# Patient Record
Sex: MALE | Race: WHITE | NOT HISPANIC OR LATINO | Employment: OTHER | URBAN - METROPOLITAN AREA
[De-identification: names, ages, dates, MRNs, and addresses within clinical notes are randomized per-mention and may not be internally consistent; named-entity substitution may affect disease eponyms.]

---

## 2017-02-10 DIAGNOSIS — G44.40 OTHER DRUG INDUCED HEADACHE: ICD-10-CM

## 2017-02-10 RX ORDER — GABAPENTIN 300 MG/1
CAPSULE ORAL
Qty: 270 CAPSULE | Refills: 0 | Status: SHIPPED | OUTPATIENT
Start: 2017-02-10 | End: 2017-05-09 | Stop reason: SDUPTHER

## 2017-02-20 ENCOUNTER — TELEPHONE (OUTPATIENT)
Dept: INTERNAL MEDICINE | Facility: CLINIC | Age: 77
End: 2017-02-20

## 2017-02-20 DIAGNOSIS — E78.2 MIXED HYPERLIPIDEMIA: ICD-10-CM

## 2017-02-20 DIAGNOSIS — E03.9 HYPOTHYROIDISM, UNSPECIFIED TYPE: ICD-10-CM

## 2017-02-20 DIAGNOSIS — I25.10 CORONARY ARTERY DISEASE INVOLVING NATIVE CORONARY ARTERY OF NATIVE HEART WITHOUT ANGINA PECTORIS: Primary | ICD-10-CM

## 2017-02-20 NOTE — TELEPHONE ENCOUNTER
Please have Dr Brown put lab orders in for this pt. He is coming in on 2/22/17.     Pt was here 10/16 but no labs done    Thanks

## 2017-02-22 ENCOUNTER — LAB (OUTPATIENT)
Dept: INTERNAL MEDICINE | Facility: CLINIC | Age: 77
End: 2017-02-22

## 2017-02-22 DIAGNOSIS — I25.10 CORONARY ARTERY DISEASE INVOLVING NATIVE CORONARY ARTERY OF NATIVE HEART WITHOUT ANGINA PECTORIS: ICD-10-CM

## 2017-02-22 DIAGNOSIS — E03.9 HYPOTHYROIDISM, UNSPECIFIED TYPE: ICD-10-CM

## 2017-02-22 DIAGNOSIS — E78.2 MIXED HYPERLIPIDEMIA: ICD-10-CM

## 2017-02-22 LAB
ALBUMIN SERPL-MCNC: 3.77 G/DL (ref 3.4–4.6)
ALBUMIN/GLOB SERPL: 1.3 G/DL
ALP SERPL-CCNC: 141 U/L (ref 46–116)
ALT SERPL W P-5'-P-CCNC: 85 U/L (ref 16–63)
ANION GAP SERPL CALCULATED.3IONS-SCNC: 7 MMOL/L
AST SERPL-CCNC: 58 U/L (ref 7–37)
BASOPHILS # BLD AUTO: 0.03 10*3/MM3 (ref 0–0.2)
BASOPHILS NFR BLD AUTO: 0.6 % (ref 0–2)
BILIRUB SERPL-MCNC: 1 MG/DL (ref 0.2–1)
BUN BLD-MCNC: 11 MG/DL (ref 6–22)
BUN/CREAT SERPL: 10.5 (ref 7–25)
CALCIUM SPEC-SCNC: 8.7 MG/DL (ref 8.6–10.5)
CHLORIDE SERPL-SCNC: 105 MMOL/L (ref 95–107)
CHOLEST SERPL-MCNC: 119 MG/DL (ref 0–200)
CO2 SERPL-SCNC: 31 MMOL/L (ref 23–32)
CREAT BLD-MCNC: 1.05 MG/DL (ref 0.7–1.3)
DEPRECATED RDW RBC AUTO: 43.1 FL (ref 37–54)
EOSINOPHIL # BLD AUTO: 0.18 10*3/MM3 (ref 0–0.7)
EOSINOPHIL NFR BLD AUTO: 3.4 % (ref 0–5)
ERYTHROCYTE [DISTWIDTH] IN BLOOD BY AUTOMATED COUNT: 11.8 % (ref 11.5–15)
GFR SERPL CREATININE-BSD FRML MDRD: 69 ML/MIN/1.73
GLOBULIN UR ELPH-MCNC: 2.9 GM/DL
GLUCOSE BLD-MCNC: 83 MG/DL (ref 70–100)
HCT VFR BLD AUTO: 45.4 % (ref 40.1–51)
HDLC SERPL-MCNC: 66 MG/DL (ref 40–81)
HGB BLD-MCNC: 15.5 G/DL (ref 13.7–17.5)
LDLC SERPL CALC-MCNC: 39 MG/DL (ref 0–100)
LDLC/HDLC SERPL: 0.59 {RATIO}
LYMPHOCYTES # BLD AUTO: 1.12 10*3/MM3 (ref 0.8–7)
LYMPHOCYTES NFR BLD AUTO: 21.3 % (ref 10–60)
MCH RBC QN AUTO: 33.9 PG (ref 26–34)
MCHC RBC AUTO-ENTMCNC: 34.1 G/DL (ref 31–37)
MCV RBC AUTO: 99.3 FL (ref 80–100)
MONOCYTES # BLD AUTO: 0.5 10*3/MM3 (ref 0–1)
MONOCYTES NFR BLD AUTO: 9.5 % (ref 0–13)
NEUTROPHILS # BLD AUTO: 3.43 10*3/MM3 (ref 1–11)
NEUTROPHILS NFR BLD AUTO: 65.2 % (ref 30–85)
PLATELET # BLD AUTO: 185 10*3/MM3 (ref 150–450)
PMV BLD AUTO: 11 FL (ref 6–12)
POTASSIUM BLD-SCNC: 4.4 MMOL/L (ref 3.3–5.3)
PROT SERPL-MCNC: 6.7 G/DL (ref 6.3–8.4)
RBC # BLD AUTO: 4.57 10*6/MM3 (ref 4.63–6.08)
SODIUM BLD-SCNC: 143 MMOL/L (ref 136–145)
T4 FREE SERPL-MCNC: 0.95 NG/DL (ref 0.93–1.7)
TRIGL SERPL-MCNC: 71 MG/DL (ref 0–150)
TSH SERPL DL<=0.05 MIU/L-ACNC: 3.97 MIU/ML (ref 0.4–4.2)
VLDLC SERPL-MCNC: 14.2 MG/DL
WBC NRBC COR # BLD: 5.26 10*3/MM3 (ref 5–10)

## 2017-02-22 PROCEDURE — 80053 COMPREHEN METABOLIC PANEL: CPT | Performed by: INTERNAL MEDICINE

## 2017-02-22 PROCEDURE — 84443 ASSAY THYROID STIM HORMONE: CPT | Performed by: INTERNAL MEDICINE

## 2017-02-22 PROCEDURE — 80061 LIPID PANEL: CPT | Performed by: INTERNAL MEDICINE

## 2017-02-22 PROCEDURE — 85025 COMPLETE CBC W/AUTO DIFF WBC: CPT | Performed by: INTERNAL MEDICINE

## 2017-05-09 DIAGNOSIS — G44.40 OTHER DRUG INDUCED HEADACHE: ICD-10-CM

## 2017-05-09 RX ORDER — GABAPENTIN 300 MG/1
CAPSULE ORAL
Qty: 270 CAPSULE | Refills: 0 | Status: SHIPPED | OUTPATIENT
Start: 2017-05-09 | End: 2017-08-15 | Stop reason: SDUPTHER

## 2017-05-16 DIAGNOSIS — F39 MOOD DISORDER (HCC): ICD-10-CM

## 2017-05-16 RX ORDER — FLUOXETINE HYDROCHLORIDE 40 MG/1
CAPSULE ORAL
Qty: 90 CAPSULE | Refills: 0 | Status: SHIPPED | OUTPATIENT
Start: 2017-05-16 | End: 2017-08-15 | Stop reason: SDUPTHER

## 2017-08-07 DIAGNOSIS — G44.40 OTHER DRUG INDUCED HEADACHE: ICD-10-CM

## 2017-08-07 RX ORDER — GABAPENTIN 300 MG/1
CAPSULE ORAL
Qty: 270 CAPSULE | OUTPATIENT
Start: 2017-08-07

## 2017-08-15 ENCOUNTER — OFFICE VISIT (OUTPATIENT)
Dept: INTERNAL MEDICINE | Facility: CLINIC | Age: 77
End: 2017-08-15

## 2017-08-15 VITALS
SYSTOLIC BLOOD PRESSURE: 110 MMHG | HEIGHT: 72 IN | DIASTOLIC BLOOD PRESSURE: 88 MMHG | WEIGHT: 201 LBS | BODY MASS INDEX: 27.22 KG/M2

## 2017-08-15 DIAGNOSIS — I25.10 CORONARY ARTERY DISEASE INVOLVING NATIVE CORONARY ARTERY OF NATIVE HEART WITHOUT ANGINA PECTORIS: Primary | ICD-10-CM

## 2017-08-15 DIAGNOSIS — F39 MOOD DISORDER (HCC): ICD-10-CM

## 2017-08-15 DIAGNOSIS — G44.40 OTHER DRUG INDUCED HEADACHE: ICD-10-CM

## 2017-08-15 DIAGNOSIS — N52.9 ERECTILE DYSFUNCTION, UNSPECIFIED ERECTILE DYSFUNCTION TYPE: ICD-10-CM

## 2017-08-15 DIAGNOSIS — G25.0 TREMOR, ESSENTIAL: ICD-10-CM

## 2017-08-15 DIAGNOSIS — E78.2 MIXED HYPERLIPIDEMIA: ICD-10-CM

## 2017-08-15 LAB
ALBUMIN SERPL-MCNC: 3.95 G/DL (ref 3.4–4.6)
ALBUMIN/GLOB SERPL: 1.3 G/DL
ALP SERPL-CCNC: 149 U/L (ref 46–116)
ALT SERPL W P-5'-P-CCNC: 68 U/L (ref 16–63)
ANION GAP SERPL CALCULATED.3IONS-SCNC: 7 MMOL/L
AST SERPL-CCNC: 58 U/L (ref 7–37)
BASOPHILS # BLD AUTO: 0.01 10*3/MM3 (ref 0–0.2)
BASOPHILS NFR BLD AUTO: 0.1 % (ref 0–2)
BILIRUB SERPL-MCNC: 0.6 MG/DL (ref 0.2–1)
BUN BLD-MCNC: 20 MG/DL (ref 6–22)
BUN/CREAT SERPL: 17.4 (ref 7–25)
CALCIUM SPEC-SCNC: 9.6 MG/DL (ref 8.6–10.5)
CHLORIDE SERPL-SCNC: 107 MMOL/L (ref 95–107)
CO2 SERPL-SCNC: 29 MMOL/L (ref 23–32)
CREAT BLD-MCNC: 1.15 MG/DL (ref 0.7–1.3)
DEPRECATED RDW RBC AUTO: 43.2 FL (ref 37–54)
EOSINOPHIL # BLD AUTO: 0.11 10*3/MM3 (ref 0–0.7)
EOSINOPHIL NFR BLD AUTO: 1.6 % (ref 0–5)
ERYTHROCYTE [DISTWIDTH] IN BLOOD BY AUTOMATED COUNT: 11.9 % (ref 11.5–15)
GFR SERPL CREATININE-BSD FRML MDRD: 62 ML/MIN/1.73
GLOBULIN UR ELPH-MCNC: 3 GM/DL
GLUCOSE BLD-MCNC: 90 MG/DL (ref 70–100)
HCT VFR BLD AUTO: 41.1 % (ref 40.1–51)
HGB BLD-MCNC: 14.5 G/DL (ref 13.7–17.5)
LYMPHOCYTES # BLD AUTO: 1.3 10*3/MM3 (ref 0.8–7)
LYMPHOCYTES NFR BLD AUTO: 18.3 % (ref 10–60)
MCH RBC QN AUTO: 34.9 PG (ref 26–34)
MCHC RBC AUTO-ENTMCNC: 35.3 G/DL (ref 31–37)
MCV RBC AUTO: 98.8 FL (ref 80–100)
MONOCYTES # BLD AUTO: 0.72 10*3/MM3 (ref 0–1)
MONOCYTES NFR BLD AUTO: 10.2 % (ref 0–13)
NEUTROPHILS # BLD AUTO: 4.95 10*3/MM3 (ref 1–11)
NEUTROPHILS NFR BLD AUTO: 69.8 % (ref 30–85)
PLATELET # BLD AUTO: 220 10*3/MM3 (ref 150–450)
PMV BLD AUTO: 10.9 FL (ref 6–12)
POTASSIUM BLD-SCNC: 4.6 MMOL/L (ref 3.3–5.3)
PROT SERPL-MCNC: 6.9 G/DL (ref 6.3–8.4)
RBC # BLD AUTO: 4.16 10*6/MM3 (ref 4.63–6.08)
SODIUM BLD-SCNC: 143 MMOL/L (ref 136–145)
WBC NRBC COR # BLD: 7.09 10*3/MM3 (ref 5–10)

## 2017-08-15 PROCEDURE — 85025 COMPLETE CBC W/AUTO DIFF WBC: CPT | Performed by: INTERNAL MEDICINE

## 2017-08-15 PROCEDURE — 80053 COMPREHEN METABOLIC PANEL: CPT | Performed by: INTERNAL MEDICINE

## 2017-08-15 PROCEDURE — 99214 OFFICE O/P EST MOD 30 MIN: CPT | Performed by: INTERNAL MEDICINE

## 2017-08-15 RX ORDER — FLUOXETINE HYDROCHLORIDE 40 MG/1
CAPSULE ORAL
Qty: 90 CAPSULE | Refills: 3 | Status: SHIPPED | OUTPATIENT
Start: 2017-08-15 | End: 2018-02-09 | Stop reason: SDUPTHER

## 2017-08-15 RX ORDER — IBUPROFEN 200 MG
200 TABLET ORAL
COMMUNITY
End: 2018-01-24 | Stop reason: SDUPTHER

## 2017-08-15 RX ORDER — GABAPENTIN 300 MG/1
300 CAPSULE ORAL 3 TIMES DAILY
Qty: 270 CAPSULE | Refills: 0 | Status: SHIPPED | OUTPATIENT
Start: 2017-08-15 | End: 2017-12-19 | Stop reason: SDUPTHER

## 2017-08-15 NOTE — PROGRESS NOTES
Subjective   Sukhdev Zayas is a 76 y.o. male.     Coronary Artery Disease   Presents for follow-up visit. Pertinent negatives include no chest pain or palpitations. Dizziness: Has hadinner ear problems for tears.        The following portions of the patient's history were reviewed and updated as appropriate: allergies, current medications, past family history, past medical history, past social history, past surgical history and problem list.    Review of Systems   Constitutional:        Here for F/U  Fell in yard last week   HENT: Negative.    Eyes: Negative.    Respiratory: Negative.    Cardiovascular: Negative for chest pain and palpitations.        Doing well sees Dr Proctor   Gastrointestinal: Negative.    Genitourinary: Negative.         Has issues W/ erectile dysfunction   Musculoskeletal: Negative.    Neurological: Dizziness: Has hadinner ear problems for tears.       Objective   Physical Exam   Constitutional: He is oriented to person, place, and time. He appears well-developed.   HENT:   Head: Normocephalic.   Eyes: EOM are normal.   Neck: Neck supple.   Cardiovascular: Normal rate, regular rhythm and normal heart sounds.    Repeat 130/80   Pulmonary/Chest: Effort normal and breath sounds normal.   Musculoskeletal: Normal range of motion.   Neurological: He is alert and oriented to person, place, and time.   Vitals reviewed.      Assessment/Plan   Sukhdev was seen today for med refill and follow-up.    Diagnoses and all orders for this visit:    Coronary artery disease involving native coronary artery of native heart without angina pectoris  -     CBC Auto Differential; Future  -     Comprehensive Metabolic Panel; Future  -     CBC Auto Differential  -     Comprehensive Metabolic Panel    Mixed hyperlipidemia    Tremor, essential    Erectile dysfunction, unspecified erectile dysfunction type  -     Testosterone, Free, Total; Future  -     Testosterone, Free, Total    Other drug induced headache  -      gabapentin (NEURONTIN) 300 MG capsule; Take 1 capsule by mouth 3 (Three) Times a Day.

## 2017-08-16 LAB
TESTOST FREE SERPL-MCNC: 6.1 PG/ML (ref 6.6–18.1)
TESTOST SERPL-MCNC: 684 NG/DL (ref 264–916)

## 2017-11-15 ENCOUNTER — OFFICE VISIT (OUTPATIENT)
Dept: INTERNAL MEDICINE | Facility: CLINIC | Age: 77
End: 2017-11-15

## 2017-11-15 VITALS
HEART RATE: 58 BPM | DIASTOLIC BLOOD PRESSURE: 98 MMHG | SYSTOLIC BLOOD PRESSURE: 120 MMHG | BODY MASS INDEX: 27.63 KG/M2 | OXYGEN SATURATION: 97 % | WEIGHT: 204 LBS | HEIGHT: 72 IN

## 2017-11-15 DIAGNOSIS — E78.2 MIXED HYPERLIPIDEMIA: ICD-10-CM

## 2017-11-15 DIAGNOSIS — R31.9 HEMATURIA OF UNDIAGNOSED CAUSE: ICD-10-CM

## 2017-11-15 DIAGNOSIS — I25.10 CORONARY ARTERY DISEASE INVOLVING NATIVE CORONARY ARTERY OF NATIVE HEART WITHOUT ANGINA PECTORIS: ICD-10-CM

## 2017-11-15 DIAGNOSIS — R26.9 GAIT DISTURBANCE: Primary | ICD-10-CM

## 2017-11-15 DIAGNOSIS — Z12.5 PROSTATE CANCER SCREENING: ICD-10-CM

## 2017-11-15 LAB
ALBUMIN SERPL-MCNC: 4.2 G/DL (ref 3.5–5.2)
ALBUMIN/GLOB SERPL: 1.4 G/DL
ALP SERPL-CCNC: 153 U/L (ref 39–117)
ALT SERPL W P-5'-P-CCNC: 62 U/L (ref 1–41)
AMORPH URATE CRY URNS QL MICRO: ABNORMAL /HPF
ANION GAP SERPL CALCULATED.3IONS-SCNC: 12 MMOL/L
AST SERPL-CCNC: 57 U/L (ref 1–40)
BACTERIA UR QL AUTO: ABNORMAL /HPF
BASOPHILS # BLD AUTO: 0.01 10*3/MM3 (ref 0–0.2)
BASOPHILS NFR BLD AUTO: 0.2 % (ref 0–2)
BILIRUB SERPL-MCNC: 1.1 MG/DL (ref 0.1–1.2)
BILIRUB UR QL STRIP: NEGATIVE
BUN BLD-MCNC: 17 MG/DL (ref 8–23)
BUN/CREAT SERPL: 17 (ref 7–25)
CALCIUM SPEC-SCNC: 9.3 MG/DL (ref 8.6–10.5)
CHLORIDE SERPL-SCNC: 102 MMOL/L (ref 98–107)
CHOLEST SERPL-MCNC: 124 MG/DL (ref 0–200)
CLARITY UR: ABNORMAL
CO2 SERPL-SCNC: 27 MMOL/L (ref 22–29)
COLOR UR: YELLOW
CREAT BLD-MCNC: 1 MG/DL (ref 0.76–1.27)
DEPRECATED RDW RBC AUTO: 46.3 FL (ref 37–54)
EOSINOPHIL # BLD AUTO: 0.13 10*3/MM3 (ref 0–0.7)
EOSINOPHIL NFR BLD AUTO: 2 % (ref 0–5)
ERYTHROCYTE [DISTWIDTH] IN BLOOD BY AUTOMATED COUNT: 12.5 % (ref 11.5–15)
GFR SERPL CREATININE-BSD FRML MDRD: 73 ML/MIN/1.73
GLOBULIN UR ELPH-MCNC: 3 GM/DL
GLUCOSE BLD-MCNC: 90 MG/DL (ref 65–99)
GLUCOSE UR STRIP-MCNC: NEGATIVE MG/DL
HCT VFR BLD AUTO: 46.5 % (ref 40.1–51)
HDLC SERPL-MCNC: 74 MG/DL (ref 40–60)
HGB BLD-MCNC: 15.3 G/DL (ref 13.7–17.5)
HGB UR QL STRIP.AUTO: ABNORMAL
HYALINE CASTS UR QL AUTO: ABNORMAL /LPF
KETONES UR QL STRIP: NEGATIVE
LDLC SERPL CALC-MCNC: 35 MG/DL (ref 0–100)
LDLC/HDLC SERPL: 0.48 {RATIO}
LEUKOCYTE ESTERASE UR QL STRIP.AUTO: NEGATIVE
LYMPHOCYTES # BLD AUTO: 1.11 10*3/MM3 (ref 0.8–7)
LYMPHOCYTES NFR BLD AUTO: 17.1 % (ref 10–60)
MCH RBC QN AUTO: 33.7 PG (ref 26–34)
MCHC RBC AUTO-ENTMCNC: 32.9 G/DL (ref 31–37)
MCV RBC AUTO: 102.4 FL (ref 80–100)
MONOCYTES # BLD AUTO: 0.51 10*3/MM3 (ref 0–1)
MONOCYTES NFR BLD AUTO: 7.9 % (ref 0–13)
MUCOUS THREADS URNS QL MICRO: ABNORMAL /HPF
NEUTROPHILS # BLD AUTO: 4.73 10*3/MM3 (ref 1–11)
NEUTROPHILS NFR BLD AUTO: 72.8 % (ref 30–85)
NITRITE UR QL STRIP: NEGATIVE
PH UR STRIP.AUTO: 7 [PH] (ref 5–8)
PLATELET # BLD AUTO: 210 10*3/MM3 (ref 150–450)
PMV BLD AUTO: 11 FL (ref 6–12)
POTASSIUM BLD-SCNC: 4.1 MMOL/L (ref 3.5–5.2)
PROT SERPL-MCNC: 7.2 G/DL (ref 6–8.5)
PROT UR QL STRIP: NEGATIVE
PSA SERPL-MCNC: 1.54 NG/ML (ref 0–4)
RBC # BLD AUTO: 4.54 10*6/MM3 (ref 4.63–6.08)
RBC # UR: ABNORMAL /HPF
REF LAB TEST METHOD: ABNORMAL
SODIUM BLD-SCNC: 141 MMOL/L (ref 136–145)
SP GR UR STRIP: 1.02 (ref 1–1.03)
SQUAMOUS #/AREA URNS HPF: ABNORMAL /HPF
TRIGL SERPL-MCNC: 73 MG/DL (ref 0–150)
UROBILINOGEN UR QL STRIP: ABNORMAL
VLDLC SERPL-MCNC: 14.6 MG/DL (ref 5–40)
WBC NRBC COR # BLD: 6.49 10*3/MM3 (ref 5–10)
WBC UR QL AUTO: ABNORMAL /HPF

## 2017-11-15 PROCEDURE — 36415 COLL VENOUS BLD VENIPUNCTURE: CPT | Performed by: INTERNAL MEDICINE

## 2017-11-15 PROCEDURE — 81001 URINALYSIS AUTO W/SCOPE: CPT | Performed by: INTERNAL MEDICINE

## 2017-11-15 PROCEDURE — 99214 OFFICE O/P EST MOD 30 MIN: CPT | Performed by: INTERNAL MEDICINE

## 2017-11-15 PROCEDURE — 80053 COMPREHEN METABOLIC PANEL: CPT | Performed by: INTERNAL MEDICINE

## 2017-11-15 PROCEDURE — G0103 PSA SCREENING: HCPCS | Performed by: INTERNAL MEDICINE

## 2017-11-15 PROCEDURE — 80061 LIPID PANEL: CPT | Performed by: INTERNAL MEDICINE

## 2017-11-15 PROCEDURE — 85025 COMPLETE CBC W/AUTO DIFF WBC: CPT | Performed by: INTERNAL MEDICINE

## 2017-11-15 NOTE — PROGRESS NOTES
Subjective   Sukhdev Zayas is a 76 y.o. male.     Fall   The accident occurred more than 1 week ago. Associated symptoms include hematuria (Has had 2 episodes of hematuria No pain or dysuria).   Hyperlipidemia   This is a chronic problem. Associated symptoms include shortness of breath (Due to inactivity). Pertinent negatives include no chest pain.        The following portions of the patient's history were reviewed and updated as appropriate: allergies, current medications, past family history, past medical history, past social history, past surgical history and problem list.    Review of Systems   Constitutional:        Doing about the same   HENT: Negative.    Eyes: Negative.    Respiratory: Positive for shortness of breath (Due to inactivity).    Cardiovascular: Negative for chest pain and palpitations.   Gastrointestinal: Negative.    Genitourinary: Positive for hematuria (Has had 2 episodes of hematuria No pain or dysuria).   Musculoskeletal: Negative.    Neurological: Positive for dizziness (Having balance issues & has fallen twice W/ no injury Comes & goes Has episodic vertigo which is incapacitating).       Objective   Physical Exam   Constitutional: He is oriented to person, place, and time. He appears well-developed and well-nourished.   HENT:   Head: Normocephalic.   Eyes: EOM are normal.   Neck: Neck supple.   Cardiovascular: Normal rate, regular rhythm and normal heart sounds.    Repeat 130/80   Pulmonary/Chest: Effort normal and breath sounds normal.   Genitourinary: Rectum normal, prostate normal and penis normal.   Musculoskeletal: Normal range of motion.   Neurological: He is alert and oriented to person, place, and time.   Vitals reviewed.      Assessment/Plan   Sukhdev was seen today for fall and hyperlipidemia.    Diagnoses and all orders for this visit:    Gait disturbance  -     Ambulatory Referral to Neurology    Hematuria of undiagnosed cause  -     Urinalysis With / Microscopic If  Indicated - Urine, Clean Catch; Future    Coronary artery disease involving native coronary artery of native heart without angina pectoris  -     CBC Auto Differential; Future  -     Comprehensive Metabolic Panel; Future    Mixed hyperlipidemia  -     Lipid Panel; Future    Prostate cancer screening  -     PSA Screen; Future

## 2017-12-19 DIAGNOSIS — R52 PAIN: Primary | ICD-10-CM

## 2017-12-19 RX ORDER — GABAPENTIN 300 MG/1
300 CAPSULE ORAL 3 TIMES DAILY
Qty: 270 CAPSULE | Refills: 0 | Status: SHIPPED | OUTPATIENT
Start: 2017-12-19 | End: 2018-04-17 | Stop reason: SDUPTHER

## 2017-12-19 NOTE — TELEPHONE ENCOUNTER
Last OV  11/15  Last refill 08/05  blair done    Please print to fax to [facility]  please review med refill and advise

## 2018-01-10 ENCOUNTER — HOSPITAL ENCOUNTER (EMERGENCY)
Facility: HOSPITAL | Age: 78
Discharge: HOME | End: 2018-01-10
Attending: EMERGENCY MEDICINE | Admitting: EMERGENCY MEDICINE

## 2018-01-10 ENCOUNTER — APPOINTMENT (OUTPATIENT)
Dept: GENERAL RADIOLOGY | Facility: HOSPITAL | Age: 78
End: 2018-01-10

## 2018-01-10 ENCOUNTER — APPOINTMENT (OUTPATIENT)
Dept: CT IMAGING | Facility: HOSPITAL | Age: 78
End: 2018-01-10

## 2018-01-10 VITALS
WEIGHT: 205 LBS | BODY MASS INDEX: 27.77 KG/M2 | HEIGHT: 72 IN | DIASTOLIC BLOOD PRESSURE: 94 MMHG | HEART RATE: 80 BPM | TEMPERATURE: 96.3 F | SYSTOLIC BLOOD PRESSURE: 157 MMHG | OXYGEN SATURATION: 93 % | RESPIRATION RATE: 18 BRPM

## 2018-01-10 DIAGNOSIS — S22.050A TRAUMATIC COMPRESSION FRACTURE OF T5 THORACIC VERTEBRA, CLOSED, INITIAL ENCOUNTER (HCC): Primary | ICD-10-CM

## 2018-01-10 LAB
HOLD SPECIMEN: NORMAL
HOLD SPECIMEN: NORMAL
WHOLE BLOOD HOLD SPECIMEN: NORMAL
WHOLE BLOOD HOLD SPECIMEN: NORMAL

## 2018-01-10 PROCEDURE — 99284 EMERGENCY DEPT VISIT MOD MDM: CPT

## 2018-01-10 PROCEDURE — 72128 CT CHEST SPINE W/O DYE: CPT

## 2018-01-10 PROCEDURE — 72072 X-RAY EXAM THORAC SPINE 3VWS: CPT

## 2018-01-10 PROCEDURE — 73030 X-RAY EXAM OF SHOULDER: CPT

## 2018-01-10 PROCEDURE — 70450 CT HEAD/BRAIN W/O DYE: CPT

## 2018-01-10 PROCEDURE — 72110 X-RAY EXAM L-2 SPINE 4/>VWS: CPT

## 2018-01-10 RX ORDER — IBUPROFEN 800 MG/1
800 TABLET ORAL ONCE
Status: COMPLETED | OUTPATIENT
Start: 2018-01-10 | End: 2018-01-10

## 2018-01-10 RX ORDER — SODIUM CHLORIDE 0.9 % (FLUSH) 0.9 %
10 SYRINGE (ML) INJECTION AS NEEDED
Status: DISCONTINUED | OUTPATIENT
Start: 2018-01-10 | End: 2018-01-10 | Stop reason: HOSPADM

## 2018-01-10 RX ORDER — HYDROCODONE BITARTRATE AND ACETAMINOPHEN 7.5; 325 MG/1; MG/1
1 TABLET ORAL ONCE
Status: COMPLETED | OUTPATIENT
Start: 2018-01-10 | End: 2018-01-10

## 2018-01-10 RX ORDER — HYDROCODONE BITARTRATE AND ACETAMINOPHEN 5; 325 MG/1; MG/1
1 TABLET ORAL EVERY 6 HOURS PRN
Qty: 30 TABLET | Refills: 0 | Status: SHIPPED | OUTPATIENT
Start: 2018-01-10 | End: 2018-01-26 | Stop reason: SDUPTHER

## 2018-01-10 RX ADMIN — IBUPROFEN 800 MG: 800 TABLET ORAL at 12:19

## 2018-01-10 RX ADMIN — HYDROCODONE BITARTRATE AND ACETAMINOPHEN 1 TABLET: 7.5; 325 TABLET ORAL at 13:18

## 2018-01-10 NOTE — ED TRIAGE NOTES
"Pt had slip and fall on ice two days ago, landing on back. Reports hit head and on Plavix. Pt states \"I think it knocked me out, it took me couple minutes to get back up the stairs.\" pt now c/o headache, dizziness and pain all over  "

## 2018-01-10 NOTE — ED PROVIDER NOTES
"EMERGENCY DEPARTMENT ENCOUNTER    CHIEF COMPLAINT  Chief Complaint: back pain  History given by: pt/ family is present at bedside   History limited by: N/A  Room Number: 44/44  PMD: Lbian Brown MD      HPI:  Pt is a 77 y.o. male who presents complaining of diffuse back pain that began 2 days ago s/p mechanical fall with progressive worsening pain. Pt states he suffered from a head injury from the fall when he slipped on a patch of ice and fell backwards, hitting the back of his head and back on the ground. Pt states he has been able to walk since initial encounter; however due to the progressive worsening of pain ambulating has become more difficult. Pt also c/o generalized myalgias/ arthralgias [secondary to the fall] and HA, but denies any other pertinent symptoms. Pt is currently taking ASA and Plavix.     Duration:  2 days   Onset: sudden   Timing: constant   Location: diffuse back   Radiation: None reported   Quality: \"pain\"  Intensity/Severity: moderate   Progression: worsening   Associated Symptoms: HA, generalized myalgias/ arthralgias   Aggravating Factors: Ambulating   Alleviating Factors: None reported   Previous Episodes: Pt experienced a mechanical fall 2 days ago.   Treatment before arrival: Pt is currently taking Plavix and ASA.     PAST MEDICAL HISTORY  Active Ambulatory Problems     Diagnosis Date Noted   • Hyperlipidemia 05/09/2016   • Coronary artery disease involving native coronary artery of native heart without angina pectoris 05/09/2016   • Cognitive disorder 05/09/2016   • Mood disorder 10/20/2016     Resolved Ambulatory Problems     Diagnosis Date Noted   • No Resolved Ambulatory Problems     Past Medical History:   Diagnosis Date   • Anxiety    • Back pain    • Coronary artery disease    • Depression    • Fatigue    • Hyperlipidemia    • Hypertension        PAST SURGICAL HISTORY  History reviewed. No pertinent surgical history.    FAMILY HISTORY  Family History   Problem Relation Age " of Onset   • Arthritis Mother    • Glaucoma Mother    • Heart disease Father    • Emphysema Father    • Tremor Father        SOCIAL HISTORY  Social History     Social History   • Marital status:      Spouse name: N/A   • Number of children: N/A   • Years of education: N/A     Occupational History   • Not on file.     Social History Main Topics   • Smoking status: Never Smoker   • Smokeless tobacco: Not on file   • Alcohol use Yes      Comment: OCC   • Drug use: No   • Sexual activity: Not on file     Other Topics Concern   • Not on file     Social History Narrative       ALLERGIES  No known drug allergy    REVIEW OF SYSTEMS  Review of Systems   Constitutional: Negative.  Negative for chills and fever.   HENT: Negative for sore throat.    Eyes: Negative.    Respiratory: Negative.  Negative for cough.    Cardiovascular: Negative.  Negative for chest pain.   Gastrointestinal: Negative.    Genitourinary: Negative.  Negative for dysuria.   Musculoskeletal: Positive for arthralgias (generalized ), back pain and myalgias (generalized ).   Skin: Negative.  Negative for rash.   Neurological: Positive for headaches (from head injury ).       PHYSICAL EXAM  ED Triage Vitals   Temp Heart Rate Resp BP SpO2   01/10/18 0730 01/10/18 0730 01/10/18 0730 01/10/18 0734 01/10/18 0730   96.3 °F (35.7 °C) 92 20 150/90 95 %      Temp src Heart Rate Source Patient Position BP Location FiO2 (%)   01/10/18 0730 01/10/18 0730 01/10/18 0734 01/10/18 0910 --   Tympanic Monitor Lying Left arm        Physical Exam   Constitutional: He is oriented to person, place, and time. He appears distressed (moderate due to pain ).   HENT:   Head: Normocephalic and atraumatic.   Eyes: EOM are normal. Pupils are equal, round, and reactive to light.   Neck: Normal range of motion. Neck supple.   Cardiovascular: Normal rate, regular rhythm, normal heart sounds and intact distal pulses.    Pulmonary/Chest: Effort normal and breath sounds normal. No  respiratory distress. He exhibits tenderness (to R psoterior ribs ).   Abdominal: Soft. There is no tenderness. There is no rebound and no guarding.   Musculoskeletal: Normal range of motion. He exhibits no edema.   Pt's pain is reproduced with movement   There are multiple abrasions/ contusions to the back with the most significant tenderness at the mid-back and R posterior ribs    Neurological: He is alert and oriented to person, place, and time. He has normal sensation and normal strength.   Skin: Skin is warm and dry.   Multiple contusions/ abrasions to the back    Psychiatric: Mood and affect normal.   Nursing note and vitals reviewed.      LAB RESULTS  Lab Results (last 24 hours)     ** No results found for the last 24 hours. **          I ordered the above labs and reviewed the results    RADIOLOGY  CT Head Without Contrast   FINDINGS: The brain and ventricles are symmetrical. There is no evidence  of intracranial hemorrhage, hydrocephalus or of abnormal extra-axial  fluid. No focal area of decreased attenuation to suggest acute  infarction is identified. Bone windows showed no evidence of calvarial  fracture. A small scalp hematoma is present in the posterior parietal  region at the midline.      XR Shoulder 2+ View Right    (Results Pending)   XR Spine Lumbar 4+ View    (Results Pending)   XR Spine Thoracic 3 View    (Results Pending)     Impression         There is compression fracture at approximately T5 thoracic level with  mild 20% to 30% loss of anterior vertebral body height. The posterior  cortex of the compressed T5 vertebra is not visualized and may be  artifact though I cannot exclude a lytic process. Age of this  compression fracture is uncertain, could be anywhere from acute to  chronic and I recommend further evaluation of the T5 thoracic vertebrae  with either a CT or MRI of the thoracic spine. No additional compression  fracture is seen in the thoracic spine.      LUMBAR SPINE PLAIN FILMS: A  total of 5 views of the lumbar spine  including AP lateral and bilateral oblique views of the entire lumbar  spine and coned-down lateral view of the lumbosacral junction are  submitted for interpretation. On lateral view,  the lumbar vertebral body heights are well-maintained. There is moderate  disc space narrowing, degenerative endplate change at L4-L5, anterior  marginal bridging osteophytes from T10 to L1. There is some facet  arthropathy L3 to S1. On the oblique views, the pars interarticularis  are intact. On the AP view, there is slight rotary dextroscoliotic  curvature of the lumbar spine, its apex is at the L3 lumbar level.  Pedicles and transverse processes appear to be intact.     IMPRESSION: No convincing acute fracture is seen in the lumbar spine.  There is mild dextroscoliotic curvature of the lumbar spine, its apex at  the L3 lumbar level with some lumbar spondylosis,  bilateral facet  overgrowth L2 to S1. The results on this exam were communicated to  Angi Lyles by telephone on 01/10/2018 at 9 AM.         I ordered the above noted radiological studies. Interpreted by radiologist. Reviewed by me in PACS.       PROCEDURES  Procedures      PROGRESS AND CONSULTS  ED Course     0742  Ordered T-spine XR, L-spine XR, R shoulder XR, and CT Head for further evaluation.     0913  Ordered IVF and labs for further evaluation.     1146  Ordered CT Thoracic Spine for further evaluation and Norco for pain.     1242  Rechecked pt who is sitting upright in a chair and in no acute distress. Discussed that CT T-spine shows a T-spine compression fracture. Discussed this does not require surgical intervention at this time and the tx plan is to manage pt's pain. Recommend pt f/u with neurosurgery next week and PCP as needed. Discussed discharge plans with pt/ spouse. Pt understands and agrees to plan, all questions addressed at this time.     MEDICAL DECISION MAKING  Results were reviewed/discussed with the patient  and they were also made aware of online access. Pt also made aware that some labs, such as cultures, will not be resulted during ER visit and follow up with PMD is necessary.     MDM  Number of Diagnoses or Management Options     Amount and/or Complexity of Data Reviewed  Tests in the radiology section of CPT®: ordered and reviewed (CT Head  FINDINGS: The brain and ventricles are symmetrical. There is no evidence of intracranial hemorrhage, hydrocephalus or of abnormal extra-axial fluid. No focal area of decreased attenuation to suggest acute infarction is identified. Bone windows showed no evidence of calvarial fracture. A small scalp hematoma is present in the posterior parietal region at the midline.    Impression        There is compression fracture at approximately T5 thoracic level with mild 20% to 30% loss of anterior vertebral body height. The posterior cortex of the compressed T5 vertebra is not visualized and may be artifact. Age of this compression fracture is uncertain, could be anywhere from acute to chronic and could be further dated with a CT or MRI of the thoracic spine if clinically indicated. No additional compression fracture is seen in the thoracic spine.      LUMBAR SPINE PLAIN FILMS: A total of 5 views of the lumbar spine including AP lateral and bilateral oblique views of the entire lumbar spine and coned-down lateral view of the lumbosacral junction are submitted for interpretation. On lateral view, the lumbar vertebral body heights are well-maintained. There is moderate disc space narrowing, degenerative endplate change at L4-L5, anterior marginal bridging osteophytes from T10 to L1. There is some facet arthropathy L3 to S1. On the oblique views, the pars interarticularis are intact. On the AP view, there is slight rotary dextroscoliotic curvature of the lumbar spine, its apex is at the L3 lumbar level. Pedicles and transverse processes appear to be intact.     IMPRESSION: No convincing  acute fracture is seen in the lumbar spine. There is mild dextroscoliotic curvature of the lumbar spine, its apex at the L3 lumbar level with some lumbar spondylosis,  bilateral facet overgrowth L2 to S1. The results on this exam were communicated to Angitoro Lyles by telephone on 01/10/2018 at 9 AM.    )  Independent visualization of images, tracings, or specimens: yes           DIAGNOSIS  Final diagnoses:   Traumatic compression fracture of T5 thoracic vertebra, closed, initial encounter       DISPOSITION  DISCHARGE    Patient discharged in stable condition.    Reviewed implications of results, diagnosis, meds, responsibility to follow up, warning signs and symptoms of possible worsening, potential complications and reasons to return to ER.    Patient/Family voiced understanding of above instructions.    Discussed plan for discharge, as there is no emergent indication for admission.  Pt/family is agreeable and understands need for follow up and repeat testing.  Pt is aware that discharge does not mean that nothing is wrong but it indicates no emergency is present that requires admission and they must continue care with follow-up as given below or physician of their choice.     FOLLOW-UP  Yg Gorman MD  [id number] [id number]  [phone]               Medication List      New Prescriptions          HYDROcodone-acetaminophen 5-325 MG per tablet   Commonly known as:  NORCO   Take 1 tablet by mouth Every 6 (Six) Hours As Needed for Severe Pain .               Latest Documented Vital Signs:  As of 8:42 AM  BP- 157/94 HR- 80 Temp- 96.3 °F (35.7 °C) (Tympanic) O2 sat- 93%    --  Documentation assistance provided by martha Triplett for Dr. Sharma.  Information recorded by the scribe was done at my direction and has been verified and validated by me.          Krish Triplett  01/10/18 1249       Aries Sharma MD  01/11/18 [id number]

## 2018-01-15 ENCOUNTER — TELEPHONE (OUTPATIENT)
Dept: SOCIAL WORK | Facility: HOSPITAL | Age: 78
End: 2018-01-15

## 2018-01-15 NOTE — TELEPHONE ENCOUNTER
ER F/U phone call:   Pt states that he is still having pain. Advised pt to contact his PCP or neurosurgeon for review of medications. Pt verbalized understanding. No other questions or concerns voiced. Trini Loco RN

## 2018-01-17 ENCOUNTER — TELEPHONE (OUTPATIENT)
Dept: NEUROSURGERY | Facility: CLINIC | Age: 78
End: 2018-01-17

## 2018-01-17 NOTE — TELEPHONE ENCOUNTER
Left message informing patient that we have not received his new patient packet for his appointment on 01/24/2018 with Ashley Huynh.   Pt was advised on voicemail if we do not receive his packet by 4:30 on Jan 19th his new patient appt will be cancelled. Pt was advised that he would need to drop the packet off in person or fax it to our office 3 days prior to appt to ensure that we receive it on time.

## 2018-01-17 NOTE — TELEPHONE ENCOUNTER
Called and left message apologizing and stated to ignore previous message. Informed on voicemail that we have received his new patient packet.

## 2018-01-18 ENCOUNTER — TELEPHONE (OUTPATIENT)
Dept: INTERNAL MEDICINE | Facility: CLINIC | Age: 78
End: 2018-01-18

## 2018-01-18 DIAGNOSIS — S22.000B: Primary | ICD-10-CM

## 2018-01-18 RX ORDER — HYDROCODONE BITARTRATE AND ACETAMINOPHEN 5; 325 MG/1; MG/1
1 TABLET ORAL EVERY 6 HOURS PRN
Qty: 30 TABLET | Refills: 0 | Status: SHIPPED | OUTPATIENT
Start: 2018-01-18 | End: 2018-01-24 | Stop reason: SDUPTHER

## 2018-01-18 NOTE — TELEPHONE ENCOUNTER
----- Message from Ashley Mohan sent at 1/18/2018  9:04 AM EST -----  Contact: [place] patient wife    [place] called requesting refill. Patient fell and was in ER with fracture on 1-10-18 and is out of pain meds and would like to have it filled early.    HYDROcodone-acetaminophen (NORCO) 5-325 MG per tablet    blair done  12/21  Refill was 01/10  #30     Pt# [phone]

## 2018-01-18 NOTE — TELEPHONE ENCOUNTER
Wife came and p/u rx. Did not sign CSA , I called her again , spoke to pt and advised him or his wife to come again to sign it .

## 2018-01-24 ENCOUNTER — OFFICE VISIT (OUTPATIENT)
Dept: NEUROSURGERY | Facility: CLINIC | Age: 78
End: 2018-01-24

## 2018-01-24 VITALS
BODY MASS INDEX: 27.77 KG/M2 | HEIGHT: 72 IN | HEART RATE: 68 BPM | WEIGHT: 205 LBS | DIASTOLIC BLOOD PRESSURE: 76 MMHG | SYSTOLIC BLOOD PRESSURE: 118 MMHG

## 2018-01-24 DIAGNOSIS — Z91.81 HX OF FALL: ICD-10-CM

## 2018-01-24 DIAGNOSIS — G44.309 POST-TRAUMATIC HEADACHE, NOT INTRACTABLE, UNSPECIFIED CHRONICITY PATTERN: ICD-10-CM

## 2018-01-24 DIAGNOSIS — M54.12 CERVICAL RADICULOPATHY AT C7: ICD-10-CM

## 2018-01-24 DIAGNOSIS — M54.14 THORACIC RADICULITIS: ICD-10-CM

## 2018-01-24 DIAGNOSIS — S22.050A CLOSED WEDGE COMPRESSION FRACTURE OF FIFTH THORACIC VERTEBRA, INITIAL ENCOUNTER: Primary | ICD-10-CM

## 2018-01-24 PROCEDURE — 99205 OFFICE O/P NEW HI 60 MIN: CPT | Performed by: NURSE PRACTITIONER

## 2018-01-24 RX ORDER — IBUPROFEN 200 MG
600 TABLET ORAL EVERY 6 HOURS PRN
COMMUNITY
End: 2018-02-27

## 2018-01-24 RX ORDER — METHYLPREDNISOLONE 4 MG/1
TABLET ORAL
Qty: 1 TABLET | Refills: 0 | Status: SHIPPED | OUTPATIENT
Start: 2018-01-24 | End: 2018-02-06

## 2018-01-24 NOTE — PROGRESS NOTES
Subjective   Patient ID: Sukhdev Zayas is a 77 y.o. male is here today as a self referral. Pt was seen in the ER for back pain after falling on icy steps on 01/07/2018.   Pt is currently taking Gabapentin 300 mg TID, Ibuprofen 600 mg and Norco 5-325 mg PRN pain.   Pt has XRAY lumbar, thoracic and shoulder from 01/10/2018 at [facility].   Pt also has CT thoracic and head scans from 01/10/2018 at [facility].       HPI Comments: Mr. Zayas is a 77-year-old male with a history of lumbar stenosis and prior lumbar surgery with Dr. Gorman many years ago.  He was last seen in the office in 2013.  He has a history of coronary artery disease and is on Plavix at home.  The patient fell slipping on the ice.  He was walking to get the newspaper.  He fell down the steps striking his head and his back on the steps.  He did not lose consciousness.  He was seen in the emergency room.  A thoracic CT was performed which revealed a T5 compression fracture of uncertain age.  CT scan of the brain was negative.  Patient was sent home from the emergency room and he contacted our office for an appointment.  Since that time he has begun to experience worsening upper thoracic pain, right scapular pain, neck pain, right arm pain and headache.  He does note a history of balance issues but also states that he has a history of vertigo.    Back Pain   This is a new problem. The current episode started 1 to 4 weeks ago. The problem occurs constantly. The problem has been gradually improving since onset. The pain is present in the thoracic spine. The quality of the pain is described as aching, burning, cramping, shooting and stabbing. Radiates to: Right shoulder. The pain is at a severity of 8/10. The pain is severe. The pain is the same all the time. The symptoms are aggravated by standing, position, bending, coughing and twisting. Stiffness is present all day. Associated symptoms include headaches, numbness (bilateral shoulder), tingling (bilateral  shoulders) and weakness (right shoulder and arm to fingers). Pertinent negatives include no bladder incontinence, bowel incontinence or chest pain. He has tried muscle relaxant and NSAIDs (Norco, Gavapentin and Ibuprofen) for the symptoms. The treatment provided moderate relief.   Neck Pain    This is a new problem. The current episode started 1 to 4 weeks ago. The problem occurs constantly. The problem has been unchanged. The pain is associated with a fall. The pain is present in the right side. The quality of the pain is described as aching. The pain is moderate. Associated symptoms include headaches, numbness (bilateral shoulder), tingling (bilateral shoulders) and weakness (right shoulder and arm to fingers). Pertinent negatives include no chest pain or trouble swallowing. Associated symptoms comments: Right scapular and right arm pain.. He has tried bed rest and acetaminophen for the symptoms. The treatment provided no relief.   Headache    This is a new problem. The current episode started 1 to 4 weeks ago. The problem occurs constantly. The problem has been unchanged. The pain is located in the occipital and bilateral region. The quality of the pain is described as aching. The pain is moderate. Associated symptoms include back pain, dizziness, nausea, neck pain, numbness (bilateral shoulder), tingling (bilateral shoulders) and weakness (right shoulder and arm to fingers). The treatment provided no relief. (Recent fall down steps.  Headache.  On Plavix.)       The following portions of the patient's history were reviewed and updated as appropriate: allergies, current medications, past family history, past medical history, past social history, past surgical history and problem list.    Review of Systems   Constitutional: Positive for activity change.   HENT: Negative for facial swelling and trouble swallowing.    Respiratory: Negative for chest tightness and shortness of breath.    Cardiovascular: Negative for  chest pain.   Gastrointestinal: Positive for nausea. Negative for bowel incontinence.   Genitourinary: Negative for bladder incontinence.   Musculoskeletal: Positive for arthralgias, back pain, gait problem, neck pain and neck stiffness.   Neurological: Positive for dizziness, tingling (bilateral shoulders), weakness (right shoulder and arm to fingers), numbness (bilateral shoulder) and headaches. Negative for speech difficulty.   All other systems reviewed and are negative.      Objective   Physical Exam   Constitutional: He is oriented to person, place, and time. He appears well-developed and well-nourished. He is cooperative. No distress.   HENT:   Head: Normocephalic and atraumatic.   Right Ear: External ear normal.   Left Ear: External ear normal.   Eyes: Conjunctivae and EOM are normal. Pupils are equal, round, and reactive to light. No scleral icterus.   Neck: Normal range of motion. Neck supple. No tracheal deviation present.   Cardiovascular: Normal rate.    Pulmonary/Chest: Effort normal. No respiratory distress. He has no wheezes.   Abdominal: Soft. He exhibits no distension. There is no tenderness.   Musculoskeletal: Normal range of motion. He exhibits tenderness (in the posterior cervical region as well as the upper thoracic region and right scapular region with palpation). He exhibits no edema.   Neurological: He is alert and oriented to person, place, and time. He has normal strength and normal reflexes. He displays normal reflexes. No cranial nerve deficit or sensory deficit. He exhibits normal muscle tone. Coordination normal. GCS eye subscore is 4. GCS verbal subscore is 5. GCS motor subscore is 6.   Reflex Scores:       Tricep reflexes are 2+ on the right side and 2+ on the left side.       Bicep reflexes are 2+ on the right side and 2+ on the left side.       Brachioradialis reflexes are 2+ on the right side and 2+ on the left side.       Patellar reflexes are 2+ on the right side and 2+ on the  left side.       Achilles reflexes are 2+ on the right side and 2+ on the left side.  No motor or sensory deficits on exam with the exception of the right deltoid which is 4 negative/5.  Negative Rhomberg. No pronator drift, no dysmetria. Some difficulty with tandem walking. Negative Milton's; negative clonus   Skin: Skin is warm and dry. No rash noted. He is not diaphoretic.   Psychiatric: He has a normal mood and affect. Thought content normal.   Vitals reviewed.    Neurologic Exam     Mental Status   Oriented to person, place, and time.     Cranial Nerves     CN III, IV, VI   Pupils are equal, round, and reactive to light.  Extraocular motions are normal.     Motor Exam     Strength   Strength 5/5 throughout.     Gait, Coordination, and Reflexes     Reflexes   Right brachioradialis: 2+  Left brachioradialis: 2+  Right biceps: 2+  Left biceps: 2+  Right triceps: 2+  Left triceps: 2+  Right patellar: 2+  Left patellar: 2+  Right achilles: 2+  Left achilles: 2+      Assessment/Plan   Independent Review of Radiographic Studies:      I reviewed a CT scan of the thoracic spine as well as a CT scan of the brain both performed without contrast from a recent emergency room visit dated July 10, 2018.  The thoracic CT revealed a subacute T5 compression deformity with approximately 35% body height loss.  The age of this fracture cannot be determined on CT scan.    The CT scan of the brain showed no evidence of acute pathology.    Medical Decision Making:    Mr. Zayas was seen today for neurosurgical consultation per his request.  Notes from today's visit will be forwarded to his primary care physician Dr. Liban Brown.  Mr. Zayas was recently evaluated in the emergency room January 10 2 days after a fall on the ice.  He states he was attempting to get the newspaper and slipped down the steps of his porch.  His back landed on the steps.  He also struck his head.  He does take Plavix at home.  He denies any loss of  consciousness.  He does admit to a history of chronic vertigo and states that his vertigo has been relatively well managed although he is still having difficulty with balance.  He has noticed increasing headaches lately.  In addition he is reporting worsening cervical pain, right trapezius and scapular pain as well as radiating right arm pain.  He is tender to palpation in both the cervical and upper thoracic spine.    Given the exam findings, the radiographic findings and the history, I feel that an MRI of the cervical and thoracic spine is warranted as well as a repeat CT scan of the brain.  The patient does take Plavix at home and there is a risk of possible delayed subdural hematoma either in the brain or throughout the spine.  The MRI would be the best study to determine if any of this has occurred.      For the radicular symptoms Mr. Zayas will try a Medrol Dosepak.  He was advised to call our office if he explains his any worsening mental change, increased drowsiness, weakness on either side of the body, worsening gait or balance.  He will be seen in the office after the above imaging is complete.    Sukhdev was seen today for back pain.    Diagnoses and all orders for this visit:    Closed wedge compression fracture of fifth thoracic vertebra, initial encounter  -     MRI Cervical Spine Without Contrast; Future  -     MRI Thoracic Spine Without Contrast; Future  -     CT Head Without Contrast; Future    Cervical radiculopathy at C7  -     MRI Cervical Spine Without Contrast; Future  -     MRI Thoracic Spine Without Contrast; Future  -     CT Head Without Contrast; Future    Hx of fall  -     MRI Cervical Spine Without Contrast; Future  -     MRI Thoracic Spine Without Contrast; Future  -     CT Head Without Contrast; Future    Thoracic radiculitis  -     MRI Cervical Spine Without Contrast; Future  -     MRI Thoracic Spine Without Contrast; Future  -     CT Head Without Contrast; Future    Post-traumatic  headache, not intractable, unspecified chronicity pattern  -     CT Head Without Contrast; Future    Other orders  -     MethylPREDNISolone (MEDROL, ASHLEY,) 4 MG tablet; follow package directions      Return for after radiographic imaging.

## 2018-01-26 DIAGNOSIS — R52 PAIN: Primary | ICD-10-CM

## 2018-01-26 NOTE — TELEPHONE ENCOUNTER
----- Message from Fatou Betancur sent at 1/26/2018 10:09 AM EST    Contact: [place], spouse - Dr Brown's pt - RE: raven Posey, spouse calling and would like a refill on pt's Rx    HYDROcodone-acetaminophen (NORCO) 5-325 MG per tablet 30 tablet         Last OV   11/15  Last refill 01/18  blair 12/21  please review med refill and advise     [place], spouse  # [phone]

## 2018-01-29 RX ORDER — HYDROCODONE BITARTRATE AND ACETAMINOPHEN 5; 325 MG/1; MG/1
1 TABLET ORAL EVERY 6 HOURS PRN
Qty: 30 TABLET | Refills: 0 | Status: SHIPPED | OUTPATIENT
Start: 2018-01-29 | End: 2018-02-06

## 2018-02-01 ENCOUNTER — HOSPITAL ENCOUNTER (OUTPATIENT)
Dept: CT IMAGING | Facility: HOSPITAL | Age: 78
Discharge: HOME | End: 2018-02-01
Admitting: NURSE PRACTITIONER

## 2018-02-01 ENCOUNTER — HOSPITAL ENCOUNTER (OUTPATIENT)
Dept: MRI IMAGING | Facility: HOSPITAL | Age: 78
Discharge: HOME | End: 2018-02-01

## 2018-02-01 DIAGNOSIS — Z91.81 HX OF FALL: ICD-10-CM

## 2018-02-01 DIAGNOSIS — M54.14 THORACIC RADICULITIS: ICD-10-CM

## 2018-02-01 DIAGNOSIS — G44.309 POST-TRAUMATIC HEADACHE, NOT INTRACTABLE, UNSPECIFIED CHRONICITY PATTERN: ICD-10-CM

## 2018-02-01 DIAGNOSIS — M54.12 CERVICAL RADICULOPATHY AT C7: ICD-10-CM

## 2018-02-01 DIAGNOSIS — S22.050A CLOSED WEDGE COMPRESSION FRACTURE OF FIFTH THORACIC VERTEBRA, INITIAL ENCOUNTER: ICD-10-CM

## 2018-02-01 PROCEDURE — 72141 MRI NECK SPINE W/O DYE: CPT

## 2018-02-01 PROCEDURE — 70450 CT HEAD/BRAIN W/O DYE: CPT

## 2018-02-01 PROCEDURE — 72146 MRI CHEST SPINE W/O DYE: CPT

## 2018-02-06 ENCOUNTER — OFFICE VISIT (OUTPATIENT)
Dept: NEUROSURGERY | Facility: CLINIC | Age: 78
End: 2018-02-06

## 2018-02-06 VITALS — SYSTOLIC BLOOD PRESSURE: 154 MMHG | HEART RATE: 80 BPM | DIASTOLIC BLOOD PRESSURE: 97 MMHG

## 2018-02-06 DIAGNOSIS — S22.030D: Primary | ICD-10-CM

## 2018-02-06 PROCEDURE — 99213 OFFICE O/P EST LOW 20 MIN: CPT | Performed by: NEUROLOGICAL SURGERY

## 2018-02-06 NOTE — PROGRESS NOTES
Subjective   Patient ID: Sukhdev Zayas is a 77 y.o. male is here today for follow-up with a new Cervical and Lumbar MRI ordered for neck pain, back pain with numbness and tingling in bilateral shoulders. He also has weakness in his right shoulder and arm.    History of Present Illness    This patient has been having severe pain in his neck and his upper back with some numbness and tingling in his shoulders and may be some weakness in his arm as well.  This has been going on for about a month slipped on the ice and fell against the stairs.  The pain is better than when it first started but is still fairly severe.    The following portions of the patient's history were reviewed and updated as appropriate: allergies, current medications, past family history, past medical history, past social history, past surgical history and problem list.    Review of Systems   Constitutional: Positive for activity change.   Respiratory: Negative for chest tightness and shortness of breath.    Cardiovascular: Negative for chest pain.   Musculoskeletal: Positive for back pain, gait problem, myalgias and neck pain.   Neurological: Positive for weakness and numbness ( Bilateral shoulders).        Tingling bilateral shoulders    All other systems reviewed and are negative.      Objective   Physical Exam   Constitutional: He is oriented to person, place, and time. He appears well-developed and well-nourished.   Neurological: He is oriented to person, place, and time.     Neurologic Exam     Mental Status   Oriented to person, place, and time.       Assessment/Plan   Independent Review of Radiographic Studies:      I reviewed the CT scan of his head which shows no abnormality.  I also reviewed the MRI of his cervical spine which shows some multilevel spondylitic disease but no evidence of cord compression and nothing terribly dangerous.  There is no fracture.  I also reviewed the MRI of his thoracic spine which shows compression  deformities at T2, T3, T5, T6, T7 and T9.  There is also a question of something at L2 which is right on the edge of the film.  There are also rib fractures of both ninth ribs in the right eighth rib.    Medical Decision Making:      I told the patient about the imaging.  I would not recommend any type of surgery for AV of these fractures.  I think that they will gradually heal over time but trying to do surgery on that many levels would be counterproductive.  I also told him about the rib fractures and the question of something at L2.  We will check a formal MRI of the lumbar spine and I'll call him with the results.  In the meantime he will continue to treat his pain with ibuprofen and acetaminophen.    Sukhdev was seen today for back pain and neck pain.    Diagnoses and all orders for this visit:    Closed wedge compression fracture of third thoracic vertebra with routine healing  -     MRI Lumbar Spine Without Contrast; Future    Return for Call with results.

## 2018-02-09 ENCOUNTER — TELEPHONE (OUTPATIENT)
Dept: INTERNAL MEDICINE | Facility: CLINIC | Age: 78
End: 2018-02-09

## 2018-02-09 DIAGNOSIS — F39 MOOD DISORDER (HCC): ICD-10-CM

## 2018-02-09 RX ORDER — FLUOXETINE HYDROCHLORIDE 40 MG/1
40 CAPSULE ORAL DAILY
Qty: 90 CAPSULE | Refills: 3 | Status: SHIPPED | OUTPATIENT
Start: 2018-02-09 | End: 2018-11-15

## 2018-02-09 NOTE — TELEPHONE ENCOUNTER
----- Message from Elizabeth Hammond sent at 2/9/2018 10:31 AM EST -----  Pt is requesting that a refill of his FLUoxetine (PROzac) 40 MG capsule [facility] Call back is [phone]. Thank you!

## 2018-02-12 ENCOUNTER — APPOINTMENT (OUTPATIENT)
Dept: MRI IMAGING | Facility: HOSPITAL | Age: 78
End: 2018-02-12
Attending: NEUROLOGICAL SURGERY

## 2018-02-14 ENCOUNTER — HOSPITAL ENCOUNTER (OUTPATIENT)
Dept: MRI IMAGING | Facility: HOSPITAL | Age: 78
Discharge: HOME | End: 2018-02-14
Attending: NEUROLOGICAL SURGERY | Admitting: NEUROLOGICAL SURGERY

## 2018-02-14 DIAGNOSIS — S22.030D: ICD-10-CM

## 2018-02-14 PROCEDURE — 72148 MRI LUMBAR SPINE W/O DYE: CPT

## 2018-02-21 ENCOUNTER — OFFICE (OUTPATIENT)
Dept: URBAN - METROPOLITAN AREA CLINIC 75 | Facility: CLINIC | Age: 78
End: 2018-02-21

## 2018-02-21 VITALS
WEIGHT: 206 LBS | HEART RATE: 64 BPM | DIASTOLIC BLOOD PRESSURE: 78 MMHG | HEIGHT: 72 IN | SYSTOLIC BLOOD PRESSURE: 116 MMHG

## 2018-02-21 DIAGNOSIS — Z95.5 PRESENCE OF CORONARY ANGIOPLASTY IMPLANT AND GRAFT: ICD-10-CM

## 2018-02-21 DIAGNOSIS — K76.9 LIVER DISEASE, UNSPECIFIED: ICD-10-CM

## 2018-02-21 DIAGNOSIS — R94.5 ABNORMAL RESULTS OF LIVER FUNCTION STUDIES: ICD-10-CM

## 2018-02-21 DIAGNOSIS — I25.10 ATHEROSCLEROTIC HEART DISEASE OF NATIVE CORONARY ARTERY WITH: ICD-10-CM

## 2018-02-21 DIAGNOSIS — Z79.82 LONG TERM (CURRENT) USE OF ASPIRIN: ICD-10-CM

## 2018-02-21 DIAGNOSIS — Z79.02 LONG TERM (CURRENT) USE OF ANTITHROMBOTICS/ANTIPLATELETS: ICD-10-CM

## 2018-02-21 DIAGNOSIS — R19.4 CHANGE IN BOWEL HABIT: ICD-10-CM

## 2018-02-21 DIAGNOSIS — K59.1 FUNCTIONAL DIARRHEA: ICD-10-CM

## 2018-02-21 DIAGNOSIS — K57.30 DIVERTICULOSIS OF LARGE INTESTINE WITHOUT PERFORATION OR ABS: ICD-10-CM

## 2018-02-21 DIAGNOSIS — K31.89 OTHER DISEASES OF STOMACH AND DUODENUM: ICD-10-CM

## 2018-02-21 DIAGNOSIS — Z86.010 PERSONAL HISTORY OF COLONIC POLYPS: ICD-10-CM

## 2018-02-21 DIAGNOSIS — K21.9 GASTRO-ESOPHAGEAL REFLUX DISEASE WITHOUT ESOPHAGITIS: ICD-10-CM

## 2018-02-21 PROCEDURE — 99214 OFFICE O/P EST MOD 30 MIN: CPT | Performed by: INTERNAL MEDICINE

## 2018-02-23 ENCOUNTER — OFFICE (OUTPATIENT)
Dept: URBAN - METROPOLITAN AREA LAB 2 | Facility: LAB | Age: 78
End: 2018-02-23

## 2018-02-23 DIAGNOSIS — K59.1 FUNCTIONAL DIARRHEA: ICD-10-CM

## 2018-02-23 PROCEDURE — 87999 UNLISTED MICROBIOLOGY PX: CPT | Performed by: INTERNAL MEDICINE

## 2018-02-27 ENCOUNTER — OFFICE VISIT (OUTPATIENT)
Dept: INTERNAL MEDICINE | Facility: CLINIC | Age: 78
End: 2018-02-27

## 2018-02-27 VITALS
SYSTOLIC BLOOD PRESSURE: 120 MMHG | DIASTOLIC BLOOD PRESSURE: 100 MMHG | HEART RATE: 71 BPM | BODY MASS INDEX: 27.09 KG/M2 | OXYGEN SATURATION: 99 % | HEIGHT: 72 IN | WEIGHT: 200 LBS

## 2018-02-27 DIAGNOSIS — E78.2 MIXED HYPERLIPIDEMIA: ICD-10-CM

## 2018-02-27 DIAGNOSIS — R19.4 CHANGE IN BOWEL HABITS: ICD-10-CM

## 2018-02-27 DIAGNOSIS — I25.10 CORONARY ARTERY DISEASE INVOLVING NATIVE CORONARY ARTERY OF NATIVE HEART WITHOUT ANGINA PECTORIS: ICD-10-CM

## 2018-02-27 DIAGNOSIS — S22.030D: Primary | ICD-10-CM

## 2018-02-27 PROCEDURE — 99214 OFFICE O/P EST MOD 30 MIN: CPT | Performed by: INTERNAL MEDICINE

## 2018-02-27 NOTE — PROGRESS NOTES
Subjective   Sukhdev Zayas is a 77 y.o. male.     Fall   The accident occurred more than 1 week ago.        The following portions of the patient's history were reviewed and updated as appropriate: allergies, current medications, past family history, past medical history, past social history, past surgical history and problem list.    Review of Systems   Constitutional:        Here for F/U   HENT: Negative.    Eyes: Negative.    Respiratory: Negative for shortness of breath (Is much better).    Cardiovascular: Negative for chest pain and palpitations.        Sees Dr Proctor annually   Gastrointestinal: Positive for blood in stool (Also had rectal bleeding & is scheduled to have EGD next week Kavita ). Diarrhea:  Having loose stools.   Genitourinary: Negative.    Musculoskeletal: Positive for back pain ( Fell in january & had Compression fractures & brkoe ribs as well Seeing Dr Gorman ).   Neurological: Negative.        Objective   Physical Exam   Constitutional: He is oriented to person, place, and time. He appears well-developed.   HENT:   Head: Normocephalic.   Eyes: EOM are normal.   Neck: Neck supple.   Cardiovascular: Normal rate, regular rhythm and normal heart sounds.    Repeat 110/70   Pulmonary/Chest: Effort normal and breath sounds normal.   Musculoskeletal: Normal range of motion.   Neurological: He is alert and oriented to person, place, and time.   Skin: Skin is warm and dry.       Assessment/Plan   Sukhdev was seen today for fall.    Diagnoses and all orders for this visit:    Closed wedge compression fracture of third thoracic vertebra with routine healing    Coronary artery disease involving native coronary artery of native heart without angina pectoris    Mixed hyperlipidemia    Change in bowel habits

## 2018-03-01 VITALS
RESPIRATION RATE: 8 BRPM | DIASTOLIC BLOOD PRESSURE: 82 MMHG | HEART RATE: 58 BPM | SYSTOLIC BLOOD PRESSURE: 150 MMHG | HEART RATE: 60 BPM | OXYGEN SATURATION: 97 % | TEMPERATURE: 97.8 F | OXYGEN SATURATION: 99 % | OXYGEN SATURATION: 96 % | DIASTOLIC BLOOD PRESSURE: 98 MMHG | DIASTOLIC BLOOD PRESSURE: 103 MMHG | HEART RATE: 69 BPM | OXYGEN SATURATION: 100 % | TEMPERATURE: 97.1 F | SYSTOLIC BLOOD PRESSURE: 170 MMHG | SYSTOLIC BLOOD PRESSURE: 141 MMHG | DIASTOLIC BLOOD PRESSURE: 89 MMHG | HEART RATE: 63 BPM | DIASTOLIC BLOOD PRESSURE: 100 MMHG | SYSTOLIC BLOOD PRESSURE: 201 MMHG | WEIGHT: 205 LBS | SYSTOLIC BLOOD PRESSURE: 200 MMHG | HEART RATE: 57 BPM | RESPIRATION RATE: 20 BRPM | RESPIRATION RATE: 16 BRPM | DIASTOLIC BLOOD PRESSURE: 92 MMHG | OXYGEN SATURATION: 94 % | HEART RATE: 68 BPM | HEIGHT: 72 IN | DIASTOLIC BLOOD PRESSURE: 115 MMHG | SYSTOLIC BLOOD PRESSURE: 189 MMHG | SYSTOLIC BLOOD PRESSURE: 198 MMHG

## 2018-03-05 ENCOUNTER — OFFICE (OUTPATIENT)
Dept: URBAN - METROPOLITAN AREA PATHOLOGY 4 | Facility: PATHOLOGY | Age: 78
End: 2018-03-05

## 2018-03-05 ENCOUNTER — AMBULATORY SURGICAL CENTER (OUTPATIENT)
Dept: URBAN - METROPOLITAN AREA SURGERY 17 | Facility: SURGERY | Age: 78
End: 2018-03-05

## 2018-03-05 DIAGNOSIS — K59.1 FUNCTIONAL DIARRHEA: ICD-10-CM

## 2018-03-05 DIAGNOSIS — K29.50 UNSPECIFIED CHRONIC GASTRITIS WITHOUT BLEEDING: ICD-10-CM

## 2018-03-05 DIAGNOSIS — K31.89 OTHER DISEASES OF STOMACH AND DUODENUM: ICD-10-CM

## 2018-03-05 DIAGNOSIS — K21.9 GASTRO-ESOPHAGEAL REFLUX DISEASE WITHOUT ESOPHAGITIS: ICD-10-CM

## 2018-03-05 DIAGNOSIS — K29.70 GASTRITIS, UNSPECIFIED, WITHOUT BLEEDING: ICD-10-CM

## 2018-03-05 DIAGNOSIS — K44.9 DIAPHRAGMATIC HERNIA WITHOUT OBSTRUCTION OR GANGRENE: ICD-10-CM

## 2018-03-05 LAB
GI HISTOLOGY: A. UNSPECIFIED: (no result)
GI HISTOLOGY: B. UNSPECIFIED: (no result)
GI HISTOLOGY: C. UNSPECIFIED: (no result)
GI HISTOLOGY: PDF REPORT: (no result)

## 2018-03-05 PROCEDURE — 43239 EGD BIOPSY SINGLE/MULTIPLE: CPT | Performed by: INTERNAL MEDICINE

## 2018-03-05 PROCEDURE — 88305 TISSUE EXAM BY PATHOLOGIST: CPT | Performed by: INTERNAL MEDICINE

## 2018-03-05 PROCEDURE — 88342 IMHCHEM/IMCYTCHM 1ST ANTB: CPT | Performed by: INTERNAL MEDICINE

## 2018-03-05 RX ORDER — DICYCLOMINE HYDROCHLORIDE 10 MG/1
30 CAPSULE ORAL
Qty: 60 | Refills: 2 | Status: ACTIVE
Start: 2018-03-05

## 2018-03-05 RX ADMIN — PROPOFOL 100 MG: 10 INJECTION, EMULSION INTRAVENOUS at 14:58

## 2018-03-05 RX ADMIN — PROPOFOL 50 MG: 10 INJECTION, EMULSION INTRAVENOUS at 15:02

## 2018-04-17 DIAGNOSIS — R52 PAIN: ICD-10-CM

## 2018-04-17 RX ORDER — GABAPENTIN 300 MG/1
300 CAPSULE ORAL 3 TIMES DAILY
Qty: 270 CAPSULE | Refills: 0 | Status: SHIPPED | OUTPATIENT
Start: 2018-04-17 | End: 2018-04-23 | Stop reason: SDUPTHER

## 2018-04-19 ENCOUNTER — TELEPHONE (OUTPATIENT)
Dept: INTERNAL MEDICINE | Facility: CLINIC | Age: 78
End: 2018-04-19

## 2018-04-19 DIAGNOSIS — R52 PAIN: ICD-10-CM

## 2018-04-23 DIAGNOSIS — R52 PAIN: ICD-10-CM

## 2018-04-23 RX ORDER — GABAPENTIN 300 MG/1
300 CAPSULE ORAL 3 TIMES DAILY
Qty: 270 CAPSULE | Refills: 0 | Status: SHIPPED | OUTPATIENT
Start: 2018-04-23 | End: 2018-07-31

## 2018-04-23 NOTE — TELEPHONE ENCOUNTER
Patient needs gabapentin to be faxed to [facility], was called into local but he didn't get it

## 2018-05-29 ENCOUNTER — OFFICE VISIT (OUTPATIENT)
Dept: INTERNAL MEDICINE | Facility: CLINIC | Age: 78
End: 2018-05-29

## 2018-05-29 VITALS
HEIGHT: 72 IN | WEIGHT: 199 LBS | OXYGEN SATURATION: 98 % | DIASTOLIC BLOOD PRESSURE: 90 MMHG | SYSTOLIC BLOOD PRESSURE: 130 MMHG | BODY MASS INDEX: 26.95 KG/M2 | HEART RATE: 68 BPM

## 2018-05-29 DIAGNOSIS — S22.030D: ICD-10-CM

## 2018-05-29 DIAGNOSIS — I25.10 CORONARY ARTERY DISEASE INVOLVING NATIVE CORONARY ARTERY OF NATIVE HEART WITHOUT ANGINA PECTORIS: ICD-10-CM

## 2018-05-29 DIAGNOSIS — N52.9 ERECTILE DYSFUNCTION, UNSPECIFIED ERECTILE DYSFUNCTION TYPE: ICD-10-CM

## 2018-05-29 DIAGNOSIS — E78.2 MIXED HYPERLIPIDEMIA: Primary | ICD-10-CM

## 2018-05-29 DIAGNOSIS — R53.83 FATIGUE, UNSPECIFIED TYPE: ICD-10-CM

## 2018-05-29 LAB
ALBUMIN SERPL-MCNC: 4.2 G/DL (ref 3.5–5.2)
ALBUMIN/GLOB SERPL: 1.6 G/DL
ALP SERPL-CCNC: 153 U/L (ref 39–117)
ALT SERPL W P-5'-P-CCNC: 36 U/L (ref 1–41)
ANION GAP SERPL CALCULATED.3IONS-SCNC: 13.8 MMOL/L
AST SERPL-CCNC: 31 U/L (ref 1–40)
BASOPHILS # BLD AUTO: 0.01 10*3/MM3 (ref 0–0.2)
BASOPHILS NFR BLD AUTO: 0.2 % (ref 0–2)
BILIRUB SERPL-MCNC: 1.1 MG/DL (ref 0.1–1.2)
BUN BLD-MCNC: 18 MG/DL (ref 8–23)
BUN/CREAT SERPL: 19.1 (ref 7–25)
CALCIUM SPEC-SCNC: 9.4 MG/DL (ref 8.6–10.5)
CHLORIDE SERPL-SCNC: 100 MMOL/L (ref 98–107)
CO2 SERPL-SCNC: 24.2 MMOL/L (ref 22–29)
CREAT BLD-MCNC: 0.94 MG/DL (ref 0.76–1.27)
DEPRECATED RDW RBC AUTO: 42.9 FL (ref 37–54)
EOSINOPHIL # BLD AUTO: 0.15 10*3/MM3 (ref 0–0.7)
EOSINOPHIL NFR BLD AUTO: 2.5 % (ref 0–5)
ERYTHROCYTE [DISTWIDTH] IN BLOOD BY AUTOMATED COUNT: 12.1 % (ref 11.5–15)
GFR SERPL CREATININE-BSD FRML MDRD: 78 ML/MIN/1.73
GLOBULIN UR ELPH-MCNC: 2.6 GM/DL
GLUCOSE BLD-MCNC: 99 MG/DL (ref 65–99)
HCT VFR BLD AUTO: 44.1 % (ref 40.1–51)
HGB BLD-MCNC: 14.9 G/DL (ref 13.7–17.5)
LYMPHOCYTES # BLD AUTO: 0.94 10*3/MM3 (ref 0.8–7)
LYMPHOCYTES NFR BLD AUTO: 15.8 % (ref 10–60)
MCH RBC QN AUTO: 33.3 PG (ref 26–34)
MCHC RBC AUTO-ENTMCNC: 33.8 G/DL (ref 31–37)
MCV RBC AUTO: 98.7 FL (ref 80–100)
MONOCYTES # BLD AUTO: 0.43 10*3/MM3 (ref 0–1)
MONOCYTES NFR BLD AUTO: 7.2 % (ref 0–13)
NEUTROPHILS # BLD AUTO: 4.43 10*3/MM3 (ref 1–11)
NEUTROPHILS NFR BLD AUTO: 74.3 % (ref 30–85)
PLATELET # BLD AUTO: 210 10*3/MM3 (ref 150–450)
PMV BLD AUTO: 10.5 FL (ref 6–12)
POTASSIUM BLD-SCNC: 4 MMOL/L (ref 3.5–5.2)
PROT SERPL-MCNC: 6.8 G/DL (ref 6–8.5)
RBC # BLD AUTO: 4.47 10*6/MM3 (ref 4.63–6.08)
SODIUM BLD-SCNC: 138 MMOL/L (ref 136–145)
T4 FREE SERPL-MCNC: 1.04 NG/DL (ref 0.93–1.7)
TSH SERPL DL<=0.05 MIU/L-ACNC: 4.44 MIU/ML (ref 0.27–4.2)
WBC NRBC COR # BLD: 5.96 10*3/MM3 (ref 5–10)

## 2018-05-29 PROCEDURE — 36415 COLL VENOUS BLD VENIPUNCTURE: CPT | Performed by: INTERNAL MEDICINE

## 2018-05-29 PROCEDURE — 99214 OFFICE O/P EST MOD 30 MIN: CPT | Performed by: INTERNAL MEDICINE

## 2018-05-29 PROCEDURE — 85025 COMPLETE CBC W/AUTO DIFF WBC: CPT | Performed by: INTERNAL MEDICINE

## 2018-05-29 PROCEDURE — 80053 COMPREHEN METABOLIC PANEL: CPT | Performed by: INTERNAL MEDICINE

## 2018-05-29 PROCEDURE — 84443 ASSAY THYROID STIM HORMONE: CPT | Performed by: INTERNAL MEDICINE

## 2018-05-29 PROCEDURE — 84439 ASSAY OF FREE THYROXINE: CPT | Performed by: INTERNAL MEDICINE

## 2018-05-29 NOTE — PROGRESS NOTES
Subjective   Sukhdev Zayas is a 77 y.o. male.     Hyperlipidemia   This is a chronic problem. The current episode started more than 1 year ago. Associated symptoms include shortness of breath (Does have PRESLEY).        The following portions of the patient's history were reviewed and updated as appropriate: allergies, current medications, past family history, past medical history, past social history, past surgical history and problem list.    Review of Systems   Constitutional: Positive for fatigue (Doesn't have much stamina Tries to walk around [employer] [place] 1 lap 2 X /week Takes alot out of him ).        Here for routine F/U Has been doing pretty well but has some issues Has stopped consuming alcohol Just doesn't taste well any more   HENT: Negative.    Eyes: Negative.    Respiratory: Positive for shortness of breath (Does have PRESLEY).    Cardiovascular:        Sees Dr Proctor annually  No CP or palpitations  Advised to see him re Fatigue & lack of stamina     Gastrointestinal: Negative for anal bleeding and diarrhea.        Sees Dr De La Rosa & had EGD about 3 months ago   Genitourinary: Positive for hematuria (Has hadintermittent hematuria on 1 -2 occaisions  None for a long time).   Musculoskeletal: Positive for back pain (Hasa F/U appt W/ Dr Gorman in july  Still has mid to upper back pain after being on his feet for very long & has to sit down  Didn't have Kyphoplasty   ).   Neurological: Negative.        Objective   Physical Exam   Constitutional: He is oriented to person, place, and time. He appears well-developed.   HENT:   Head: Normocephalic.   Eyes: EOM are normal.   Neck: Neck supple.   Cardiovascular: Normal rate, regular rhythm and normal heart sounds.    Pulmonary/Chest: Effort normal and breath sounds normal.   Musculoskeletal: Normal range of motion.   Neurological: He is alert and oriented to person, place, and time.   Skin: Skin is warm and dry.   Vitals reviewed.        Assessment/Plan   Sukhdev  was seen today for hyperlipidemia and coronary artery disease.    Diagnoses and all orders for this visit:    Mixed hyperlipidemia  -     CBC Auto Differential; Future  -     Comprehensive Metabolic Panel; Future  -     CBC Auto Differential  -     Comprehensive Metabolic Panel    Coronary artery disease involving native coronary artery of native heart without angina pectoris    Closed wedge compression fracture of third thoracic vertebra with routine healing    Fatigue, unspecified type  -     TSH; Future  -     T4, free; Future  -     TSH  -     T4, free    Erectile dysfunction, unspecified erectile dysfunction type  -     Testosterone, Free, Total; Future  -     Testosterone, Free, Total

## 2018-05-30 LAB
TESTOST FREE SERPL-MCNC: 5.1 PG/ML (ref 6.6–18.1)
TESTOST SERPL-MCNC: 764 NG/DL (ref 264–916)

## 2018-06-06 ENCOUNTER — OFFICE (OUTPATIENT)
Dept: URBAN - METROPOLITAN AREA CLINIC 75 | Facility: CLINIC | Age: 78
End: 2018-06-06

## 2018-06-06 VITALS
WEIGHT: 195 LBS | DIASTOLIC BLOOD PRESSURE: 78 MMHG | HEART RATE: 64 BPM | HEIGHT: 72 IN | SYSTOLIC BLOOD PRESSURE: 130 MMHG

## 2018-06-06 DIAGNOSIS — K31.89 OTHER DISEASES OF STOMACH AND DUODENUM: ICD-10-CM

## 2018-06-06 DIAGNOSIS — Z86.010 PERSONAL HISTORY OF COLONIC POLYPS: ICD-10-CM

## 2018-06-06 DIAGNOSIS — K59.1 FUNCTIONAL DIARRHEA: ICD-10-CM

## 2018-06-06 DIAGNOSIS — Z79.82 LONG TERM (CURRENT) USE OF ASPIRIN: ICD-10-CM

## 2018-06-06 DIAGNOSIS — R94.5 ABNORMAL RESULTS OF LIVER FUNCTION STUDIES: ICD-10-CM

## 2018-06-06 DIAGNOSIS — K21.9 GASTRO-ESOPHAGEAL REFLUX DISEASE WITHOUT ESOPHAGITIS: ICD-10-CM

## 2018-06-06 DIAGNOSIS — K57.30 DIVERTICULOSIS OF LARGE INTESTINE WITHOUT PERFORATION OR ABS: ICD-10-CM

## 2018-06-06 DIAGNOSIS — Z79.02 LONG TERM (CURRENT) USE OF ANTITHROMBOTICS/ANTIPLATELETS: ICD-10-CM

## 2018-06-06 DIAGNOSIS — K44.9 DIAPHRAGMATIC HERNIA WITHOUT OBSTRUCTION OR GANGRENE: ICD-10-CM

## 2018-06-06 DIAGNOSIS — I25.10 ATHEROSCLEROTIC HEART DISEASE OF NATIVE CORONARY ARTERY WITH: ICD-10-CM

## 2018-06-06 DIAGNOSIS — K29.70 GASTRITIS, UNSPECIFIED, WITHOUT BLEEDING: ICD-10-CM

## 2018-06-06 DIAGNOSIS — K76.9 LIVER DISEASE, UNSPECIFIED: ICD-10-CM

## 2018-06-06 PROCEDURE — 99213 OFFICE O/P EST LOW 20 MIN: CPT | Performed by: INTERNAL MEDICINE

## 2018-06-06 RX ORDER — PANTOPRAZOLE SODIUM 40 MG/1
40 TABLET, DELAYED RELEASE ORAL
Qty: 90 | Refills: 3 | Status: ACTIVE
Start: 2017-07-07

## 2018-07-09 ENCOUNTER — OFFICE VISIT (OUTPATIENT)
Dept: NEUROLOGY | Facility: CLINIC | Age: 78
End: 2018-07-09

## 2018-07-09 VITALS — BODY MASS INDEX: 27.09 KG/M2 | WEIGHT: 200 LBS | HEIGHT: 72 IN

## 2018-07-09 DIAGNOSIS — I95.1 ORTHOSTATIC HYPOTENSION: ICD-10-CM

## 2018-07-09 DIAGNOSIS — R25.9 MIXED ACTION AND RESTING TREMOR: ICD-10-CM

## 2018-07-09 DIAGNOSIS — H81.09 LABYRINTHINE VERTIGO, UNSPECIFIED LATERALITY: Primary | ICD-10-CM

## 2018-07-09 PROCEDURE — 99204 OFFICE O/P NEW MOD 45 MIN: CPT | Performed by: PSYCHIATRY & NEUROLOGY

## 2018-07-09 RX ORDER — PANTOPRAZOLE SODIUM 40 MG/1
TABLET, DELAYED RELEASE ORAL
COMMUNITY
Start: 2018-06-06 | End: 2018-11-15
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
## 2018-07-23 ENCOUNTER — TELEPHONE (OUTPATIENT)
Dept: NEUROLOGY | Facility: CLINIC | Age: 78
End: 2018-07-23

## 2018-07-23 NOTE — TELEPHONE ENCOUNTER
----- Message from Herminio Multani sent at 7/20/2018  1:32 PM EDT -----  Contact: pt [phone]  Pt stopped in to update on his medicine history and he has no history of ever taking Primidone. Pt just want to let  know that. Please advise.

## 2018-07-25 ENCOUNTER — TELEPHONE (OUTPATIENT)
Dept: NEUROLOGY | Facility: CLINIC | Age: 78
End: 2018-07-25

## 2018-07-25 NOTE — TELEPHONE ENCOUNTER
----- Message from Tamela Olivarez sent at 7/24/2018 10:28 AM EDT -----  Contact: [phone]  Mr. Zayas has weaned off old medication and ready for the next set.    [facility] on [address] and [address]

## 2018-07-25 NOTE — TELEPHONE ENCOUNTER
Pt has weaned off the gabapentin and is needing to start something for his tremors. Please advise

## 2018-07-27 DIAGNOSIS — R25.1 TREMOR: Primary | ICD-10-CM

## 2018-07-27 RX ORDER — PRIMIDONE 50 MG/1
TABLET ORAL
Qty: 90 TABLET | Refills: 3 | Status: SHIPPED | OUTPATIENT
Start: 2018-07-27 | End: 2018-09-05 | Stop reason: SDUPTHER

## 2018-07-31 ENCOUNTER — OFFICE VISIT (OUTPATIENT)
Dept: INTERNAL MEDICINE | Facility: CLINIC | Age: 78
End: 2018-07-31

## 2018-07-31 VITALS
WEIGHT: 195 LBS | SYSTOLIC BLOOD PRESSURE: 128 MMHG | BODY MASS INDEX: 26.45 KG/M2 | TEMPERATURE: 98.2 F | DIASTOLIC BLOOD PRESSURE: 82 MMHG

## 2018-07-31 DIAGNOSIS — R31.9 HEMATURIA, UNSPECIFIED TYPE: Primary | ICD-10-CM

## 2018-07-31 PROBLEM — K21.9 GERD (GASTROESOPHAGEAL REFLUX DISEASE): Status: ACTIVE | Noted: 2018-07-31

## 2018-07-31 PROBLEM — Z95.5 S/P DRUG ELUTING CORONARY STENT PLACEMENT: Status: ACTIVE | Noted: 2018-07-31

## 2018-07-31 PROBLEM — G47.33 OBSTRUCTIVE SLEEP APNEA: Status: ACTIVE | Noted: 2018-07-31

## 2018-07-31 LAB
BACTERIA UR QL AUTO: ABNORMAL /HPF
BILIRUB UR QL CFM: NEGATIVE
BILIRUB UR QL STRIP: ABNORMAL
CLARITY UR: ABNORMAL
COLOR UR: ABNORMAL
GLUCOSE UR STRIP-MCNC: NEGATIVE MG/DL
HGB UR QL STRIP.AUTO: ABNORMAL
HYALINE CASTS UR QL AUTO: ABNORMAL /LPF
KETONES UR QL STRIP: ABNORMAL
LEUKOCYTE ESTERASE UR QL STRIP.AUTO: NEGATIVE
NITRITE UR QL STRIP: POSITIVE
PH UR STRIP.AUTO: 5.5 [PH] (ref 5–8)
PROT UR QL STRIP: ABNORMAL
RBC # UR: ABNORMAL /HPF
REF LAB TEST METHOD: ABNORMAL
SP GR UR STRIP: 1.02 (ref 1–1.03)
SQUAMOUS #/AREA URNS HPF: ABNORMAL /HPF
UROBILINOGEN UR QL STRIP: ABNORMAL
WBC UR QL AUTO: ABNORMAL /HPF

## 2018-07-31 PROCEDURE — 87086 URINE CULTURE/COLONY COUNT: CPT | Performed by: NURSE PRACTITIONER

## 2018-07-31 PROCEDURE — 81001 URINALYSIS AUTO W/SCOPE: CPT | Performed by: NURSE PRACTITIONER

## 2018-07-31 PROCEDURE — 99213 OFFICE O/P EST LOW 20 MIN: CPT | Performed by: NURSE PRACTITIONER

## 2018-07-31 RX ORDER — SULFAMETHOXAZOLE AND TRIMETHOPRIM 800; 160 MG/1; MG/1
1 TABLET ORAL 2 TIMES DAILY
Qty: 28 TABLET | Refills: 0 | Status: SHIPPED | OUTPATIENT
Start: 2018-07-31 | End: 2018-08-14

## 2018-07-31 RX ORDER — AMOXICILLIN 500 MG/1
1 CAPSULE ORAL 3 TIMES DAILY
Refills: 0 | COMMUNITY
Start: 2018-07-17 | End: 2018-07-31

## 2018-07-31 NOTE — PROGRESS NOTES
Subjective   Sukhdev Zayas is a 77 y.o. male.     He started taking primidone 3 days ago. He had recent dental work and treated with amoxicillin  (2 weeks ago). He reports he noticed blood in urine about one year ago. He has had a total of three episodes, which the other 2 episodes resolved in less than 24 hours.       Blood in Urine   This is a new problem. The current episode started in the past 7 days (2 days ago ). The problem has been gradually worsening since onset. He describes the hematuria as gross hematuria. He reports clotting at the beginning of his urine stream. His pain is at a severity of 7/10 (stinging ). The pain is moderate. He describes his urine color as dark red. Irritative symptoms include frequency (chronic ), nocturia (chronic 4-5 ) and urgency (chronic ). Obstructive symptoms include dribbling. Obstructive symptoms do not include incomplete emptying, an intermittent stream, a slower stream or straining. Associated symptoms include dysuria. Pertinent negatives include no abdominal pain, chills, fever, flank pain, nausea or vomiting. (Back pain, worst ) He is not sexually active. There is no history of kidney stones.        The following portions of the patient's history were reviewed and updated as appropriate: allergies, current medications, past family history, past medical history, past social history, past surgical history and problem list.    Review of Systems   Constitutional: Negative for chills, fatigue (chronic ) and fever.   Respiratory: Positive for shortness of breath (chronic, stable ).    Cardiovascular: Negative for chest pain.   Gastrointestinal: Negative for abdominal pain, nausea and vomiting.   Genitourinary: Positive for dysuria, frequency (chronic ), hematuria, nocturia (chronic 4-5 ) and urgency (chronic ). Negative for flank pain, incomplete emptying, penile swelling, scrotal swelling and testicular pain.   Musculoskeletal: Positive for back pain (lower back).    Neurological: Negative for dizziness (chronic , no changes ).       Objective   Physical Exam   Constitutional: He appears well-developed and well-nourished. He is cooperative. He does not have a sickly appearance. He does not appear ill.   HENT:   Head: Normocephalic.   Right Ear: Hearing, tympanic membrane and external ear normal. No drainage, swelling or tenderness. Tympanic membrane is not injected, not scarred, not erythematous and not bulging. No middle ear effusion. No decreased hearing is noted.   Left Ear: Hearing, tympanic membrane and external ear normal. No drainage, swelling or tenderness. Tympanic membrane is not injected, not scarred, not erythematous and not bulging.  No middle ear effusion. No decreased hearing is noted.   Nose: Nose normal. No mucosal edema, rhinorrhea, sinus tenderness or nasal deformity. Right sinus exhibits no maxillary sinus tenderness and no frontal sinus tenderness. Left sinus exhibits no maxillary sinus tenderness and no frontal sinus tenderness.   Mouth/Throat: Oropharynx is clear and moist and mucous membranes are normal. Normal dentition.   Eyes: Pupils are equal, round, and reactive to light. Conjunctivae and lids are normal. Right eye exhibits no discharge and no exudate. Left eye exhibits no discharge and no exudate.   Neck: Trachea normal and normal range of motion. No edema present. No thyroid mass and no thyromegaly present.   Cardiovascular: Regular rhythm, normal heart sounds and normal pulses.    No murmur heard.  Pulmonary/Chest: Breath sounds normal. No respiratory distress. He has no decreased breath sounds. He has no wheezes. He has no rhonchi. He has no rales.   Abdominal: Normal appearance and bowel sounds are normal. There is tenderness.   Lymphadenopathy:        Head (right side): No submental, no submandibular, no tonsillar, no preauricular, no posterior auricular and no occipital adenopathy present.        Head (left side): No submental, no  submandibular, no tonsillar, no preauricular, no posterior auricular and no occipital adenopathy present.   Neurological: He is alert. He displays tremor (left hand ).   Skin: Skin is warm, dry and intact. No cyanosis. Nails show no clubbing.       Assessment/Plan   Sukhdev was seen today for blood in urine.    Diagnoses and all orders for this visit:    Hematuria, unspecified type  -     Urinalysis With Microscopic If Indicated (No Culture) - Urine, Clean Catch; Future  -     Urinalysis With Microscopic If Indicated (No Culture) - Urine, Clean Catch  -     Urinalysis, Microscopic Only - Urine, Clean Catch; Future  -     Urinalysis, Microscopic Only - Urine, Clean Catch  -     Ictotest, Urine - Urine, Clean Catch; Future  -     Ictotest, Urine - Urine, Clean Catch  -     sulfamethoxazole-trimethoprim (BACTRIM DS,SEPTRA DS) 800-160 MG per tablet; Take 1 tablet by mouth 2 (Two) Times a Day for 14 days.  -     Ambulatory Referral to Urology  -     Urine Culture - Urine, Urine, Clean Catch; Future  -     Urine Culture - Urine, Urine, Clean Catch      Urine sent for culture (unable to see on scope secondary to TNTC blood). He has been set up for appointment in am with urologist (needs CT and cystoscopy).

## 2018-07-31 NOTE — PATIENT INSTRUCTIONS
Hematuria, Adult  Hematuria is blood in your urine. It can be caused by a bladder infection, kidney infection, prostate infection, kidney stone, or cancer of your urinary tract. Infections can usually be treated with medicine, and a kidney stone usually will pass through your urine. If neither of these is the cause of your hematuria, further workup to find out the reason may be needed.  It is very important that you tell your health care provider about any blood you see in your urine, even if the blood stops without treatment or happens without causing pain. Blood in your urine that happens and then stops and then happens again can be a symptom of a very serious condition. Also, pain is not a symptom in the initial stages of many urinary cancers.  Follow these instructions at home:  · Drink lots of fluid, 3-4 quarts a day. If you have been diagnosed with an infection, cranberry juice is especially recommended, in addition to large amounts of water.  · Avoid caffeine, tea, and carbonated beverages because they tend to irritate the bladder.  · Avoid alcohol because it may irritate the prostate.  · Take all medicines as directed by your health care provider.  · If you were prescribed an antibiotic medicine, finish it all even if you start to feel better.  · If you have been diagnosed with a kidney stone, follow your health care provider's instructions regarding straining your urine to catch the stone.  · Empty your bladder often. Avoid holding urine for long periods of time.  · After a bowel movement, women should cleanse front to back. Use each tissue only once.  · Empty your bladder before and after sexual intercourse if you are a female.  Contact a health care provider if:  · You develop back pain.  · You have a fever.  · You have a feeling of sickness in your stomach (nausea) or vomiting.  · Your symptoms are not better in 3 days. Return sooner if you are getting worse.  Get help right away if:  · You develop  severe vomiting and are unable to keep the medicine down.  · You develop severe back or abdominal pain despite taking your medicines.  · You begin passing a large amount of blood or clots in your urine.  · You feel extremely weak or faint, or you pass out.  This information is not intended to replace advice given to you by your health care provider. Make sure you discuss any questions you have with your health care provider.  Document Released: 12/18/2006 Document Revised: 05/25/2017 Document Reviewed: 08/18/2014  [employer] Interactive Patient Education © 2017 [employer]

## 2018-08-01 ENCOUNTER — TELEPHONE (OUTPATIENT)
Dept: INTERNAL MEDICINE | Facility: CLINIC | Age: 78
End: 2018-08-01

## 2018-08-01 NOTE — TELEPHONE ENCOUNTER
I called patient to follow up from office visit. He saw urologist this morning and they have scheduled him for ct and cystoscopy. He persist with blood in urine. He was instructed to continue with antibiotic. He will hold Plavix per urologist. I have instructed patient to return to clinic or urologist if any worsening of  symptoms.

## 2018-08-02 LAB — BACTERIA SPEC AEROBE CULT: NORMAL

## 2018-09-05 ENCOUNTER — OFFICE VISIT (OUTPATIENT)
Dept: NEUROLOGY | Facility: CLINIC | Age: 78
End: 2018-09-05

## 2018-09-05 VITALS
HEIGHT: 72 IN | WEIGHT: 183 LBS | SYSTOLIC BLOOD PRESSURE: 140 MMHG | BODY MASS INDEX: 24.79 KG/M2 | OXYGEN SATURATION: 92 % | DIASTOLIC BLOOD PRESSURE: 78 MMHG | HEART RATE: 68 BPM

## 2018-09-05 DIAGNOSIS — R25.1 TREMOR: Primary | ICD-10-CM

## 2018-09-05 PROCEDURE — 99213 OFFICE O/P EST LOW 20 MIN: CPT | Performed by: NURSE PRACTITIONER

## 2018-09-05 RX ORDER — AMLODIPINE BESYLATE 2.5 MG/1
2.5 TABLET ORAL DAILY
Refills: 3 | COMMUNITY
Start: 2018-08-16 | End: 2018-11-15

## 2018-09-05 RX ORDER — PRIMIDONE 50 MG/1
100 TABLET ORAL 3 TIMES DAILY
Qty: 540 TABLET | Refills: 3 | Status: SHIPPED | OUTPATIENT
Start: 2018-09-05 | End: 2018-11-15

## 2018-09-05 NOTE — PROGRESS NOTES
CC: Follow up for tremor.     HPI:  Sukhdev Zayas is a  77 y.o.  right-handed male with PMH HTN,  HLD, UBALDO, depression, CAD s/p stents, previous back surgery, and tremor. The patient is accompanied by his wife today. The patient was referred by Dr Brown and first evaluated by Dr Chaudhary 7/9/18 for tremor and gait disturbance. The patient's father had a tremor. The patients states he's had his tremor for several years. It is present both with action and rest. His occurs in both upper extremities, worse in LUE, and in the right leg as well. His wife has recently started to notice it around his mouth as well. He had been on a beta-blocker which made him very lethargic. He most recently was gabapentin 900 mg at night. When he took gabapentin during the day it made him lethargic also. Gabapentin was recently weaned off and tremor did seem worse. Dr Chaudhary started the patient on primidone 50 mg TID. He has only been on the full dose about 2 weeks and states he occasionally misses a dose. He has not noticed any significant improvement but also denies any obvious side effects.    His balance trouble has been described as lightheadedness when standing too quickly and he has also had episodes of vertigo associated with rapid movements, lying down, or rolling over in bed. He has described tripping over his own feet. He had a slip on ice in January causing compression and rib fractures. He was evaluated by Dr Gorman and MRIs of his entire spinal axis showed no substantial cord compression. CT head did not show hydrocephalus, however there was some atrophy and small vessel disease noted. He had documented orthostatic hypotension on his previous visit as documented by Dr Chaudhary: 150/80 supine; 120/80 immediately on standing and then after a delay 110/80.       Since the patient's last visit with Dr Chuadhary he was evaluated by urology, Dr Hood, for hematuria. Imaging showed a bladder mass. The patient disclosed episodic CP so he  underwent cardiac cath 8/23/18 by Dr Edge with [facility] and he had 2 lesions requiring stents. His asa and plavix have been continued. There is ongoing discussion about when cystoscopy/bladder mass resection can be safely performed. The patient has never smoked.    Past Medical History:   Diagnosis Date   • Anxiety    • Back pain    • Coronary artery disease    • Depression    • Fatigue    • Hyperlipidemia    • Hypertension          Past Surgical History:   Procedure Laterality Date   • CARDIAC SURGERY  2008    5 caths and 3 stents per patient approx 2008 or 2010           Current Outpatient Prescriptions:   •  amLODIPine (NORVASC) 2.5 MG tablet, Take 2.5 mg by mouth Daily., Disp: , Rfl: 3  •  aspirin 81 MG EC tablet, Take 81 mg by mouth daily., Disp: , Rfl:   •  atorvastatin (LIPITOR) 40 MG tablet, Take 40 mg by mouth., Disp: , Rfl:   •  clopidogrel (PLAVIX) 75 MG tablet, Take 75 mg by mouth daily., Disp: , Rfl:   •  FLUoxetine (PROzac) 40 MG capsule, Take 1 capsule by mouth Daily., Disp: 90 capsule, Rfl: 3  •  nitroglycerin (NITROSTAT) 0.4 MG SL tablet, Place 0.4 mg under the tongue as needed for chest pain. Take no more than 3 doses in 15 minutes., Disp: , Rfl:   •  pantoprazole (PROTONIX) 40 MG EC tablet, , Disp: , Rfl:   •  primidone (MYSOLINE) 50 MG tablet, Take 2 tablets by mouth 3 (Three) Times a Day. 2 tabs PO three times daily, Disp: 540 tablet, Rfl: 3      Family History   Problem Relation Age of Onset   • Arthritis Mother    • Glaucoma Mother    • Heart disease Father    • Emphysema Father    • Tremor Father          Social History     Social History   • Marital status:      Spouse name: N/A   • Number of children: 4   • Years of education: 5 college degrees     Occupational History   • Retired      Social History Main Topics   • Smoking status: Never Smoker   • Smokeless tobacco: Never Used   • Alcohol use Yes      Comment: OCC   • Drug use: No   • Sexual activity: Not Currently     Other Topics  "Concern   • Not on file     Social History Narrative   • No narrative on file         Allergies   Allergen Reactions   • No Known Drug Allergy            ROS:  Review of Systems   Constitutional: Negative.    HENT: Negative for congestion, dental problem, drooling, ear discharge, ear pain, facial swelling, hearing loss, mouth sores, nosebleeds, postnasal drip, rhinorrhea, sinus pain, sinus pressure, sneezing, sore throat, tinnitus, trouble swallowing and voice change.    Eyes: Negative.    Respiratory: Negative.    Cardiovascular: Negative.    Gastrointestinal: Negative.    Endocrine: Negative.    Genitourinary: Positive for hematuria. Negative for decreased urine volume, difficulty urinating, discharge, dysuria, enuresis, flank pain, frequency, genital sores, penile pain, penile swelling, scrotal swelling, testicular pain and urgency.   Musculoskeletal: Negative.    Skin: Negative.    Allergic/Immunologic: Negative.    Neurological: Positive for dizziness, tremors, light-headedness and headaches. Negative for seizures, syncope, facial asymmetry, speech difficulty, weakness and numbness.   Hematological: Negative.    Psychiatric/Behavioral: Negative.            Physical Exam:  Vitals:    09/05/18 0949   BP: 140/78   Pulse: 68   SpO2: 92%   Weight: 83 kg (183 lb)   Height: 182.9 cm (72\")     Body mass index is 24.82 kg/m².    Physical Exam  General appearance: Well developed, well nourished, well groomed, alert and cooperative.   HEENT: Normocephalic.   Neck and spine: Normal range of motion. Normal alignment. No mass or tenderness.    Cardiac: Regular rate and rhythm. No murmurs.   Peripheral Vasculature: Extremities warm and dry.  Chest Exam: Clear to auscultation bilaterally, no wheezes, no rhonchi.  Extremities: Normal, no edema.   Skin: No rashes or birthmarks.     Neurological Exam:   Higher integrative function: Oriented to time, place, person, intact recent and remote memory, attention span, concentration and " language. Spontaneous speech, fund of vocabulary are normal.   CN II: Normal visual fields.   CN III IV VI: Extraocular movements are full without nystagmus. Pupils are equal, round, and reactive to light. No relative afferent pupillary defect. No internuclear ophthalmoplegia.   CN V: Normal facial sensation.   CN VII: Facial movements are symmetric, no weakness.   CN VIII: Auditory acuity is normal.   CN IX & X: Symmetric palatal movement.   CN XI: Sternocleidomastoid and trapezius are normal. No weakness.   CN XII: The tongue is midline. No atrophy or fasciculations.   Motor: Normal muscle strength, bulk, and tone in upper and lower extremities. No fasciculations, rigidity, or spasticity.  Sensation: Normal light touch except for chronic numbness in fifth digit on left foot.   Station and gait: Mildly broadened gait. Difficulty with tandem walk.   Muscle stretch reflexes: Reflexes are normal and symmetric in the upper and lower extremities. Plantar reflexes are flexor bilaterally.   Coordination: Finger to nose test showed no dysmetria. Heel to shin normal. Primarily left hand and right leg resting tremor seen intermittently on exam. Pt also with left hand tremor with action.      Results:      Lab Results   Component Value Date    GLUCOSE 99 05/29/2018    BUN 18 05/29/2018    CREATININE 0.94 05/29/2018    EGFRIFNONA 78 05/29/2018    BCR 19.1 05/29/2018    CO2 24.2 05/29/2018    CALCIUM 9.4 05/29/2018    ALBUMIN 4.20 05/29/2018    AST 31 05/29/2018    ALT 36 05/29/2018       Lab Results   Component Value Date    WBC 5.96 05/29/2018    HGB 14.9 05/29/2018    HCT 44.1 05/29/2018    MCV 98.7 05/29/2018     05/29/2018         .No results found for: RPR      Lab Results   Component Value Date    TSH 4.440 (H) 05/29/2018         No results found for: [id number]      No results found for: FOLATE      No results found for: HGBA1C            Assessment:   1. Mixed tremor. Pt has failed betablocker and gabapentin.   Tremor does occur at rest some but he has no other features of Parkinson's disease.  2. Gait disturbance with previous documentation of orthostatic hypotension.        Plan:  1. Increase primidone from 50 mg TID to 100 mg TID. Pt given written instructions to increase by 50 mg weekly.  2. Next can consider Topamax although will need to f/u with pt's wife to see if she has spoken to ophthalmologist to see if there was any history of narrow angles.  3. Follow up in 3 months.

## 2018-09-05 NOTE — PATIENT INSTRUCTIONS
You are taking primidone 50 mg 1 pill 3 times a day. Increase by 1 pill weekly  - take 1 pill in morning, 1 pill in afternoon, 2 at night for a week  - take 1 pill in morning, 2 pills in afternoon, 2 at night for 1 week  - then take 2 pills three times daily  - follow up in 3 months or sooner if needed

## 2018-09-18 PROBLEM — R07.9 CHEST PAIN: Status: ACTIVE | Noted: 2018-08-16

## 2018-09-18 PROBLEM — I20.8 EXERTIONAL ANGINA (HCC): Status: ACTIVE | Noted: 2018-08-20

## 2018-09-18 PROBLEM — N32.89 BLADDER MASS: Status: ACTIVE | Noted: 2018-09-18

## 2018-09-18 PROBLEM — I51.89 DIASTOLIC DYSFUNCTION: Status: ACTIVE | Noted: 2018-08-16

## 2018-09-18 PROBLEM — I20.89 EXERTIONAL ANGINA: Status: ACTIVE | Noted: 2018-08-20

## 2018-09-18 PROBLEM — I20.0 UNSTABLE ANGINA (HCC): Status: ACTIVE | Noted: 2018-08-23

## 2018-11-13 ENCOUNTER — TELEPHONE (OUTPATIENT)
Dept: NEUROLOGY | Facility: CLINIC | Age: 78
End: 2018-11-13

## 2018-11-13 ENCOUNTER — DOCUMENTATION (OUTPATIENT)
Dept: NEUROLOGY | Facility: CLINIC | Age: 78
End: 2018-11-13

## 2018-11-13 RX ORDER — GABAPENTIN 100 MG/1
300 CAPSULE ORAL 3 TIMES DAILY
Qty: 270 CAPSULE | Refills: 1 | Status: SHIPPED | OUTPATIENT
Start: 2018-11-13 | End: 2018-11-15

## 2018-11-13 NOTE — TELEPHONE ENCOUNTER
Spoke with wife. Primidone not helping tremor. Pt wants to go back on gabapentin since it seemed to help. Prescription sent to [facility] for 300 mg TID. Instructions given to wife to increase dose gradually.

## 2018-11-13 NOTE — TELEPHONE ENCOUNTER
----- Message from ANIL Samaniego sent at 11/13/2018  3:44 PM EST -----  Contact: wife  I talked to wife. We are restarting gabapentin for tremor and keeping 11/19 appointment.  ----- Message -----  From: Elyse Zambrano MA  Sent: 11/9/2018   9:21 AM  To: ANIL Samaniego    I called pt and he said he has not followed up with Dr. Cedillo. I got the last ov note which is on your desk. Pt said he can f/u with him if you need him to.     ----- Message -----  From: Christie Pacheco APRN  Sent: 11/7/2018   2:13 PM  To: Elyse Zambrano MA, Bacilio Chaudhary MD    Has pt had f/u with ophthomology? Need this before next medication can be tried. He can be worked in with me but it would be more helpful if he could be seen by Dr Chaudhary or at least when Dr Chaudhary is here.   ----- Message -----  From: Elyse Zambrano MA  Sent: 11/5/2018   1:47 PM  To: ANIL Samaniego        ----- Message -----  From: Betzaida Garcia  Sent: 11/5/2018   1:38 PM  To: Elyse Zambrano MA    Wife called to report pt's tremors are much worse since the medication change ordered in September.  Wife sts tremors have worsened and pt's left hand constantly trembles. Wife is very concerned to see his sxs worsen as they have.  I have scheduled pt for the first available, november 19, but told wife if we needed to see him sooner, we would work him in.  Copied and paste plan from last ov below:    Plan:  1. Increase primidone from 50 mg TID to 100 mg TID. Pt given written instructions to increase by 50 mg weekly.  2. Next can consider Topamax although will need to f/u with pt's wife to see if she has spoken to ophthalmologist to see if there was any history of narrow angles.  3. Follow up in 3 months.

## 2018-11-13 NOTE — PROGRESS NOTES
Pt's wife called due to increase in tremor. He is currently taking primidone 100 mg TID and pt doesn't feel that it's helping much. He had better results when he was taking gabapentin 900 mg at bedtime, which he is no longer taking. I reviewed last ophthamology note with Dr Chaudhary and there was no evidence of narrow angle glaucoma so pt is a candidate for Topamax (also no h/o kidney stones), but wife states pt is very sensitive to new medications and he would prefer to go back on gabapentin. Instructions given to gradually increase dose to 300 mg TID, prescription sent to pharmacy of choice. Pt has appointment 11/19.

## 2018-11-15 ENCOUNTER — APPOINTMENT (OUTPATIENT)
Dept: PREADMISSION TESTING | Facility: HOSPITAL | Age: 78
End: 2018-11-15

## 2018-11-15 VITALS
WEIGHT: 199 LBS | OXYGEN SATURATION: 99 % | SYSTOLIC BLOOD PRESSURE: 107 MMHG | DIASTOLIC BLOOD PRESSURE: 72 MMHG | HEIGHT: 72 IN | BODY MASS INDEX: 26.95 KG/M2 | HEART RATE: 70 BPM | RESPIRATION RATE: 20 BRPM

## 2018-11-15 LAB
ANION GAP SERPL CALCULATED.3IONS-SCNC: 10.7 MMOL/L
BACTERIA UR QL AUTO: ABNORMAL /HPF
BILIRUB UR QL STRIP: NEGATIVE
BUN BLD-MCNC: 15 MG/DL (ref 8–23)
BUN/CREAT SERPL: 13.8 (ref 7–25)
CALCIUM SPEC-SCNC: 9.5 MG/DL (ref 8.6–10.5)
CHLORIDE SERPL-SCNC: 103 MMOL/L (ref 98–107)
CLARITY UR: CLEAR
CO2 SERPL-SCNC: 26.3 MMOL/L (ref 22–29)
COLOR UR: ABNORMAL
CREAT BLD-MCNC: 1.09 MG/DL (ref 0.76–1.27)
DEPRECATED RDW RBC AUTO: 44.8 FL (ref 37–54)
ERYTHROCYTE [DISTWIDTH] IN BLOOD BY AUTOMATED COUNT: 12.3 % (ref 11.5–14.5)
GFR SERPL CREATININE-BSD FRML MDRD: 66 ML/MIN/1.73
GLUCOSE BLD-MCNC: 108 MG/DL (ref 65–99)
GLUCOSE UR STRIP-MCNC: NEGATIVE MG/DL
HCT VFR BLD AUTO: 43.9 % (ref 40.4–52.2)
HGB BLD-MCNC: 14.9 G/DL (ref 13.7–17.6)
HGB UR QL STRIP.AUTO: ABNORMAL
HYALINE CASTS UR QL AUTO: ABNORMAL /LPF
KETONES UR QL STRIP: ABNORMAL
LEUKOCYTE ESTERASE UR QL STRIP.AUTO: ABNORMAL
MCH RBC QN AUTO: 33.5 PG (ref 27–32.7)
MCHC RBC AUTO-ENTMCNC: 33.9 G/DL (ref 32.6–36.4)
MCV RBC AUTO: 98.7 FL (ref 79.8–96.2)
NITRITE UR QL STRIP: NEGATIVE
PH UR STRIP.AUTO: 6.5 [PH] (ref 5–8)
PLATELET # BLD AUTO: 206 10*3/MM3 (ref 140–500)
PMV BLD AUTO: 10.7 FL (ref 6–12)
POTASSIUM BLD-SCNC: 4.3 MMOL/L (ref 3.5–5.2)
PROT UR QL STRIP: ABNORMAL
RBC # BLD AUTO: 4.45 10*6/MM3 (ref 4.6–6)
RBC # UR: ABNORMAL /HPF
REF LAB TEST METHOD: ABNORMAL
SODIUM BLD-SCNC: 140 MMOL/L (ref 136–145)
SP GR UR STRIP: 1.02 (ref 1–1.03)
SQUAMOUS #/AREA URNS HPF: ABNORMAL /HPF
UROBILINOGEN UR QL STRIP: ABNORMAL
WBC NRBC COR # BLD: 6.23 10*3/MM3 (ref 4.5–10.7)
WBC UR QL AUTO: ABNORMAL /HPF

## 2018-11-15 PROCEDURE — 93010 ELECTROCARDIOGRAM REPORT: CPT | Performed by: INTERNAL MEDICINE

## 2018-11-15 PROCEDURE — 80048 BASIC METABOLIC PNL TOTAL CA: CPT | Performed by: UROLOGY

## 2018-11-15 PROCEDURE — 85027 COMPLETE CBC AUTOMATED: CPT | Performed by: UROLOGY

## 2018-11-15 PROCEDURE — 93005 ELECTROCARDIOGRAM TRACING: CPT

## 2018-11-15 PROCEDURE — 81001 URINALYSIS AUTO W/SCOPE: CPT | Performed by: UROLOGY

## 2018-11-15 PROCEDURE — 87086 URINE CULTURE/COLONY COUNT: CPT | Performed by: UROLOGY

## 2018-11-15 PROCEDURE — 36415 COLL VENOUS BLD VENIPUNCTURE: CPT

## 2018-11-15 RX ORDER — CLOPIDOGREL BISULFATE 75 MG/1
75 TABLET ORAL DAILY
COMMUNITY
End: 2018-12-02 | Stop reason: HOSPADM

## 2018-11-15 RX ORDER — NITROGLYCERIN 0.4 MG/1
0.4 TABLET SUBLINGUAL
Status: ON HOLD | COMMUNITY
End: 2018-11-29 | Stop reason: SDUPTHER

## 2018-11-15 RX ORDER — IBUPROFEN 200 MG
200 TABLET ORAL EVERY 6 HOURS PRN
COMMUNITY
End: 2018-12-20

## 2018-11-15 RX ORDER — FLUOXETINE HYDROCHLORIDE 40 MG/1
40 CAPSULE ORAL DAILY
COMMUNITY
End: 2019-04-10 | Stop reason: SDUPTHER

## 2018-11-15 RX ORDER — PRIMIDONE 50 MG/1
100 TABLET ORAL 3 TIMES DAILY
Status: ON HOLD | COMMUNITY
End: 2018-11-29

## 2018-11-15 RX ORDER — ATORVASTATIN CALCIUM 40 MG/1
40 TABLET, FILM COATED ORAL EVERY MORNING
COMMUNITY
End: 2019-09-17 | Stop reason: HOSPADM

## 2018-11-15 RX ORDER — GABAPENTIN 100 MG/1
600 CAPSULE ORAL 3 TIMES DAILY
COMMUNITY
End: 2018-12-20 | Stop reason: SDUPTHER

## 2018-11-15 RX ORDER — AMLODIPINE BESYLATE 5 MG/1
5 TABLET ORAL EVERY MORNING
COMMUNITY
End: 2019-09-17 | Stop reason: HOSPADM

## 2018-11-15 RX ORDER — ESOMEPRAZOLE MAGNESIUM 40 MG/1
40 CAPSULE, DELAYED RELEASE ORAL
COMMUNITY
End: 2019-09-27

## 2018-11-15 NOTE — PROGRESS NOTES
CC:    HPI:  Sukhdev Zayas is a  77 y.o.  RH-handed male with past medical history of hypertension, hyperlipidemia, UBALDO, depression, CAD status post stents 8/23/18, previous back surgery, and tremor.  The patient is seen in follow-up today for his tremor.  He was last seen by me September 5, 2018.  The patient is accompanied by his wife today.  The patient was first referred by Dr. Brown and evaluated initially by Dr. Chaudhary in July 2018 for tremor and gait disturbance.  In review of previous neurology notes the patient's father also had a tremor.  The patient has had his tremor for several years it is present both with action and rest.  It is most bothersome in his left upper extremity however it may occur in the right arm or in the left face as well.  Previously been tried on a beta blocker which made him very lethargic.  He was on previously on gabapentin 900 mg at night, as it made him very drowsy if he took him during the day.      Our last visit we increased his primidone from 50 mg 3 times a day to 100 mg 3 times a day.  His wife called in stating that his tremor was worse gabapentin was restarted.  (Addition of topamax was discussed however patient is very sensitive to medications so his wife did not want to add a new medication. Previous ophthalmology notes were reviewed and there was no indication of narrow angle glaucoma.  The patient has no history of kidney stones).  The patient was started on gabapentin 100 mg 3 times a day with instructions to increase gradually to 300 mg 3 times a day however when he began adding the second pill to one of his doses he got very dizzy and lethargic so the gabapentin was stopped.    Cardiology notes in care everywhere were reviewed.  The patient had a repeat cardiac cath after his stents in August 2018 because he continued to have chest pain.  The stents were patent on the repeat cardiac cath.  The patient's bladder resection is planned for November 29.  He was  instructed to hold his Plavix for 5 days prior to surgery.        Past Medical History:   Diagnosis Date   • Anxiety    • Back pain    • Bladder tumor    • Coronary artery disease    • Depression    • Fatigue    • Hyperlipidemia    • Hypertension    • Tremors of nervous system          Past Surgical History:   Procedure Laterality Date   • CARDIAC CATHETERIZATION      7x   • CARDIAC SURGERY  2008    5 caths and 3 stents per patient approx 2008 or 2010   • CATARACT EXTRACTION, BILATERAL     • COLONOSCOPY     • EYE SURGERY     • SKIN CANCER EXCISION Left     chest wall           Current Outpatient Medications:   •  AMLODIPINE BESYLATE PO, Take 1 tablet by mouth Every Morning., Disp: , Rfl:   •  aspirin 81 MG tablet, Take 81 mg by mouth Daily. To stop 3-5 days before surgery, Disp: , Rfl:   •  atorvastatin (LIPITOR) 40 MG tablet, Take 40 mg by mouth Daily., Disp: , Rfl:   •  Cholecalciferol (VITAMIN D PO), Take 1 capsule by mouth Daily., Disp: , Rfl:   •  clopidogrel (PLAVIX) 75 MG tablet, Take 75 mg by mouth Daily. Pt to  Stop 3-5 days before surgery, Disp: , Rfl:   •  esomeprazole (nexIUM) 40 MG capsule, Take 40 mg by mouth Every Morning Before Breakfast., Disp: , Rfl:   •  FLUoxetine (PROZAC) 40 MG capsule, Take 40 mg by mouth Daily., Disp: , Rfl:   •  gabapentin (NEURONTIN) 100 MG capsule, Take 100 mg by mouth 3 (Three) Times a Day., Disp: , Rfl:   •  ibuprofen (ADVIL,MOTRIN) 200 MG tablet, Take 200 mg by mouth Every 6 (Six) Hours As Needed for Mild Pain ., Disp: , Rfl:   •  nitroglycerin (NITROSTAT) 0.4 MG SL tablet, Place 0.4 mg under the tongue as needed for chest pain. Take no more than 3 doses in 15 minutes., Disp: , Rfl:   •  primidone (MYSOLINE) 50 MG tablet, Take 100 mg by mouth 3 (Three) Times a Day., Disp: , Rfl:   •  Acetaminophen (TYLENOL PO), Take 3-4 tablets by mouth As Needed., Disp: , Rfl:   •  nitroglycerin (NITROSTAT) 0.4 MG SL tablet, Place 0.4 mg under the tongue Every 5 (Five) Minutes As Needed  for Chest Pain. Take no more than 3 doses in 15 minutes., Disp: , Rfl:       Family History   Problem Relation Age of Onset   • Arthritis Mother    • Glaucoma Mother    • Heart disease Father    • Emphysema Father    • Tremor Father    • Malig Hyperthermia Neg Hx          Social History     Socioeconomic History   • Marital status:      Spouse name: Not on file   • Number of children: 4   • Years of education: 5 college degrees   • Highest education level: Not on file   Social Needs   • Financial resource strain: Not on file   • Food insecurity - worry: Not on file   • Food insecurity - inability: Not on file   • Transportation needs - medical: Not on file   • Transportation needs - non-medical: Not on file   Occupational History   • Occupation: Retired   Tobacco Use   • Smoking status: Never Smoker   • Smokeless tobacco: Never Used   Substance and Sexual Activity   • Alcohol use: No     Frequency: Never     Comment: used to drink 2-3 nightly quit last 3 months ago 2018   • Drug use: No   • Sexual activity: Not on file   Other Topics Concern   • Not on file   Social History Narrative   • Not on file         Allergies   Allergen Reactions   • No Known Drug Allergy        ROS:  Review of Systems   Constitutional: Positive for fatigue. Negative for activity change, appetite change, chills, diaphoresis, fever and unexpected weight change.   HENT: Negative for drooling, hearing loss, tinnitus, trouble swallowing and voice change.    Eyes: Negative for photophobia, pain and visual disturbance.   Respiratory: Negative for chest tightness and shortness of breath.    Cardiovascular: Negative for chest pain, palpitations and leg swelling.   Gastrointestinal: Negative for blood in stool, nausea and vomiting.   Endocrine: Negative for cold intolerance and heat intolerance.   Genitourinary: Negative for difficulty urinating.   Musculoskeletal: Positive for gait problem. Negative for neck pain and neck stiffness.   Skin:  "Negative for rash.   Neurological: Positive for dizziness, tremors and light-headedness. Negative for seizures, syncope, facial asymmetry, speech difficulty, weakness, numbness and headaches.   Hematological: Bruises/bleeds easily.   Psychiatric/Behavioral: Positive for agitation and confusion. Negative for behavioral problems, hallucinations, sleep disturbance and suicidal ideas. The patient is nervous/anxious.            Physical Exam:  Vitals:    11/19/18 1107   BP: 132/80   BP Location: Left arm   Patient Position: Sitting   Cuff Size: Adult   Pulse: 64   SpO2: 94%   Weight: 89.4 kg (197 lb)   Height: 182.9 cm (72.01\")     Body mass index is 26.71 kg/m².    Physical Exam  General appearance: Well developed, well nourished, well groomed, alert and cooperative.   HEENT: Normocephalic.   Neck and spine: Normal range of motion. Normal alignment. No mass or tenderness.    Cardiac: Regular rate and rhythm. No murmurs.   Peripheral Vasculature: Extremities warm and dry.  Chest Exam: Clear to auscultation bilaterally, no wheezes, no rhonchi.  Extremities: Normal, no edema.   Skin: No rashes or birthmarks.     Neurological Exam:   Higher integrative function: Oriented to time, place, person, intact recent and remote memory, attention span, concentration and language. Spontaneous speech, fund of vocabulary are normal.   CN II: Normal visual fields.   CN III IV VI: Extraocular movements are full without nystagmus. Pupils are equal, round, and reactive to light. No relative afferent pupillary defect. No internuclear ophthalmoplegia.   CN V: Normal facial sensation.   CN VII: Facial movements are symmetric, no weakness.   CN VIII: Auditory acuity is normal.   CN IX & X: Symmetric palatal movement.   CN XI: Sternocleidomastoid and trapezius are normal. No weakness.   CN XII: The tongue is midline. No atrophy or fasciculations.   Motor: Normal muscle strength, bulk, and tone in upper and lower extremities.  Patient has a left " hand resting tremor and action tremor.  He also intermittently has a left facial tremor.  Sensation: Normal light touch.  Station and gait: Mildly broad-based gait.  Muscle stretch reflexes: Reflexes are normal and symmetric in the upper and lower extremities.   Coordination: Finger to nose test showed no dysmetria.  Heel to shin normal.                 Results:      Lab Results   Component Value Date    GLUCOSE 108 (H) 11/15/2018    BUN 15 11/15/2018    CREATININE 1.09 11/15/2018    EGFRIFNONA 66 11/15/2018    BCR 13.8 11/15/2018    CO2 26.3 11/15/2018    CALCIUM 9.5 11/15/2018    ALBUMIN 4.20 05/29/2018    AST 31 05/29/2018    ALT 36 05/29/2018       Lab Results   Component Value Date    WBC 6.23 11/15/2018    HGB 14.9 11/15/2018    HCT 43.9 11/15/2018    MCV 98.7 (H) 11/15/2018     11/15/2018         .No results found for: RPR      Lab Results   Component Value Date    TSH 4.440 (H) 05/29/2018         No results found for: [id number]      No results found for: FOLATE      No results found for: HGBA1C            Assessment:   Mr. soliman was seen today for mixed tremor.  He has failed a beta blocker.  Primidone was not helpful.  Gabapentin seems to be helpful however it causes him to have dizziness and drowsiness.          Plan:  · Patient to restart gabapentin 100 mg by mouth 3 times a day.  In the future only the evening dose will be uptitrated due to his sensitivity.  · Written instructions given to the patient and his wife on weaning primidone off.They were instructed that tremor may get worse before it gets better.  · The future Topamax can be considered as patient has no history of narrow angle glaucoma or kidney stones.  · Patient to follow-up in one month, sooner as needed.  Once the patient is taking gabapentin 100 mg 3 times a day and he is off the primidone his wife is going to call me with an update on how he is doing.                          Dictated utilizing Dragon dictation.

## 2018-11-16 LAB — BACTERIA SPEC AEROBE CULT: NORMAL

## 2018-11-19 ENCOUNTER — OFFICE VISIT (OUTPATIENT)
Dept: NEUROLOGY | Facility: CLINIC | Age: 78
End: 2018-11-19

## 2018-11-19 VITALS
DIASTOLIC BLOOD PRESSURE: 80 MMHG | HEART RATE: 64 BPM | HEIGHT: 72 IN | WEIGHT: 197 LBS | OXYGEN SATURATION: 94 % | SYSTOLIC BLOOD PRESSURE: 132 MMHG | BODY MASS INDEX: 26.68 KG/M2

## 2018-11-19 DIAGNOSIS — R25.9 MIXED ACTION AND RESTING TREMOR: Primary | ICD-10-CM

## 2018-11-19 PROCEDURE — 99213 OFFICE O/P EST LOW 20 MIN: CPT | Performed by: NURSE PRACTITIONER

## 2018-11-29 ENCOUNTER — HOSPITAL ENCOUNTER (OUTPATIENT)
Facility: HOSPITAL | Age: 78
Setting detail: OBSERVATION
Discharge: HOME | End: 2018-12-02
Attending: UROLOGY | Admitting: UROLOGY

## 2018-11-29 ENCOUNTER — ANESTHESIA (OUTPATIENT)
Dept: PERIOP | Facility: HOSPITAL | Age: 78
End: 2018-11-29

## 2018-11-29 ENCOUNTER — ANESTHESIA EVENT (OUTPATIENT)
Dept: PERIOP | Facility: HOSPITAL | Age: 78
End: 2018-11-29

## 2018-11-29 DIAGNOSIS — N32.89 BLADDER MASS: ICD-10-CM

## 2018-11-29 LAB
ANION GAP SERPL CALCULATED.3IONS-SCNC: 11.6 MMOL/L
BUN BLD-MCNC: 17 MG/DL (ref 8–23)
BUN/CREAT SERPL: 20.2 (ref 7–25)
CALCIUM SPEC-SCNC: 8.8 MG/DL (ref 8.6–10.5)
CHLORIDE SERPL-SCNC: 106 MMOL/L (ref 98–107)
CO2 SERPL-SCNC: 26.4 MMOL/L (ref 22–29)
CREAT BLD-MCNC: 0.84 MG/DL (ref 0.76–1.27)
DEPRECATED RDW RBC AUTO: 46.3 FL (ref 37–54)
ERYTHROCYTE [DISTWIDTH] IN BLOOD BY AUTOMATED COUNT: 12.9 % (ref 11.5–14.5)
GFR SERPL CREATININE-BSD FRML MDRD: 88 ML/MIN/1.73
GLUCOSE BLD-MCNC: 94 MG/DL (ref 65–99)
HCT VFR BLD AUTO: 41.3 % (ref 40.4–52.2)
HGB BLD-MCNC: 13.8 G/DL (ref 13.7–17.6)
MCH RBC QN AUTO: 33.1 PG (ref 27–32.7)
MCHC RBC AUTO-ENTMCNC: 33.4 G/DL (ref 32.6–36.4)
MCV RBC AUTO: 99 FL (ref 79.8–96.2)
PLATELET # BLD AUTO: 174 10*3/MM3 (ref 140–500)
PMV BLD AUTO: 10.7 FL (ref 6–12)
POTASSIUM BLD-SCNC: 4.3 MMOL/L (ref 3.5–5.2)
RBC # BLD AUTO: 4.17 10*6/MM3 (ref 4.6–6)
SODIUM BLD-SCNC: 144 MMOL/L (ref 136–145)
WBC NRBC COR # BLD: 6.92 10*3/MM3 (ref 4.5–10.7)

## 2018-11-29 PROCEDURE — G0378 HOSPITAL OBSERVATION PER HR: HCPCS

## 2018-11-29 PROCEDURE — 25010000002 ONDANSETRON PER 1 MG: Performed by: NURSE ANESTHETIST, CERTIFIED REGISTERED

## 2018-11-29 PROCEDURE — 80048 BASIC METABOLIC PNL TOTAL CA: CPT | Performed by: UROLOGY

## 2018-11-29 PROCEDURE — 85027 COMPLETE CBC AUTOMATED: CPT | Performed by: UROLOGY

## 2018-11-29 PROCEDURE — 25010000003 CEFAZOLIN 1-4 GM/50ML-% SOLUTION: Performed by: UROLOGY

## 2018-11-29 PROCEDURE — 88307 TISSUE EXAM BY PATHOLOGIST: CPT | Performed by: UROLOGY

## 2018-11-29 PROCEDURE — 25010000002 CEFTRIAXONE PER 250 MG: Performed by: UROLOGY

## 2018-11-29 PROCEDURE — 25010000002 FENTANYL CITRATE (PF) 100 MCG/2ML SOLUTION: Performed by: NURSE ANESTHETIST, CERTIFIED REGISTERED

## 2018-11-29 PROCEDURE — 25010000002 HYDROMORPHONE PER 4 MG: Performed by: NURSE ANESTHETIST, CERTIFIED REGISTERED

## 2018-11-29 PROCEDURE — 25010000002 PROPOFOL 10 MG/ML EMULSION: Performed by: NURSE ANESTHETIST, CERTIFIED REGISTERED

## 2018-11-29 RX ORDER — HYDROMORPHONE HYDROCHLORIDE 1 MG/ML
0.5 INJECTION, SOLUTION INTRAMUSCULAR; INTRAVENOUS; SUBCUTANEOUS
Status: DISCONTINUED | OUTPATIENT
Start: 2018-11-29 | End: 2018-11-29 | Stop reason: HOSPADM

## 2018-11-29 RX ORDER — LIDOCAINE HYDROCHLORIDE 10 MG/ML
0.5 INJECTION, SOLUTION EPIDURAL; INFILTRATION; INTRACAUDAL; PERINEURAL ONCE AS NEEDED
Status: DISCONTINUED | OUTPATIENT
Start: 2018-11-29 | End: 2018-11-29 | Stop reason: HOSPADM

## 2018-11-29 RX ORDER — ATORVASTATIN CALCIUM 20 MG/1
40 TABLET, FILM COATED ORAL DAILY
Status: DISCONTINUED | OUTPATIENT
Start: 2018-11-29 | End: 2018-12-02 | Stop reason: HOSPADM

## 2018-11-29 RX ORDER — ASPIRIN 81 MG/1
81 TABLET, CHEWABLE ORAL DAILY
Status: DISCONTINUED | OUTPATIENT
Start: 2018-11-29 | End: 2018-11-29 | Stop reason: HOSPADM

## 2018-11-29 RX ORDER — GLYCOPYRROLATE 0.2 MG/ML
INJECTION INTRAMUSCULAR; INTRAVENOUS AS NEEDED
Status: DISCONTINUED | OUTPATIENT
Start: 2018-11-29 | End: 2018-11-29 | Stop reason: SURG

## 2018-11-29 RX ORDER — LABETALOL HYDROCHLORIDE 5 MG/ML
INJECTION, SOLUTION INTRAVENOUS AS NEEDED
Status: DISCONTINUED | OUTPATIENT
Start: 2018-11-29 | End: 2018-11-29 | Stop reason: SURG

## 2018-11-29 RX ORDER — CEFTRIAXONE SODIUM 1 G/50ML
1 INJECTION, SOLUTION INTRAVENOUS EVERY 24 HOURS
Status: COMPLETED | OUTPATIENT
Start: 2018-11-29 | End: 2018-11-30

## 2018-11-29 RX ORDER — ASPIRIN 81 MG/1
TABLET, CHEWABLE ORAL AS NEEDED
Status: DISCONTINUED | OUTPATIENT
Start: 2018-11-29 | End: 2018-11-29 | Stop reason: HOSPADM

## 2018-11-29 RX ORDER — HYDROCODONE BITARTRATE AND ACETAMINOPHEN 5; 325 MG/1; MG/1
1 TABLET ORAL EVERY 4 HOURS PRN
Status: DISCONTINUED | OUTPATIENT
Start: 2018-11-29 | End: 2018-12-02 | Stop reason: HOSPADM

## 2018-11-29 RX ORDER — OXYCODONE AND ACETAMINOPHEN 7.5; 325 MG/1; MG/1
1 TABLET ORAL ONCE AS NEEDED
Status: DISCONTINUED | OUTPATIENT
Start: 2018-11-29 | End: 2018-11-29 | Stop reason: HOSPADM

## 2018-11-29 RX ORDER — FLUOXETINE HYDROCHLORIDE 20 MG/1
40 CAPSULE ORAL DAILY
Status: DISCONTINUED | OUTPATIENT
Start: 2018-11-29 | End: 2018-12-02 | Stop reason: HOSPADM

## 2018-11-29 RX ORDER — SODIUM CHLORIDE 0.9 % (FLUSH) 0.9 %
1-10 SYRINGE (ML) INJECTION AS NEEDED
Status: DISCONTINUED | OUTPATIENT
Start: 2018-11-29 | End: 2018-11-29 | Stop reason: HOSPADM

## 2018-11-29 RX ORDER — FENTANYL CITRATE 50 UG/ML
50 INJECTION, SOLUTION INTRAMUSCULAR; INTRAVENOUS
Status: DISCONTINUED | OUTPATIENT
Start: 2018-11-29 | End: 2018-11-29 | Stop reason: HOSPADM

## 2018-11-29 RX ORDER — GABAPENTIN 100 MG/1
100 CAPSULE ORAL 3 TIMES DAILY
Status: DISCONTINUED | OUTPATIENT
Start: 2018-11-29 | End: 2018-11-29

## 2018-11-29 RX ORDER — SODIUM CHLORIDE 9 MG/ML
100 INJECTION, SOLUTION INTRAVENOUS CONTINUOUS
Status: DISCONTINUED | OUTPATIENT
Start: 2018-11-29 | End: 2018-12-02 | Stop reason: HOSPADM

## 2018-11-29 RX ORDER — GABAPENTIN 100 MG/1
200 CAPSULE ORAL 3 TIMES DAILY
Status: DISCONTINUED | OUTPATIENT
Start: 2018-11-29 | End: 2018-12-02 | Stop reason: HOSPADM

## 2018-11-29 RX ORDER — PRIMIDONE 50 MG/1
100 TABLET ORAL 3 TIMES DAILY
Status: DISCONTINUED | OUTPATIENT
Start: 2018-11-29 | End: 2018-11-29

## 2018-11-29 RX ORDER — ONDANSETRON 2 MG/ML
4 INJECTION INTRAMUSCULAR; INTRAVENOUS ONCE AS NEEDED
Status: DISCONTINUED | OUTPATIENT
Start: 2018-11-29 | End: 2018-11-29 | Stop reason: HOSPADM

## 2018-11-29 RX ORDER — EPHEDRINE SULFATE 50 MG/ML
INJECTION, SOLUTION INTRAVENOUS AS NEEDED
Status: DISCONTINUED | OUTPATIENT
Start: 2018-11-29 | End: 2018-11-29 | Stop reason: SURG

## 2018-11-29 RX ORDER — NITROGLYCERIN 0.4 MG/1
0.4 TABLET SUBLINGUAL AS NEEDED
Status: DISCONTINUED | OUTPATIENT
Start: 2018-11-29 | End: 2018-12-02 | Stop reason: HOSPADM

## 2018-11-29 RX ORDER — FINASTERIDE 5 MG/1
5 TABLET, FILM COATED ORAL DAILY
Status: DISCONTINUED | OUTPATIENT
Start: 2018-11-29 | End: 2018-12-02 | Stop reason: HOSPADM

## 2018-11-29 RX ORDER — PROMETHAZINE HYDROCHLORIDE 25 MG/1
25 TABLET ORAL ONCE AS NEEDED
Status: DISCONTINUED | OUTPATIENT
Start: 2018-11-29 | End: 2018-11-29 | Stop reason: HOSPADM

## 2018-11-29 RX ORDER — DOCUSATE SODIUM 100 MG/1
100 CAPSULE, LIQUID FILLED ORAL 2 TIMES DAILY PRN
Status: DISCONTINUED | OUTPATIENT
Start: 2018-11-29 | End: 2018-12-02 | Stop reason: HOSPADM

## 2018-11-29 RX ORDER — NALOXONE HCL 0.4 MG/ML
0.2 VIAL (ML) INJECTION AS NEEDED
Status: DISCONTINUED | OUTPATIENT
Start: 2018-11-29 | End: 2018-11-29 | Stop reason: HOSPADM

## 2018-11-29 RX ORDER — PROMETHAZINE HYDROCHLORIDE 25 MG/ML
12.5 INJECTION, SOLUTION INTRAMUSCULAR; INTRAVENOUS ONCE AS NEEDED
Status: DISCONTINUED | OUTPATIENT
Start: 2018-11-29 | End: 2018-11-29 | Stop reason: HOSPADM

## 2018-11-29 RX ORDER — PANTOPRAZOLE SODIUM 40 MG/1
40 TABLET, DELAYED RELEASE ORAL EVERY MORNING
Status: DISCONTINUED | OUTPATIENT
Start: 2018-11-29 | End: 2018-12-02 | Stop reason: HOSPADM

## 2018-11-29 RX ORDER — ONDANSETRON 4 MG/1
4 TABLET, FILM COATED ORAL EVERY 6 HOURS PRN
Status: DISCONTINUED | OUTPATIENT
Start: 2018-11-29 | End: 2018-12-02 | Stop reason: HOSPADM

## 2018-11-29 RX ORDER — PROMETHAZINE HYDROCHLORIDE 25 MG/1
25 SUPPOSITORY RECTAL ONCE AS NEEDED
Status: DISCONTINUED | OUTPATIENT
Start: 2018-11-29 | End: 2018-11-29 | Stop reason: HOSPADM

## 2018-11-29 RX ORDER — MAGNESIUM HYDROXIDE 1200 MG/15ML
LIQUID ORAL AS NEEDED
Status: DISCONTINUED | OUTPATIENT
Start: 2018-11-29 | End: 2018-11-29 | Stop reason: HOSPADM

## 2018-11-29 RX ORDER — FAMOTIDINE 10 MG/ML
20 INJECTION, SOLUTION INTRAVENOUS ONCE
Status: COMPLETED | OUTPATIENT
Start: 2018-11-29 | End: 2018-11-29

## 2018-11-29 RX ORDER — FENTANYL CITRATE 50 UG/ML
INJECTION, SOLUTION INTRAMUSCULAR; INTRAVENOUS AS NEEDED
Status: DISCONTINUED | OUTPATIENT
Start: 2018-11-29 | End: 2018-11-29 | Stop reason: SURG

## 2018-11-29 RX ORDER — PROPOFOL 10 MG/ML
VIAL (ML) INTRAVENOUS AS NEEDED
Status: DISCONTINUED | OUTPATIENT
Start: 2018-11-29 | End: 2018-11-29 | Stop reason: SURG

## 2018-11-29 RX ORDER — HYDROMORPHONE HYDROCHLORIDE 1 MG/ML
0.5 INJECTION, SOLUTION INTRAMUSCULAR; INTRAVENOUS; SUBCUTANEOUS
Status: DISCONTINUED | OUTPATIENT
Start: 2018-11-29 | End: 2018-12-02 | Stop reason: HOSPADM

## 2018-11-29 RX ORDER — ONDANSETRON 2 MG/ML
INJECTION INTRAMUSCULAR; INTRAVENOUS AS NEEDED
Status: DISCONTINUED | OUTPATIENT
Start: 2018-11-29 | End: 2018-11-29 | Stop reason: SURG

## 2018-11-29 RX ORDER — MIDAZOLAM HYDROCHLORIDE 1 MG/ML
1 INJECTION INTRAMUSCULAR; INTRAVENOUS
Status: DISCONTINUED | OUTPATIENT
Start: 2018-11-29 | End: 2018-11-29 | Stop reason: HOSPADM

## 2018-11-29 RX ORDER — EPHEDRINE SULFATE 50 MG/ML
5 INJECTION, SOLUTION INTRAVENOUS ONCE AS NEEDED
Status: DISCONTINUED | OUTPATIENT
Start: 2018-11-29 | End: 2018-11-29 | Stop reason: HOSPADM

## 2018-11-29 RX ORDER — CEFAZOLIN SODIUM 1 G/50ML
1 INJECTION, SOLUTION INTRAVENOUS ONCE
Status: COMPLETED | OUTPATIENT
Start: 2018-11-29 | End: 2018-11-29

## 2018-11-29 RX ORDER — ONDANSETRON 2 MG/ML
4 INJECTION INTRAMUSCULAR; INTRAVENOUS EVERY 6 HOURS PRN
Status: DISCONTINUED | OUTPATIENT
Start: 2018-11-29 | End: 2018-12-02 | Stop reason: HOSPADM

## 2018-11-29 RX ORDER — ROCURONIUM BROMIDE 10 MG/ML
INJECTION, SOLUTION INTRAVENOUS AS NEEDED
Status: DISCONTINUED | OUTPATIENT
Start: 2018-11-29 | End: 2018-11-29 | Stop reason: SURG

## 2018-11-29 RX ORDER — MIDAZOLAM HYDROCHLORIDE 1 MG/ML
2 INJECTION INTRAMUSCULAR; INTRAVENOUS
Status: DISCONTINUED | OUTPATIENT
Start: 2018-11-29 | End: 2018-11-29 | Stop reason: HOSPADM

## 2018-11-29 RX ORDER — AMLODIPINE BESYLATE 5 MG/1
5 TABLET ORAL
Status: DISCONTINUED | OUTPATIENT
Start: 2018-11-29 | End: 2018-12-02 | Stop reason: HOSPADM

## 2018-11-29 RX ORDER — HYDROCODONE BITARTRATE AND ACETAMINOPHEN 7.5; 325 MG/1; MG/1
1 TABLET ORAL ONCE AS NEEDED
Status: DISCONTINUED | OUTPATIENT
Start: 2018-11-29 | End: 2018-11-29 | Stop reason: HOSPADM

## 2018-11-29 RX ORDER — LIDOCAINE HYDROCHLORIDE 20 MG/ML
INJECTION, SOLUTION INFILTRATION; PERINEURAL AS NEEDED
Status: DISCONTINUED | OUTPATIENT
Start: 2018-11-29 | End: 2018-11-29 | Stop reason: SURG

## 2018-11-29 RX ORDER — NALOXONE HCL 0.4 MG/ML
0.1 VIAL (ML) INJECTION
Status: DISCONTINUED | OUTPATIENT
Start: 2018-11-29 | End: 2018-12-02 | Stop reason: HOSPADM

## 2018-11-29 RX ORDER — ASPIRIN 81 MG/1
81 TABLET, CHEWABLE ORAL DAILY
Status: DISCONTINUED | OUTPATIENT
Start: 2018-11-30 | End: 2018-12-02 | Stop reason: HOSPADM

## 2018-11-29 RX ORDER — ONDANSETRON 4 MG/1
4 TABLET, ORALLY DISINTEGRATING ORAL EVERY 6 HOURS PRN
Status: DISCONTINUED | OUTPATIENT
Start: 2018-11-29 | End: 2018-12-02 | Stop reason: HOSPADM

## 2018-11-29 RX ORDER — SODIUM CHLORIDE, SODIUM LACTATE, POTASSIUM CHLORIDE, CALCIUM CHLORIDE 600; 310; 30; 20 MG/100ML; MG/100ML; MG/100ML; MG/100ML
9 INJECTION, SOLUTION INTRAVENOUS CONTINUOUS
Status: DISCONTINUED | OUTPATIENT
Start: 2018-11-29 | End: 2018-11-29 | Stop reason: HOSPADM

## 2018-11-29 RX ADMIN — FINASTERIDE 5 MG: 5 TABLET, FILM COATED ORAL at 15:25

## 2018-11-29 RX ADMIN — LIDOCAINE HYDROCHLORIDE 80 MG: 20 INJECTION, SOLUTION INFILTRATION; PERINEURAL at 07:30

## 2018-11-29 RX ADMIN — AMLODIPINE BESYLATE 5 MG: 5 TABLET ORAL at 17:33

## 2018-11-29 RX ADMIN — EPHEDRINE SULFATE 5 MG: 50 INJECTION INTRAMUSCULAR; INTRAVENOUS; SUBCUTANEOUS at 07:43

## 2018-11-29 RX ADMIN — FENTANYL CITRATE 100 MCG: 50 INJECTION INTRAMUSCULAR; INTRAVENOUS at 07:30

## 2018-11-29 RX ADMIN — SUGAMMADEX 200 MG: 100 INJECTION, SOLUTION INTRAVENOUS at 08:15

## 2018-11-29 RX ADMIN — GLYCOPYRROLATE 0.2 MG: 0.2 INJECTION INTRAMUSCULAR; INTRAVENOUS at 08:09

## 2018-11-29 RX ADMIN — GABAPENTIN 200 MG: 100 CAPSULE ORAL at 15:50

## 2018-11-29 RX ADMIN — LABETALOL HYDROCHLORIDE 5 MG: 5 INJECTION, SOLUTION INTRAVENOUS at 08:25

## 2018-11-29 RX ADMIN — EPHEDRINE SULFATE 5 MG: 50 INJECTION INTRAMUSCULAR; INTRAVENOUS; SUBCUTANEOUS at 07:39

## 2018-11-29 RX ADMIN — HYDROMORPHONE HYDROCHLORIDE 0.5 MG: 1 INJECTION, SOLUTION INTRAMUSCULAR; INTRAVENOUS; SUBCUTANEOUS at 10:05

## 2018-11-29 RX ADMIN — ROCURONIUM BROMIDE 50 MG: 10 INJECTION INTRAVENOUS at 07:30

## 2018-11-29 RX ADMIN — FAMOTIDINE 20 MG: 10 INJECTION INTRAVENOUS at 06:37

## 2018-11-29 RX ADMIN — HYDROCODONE BITARTRATE AND ACETAMINOPHEN 1 TABLET: 5; 325 TABLET ORAL at 21:46

## 2018-11-29 RX ADMIN — GABAPENTIN 200 MG: 100 CAPSULE ORAL at 21:44

## 2018-11-29 RX ADMIN — SODIUM CHLORIDE, POTASSIUM CHLORIDE, SODIUM LACTATE AND CALCIUM CHLORIDE 9 ML/HR: 600; 310; 30; 20 INJECTION, SOLUTION INTRAVENOUS at 06:37

## 2018-11-29 RX ADMIN — EPHEDRINE SULFATE 5 MG: 50 INJECTION INTRAMUSCULAR; INTRAVENOUS; SUBCUTANEOUS at 08:08

## 2018-11-29 RX ADMIN — SODIUM CHLORIDE 100 ML/HR: 9 INJECTION, SOLUTION INTRAVENOUS at 22:09

## 2018-11-29 RX ADMIN — PROPOFOL 160 MG: 10 INJECTION, EMULSION INTRAVENOUS at 07:30

## 2018-11-29 RX ADMIN — ONDANSETRON 4 MG: 2 INJECTION INTRAMUSCULAR; INTRAVENOUS at 07:44

## 2018-11-29 RX ADMIN — CEFAZOLIN SODIUM 1 G: 1 INJECTION, SOLUTION INTRAVENOUS at 07:35

## 2018-11-29 RX ADMIN — SODIUM CHLORIDE 100 ML/HR: 9 INJECTION, SOLUTION INTRAVENOUS at 11:30

## 2018-11-29 RX ADMIN — EPHEDRINE SULFATE 10 MG: 50 INJECTION INTRAMUSCULAR; INTRAVENOUS; SUBCUTANEOUS at 08:11

## 2018-11-29 RX ADMIN — CEFTRIAXONE SODIUM 1 G: 1 INJECTION, SOLUTION INTRAVENOUS at 15:25

## 2018-11-29 NOTE — ANESTHESIA PREPROCEDURE EVALUATION
Anesthesia Evaluation     Patient summary reviewed and Nursing notes reviewed                Airway   Mallampati: I  TM distance: >3 FB  Neck ROM: full  No difficulty expected  Dental - normal exam     Pulmonary - normal exam   (+) sleep apnea,   Cardiovascular - normal exam    (+) hypertension, CAD, angina, hyperlipidemia,       Neuro/Psych  (+) tremors,     GI/Hepatic/Renal/Endo    (+)  GERD,      Musculoskeletal     (+) back pain,   Abdominal  - normal exam    Bowel sounds: normal.   Substance History - negative use     OB/GYN negative ob/gyn ROS         Other                        Anesthesia Plan    ASA 3     general     Anesthetic plan, all risks, benefits, and alternatives have been provided, discussed and informed consent has been obtained with: patient.

## 2018-11-29 NOTE — ANESTHESIA POSTPROCEDURE EVALUATION
"Patient: Sukhdev Zayas    Procedure Summary     Date:  11/29/18 Room / Location:  [facility] OR 01 / [facility] MAIN OR    Anesthesia Start:  0723 Anesthesia Stop:  0835    Procedure:  TUR BLADDER TUMOR  LARGE (N/A ) Diagnosis:      Surgeon:  Wang Soares Jr., MD Provider:  Jose Ferguson MD    Anesthesia Type:  general ASA Status:  3          Anesthesia Type: general  Last vitals  BP   172/91 (11/29/18 1005)   Temp   36.8 °C (98.2 °F) (11/29/18 0832)   Pulse   65 (11/29/18 1005)   Resp   16 (11/29/18 1005)     SpO2   97 % (11/29/18 1005)     Post Anesthesia Care and Evaluation    Patient location during evaluation: PACU  Patient participation: complete - patient participated  Level of consciousness: awake and alert  Pain management: adequate  Airway patency: patent  Anesthetic complications: No anesthetic complications    Cardiovascular status: acceptable  Respiratory status: acceptable  Hydration status: acceptable    Comments: /91 (BP Location: Left arm, Patient Position: Lying)   Pulse 65   Temp 36.8 °C (98.2 °F) (Oral)   Resp 16   Ht 182.9 cm (72\")   Wt 89.5 kg (197 lb 6.4 oz)   SpO2 97%   BMI 26.77 kg/m²         "

## 2018-11-30 ENCOUNTER — APPOINTMENT (OUTPATIENT)
Dept: CT IMAGING | Facility: HOSPITAL | Age: 78
End: 2018-11-30

## 2018-11-30 LAB
CYTO UR: NORMAL
LAB AP CASE REPORT: NORMAL
PATH REPORT.FINAL DX SPEC: NORMAL
PATH REPORT.GROSS SPEC: NORMAL

## 2018-11-30 PROCEDURE — G0378 HOSPITAL OBSERVATION PER HR: HCPCS

## 2018-11-30 PROCEDURE — 25010000002 HYDROMORPHONE PER 4 MG: Performed by: UROLOGY

## 2018-11-30 PROCEDURE — 74176 CT ABD & PELVIS W/O CONTRAST: CPT

## 2018-11-30 PROCEDURE — 90661 CCIIV3 VAC ABX FR 0.5 ML IM: CPT | Performed by: UROLOGY

## 2018-11-30 PROCEDURE — 25010000002 KETOROLAC TROMETHAMINE PER 15 MG: Performed by: UROLOGY

## 2018-11-30 PROCEDURE — 25010000002 CEFTRIAXONE PER 250 MG: Performed by: UROLOGY

## 2018-11-30 PROCEDURE — 25010000002 ONDANSETRON PER 1 MG: Performed by: UROLOGY

## 2018-11-30 PROCEDURE — 25010000002 INFLUENZA VAC SUBUNIT QUAD 0.5 ML SUSPENSION PREFILLED SYRINGE: Performed by: UROLOGY

## 2018-11-30 PROCEDURE — G0008 ADMIN INFLUENZA VIRUS VAC: HCPCS | Performed by: UROLOGY

## 2018-11-30 RX ORDER — SULFAMETHOXAZOLE AND TRIMETHOPRIM 800; 160 MG/1; MG/1
1 TABLET ORAL 2 TIMES DAILY
Qty: 6 TABLET | Refills: 0 | Status: SHIPPED | OUTPATIENT
Start: 2018-11-30 | End: 2018-12-20

## 2018-11-30 RX ORDER — HYDROCODONE BITARTRATE AND ACETAMINOPHEN 5; 325 MG/1; MG/1
1-2 TABLET ORAL EVERY 4 HOURS PRN
Qty: 4 TABLET | Refills: 0 | Status: SHIPPED | OUTPATIENT
Start: 2018-11-30 | End: 2018-12-20

## 2018-11-30 RX ORDER — KETOROLAC TROMETHAMINE 30 MG/ML
15 INJECTION, SOLUTION INTRAMUSCULAR; INTRAVENOUS EVERY 6 HOURS
Status: DISCONTINUED | OUTPATIENT
Start: 2018-11-30 | End: 2018-12-02 | Stop reason: HOSPADM

## 2018-11-30 RX ADMIN — PANTOPRAZOLE SODIUM 40 MG: 40 TABLET, DELAYED RELEASE ORAL at 08:45

## 2018-11-30 RX ADMIN — HYDROMORPHONE HYDROCHLORIDE 0.5 MG: 1 INJECTION, SOLUTION INTRAMUSCULAR; INTRAVENOUS; SUBCUTANEOUS at 12:46

## 2018-11-30 RX ADMIN — GABAPENTIN 200 MG: 100 CAPSULE ORAL at 17:43

## 2018-11-30 RX ADMIN — AMLODIPINE BESYLATE 5 MG: 5 TABLET ORAL at 08:45

## 2018-11-30 RX ADMIN — FINASTERIDE 5 MG: 5 TABLET, FILM COATED ORAL at 08:45

## 2018-11-30 RX ADMIN — KETOROLAC TROMETHAMINE 15 MG: 30 INJECTION, SOLUTION INTRAMUSCULAR at 16:17

## 2018-11-30 RX ADMIN — GABAPENTIN 200 MG: 100 CAPSULE ORAL at 08:45

## 2018-11-30 RX ADMIN — CEFTRIAXONE SODIUM 1 G: 1 INJECTION, SOLUTION INTRAVENOUS at 16:17

## 2018-11-30 RX ADMIN — INFLUENZA A VIRUS A/SINGAPORE/GP1908/2015 IVR-180 (H1N1) ANTIGEN (MDCK CELL DERIVED, PROPIOLACTONE INACTIVATED), INFLUENZA A VIRUS A/NORTH CAROLINA/04/2016 (H3N2) HEMAGGLUTININ ANTIGEN (MDCK CELL DERIVED, PROPIOLACTONE INACTIVATED), INFLUENZA B VIRUS B/IOWA/06/2017 HEMAGGLUTININ ANTIGEN (MDCK CELL DERIVED, PROPIOLACTONE INACTIVATED), INFLUENZA B VIRUS B/SINGAPORE/INFTT-16-0610/2016 HEMAGGLUTININ ANTIGEN (MDCK CELL DERIVED, PROPIOLACTONE INACTIVATED) 0.5 ML: 15; 15; 15; 15 INJECTION, SUSPENSION INTRAMUSCULAR at 16:17

## 2018-11-30 RX ADMIN — KETOROLAC TROMETHAMINE 15 MG: 30 INJECTION, SOLUTION INTRAMUSCULAR at 22:42

## 2018-11-30 RX ADMIN — HYDROCODONE BITARTRATE AND ACETAMINOPHEN 1 TABLET: 5; 325 TABLET ORAL at 11:20

## 2018-11-30 RX ADMIN — ATORVASTATIN CALCIUM 40 MG: 20 TABLET, FILM COATED ORAL at 08:45

## 2018-11-30 RX ADMIN — GABAPENTIN 200 MG: 100 CAPSULE ORAL at 21:09

## 2018-11-30 RX ADMIN — Medication 81 MG: at 12:46

## 2018-11-30 RX ADMIN — FLUOXETINE HYDROCHLORIDE 40 MG: 20 CAPSULE ORAL at 08:45

## 2018-11-30 RX ADMIN — ONDANSETRON 4 MG: 2 INJECTION INTRAMUSCULAR; INTRAVENOUS at 12:46

## 2018-12-01 PROCEDURE — 25010000002 KETOROLAC TROMETHAMINE PER 15 MG: Performed by: UROLOGY

## 2018-12-01 PROCEDURE — G0378 HOSPITAL OBSERVATION PER HR: HCPCS

## 2018-12-01 RX ORDER — BISACODYL 5 MG/1
10 TABLET, DELAYED RELEASE ORAL DAILY PRN
Status: DISCONTINUED | OUTPATIENT
Start: 2018-12-01 | End: 2018-12-02 | Stop reason: HOSPADM

## 2018-12-01 RX ADMIN — KETOROLAC TROMETHAMINE 15 MG: 30 INJECTION, SOLUTION INTRAMUSCULAR at 10:59

## 2018-12-01 RX ADMIN — FLUOXETINE HYDROCHLORIDE 40 MG: 20 CAPSULE ORAL at 10:59

## 2018-12-01 RX ADMIN — KETOROLAC TROMETHAMINE 15 MG: 30 INJECTION, SOLUTION INTRAMUSCULAR at 04:49

## 2018-12-01 RX ADMIN — Medication 81 MG: at 10:58

## 2018-12-01 RX ADMIN — AMLODIPINE BESYLATE 5 MG: 5 TABLET ORAL at 10:59

## 2018-12-01 RX ADMIN — KETOROLAC TROMETHAMINE 15 MG: 30 INJECTION, SOLUTION INTRAMUSCULAR at 23:07

## 2018-12-01 RX ADMIN — GABAPENTIN 200 MG: 100 CAPSULE ORAL at 21:11

## 2018-12-01 RX ADMIN — FINASTERIDE 5 MG: 5 TABLET, FILM COATED ORAL at 11:00

## 2018-12-01 RX ADMIN — DOCUSATE SODIUM 100 MG: 100 CAPSULE, LIQUID FILLED ORAL at 21:06

## 2018-12-01 RX ADMIN — GABAPENTIN 200 MG: 100 CAPSULE ORAL at 11:00

## 2018-12-01 RX ADMIN — KETOROLAC TROMETHAMINE 15 MG: 30 INJECTION, SOLUTION INTRAMUSCULAR at 17:04

## 2018-12-01 RX ADMIN — ATORVASTATIN CALCIUM 40 MG: 20 TABLET, FILM COATED ORAL at 10:59

## 2018-12-01 RX ADMIN — GABAPENTIN 200 MG: 100 CAPSULE ORAL at 17:04

## 2018-12-01 RX ADMIN — BISACODYL 10 MG: 5 TABLET, COATED ORAL at 21:06

## 2018-12-01 RX ADMIN — PANTOPRAZOLE SODIUM 40 MG: 40 TABLET, DELAYED RELEASE ORAL at 06:42

## 2018-12-01 NOTE — PLAN OF CARE
Problem: Patient Care Overview  Goal: Plan of Care Review  Outcome: Ongoing (interventions implemented as appropriate)   11/30/18 2119 12/01/18 0353   Coping/Psychosocial   Plan of Care Reviewed With patient;spouse --    Plan of Care Review   Progress --  improving   OTHER   Outcome Summary --  Pt rested well with spouse at bedside. Medicated for pain per scheduled Toradol. CBI running slow all night, slight pink after activity. Continue to monitor.      Goal: Individualization and Mutuality  Outcome: Ongoing (interventions implemented as appropriate)    Goal: Discharge Needs Assessment  Outcome: Ongoing (interventions implemented as appropriate)

## 2018-12-01 NOTE — PROGRESS NOTES
"Cc bt  Doing well still some g hem   Still on cbi no pain     LOS: 0 days   Patient Care Team:  Liban Brown MD as PCP - General (Internal Medicine)  Liban Brown MD as PCP - Claims Attributed  Wang Soares Jr., MD as Consulting Physician (Urology)  Jitendra Proctor MD as Consulting Physician (Cardiology)        Subjective     History of Present Illness    Subjective      Objective     Vital Signs  Temp:  [97 °F (36.1 °C)-98.3 °F (36.8 °C)] 97.7 °F (36.5 °C)  Heart Rate:  [61-69] 63  Resp:  [16] 16  BP: ()/(56-76) 114/68    Objective:  General Appearance:  Comfortable.    Vital signs: (most recent): Blood pressure 114/68, pulse 63, temperature 97.7 °F (36.5 °C), resp. rate 16, height 182.9 cm (72\"), weight 89.5 kg (197 lb 6.4 oz), SpO2 93 %.  Vital signs are normal.    Output: Producing urine.    HEENT: Normal HEENT exam.    Lungs:  Normal effort and normal respiratory rate.    Abdomen: Abdomen is soft.              Results Review:  New clinical results reviewed.        Assessment/Plan       Bladder mass      Assessment:  (Bt and g heme  On asa     Will try tto wean cbi  ).       Junior Adams MD  12/01/18  1:57 PM            "

## 2018-12-02 VITALS
RESPIRATION RATE: 16 BRPM | TEMPERATURE: 98.5 F | SYSTOLIC BLOOD PRESSURE: 132 MMHG | HEIGHT: 72 IN | WEIGHT: 197.4 LBS | OXYGEN SATURATION: 92 % | DIASTOLIC BLOOD PRESSURE: 68 MMHG | BODY MASS INDEX: 26.74 KG/M2 | HEART RATE: 72 BPM

## 2018-12-02 PROCEDURE — G0378 HOSPITAL OBSERVATION PER HR: HCPCS

## 2018-12-02 PROCEDURE — 25010000002 HYDROMORPHONE PER 4 MG: Performed by: UROLOGY

## 2018-12-02 PROCEDURE — 25010000002 KETOROLAC TROMETHAMINE PER 15 MG: Performed by: UROLOGY

## 2018-12-02 RX ADMIN — Medication 81 MG: at 09:38

## 2018-12-02 RX ADMIN — GABAPENTIN 200 MG: 100 CAPSULE ORAL at 09:38

## 2018-12-02 RX ADMIN — FLUOXETINE HYDROCHLORIDE 40 MG: 20 CAPSULE ORAL at 09:38

## 2018-12-02 RX ADMIN — ATORVASTATIN CALCIUM 40 MG: 20 TABLET, FILM COATED ORAL at 09:38

## 2018-12-02 RX ADMIN — PANTOPRAZOLE SODIUM 40 MG: 40 TABLET, DELAYED RELEASE ORAL at 07:44

## 2018-12-02 RX ADMIN — KETOROLAC TROMETHAMINE 15 MG: 30 INJECTION, SOLUTION INTRAMUSCULAR at 05:42

## 2018-12-02 RX ADMIN — KETOROLAC TROMETHAMINE 15 MG: 30 INJECTION, SOLUTION INTRAMUSCULAR at 12:11

## 2018-12-02 RX ADMIN — AMLODIPINE BESYLATE 5 MG: 5 TABLET ORAL at 09:39

## 2018-12-02 RX ADMIN — BISACODYL 10 MG: 5 TABLET, COATED ORAL at 09:38

## 2018-12-02 RX ADMIN — FINASTERIDE 5 MG: 5 TABLET, FILM COATED ORAL at 09:38

## 2018-12-02 RX ADMIN — HYDROMORPHONE HYDROCHLORIDE 0.5 MG: 1 INJECTION, SOLUTION INTRAMUSCULAR; INTRAVENOUS; SUBCUTANEOUS at 05:42

## 2018-12-02 NOTE — PROGRESS NOTES
"Cc bt  Doing well single episode of left flank pain last night  No pain now urine still with g heme     LOS: 0 days   Patient Care Team:  Liban Brown MD as PCP - General (Internal Medicine)  Liban Brown MD as PCP - Claims Attributed  Wang Soares Jr., MD as Consulting Physician (Urology)  Jitendra Proctor MD as Consulting Physician (Cardiology)        Subjective     History of Present Illness    Subjective      Objective     Vital Signs  Temp:  [96.8 °F (36 °C)-98.5 °F (36.9 °C)] 98.5 °F (36.9 °C)  Heart Rate:  [64-73] 72  Resp:  [16] 16  BP: (124-151)/(68-83) 132/68    Objective:  General Appearance:  Comfortable.    Vital signs: (most recent): Blood pressure 132/68, pulse 72, temperature 98.5 °F (36.9 °C), temperature source Oral, resp. rate 16, height 182.9 cm (72\"), weight 89.5 kg (197 lb 6.4 oz), SpO2 92 %.  Vital signs are normal.    Output: Producing urine.    HEENT: Normal HEENT exam.    Lungs:  Normal effort and normal respiratory rate.    Abdomen: Abdomen is soft.              Results Review:  New clinical results reviewed.        Assessment/Plan       Bladder mass      Assessment:  (MO home with shay).       Junior Adams MD  12/02/18  1:10 PM            "

## 2018-12-02 NOTE — PLAN OF CARE
Problem: Patient Care Overview  Goal: Plan of Care Review  Outcome: Ongoing (interventions implemented as appropriate)   12/02/18 0550   Coping/Psychosocial   Plan of Care Reviewed With patient   Plan of Care Review   Progress improving   OTHER   Outcome Summary Pt rested well until this AM when he c/o left side pain- something he has had before. He requested dilaudid and stated this is the only thing that relieves it- 0.5 mgs given IVP as ordered. Urine is pink tinged but there are no clots and and shay is patent to BSD. Pt was given bisacodyl 10 mgs PO at 2100 last night but has had no results. Up ad real in room-wife at bedside. Probable discharge today. Will continue to monitor and follow current POC.       Problem: Urine Elimination Impaired (Adult)  Goal: Identify Related Risk Factors and Signs and Symptoms  Outcome: Ongoing (interventions implemented as appropriate)    Goal: Effective Containment of Urine  Outcome: Ongoing (interventions implemented as appropriate)    Goal: Reduced Incontinence Episodes  Outcome: Ongoing (interventions implemented as appropriate)

## 2018-12-02 NOTE — PLAN OF CARE
Problem: Urine Elimination Impaired (Adult)  Goal: Effective or Improved Urinary Elimination  Outcome: Ongoing (interventions implemented as appropriate)  CBI clamped at 10am with success. Holding again overnight tonight per urology order. No distresses noted. Suppository tonight. Monitor output and hope for home in a.m.

## 2018-12-03 NOTE — PROGRESS NOTES
Case Management Discharge Note    Final Note: Home--no needs    Destination      No service has been selected for the patient.      Durable Medical Equipment      No service has been selected for the patient.      Dialysis/Infusion      No service has been selected for the patient.      Home Medical Care      No service has been selected for the patient.      Community Resources      No service has been selected for the patient.             Final Discharge Disposition Code: 01 - home or self-care

## 2018-12-14 NOTE — PROGRESS NOTES
CC: Follow-up for mixed tremor    HPI:  Sukhdev Zayas is a  78 y.o.  right-handed male with past medical history of hypertension, hyperlipidemia, UBALDO, depression, CAD status post stents 8/23/18, previous back surgery, and tremor.  Patient is being seen today for follow-up of his tremor.  He is accompanied by his wife.  He was last seen by me November 19, 2018.  Patient is Accompanied by his wife today.  The patient was initially evaluated by Dr. Chaudhary in July 2018 for tremor and gait disturbance.  Patient's father also had a tremor.  The patient's tremor has been present for several years and it is present both with action and rest.  It is worse in his left upper extremity but may also occur in his right arm or in the left face as well.  Beta blocker made him lethargic.  He is previously been on gabapentin 900 mg at night as it made him very drowsy during the day.  Patient didn't seem to think the gabapentin was helping much but once we weaned him off it became evident that it was helping.  Prior to the last appointment we had started the patient on primidone however it was not helping his tremor so that has been weaned off.  I started the patient on gabapentin at the last appointment with instructions for slow titration however the patient has increased dosing on his own to 600 mg 3 times a day.  The only side effect he has now is mild drowsiness he feels like his tremor is 40-50% improved, but it is still quite bothersome.    Patient had a bladder resection November 29. Surgery seemed to go well.  He has a repeat scope planned.        Past Medical History:   Diagnosis Date   • Anxiety    • Back pain    • Bladder tumor    • Coronary artery disease    • Depression    • Fatigue    • Hyperlipidemia    • Hypertension    • Tremors of nervous system          Past Surgical History:   Procedure Laterality Date   • CARDIAC CATHETERIZATION      7x   • CARDIAC SURGERY  2008    5 caths and 3 stents per patient approx 2008  or 2010   • CATARACT EXTRACTION, BILATERAL     • COLONOSCOPY     • EYE SURGERY     • SKIN CANCER EXCISION Left     chest wall   • TRANSURETHRAL RESECTION OF BLADDER TUMOR N/A 11/29/2018    Procedure: TUR BLADDER TUMOR  LARGE;  Surgeon: Wang Soares Jr., MD;  Location: [facility] OR;  Service: Urology           Current Outpatient Medications:   •  Acetaminophen (TYLENOL PO), Take  by mouth As Needed., Disp: , Rfl:   •  amLODIPine (NORVASC) 5 MG tablet, Take 1 tablet by mouth Every Morning., Disp: , Rfl:   •  aspirin 81 MG tablet, Take 81 mg by mouth Daily. To stop 3-5 days before surgery, Disp: , Rfl:   •  atorvastatin (LIPITOR) 40 MG tablet, Take 40 mg by mouth Daily., Disp: , Rfl:   •  esomeprazole (nexIUM) 40 MG capsule, Take 40 mg by mouth Every Morning Before Breakfast., Disp: , Rfl:   •  FLUoxetine (PROZAC) 40 MG capsule, Take 40 mg by mouth Daily., Disp: , Rfl:   •  gabapentin (NEURONTIN) 100 MG capsule, Take 600 mg by mouth 3 (Three) Times a Day. Plan to titrate up to 300mg TID per Neurologist, Disp: , Rfl:   •  nitroglycerin (NITROSTAT) 0.4 MG SL tablet, Place 0.4 mg under the tongue as needed for chest pain. Take no more than 3 doses in 15 minutes., Disp: , Rfl:   •  topiramate (TOPAMAX) 25 MG tablet, Take 1 tablet by mouth 2 (Two) Times a Day. 1 tablet at bedtime for 1 week then 1 tablet twice a day, Disp: 30 tablet, Rfl: 2      Family History   Problem Relation Age of Onset   • Arthritis Mother    • Glaucoma Mother    • Heart disease Father    • Emphysema Father    • Tremor Father    • Malig Hyperthermia Neg Hx          Social History     Socioeconomic History   • Marital status:      Spouse name: Not on file   • Number of children: 4   • Years of education: 5 college degrees   • Highest education level: Not on file   Social Needs   • Financial resource strain: Not on file   • Food insecurity - worry: Not on file   • Food insecurity - inability: Not on file   • Transportation needs -  "medical: Not on file   • Transportation needs - non-medical: Not on file   Occupational History   • Occupation: Retired   Tobacco Use   • Smoking status: Never Smoker   • Smokeless tobacco: Never Used   Substance and Sexual Activity   • Alcohol use: No     Frequency: Never     Comment: used to drink 2-3 nightly quit last 3 months ago 2018   • Drug use: No   • Sexual activity: Defer   Other Topics Concern   • Not on file   Social History Narrative   • Not on file         Allergies   Allergen Reactions   • No Known Drug Allergy                ROS:  Review of Systems   Constitutional: Negative.    HENT: Negative.    Eyes: Negative.    Respiratory: Negative.    Cardiovascular: Negative.    Gastrointestinal: Negative.    Endocrine: Negative.    Genitourinary: Negative.    Musculoskeletal: Negative.    Skin: Negative.    Allergic/Immunologic: Negative.    Neurological: Positive for tremors. Negative for dizziness, seizures, syncope, facial asymmetry, speech difficulty, weakness, light-headedness, numbness and headaches.   Hematological: Negative.    Psychiatric/Behavioral: Negative.            Physical Exam:  Vitals:    12/20/18 1559   BP: 125/70   Pulse: 64   SpO2: 92%   Weight: 86.2 kg (190 lb)   Height: 182.9 cm (72\")     Body mass index is 25.77 kg/m².    Physical Exam  General appearance: Well developed, well nourished, well groomed, alert and cooperative.   HEENT: Normocephalic.   Neck and spine: Normal range of motion. Normal alignment. No mass or tenderness.    Cardiac: Regular rate and rhythm. No murmurs.   Peripheral Vasculature: Extremities warm and dry.  Chest Exam: Clear to auscultation bilaterally, no wheezes, no rhonchi.  Extremities: Normal, no edema.   Skin: No rashes or birthmarks.     Neurological Exam:   Higher integrative function: Oriented to time, place, person, intact recent and remote memory, attention span, concentration and language. Spontaneous speech, fund of vocabulary are normal.   CN II: " Normal visual fields.   CN III IV VI: Extraocular movements are full without nystagmus. Pupils are equal, round, and reactive to light.   CN V: Normal facial sensation.  CN VII: Facial movements are symmetric, no weakness.   CN VIII: Auditory acuity is normal.   CN IX & X: Symmetric palatal movement.   CN XI: Sternocleidomastoid and trapezius are normal.   CN XII: The tongue is midline.   Motor: Normal muscle strength, bulk, and tone in upper and lower extremities. L hand resting and action tremor. Intermittent left facial tremor.  Sensation: Normal light touch.  Station and gait: Normal casual gait.  Coordination: No dysmetria with finger-to-nose.            Results:      Lab Results   Component Value Date    GLUCOSE 94 11/29/2018    BUN 17 11/29/2018    CREATININE 0.84 11/29/2018    EGFRIFNONA 88 11/29/2018    BCR 20.2 11/29/2018    CO2 26.4 11/29/2018    CALCIUM 8.8 11/29/2018    ALBUMIN 4.20 05/29/2018    AST 31 05/29/2018    ALT 36 05/29/2018       Lab Results   Component Value Date    WBC 6.92 11/29/2018    HGB 13.8 11/29/2018    HCT 41.3 11/29/2018    MCV 99.0 (H) 11/29/2018     11/29/2018         .No results found for: RPR      Lab Results   Component Value Date    TSH 4.440 (H) 05/29/2018         No results found for: [id number]      No results found for: FOLATE      No results found for: HGBA1C            Assessment:   Mr. Garcia was seen today for a mixed tremor.  He has felt beta blocker and primidone was not helpful gabapentin is helping at 600 mg 3 times a day with only mild drowsiness.          Plan:  Will add Topamax.  Start with 25 mg daily at bedtime and after one week increase to 25 mg twice a day if tolerated.  Patient is wife were educated on the side effects.  The patient has never had kidney stones and previous ophthalmology notes have been reviewed and there is no indication of narrow angle glaucoma.    Patient instructed to call me in approximately 2 weeks to let me know how he is  doing and further recommendations will be made at that time.                          Dictated utilizing Dragon dictation.

## 2018-12-20 ENCOUNTER — OFFICE VISIT (OUTPATIENT)
Dept: NEUROLOGY | Facility: CLINIC | Age: 78
End: 2018-12-20

## 2018-12-20 VITALS
WEIGHT: 190 LBS | BODY MASS INDEX: 25.73 KG/M2 | HEART RATE: 64 BPM | HEIGHT: 72 IN | SYSTOLIC BLOOD PRESSURE: 125 MMHG | DIASTOLIC BLOOD PRESSURE: 70 MMHG | OXYGEN SATURATION: 92 %

## 2018-12-20 DIAGNOSIS — R25.9 MIXED ACTION AND RESTING TREMOR: Primary | ICD-10-CM

## 2018-12-20 PROCEDURE — 99213 OFFICE O/P EST LOW 20 MIN: CPT | Performed by: NURSE PRACTITIONER

## 2018-12-20 RX ORDER — GABAPENTIN 300 MG/1
CAPSULE ORAL
Qty: 180 CAPSULE | Refills: 2 | Status: SHIPPED | OUTPATIENT
Start: 2018-12-20 | End: 2018-12-20 | Stop reason: SDUPTHER

## 2018-12-20 RX ORDER — TOPIRAMATE 25 MG/1
25 TABLET ORAL 2 TIMES DAILY
Qty: 30 TABLET | Refills: 2 | Status: SHIPPED | OUTPATIENT
Start: 2018-12-20 | End: 2019-01-03

## 2018-12-20 RX ORDER — GABAPENTIN 300 MG/1
600 CAPSULE ORAL 3 TIMES DAILY
Qty: 180 CAPSULE | Refills: 2 | Status: SHIPPED | OUTPATIENT
Start: 2018-12-20 | End: 2019-01-03

## 2018-12-20 RX ORDER — GABAPENTIN 300 MG/1
CAPSULE ORAL
Qty: 180 CAPSULE | Refills: 2 | OUTPATIENT
Start: 2018-12-20 | End: 2018-12-20 | Stop reason: DRUGHIGH

## 2018-12-20 NOTE — PATIENT INSTRUCTIONS
· Start topamax 1 pill daily at bedtime for 1 week, if tolerated increase to twice a day.  · Call me in 2 weeks to check in.

## 2019-01-03 ENCOUNTER — APPOINTMENT (OUTPATIENT)
Dept: PREADMISSION TESTING | Facility: HOSPITAL | Age: 79
End: 2019-01-03

## 2019-01-03 VITALS
DIASTOLIC BLOOD PRESSURE: 92 MMHG | HEART RATE: 64 BPM | BODY MASS INDEX: 26.82 KG/M2 | WEIGHT: 198 LBS | SYSTOLIC BLOOD PRESSURE: 152 MMHG | OXYGEN SATURATION: 100 % | HEIGHT: 72 IN | RESPIRATION RATE: 20 BRPM

## 2019-01-03 LAB
ANION GAP SERPL CALCULATED.3IONS-SCNC: 11.2 MMOL/L
BUN BLD-MCNC: 17 MG/DL (ref 8–23)
BUN/CREAT SERPL: 16.8 (ref 7–25)
CALCIUM SPEC-SCNC: 9.1 MG/DL (ref 8.6–10.5)
CHLORIDE SERPL-SCNC: 107 MMOL/L (ref 98–107)
CO2 SERPL-SCNC: 22.8 MMOL/L (ref 22–29)
CREAT BLD-MCNC: 1.01 MG/DL (ref 0.76–1.27)
DEPRECATED RDW RBC AUTO: 49.6 FL (ref 37–54)
ERYTHROCYTE [DISTWIDTH] IN BLOOD BY AUTOMATED COUNT: 13.2 % (ref 11.5–14.5)
GFR SERPL CREATININE-BSD FRML MDRD: 71 ML/MIN/1.73
GLUCOSE BLD-MCNC: 56 MG/DL (ref 65–99)
HCT VFR BLD AUTO: 45.7 % (ref 40.4–52.2)
HGB BLD-MCNC: 14.7 G/DL (ref 13.7–17.6)
MCH RBC QN AUTO: 33 PG (ref 27–32.7)
MCHC RBC AUTO-ENTMCNC: 32.2 G/DL (ref 32.6–36.4)
MCV RBC AUTO: 102.5 FL (ref 79.8–96.2)
PLATELET # BLD AUTO: 211 10*3/MM3 (ref 140–500)
PMV BLD AUTO: 10.5 FL (ref 6–12)
POTASSIUM BLD-SCNC: 4.1 MMOL/L (ref 3.5–5.2)
RBC # BLD AUTO: 4.46 10*6/MM3 (ref 4.6–6)
SODIUM BLD-SCNC: 141 MMOL/L (ref 136–145)
WBC NRBC COR # BLD: 7.55 10*3/MM3 (ref 4.5–10.7)

## 2019-01-03 PROCEDURE — 80048 BASIC METABOLIC PNL TOTAL CA: CPT | Performed by: UROLOGY

## 2019-01-03 PROCEDURE — 36415 COLL VENOUS BLD VENIPUNCTURE: CPT

## 2019-01-03 PROCEDURE — 85027 COMPLETE CBC AUTOMATED: CPT | Performed by: UROLOGY

## 2019-01-03 RX ORDER — GABAPENTIN 300 MG/1
600 CAPSULE ORAL 3 TIMES DAILY
COMMUNITY
End: 2019-07-17

## 2019-01-03 RX ORDER — TOPIRAMATE 25 MG/1
25 TABLET ORAL NIGHTLY
COMMUNITY
End: 2019-03-14 | Stop reason: SDUPTHER

## 2019-01-03 RX ORDER — CLOPIDOGREL BISULFATE 75 MG/1
75 TABLET ORAL DAILY
Status: ON HOLD | COMMUNITY
End: 2019-01-08 | Stop reason: SDUPTHER

## 2019-01-03 NOTE — DISCHARGE INSTRUCTIONS
Take the following medications the morning of surgery with a small sip of water:  AMLODIPINE, NEXIUM, AND GABAPENTIN    ARRIVAL TIME 0700 TO MAIN OR         General Instructions:  • Do not eat or drink anything after midnight the night before surgery.  • Infants may have breast milk up to four hours before surgery.  • Infants drinking formula may drink formula up to six hours before surgery.   • Patients who avoid smoking, chewing tobacco and alcohol for 4 weeks prior to surgery have a reduced risk of post-operative complications.  Quit smoking as many days before surgery as you can.  • Do not smoke, use chewing tobacco or drink alcohol the day of surgery.   • If applicable bring your C-PAP/ BI-PAP machine.  • Bring any papers given to you in the doctor’s office.  • Wear clean comfortable clothes and socks.  • Do not wear contact lenses or make-up.  Bring a case for your glasses.   • Bring crutches or walker if applicable.  • Remove all piercings.  Leave jewelry and any other valuables at home.  • Hair extensions with metal clips must be removed prior to surgery.  • The Pre-Admission Testing nurse will instruct you to bring medications if unable to obtain an accurate list in Pre-Admission Testing.            Preventing a Surgical Site Infection:  • For 2 to 3 days before surgery, avoid shaving with a razor because the razor can irritate skin and make it easier to develop an infection.    • Any areas of open skin can increase the risk of a post-operative wound infection by allowing bacteria to enter and travel throughout the body.  Notify your surgeon if you have any skin wounds / rashes even if it is not near the expected surgical site.  The area will need assessed to determine if surgery should be delayed until it is healed.  • The night prior to surgery sleep in a clean bed with clean clothing.  Do not allow pets to sleep with you.  • Shower on the morning of surgery using a fresh bar of anti-bacterial soap (such  as Dial) and clean washcloth.  Dry with a clean towel and dress in clean clothing.  • Ask your surgeon if you will be receiving antibiotics prior to surgery.  • Make sure you, your family, and all healthcare providers clean their hands with soap and water or an alcohol based hand  before caring for you or your wound.    Day of surgery:  Upon arrival, a Pre-op nurse and Anesthesiologist will review your health history, obtain vital signs, and answer questions you may have.  The only belongings needed at this time will be your home medications and if applicable your C-PAP/BI-PAP machine.  If you are staying overnight your family can leave the rest of your belongings in the car and bring them to your room later.  A Pre-op nurse will start an IV and you may receive medication in preparation for surgery, including something to help you relax.  Your family will be able to see you in the Pre-op area.  While you are in surgery your family should notify the waiting room  if they leave the waiting room area and provide a contact phone number.    Please be aware that surgery does come with discomfort.  We want to make every effort to control your discomfort so please discuss any uncontrolled symptoms with your nurse.   Your doctor will most likely have prescribed pain medications.      If you are going home after surgery you will receive individualized written care instructions before being discharged.  A responsible adult must drive you to and from the hospital on the day of your surgery and stay with you for 24 hours.    If you are staying overnight following surgery, you will be transported to your hospital room following the recovery period.  [facility] has all private rooms.    You have received a list of surgical assistants for your reference.  If you have any questions please call Pre-Admission Testing at [phone].  Deductibles and co-payments are collected on the day of service.  Please be prepared to pay the required co-pay, deductible or deposit on the day of service as defined by your plan.

## 2019-01-08 ENCOUNTER — ANESTHESIA EVENT (OUTPATIENT)
Dept: PERIOP | Facility: HOSPITAL | Age: 79
End: 2019-01-08

## 2019-01-08 ENCOUNTER — ANESTHESIA (OUTPATIENT)
Dept: PERIOP | Facility: HOSPITAL | Age: 79
End: 2019-01-08

## 2019-01-08 ENCOUNTER — HOSPITAL ENCOUNTER (OUTPATIENT)
Facility: HOSPITAL | Age: 79
Setting detail: HOSPITAL OUTPATIENT SURGERY
Discharge: HOME | End: 2019-01-08
Attending: UROLOGY | Admitting: UROLOGY

## 2019-01-08 VITALS
TEMPERATURE: 97.6 F | HEART RATE: 59 BPM | OXYGEN SATURATION: 96 % | WEIGHT: 194 LBS | SYSTOLIC BLOOD PRESSURE: 148 MMHG | DIASTOLIC BLOOD PRESSURE: 85 MMHG | BODY MASS INDEX: 26.28 KG/M2 | HEIGHT: 72 IN | RESPIRATION RATE: 16 BRPM

## 2019-01-08 DIAGNOSIS — D49.4 BLADDER TUMOR: ICD-10-CM

## 2019-01-08 PROBLEM — C67.9 BLADDER CANCER (HCC): Status: ACTIVE | Noted: 2019-01-08

## 2019-01-08 PROCEDURE — 25010000002 FENTANYL CITRATE (PF) 100 MCG/2ML SOLUTION: Performed by: ANESTHESIOLOGY

## 2019-01-08 PROCEDURE — 25010000002 PHENYLEPHRINE PER 1 ML: Performed by: NURSE ANESTHETIST, CERTIFIED REGISTERED

## 2019-01-08 PROCEDURE — 25010000002 PROPOFOL 10 MG/ML EMULSION: Performed by: NURSE ANESTHETIST, CERTIFIED REGISTERED

## 2019-01-08 PROCEDURE — 25010000003 CEFAZOLIN 1-4 GM/50ML-% SOLUTION: Performed by: UROLOGY

## 2019-01-08 PROCEDURE — 88305 TISSUE EXAM BY PATHOLOGIST: CPT | Performed by: UROLOGY

## 2019-01-08 PROCEDURE — 25010000002 ONDANSETRON PER 1 MG: Performed by: NURSE ANESTHETIST, CERTIFIED REGISTERED

## 2019-01-08 PROCEDURE — 25010000002 FENTANYL CITRATE (PF) 100 MCG/2ML SOLUTION: Performed by: NURSE ANESTHETIST, CERTIFIED REGISTERED

## 2019-01-08 RX ORDER — FAMOTIDINE 10 MG/ML
20 INJECTION, SOLUTION INTRAVENOUS ONCE
Status: COMPLETED | OUTPATIENT
Start: 2019-01-08 | End: 2019-01-08

## 2019-01-08 RX ORDER — MAGNESIUM HYDROXIDE 1200 MG/15ML
LIQUID ORAL AS NEEDED
Status: DISCONTINUED | OUTPATIENT
Start: 2019-01-08 | End: 2019-01-08 | Stop reason: HOSPADM

## 2019-01-08 RX ORDER — LIDOCAINE HYDROCHLORIDE 10 MG/ML
0.5 INJECTION, SOLUTION EPIDURAL; INFILTRATION; INTRACAUDAL; PERINEURAL ONCE AS NEEDED
Status: DISCONTINUED | OUTPATIENT
Start: 2019-01-08 | End: 2019-01-08 | Stop reason: HOSPADM

## 2019-01-08 RX ORDER — NALOXONE HCL 0.4 MG/ML
0.2 VIAL (ML) INJECTION AS NEEDED
Status: DISCONTINUED | OUTPATIENT
Start: 2019-01-08 | End: 2019-01-08 | Stop reason: HOSPADM

## 2019-01-08 RX ORDER — SODIUM CHLORIDE, SODIUM LACTATE, POTASSIUM CHLORIDE, CALCIUM CHLORIDE 600; 310; 30; 20 MG/100ML; MG/100ML; MG/100ML; MG/100ML
9 INJECTION, SOLUTION INTRAVENOUS CONTINUOUS
Status: DISCONTINUED | OUTPATIENT
Start: 2019-01-08 | End: 2019-01-08 | Stop reason: HOSPADM

## 2019-01-08 RX ORDER — HYDROMORPHONE HYDROCHLORIDE 1 MG/ML
0.5 INJECTION, SOLUTION INTRAMUSCULAR; INTRAVENOUS; SUBCUTANEOUS
Status: DISCONTINUED | OUTPATIENT
Start: 2019-01-08 | End: 2019-01-08 | Stop reason: HOSPADM

## 2019-01-08 RX ORDER — ONDANSETRON 2 MG/ML
4 INJECTION INTRAMUSCULAR; INTRAVENOUS ONCE AS NEEDED
Status: DISCONTINUED | OUTPATIENT
Start: 2019-01-08 | End: 2019-01-08 | Stop reason: HOSPADM

## 2019-01-08 RX ORDER — LABETALOL HYDROCHLORIDE 5 MG/ML
5 INJECTION, SOLUTION INTRAVENOUS
Status: DISCONTINUED | OUTPATIENT
Start: 2019-01-08 | End: 2019-01-08 | Stop reason: HOSPADM

## 2019-01-08 RX ORDER — SODIUM CHLORIDE 0.9 % (FLUSH) 0.9 %
1-10 SYRINGE (ML) INJECTION AS NEEDED
Status: DISCONTINUED | OUTPATIENT
Start: 2019-01-08 | End: 2019-01-08 | Stop reason: HOSPADM

## 2019-01-08 RX ORDER — CEFAZOLIN SODIUM 1 G/50ML
1 INJECTION, SOLUTION INTRAVENOUS ONCE
Status: COMPLETED | OUTPATIENT
Start: 2019-01-08 | End: 2019-01-08

## 2019-01-08 RX ORDER — FENTANYL CITRATE 50 UG/ML
50 INJECTION, SOLUTION INTRAMUSCULAR; INTRAVENOUS
Status: DISCONTINUED | OUTPATIENT
Start: 2019-01-08 | End: 2019-01-08 | Stop reason: HOSPADM

## 2019-01-08 RX ORDER — HYDROCODONE BITARTRATE AND ACETAMINOPHEN 5; 325 MG/1; MG/1
1 TABLET ORAL ONCE AS NEEDED
Status: DISCONTINUED | OUTPATIENT
Start: 2019-01-08 | End: 2019-01-08 | Stop reason: HOSPADM

## 2019-01-08 RX ORDER — MIDAZOLAM HYDROCHLORIDE 1 MG/ML
2 INJECTION INTRAMUSCULAR; INTRAVENOUS
Status: DISCONTINUED | OUTPATIENT
Start: 2019-01-08 | End: 2019-01-08 | Stop reason: HOSPADM

## 2019-01-08 RX ORDER — PROPOFOL 10 MG/ML
VIAL (ML) INTRAVENOUS AS NEEDED
Status: DISCONTINUED | OUTPATIENT
Start: 2019-01-08 | End: 2019-01-08 | Stop reason: SURG

## 2019-01-08 RX ORDER — LIDOCAINE HYDROCHLORIDE 20 MG/ML
INJECTION, SOLUTION INFILTRATION; PERINEURAL AS NEEDED
Status: DISCONTINUED | OUTPATIENT
Start: 2019-01-08 | End: 2019-01-08 | Stop reason: SURG

## 2019-01-08 RX ORDER — ONDANSETRON 2 MG/ML
INJECTION INTRAMUSCULAR; INTRAVENOUS AS NEEDED
Status: DISCONTINUED | OUTPATIENT
Start: 2019-01-08 | End: 2019-01-08 | Stop reason: SURG

## 2019-01-08 RX ORDER — HYDRALAZINE HYDROCHLORIDE 20 MG/ML
5 INJECTION INTRAMUSCULAR; INTRAVENOUS
Status: DISCONTINUED | OUTPATIENT
Start: 2019-01-08 | End: 2019-01-08 | Stop reason: HOSPADM

## 2019-01-08 RX ORDER — FLUMAZENIL 0.1 MG/ML
0.2 INJECTION INTRAVENOUS AS NEEDED
Status: DISCONTINUED | OUTPATIENT
Start: 2019-01-08 | End: 2019-01-08 | Stop reason: HOSPADM

## 2019-01-08 RX ORDER — FENTANYL CITRATE 50 UG/ML
INJECTION, SOLUTION INTRAMUSCULAR; INTRAVENOUS AS NEEDED
Status: DISCONTINUED | OUTPATIENT
Start: 2019-01-08 | End: 2019-01-08 | Stop reason: SURG

## 2019-01-08 RX ORDER — HYDROCODONE BITARTRATE AND ACETAMINOPHEN 5; 325 MG/1; MG/1
1-2 TABLET ORAL EVERY 4 HOURS PRN
Qty: 10 TABLET | Refills: 0 | Status: SHIPPED | OUTPATIENT
Start: 2019-01-08 | End: 2019-04-10

## 2019-01-08 RX ORDER — MIDAZOLAM HYDROCHLORIDE 1 MG/ML
1 INJECTION INTRAMUSCULAR; INTRAVENOUS
Status: DISCONTINUED | OUTPATIENT
Start: 2019-01-08 | End: 2019-01-08 | Stop reason: HOSPADM

## 2019-01-08 RX ORDER — SULFAMETHOXAZOLE AND TRIMETHOPRIM 800; 160 MG/1; MG/1
1 TABLET ORAL 2 TIMES DAILY
Qty: 6 TABLET | Refills: 0 | Status: SHIPPED | OUTPATIENT
Start: 2019-01-08 | End: 2019-04-10

## 2019-01-08 RX ORDER — CLOPIDOGREL BISULFATE 75 MG/1
75 TABLET ORAL DAILY
Qty: 30 TABLET
Start: 2019-01-11 | End: 2019-05-24

## 2019-01-08 RX ORDER — ACETAMINOPHEN 325 MG/1
650 TABLET ORAL ONCE AS NEEDED
Status: DISCONTINUED | OUTPATIENT
Start: 2019-01-08 | End: 2019-01-08 | Stop reason: HOSPADM

## 2019-01-08 RX ADMIN — PHENYLEPHRINE HYDROCHLORIDE 100 MCG: 10 INJECTION INTRAVENOUS at 10:53

## 2019-01-08 RX ADMIN — LIDOCAINE HYDROCHLORIDE 100 MG: 20 INJECTION, SOLUTION INFILTRATION; PERINEURAL at 10:23

## 2019-01-08 RX ADMIN — PHENYLEPHRINE HYDROCHLORIDE 50 MCG: 10 INJECTION INTRAVENOUS at 10:25

## 2019-01-08 RX ADMIN — SODIUM CHLORIDE, POTASSIUM CHLORIDE, SODIUM LACTATE AND CALCIUM CHLORIDE 9 ML/HR: 600; 310; 30; 20 INJECTION, SOLUTION INTRAVENOUS at 10:05

## 2019-01-08 RX ADMIN — PROPOFOL 140 MG: 10 INJECTION, EMULSION INTRAVENOUS at 10:23

## 2019-01-08 RX ADMIN — PHENYLEPHRINE HYDROCHLORIDE 50 MCG: 10 INJECTION INTRAVENOUS at 10:31

## 2019-01-08 RX ADMIN — PHENYLEPHRINE HYDROCHLORIDE 100 MCG: 10 INJECTION INTRAVENOUS at 10:46

## 2019-01-08 RX ADMIN — ONDANSETRON 4 MG: 2 INJECTION INTRAMUSCULAR; INTRAVENOUS at 10:45

## 2019-01-08 RX ADMIN — PHENYLEPHRINE HYDROCHLORIDE 50 MCG: 10 INJECTION INTRAVENOUS at 10:36

## 2019-01-08 RX ADMIN — FENTANYL CITRATE 50 MCG: 50 INJECTION, SOLUTION INTRAMUSCULAR; INTRAVENOUS at 10:06

## 2019-01-08 RX ADMIN — FENTANYL CITRATE 25 MCG: 50 INJECTION INTRAMUSCULAR; INTRAVENOUS at 10:20

## 2019-01-08 RX ADMIN — HYDROCODONE BITARTRATE AND ACETAMINOPHEN 1 TABLET: 5; 325 TABLET ORAL at 11:44

## 2019-01-08 RX ADMIN — CEFAZOLIN SODIUM 1 G: 1 INJECTION, SOLUTION INTRAVENOUS at 10:30

## 2019-01-08 RX ADMIN — FAMOTIDINE 20 MG: 10 INJECTION, SOLUTION INTRAVENOUS at 10:05

## 2019-01-08 NOTE — ANESTHESIA PREPROCEDURE EVALUATION
Anesthesia Evaluation     Patient summary reviewed and Nursing notes reviewed                Airway   Mallampati: III  Neck ROM: limited  Dental      Pulmonary    (+) sleep apnea,   Cardiovascular     ECG reviewed  Rhythm: regular  Rate: normal    (+) hypertension, CAD, cardiac stents within the past 12 months hyperlipidemia,       Neuro/Psych  (+) dizziness/light headedness, tremors, psychiatric history Anxiety and Depression,     GI/Hepatic/Renal/Endo    (+)  GERD,      Musculoskeletal     (+) back pain,   Abdominal    Substance History - negative use     OB/GYN negative ob/gyn ROS         Other      history of cancer remission                    Anesthesia Plan    ASA 3     general   (5 coronary stents total the last of which were placed in Aug of 2018 and now without angina and the patient has stopped his plavix 7 days prior to today's procedure)  intravenous induction   Anesthetic plan, all risks, benefits, and alternatives have been provided, discussed and informed consent has been obtained with: patient.

## 2019-01-08 NOTE — ANESTHESIA PROCEDURE NOTES
ANESTHESIA INTUBATION  Urgency: elective    Airway not difficult    General Information and Staff    Patient location during procedure: OR  Anesthesiologist: Armen Valladares MD  CRNA: Felicity Iverson CRNA    Indications and Patient Condition  Indications for airway management: airway protection    Preoxygenated: yes (Pt pre-O2 with 100% O2)  Mask difficulty assessment: 0 - not attempted    Final Airway Details  Final airway type: supraglottic airway      Successful airway: classic  Size 5    Number of attempts at approach: 1    Additional Comments  Appears ATOLMA x1. No change in dentition. + ETCO2. Airway seal pressure <20cm H2O.

## 2019-01-08 NOTE — OP NOTE
Operative Report    [facility] OR    Patient: Sukhdev Zayas  Age:      78 y.o.  :     1940  Sex:      male    Medical Record:  [id number]    Date of Operation/Procedure:  2019    Pre-op Diagnosis:   Bladder cancer    Post-Op Diagnosis Codes:   Same    Pre-operative Diagnosis Free Text:  * No pre-op diagnosis entered *     Name of Operation/Procedure:  Procedure(s) and Anesthesia Type:     * CYSTOSCOPY BLADDER BIOPSY - General    Findings/Complications:  Necrotic tissue overlying tumor base. No residual tumor observed    Description of procedure:  After informed consent was obtained, the patient was taken to the OR and general anesthesia was induced. He was placed in lithotomy position, prepped and draped in the standard fashion. All pressure points were padded. He received IV antibiotics prior to the procedure. The rigid cystoscope was passed into the bladder. The bladder was then inspected in its entirety. There were no tumors, stones, or foreign bodies that were visible. The tumor base from the previously resected tumor demonstrated necrotic tissue and calcifications. Biopsies were taken from viable tissue at the base of the previous tumor. All biopsy sites were fulgurated. The patient was then awakened from anesthesia in the OR and taken to the recovery room    Estimated Blood Loss: 2 mL    Specimens:   Order Name Source Comment Collection Info Order Time   TISSUE PATHOLOGY EXAM Urinary Bladder  Collected By: Wang Soares Jr., MD 2019 10:44 AM       Fluids/Drains: none    Wang Saores Jr., MD  2019  10:51 AM

## 2019-01-08 NOTE — H&P
[facility] H&P      Patient Identification:  NAME:  Sukhdev Zayas  Age:  78 y.o.   Sex:  male   :  1940   MRN:  [id number]       Chief complaint: Bladder tumor    History of present illness:    78-year-old man with a history of HG T1 NMIBC here for re-TUR. Holding Plavix x 7 days.     Past medical history:  Past Medical History:   Diagnosis Date   • Anxiety    • Back pain    • Bladder cancer (CMS/HCC)    • Coronary artery disease    • Depression    • Dizziness    • Fatigue    • GERD (gastroesophageal reflux disease)    • History of fractured rib     RIGHT SIDE   • Hyperlipidemia    • Hypertension    • Risk factors for obstructive sleep apnea     STOP BANG 6   • Tremors of nervous system    • Urinary incontinence        Past surgical history:  Past Surgical History:   Procedure Laterality Date   • CARDIAC CATHETERIZATION      7x   • CATARACT EXTRACTION, BILATERAL     • COLONOSCOPY     • CORONARY ANGIOPLASTY WITH STENT PLACEMENT      X5    • EYE SURGERY     • SKIN CANCER EXCISION Left     chest wall   • TRANSURETHRAL RESECTION OF BLADDER TUMOR N/A 2018    Procedure: TUR BLADDER TUMOR  LARGE;  Surgeon: Wang Soares Jr., MD;  Location: [facility];  Service: Urology       Allergies:  No known drug allergy    Home medications:  Medications Prior to Admission   Medication Sig Dispense Refill Last Dose   • amLODIPine (NORVASC) 5 MG tablet Take 5 mg by mouth Every Morning.   2019 at 0900   • aspirin 81 MG tablet Take 81 mg by mouth Daily. TO CONTINUE PER CARDIO   2019 at 0900   • atorvastatin (LIPITOR) 40 MG tablet Take 40 mg by mouth Daily.   2019 at 0900   • esomeprazole (nexIUM) 40 MG capsule Take 40 mg by mouth Every Morning Before Breakfast.   2019 at 0900   • FLUoxetine (PROZAC) 40 MG capsule Take 40 mg by mouth Daily.   2019 at 0900   • gabapentin (NEURONTIN) 300 MG capsule Take 300 mg by mouth 3 (Three) Times a Day.   2019 at 2100   • topiramate  (TOPAMAX) 25 MG tablet Take 25 mg by mouth Every Night.   2019 at 2100   • Acetaminophen (TYLENOL PO) Take 325 mg by mouth As Needed.   Taking   • clopidogrel (PLAVIX) 75 MG tablet Take 75 mg by mouth Daily. TO HOLD 5 DAYS PRIOR  PER CARDIO   2018   • nitroglycerin (NITROSTAT) 0.4 MG SL tablet Place 0.4 mg under the tongue as needed for chest pain. Take no more than 3 doses in 15 minutes.   Taking        Hospital medications:    ceFAZolin 1 g Intravenous Once       lactated ringers 9 mL/hr Last Rate: 9 mL/hr (19 1005)     fentanyl  •  lidocaine PF 1%  •  midazolam **OR** midazolam  •  sodium chloride    Family history:  Family History   Problem Relation Age of Onset   • Arthritis Mother    • Glaucoma Mother    • Heart disease Father    • Emphysema Father    • Tremor Father    • Malig Hyperthermia Neg Hx        Social history:  Social History     Tobacco Use   • Smoking status: Never Smoker   • Smokeless tobacco: Never Used   Substance Use Topics   • Alcohol use: No     Frequency: Never     Comment: used to drink 2-3 nightly quit last 4 months ago    • Drug use: No       Review of systems:    Negative 12-system ROS except for the following:  none      Objective:  TMax 24 hours:   Temp (24hrs), Av.5 °F (36.4 °C), Min:97.5 °F (36.4 °C), Max:97.5 °F (36.4 °C)      Vitals Ranges:   Temp:  [97.5 °F (36.4 °C)] 97.5 °F (36.4 °C)  Heart Rate:  [58-60] 58  Resp:  [20] 20  BP: (144)/(88) 144/88    Intake/Output Last 3 shifts:  No intake/output data recorded.     Physical Exam:       General Appearance:    Alert, cooperative, in no acute distress   Head:    Normocephalic, without obvious abnormality, atraumatic                                               Neuro/Psych:   Orientation intact, mood/affect pleasant, no focal findings       Results review:   I reviewed the patient's new clinical results.    Data review:  Lab Results (last 24 hours)     ** No results found for the last 24 hours. **            Imaging:  Imaging Results (last 24 hours)     ** No results found for the last 24 hours. **             Assessment:       * No active hospital problems. *    Bladder cancer    Plan:     - To OR for TURBT    Wang Soares Jr., MD  01/08/19  10:13 AM

## 2019-01-08 NOTE — ANESTHESIA POSTPROCEDURE EVALUATION
Patient: Sukhdev Zayas    Procedure Summary     Date:  01/08/19 Room / Location:  [facility] OR 01 / [facility] MAIN OR    Anesthesia Start:  1016 Anesthesia Stop:  1102    Procedure:  CYSTOSCOPY BLADDER BIOPSY (N/A ) Diagnosis:      Surgeon:  Wang Soares Jr., MD Provider:  Armen Valladares MD    Anesthesia Type:  general ASA Status:  3          Anesthesia Type: general  Last vitals  BP   129/76 (01/08/19 1130)   Temp   36.4 °C (97.6 °F) (01/08/19 1057)   Pulse   55 (01/08/19 1130)   Resp   20 (01/08/19 1130)     SpO2   97 % (01/08/19 1130)     Post Anesthesia Care and Evaluation    Patient location during evaluation: PACU  Patient participation: complete - patient participated  Level of consciousness: awake and alert  Pain management: adequate  Airway patency: patent  Anesthetic complications: No anesthetic complications    Cardiovascular status: acceptable  Respiratory status: acceptable  Hydration status: acceptable    Comments: --------------------            01/08/19               1130     --------------------   BP:       129/76     Pulse:      55       Resp:       20       Temp:                SpO2:      97%      --------------------

## 2019-01-09 LAB
CYTO UR: NORMAL
LAB AP CASE REPORT: NORMAL
LAB AP DIAGNOSIS COMMENT: NORMAL
PATH REPORT.FINAL DX SPEC: NORMAL
PATH REPORT.GROSS SPEC: NORMAL

## 2019-01-18 DIAGNOSIS — F39 MOOD DISORDER (HCC): ICD-10-CM

## 2019-01-18 RX ORDER — FLUOXETINE HYDROCHLORIDE 40 MG/1
CAPSULE ORAL
Qty: 90 CAPSULE | Refills: 3 | Status: SHIPPED | OUTPATIENT
Start: 2019-01-18 | End: 2019-05-24

## 2019-03-14 RX ORDER — TOPIRAMATE 25 MG/1
25 TABLET ORAL 2 TIMES DAILY
Qty: 180 TABLET | Refills: 3 | Status: SHIPPED | OUTPATIENT
Start: 2019-03-14 | End: 2019-04-10

## 2019-03-26 ENCOUNTER — TELEPHONE (OUTPATIENT)
Dept: NEUROLOGY | Facility: CLINIC | Age: 79
End: 2019-03-26

## 2019-03-26 NOTE — TELEPHONE ENCOUNTER
S/w pt's wife she stated that patient has been unsteady on his feet and having some confusion. She thinks it may be due to the topamax. She wanted to see if there's anyway he can be taken off the med.

## 2019-03-27 NOTE — TELEPHONE ENCOUNTER
If he is taking it twice a day have him go to once daily for a week then stop it. Let me know if any new or worsening symptoms.

## 2019-03-28 NOTE — TELEPHONE ENCOUNTER
Called and spoke to patient's wife.  I confirmed with wife that patient is currently taking 2 topomax a day and instructed her to have pt cut back to one a day for one week and then stop it, per Christie.  I instructed wife to call if they notice any new or worsening sxs.

## 2019-04-02 NOTE — PROGRESS NOTES
CC: Follow up for mixed tremor    HPI:  Sukhdev Zayas is a  78 y.o.  right-handed male with PMH of HTN, HLP, UBALDO, depression, bladder cancer s/p resection (undergoing intravesical chemo), CAD status post stents August 2018, previous back surgery, and tremor who is being seen in follow-up today for his tremor.  He is accompanied by his wife.  He was last seen by me December 20, 2018.  The patient was evaluated initially by Dr. Chaudhary in July 2018 for tremor and gait disturbance.  The patient's father also had a tremor.  The patient's tremor has been present for several years and is present both with action and rest.  It is worse in his left upper extremity but may also occur in his right arm or in the left face as well.  He has had lethargy related to beta-blocker.  He was previously on gabapentin 900 mg at night and it made him very drowsy during the day.  The patient did not think that the gabapentin was helping however when we weaned him off of it it became evident that it was helping.  We try the patient on primidone which did not seem to help his tremors that was stopped.  When I last saw him he was on gabapentin 600 mg 3 times a day and he noted some mild drowsiness but felt like his primary tremor was 40-50% improved however it was still quite bothersome.      I started Topamax 25 mg daily at bedtime with instructions to increase to 25 mg twice daily after 1 week if tolerated.  Patient developed confusion and unsteadiness on his feet on 25 mg BID so it was stopped. It did not help his tremor at that dose. The tremor seems to be getting progressively worse. It started in his left upper extremity but is also in the right arm and bilateral face. His wife states some days it increases in amplitude.    The patient has recently had his dose of prozac increased for depression.    The patient's wife is concerned that patient's gait is changing. He doesn't seem to be picking up his feet as well. He had a fall 2 days  ago; he was trying to step over a low landscaping fence and did not pick his foot up high enough to clear it. His wife also notes 3 episodes of the patient staring off briefly like he forgot what it was doing. He is responsive during these spells he just seems a little confused about what he is try to do or say. Spells have lasted no longer than a couple of minutes. She also feels like his memory is not as sharp and notes he had a baseline neuropsych test previously (per records done at [facility] in Spring 2016).     [facility] report between March 2018 in March 2019 was reviewed by me.  He has had hydrocodone filled twice, prescribed by Dr. Wang Soares.  He has also had gabapentin filled 3 different times, prescribed by myself and patient's PCP Dr. Brown.  Past Medical History:   Diagnosis Date   • Anxiety    • Back pain    • Bladder cancer (CMS/HCC)    • Coronary artery disease    • Depression    • Dizziness    • Fatigue    • GERD (gastroesophageal reflux disease)    • History of fractured rib     RIGHT SIDE   • Hyperlipidemia    • Hypertension    • Risk factors for obstructive sleep apnea     STOP BANG 6   • Tremors of nervous system    • Urinary incontinence          Past Surgical History:   Procedure Laterality Date   • CARDIAC CATHETERIZATION      7x   • CATARACT EXTRACTION, BILATERAL     • COLONOSCOPY     • CORONARY ANGIOPLASTY WITH STENT PLACEMENT      X5    • CYSTOSCOPY BLADDER BIOPSY N/A 1/8/2019    Procedure: CYSTOSCOPY BLADDER BIOPSY;  Surgeon: Wang Soares Jr., MD;  Location: [facility];  Service: Urology   • EYE SURGERY     • SKIN CANCER EXCISION Left     chest wall   • TRANSURETHRAL RESECTION OF BLADDER TUMOR N/A 11/29/2018    Procedure: TUR BLADDER TUMOR  LARGE;  Surgeon: Wang Soares Jr., MD;  Location: [facility];  Service: Urology           Current Outpatient Medications:   •  Acetaminophen (TYLENOL PO), Take 325 mg by mouth As Needed., Disp: , Rfl:   •  amLODIPine (NORVASC) 5  MG tablet, Take 5 mg by mouth Every Morning., Disp: , Rfl:   •  atorvastatin (LIPITOR) 40 MG tablet, Take 40 mg by mouth Daily., Disp: , Rfl:   •  clopidogrel (PLAVIX) 75 MG tablet, Take 1 tablet by mouth Daily. TO HOLD 5 DAYS PRIOR  PER CARDIO, Disp: 30 tablet, Rfl:   •  esomeprazole (nexIUM) 40 MG capsule, Take 40 mg by mouth Every Morning Before Breakfast., Disp: , Rfl:   •  FLUoxetine (PROzac) 40 MG capsule, TAKE 1 CAPSULE DAILY, Disp: 90 capsule, Rfl: 3  •  gabapentin (NEURONTIN) 300 MG capsule, Take 300 mg by mouth 3 (Three) Times a Day., Disp: , Rfl:   •  nitroglycerin (NITROSTAT) 0.4 MG SL tablet, Place 0.4 mg under the tongue as needed for chest pain. Take no more than 3 doses in 15 minutes., Disp: , Rfl:   •  amantadine (SYMMETREL) 100 MG capsule, Take 1 capsule by mouth 2 (Two) Times a Day., Disp: 60 capsule, Rfl: 2  •  FLUoxetine (PROzac) 20 MG tablet, TK 3 TS PO QD, Disp: , Rfl: 5  •  primidone (MYSOLINE) 50 MG tablet, , Disp: , Rfl:       Family History   Problem Relation Age of Onset   • Arthritis Mother    • Glaucoma Mother    • Heart disease Father    • Emphysema Father    • Tremor Father    • Malig Hyperthermia Neg Hx          Social History     Socioeconomic History   • Marital status:      Spouse name: Not on file   • Number of children: 4   • Years of education: 5 college degrees   • Highest education level: Not on file   Occupational History   • Occupation: Retired   Tobacco Use   • Smoking status: Never Smoker   • Smokeless tobacco: Never Used   Substance and Sexual Activity   • Alcohol use: No     Frequency: Never     Comment: used to drink 2-3 nightly quit last 4 months ago 2018   • Drug use: No   • Sexual activity: Defer         Allergies   Allergen Reactions   • No Known Drug Allergy          ROS:  Review of Systems   Constitutional: Negative for activity change, appetite change and fatigue.   HENT: Negative for ear pain, facial swelling and trouble swallowing.    Eyes: Negative for  "photophobia, pain and visual disturbance.   Respiratory: Negative for choking, chest tightness and shortness of breath.    Cardiovascular: Positive for chest pain. Negative for palpitations and leg swelling.   Gastrointestinal: Negative for abdominal pain, constipation and nausea.   Endocrine: Negative for polydipsia, polyphagia and polyuria.   Genitourinary: Negative for difficulty urinating, frequency and urgency.   Musculoskeletal: Positive for gait problem. Negative for back pain and neck pain.   Skin: Negative for color change, rash and wound.   Allergic/Immunologic: Negative for environmental allergies, food allergies and immunocompromised state.   Neurological: Positive for dizziness, tremors, weakness and light-headedness. Negative for seizures, syncope, facial asymmetry, speech difficulty, numbness and headaches.   Hematological: Negative for adenopathy. Bruises/bleeds easily.   Psychiatric/Behavioral: Positive for confusion. Negative for agitation, behavioral problems, decreased concentration, dysphoric mood, hallucinations, self-injury, sleep disturbance and suicidal ideas. The patient is not nervous/anxious and is not hyperactive.          I reviewed the above ROS completed by the medical assistant.    Physical Exam:  Vitals:    04/10/19 1604   BP: 118/72   Pulse: 74   SpO2: 98%   Weight: 90.3 kg (199 lb)   Height: 182.9 cm (72.01\")     Body mass index is 26.98 kg/m².    Physical Exam  General appearance: Well developed, well nourished, well groomed, alert and cooperative.   HEENT: Normocephalic.   Neck and spine: Normal range of motion. Normal alignment. No mass or tenderness.    Cardiac: Regular rate and rhythm. No murmurs.   Peripheral Vasculature: Radial pulses are equal and symmetric.  Chest Exam: Clear to auscultation bilaterally, no wheezes, no rhonchi.  Extremities: Normal, no edema.   Skin: No rashes or birthmarks.     Neurological Exam:   Higher integrative function: Oriented to time, place, " person, intact recent and remote memory, attention span, concentration and language. Spontaneous speech, fund of vocabulary are normal. Clock draw 4/4. 5 min recall 1 out of 3. Able to do serial 7's accurately.  CN II: Normal visual fields.   CN III IV VI: Extraocular movements are full without nystagmus. Pupils are equal, round, and reactive to light.  CN V: Normal facial sensation.  CN VII: Facial movements are symmetric, no weakness.   CN VIII: Auditory acuity is normal.   CN IX & X: Symmetric palatal movement.   CN XI: Sternocleidomastoid and trapezius are normal. No weakness.   CN XII: The tongue is midline.    Motor: Normal muscle strength, bulk, and tone in upper and lower extremities. LUE > RUE resting and action tremor. Intermittent facial tremor.   Sensation: Normal light touch.  Station and gait: No festination/shufflinf. Decreased left arm swing.   Muscle stretch reflexes: Reflexes are normal and symmetric in the upper and lower extremities.   Coordination: Finger to nose test showed no dysmetria. Heel to shin normal.                   Results:      Lab Results   Component Value Date    GLUCOSE 56 (L) 01/03/2019    BUN 17 01/03/2019    CREATININE 1.01 01/03/2019    EGFRIFNONA 71 01/03/2019    BCR 16.8 01/03/2019    CO2 22.8 01/03/2019    CALCIUM 9.1 01/03/2019    ALBUMIN 4.20 05/29/2018    AST 31 05/29/2018    ALT 36 05/29/2018       Lab Results   Component Value Date    WBC 7.55 01/03/2019    HGB 14.7 01/03/2019    HCT 45.7 01/03/2019    .5 (H) 01/03/2019     01/03/2019         .No results found for: RPR      Lab Results   Component Value Date    TSH 4.440 (H) 05/29/2018         No results found for: [id number]      No results found for: FOLATE      No results found for: HGBA1C            Assessment:   Mr Zayas was seen today for mixed tremor. He has not tolerated multiple medications, as described above. He has had increase in shuffling as described by his wife and spells of  confusion/decreased responsiveness.          Plan:  Continue gabapentin   Start amantadine, 100 mg daily x 1 week, then twice daily. Side effects reviewed with the patient and his wife.  Referral to [facility] for repeat neuropsych testing.  Check MRI brain w/wo  Check RPR, B12, and TSH  Follow up in 3 months                            Dictated utilizing Dragon dictation.

## 2019-04-10 ENCOUNTER — OFFICE VISIT (OUTPATIENT)
Dept: NEUROLOGY | Facility: CLINIC | Age: 79
End: 2019-04-10

## 2019-04-10 VITALS
SYSTOLIC BLOOD PRESSURE: 118 MMHG | HEART RATE: 74 BPM | DIASTOLIC BLOOD PRESSURE: 72 MMHG | HEIGHT: 72 IN | OXYGEN SATURATION: 98 % | WEIGHT: 199 LBS | BODY MASS INDEX: 26.95 KG/M2

## 2019-04-10 DIAGNOSIS — R25.1 TREMOR: ICD-10-CM

## 2019-04-10 DIAGNOSIS — R41.3 MEMORY LOSS: Primary | ICD-10-CM

## 2019-04-10 PROCEDURE — 99215 OFFICE O/P EST HI 40 MIN: CPT | Performed by: NURSE PRACTITIONER

## 2019-04-10 RX ORDER — PANTOPRAZOLE SODIUM 40 MG/1
TABLET, DELAYED RELEASE ORAL
COMMUNITY
Start: 2019-02-09 | End: 2019-04-10

## 2019-04-10 RX ORDER — PRIMIDONE 50 MG/1
TABLET ORAL
COMMUNITY
Start: 2019-02-14 | End: 2019-04-11

## 2019-04-10 RX ORDER — DOXYCYCLINE 100 MG/1
CAPSULE ORAL
Refills: 0 | COMMUNITY
Start: 2019-02-11 | End: 2019-04-10

## 2019-04-10 RX ORDER — FLUOXETINE 20 MG/1
TABLET, FILM COATED ORAL
Refills: 5 | COMMUNITY
Start: 2019-03-03 | End: 2019-05-24

## 2019-04-10 RX ORDER — PHENAZOPYRIDINE HYDROCHLORIDE 100 MG/1
TABLET, FILM COATED ORAL
Refills: 6 | COMMUNITY
Start: 2019-02-13 | End: 2019-04-10

## 2019-04-10 RX ORDER — AMANTADINE HYDROCHLORIDE 100 MG/1
100 CAPSULE, GELATIN COATED ORAL 2 TIMES DAILY
Qty: 60 CAPSULE | Refills: 2 | Status: SHIPPED | OUTPATIENT
Start: 2019-04-10 | End: 2019-07-17

## 2019-04-10 NOTE — PATIENT INSTRUCTIONS
· MRI brain with and without- we will call to schedule  · Referral to neuropsych testing, [facility], we will call to schedule  · Get labs checked at [facility] lab  · Start amantadine 100 mg once daily, increase to twice daily if tolerated after 1 week. Side effects- dizziness, drowsiness, etc    Amantadine capsules or tablets  What is this medicine?  AMANTADINE (a MAN jocelyn matthews) is an antiviral medicine. It is used to prevent and to treat a specific type of flu called influenza A. It will not work for colds, other types of flu, or other viral infections. This medicine is also used to treat Parkinson's disease and other movement disorders.  This medicine may be used for other purposes; ask your health care provider or pharmacist if you have questions.  COMMON BRAND NAME(S): Symmzainabel  What should I tell my health care provider before I take this medicine?  They need to know if you have any of these conditions:  -depression or other mental illness  -eczema  -glaucoma  -heart failure  -if you drink alcohol  -kidney disease  -low blood pressure  -narcolepsy  -seizures  -sleep apnea  -suicidal thoughts, plans, or attempt; a previous suicide attempt by you or a family member  -an unusual or allergic reaction to amantadine, other medicines, foods, dyes, or preservatives  -pregnant or trying to get pregnant  -breast-feeding  How should I use this medicine?  Take this medicine by mouth with a full glass of water. Follow the directions on the prescription label. Take your medicine at regular intervals. Do not take your medicine more often than directed. Take all of your medicine as directed even if you think your are better. Do not skip doses or stop your medicine early. Contact your pediatrician or health care professional regarding the useof this medicine in children. While this drug may be prescribed for children as young as 1 year old for selected conditions, precautions do apply. Patients over 65 years old may  have a stronger reaction and need a smaller dose.  Overdosage: If you think you have taken too much of this medicine contact a poison control center or emergency room at once.  NOTE: This medicine is only for you. Do not share this medicine with others.  What if I miss a dose?  If you miss a dose, take it as soon as you can. If it is almost time for your next dose, take only that dose. Do not take double or extra doses.  What may interact with this medicine?  -acetazolamide  -alcohol  -atropine  -antihistamines for allergy, cough and cold  -benztropine  -bupropion  -certain medicines for bladder problems like oxybutynin, tolterodine  -certain medicines for stomach problems like dicyclomine, hyoscyamine  -certain medicines for travel sickness like scopolamine  -ipratropium  -methazolamide  -quinidine  -quinine  -sodium bicarbonate  -some flu vaccines  -thioridazine  -trihexyphenidyl  This list may not describe all possible interactions. Give your health care provider a list of all the medicines, herbs, non-prescription drugs, or dietary supplements you use. Also tell them if you smoke, drink alcohol, or use illegal drugs. Some items may interact with your medicine.  What should I watch for while using this medicine?  Tell your doctor or health care professional if your symptoms do not improve.  You may get drowsy or dizzy. Do not drive, use machinery, or do anything that needs mental alertness until you know how this medicine affects you. Do not stand or sit up quickly, especially if you are an older patient. This reduces the risk of dizzy or fainting spells. Alcohol may interfere with the effect of this medicine. Avoid alcoholic drinks.  If you are taking this medicine for Parkinson's disease or a movement disorder, be careful. Slowly increase your daily activities as your condition improves. Do not suddenly stop taking your medicine because you may develop a severe reaction.  You may get dry mouth or eyes, or  blurry vision while taking this medicine. Try sugarless gum or hard candy, and drink 6 to 8 glasses of water daily. Brush and floss your teeth regularly and carefully to avoid teeth and gum problems. You may want to wet your eyes with lubricating eye drops. Talk to your doctor if these symptoms become a problem.  There have been reports of increased sexual urges or other strong urges such as gambling while taking some medicines for Parkinson's disease. If you experience any of these urges while taking this medicine, you should report it to your health care provider as soon as possible.  You should check your skin often for changes to moles and new growths while taking this medicine. Call your doctor if you notice any of these changes.  What side effects may I notice from receiving this medicine?  Side effects that you should report to your doctor or health care professional as soon as possible:  -allergic reactions like skin rash, itching or hives, swelling of the face, lips, or tongue  -anxiety  -breathing problems  -changes in vision  -color changes on the skin  -confusion  -depressed mood  -eye pain  -falling asleep during normal activities like driving  -hallucination, loss of contact with reality  -new or increased gambling urges, sexual urges, uncontrolled spending, binge or compulsive eating, or other urges  -seizures  -signs and symptoms of low blood pressure like dizziness; feeling faint or lightheaded, falls; unusually weak or tired  -swelling in your legs and feet  -suicidal thoughts or other mood changes  -trouble passing urine or change in the amount of urine  -trouble sleeping  -uncontrolled movements of the mouth, head, hands, feet, shoulders, eyelids or other unusual muscle movements  Side effects that usually do not require medical attention (report these to your doctor or health care professional if they continue or are bothersome):  -constipation  -dizziness  -drowsiness  -dry  mouth  -headache  -nausea  This list may not describe all possible side effects. Call your doctor for medical advice about side effects. You may report side effects to FDA at 9-054-RRL-5707.  Where should I keep my medicine?  Keep out of the reach of children.  Store at room temperature between 20 and 25 degrees C (68 and 77 degrees F). Keep container tightly closed. Throw away any unused medicine after the expiration date.  NOTE: This sheet is a summary. It may not cover all possible information. If you have questions about this medicine, talk to your doctor, pharmacist, or health care provider.  © 2019 [employer] (2017-09-01 12:06:00)

## 2019-04-11 ENCOUNTER — LAB (OUTPATIENT)
Dept: LAB | Facility: HOSPITAL | Age: 79
End: 2019-04-11

## 2019-04-11 DIAGNOSIS — R41.3 MEMORY LOSS: ICD-10-CM

## 2019-04-11 LAB
RPR SER QL: NORMAL
TSH SERPL DL<=0.05 MIU/L-ACNC: 5.31 MIU/ML (ref 0.27–4.2)
VIT B12 BLD-MCNC: 519 PG/ML (ref 211–946)

## 2019-04-11 PROCEDURE — 36415 COLL VENOUS BLD VENIPUNCTURE: CPT

## 2019-04-11 PROCEDURE — 86592 SYPHILIS TEST NON-TREP QUAL: CPT

## 2019-04-11 PROCEDURE — 84443 ASSAY THYROID STIM HORMONE: CPT

## 2019-04-11 PROCEDURE — 82607 VITAMIN B-12: CPT

## 2019-04-20 ENCOUNTER — HOSPITAL ENCOUNTER (OUTPATIENT)
Dept: MRI IMAGING | Facility: HOSPITAL | Age: 79
Discharge: HOME | End: 2019-04-20
Admitting: NURSE PRACTITIONER

## 2019-04-20 DIAGNOSIS — R41.3 MEMORY LOSS: ICD-10-CM

## 2019-04-20 PROCEDURE — 0 GADOBENATE DIMEGLUMINE 529 MG/ML SOLUTION: Performed by: NURSE PRACTITIONER

## 2019-04-20 PROCEDURE — 70553 MRI BRAIN STEM W/O & W/DYE: CPT

## 2019-04-20 PROCEDURE — 82565 ASSAY OF CREATININE: CPT

## 2019-04-20 PROCEDURE — A9577 INJ MULTIHANCE: HCPCS | Performed by: NURSE PRACTITIONER

## 2019-04-20 RX ADMIN — GADOBENATE DIMEGLUMINE 19 ML: 529 INJECTION, SOLUTION INTRAVENOUS at 10:02

## 2019-04-22 LAB — CREAT BLDA-MCNC: 1.1 MG/DL (ref 0.6–1.3)

## 2019-04-24 ENCOUNTER — DOCUMENTATION (OUTPATIENT)
Dept: NEUROLOGY | Facility: CLINIC | Age: 79
End: 2019-04-24

## 2019-04-24 NOTE — PROGRESS NOTES
I called and spoke with the patient and his wife. I notified them of his normal MRI brain with and without contrast, normal B12/RPR, and mildly elevated TSH. I will fax results to Dr Brown's (now retired) office and I ask patient's wife to follow up with that office to see if any further thyroid w/u is needed. Patient has neuropych evaluation scheduled with Dr Henley in June.

## 2019-05-24 ENCOUNTER — APPOINTMENT (OUTPATIENT)
Dept: CT IMAGING | Facility: HOSPITAL | Age: 79
End: 2019-05-24

## 2019-05-24 ENCOUNTER — APPOINTMENT (OUTPATIENT)
Dept: GENERAL RADIOLOGY | Facility: HOSPITAL | Age: 79
End: 2019-05-24

## 2019-05-24 ENCOUNTER — HOSPITAL ENCOUNTER (INPATIENT)
Facility: HOSPITAL | Age: 79
LOS: 1 days | Discharge: HOME | End: 2019-05-27
Attending: EMERGENCY MEDICINE | Admitting: INTERNAL MEDICINE

## 2019-05-24 DIAGNOSIS — R06.09 DYSPNEA ON EXERTION: ICD-10-CM

## 2019-05-24 DIAGNOSIS — N30.01 ACUTE CYSTITIS WITH HEMATURIA: Primary | ICD-10-CM

## 2019-05-24 DIAGNOSIS — R53.1 GENERALIZED WEAKNESS: ICD-10-CM

## 2019-05-24 LAB
ALBUMIN SERPL-MCNC: 3.4 G/DL (ref 3.5–5.2)
ALBUMIN/GLOB SERPL: 1.1 G/DL
ALP SERPL-CCNC: 181 U/L (ref 39–117)
ALT SERPL W P-5'-P-CCNC: 63 U/L (ref 1–41)
ANION GAP SERPL CALCULATED.3IONS-SCNC: 17.4 MMOL/L
AST SERPL-CCNC: 81 U/L (ref 1–40)
BACTERIA UR QL AUTO: ABNORMAL /HPF
BASOPHILS # BLD AUTO: 0.01 10*3/MM3 (ref 0–0.2)
BASOPHILS NFR BLD AUTO: 0.1 % (ref 0–1.5)
BILIRUB SERPL-MCNC: 1.1 MG/DL (ref 0.2–1.2)
BILIRUB UR QL STRIP: NEGATIVE
BUN BLD-MCNC: 20 MG/DL (ref 8–23)
BUN/CREAT SERPL: 16.5 (ref 7–25)
CALCIUM SPEC-SCNC: 9 MG/DL (ref 8.6–10.5)
CHLORIDE SERPL-SCNC: 102 MMOL/L (ref 98–107)
CLARITY UR: ABNORMAL
CO2 SERPL-SCNC: 19.6 MMOL/L (ref 22–29)
COLOR UR: ABNORMAL
CREAT BLD-MCNC: 1.21 MG/DL (ref 0.76–1.27)
D-LACTATE SERPL-SCNC: 2.2 MMOL/L (ref 0.5–2)
DEPRECATED RDW RBC AUTO: 44.1 FL (ref 37–54)
EOSINOPHIL # BLD AUTO: 0.02 10*3/MM3 (ref 0–0.4)
EOSINOPHIL NFR BLD AUTO: 0.3 % (ref 0.3–6.2)
ERYTHROCYTE [DISTWIDTH] IN BLOOD BY AUTOMATED COUNT: 12.5 % (ref 12.3–15.4)
GFR SERPL CREATININE-BSD FRML MDRD: 58 ML/MIN/1.73
GLOBULIN UR ELPH-MCNC: 3 GM/DL
GLUCOSE BLD-MCNC: 101 MG/DL (ref 65–99)
GLUCOSE UR STRIP-MCNC: NEGATIVE MG/DL
HCT VFR BLD AUTO: 42.2 % (ref 37.5–51)
HGB BLD-MCNC: 14 G/DL (ref 13–17.7)
HGB UR QL STRIP.AUTO: ABNORMAL
HYALINE CASTS UR QL AUTO: ABNORMAL /LPF
IMM GRANULOCYTES # BLD AUTO: 0.02 10*3/MM3 (ref 0–0.05)
IMM GRANULOCYTES NFR BLD AUTO: 0.3 % (ref 0–0.5)
KETONES UR QL STRIP: ABNORMAL
LEUKOCYTE ESTERASE UR QL STRIP.AUTO: ABNORMAL
LYMPHOCYTES # BLD AUTO: 0.16 10*3/MM3 (ref 0.7–3.1)
LYMPHOCYTES NFR BLD AUTO: 2.3 % (ref 19.6–45.3)
MCH RBC QN AUTO: 32.1 PG (ref 26.6–33)
MCHC RBC AUTO-ENTMCNC: 33.2 G/DL (ref 31.5–35.7)
MCV RBC AUTO: 96.8 FL (ref 79–97)
MONOCYTES # BLD AUTO: 0.11 10*3/MM3 (ref 0.1–0.9)
MONOCYTES NFR BLD AUTO: 1.6 % (ref 5–12)
NEUTROPHILS # BLD AUTO: 6.76 10*3/MM3 (ref 1.7–7)
NEUTROPHILS NFR BLD AUTO: 95.4 % (ref 42.7–76)
NITRITE UR QL STRIP: POSITIVE
NRBC BLD AUTO-RTO: 0.1 /100 WBC (ref 0–0.2)
PH UR STRIP.AUTO: 5.5 [PH] (ref 5–8)
PLATELET # BLD AUTO: 159 10*3/MM3 (ref 140–450)
PMV BLD AUTO: 10.7 FL (ref 6–12)
POTASSIUM BLD-SCNC: 3.1 MMOL/L (ref 3.5–5.2)
PROT SERPL-MCNC: 6.4 G/DL (ref 6–8.5)
PROT UR QL STRIP: ABNORMAL
RBC # BLD AUTO: 4.36 10*6/MM3 (ref 4.14–5.8)
RBC # UR: ABNORMAL /HPF
REF LAB TEST METHOD: ABNORMAL
SODIUM BLD-SCNC: 139 MMOL/L (ref 136–145)
SP GR UR STRIP: 1.03 (ref 1–1.03)
SQUAMOUS #/AREA URNS HPF: ABNORMAL /HPF
TROPONIN T SERPL-MCNC: <0.01 NG/ML (ref 0–0.03)
UROBILINOGEN UR QL STRIP: ABNORMAL
WBC NRBC COR # BLD: 7.08 10*3/MM3 (ref 3.4–10.8)
WBC UR QL AUTO: ABNORMAL /HPF

## 2019-05-24 PROCEDURE — 93010 ELECTROCARDIOGRAM REPORT: CPT | Performed by: INTERNAL MEDICINE

## 2019-05-24 PROCEDURE — 87086 URINE CULTURE/COLONY COUNT: CPT | Performed by: HOSPITALIST

## 2019-05-24 PROCEDURE — 87040 BLOOD CULTURE FOR BACTERIA: CPT | Performed by: EMERGENCY MEDICINE

## 2019-05-24 PROCEDURE — 83605 ASSAY OF LACTIC ACID: CPT | Performed by: EMERGENCY MEDICINE

## 2019-05-24 PROCEDURE — 85025 COMPLETE CBC W/AUTO DIFF WBC: CPT | Performed by: EMERGENCY MEDICINE

## 2019-05-24 PROCEDURE — 93005 ELECTROCARDIOGRAM TRACING: CPT | Performed by: EMERGENCY MEDICINE

## 2019-05-24 PROCEDURE — G0378 HOSPITAL OBSERVATION PER HR: HCPCS

## 2019-05-24 PROCEDURE — 87077 CULTURE AEROBIC IDENTIFY: CPT | Performed by: EMERGENCY MEDICINE

## 2019-05-24 PROCEDURE — 84484 ASSAY OF TROPONIN QUANT: CPT | Performed by: EMERGENCY MEDICINE

## 2019-05-24 PROCEDURE — 25010000002 CEFTRIAXONE PER 250 MG: Performed by: EMERGENCY MEDICINE

## 2019-05-24 PROCEDURE — 81001 URINALYSIS AUTO W/SCOPE: CPT | Performed by: EMERGENCY MEDICINE

## 2019-05-24 PROCEDURE — 74176 CT ABD & PELVIS W/O CONTRAST: CPT

## 2019-05-24 PROCEDURE — 80053 COMPREHEN METABOLIC PANEL: CPT | Performed by: EMERGENCY MEDICINE

## 2019-05-24 PROCEDURE — 87186 SC STD MICRODIL/AGAR DIL: CPT | Performed by: EMERGENCY MEDICINE

## 2019-05-24 PROCEDURE — P9612 CATHETERIZE FOR URINE SPEC: HCPCS

## 2019-05-24 PROCEDURE — 71046 X-RAY EXAM CHEST 2 VIEWS: CPT

## 2019-05-24 PROCEDURE — 99284 EMERGENCY DEPT VISIT MOD MDM: CPT

## 2019-05-24 RX ORDER — ACETAMINOPHEN 325 MG/1
650 TABLET ORAL EVERY 6 HOURS PRN
COMMUNITY
End: 2019-09-17 | Stop reason: HOSPADM

## 2019-05-24 RX ORDER — ASPIRIN 81 MG/1
81 TABLET ORAL DAILY
COMMUNITY
End: 2019-06-13 | Stop reason: HOSPADM

## 2019-05-24 RX ORDER — CLOPIDOGREL BISULFATE 75 MG/1
75 TABLET ORAL DAILY
COMMUNITY
End: 2019-06-13 | Stop reason: HOSPADM

## 2019-05-24 RX ORDER — FLUOXETINE HYDROCHLORIDE 40 MG/1
40 CAPSULE ORAL EVERY MORNING
Status: ON HOLD | COMMUNITY
End: 2019-10-04

## 2019-05-24 RX ORDER — CEFTRIAXONE SODIUM 1 G/50ML
1 INJECTION, SOLUTION INTRAVENOUS ONCE
Status: COMPLETED | OUTPATIENT
Start: 2019-05-24 | End: 2019-05-24

## 2019-05-24 RX ORDER — ACETAMINOPHEN 500 MG
1000 TABLET ORAL ONCE
Status: COMPLETED | OUTPATIENT
Start: 2019-05-24 | End: 2019-05-24

## 2019-05-24 RX ORDER — TOPIRAMATE 25 MG/1
25 CAPSULE, COATED PELLETS ORAL NIGHTLY
COMMUNITY
End: 2019-07-17

## 2019-05-24 RX ORDER — POTASSIUM CHLORIDE 750 MG/1
40 CAPSULE, EXTENDED RELEASE ORAL ONCE
Status: COMPLETED | OUTPATIENT
Start: 2019-05-24 | End: 2019-05-24

## 2019-05-24 RX ORDER — FLUOXETINE HYDROCHLORIDE 20 MG/1
20 CAPSULE ORAL DAILY
COMMUNITY
End: 2019-06-26

## 2019-05-24 RX ORDER — SODIUM CHLORIDE 0.9 % (FLUSH) 0.9 %
10 SYRINGE (ML) INJECTION AS NEEDED
Status: DISCONTINUED | OUTPATIENT
Start: 2019-05-24 | End: 2019-05-27 | Stop reason: HOSPADM

## 2019-05-24 RX ADMIN — POTASSIUM CHLORIDE 40 MEQ: 750 CAPSULE, EXTENDED RELEASE ORAL at 22:09

## 2019-05-24 RX ADMIN — SODIUM CHLORIDE 1000 ML: 9 INJECTION, SOLUTION INTRAVENOUS at 22:10

## 2019-05-24 RX ADMIN — CEFTRIAXONE SODIUM 1 G: 1 INJECTION, SOLUTION INTRAVENOUS at 22:10

## 2019-05-24 RX ADMIN — ACETAMINOPHEN 1000 MG: 500 TABLET, FILM COATED ORAL at 22:09

## 2019-05-25 PROBLEM — R53.1 GENERALIZED WEAKNESS: Status: ACTIVE | Noted: 2019-05-25

## 2019-05-25 PROBLEM — R06.09 DYSPNEA ON EXERTION: Status: ACTIVE | Noted: 2019-05-25

## 2019-05-25 LAB
ANION GAP SERPL CALCULATED.3IONS-SCNC: 13.7 MMOL/L
BASOPHILS # BLD AUTO: 0.02 10*3/MM3 (ref 0–0.2)
BASOPHILS NFR BLD AUTO: 0.2 % (ref 0–1.5)
BUN BLD-MCNC: 21 MG/DL (ref 8–23)
BUN/CREAT SERPL: 17.5 (ref 7–25)
CALCIUM SPEC-SCNC: 8.1 MG/DL (ref 8.6–10.5)
CHLORIDE SERPL-SCNC: 109 MMOL/L (ref 98–107)
CO2 SERPL-SCNC: 20.3 MMOL/L (ref 22–29)
CREAT BLD-MCNC: 1.2 MG/DL (ref 0.76–1.27)
D-LACTATE SERPL-SCNC: 1.6 MMOL/L (ref 0.5–2)
DEPRECATED RDW RBC AUTO: 46 FL (ref 37–54)
EOSINOPHIL # BLD AUTO: 0.04 10*3/MM3 (ref 0–0.4)
EOSINOPHIL NFR BLD AUTO: 0.4 % (ref 0.3–6.2)
ERYTHROCYTE [DISTWIDTH] IN BLOOD BY AUTOMATED COUNT: 12.8 % (ref 12.3–15.4)
GFR SERPL CREATININE-BSD FRML MDRD: 59 ML/MIN/1.73
GLUCOSE BLD-MCNC: 105 MG/DL (ref 65–99)
HCT VFR BLD AUTO: 38.8 % (ref 37.5–51)
HGB BLD-MCNC: 12.8 G/DL (ref 13–17.7)
HOLD SPECIMEN: NORMAL
IMM GRANULOCYTES # BLD AUTO: 0.06 10*3/MM3 (ref 0–0.05)
IMM GRANULOCYTES NFR BLD AUTO: 0.6 % (ref 0–0.5)
LYMPHOCYTES # BLD AUTO: 0.69 10*3/MM3 (ref 0.7–3.1)
LYMPHOCYTES NFR BLD AUTO: 6.4 % (ref 19.6–45.3)
MCH RBC QN AUTO: 32.7 PG (ref 26.6–33)
MCHC RBC AUTO-ENTMCNC: 33 G/DL (ref 31.5–35.7)
MCV RBC AUTO: 99 FL (ref 79–97)
MONOCYTES # BLD AUTO: 0.75 10*3/MM3 (ref 0.1–0.9)
MONOCYTES NFR BLD AUTO: 6.9 % (ref 5–12)
NEUTROPHILS # BLD AUTO: 9.27 10*3/MM3 (ref 1.7–7)
NEUTROPHILS NFR BLD AUTO: 85.5 % (ref 42.7–76)
NRBC BLD AUTO-RTO: 0 /100 WBC (ref 0–0.2)
PLATELET # BLD AUTO: 146 10*3/MM3 (ref 140–450)
PMV BLD AUTO: 10.8 FL (ref 6–12)
POTASSIUM BLD-SCNC: 3.7 MMOL/L (ref 3.5–5.2)
RBC # BLD AUTO: 3.92 10*6/MM3 (ref 4.14–5.8)
SODIUM BLD-SCNC: 143 MMOL/L (ref 136–145)
TROPONIN T SERPL-MCNC: <0.01 NG/ML (ref 0–0.03)
WBC NRBC COR # BLD: 10.83 10*3/MM3 (ref 3.4–10.8)

## 2019-05-25 PROCEDURE — G0378 HOSPITAL OBSERVATION PER HR: HCPCS

## 2019-05-25 PROCEDURE — 85025 COMPLETE CBC W/AUTO DIFF WBC: CPT | Performed by: HOSPITALIST

## 2019-05-25 PROCEDURE — 97110 THERAPEUTIC EXERCISES: CPT

## 2019-05-25 PROCEDURE — 83605 ASSAY OF LACTIC ACID: CPT | Performed by: EMERGENCY MEDICINE

## 2019-05-25 PROCEDURE — 84484 ASSAY OF TROPONIN QUANT: CPT | Performed by: HOSPITALIST

## 2019-05-25 PROCEDURE — 97162 PT EVAL MOD COMPLEX 30 MIN: CPT

## 2019-05-25 PROCEDURE — 80048 BASIC METABOLIC PNL TOTAL CA: CPT | Performed by: HOSPITALIST

## 2019-05-25 RX ORDER — SODIUM CHLORIDE 0.9 % (FLUSH) 0.9 %
3 SYRINGE (ML) INJECTION EVERY 12 HOURS SCHEDULED
Status: DISCONTINUED | OUTPATIENT
Start: 2019-05-25 | End: 2019-05-27 | Stop reason: HOSPADM

## 2019-05-25 RX ORDER — ATORVASTATIN CALCIUM 20 MG/1
40 TABLET, FILM COATED ORAL DAILY
Status: DISCONTINUED | OUTPATIENT
Start: 2019-05-25 | End: 2019-05-27 | Stop reason: HOSPADM

## 2019-05-25 RX ORDER — NITROGLYCERIN 0.4 MG/1
0.4 TABLET SUBLINGUAL AS NEEDED
Status: DISCONTINUED | OUTPATIENT
Start: 2019-05-25 | End: 2019-05-27 | Stop reason: HOSPADM

## 2019-05-25 RX ORDER — ASPIRIN 81 MG/1
81 TABLET ORAL DAILY
Status: DISCONTINUED | OUTPATIENT
Start: 2019-05-25 | End: 2019-05-27 | Stop reason: HOSPADM

## 2019-05-25 RX ORDER — CEFTRIAXONE SODIUM 1 G/50ML
1 INJECTION, SOLUTION INTRAVENOUS EVERY 24 HOURS
Status: DISCONTINUED | OUTPATIENT
Start: 2019-05-25 | End: 2019-05-26

## 2019-05-25 RX ORDER — POTASSIUM CHLORIDE 1.5 G/1.77G
40 POWDER, FOR SOLUTION ORAL AS NEEDED
Status: DISCONTINUED | OUTPATIENT
Start: 2019-05-25 | End: 2019-05-27 | Stop reason: HOSPADM

## 2019-05-25 RX ORDER — ONDANSETRON 4 MG/1
4 TABLET, FILM COATED ORAL EVERY 6 HOURS PRN
Status: DISCONTINUED | OUTPATIENT
Start: 2019-05-25 | End: 2019-05-27 | Stop reason: HOSPADM

## 2019-05-25 RX ORDER — ONDANSETRON 2 MG/ML
4 INJECTION INTRAMUSCULAR; INTRAVENOUS EVERY 6 HOURS PRN
Status: DISCONTINUED | OUTPATIENT
Start: 2019-05-25 | End: 2019-05-27 | Stop reason: HOSPADM

## 2019-05-25 RX ORDER — TOPIRAMATE 25 MG/1
25 TABLET ORAL NIGHTLY
Status: DISCONTINUED | OUTPATIENT
Start: 2019-05-25 | End: 2019-05-27 | Stop reason: HOSPADM

## 2019-05-25 RX ORDER — CLOPIDOGREL BISULFATE 75 MG/1
75 TABLET ORAL DAILY
Status: DISCONTINUED | OUTPATIENT
Start: 2019-05-25 | End: 2019-05-27 | Stop reason: HOSPADM

## 2019-05-25 RX ORDER — ACETAMINOPHEN 325 MG/1
650 TABLET ORAL EVERY 6 HOURS PRN
Status: DISCONTINUED | OUTPATIENT
Start: 2019-05-25 | End: 2019-05-27 | Stop reason: HOSPADM

## 2019-05-25 RX ORDER — AMANTADINE HYDROCHLORIDE 100 MG/1
100 CAPSULE, GELATIN COATED ORAL 2 TIMES DAILY
Status: DISCONTINUED | OUTPATIENT
Start: 2019-05-25 | End: 2019-05-27 | Stop reason: HOSPADM

## 2019-05-25 RX ORDER — FLUOXETINE HYDROCHLORIDE 20 MG/1
40 CAPSULE ORAL DAILY
Status: DISCONTINUED | OUTPATIENT
Start: 2019-05-25 | End: 2019-05-25 | Stop reason: SDUPTHER

## 2019-05-25 RX ORDER — AMLODIPINE BESYLATE 5 MG/1
5 TABLET ORAL EVERY MORNING
Status: DISCONTINUED | OUTPATIENT
Start: 2019-05-25 | End: 2019-05-27 | Stop reason: HOSPADM

## 2019-05-25 RX ORDER — POTASSIUM CHLORIDE 7.45 MG/ML
10 INJECTION INTRAVENOUS
Status: DISCONTINUED | OUTPATIENT
Start: 2019-05-25 | End: 2019-05-27 | Stop reason: HOSPADM

## 2019-05-25 RX ORDER — PANTOPRAZOLE SODIUM 40 MG/1
40 TABLET, DELAYED RELEASE ORAL EVERY MORNING
Status: DISCONTINUED | OUTPATIENT
Start: 2019-05-25 | End: 2019-05-27 | Stop reason: HOSPADM

## 2019-05-25 RX ORDER — FLUOXETINE HYDROCHLORIDE 20 MG/1
60 CAPSULE ORAL DAILY
Status: DISCONTINUED | OUTPATIENT
Start: 2019-05-25 | End: 2019-05-27 | Stop reason: HOSPADM

## 2019-05-25 RX ORDER — SODIUM CHLORIDE 0.9 % (FLUSH) 0.9 %
3-10 SYRINGE (ML) INJECTION AS NEEDED
Status: DISCONTINUED | OUTPATIENT
Start: 2019-05-25 | End: 2019-05-27 | Stop reason: HOSPADM

## 2019-05-25 RX ORDER — GABAPENTIN 300 MG/1
300 CAPSULE ORAL 3 TIMES DAILY
Status: DISCONTINUED | OUTPATIENT
Start: 2019-05-25 | End: 2019-05-27 | Stop reason: HOSPADM

## 2019-05-25 RX ORDER — POTASSIUM CHLORIDE 750 MG/1
40 CAPSULE, EXTENDED RELEASE ORAL AS NEEDED
Status: DISCONTINUED | OUTPATIENT
Start: 2019-05-25 | End: 2019-05-27 | Stop reason: HOSPADM

## 2019-05-25 RX ADMIN — CLOPIDOGREL 75 MG: 75 TABLET, FILM COATED ORAL at 08:48

## 2019-05-25 RX ADMIN — ASPIRIN 81 MG: 81 TABLET, DELAYED RELEASE ORAL at 08:48

## 2019-05-25 RX ADMIN — ATORVASTATIN CALCIUM 40 MG: 20 TABLET, FILM COATED ORAL at 08:48

## 2019-05-25 RX ADMIN — GABAPENTIN 300 MG: 300 CAPSULE ORAL at 21:29

## 2019-05-25 RX ADMIN — GABAPENTIN 300 MG: 300 CAPSULE ORAL at 15:32

## 2019-05-25 RX ADMIN — GABAPENTIN 300 MG: 300 CAPSULE ORAL at 08:48

## 2019-05-25 RX ADMIN — SODIUM CHLORIDE, PRESERVATIVE FREE 3 ML: 5 INJECTION INTRAVENOUS at 21:30

## 2019-05-25 RX ADMIN — TOPIRAMATE 25 MG: 25 TABLET, FILM COATED ORAL at 21:29

## 2019-05-25 RX ADMIN — AMLODIPINE BESYLATE 5 MG: 5 TABLET ORAL at 08:48

## 2019-05-25 RX ADMIN — PANTOPRAZOLE SODIUM 40 MG: 40 TABLET, DELAYED RELEASE ORAL at 08:47

## 2019-05-25 RX ADMIN — AMANTADINE HYDROCHLORIDE 100 MG: 100 CAPSULE ORAL at 08:48

## 2019-05-25 RX ADMIN — AMANTADINE HYDROCHLORIDE 100 MG: 100 CAPSULE ORAL at 21:29

## 2019-05-25 RX ADMIN — SODIUM CHLORIDE, PRESERVATIVE FREE 3 ML: 5 INJECTION INTRAVENOUS at 08:49

## 2019-05-25 RX ADMIN — FLUOXETINE HYDROCHLORIDE 60 MG: 20 CAPSULE ORAL at 08:48

## 2019-05-26 PROBLEM — N30.00 ACUTE CYSTITIS WITHOUT HEMATURIA: Status: ACTIVE | Noted: 2019-05-24

## 2019-05-26 LAB
BASOPHILS # BLD AUTO: 0.01 10*3/MM3 (ref 0–0.2)
BASOPHILS NFR BLD AUTO: 0.2 % (ref 0–1.5)
DEPRECATED RDW RBC AUTO: 45.1 FL (ref 37–54)
EOSINOPHIL # BLD AUTO: 0.09 10*3/MM3 (ref 0–0.4)
EOSINOPHIL NFR BLD AUTO: 1.7 % (ref 0.3–6.2)
ERYTHROCYTE [DISTWIDTH] IN BLOOD BY AUTOMATED COUNT: 12.3 % (ref 12.3–15.4)
HCT VFR BLD AUTO: 39 % (ref 37.5–51)
HGB BLD-MCNC: 12.9 G/DL (ref 13–17.7)
LYMPHOCYTES # BLD AUTO: 0.94 10*3/MM3 (ref 0.7–3.1)
LYMPHOCYTES NFR BLD AUTO: 18 % (ref 19.6–45.3)
MCH RBC QN AUTO: 32.6 PG (ref 26.6–33)
MCHC RBC AUTO-ENTMCNC: 33.1 G/DL (ref 31.5–35.7)
MCV RBC AUTO: 98.5 FL (ref 79–97)
MONOCYTES # BLD AUTO: 0.79 10*3/MM3 (ref 0.1–0.9)
MONOCYTES NFR BLD AUTO: 15.2 % (ref 5–12)
NEUTROPHILS # BLD AUTO: 3.36 10*3/MM3 (ref 1.7–7)
NEUTROPHILS NFR BLD AUTO: 64.5 % (ref 42.7–76)
PLATELET # BLD AUTO: 140 10*3/MM3 (ref 140–450)
PMV BLD AUTO: 11 FL (ref 6–12)
RBC # BLD AUTO: 3.96 10*6/MM3 (ref 4.14–5.8)
WBC NRBC COR # BLD: 5.21 10*3/MM3 (ref 3.4–10.8)

## 2019-05-26 PROCEDURE — 85025 COMPLETE CBC W/AUTO DIFF WBC: CPT | Performed by: INTERNAL MEDICINE

## 2019-05-26 PROCEDURE — 25010000002 MEROPENEM PER 100 MG: Performed by: UROLOGY

## 2019-05-26 RX ORDER — FLUTICASONE PROPIONATE 50 MCG
2 SPRAY, SUSPENSION (ML) NASAL DAILY
Status: DISCONTINUED | OUTPATIENT
Start: 2019-05-26 | End: 2019-05-27 | Stop reason: HOSPADM

## 2019-05-26 RX ADMIN — AMANTADINE HYDROCHLORIDE 100 MG: 100 CAPSULE ORAL at 09:17

## 2019-05-26 RX ADMIN — FLUOXETINE HYDROCHLORIDE 60 MG: 20 CAPSULE ORAL at 09:17

## 2019-05-26 RX ADMIN — SODIUM CHLORIDE, PRESERVATIVE FREE 3 ML: 5 INJECTION INTRAVENOUS at 09:17

## 2019-05-26 RX ADMIN — ACETAMINOPHEN 650 MG: 325 TABLET, FILM COATED ORAL at 12:33

## 2019-05-26 RX ADMIN — FLUTICASONE PROPIONATE 2 SPRAY: 50 SPRAY, METERED NASAL at 15:55

## 2019-05-26 RX ADMIN — GABAPENTIN 300 MG: 300 CAPSULE ORAL at 15:56

## 2019-05-26 RX ADMIN — PANTOPRAZOLE SODIUM 40 MG: 40 TABLET, DELAYED RELEASE ORAL at 07:16

## 2019-05-26 RX ADMIN — ASPIRIN 81 MG: 81 TABLET, DELAYED RELEASE ORAL at 09:17

## 2019-05-26 RX ADMIN — CLOPIDOGREL 75 MG: 75 TABLET, FILM COATED ORAL at 09:17

## 2019-05-26 RX ADMIN — MEROPENEM 1 G: 1 INJECTION, POWDER, FOR SOLUTION INTRAVENOUS at 15:56

## 2019-05-26 RX ADMIN — AMANTADINE HYDROCHLORIDE 100 MG: 100 CAPSULE ORAL at 20:01

## 2019-05-26 RX ADMIN — GABAPENTIN 300 MG: 300 CAPSULE ORAL at 09:17

## 2019-05-26 RX ADMIN — MEROPENEM 500 MG: 500 INJECTION INTRAVENOUS at 19:58

## 2019-05-26 RX ADMIN — AMLODIPINE BESYLATE 5 MG: 5 TABLET ORAL at 07:17

## 2019-05-26 RX ADMIN — ATORVASTATIN CALCIUM 40 MG: 20 TABLET, FILM COATED ORAL at 09:17

## 2019-05-26 RX ADMIN — GABAPENTIN 300 MG: 300 CAPSULE ORAL at 20:01

## 2019-05-26 RX ADMIN — TOPIRAMATE 25 MG: 25 TABLET, FILM COATED ORAL at 20:01

## 2019-05-26 RX ADMIN — SODIUM CHLORIDE, PRESERVATIVE FREE 3 ML: 5 INJECTION INTRAVENOUS at 22:14

## 2019-05-27 VITALS
TEMPERATURE: 97.5 F | SYSTOLIC BLOOD PRESSURE: 119 MMHG | OXYGEN SATURATION: 94 % | WEIGHT: 189.9 LBS | RESPIRATION RATE: 18 BRPM | DIASTOLIC BLOOD PRESSURE: 75 MMHG | HEIGHT: 72 IN | BODY MASS INDEX: 25.72 KG/M2 | HEART RATE: 70 BPM

## 2019-05-27 LAB
ANION GAP SERPL CALCULATED.3IONS-SCNC: 8.6 MMOL/L
BACTERIA SPEC AEROBE CULT: NORMAL
BASOPHILS # BLD AUTO: 0.02 10*3/MM3 (ref 0–0.2)
BASOPHILS NFR BLD AUTO: 0.4 % (ref 0–1.5)
BUN BLD-MCNC: 12 MG/DL (ref 8–23)
BUN/CREAT SERPL: 14.3 (ref 7–25)
CALCIUM SPEC-SCNC: 8.6 MG/DL (ref 8.6–10.5)
CHLORIDE SERPL-SCNC: 106 MMOL/L (ref 98–107)
CO2 SERPL-SCNC: 27.4 MMOL/L (ref 22–29)
CREAT BLD-MCNC: 0.84 MG/DL (ref 0.76–1.27)
DEPRECATED RDW RBC AUTO: 45.1 FL (ref 37–54)
EOSINOPHIL # BLD AUTO: 0.19 10*3/MM3 (ref 0–0.4)
EOSINOPHIL NFR BLD AUTO: 3.4 % (ref 0.3–6.2)
ERYTHROCYTE [DISTWIDTH] IN BLOOD BY AUTOMATED COUNT: 12.5 % (ref 12.3–15.4)
GFR SERPL CREATININE-BSD FRML MDRD: 88 ML/MIN/1.73
GLUCOSE BLD-MCNC: 85 MG/DL (ref 65–99)
HCT VFR BLD AUTO: 40.6 % (ref 37.5–51)
HGB BLD-MCNC: 13.3 G/DL (ref 13–17.7)
IMM GRANULOCYTES # BLD AUTO: 0.03 10*3/MM3 (ref 0–0.05)
IMM GRANULOCYTES NFR BLD AUTO: 0.5 % (ref 0–0.5)
LYMPHOCYTES # BLD AUTO: 1.02 10*3/MM3 (ref 0.7–3.1)
LYMPHOCYTES NFR BLD AUTO: 18.4 % (ref 19.6–45.3)
MCH RBC QN AUTO: 32.4 PG (ref 26.6–33)
MCHC RBC AUTO-ENTMCNC: 32.8 G/DL (ref 31.5–35.7)
MCV RBC AUTO: 98.8 FL (ref 79–97)
MONOCYTES # BLD AUTO: 0.63 10*3/MM3 (ref 0.1–0.9)
MONOCYTES NFR BLD AUTO: 11.4 % (ref 5–12)
NEUTROPHILS # BLD AUTO: 3.65 10*3/MM3 (ref 1.7–7)
NEUTROPHILS NFR BLD AUTO: 65.9 % (ref 42.7–76)
NRBC BLD AUTO-RTO: 0 /100 WBC (ref 0–0.2)
PLATELET # BLD AUTO: 163 10*3/MM3 (ref 140–450)
PMV BLD AUTO: 10.5 FL (ref 6–12)
POTASSIUM BLD-SCNC: 4.2 MMOL/L (ref 3.5–5.2)
RBC # BLD AUTO: 4.11 10*6/MM3 (ref 4.14–5.8)
SODIUM BLD-SCNC: 142 MMOL/L (ref 136–145)
WBC NRBC COR # BLD: 5.54 10*3/MM3 (ref 3.4–10.8)

## 2019-05-27 PROCEDURE — 25010000002 MEROPENEM PER 100 MG: Performed by: UROLOGY

## 2019-05-27 PROCEDURE — 85025 COMPLETE CBC W/AUTO DIFF WBC: CPT | Performed by: INTERNAL MEDICINE

## 2019-05-27 PROCEDURE — 80048 BASIC METABOLIC PNL TOTAL CA: CPT | Performed by: INTERNAL MEDICINE

## 2019-05-27 RX ORDER — SULFAMETHOXAZOLE AND TRIMETHOPRIM 800; 160 MG/1; MG/1
1 TABLET ORAL 2 TIMES DAILY
Start: 2019-05-27 | End: 2019-06-26

## 2019-05-27 RX ADMIN — FLUOXETINE HYDROCHLORIDE 60 MG: 20 CAPSULE ORAL at 08:43

## 2019-05-27 RX ADMIN — MEROPENEM 500 MG: 500 INJECTION INTRAVENOUS at 08:42

## 2019-05-27 RX ADMIN — MEROPENEM 500 MG: 500 INJECTION INTRAVENOUS at 02:35

## 2019-05-27 RX ADMIN — AMANTADINE HYDROCHLORIDE 100 MG: 100 CAPSULE ORAL at 08:43

## 2019-05-27 RX ADMIN — ASPIRIN 81 MG: 81 TABLET, DELAYED RELEASE ORAL at 08:43

## 2019-05-27 RX ADMIN — SODIUM CHLORIDE, PRESERVATIVE FREE 3 ML: 5 INJECTION INTRAVENOUS at 08:45

## 2019-05-27 RX ADMIN — FLUTICASONE PROPIONATE 2 SPRAY: 50 SPRAY, METERED NASAL at 08:47

## 2019-05-27 RX ADMIN — ATORVASTATIN CALCIUM 40 MG: 20 TABLET, FILM COATED ORAL at 08:43

## 2019-05-27 RX ADMIN — GABAPENTIN 300 MG: 300 CAPSULE ORAL at 08:45

## 2019-05-27 RX ADMIN — PANTOPRAZOLE SODIUM 40 MG: 40 TABLET, DELAYED RELEASE ORAL at 06:17

## 2019-05-27 RX ADMIN — AMLODIPINE BESYLATE 5 MG: 5 TABLET ORAL at 06:16

## 2019-05-27 RX ADMIN — CLOPIDOGREL 75 MG: 75 TABLET, FILM COATED ORAL at 08:43

## 2019-05-28 ENCOUNTER — READMISSION MANAGEMENT (OUTPATIENT)
Dept: CALL CENTER | Facility: HOSPITAL | Age: 79
End: 2019-05-28

## 2019-05-29 LAB
BACTERIA SPEC AEROBE CULT: ABNORMAL
BACTERIA SPEC AEROBE CULT: NORMAL
GRAM STN SPEC: ABNORMAL
ISOLATED FROM: ABNORMAL

## 2019-05-29 NOTE — OUTREACH NOTE
Prep Survey      Responses   Facility patient discharged from?  [facility]   Is patient eligible?  Yes   Discharge diagnosis  Acute cystitis without hematuria    Does the patient have one of the following disease processes/diagnoses(primary or secondary)?  Other   Does the patient have Home health ordered?  No   Is there a DME ordered?  No   Prep survey completed?  Yes          Rachel Cortes RN

## 2019-05-30 ENCOUNTER — READMISSION MANAGEMENT (OUTPATIENT)
Dept: CALL CENTER | Facility: HOSPITAL | Age: 79
End: 2019-05-30

## 2019-05-30 ENCOUNTER — APPOINTMENT (OUTPATIENT)
Dept: PREADMISSION TESTING | Facility: HOSPITAL | Age: 79
End: 2019-05-30

## 2019-05-30 VITALS
RESPIRATION RATE: 18 BRPM | TEMPERATURE: 98.8 F | BODY MASS INDEX: 25.19 KG/M2 | HEART RATE: 60 BPM | OXYGEN SATURATION: 99 % | SYSTOLIC BLOOD PRESSURE: 155 MMHG | WEIGHT: 186 LBS | HEIGHT: 72 IN | DIASTOLIC BLOOD PRESSURE: 86 MMHG

## 2019-05-30 LAB
ANION GAP SERPL CALCULATED.3IONS-SCNC: 12.3 MMOL/L
BUN BLD-MCNC: 17 MG/DL (ref 8–23)
BUN/CREAT SERPL: 15.6 (ref 7–25)
CALCIUM SPEC-SCNC: 9.2 MG/DL (ref 8.6–10.5)
CHLORIDE SERPL-SCNC: 102 MMOL/L (ref 98–107)
CO2 SERPL-SCNC: 24.7 MMOL/L (ref 22–29)
CREAT BLD-MCNC: 1.09 MG/DL (ref 0.76–1.27)
DEPRECATED RDW RBC AUTO: 45.1 FL (ref 37–54)
ERYTHROCYTE [DISTWIDTH] IN BLOOD BY AUTOMATED COUNT: 12.5 % (ref 12.3–15.4)
GFR SERPL CREATININE-BSD FRML MDRD: 65 ML/MIN/1.73
GLUCOSE BLD-MCNC: 87 MG/DL (ref 65–99)
HCT VFR BLD AUTO: 44.1 % (ref 37.5–51)
HGB BLD-MCNC: 14.5 G/DL (ref 13–17.7)
MCH RBC QN AUTO: 32.2 PG (ref 26.6–33)
MCHC RBC AUTO-ENTMCNC: 32.9 G/DL (ref 31.5–35.7)
MCV RBC AUTO: 98 FL (ref 79–97)
PLATELET # BLD AUTO: 264 10*3/MM3 (ref 140–450)
PMV BLD AUTO: 10.2 FL (ref 6–12)
POTASSIUM BLD-SCNC: 4.6 MMOL/L (ref 3.5–5.2)
RBC # BLD AUTO: 4.5 10*6/MM3 (ref 4.14–5.8)
SODIUM BLD-SCNC: 139 MMOL/L (ref 136–145)
WBC NRBC COR # BLD: 6.92 10*3/MM3 (ref 3.4–10.8)

## 2019-05-30 PROCEDURE — 80048 BASIC METABOLIC PNL TOTAL CA: CPT | Performed by: UROLOGY

## 2019-05-30 PROCEDURE — 85027 COMPLETE CBC AUTOMATED: CPT | Performed by: UROLOGY

## 2019-05-30 PROCEDURE — 36415 COLL VENOUS BLD VENIPUNCTURE: CPT

## 2019-05-30 RX ORDER — CEPHALEXIN 500 MG/1
1 CAPSULE ORAL 2 TIMES DAILY
Refills: 0 | COMMUNITY
Start: 2019-04-30 | End: 2019-05-30

## 2019-05-30 RX ORDER — NITROFURANTOIN 25; 75 MG/1; MG/1
1 CAPSULE ORAL DAILY
Refills: 0 | COMMUNITY
Start: 2019-05-06 | End: 2019-05-30

## 2019-05-30 NOTE — OUTREACH NOTE
Medical Week 1 Survey      Responses   Facility patient discharged from?  [facility]   Does the patient have one of the following disease processes/diagnoses(primary or secondary)?  Other   Is there a successful TCM telephone encounter documented?  No   Week 1 attempt successful?  Yes   Call start time  1159   Call end time  1203   Is patient permission given to speak with other caregiver?  Yes   List who call center can speak with  anyone   Person spoke with today (if not patient) and relationship  Pt   Meds reviewed with patient/caregiver?  Yes   Is the patient having any side effects they believe may be caused by any medication additions or changes?  No   Does the patient have all medications ordered at discharge?  Yes   Is the patient taking all medications as directed (includes completed medication regime)?  Yes   Comments regarding appointments  Pt has already seen pcp.   Does the patient have a primary care provider?   Yes   Does the patient have an appointment with their PCP within 7 days of discharge?  Yes   Has the patient kept scheduled appointments due by today?  Yes   Has home health visited the patient within 72 hours of discharge?  N/A   Psychosocial issues?  No   Did the patient receive a copy of their discharge instructions?  Yes   Nursing interventions  Reviewed instructions with patient   What is the patient's perception of their health status since discharge?  Improving   Is the patient/caregiver able to teach back signs and symptoms related to disease process for when to call PCP?  Yes   Is the patient/caregiver able to teach back signs and symptoms related to disease process for when to call 911?  Yes   Is the patient/caregiver able to teach back the hierarchy of who to call/visit for symptoms/problems? PCP, Specialist, Home health nurse, Urgent Care, ED, 911  Yes   Additional teach back comments  Pt did participate in the survey today but feels another call would not be necessary.   Week 1  call completed?  Yes   Revoked  No further contact(revokes)-requires comment          Aimee Aj RN

## 2019-06-13 ENCOUNTER — ANESTHESIA (OUTPATIENT)
Dept: PERIOP | Facility: HOSPITAL | Age: 79
End: 2019-06-13

## 2019-06-13 ENCOUNTER — ANESTHESIA EVENT (OUTPATIENT)
Dept: PERIOP | Facility: HOSPITAL | Age: 79
End: 2019-06-13

## 2019-06-13 ENCOUNTER — HOSPITAL ENCOUNTER (OUTPATIENT)
Facility: HOSPITAL | Age: 79
Setting detail: HOSPITAL OUTPATIENT SURGERY
Discharge: HOME | End: 2019-06-13
Attending: UROLOGY | Admitting: UROLOGY

## 2019-06-13 VITALS
OXYGEN SATURATION: 95 % | HEIGHT: 72 IN | RESPIRATION RATE: 16 BRPM | SYSTOLIC BLOOD PRESSURE: 135 MMHG | WEIGHT: 185.85 LBS | TEMPERATURE: 98.4 F | HEART RATE: 91 BPM | DIASTOLIC BLOOD PRESSURE: 92 MMHG | BODY MASS INDEX: 25.17 KG/M2

## 2019-06-13 DIAGNOSIS — C67.9 BLADDER CANCER (HCC): Primary | ICD-10-CM

## 2019-06-13 PROCEDURE — 88305 TISSUE EXAM BY PATHOLOGIST: CPT | Performed by: UROLOGY

## 2019-06-13 PROCEDURE — 25010000002 FENTANYL CITRATE (PF) 100 MCG/2ML SOLUTION: Performed by: ANESTHESIOLOGY

## 2019-06-13 PROCEDURE — 25010000002 DEXAMETHASONE PER 1 MG: Performed by: ANESTHESIOLOGY

## 2019-06-13 PROCEDURE — 25010000002 PROPOFOL 10 MG/ML EMULSION: Performed by: ANESTHESIOLOGY

## 2019-06-13 PROCEDURE — 25010000002 HYDROMORPHONE PER 4 MG: Performed by: ANESTHESIOLOGY

## 2019-06-13 PROCEDURE — 25010000002 ONDANSETRON PER 1 MG: Performed by: ANESTHESIOLOGY

## 2019-06-13 PROCEDURE — 25010000002 HYDRALAZINE PER 20 MG: Performed by: ANESTHESIOLOGY

## 2019-06-13 PROCEDURE — 25010000003 CEFAZOLIN 1-4 GM/50ML-% SOLUTION: Performed by: UROLOGY

## 2019-06-13 RX ORDER — HYDROMORPHONE HYDROCHLORIDE 1 MG/ML
0.5 INJECTION, SOLUTION INTRAMUSCULAR; INTRAVENOUS; SUBCUTANEOUS
Status: DISCONTINUED | OUTPATIENT
Start: 2019-06-13 | End: 2019-06-13 | Stop reason: HOSPADM

## 2019-06-13 RX ORDER — FAMOTIDINE 10 MG/ML
20 INJECTION, SOLUTION INTRAVENOUS
Status: COMPLETED | OUTPATIENT
Start: 2019-06-13 | End: 2019-06-13

## 2019-06-13 RX ORDER — HYDROCODONE BITARTRATE AND ACETAMINOPHEN 7.5; 325 MG/1; MG/1
1 TABLET ORAL ONCE AS NEEDED
Status: DISCONTINUED | OUTPATIENT
Start: 2019-06-13 | End: 2019-06-13 | Stop reason: HOSPADM

## 2019-06-13 RX ORDER — PROMETHAZINE HYDROCHLORIDE 25 MG/ML
12.5 INJECTION, SOLUTION INTRAMUSCULAR; INTRAVENOUS ONCE AS NEEDED
Status: DISCONTINUED | OUTPATIENT
Start: 2019-06-13 | End: 2019-06-13 | Stop reason: HOSPADM

## 2019-06-13 RX ORDER — LIDOCAINE HYDROCHLORIDE 20 MG/ML
INJECTION, SOLUTION INFILTRATION; PERINEURAL AS NEEDED
Status: DISCONTINUED | OUTPATIENT
Start: 2019-06-13 | End: 2019-06-13 | Stop reason: SURG

## 2019-06-13 RX ORDER — MIDAZOLAM HYDROCHLORIDE 1 MG/ML
2 INJECTION INTRAMUSCULAR; INTRAVENOUS
Status: DISCONTINUED | OUTPATIENT
Start: 2019-06-13 | End: 2019-06-13 | Stop reason: HOSPADM

## 2019-06-13 RX ORDER — PROPOFOL 10 MG/ML
VIAL (ML) INTRAVENOUS AS NEEDED
Status: DISCONTINUED | OUTPATIENT
Start: 2019-06-13 | End: 2019-06-13 | Stop reason: SURG

## 2019-06-13 RX ORDER — PROMETHAZINE HYDROCHLORIDE 25 MG/ML
6.25 INJECTION, SOLUTION INTRAMUSCULAR; INTRAVENOUS
Status: DISCONTINUED | OUTPATIENT
Start: 2019-06-13 | End: 2019-06-13 | Stop reason: HOSPADM

## 2019-06-13 RX ORDER — FENTANYL CITRATE 50 UG/ML
INJECTION, SOLUTION INTRAMUSCULAR; INTRAVENOUS AS NEEDED
Status: DISCONTINUED | OUTPATIENT
Start: 2019-06-13 | End: 2019-06-13 | Stop reason: SURG

## 2019-06-13 RX ORDER — PROMETHAZINE HYDROCHLORIDE 25 MG/1
25 TABLET ORAL ONCE AS NEEDED
Status: DISCONTINUED | OUTPATIENT
Start: 2019-06-13 | End: 2019-06-13 | Stop reason: HOSPADM

## 2019-06-13 RX ORDER — EPHEDRINE SULFATE 50 MG/ML
INJECTION, SOLUTION INTRAVENOUS AS NEEDED
Status: DISCONTINUED | OUTPATIENT
Start: 2019-06-13 | End: 2019-06-13 | Stop reason: SURG

## 2019-06-13 RX ORDER — IBUPROFEN 200 MG
600 TABLET ORAL EVERY 6 HOURS PRN
COMMUNITY
End: 2019-09-05 | Stop reason: HOSPADM

## 2019-06-13 RX ORDER — SODIUM CHLORIDE 0.9 % (FLUSH) 0.9 %
3 SYRINGE (ML) INJECTION EVERY 12 HOURS SCHEDULED
Status: DISCONTINUED | OUTPATIENT
Start: 2019-06-13 | End: 2019-06-13 | Stop reason: HOSPADM

## 2019-06-13 RX ORDER — DEXAMETHASONE SODIUM PHOSPHATE 10 MG/ML
INJECTION INTRAMUSCULAR; INTRAVENOUS AS NEEDED
Status: DISCONTINUED | OUTPATIENT
Start: 2019-06-13 | End: 2019-06-13 | Stop reason: SURG

## 2019-06-13 RX ORDER — OXYCODONE AND ACETAMINOPHEN 7.5; 325 MG/1; MG/1
1 TABLET ORAL ONCE AS NEEDED
Status: DISCONTINUED | OUTPATIENT
Start: 2019-06-13 | End: 2019-06-13 | Stop reason: HOSPADM

## 2019-06-13 RX ORDER — HYDROCODONE BITARTRATE AND ACETAMINOPHEN 7.5; 325 MG/1; MG/1
1 TABLET ORAL EVERY 6 HOURS PRN
Status: DISCONTINUED | OUTPATIENT
Start: 2019-06-13 | End: 2019-06-13

## 2019-06-13 RX ORDER — ONDANSETRON 2 MG/ML
4 INJECTION INTRAMUSCULAR; INTRAVENOUS ONCE AS NEEDED
Status: DISCONTINUED | OUTPATIENT
Start: 2019-06-13 | End: 2019-06-13 | Stop reason: HOSPADM

## 2019-06-13 RX ORDER — SODIUM CHLORIDE 0.9 % (FLUSH) 0.9 %
3-10 SYRINGE (ML) INJECTION AS NEEDED
Status: DISCONTINUED | OUTPATIENT
Start: 2019-06-13 | End: 2019-06-13 | Stop reason: HOSPADM

## 2019-06-13 RX ORDER — FENTANYL CITRATE 50 UG/ML
50 INJECTION, SOLUTION INTRAMUSCULAR; INTRAVENOUS
Status: DISCONTINUED | OUTPATIENT
Start: 2019-06-13 | End: 2019-06-13 | Stop reason: HOSPADM

## 2019-06-13 RX ORDER — DIPHENHYDRAMINE HCL 25 MG
25 CAPSULE ORAL
Status: DISCONTINUED | OUTPATIENT
Start: 2019-06-13 | End: 2019-06-13 | Stop reason: HOSPADM

## 2019-06-13 RX ORDER — HYDRALAZINE HYDROCHLORIDE 20 MG/ML
5 INJECTION INTRAMUSCULAR; INTRAVENOUS
Status: DISCONTINUED | OUTPATIENT
Start: 2019-06-13 | End: 2019-06-13 | Stop reason: HOSPADM

## 2019-06-13 RX ORDER — HYDROCODONE BITARTRATE AND ACETAMINOPHEN 5; 325 MG/1; MG/1
1-2 TABLET ORAL EVERY 4 HOURS PRN
Qty: 5 TABLET | Refills: 0 | Status: SHIPPED | OUTPATIENT
Start: 2019-06-13 | End: 2019-06-26

## 2019-06-13 RX ORDER — PROMETHAZINE HYDROCHLORIDE 25 MG/1
25 SUPPOSITORY RECTAL ONCE AS NEEDED
Status: DISCONTINUED | OUTPATIENT
Start: 2019-06-13 | End: 2019-06-13 | Stop reason: HOSPADM

## 2019-06-13 RX ORDER — HYDROCODONE BITARTRATE AND ACETAMINOPHEN 7.5; 325 MG/1; MG/1
1 TABLET ORAL ONCE AS NEEDED
Status: COMPLETED | OUTPATIENT
Start: 2019-06-13 | End: 2019-06-13

## 2019-06-13 RX ORDER — MIDAZOLAM HYDROCHLORIDE 1 MG/ML
1 INJECTION INTRAMUSCULAR; INTRAVENOUS
Status: DISCONTINUED | OUTPATIENT
Start: 2019-06-13 | End: 2019-06-13 | Stop reason: HOSPADM

## 2019-06-13 RX ORDER — SODIUM CHLORIDE, SODIUM LACTATE, POTASSIUM CHLORIDE, CALCIUM CHLORIDE 600; 310; 30; 20 MG/100ML; MG/100ML; MG/100ML; MG/100ML
9 INJECTION, SOLUTION INTRAVENOUS CONTINUOUS PRN
Status: DISCONTINUED | OUTPATIENT
Start: 2019-06-13 | End: 2019-06-13 | Stop reason: HOSPADM

## 2019-06-13 RX ORDER — ACETAMINOPHEN 325 MG/1
650 TABLET ORAL ONCE AS NEEDED
Status: DISCONTINUED | OUTPATIENT
Start: 2019-06-13 | End: 2019-06-13 | Stop reason: HOSPADM

## 2019-06-13 RX ORDER — ONDANSETRON 2 MG/ML
INJECTION INTRAMUSCULAR; INTRAVENOUS AS NEEDED
Status: DISCONTINUED | OUTPATIENT
Start: 2019-06-13 | End: 2019-06-13 | Stop reason: SURG

## 2019-06-13 RX ORDER — FLUMAZENIL 0.1 MG/ML
0.2 INJECTION INTRAVENOUS AS NEEDED
Status: DISCONTINUED | OUTPATIENT
Start: 2019-06-13 | End: 2019-06-13 | Stop reason: HOSPADM

## 2019-06-13 RX ORDER — CEPHALEXIN 500 MG/1
500 CAPSULE ORAL 2 TIMES DAILY
Qty: 10 CAPSULE | Refills: 0 | Status: SHIPPED | OUTPATIENT
Start: 2019-06-13 | End: 2019-06-18

## 2019-06-13 RX ORDER — CEFAZOLIN SODIUM 1 G/50ML
1 INJECTION, SOLUTION INTRAVENOUS
Status: COMPLETED | OUTPATIENT
Start: 2019-06-13 | End: 2019-06-13

## 2019-06-13 RX ORDER — MAGNESIUM HYDROXIDE 1200 MG/15ML
LIQUID ORAL AS NEEDED
Status: DISCONTINUED | OUTPATIENT
Start: 2019-06-13 | End: 2019-06-13 | Stop reason: HOSPADM

## 2019-06-13 RX ORDER — NALOXONE HCL 0.4 MG/ML
0.2 VIAL (ML) INJECTION AS NEEDED
Status: DISCONTINUED | OUTPATIENT
Start: 2019-06-13 | End: 2019-06-13 | Stop reason: HOSPADM

## 2019-06-13 RX ORDER — EPHEDRINE SULFATE 50 MG/ML
5 INJECTION, SOLUTION INTRAVENOUS ONCE AS NEEDED
Status: DISCONTINUED | OUTPATIENT
Start: 2019-06-13 | End: 2019-06-13 | Stop reason: HOSPADM

## 2019-06-13 RX ADMIN — ONDANSETRON 4 MG: 2 INJECTION INTRAMUSCULAR; INTRAVENOUS at 11:02

## 2019-06-13 RX ADMIN — FENTANYL CITRATE 25 MCG: 50 INJECTION INTRAMUSCULAR; INTRAVENOUS at 11:25

## 2019-06-13 RX ADMIN — HYDROMORPHONE HYDROCHLORIDE 0.5 MG: 1 INJECTION, SOLUTION INTRAMUSCULAR; INTRAVENOUS; SUBCUTANEOUS at 11:56

## 2019-06-13 RX ADMIN — EPHEDRINE SULFATE 10 MG: 50 INJECTION INTRAMUSCULAR; INTRAVENOUS; SUBCUTANEOUS at 10:42

## 2019-06-13 RX ADMIN — HYDROCODONE BITARTRATE AND ACETAMINOPHEN 1 TABLET: 7.5; 325 TABLET ORAL at 12:01

## 2019-06-13 RX ADMIN — FENTANYL CITRATE 25 MCG: 50 INJECTION INTRAMUSCULAR; INTRAVENOUS at 10:52

## 2019-06-13 RX ADMIN — HYDRALAZINE HYDROCHLORIDE 5 MG: 20 INJECTION INTRAMUSCULAR; INTRAVENOUS at 12:20

## 2019-06-13 RX ADMIN — SODIUM CHLORIDE, POTASSIUM CHLORIDE, SODIUM LACTATE AND CALCIUM CHLORIDE: 600; 310; 30; 20 INJECTION, SOLUTION INTRAVENOUS at 10:20

## 2019-06-13 RX ADMIN — PROPOFOL 150 MG: 10 INJECTION, EMULSION INTRAVENOUS at 10:27

## 2019-06-13 RX ADMIN — FENTANYL CITRATE 25 MCG: 50 INJECTION INTRAMUSCULAR; INTRAVENOUS at 10:51

## 2019-06-13 RX ADMIN — DEXAMETHASONE SODIUM PHOSPHATE 8 MG: 10 INJECTION INTRAMUSCULAR; INTRAVENOUS at 10:43

## 2019-06-13 RX ADMIN — FENTANYL CITRATE 50 MCG: 50 INJECTION INTRAMUSCULAR; INTRAVENOUS at 12:40

## 2019-06-13 RX ADMIN — FENTANYL CITRATE 50 MCG: 50 INJECTION INTRAMUSCULAR; INTRAVENOUS at 12:49

## 2019-06-13 RX ADMIN — FENTANYL CITRATE 25 MCG: 50 INJECTION INTRAMUSCULAR; INTRAVENOUS at 10:33

## 2019-06-13 RX ADMIN — LIDOCAINE HYDROCHLORIDE 75 MG: 20 INJECTION, SOLUTION INFILTRATION; PERINEURAL at 10:27

## 2019-06-13 RX ADMIN — HYDRALAZINE HYDROCHLORIDE 5 MG: 20 INJECTION INTRAMUSCULAR; INTRAVENOUS at 11:30

## 2019-06-13 RX ADMIN — FENTANYL CITRATE 50 MCG: 50 INJECTION INTRAMUSCULAR; INTRAVENOUS at 12:10

## 2019-06-13 RX ADMIN — FAMOTIDINE 20 MG: 10 INJECTION INTRAVENOUS at 09:57

## 2019-06-13 RX ADMIN — HYDRALAZINE HYDROCHLORIDE 5 MG: 20 INJECTION INTRAMUSCULAR; INTRAVENOUS at 11:40

## 2019-06-13 RX ADMIN — CEFAZOLIN SODIUM 1 G: 1 INJECTION, SOLUTION INTRAVENOUS at 10:27

## 2019-06-13 RX ADMIN — EPHEDRINE SULFATE 20 MG: 50 INJECTION INTRAMUSCULAR; INTRAVENOUS; SUBCUTANEOUS at 11:16

## 2019-06-13 RX ADMIN — FENTANYL CITRATE 50 MCG: 50 INJECTION INTRAMUSCULAR; INTRAVENOUS at 11:56

## 2019-06-13 RX ADMIN — HYDROMORPHONE HYDROCHLORIDE 0.5 MG: 1 INJECTION, SOLUTION INTRAMUSCULAR; INTRAVENOUS; SUBCUTANEOUS at 12:17

## 2019-06-13 RX ADMIN — SODIUM CHLORIDE, POTASSIUM CHLORIDE, SODIUM LACTATE AND CALCIUM CHLORIDE 9 ML/HR: 600; 310; 30; 20 INJECTION, SOLUTION INTRAVENOUS at 09:57

## 2019-06-13 NOTE — H&P
FIRST UROLOGY CONSULT      Patient Identification:  NAME:  Sukhdev Zayas  Age:  78 y.o.   Sex:  male   :  1940   MRN:  [id number]       Chief complaint: Bladder cancer    History of present illness:  This is a 78 year old man with a history of HG T1 bladder cancer s/p BCG induction course. He returns today for cystoscopy and bladder biopsy. We discussed the risks of the procedure, and he wishes to proceed.      Past medical history:  Past Medical History:   Diagnosis Date   • Anxiety    • Back pain    • Bladder cancer (CMS/HCC)    • Coronary artery disease    • Depression    • Dizziness    • Fatigue    • GERD (gastroesophageal reflux disease)    • History of fractured rib     RIGHT SIDE   • Hyperlipidemia    • Hypertension    • Risk factors for obstructive sleep apnea     STOP BANG 6   • Tremors of nervous system    • Urinary incontinence        Past surgical history:  Past Surgical History:   Procedure Laterality Date   • CARDIAC CATHETERIZATION      7x   • CATARACT EXTRACTION, BILATERAL     • COLONOSCOPY     • CORONARY ANGIOPLASTY WITH STENT PLACEMENT      X5    • CYSTOSCOPY BLADDER BIOPSY N/A 2019    Procedure: CYSTOSCOPY BLADDER BIOPSY;  Surgeon: Wang Soares Jr., MD;  Location: [facility];  Service: Urology   • EYE SURGERY     • SKIN CANCER EXCISION Left     chest wall   • TRANSURETHRAL RESECTION OF BLADDER TUMOR N/A 2018    Procedure: TUR BLADDER TUMOR  LARGE;  Surgeon: Wang Soares Jr., MD;  Location: [facility];  Service: Urology       Allergies:  Patient has no known allergies.    Home medications:  Medications Prior to Admission   Medication Sig Dispense Refill Last Dose   • acetaminophen (TYLENOL) 325 MG tablet Take 650 mg by mouth Every 6 (Six) Hours As Needed for Mild Pain .   Past Week at Unknown time   • amLODIPine (NORVASC) 5 MG tablet Take 5 mg by mouth Every Morning.   2019 at 0800   • atorvastatin (LIPITOR) 40 MG tablet Take 40 mg by mouth  Daily.   6/12/2019 at Unknown time   • esomeprazole (nexIUM) 40 MG capsule Take 40 mg by mouth Every Morning Before Breakfast.   6/12/2019 at 0800   • FLUoxetine (PROzac) 20 MG capsule Take 20 mg by mouth Daily. Take with 40 mg = 60 mg   6/12/2019 at 0800   • FLUoxetine (PROzac) 40 MG capsule Take 40 mg by mouth Daily. Take with 20 mg = 60 mg   6/12/2019 at 0800   • ibuprofen (ADVIL,MOTRIN) 200 MG tablet Take 600 mg by mouth Every 6 (Six) Hours As Needed for Mild Pain .   6/10/2019   • topiramate (TOPAMAX) 25 MG capsule Take 25 mg by mouth Every Night.   6/12/2019 at 2100   • amantadine (SYMMETREL) 100 MG capsule Take 1 capsule by mouth 2 (Two) Times a Day. 60 capsule 2 Unknown at Unknown time   • aspirin 81 MG EC tablet Take 81 mg by mouth Daily. PT TO CHECK WITH DR ONTIVEROS ON STOP DATE FOR SX   6/6/2019   • clopidogrel (PLAVIX) 75 MG tablet Take 75 mg by mouth Daily. PT TO HOLD 1 WEEK PRIOR TO SX PER CARDIOLOGIST   6/6/2019   • gabapentin (NEURONTIN) 300 MG capsule Take 300 mg by mouth 3 (Three) Times a Day.   6/11/2019   • nitroglycerin (NITROSTAT) 0.4 MG SL tablet Place 0.4 mg under the tongue as needed for chest pain. Take no more than 3 doses in 15 minutes.   Unknown at Unknown time   • sulfamethoxazole-trimethoprim (BACTRIM DS) 800-160 MG per tablet Take 1 tablet by mouth 2 (Two) Times a Day. (Patient taking differently: Take 160 mg by mouth 2 (Two) Times a Day. X 7 DAYS-PT STARTED 5/27/19)   6/11/2019        Hospital medications:    ceFAZolin 1 g Intravenous 30 Min Pre-Op   sodium chloride 3 mL Intravenous Q12H       lactated ringers 9 mL/hr Last Rate: 9 mL/hr (06/13/19 0957)     lactated ringers  •  midazolam **OR** midazolam  •  sodium chloride    Family history:  Family History   Problem Relation Age of Onset   • Arthritis Mother    • Glaucoma Mother    • Heart disease Father    • Emphysema Father    • Tremor Father    • Malig Hyperthermia Neg Hx        Social history:  Social History     Tobacco Use   •  Smoking status: Never Smoker   • Smokeless tobacco: Never Used   Substance Use Topics   • Alcohol use: No     Frequency: Never     Comment: used to drink 2-3 nightly quit last 4 months ago 2018   • Drug use: No       Review of systems:    Negative 12-system ROS except for the following:  As per HPI      Objective:  TMax 24 hours:   No data recorded.      Vitals Ranges:   Heart Rate:  [70] 70  Resp:  [16] 16  BP: (166)/(96) 166/96    Intake/Output Last 3 shifts:  No intake/output data recorded.     Physical Exam:       General Appearance:    Alert, cooperative, in no acute distress                                                   Neuro/Psych:   Orientation intact, mood/affect pleasant, no focal findings       Results review:   I reviewed the patient's new clinical results.    Data review:  Lab Results (last 24 hours)     ** No results found for the last 24 hours. **           Imaging:  Imaging Results (last 24 hours)     ** No results found for the last 24 hours. **             Assessment:       * No active hospital problems. *    Bladder cancer    Plan:     Cystoscopy and bladder biopsy    Wang Soares Jr., MD  06/13/19  10:06 AM

## 2019-06-13 NOTE — OP NOTE
Operative Report    [facility]    Patient: Sukhdev Zayas  Age:      78 y.o.  :     1940  Sex:      male    Medical Record:  [id number]    Date of Operation/Procedure:  2019    Pre-op Diagnosis:   Bladder cancer    Post-Op Diagnosis Codes:   Same    Pre-operative Diagnosis Free Text:  * No pre-op diagnosis entered *     Name of Operation/Procedure:  Procedure(s) and Anesthesia Type:     * CYSTOSCOPY BLADDER BIOPSY - General     * CYSTO LITHOPAXY - General    Findings/Complications:  Bladder stones were identified, lasered into small fragments, basketed, and removed. No discrete tumors were seen. Biopsies were taken from the prior tumor site, dome, right and left lateral walls.    Description of procedure:  After informed consent was obtained, the patient was taken to the OR and general anesthesia was induced. He was placed in lithotomy position, prepped and draped in the standard fashion. All pressure points were padded. He received IV antibiotics prior to the procedure. The rigid cystoscope was passed into the bladder. The bladder was then inspected in its entirety. There were no tumors or foreign bodies that were visible. There were, however, several large bladder stones. First, bladder biopsy was performed. Biopsies were taken from the area around the posterior wall tumor, the dome, as well as the left and right lateral walls. All biopsy sites were fulgurated. A laser fiber was then inserted, and the bladder stones were fragmented. All fragments were grasped with a zer-tip nitinol basket and removed. The patient was then awakened from anesthesia in the OR and taken to the recovery room    Estimated Blood Loss: minimal    Specimens: * No orders in the log *    Fluids/Drains: none    Wang Soares Jr., MD  2019  10:09 AM

## 2019-06-13 NOTE — DISCHARGE INSTRUCTIONS
**YOU HAD A PAIN PILL AT 12:01 PM**  FOLLOW UP IN THE OFFICE TOMORROW FOR CATHETER REMOVAL      Outpatient Surgery Guidelines, Adult  Outpatient procedures are those for which the person having the procedure is allowed to go home the same day as the procedure. Various procedures are done on an outpatient basis. You should follow some general guidelines if you will be having an outpatient procedure.  AFTER THE  PROCEDURE  After surgery, you will be taken to a recovery area, where your progress will be monitored. If there are no complications, you will be allowed to go home when you are awake, stable, and taking fluids well. You may have numbness around the surgical site. Healing will take some time. You will have tenderness at the surgical site and may have some swelling and bruising. You may also have some nausea.  HOME CARE INSTRUCTIONS  · Do not drive for 24 hours, or as directed by your health care provider. Do not drive while taking prescription pain medicines.  · Do not drink alcohol for 24 hours.  · Do not make important decisions or sign legal documents for 24 hours.  · Plan on having a responsible adult stay with your for 24 hours following your procedure.  · You may resume a normal diet and activities as directed.  · Do not lift anything heavier than 10 pounds (4.5 kg) or play contact sports until your health care provider says it is okay.  · Only take over-the-counter or prescription medicines as directed by your health care provider.  · Follow up with your health care provider as directed.  · If you have sleep apnea, surgery and certain medicines can increase your risk for breathing problems. Follow instructions from your health care provider about wearing your sleep device:  ? Anytime you are sleeping, including during daytime naps.  ? While taking prescription pain medicines, sleeping medicines, or medicines that make you drowsy.  ·   SEEK MEDICAL CARE IF:  · You have increased bleeding (more than a  small spot) from the surgical site.  · You have redness, swelling, or increasing pain in the wound.  · You see pus coming from the wound.  · You have a fever > 101.  · You notice a bad smell coming from the wound or dressing.  · You feel lightheaded or faint.  · You develop a rash.  · You have trouble breathing.  · You develop allergies.  MAKE SURE YOU:  · Understand these instructions.  · Will watch your condition.  · Will get help right away if you are not doing well or get worse.

## 2019-06-13 NOTE — ANESTHESIA POSTPROCEDURE EVALUATION
Patient: Sukhdev Zayas    Procedure Summary     Date:  06/13/19 Room / Location:  [facility] OR 01 / [facility] MAIN OR    Anesthesia Start:  1020 Anesthesia Stop:  1126    Procedures:       CYSTOSCOPY BLADDER BIOPSY (N/A )      CYSTO LITHOPAXY (N/A ) Diagnosis:      Surgeon:  Wang Soares Jr., MD Provider:  Arthur Vee MD    Anesthesia Type:  general ASA Status:  3          Anesthesia Type: general  Last vitals  BP   166/88 (06/13/19 1200)   Temp       Pulse   80 (06/13/19 1200)   Resp   14 (06/13/19 1200)     SpO2   96 % (06/13/19 1200)     Post Anesthesia Care and Evaluation    Patient location during evaluation: PACU  Patient participation: complete - patient participated  Level of consciousness: awake and alert  Pain management: adequate  Airway patency: patent  Anesthetic complications: No anesthetic complications    Cardiovascular status: acceptable  Respiratory status: acceptable  Hydration status: acceptable    Comments: --------------------            06/13/19               1200     --------------------   BP:       166/88     Pulse:      80       Resp:       14       SpO2:      96%      --------------------

## 2019-06-13 NOTE — PERIOPERATIVE NURSING NOTE
Pt discharged from phase 2 after retail pharmacy was closed, paper scripts given to pt spouse, stated she would have them filled at [facility]

## 2019-06-13 NOTE — PERIOPERATIVE NURSING NOTE
Attempted to call OR where Dr. Soares is working to report c/o increased pain, HTN, unable to void and bladder scan results; no answer.  Will call and return to PACU.

## 2019-06-13 NOTE — PERIOPERATIVE NURSING NOTE
Dr Hernadez at bedside, V.O. For pt to continue shay catheter upon discharge today, and follow up with Dr Hernadez tomorrow for catheter removal.

## 2019-06-13 NOTE — ANESTHESIA PROCEDURE NOTES
Airway  Urgency: elective    Airway not difficult    General Information and Staff    Patient location during procedure: OR  Anesthesiologist: Arthur Vee MD    Indications and Patient Condition  Indications for airway management: airway protection    Preoxygenated: yes  MILS maintained throughout  Mask difficulty assessment: 1 - vent by mask    Final Airway Details  Final airway type: supraglottic airway      Successful airway: classic  Size 5    Number of attempts at approach: 1

## 2019-06-25 ENCOUNTER — APPOINTMENT (OUTPATIENT)
Dept: PREADMISSION TESTING | Facility: HOSPITAL | Age: 79
End: 2019-06-25

## 2019-06-26 ENCOUNTER — APPOINTMENT (OUTPATIENT)
Dept: PREADMISSION TESTING | Facility: HOSPITAL | Age: 79
End: 2019-06-26

## 2019-06-26 VITALS
OXYGEN SATURATION: 96 % | WEIGHT: 185 LBS | HEART RATE: 79 BPM | TEMPERATURE: 97.7 F | SYSTOLIC BLOOD PRESSURE: 100 MMHG | HEIGHT: 72 IN | BODY MASS INDEX: 25.06 KG/M2 | DIASTOLIC BLOOD PRESSURE: 64 MMHG | RESPIRATION RATE: 16 BRPM

## 2019-06-26 LAB
ANION GAP SERPL CALCULATED.3IONS-SCNC: 10.6 MMOL/L (ref 5–15)
BUN BLD-MCNC: 16 MG/DL (ref 8–23)
BUN/CREAT SERPL: 14.8 (ref 7–25)
CALCIUM SPEC-SCNC: 9.6 MG/DL (ref 8.6–10.5)
CHLORIDE SERPL-SCNC: 106 MMOL/L (ref 98–107)
CO2 SERPL-SCNC: 22.4 MMOL/L (ref 22–29)
CREAT BLD-MCNC: 1.08 MG/DL (ref 0.76–1.27)
DEPRECATED RDW RBC AUTO: 47.4 FL (ref 37–54)
ERYTHROCYTE [DISTWIDTH] IN BLOOD BY AUTOMATED COUNT: 13.1 % (ref 12.3–15.4)
GFR SERPL CREATININE-BSD FRML MDRD: 66 ML/MIN/1.73
GLUCOSE BLD-MCNC: 87 MG/DL (ref 65–99)
HCT VFR BLD AUTO: 41.6 % (ref 37.5–51)
HGB BLD-MCNC: 13.5 G/DL (ref 13–17.7)
MCH RBC QN AUTO: 32.5 PG (ref 26.6–33)
MCHC RBC AUTO-ENTMCNC: 32.5 G/DL (ref 31.5–35.7)
MCV RBC AUTO: 100.2 FL (ref 79–97)
PLATELET # BLD AUTO: 249 10*3/MM3 (ref 140–450)
PMV BLD AUTO: 10.5 FL (ref 6–12)
POTASSIUM BLD-SCNC: 3.9 MMOL/L (ref 3.5–5.2)
RBC # BLD AUTO: 4.15 10*6/MM3 (ref 4.14–5.8)
SODIUM BLD-SCNC: 139 MMOL/L (ref 136–145)
WBC NRBC COR # BLD: 8.65 10*3/MM3 (ref 3.4–10.8)

## 2019-06-26 PROCEDURE — 80048 BASIC METABOLIC PNL TOTAL CA: CPT | Performed by: UROLOGY

## 2019-06-26 PROCEDURE — 85027 COMPLETE CBC AUTOMATED: CPT | Performed by: UROLOGY

## 2019-06-26 PROCEDURE — 36415 COLL VENOUS BLD VENIPUNCTURE: CPT

## 2019-06-26 RX ORDER — CEPHALEXIN 500 MG/1
500 CAPSULE ORAL 2 TIMES DAILY
COMMUNITY
End: 2019-07-29

## 2019-07-11 ENCOUNTER — HOSPITAL ENCOUNTER (OUTPATIENT)
Facility: HOSPITAL | Age: 79
Setting detail: OBSERVATION
Discharge: HOME | End: 2019-07-12
Attending: UROLOGY | Admitting: UROLOGY

## 2019-07-11 ENCOUNTER — ANESTHESIA (OUTPATIENT)
Dept: PERIOP | Facility: HOSPITAL | Age: 79
End: 2019-07-11

## 2019-07-11 ENCOUNTER — ANESTHESIA EVENT (OUTPATIENT)
Dept: PERIOP | Facility: HOSPITAL | Age: 79
End: 2019-07-11

## 2019-07-11 DIAGNOSIS — N40.0 BPH (BENIGN PROSTATIC HYPERPLASIA): Primary | ICD-10-CM

## 2019-07-11 LAB
ANION GAP SERPL CALCULATED.3IONS-SCNC: 9 MMOL/L (ref 5–15)
BUN BLD-MCNC: 14 MG/DL (ref 8–23)
BUN/CREAT SERPL: 15.2 (ref 7–25)
CALCIUM SPEC-SCNC: 8.7 MG/DL (ref 8.6–10.5)
CHLORIDE SERPL-SCNC: 104 MMOL/L (ref 98–107)
CO2 SERPL-SCNC: 25 MMOL/L (ref 22–29)
CREAT BLD-MCNC: 0.92 MG/DL (ref 0.76–1.27)
DEPRECATED RDW RBC AUTO: 46.7 FL (ref 37–54)
ERYTHROCYTE [DISTWIDTH] IN BLOOD BY AUTOMATED COUNT: 12.8 % (ref 12.3–15.4)
GFR SERPL CREATININE-BSD FRML MDRD: 80 ML/MIN/1.73
GLUCOSE BLD-MCNC: 91 MG/DL (ref 65–99)
HCT VFR BLD AUTO: 38.4 % (ref 37.5–51)
HGB BLD-MCNC: 12.6 G/DL (ref 13–17.7)
MCH RBC QN AUTO: 32.9 PG (ref 26.6–33)
MCHC RBC AUTO-ENTMCNC: 32.8 G/DL (ref 31.5–35.7)
MCV RBC AUTO: 100.3 FL (ref 79–97)
PLATELET # BLD AUTO: 168 10*3/MM3 (ref 140–450)
PMV BLD AUTO: 10 FL (ref 6–12)
POTASSIUM BLD-SCNC: 4.4 MMOL/L (ref 3.5–5.2)
RBC # BLD AUTO: 3.83 10*6/MM3 (ref 4.14–5.8)
SODIUM BLD-SCNC: 138 MMOL/L (ref 136–145)
WBC NRBC COR # BLD: 7.77 10*3/MM3 (ref 3.4–10.8)

## 2019-07-11 PROCEDURE — G0378 HOSPITAL OBSERVATION PER HR: HCPCS

## 2019-07-11 PROCEDURE — 25010000002 CEFTRIAXONE PER 250 MG: Performed by: UROLOGY

## 2019-07-11 PROCEDURE — 25010000002 HYDRALAZINE PER 20 MG: Performed by: ANESTHESIOLOGY

## 2019-07-11 PROCEDURE — 25010000002 FENTANYL CITRATE (PF) 100 MCG/2ML SOLUTION: Performed by: ANESTHESIOLOGY

## 2019-07-11 PROCEDURE — 25010000002 HYDROMORPHONE PER 4 MG: Performed by: ANESTHESIOLOGY

## 2019-07-11 PROCEDURE — 88305 TISSUE EXAM BY PATHOLOGIST: CPT | Performed by: UROLOGY

## 2019-07-11 PROCEDURE — 85027 COMPLETE CBC AUTOMATED: CPT | Performed by: UROLOGY

## 2019-07-11 PROCEDURE — 80048 BASIC METABOLIC PNL TOTAL CA: CPT | Performed by: UROLOGY

## 2019-07-11 PROCEDURE — 25010000002 PROPOFOL 10 MG/ML EMULSION: Performed by: ANESTHESIOLOGY

## 2019-07-11 PROCEDURE — 25010000002 KETOROLAC TROMETHAMINE PER 15 MG: Performed by: UROLOGY

## 2019-07-11 PROCEDURE — 25010000003 CEFAZOLIN 1-4 GM/50ML-% SOLUTION: Performed by: UROLOGY

## 2019-07-11 PROCEDURE — 25010000002 MIDAZOLAM PER 1 MG: Performed by: ANESTHESIOLOGY

## 2019-07-11 RX ORDER — AMLODIPINE BESYLATE 5 MG/1
5 TABLET ORAL EVERY MORNING
Status: DISCONTINUED | OUTPATIENT
Start: 2019-07-11 | End: 2019-07-12 | Stop reason: HOSPADM

## 2019-07-11 RX ORDER — OXYCODONE AND ACETAMINOPHEN 7.5; 325 MG/1; MG/1
1 TABLET ORAL ONCE AS NEEDED
Status: DISCONTINUED | OUTPATIENT
Start: 2019-07-11 | End: 2019-07-11 | Stop reason: HOSPADM

## 2019-07-11 RX ORDER — AMANTADINE HYDROCHLORIDE 100 MG/1
100 CAPSULE, GELATIN COATED ORAL 2 TIMES DAILY
Status: DISCONTINUED | OUTPATIENT
Start: 2019-07-11 | End: 2019-07-12 | Stop reason: HOSPADM

## 2019-07-11 RX ORDER — HYDRALAZINE HYDROCHLORIDE 20 MG/ML
INJECTION INTRAMUSCULAR; INTRAVENOUS AS NEEDED
Status: DISCONTINUED | OUTPATIENT
Start: 2019-07-11 | End: 2019-07-11 | Stop reason: SURG

## 2019-07-11 RX ORDER — SODIUM CHLORIDE, SODIUM LACTATE, POTASSIUM CHLORIDE, CALCIUM CHLORIDE 600; 310; 30; 20 MG/100ML; MG/100ML; MG/100ML; MG/100ML
9 INJECTION, SOLUTION INTRAVENOUS CONTINUOUS
Status: DISCONTINUED | OUTPATIENT
Start: 2019-07-11 | End: 2019-07-11 | Stop reason: HOSPADM

## 2019-07-11 RX ORDER — MIDAZOLAM HYDROCHLORIDE 1 MG/ML
2 INJECTION INTRAMUSCULAR; INTRAVENOUS
Status: DISCONTINUED | OUTPATIENT
Start: 2019-07-11 | End: 2019-07-11 | Stop reason: HOSPADM

## 2019-07-11 RX ORDER — FLUOXETINE HYDROCHLORIDE 20 MG/1
60 CAPSULE ORAL EVERY MORNING
Status: DISCONTINUED | OUTPATIENT
Start: 2019-07-11 | End: 2019-07-12 | Stop reason: HOSPADM

## 2019-07-11 RX ORDER — CEFTRIAXONE SODIUM 1 G/50ML
1 INJECTION, SOLUTION INTRAVENOUS EVERY 24 HOURS
Status: DISCONTINUED | OUTPATIENT
Start: 2019-07-11 | End: 2019-07-12 | Stop reason: HOSPADM

## 2019-07-11 RX ORDER — HYDROCODONE BITARTRATE AND ACETAMINOPHEN 7.5; 325 MG/1; MG/1
1 TABLET ORAL ONCE AS NEEDED
Status: DISCONTINUED | OUTPATIENT
Start: 2019-07-11 | End: 2019-07-11 | Stop reason: HOSPADM

## 2019-07-11 RX ORDER — MAGNESIUM HYDROXIDE 1200 MG/15ML
LIQUID ORAL AS NEEDED
Status: DISCONTINUED | OUTPATIENT
Start: 2019-07-11 | End: 2019-07-11 | Stop reason: HOSPADM

## 2019-07-11 RX ORDER — PROPOFOL 10 MG/ML
VIAL (ML) INTRAVENOUS AS NEEDED
Status: DISCONTINUED | OUTPATIENT
Start: 2019-07-11 | End: 2019-07-11 | Stop reason: SURG

## 2019-07-11 RX ORDER — HYDROMORPHONE HCL 110MG/55ML
PATIENT CONTROLLED ANALGESIA SYRINGE INTRAVENOUS AS NEEDED
Status: DISCONTINUED | OUTPATIENT
Start: 2019-07-11 | End: 2019-07-11 | Stop reason: SURG

## 2019-07-11 RX ORDER — PROMETHAZINE HYDROCHLORIDE 25 MG/1
25 TABLET ORAL ONCE AS NEEDED
Status: DISCONTINUED | OUTPATIENT
Start: 2019-07-11 | End: 2019-07-11 | Stop reason: HOSPADM

## 2019-07-11 RX ORDER — ATROPA BELLADONNA AND OPIUM 16.2; 6 MG/1; MG/1
SUPPOSITORY RECTAL AS NEEDED
Status: DISCONTINUED | OUTPATIENT
Start: 2019-07-11 | End: 2019-07-11 | Stop reason: HOSPADM

## 2019-07-11 RX ORDER — ASPIRIN 81 MG/1
81 TABLET, CHEWABLE ORAL ONCE
Status: DISCONTINUED | OUTPATIENT
Start: 2019-07-11 | End: 2019-07-11 | Stop reason: HOSPADM

## 2019-07-11 RX ORDER — ONDANSETRON 2 MG/ML
4 INJECTION INTRAMUSCULAR; INTRAVENOUS ONCE AS NEEDED
Status: DISCONTINUED | OUTPATIENT
Start: 2019-07-11 | End: 2019-07-11 | Stop reason: HOSPADM

## 2019-07-11 RX ORDER — LIDOCAINE HYDROCHLORIDE 20 MG/ML
INJECTION, SOLUTION INFILTRATION; PERINEURAL AS NEEDED
Status: DISCONTINUED | OUTPATIENT
Start: 2019-07-11 | End: 2019-07-11 | Stop reason: SURG

## 2019-07-11 RX ORDER — HYDROMORPHONE HYDROCHLORIDE 1 MG/ML
0.5 INJECTION, SOLUTION INTRAMUSCULAR; INTRAVENOUS; SUBCUTANEOUS
Status: DISCONTINUED | OUTPATIENT
Start: 2019-07-11 | End: 2019-07-11 | Stop reason: HOSPADM

## 2019-07-11 RX ORDER — PROMETHAZINE HYDROCHLORIDE 25 MG/1
25 SUPPOSITORY RECTAL ONCE AS NEEDED
Status: DISCONTINUED | OUTPATIENT
Start: 2019-07-11 | End: 2019-07-11 | Stop reason: HOSPADM

## 2019-07-11 RX ORDER — ONDANSETRON 2 MG/ML
4 INJECTION INTRAMUSCULAR; INTRAVENOUS EVERY 6 HOURS PRN
Status: DISCONTINUED | OUTPATIENT
Start: 2019-07-11 | End: 2019-07-12 | Stop reason: HOSPADM

## 2019-07-11 RX ORDER — HYDROMORPHONE HYDROCHLORIDE 1 MG/ML
0.5 INJECTION, SOLUTION INTRAMUSCULAR; INTRAVENOUS; SUBCUTANEOUS
Status: DISCONTINUED | OUTPATIENT
Start: 2019-07-11 | End: 2019-07-12 | Stop reason: HOSPADM

## 2019-07-11 RX ORDER — HYDROCODONE BITARTRATE AND ACETAMINOPHEN 5; 325 MG/1; MG/1
1 TABLET ORAL EVERY 4 HOURS PRN
Status: DISCONTINUED | OUTPATIENT
Start: 2019-07-11 | End: 2019-07-12 | Stop reason: HOSPADM

## 2019-07-11 RX ORDER — PROMETHAZINE HYDROCHLORIDE 25 MG/ML
6.25 INJECTION, SOLUTION INTRAMUSCULAR; INTRAVENOUS
Status: DISCONTINUED | OUTPATIENT
Start: 2019-07-11 | End: 2019-07-11 | Stop reason: HOSPADM

## 2019-07-11 RX ORDER — SENNA AND DOCUSATE SODIUM 50; 8.6 MG/1; MG/1
2 TABLET, FILM COATED ORAL 2 TIMES DAILY
Status: DISCONTINUED | OUTPATIENT
Start: 2019-07-11 | End: 2019-07-12 | Stop reason: HOSPADM

## 2019-07-11 RX ORDER — EPHEDRINE SULFATE 50 MG/ML
5 INJECTION, SOLUTION INTRAVENOUS ONCE AS NEEDED
Status: DISCONTINUED | OUTPATIENT
Start: 2019-07-11 | End: 2019-07-11 | Stop reason: HOSPADM

## 2019-07-11 RX ORDER — FENTANYL CITRATE 50 UG/ML
50 INJECTION, SOLUTION INTRAMUSCULAR; INTRAVENOUS
Status: DISCONTINUED | OUTPATIENT
Start: 2019-07-11 | End: 2019-07-11 | Stop reason: HOSPADM

## 2019-07-11 RX ORDER — HYDRALAZINE HYDROCHLORIDE 20 MG/ML
5 INJECTION INTRAMUSCULAR; INTRAVENOUS
Status: DISCONTINUED | OUTPATIENT
Start: 2019-07-11 | End: 2019-07-11 | Stop reason: HOSPADM

## 2019-07-11 RX ORDER — PROMETHAZINE HYDROCHLORIDE 25 MG/ML
12.5 INJECTION, SOLUTION INTRAMUSCULAR; INTRAVENOUS ONCE AS NEEDED
Status: DISCONTINUED | OUTPATIENT
Start: 2019-07-11 | End: 2019-07-11 | Stop reason: HOSPADM

## 2019-07-11 RX ORDER — SODIUM CHLORIDE 9 MG/ML
100 INJECTION, SOLUTION INTRAVENOUS CONTINUOUS
Status: DISCONTINUED | OUTPATIENT
Start: 2019-07-11 | End: 2019-07-12 | Stop reason: HOSPADM

## 2019-07-11 RX ORDER — NITROGLYCERIN 0.4 MG/1
0.4 TABLET SUBLINGUAL AS NEEDED
Status: DISCONTINUED | OUTPATIENT
Start: 2019-07-11 | End: 2019-07-12 | Stop reason: HOSPADM

## 2019-07-11 RX ORDER — NALOXONE HCL 0.4 MG/ML
0.2 VIAL (ML) INJECTION AS NEEDED
Status: DISCONTINUED | OUTPATIENT
Start: 2019-07-11 | End: 2019-07-11 | Stop reason: HOSPADM

## 2019-07-11 RX ORDER — EPHEDRINE SULFATE 50 MG/ML
INJECTION, SOLUTION INTRAVENOUS AS NEEDED
Status: DISCONTINUED | OUTPATIENT
Start: 2019-07-11 | End: 2019-07-11 | Stop reason: SURG

## 2019-07-11 RX ORDER — DIPHENHYDRAMINE HYDROCHLORIDE 50 MG/ML
12.5 INJECTION INTRAMUSCULAR; INTRAVENOUS
Status: DISCONTINUED | OUTPATIENT
Start: 2019-07-11 | End: 2019-07-11 | Stop reason: HOSPADM

## 2019-07-11 RX ORDER — ATORVASTATIN CALCIUM 20 MG/1
40 TABLET, FILM COATED ORAL EVERY MORNING
Status: DISCONTINUED | OUTPATIENT
Start: 2019-07-11 | End: 2019-07-12 | Stop reason: HOSPADM

## 2019-07-11 RX ORDER — GLYCOPYRROLATE 0.2 MG/ML
INJECTION INTRAMUSCULAR; INTRAVENOUS AS NEEDED
Status: DISCONTINUED | OUTPATIENT
Start: 2019-07-11 | End: 2019-07-11 | Stop reason: SURG

## 2019-07-11 RX ORDER — MIDAZOLAM HYDROCHLORIDE 1 MG/ML
1 INJECTION INTRAMUSCULAR; INTRAVENOUS
Status: DISCONTINUED | OUTPATIENT
Start: 2019-07-11 | End: 2019-07-11 | Stop reason: HOSPADM

## 2019-07-11 RX ORDER — ONDANSETRON 4 MG/1
4 TABLET, FILM COATED ORAL EVERY 6 HOURS PRN
Status: DISCONTINUED | OUTPATIENT
Start: 2019-07-11 | End: 2019-07-12 | Stop reason: HOSPADM

## 2019-07-11 RX ORDER — FAMOTIDINE 10 MG/ML
20 INJECTION, SOLUTION INTRAVENOUS ONCE
Status: COMPLETED | OUTPATIENT
Start: 2019-07-11 | End: 2019-07-11

## 2019-07-11 RX ORDER — LIDOCAINE HYDROCHLORIDE 10 MG/ML
0.5 INJECTION, SOLUTION EPIDURAL; INFILTRATION; INTRACAUDAL; PERINEURAL ONCE AS NEEDED
Status: DISCONTINUED | OUTPATIENT
Start: 2019-07-11 | End: 2019-07-11 | Stop reason: HOSPADM

## 2019-07-11 RX ORDER — ASPIRIN 81 MG/1
81 TABLET ORAL ONCE
Status: COMPLETED | OUTPATIENT
Start: 2019-07-11 | End: 2019-07-11

## 2019-07-11 RX ORDER — DIPHENHYDRAMINE HCL 25 MG
25 CAPSULE ORAL
Status: DISCONTINUED | OUTPATIENT
Start: 2019-07-11 | End: 2019-07-11 | Stop reason: HOSPADM

## 2019-07-11 RX ORDER — FLUMAZENIL 0.1 MG/ML
0.2 INJECTION INTRAVENOUS AS NEEDED
Status: DISCONTINUED | OUTPATIENT
Start: 2019-07-11 | End: 2019-07-11 | Stop reason: HOSPADM

## 2019-07-11 RX ORDER — GABAPENTIN 300 MG/1
600 CAPSULE ORAL 3 TIMES DAILY
Status: DISCONTINUED | OUTPATIENT
Start: 2019-07-11 | End: 2019-07-12 | Stop reason: HOSPADM

## 2019-07-11 RX ORDER — KETOROLAC TROMETHAMINE 30 MG/ML
15 INJECTION, SOLUTION INTRAMUSCULAR; INTRAVENOUS EVERY 6 HOURS
Status: DISCONTINUED | OUTPATIENT
Start: 2019-07-11 | End: 2019-07-12 | Stop reason: HOSPADM

## 2019-07-11 RX ORDER — NALOXONE HCL 0.4 MG/ML
0.1 VIAL (ML) INJECTION
Status: DISCONTINUED | OUTPATIENT
Start: 2019-07-11 | End: 2019-07-12 | Stop reason: HOSPADM

## 2019-07-11 RX ORDER — LABETALOL HYDROCHLORIDE 5 MG/ML
5 INJECTION, SOLUTION INTRAVENOUS
Status: DISCONTINUED | OUTPATIENT
Start: 2019-07-11 | End: 2019-07-11 | Stop reason: HOSPADM

## 2019-07-11 RX ORDER — FENTANYL CITRATE 50 UG/ML
INJECTION, SOLUTION INTRAMUSCULAR; INTRAVENOUS AS NEEDED
Status: DISCONTINUED | OUTPATIENT
Start: 2019-07-11 | End: 2019-07-11 | Stop reason: SURG

## 2019-07-11 RX ORDER — ACETAMINOPHEN 325 MG/1
650 TABLET ORAL ONCE AS NEEDED
Status: DISCONTINUED | OUTPATIENT
Start: 2019-07-11 | End: 2019-07-11 | Stop reason: HOSPADM

## 2019-07-11 RX ORDER — SODIUM CHLORIDE 0.9 % (FLUSH) 0.9 %
1-10 SYRINGE (ML) INJECTION AS NEEDED
Status: DISCONTINUED | OUTPATIENT
Start: 2019-07-11 | End: 2019-07-11 | Stop reason: HOSPADM

## 2019-07-11 RX ORDER — PANTOPRAZOLE SODIUM 40 MG/1
40 TABLET, DELAYED RELEASE ORAL EVERY MORNING
Status: DISCONTINUED | OUTPATIENT
Start: 2019-07-11 | End: 2019-07-12 | Stop reason: HOSPADM

## 2019-07-11 RX ORDER — TOPIRAMATE 25 MG/1
25 TABLET ORAL DAILY
Status: DISCONTINUED | OUTPATIENT
Start: 2019-07-11 | End: 2019-07-12 | Stop reason: HOSPADM

## 2019-07-11 RX ORDER — CEFAZOLIN SODIUM 1 G/50ML
1 INJECTION, SOLUTION INTRAVENOUS ONCE
Status: COMPLETED | OUTPATIENT
Start: 2019-07-11 | End: 2019-07-11

## 2019-07-11 RX ADMIN — CEFAZOLIN SODIUM 2 G: 1 INJECTION, SOLUTION INTRAVENOUS at 08:16

## 2019-07-11 RX ADMIN — CEFTRIAXONE SODIUM 1 G: 1 INJECTION, SOLUTION INTRAVENOUS at 12:21

## 2019-07-11 RX ADMIN — GABAPENTIN 600 MG: 300 CAPSULE ORAL at 20:21

## 2019-07-11 RX ADMIN — LIDOCAINE HYDROCHLORIDE 75 MG: 20 INJECTION, SOLUTION INFILTRATION; PERINEURAL at 08:14

## 2019-07-11 RX ADMIN — SENNOSIDES,DOCUSATE SODIUM 2 TABLET: 50; 8.6 TABLET, FILM COATED ORAL at 20:22

## 2019-07-11 RX ADMIN — FAMOTIDINE 20 MG: 10 INJECTION INTRAVENOUS at 08:03

## 2019-07-11 RX ADMIN — HYDROMORPHONE HYDROCHLORIDE 0.5 MG: 2 INJECTION INTRAMUSCULAR; INTRAVENOUS; SUBCUTANEOUS at 09:03

## 2019-07-11 RX ADMIN — KETOROLAC TROMETHAMINE 15 MG: 30 INJECTION, SOLUTION INTRAMUSCULAR at 15:31

## 2019-07-11 RX ADMIN — GABAPENTIN 600 MG: 300 CAPSULE ORAL at 15:32

## 2019-07-11 RX ADMIN — PROPOFOL 100 MG: 10 INJECTION, EMULSION INTRAVENOUS at 08:13

## 2019-07-11 RX ADMIN — FENTANYL CITRATE 25 MCG: 50 INJECTION INTRAMUSCULAR; INTRAVENOUS at 08:50

## 2019-07-11 RX ADMIN — SODIUM CHLORIDE, POTASSIUM CHLORIDE, SODIUM LACTATE AND CALCIUM CHLORIDE 9 ML/HR: 600; 310; 30; 20 INJECTION, SOLUTION INTRAVENOUS at 08:03

## 2019-07-11 RX ADMIN — AMANTADINE HYDROCHLORIDE 100 MG: 100 CAPSULE ORAL at 20:22

## 2019-07-11 RX ADMIN — AMLODIPINE BESYLATE 5 MG: 5 TABLET ORAL at 12:21

## 2019-07-11 RX ADMIN — ATORVASTATIN CALCIUM 40 MG: 20 TABLET, FILM COATED ORAL at 12:21

## 2019-07-11 RX ADMIN — FENTANYL CITRATE 25 MCG: 50 INJECTION INTRAMUSCULAR; INTRAVENOUS at 08:59

## 2019-07-11 RX ADMIN — ASPIRIN 81 MG: 81 TABLET, CHEWABLE ORAL at 08:09

## 2019-07-11 RX ADMIN — LABETALOL 20 MG/4 ML (5 MG/ML) INTRAVENOUS SYRINGE 5 MG: at 09:32

## 2019-07-11 RX ADMIN — HYDROMORPHONE HYDROCHLORIDE 0.5 MG: 2 INJECTION INTRAMUSCULAR; INTRAVENOUS; SUBCUTANEOUS at 09:10

## 2019-07-11 RX ADMIN — HYDRALAZINE HYDROCHLORIDE 5 MG: 20 INJECTION INTRAMUSCULAR; INTRAVENOUS at 09:32

## 2019-07-11 RX ADMIN — KETOROLAC TROMETHAMINE 15 MG: 30 INJECTION, SOLUTION INTRAMUSCULAR at 20:21

## 2019-07-11 RX ADMIN — FENTANYL CITRATE 25 MCG: 50 INJECTION INTRAMUSCULAR; INTRAVENOUS at 08:45

## 2019-07-11 RX ADMIN — FLUOXETINE HYDROCHLORIDE 60 MG: 20 CAPSULE ORAL at 12:13

## 2019-07-11 RX ADMIN — HYDRALAZINE HYDROCHLORIDE 5 MG: 20 INJECTION INTRAMUSCULAR; INTRAVENOUS at 08:47

## 2019-07-11 RX ADMIN — TOPIRAMATE 25 MG: 25 TABLET, FILM COATED ORAL at 12:21

## 2019-07-11 RX ADMIN — KETOROLAC TROMETHAMINE 15 MG: 30 INJECTION, SOLUTION INTRAMUSCULAR at 10:31

## 2019-07-11 RX ADMIN — FENTANYL CITRATE 25 MCG: 50 INJECTION INTRAMUSCULAR; INTRAVENOUS at 08:10

## 2019-07-11 RX ADMIN — PROPOFOL 50 MG: 10 INJECTION, EMULSION INTRAVENOUS at 08:14

## 2019-07-11 RX ADMIN — ASPIRIN 81 MG: 81 TABLET, DELAYED RELEASE ORAL at 08:02

## 2019-07-11 RX ADMIN — MIDAZOLAM 1 MG: 1 INJECTION INTRAMUSCULAR; INTRAVENOUS at 08:03

## 2019-07-11 RX ADMIN — GLYCOPYRROLATE 0.2 MG: 0.2 INJECTION INTRAMUSCULAR; INTRAVENOUS at 08:10

## 2019-07-11 RX ADMIN — SODIUM CHLORIDE 100 ML/HR: 9 INJECTION, SOLUTION INTRAVENOUS at 12:20

## 2019-07-11 RX ADMIN — SODIUM CHLORIDE 100 ML/HR: 9 INJECTION, SOLUTION INTRAVENOUS at 20:22

## 2019-07-11 RX ADMIN — EPHEDRINE SULFATE 10 MG: 50 INJECTION INTRAMUSCULAR; INTRAVENOUS; SUBCUTANEOUS at 08:25

## 2019-07-11 RX ADMIN — FENTANYL CITRATE 50 MCG: 50 INJECTION INTRAMUSCULAR; INTRAVENOUS at 09:42

## 2019-07-11 RX ADMIN — SENNOSIDES,DOCUSATE SODIUM 2 TABLET: 50; 8.6 TABLET, FILM COATED ORAL at 12:23

## 2019-07-11 RX ADMIN — AMANTADINE HYDROCHLORIDE 100 MG: 100 CAPSULE ORAL at 12:12

## 2019-07-11 NOTE — H&P
FIRST UROLOGY CONSULT      Patient Identification:  NAME:  Sukhdev Zayas  Age:  78 y.o.   Sex:  male   :  1940   MRN:  [id number]       Chief complaint: BPH    History of present illness:  This is a 78 year old man with a history of bladder cancer and BPH. He has been found on multiple occasions to have an elevated PVR and has relied on an indwelling urethral catheter. He now presents for TURP. He understands that there are risks to the procedure, including pain, infection, bleeding, damage to structures around the prostate, urinary incontinence, failure to improve urination, need for additional procedures, as well as the risk of myocardial infarction, sepsis, stroke, recurrence of cancer, even death. He would like to proceed.      Past medical history:  Past Medical History:   Diagnosis Date   • Anxiety    • Back pain    • Bladder cancer (CMS/HCC)    • Coronary artery disease    • Depression    • Dizziness    • Fatigue    • GERD (gastroesophageal reflux disease)    • History of fractured rib     RIGHT SIDE   • Hyperlipidemia    • Hypertension    • Risk factors for obstructive sleep apnea    • Tremors of nervous system    • Urinary incontinence        Past surgical history:  Past Surgical History:   Procedure Laterality Date   • CARDIAC CATHETERIZATION      7x   • CATARACT EXTRACTION, BILATERAL     • COLONOSCOPY     • CORONARY ANGIOPLASTY WITH STENT PLACEMENT      X5    • CYSTOSCOPY BLADDER BIOPSY N/A 2019    Procedure: CYSTOSCOPY BLADDER BIOPSY;  Surgeon: Wang Soares Jr., MD;  Location: [facility];  Service: Urology   • CYSTOSCOPY BLADDER BIOPSY N/A 2019    Procedure: CYSTOSCOPY BLADDER BIOPSY;  Surgeon: Wang Soares Jr., MD;  Location: [facility] OR;  Service: Urology   • CYSTOSCOPY URETEROSCOPY LASER LITHOTRIPSY N/A 2019    Procedure: CYSTO LITHOPAXY;  Surgeon: Wang Soares Jr., MD;  Location: [facility] OR;  Service: Urology   • EYE SURGERY     •  SKIN CANCER EXCISION Left     chest wall   • TRANSURETHRAL RESECTION OF BLADDER TUMOR N/A 11/29/2018    Procedure: TUR BLADDER TUMOR  LARGE;  Surgeon: Wang Soares Jr., MD;  Location: [facility];  Service: Urology       Allergies:  Patient has no known allergies.    Home medications:  Medications Prior to Admission   Medication Sig Dispense Refill Last Dose   • acetaminophen (TYLENOL) 325 MG tablet Take 650 mg by mouth Every 6 (Six) Hours As Needed for Mild Pain .   Past Week at Unknown time   • amantadine (SYMMETREL) 100 MG capsule Take 1 capsule by mouth 2 (Two) Times a Day. 60 capsule 2 Unknown at Unknown time   • amLODIPine (NORVASC) 5 MG tablet Take 5 mg by mouth Every Morning.   6/12/2019 at 0800   • aspirin 81 MG tablet Take 81 mg by mouth Daily. TO HOLD 1 WEEK BEFORE SURGERY      • atorvastatin (LIPITOR) 40 MG tablet Take 40 mg by mouth Every Morning.   6/12/2019 at Unknown time   • cephalexin (KEFLEX) 500 MG capsule Take 500 mg by mouth 2 (Two) Times a Day.      • esomeprazole (nexIUM) 40 MG capsule Take 40 mg by mouth Every Morning Before Breakfast.   6/12/2019 at 0800   • FLUoxetine (PROzac) 40 MG capsule Take 60 mg by mouth Every Morning.   6/12/2019 at 0800   • gabapentin (NEURONTIN) 300 MG capsule Take 300 mg by mouth 3 (Three) Times a Day.   6/11/2019   • ibuprofen (ADVIL,MOTRIN) 200 MG tablet Take 600 mg by mouth Every 6 (Six) Hours As Needed for Mild Pain . TO HOLD 1 WEEK BEFORE SURGERY   6/10/2019   • nitroglycerin (NITROSTAT) 0.4 MG SL tablet Place 0.4 mg under the tongue as needed for chest pain. Take no more than 3 doses in 15 minutes.   Unknown at Unknown time   • topiramate (TOPAMAX) 25 MG capsule Take 25 mg by mouth Every Night.   6/12/2019 at 2100        Hospital medications:    ceFAZolin 1 g Intravenous Once            Family history:  Family History   Problem Relation Age of Onset   • Arthritis Mother    • Glaucoma Mother    • Heart disease Father    • Emphysema Father    •  Tremor Father    • Malig Hyperthermia Neg Hx        Social history:  Social History     Tobacco Use   • Smoking status: Never Smoker   • Smokeless tobacco: Never Used   Substance Use Topics   • Alcohol use: No     Frequency: Never   • Drug use: No       Review of systems:      Positive for:  As per HPI  Negative for:  none    Objective:  TMax 24 hours:   No data recorded.      Vitals Ranges:        Intake/Output Last 3 shifts:  No intake/output data recorded.     Physical Exam:    General Appearance:    Alert, cooperative, NAD                                       Neuro/Psych:   Orientation intact, mood/affect pleasant, no focal findings       Results review:   I reviewed the patient's new clinical results.    Data review:  Lab Results (last 24 hours)     ** No results found for the last 24 hours. **           Imaging:  Imaging Results (last 24 hours)     ** No results found for the last 24 hours. **             Assessment:       BPH (benign prostatic hyperplasia)    Urinary retention  Bladder cancer    Plan:     TURP    Wang Soares Jr., MD  07/11/19  7:02 AM

## 2019-07-11 NOTE — ANESTHESIA PROCEDURE NOTES
Airway  Urgency: elective    Airway not difficult    General Information and Staff    Patient location during procedure: OR  Anesthesiologist: Nasir Rajan MD    Indications and Patient Condition  Indications for airway management: airway protection    Preoxygenated: yes  Mask difficulty assessment: 0 - not attempted    Final Airway Details  Final airway type: supraglottic airway      Successful airway: classic  Size 5    Number of attempts at approach: 1

## 2019-07-11 NOTE — OP NOTE
Operative Report     [facility] OR    Patient: Sukhdev Zayas  Age:      78 y.o.  :     1940  Sex:      male    Medical Record:  [id number]    Date of Operation/Procedure:  2019    Pre-op Diagnosis:   BPH, urinary retention    Post-Op Diagnosis Codes:   Same    Pre-operative Diagnosis Free Text:  * No pre-op diagnosis entered *     Name of Operation/Procedure:  Procedure(s) and Anesthesia Type:     * CYSTOSCOPY TRANSURETHRAL RESECTION OF PROSTATE - General    Findings/Complications:  Severe BPH, no median lobe.    Description of procedure: The patient was taken to the OR and placed under GA in lithotomy position.  He was prepped and draped in sterile fashion.  The 21 Fr rigid cystoscope was placed and pan-cystoscopy was performed.  Severe hyperplasia of the prostate was noted.  The UOs were visualized and were safely away from the median lobe.  No bladder tumors seen.  The bladder was left full.  The obturator sheath was then placed into the bladder and the bladder drained.  The CF resectoscope was then placed. Resection was initiated on the 5 o'clock sulcus, and a channel was carried down to the capsule and out to but not past the verumontanum.  Hemostasis was obtained.  I then resected a single trough at the 7 o'clock position and carried it deep to the capsule and out to the verumontanum.  The remained of the right and left lobes were resected fully then by connecting the two previous resection sites.  All chips were removed and hemostasis was obtained.  Anterior lobe tissue was not prominent and was not resected.  Careful hemostasis was obtained and all chips removed.  A 24 Fr 3 way Moses was placed on traction and the bladder irrigated repeatedly until the urine was clear.  CBI was initiated in the OR.  The patient was awakened and taken to the RR in good condition.  No complications.    Estimated Blood Loss: 100ml    Specimens:   Order Name Source Comment Collection Info Order Time   TISSUE  PATHOLOGY EXAM Prostate  Collected By: Wang Soares Jr., MD 7/11/2019  9:11 AM       Fluids/Drains: shay catheter    Wang Soares Jr., MD  7/11/2019  9:44 AM

## 2019-07-11 NOTE — ANESTHESIA POSTPROCEDURE EVALUATION
Patient: Sukhdev Zayas    Procedure Summary     Date:  07/11/19 Room / Location:  [facility] OR 01 / [facility] MAIN OR    Anesthesia Start:  0810 Anesthesia Stop:  0925    Procedure:  CYSTOSCOPY TRANSURETHRAL RESECTION OF PROSTATE (N/A ) Diagnosis:      Surgeon:  Wang Soares Jr., MD Provider:  Nasir Rajan MD    Anesthesia Type:  general ASA Status:  3          Anesthesia Type: general  Last vitals  BP   155/95 (07/11/19 0955)   Temp   36.4 °C (97.6 °F) (07/11/19 0923)   Pulse   64 (07/11/19 0955)   Resp   14 (07/11/19 0955)     SpO2   94 % (07/11/19 0955)     Post Anesthesia Care and Evaluation    Patient location during evaluation: bedside  Pain management: adequate  Airway patency: patent  Anesthetic complications: No anesthetic complications    Cardiovascular status: acceptable  Respiratory status: acceptable  Hydration status: acceptable

## 2019-07-11 NOTE — ANESTHESIA PREPROCEDURE EVALUATION
Anesthesia Evaluation     Patient summary reviewed and Nursing notes reviewed                Airway   Mallampati: III  Neck ROM: limited  Dental      Pulmonary    (+) shortness of breath, sleep apnea,   Cardiovascular     (+) hypertension, CAD, cardiac stents within the past 12 months angina, hyperlipidemia,       Neuro/Psych  (+) dizziness/light headedness, tremors,     GI/Hepatic/Renal/Endo    (+)  GERD,      Musculoskeletal     (+) back pain,   Abdominal    Substance History - negative use     OB/GYN negative ob/gyn ROS         Other      history of cancer                    Anesthesia Plan    ASA 3     general     intravenous induction   Anesthetic plan, all risks, benefits, and alternatives have been provided, discussed and informed consent has been obtained with: patient.

## 2019-07-12 VITALS
SYSTOLIC BLOOD PRESSURE: 122 MMHG | OXYGEN SATURATION: 97 % | DIASTOLIC BLOOD PRESSURE: 74 MMHG | TEMPERATURE: 97.2 F | HEIGHT: 72 IN | WEIGHT: 188.56 LBS | RESPIRATION RATE: 16 BRPM | HEART RATE: 62 BPM | BODY MASS INDEX: 25.54 KG/M2

## 2019-07-12 PROCEDURE — 25010000002 KETOROLAC TROMETHAMINE PER 15 MG: Performed by: UROLOGY

## 2019-07-12 PROCEDURE — G0378 HOSPITAL OBSERVATION PER HR: HCPCS

## 2019-07-12 RX ORDER — HYDROCODONE BITARTRATE AND ACETAMINOPHEN 5; 325 MG/1; MG/1
1-2 TABLET ORAL EVERY 4 HOURS PRN
Qty: 5 TABLET | Refills: 0 | Status: SHIPPED | OUTPATIENT
Start: 2019-07-12 | End: 2019-07-17

## 2019-07-12 RX ORDER — DOCUSATE SODIUM 250 MG
250 CAPSULE ORAL 2 TIMES DAILY PRN
Qty: 30 CAPSULE | Refills: 0 | Status: ON HOLD | OUTPATIENT
Start: 2019-07-12 | End: 2019-08-31

## 2019-07-12 RX ADMIN — AMLODIPINE BESYLATE 5 MG: 5 TABLET ORAL at 05:49

## 2019-07-12 RX ADMIN — SENNOSIDES,DOCUSATE SODIUM 2 TABLET: 50; 8.6 TABLET, FILM COATED ORAL at 08:35

## 2019-07-12 RX ADMIN — GABAPENTIN 600 MG: 300 CAPSULE ORAL at 08:35

## 2019-07-12 RX ADMIN — TOPIRAMATE 25 MG: 25 TABLET, FILM COATED ORAL at 08:35

## 2019-07-12 RX ADMIN — FLUOXETINE HYDROCHLORIDE 60 MG: 20 CAPSULE ORAL at 05:48

## 2019-07-12 RX ADMIN — KETOROLAC TROMETHAMINE 15 MG: 30 INJECTION, SOLUTION INTRAMUSCULAR at 05:49

## 2019-07-12 RX ADMIN — PANTOPRAZOLE SODIUM 40 MG: 40 TABLET, DELAYED RELEASE ORAL at 05:47

## 2019-07-12 RX ADMIN — AMANTADINE HYDROCHLORIDE 100 MG: 100 CAPSULE ORAL at 08:35

## 2019-07-12 RX ADMIN — ATORVASTATIN CALCIUM 40 MG: 20 TABLET, FILM COATED ORAL at 05:48

## 2019-07-12 RX ADMIN — SODIUM CHLORIDE 100 ML/HR: 9 INJECTION, SOLUTION INTRAVENOUS at 05:49

## 2019-07-12 NOTE — PROGRESS NOTES
Case Management Discharge Note    Final Note: Home---no needs    Destination      No service has been selected for the patient.      Durable Medical Equipment      No service has been selected for the patient.      Dialysis/Infusion      No service has been selected for the patient.      Home Medical Care      No service has been selected for the patient.      Therapy      No service has been selected for the patient.      Community Resources      No service has been selected for the patient.             Final Discharge Disposition Code: 01 - home or self-care

## 2019-07-12 NOTE — DISCHARGE SUMMARY
Date of admission:  7/11/2019   Date of discharge:   7/12/2019  Admitting diagnosis:  BPH, urinary retention  Discharge diagnosis:  Same    Procedures:  TURP    Hospital course:  The patient was taken to the OR on the date of admission for the above-mentioned surgery. Postoperatively, his diet and activity were advanced without difficulty. He remained afebrile and hemodynamically stable throughout his admission. CBI was clamped on POD1 and his urine remained clear. He ambulated, and his pain was well controlled on oral medications. He was cleared for discharge on POD1.    Discharge medications:       Discharge Medications      ASK your doctor about these medications      Instructions Start Date   acetaminophen 325 MG tablet  Commonly known as:  TYLENOL   650 mg, Oral, Every 6 Hours PRN      amantadine 100 MG capsule  Commonly known as:  SYMMETREL   100 mg, Oral, 2 Times Daily      amLODIPine 5 MG tablet  Commonly known as:  NORVASC   5 mg, Oral, Every Morning      aspirin 81 MG tablet   81 mg, Oral, Daily, TO HOLD 1 WEEK BEFORE SURGERY       atorvastatin 40 MG tablet  Commonly known as:  LIPITOR   40 mg, Oral, Every Morning      cephalexin 500 MG capsule  Commonly known as:  KEFLEX   500 mg, Oral, 2 Times Daily      esomeprazole 40 MG capsule  Commonly known as:  nexIUM   40 mg, Oral, Every Morning Before Breakfast      FLUoxetine 40 MG capsule  Commonly known as:  PROzac   60 mg, Oral, Every Morning      gabapentin 300 MG capsule  Commonly known as:  NEURONTIN   600 mg, Oral, 3 Times Daily      ibuprofen 200 MG tablet  Commonly known as:  ADVIL,MOTRIN   600 mg, Oral, Every 6 Hours PRN, TO HOLD 1 WEEK BEFORE SURGERY      nitroglycerin 0.4 MG SL tablet  Commonly known as:  NITROSTAT   0.4 mg, Sublingual, As Needed, Take no more than 3 doses in 15 minutes.      topiramate 25 MG capsule  Commonly known as:  TOPAMAX   25 mg, Oral, Nightly              ROV:  1 week

## 2019-07-13 ENCOUNTER — READMISSION MANAGEMENT (OUTPATIENT)
Dept: CALL CENTER | Facility: HOSPITAL | Age: 79
End: 2019-07-13

## 2019-07-13 NOTE — OUTREACH NOTE
Prep Survey      Responses   Facility patient discharged from?  [facility]   Is patient eligible?  Yes   Discharge diagnosis  BPH,    urinary retention,      TURP   Does the patient have one of the following disease processes/diagnoses(primary or secondary)?  Other   Does the patient have Home health ordered?  No   Is there a DME ordered?  No   Prep survey completed?  Yes          Rachel Cortes RN

## 2019-07-14 ENCOUNTER — READMISSION MANAGEMENT (OUTPATIENT)
Dept: CALL CENTER | Facility: HOSPITAL | Age: 79
End: 2019-07-14

## 2019-07-14 NOTE — OUTREACH NOTE
Medical Week 1 Survey      Responses   Facility patient discharged from?  [facility]   Does the patient have one of the following disease processes/diagnoses(primary or secondary)?  Other   Is there a successful TCM telephone encounter documented?  No   Week 1 attempt successful?  Yes   Call start time  1810   Call end time  1813   Is patient permission given to speak with other caregiver?  Yes   List who call center can speak with  anyone   Person spoke with today (if not patient) and relationship  [place]   Meds reviewed with patient/caregiver?  Yes   Is the patient having any side effects they believe may be caused by any medication additions or changes?  No   Does the patient have all medications ordered at discharge?  Yes   Is the patient taking all medications as directed (includes completed medication regime)?  Yes   Does the patient have a primary care provider?   Yes   Does the patient have an appointment with their PCP within 7 days of discharge?  Yes   Comments regarding PCP  Dr. Brown   Has the patient kept scheduled appointments due by today?  N/A   Comments  Shay will be removed tomorrow with urology appt.    Has home health visited the patient within 72 hours of discharge?  N/A   Psychosocial issues?  No   Comments  has shay to be removed tomorrow 7/15   Did the patient receive a copy of their discharge instructions?  Yes   Nursing interventions  Reviewed instructions with patient   What is the patient's perception of their health status since discharge?  Improving   Is the patient/caregiver able to teach back signs and symptoms related to disease process for when to call PCP?  Yes   Is the patient/caregiver able to teach back signs and symptoms related to disease process for when to call 911?  Yes   Is the patient/caregiver able to teach back the hierarchy of who to call/visit for symptoms/problems? PCP, Specialist, Home health nurse, Urgent Care, ED, 911  Yes   Week 1 call completed?  Yes   Wrap  up additional comments  Will see Urology tomorrow, shay will be removed, pt. states is doing well          Ngoc Jose, RN

## 2019-07-17 ENCOUNTER — OFFICE VISIT (OUTPATIENT)
Dept: NEUROLOGY | Facility: CLINIC | Age: 79
End: 2019-07-17

## 2019-07-17 VITALS
DIASTOLIC BLOOD PRESSURE: 78 MMHG | HEIGHT: 72 IN | WEIGHT: 187 LBS | HEART RATE: 66 BPM | OXYGEN SATURATION: 96 % | SYSTOLIC BLOOD PRESSURE: 126 MMHG | BODY MASS INDEX: 25.33 KG/M2

## 2019-07-17 DIAGNOSIS — R29.898 LEFT LEG WEAKNESS: ICD-10-CM

## 2019-07-17 DIAGNOSIS — R25.9 MIXED ACTION AND RESTING TREMOR: Primary | ICD-10-CM

## 2019-07-17 DIAGNOSIS — F09 COGNITIVE DISORDER: ICD-10-CM

## 2019-07-17 PROCEDURE — 99215 OFFICE O/P EST HI 40 MIN: CPT | Performed by: NURSE PRACTITIONER

## 2019-07-17 RX ORDER — GABAPENTIN 600 MG/1
TABLET ORAL
Qty: 270 TABLET | Refills: 2 | Status: ON HOLD | OUTPATIENT
Start: 2019-07-17 | End: 2019-08-31

## 2019-07-17 RX ORDER — CLOPIDOGREL BISULFATE 75 MG/1
75 TABLET ORAL DAILY
COMMUNITY
Start: 2019-06-28 | End: 2019-09-17 | Stop reason: HOSPADM

## 2019-07-17 NOTE — PROGRESS NOTES
CC: Follow up for mixed tremor, memory loss, and new complaint of falls.    HPI:  Sukhdev Zayas is a  78 y.o.  right-handed male with PMH of HTN, HLD, depression, bladder cancer status post resection and intravesical vesicle chemo, CAD status post stents August 2018, previous back surgery, and tremor who is being seen in follow-up today for his mixed tremor, memory loss, and new onset of falls.  Chart is indicated that the patient has UBALDO however the patient tells me he has previously been tested for UBALDO and does not have it.  The patient is accompanied by his wife.  The patient was initially evaluated by Dr. Chaudhary in July 2018 for tremor and gait disturbance.  The patient was most recently seen by me in April 2019.  The patient's tremor has been present for several years and present both with action and rest.  It is worse in his left upper extremity may also occur in his right arm or left face.  He had lethargy related to beta-blocker.  He has been off and on gabapentin which does not seem terribly helpful but also the tremor gets worse when he is off of it.  The patient is also been tried on primidone, Topamax, and most recently amantadine.  None of these medications were helpful for his tremor and had side effects.  He had been on gabapentin 600 mg 3 times a day up until 1 to 2 weeks ago when he ran out and did not request refill.  Again he feels his tremor has been worse off of it.    When I last saw the patient in April he and his wife are complaining that his gait has changed.  He was not picking his feet up as well and he had a fall due to difficulty stepping over a landscaping fence.  His wife also felt that the patient had been getting a little confused and that the patient has been having difficulty with short-term memory.  He has remotely had a baseline neuropsych test done at [facility] in spring 2016.  After last visit I referred him for MRI of the brain with and without contrast which was completed  April 20, 2019.  It showed mild small vessel disease but no acute findings.  In April 2019 check labs and RPR was nonreactive, TSH was slightly elevated at 5.3, and vitamin B12 level was 519.  I recommended he follow-up with primary care for the slightly elevated TSH.  I referred him for neuropsych testing but this is not scheduled until August.    Since I last saw the patient he has been hospitalized twice, initially for cystitis and again last week for TURP.  He has had 3 falls recently.  The falls occurred while he was getting into the car.  His right foot was already in the car and all of his weight was on his left leg and his left get leg gave out and he fell.  The other 2 episodes occurred when he was walking; both legs gave out with one episode in his left leg gave out with another episode.  The injuries.  He denies back pain or dizziness or lightheadedness with these spells.  He has to wait a minute for the strength to come back in his left or both legs before he can get up again.  He does have some left hip pain but never with the spells.    Patient notes a chronic history of poor sense of smell but no issues with constipation.    Past Medical History:   Diagnosis Date   • Anxiety    • Back pain    • Bladder cancer (CMS/HCC)    • Coronary artery disease    • Depression    • Dizziness    • Fatigue    • GERD (gastroesophageal reflux disease)    • History of fractured rib     RIGHT SIDE   • Hyperlipidemia    • Hypertension    • Risk factors for obstructive sleep apnea    • Tremors of nervous system    • Urinary incontinence          Past Surgical History:   Procedure Laterality Date   • CARDIAC CATHETERIZATION      7x   • CATARACT EXTRACTION, BILATERAL     • COLONOSCOPY     • CORONARY ANGIOPLASTY WITH STENT PLACEMENT      X5    • CYSTOSCOPY BLADDER BIOPSY N/A 1/8/2019    Procedure: CYSTOSCOPY BLADDER BIOPSY;  Surgeon: Wang Soares Jr., MD;  Location: [facility];  Service: Urology   • CYSTOSCOPY  BLADDER BIOPSY N/A 6/13/2019    Procedure: CYSTOSCOPY BLADDER BIOPSY;  Surgeon: Wang Soares Jr., MD;  Location:  [facility];  Service: Urology   • CYSTOSCOPY TRANSURETHRAL RESECTION OF PROSTATE N/A 7/11/2019    Procedure: CYSTOSCOPY TRANSURETHRAL RESECTION OF PROSTATE;  Surgeon: Wang Soares Jr., MD;  Location:  [facility];  Service: Urology   • CYSTOSCOPY URETEROSCOPY LASER LITHOTRIPSY N/A 6/13/2019    Procedure: CYSTO LITHOPAXY;  Surgeon: Wang Soares Jr., MD;  Location:  [facility];  Service: Urology   • EYE SURGERY     • SKIN CANCER EXCISION Left     chest wall   • TRANSURETHRAL RESECTION OF BLADDER TUMOR N/A 11/29/2018    Procedure: TUR BLADDER TUMOR  LARGE;  Surgeon: Wang Soares Jr., MD;  [facility];  Service: Urology           Current Outpatient Medications:   •  acetaminophen (TYLENOL) 325 MG tablet, Take 650 mg by mouth Every 6 (Six) Hours As Needed for Mild Pain ., Disp: , Rfl:   •  amantadine (SYMMETREL) 100 MG capsule, Take 1 capsule by mouth 2 (Two) Times a Day., Disp: 60 capsule, Rfl: 2  •  amLODIPine (NORVASC) 5 MG tablet, Take 5 mg by mouth Every Morning., Disp: , Rfl:   •  atorvastatin (LIPITOR) 40 MG tablet, Take 40 mg by mouth Every Morning., Disp: , Rfl:   •  cephalexin (KEFLEX) 500 MG capsule, Take 500 mg by mouth 2 (Two) Times a Day., Disp: , Rfl:   •  clopidogrel (PLAVIX) 75 MG tablet, Take 75 mg by mouth Daily. Not taking for the next week, Disp: , Rfl:   •  docusate sodium (COLACE) 250 MG capsule, Take 1 capsule by mouth 2 (Two) Times a Day As Needed for Constipation., Disp: 30 capsule, Rfl: 0  •  esomeprazole (nexIUM) 40 MG capsule, Take 40 mg by mouth Every Morning Before Breakfast., Disp: , Rfl:   •  FLUoxetine (PROzac) 40 MG capsule, Take 60 mg by mouth Every Morning., Disp: , Rfl:   •  ibuprofen (ADVIL,MOTRIN) 200 MG tablet, Take 600 mg by mouth Every 6 (Six) Hours As Needed for Mild Pain . TO HOLD 1 WEEK BEFORE SURGERY, Disp: ,  Rfl:   •  topiramate (TOPAMAX) 25 MG capsule, Take 25 mg by mouth Every Night., Disp: , Rfl:   •  nitroglycerin (NITROSTAT) 0.4 MG SL tablet, Place 0.4 mg under the tongue as needed for chest pain. Take no more than 3 doses in 15 minutes., Disp: , Rfl:       Family History   Problem Relation Age of Onset   • Arthritis Mother    • Glaucoma Mother    • Heart disease Father    • Emphysema Father    • Tremor Father    • Malig Hyperthermia Neg Hx          Social History     Socioeconomic History   • Marital status:      Spouse name: Not on file   • Number of children: 4   • Years of education: 5 college degrees   • Highest education level: Not on file   Occupational History   • Occupation: Retired   Tobacco Use   • Smoking status: Never Smoker   • Smokeless tobacco: Never Used   Substance and Sexual Activity   • Alcohol use: No     Frequency: Never   • Drug use: No   • Sexual activity: Defer     Partners: Male         No Known Allergies            ROS:  Review of Systems   Constitutional: Positive for fatigue. Negative for chills and fever.   HENT: Negative for hearing loss, tinnitus and trouble swallowing.    Eyes: Negative for pain, redness and itching.   Respiratory: Positive for chest tightness and shortness of breath. Negative for wheezing.    Cardiovascular: Positive for chest pain and palpitations. Negative for leg swelling.   Gastrointestinal: Negative for constipation, diarrhea, nausea and vomiting.   Endocrine: Negative for cold intolerance, heat intolerance and polydipsia.   Genitourinary: Negative for decreased urine volume, difficulty urinating and urgency.   Musculoskeletal: Positive for back pain, gait problem (fallen 3 times in last couple of months), neck pain and neck stiffness.   Skin: Negative for color change, rash and wound.   Allergic/Immunologic: Negative for environmental allergies, food allergies and immunocompromised state.   Neurological: Positive for dizziness, tremors, weakness (legs)  "and headaches. Negative for seizures, syncope, facial asymmetry, speech difficulty, light-headedness and numbness.   Hematological: Negative for adenopathy. Does not bruise/bleed easily.   Psychiatric/Behavioral: Negative for agitation, behavioral problems, confusion, decreased concentration, dysphoric mood, hallucinations, self-injury, sleep disturbance and suicidal ideas. The patient is not nervous/anxious and is not hyperactive.        Physical Exam:  Vitals:    07/17/19 1040   BP: 126/78   Pulse: 66   SpO2: 96%   Weight: 84.8 kg (187 lb)   Height: 182.9 cm (72\")     Orthostatic BP: SBP went from 118 sitting to 120 standing.    Body mass index is 25.36 kg/m².    General appearance: Well developed, well nourished, well groomed, alert and cooperative.   HEENT: Normocephalic.   Neck and spine: Normal range of motion. Normal alignment. No mass or tenderness.    Cardiac: Regular rate and rhythm. No murmurs.   Peripheral Vasculature: Radial pulses are equal and symmetric.  Chest Exam: Clear to auscultation bilaterally, no wheezes, no rhonchi.  Extremities: Normal, no edema.   Skin: No rashes or birthmarks.      Neurological Exam:   Higher integrative function: Oriented to time, place, person, remote memory, attention span, concentration and language. Spontaneous speech, fund of vocabulary are normal.   Oriented to current president.  Serial sevens 2 out of 5.  Able to spell world backwards correctly.  CN II: Normal visual fields.   CN III IV VI: Extraocular movements are full without nystagmus. Pupils are equal, round, and reactive to light.  CN V: Normal facial sensation.  CN VII: Facial movements are symmetric, no weakness.   CN VIII: Auditory acuity is normal.   CN IX & X: Symmetric palatal movement.   CN XI: Sternocleidomastoid and trapezius are normal. No weakness.   CN XII: The tongue is midline.    Motor: Normal muscle strength except left hip flexion, knee extension and dorsiflexion were mildly weak at 4+/5. " LUE > RUE resting and action tremor. Intermittent L facial tremor.   Sensation: Decreased light touch and pinprick in left leg, more so in the lateral left thigh.  Station and gait: No festination/shufflin. Decreased left arm swing.   Muscle stretch reflexes: Reflexes are 2+ bilateral upper extremities and right patella.  Hyporeflexive in the left patella and bilateral ankles.  Coordination: Finger to nose test showed no dysmetria. Heel to shin normal.      Patient reexamined, changes noted.      Results:      Lab Results   Component Value Date    GLUCOSE 91 07/11/2019    BUN 14 07/11/2019    CREATININE 0.92 07/11/2019    EGFRIFNONA 80 07/11/2019    BCR 15.2 07/11/2019    CO2 25.0 07/11/2019    CALCIUM 8.7 07/11/2019    ALBUMIN 3.40 (L) 05/24/2019    AST 81 (H) 05/24/2019    ALT 63 (H) 05/24/2019       Lab Results   Component Value Date    WBC 7.77 07/11/2019    HGB 12.6 (L) 07/11/2019    HCT 38.4 07/11/2019    .3 (H) 07/11/2019     07/11/2019         .  Lab Results   Component Value Date    RPR Non-Reactive 04/11/2019         Lab Results   Component Value Date    TSH 5.310 (H) 04/11/2019         Lab Results   Component Value Date    [id number] 519 04/11/2019         No results found for: FOLATE      No results found for: HGBA1C    MRI EXAMINATION BRAIN WITH AND WITHOUT CONTRAST MRI brain images viewed by me, no acute findings.     HISTORY: Encephalopathy.     COMPARISON: Comparison is made to multiple prior CT examinations of the  head with the most recent examination being that of 02/01/2018.     TECHNIQUE: A MRI examination of the brain was performed before and after  the intravenous administration of contrast utilizing sagittal T1, axial  diffusion, T1, T2, T2 FLAIR, gradient echo T2, as well as sagittal,  axial and coronal T1 postcontrast weighted sequences.     FINDINGS: There is no evidence of restricted diffusion to suggest acute  infarction. There is age-appropriate atrophy. There is a mild  degree of  increased signal intensity involving the periventricular and deep white  matter of the cerebral hemispheres bilaterally on T2 FLAIR sequence,  nonspecific, but suggesting mild small vessel ischemic disease. There is  no evidence of mass, mass effect or of abnormal enhancement after  contrast administration.     IMPRESSION:  Mild small vessel ischemic disease. There is no evidence of  acute infarction, mass or of abnormal enhancement.     This report was finalized on 4/23/2019 8:30 AM by Dr. Trey Card M.D.             Assessment:   Mr. soliman was seen today for mixed tremor, memory loss, and falls, Parkinson's plus?? On exam he has decreased light touch, pinprick, and strength in his left leg.          Plan:  Neuropsychometric testing planned for August.  Patient discussed with Dr Dave today, check MRI L-spine (with and without contrast given bladder cancer)  Gabapentin 600 mg 3 times daily refilled.  At his time I do not have any further recommendations for medications to try for his tremor and recommend following up with Dr. Chaudhary.                          Dictated utilizing Dragon dictation.

## 2019-07-17 NOTE — PATIENT INSTRUCTIONS
Restart gabapentin slowing- increasing to 600 mg three times a day  I will discuss further w/u for falls and left leg weakness and let you know about further tests  Follow up with Dr Chaudhary in 3 months after Neuro psychtesting

## 2019-07-18 ENCOUNTER — TELEPHONE (OUTPATIENT)
Dept: NEUROLOGY | Facility: CLINIC | Age: 79
End: 2019-07-18

## 2019-07-18 NOTE — TELEPHONE ENCOUNTER
----- Message from ANIL Samaniego sent at 7/17/2019  1:18 PM EDT -----  Please call patient/wife and let them know that I talked to Dr Dave and our recommendation is to check a lumbar MRI and to f/u with Dr Chaudhary.

## 2019-07-18 NOTE — TELEPHONE ENCOUNTER
Called and informed wife that Christie and Dr. Dave recommend MRI Lumbar and f/u with Dr. Chaudhary.  Informed patient insurance has authorized MRI and hosp will be calling to schedule. Instructed wife to call us if she hasn't heard from hospital scheduling by the end of the week.

## 2019-07-29 ENCOUNTER — OFFICE VISIT (OUTPATIENT)
Dept: INTERNAL MEDICINE | Facility: CLINIC | Age: 79
End: 2019-07-29

## 2019-07-29 VITALS
BODY MASS INDEX: 25.73 KG/M2 | HEIGHT: 72 IN | SYSTOLIC BLOOD PRESSURE: 106 MMHG | TEMPERATURE: 98.3 F | HEART RATE: 75 BPM | WEIGHT: 190 LBS | RESPIRATION RATE: 15 BRPM | DIASTOLIC BLOOD PRESSURE: 67 MMHG

## 2019-07-29 DIAGNOSIS — C67.9 MALIGNANT NEOPLASM OF URINARY BLADDER, UNSPECIFIED SITE (HCC): Primary | ICD-10-CM

## 2019-07-29 DIAGNOSIS — I25.10 CORONARY ARTERY DISEASE INVOLVING NATIVE CORONARY ARTERY OF NATIVE HEART WITHOUT ANGINA PECTORIS: ICD-10-CM

## 2019-07-29 DIAGNOSIS — R25.9 MIXED ACTION AND RESTING TREMOR: ICD-10-CM

## 2019-07-29 DIAGNOSIS — F09 COGNITIVE DISORDER: ICD-10-CM

## 2019-07-29 DIAGNOSIS — E03.9 ACQUIRED HYPOTHYROIDISM: ICD-10-CM

## 2019-07-29 DIAGNOSIS — E78.2 MIXED HYPERLIPIDEMIA: ICD-10-CM

## 2019-07-29 DIAGNOSIS — R79.89 ABNORMAL TSH: ICD-10-CM

## 2019-07-29 PROCEDURE — 99214 OFFICE O/P EST MOD 30 MIN: CPT | Performed by: PHYSICIAN ASSISTANT

## 2019-07-29 RX ORDER — FLUOXETINE HYDROCHLORIDE 20 MG/1
20 CAPSULE ORAL DAILY
Status: ON HOLD | COMMUNITY
End: 2019-10-04

## 2019-07-29 RX ORDER — FLUOXETINE HYDROCHLORIDE 40 MG/1
20 CAPSULE ORAL DAILY
COMMUNITY
End: 2019-07-29 | Stop reason: SDUPTHER

## 2019-07-29 NOTE — PROGRESS NOTES
Subjective   Chief Complaint   Patient presents with   • Hypertension   • Hypothyroidism   • Hyperlipidemia       Of coronary artery disease, hyperlipidemia, chest pain, bladder cancer, benign essential tremors and depression who presents today to establish care as a new patient.  He is a former patient of Dr. alexander.  He is currently being treated by Dr. Soares at [facility] urology for his bladder cancer and had a TURP to weeks ago.  He has had continual gross hematuria since the procedure which she has been reassured is normal.  He does however state that his blood pressure has been lower than normal for him over the last week or so when he was at cardiology.  He has felt fatigued for the last 5 to 6 years and has had extensive work-up including sleep study which was negative for obstructive sleep apnea.  He states the last week or 2 since surgery he has been more fatigued.  He has also had some orthostasis symptoms with sitting to standing.  He has not had a repeat CBC since preop.    He has followed by Dr. Chaudhary neurology for tremors which she has failed multiple modalities of treatment.  The only thing that seems to keep his tremors from being worse his gabapentin.  This was recently restarted by Dr. Jet MA.  Patient also notes some cognitive decline and he is having thorough neuropsych evaluation in August with [facility].    Dr. Proctor  is his cardiologist and he manages his lipids.  Dr. Soares is continue to hold his Plavix for right now due to hematuria postop and continues on baby aspirin.  He did see cardiology nurse practitioner last Friday.  He was started on Ranexa for intermittent chest pain as nitro is caused headaches for him in the past.  He is to call cardiology in 2 weeks for follow-up regarding this medication.    He had a TSH with neurology in April that was abnormal has not had it repeated.          Patient Active Problem List   Diagnosis   • Hyperlipidemia   • Coronary artery  disease involving native coronary artery of native heart without angina pectoris   • Cognitive disorder   • Mood disorder (CMS/HCC)   • Closed wedge compression fracture of third thoracic vertebra with routine healing   • GERD (gastroesophageal reflux disease)   • S/P drug eluting coronary stent placement   • Chest pain   • Diastolic dysfunction   • Exertional angina (CMS/HCC)   • Unstable angina (CMS/HCC)   • Bladder mass   • Mixed action and resting tremor   • Bladder cancer (CMS/HCC)   • Acute cystitis without hematuria   • Generalized weakness   • Dyspnea on exertion   • BPH (benign prostatic hyperplasia)       No Known Allergies    Current Outpatient Medications on File Prior to Visit   Medication Sig Dispense Refill   • acetaminophen (TYLENOL) 325 MG tablet Take 650 mg by mouth Every 6 (Six) Hours As Needed for Mild Pain .     • amLODIPine (NORVASC) 5 MG tablet Take 5 mg by mouth Every Morning.     • atorvastatin (LIPITOR) 40 MG tablet Take 40 mg by mouth Every Morning.     • esomeprazole (nexIUM) 40 MG capsule Take 40 mg by mouth Every Morning Before Breakfast.     • FLUoxetine (PROzac) 20 MG capsule Take 20 mg by mouth Daily.     • FLUoxetine (PROzac) 40 MG capsule Take 40 mg by mouth Every Morning. Patient takes total of 60 mg a day     • nitroglycerin (NITROSTAT) 0.4 MG SL tablet Place 0.4 mg under the tongue as needed for chest pain. Take no more than 3 doses in 15 minutes.     • clopidogrel (PLAVIX) 75 MG tablet Take 75 mg by mouth Daily. Not taking for the next week     • docusate sodium (COLACE) 250 MG capsule Take 1 capsule by mouth 2 (Two) Times a Day As Needed for Constipation. 30 capsule 0   • gabapentin (NEURONTIN) 600 MG tablet Gradually increase to three times daily. 270 tablet 2   • ibuprofen (ADVIL,MOTRIN) 200 MG tablet Take 600 mg by mouth Every 6 (Six) Hours As Needed for Mild Pain . TO HOLD 1 WEEK BEFORE SURGERY       No current facility-administered medications on file prior to visit.         Past Medical History:   Diagnosis Date   • Anxiety    • Back pain    • Bladder cancer (CMS/HCC)    • Coronary artery disease    • Depression    • Dizziness    • Fatigue    • GERD (gastroesophageal reflux disease)    • History of fractured rib     RIGHT SIDE   • Hyperlipidemia    • Hypertension    • Tremors of nervous system    • Urinary incontinence        Family History   Problem Relation Age of Onset   • Arthritis Mother    • Glaucoma Mother    • Heart disease Father    • Emphysema Father    • Tremor Father    • Malig Hyperthermia Neg Hx        Social History     Socioeconomic History   • Marital status:      Spouse name: Not on file   • Number of children: 4   • Years of education: 5 college degrees   • Highest education level: Not on file   Occupational History   • Occupation: Retired   Tobacco Use   • Smoking status: Never Smoker   • Smokeless tobacco: Never Used   Substance and Sexual Activity   • Alcohol use: No     Frequency: Never   • Drug use: No   • Sexual activity: Defer     Partners: Male       Past Surgical History:   Procedure Laterality Date   • CARDIAC CATHETERIZATION      7x   • CATARACT EXTRACTION, BILATERAL     • COLONOSCOPY     • CORONARY ANGIOPLASTY WITH STENT PLACEMENT      X5    • CYSTOSCOPY BLADDER BIOPSY N/A 1/8/2019    Procedure: CYSTOSCOPY BLADDER BIOPSY;  Surgeon: Wang Soares Jr., MD;  Location: [facility] OR;  Service: Urology   • CYSTOSCOPY BLADDER BIOPSY N/A 6/13/2019    Procedure: CYSTOSCOPY BLADDER BIOPSY;  Surgeon: Wang Soares Jr., MD;  Location: [facility] OR;  Service: Urology   • CYSTOSCOPY TRANSURETHRAL RESECTION OF PROSTATE N/A 7/11/2019    Procedure: CYSTOSCOPY TRANSURETHRAL RESECTION OF PROSTATE;  Surgeon: Wang Soares Jr., MD;  Location: [facility] OR;  Service: Urology   • CYSTOSCOPY URETEROSCOPY LASER LITHOTRIPSY N/A 6/13/2019    Procedure: CYSTO LITHOPAXY;  Surgeon: Wang Soares Jr., MD;  Location: [facility] OR;   "Service: Urology   • EYE SURGERY     • SKIN CANCER EXCISION Left     chest wall   • TRANSURETHRAL RESECTION OF BLADDER TUMOR N/A 11/29/2018    Procedure: TUR BLADDER TUMOR  LARGE;  Surgeon: Wang Soares Jr., MD;  Location: [facility];  Service: Urology       PHQ-9 Depression Screening  Little interest or pleasure in doing things?     Feeling down, depressed, or hopeless?     Trouble falling or staying asleep, or sleeping too much?     Feeling tired or having little energy?     Poor appetite or overeating?     Feeling bad about yourself - or that you are a failure or have let yourself or your family down?     Trouble concentrating on things, such as reading the newspaper or watching television?     Moving or speaking so slowly that other people could have noticed? Or the opposite - being so fidgety or restless that you have been moving around a lot more than usual?     Thoughts that you would be better off dead, or of hurting yourself in some way?     PHQ-9 Total Score     If you checked off any problems, how difficult have these problems made it for you to do your work, take care of things at home, or get along with other people?       The following portions of the patient's history were reviewed and updated as appropriate: problem list, allergies, current medications, past medical history, past family history, past social history and past surgical history.    Review of Systems   Constitution: Positive for malaise/fatigue.   Cardiovascular: Negative for chest pain.   Genitourinary: Positive for hematuria.       Immunization History   Administered Date(s) Administered   • Flu Vaccine High Dose PF 65YR+ 10/12/2016   • flucelvax quad pfs =>4 YRS 11/30/2018       Objective   Vitals:    07/29/19 1451 07/29/19 1524   BP:  106/67   Pulse: 75    Resp: 15    Temp: 98.3 °F (36.8 °C)    TempSrc: Oral    Weight: 86.2 kg (190 lb)    Height: 182 cm (71.65\")      Physical Exam   Constitutional: He appears " well-developed and well-nourished.   HENT:   Head: Normocephalic and atraumatic.   Eyes:   Conjunctiva are pale   Neck: Carotid bruit is not present.   Cardiovascular: Normal rate, regular rhythm and normal heart sounds.   Pulmonary/Chest: Effort normal and breath sounds normal.   Vitals reviewed.        Assessment/Plan   Sukhdev was seen today for hypertension, hypothyroidism and hyperlipidemia.    Diagnoses and all orders for this visit:    Malignant neoplasm of urinary bladder, unspecified site (CMS/HCC)  -     CBC & Differential    Abnormal TSH  -     TSH    Acquired hypothyroidism   -     TSH    Coronary artery disease involving native coronary artery of native heart without angina pectoris    Cognitive disorder    Mixed hyperlipidemia    Mixed action and resting tremor    I am going to repeat his hgb today has not had rechecked since preop. His BP is lower than nml for him today, concerned for volume depletion. I will also repeat his TSH today as well. Will treat if needed based on his numbers. He is going to need pneumonia vaccines at his fup he does not want right now due to everything he has been going through as of late. He will also need a flu shot in the fall. I will plan to see him in fup in at least 6 months, sooner if needed based upon his lab results.     Return in about 6 months (around 1/29/2020).

## 2019-07-30 ENCOUNTER — OFFICE VISIT (OUTPATIENT)
Dept: INTERNAL MEDICINE | Facility: CLINIC | Age: 79
End: 2019-07-30

## 2019-07-30 VITALS
SYSTOLIC BLOOD PRESSURE: 106 MMHG | DIASTOLIC BLOOD PRESSURE: 60 MMHG | WEIGHT: 189.9 LBS | HEART RATE: 62 BPM | BODY MASS INDEX: 25.72 KG/M2 | RESPIRATION RATE: 15 BRPM | HEIGHT: 72 IN | TEMPERATURE: 97.6 F

## 2019-07-30 DIAGNOSIS — R55 SYNCOPE, UNSPECIFIED SYNCOPE TYPE: Primary | ICD-10-CM

## 2019-07-30 LAB
BASOPHILS # BLD AUTO: 0.04 10*3/MM3 (ref 0–0.2)
BASOPHILS NFR BLD AUTO: 0.5 % (ref 0–1.5)
EOSINOPHIL # BLD AUTO: 0.1 10*3/MM3 (ref 0–0.4)
EOSINOPHIL NFR BLD AUTO: 1.2 % (ref 0.3–6.2)
ERYTHROCYTE [DISTWIDTH] IN BLOOD BY AUTOMATED COUNT: 13.1 % (ref 12.3–15.4)
HCT VFR BLD AUTO: 42.6 % (ref 37.5–51)
HGB BLD-MCNC: 13.5 G/DL (ref 13–17.7)
IMM GRANULOCYTES # BLD AUTO: 0.04 10*3/MM3 (ref 0–0.05)
IMM GRANULOCYTES NFR BLD AUTO: 0.5 % (ref 0–0.5)
LYMPHOCYTES # BLD AUTO: 1 10*3/MM3 (ref 0.7–3.1)
LYMPHOCYTES NFR BLD AUTO: 11.8 % (ref 19.6–45.3)
MCH RBC QN AUTO: 31.7 PG (ref 26.6–33)
MCHC RBC AUTO-ENTMCNC: 31.7 G/DL (ref 31.5–35.7)
MCV RBC AUTO: 100 FL (ref 79–97)
MONOCYTES # BLD AUTO: 0.6 10*3/MM3 (ref 0.1–0.9)
MONOCYTES NFR BLD AUTO: 7.1 % (ref 5–12)
NEUTROPHILS # BLD AUTO: 6.71 10*3/MM3 (ref 1.7–7)
NEUTROPHILS NFR BLD AUTO: 78.9 % (ref 42.7–76)
NRBC BLD AUTO-RTO: 0 /100 WBC (ref 0–0.2)
PLATELET # BLD AUTO: 357 10*3/MM3 (ref 140–450)
RBC # BLD AUTO: 4.26 10*6/MM3 (ref 4.14–5.8)
TSH SERPL DL<=0.005 MIU/L-ACNC: 4.3 MIU/ML (ref 0.27–4.2)
WBC # BLD AUTO: 8.49 10*3/MM3 (ref 3.4–10.8)

## 2019-07-30 PROCEDURE — 99214 OFFICE O/P EST MOD 30 MIN: CPT | Performed by: PHYSICIAN ASSISTANT

## 2019-07-30 NOTE — PROGRESS NOTES
Subjective   Chief Complaint   Patient presents with   • Follow-up     fainted       Patient is here today with his wife after syncopal episode this morning.  He was seen for the first time by myself yesterday has had continuous gross hematuria and had a CBC on him and his hemoglobin was normal.  His blood pressure has been a little low normal running around 100/60.  This morning he states that he woke up and he felt fine he was sitting on the couch had does counter back off and woke up again and got up to go to the kitchen to fix coffee for him and his wife.  He states he was in there and holding on to the countertop and felt a little fuzzy mentally which has been ongoing for several years now.  He just does not seem to have as much mental clarity and sharpness he states.  The next thing his wife know she hears a loud commotion she went into the kitchen and he had passed out he try to get back up and fell again.  He thinks he lost consciousness for just a few seconds.  He had some confusion right afterwards for a few minutes no observed twitching or jerking.  No loss of bowel or bladder function.  He denies feeling dizzy or lightheaded or any chest pain prior to syncopal episode.  He did not take his Ranexa this morning nor did he take gabapentin.  Again his hemoglobin from yesterday was normal.  He saw cardiology last week had a normal EKG and recently saw neurology as well.  He had an MRI of the brain in April that showed small vessel ischemic changes but otherwise normal.  He had a carotid ultrasound it looks like in 2013 that showed mild enosis bilaterally.  He had an echo in 2018 as well as I have reviewed.  He has not had follow-up imaging on his carotids.  If he feels okay now still has some mental fogginess he feels like.          Patient Active Problem List   Diagnosis   • Hyperlipidemia   • Coronary artery disease involving native coronary artery of native heart without angina pectoris   • Cognitive  disorder   • Mood disorder (CMS/HCC)   • Closed wedge compression fracture of third thoracic vertebra with routine healing   • GERD (gastroesophageal reflux disease)   • S/P drug eluting coronary stent placement   • Chest pain   • Diastolic dysfunction   • Exertional angina (CMS/HCC)   • Unstable angina (CMS/HCC)   • Bladder mass   • Mixed action and resting tremor   • Bladder cancer (CMS/HCC)   • Acute cystitis without hematuria   • Generalized weakness   • Dyspnea on exertion   • BPH (benign prostatic hyperplasia)       No Known Allergies    Current Outpatient Medications on File Prior to Visit   Medication Sig Dispense Refill   • acetaminophen (TYLENOL) 325 MG tablet Take 650 mg by mouth Every 6 (Six) Hours As Needed for Mild Pain .     • amLODIPine (NORVASC) 5 MG tablet Take 5 mg by mouth Every Morning.     • atorvastatin (LIPITOR) 40 MG tablet Take 40 mg by mouth Every Morning.     • docusate sodium (COLACE) 250 MG capsule Take 1 capsule by mouth 2 (Two) Times a Day As Needed for Constipation. 30 capsule 0   • esomeprazole (nexIUM) 40 MG capsule Take 40 mg by mouth Every Morning Before Breakfast.     • FLUoxetine (PROzac) 20 MG capsule Take 20 mg by mouth Daily.     • FLUoxetine (PROzac) 40 MG capsule Take 40 mg by mouth Every Morning. Patient takes total of 60 mg a day     • gabapentin (NEURONTIN) 600 MG tablet Gradually increase to three times daily. 270 tablet 2   • nitroglycerin (NITROSTAT) 0.4 MG SL tablet Place 0.4 mg under the tongue as needed for chest pain. Take no more than 3 doses in 15 minutes.     • clopidogrel (PLAVIX) 75 MG tablet Take 75 mg by mouth Daily. Not taking for the next week     • ibuprofen (ADVIL,MOTRIN) 200 MG tablet Take 600 mg by mouth Every 6 (Six) Hours As Needed for Mild Pain . TO HOLD 1 WEEK BEFORE SURGERY       No current facility-administered medications on file prior to visit.        Past Medical History:   Diagnosis Date   • Anxiety    • Back pain    • Bladder cancer  (CMS/[facility])    • Coronary artery disease    • Depression    • Dizziness    • Fatigue    • GERD (gastroesophageal reflux disease)    • History of fractured rib     RIGHT SIDE   • Hyperlipidemia    • Hypertension    • Tremors of nervous system    • Urinary incontinence        Family History   Problem Relation Age of Onset   • Arthritis Mother    • Glaucoma Mother    • Heart disease Father    • Emphysema Father    • Tremor Father    • Malig Hyperthermia Neg Hx        Social History     Socioeconomic History   • Marital status:      Spouse name: Not on file   • Number of children: 4   • Years of education: 5 college degrees   • Highest education level: Not on file   Occupational History   • Occupation: Retired   Tobacco Use   • Smoking status: Never Smoker   • Smokeless tobacco: Never Used   Substance and Sexual Activity   • Alcohol use: No     Frequency: Never   • Drug use: No   • Sexual activity: Defer     Partners: Male       Past Surgical History:   Procedure Laterality Date   • CARDIAC CATHETERIZATION      7x   • CATARACT EXTRACTION, BILATERAL     • COLONOSCOPY     • CORONARY ANGIOPLASTY WITH STENT PLACEMENT      X5    • CYSTOSCOPY BLADDER BIOPSY N/A 1/8/2019    Procedure: CYSTOSCOPY BLADDER BIOPSY;  Surgeon: Wang Soares Jr., MD;  Location: [facility] OR;  Service: Urology   • CYSTOSCOPY BLADDER BIOPSY N/A 6/13/2019    Procedure: CYSTOSCOPY BLADDER BIOPSY;  Surgeon: Wang Soares Jr., MD;  Location: [facility] OR;  Service: Urology   • CYSTOSCOPY TRANSURETHRAL RESECTION OF PROSTATE N/A 7/11/2019    Procedure: CYSTOSCOPY TRANSURETHRAL RESECTION OF PROSTATE;  Surgeon: Wang Soares Jr., MD;  Location: [facility] OR;  Service: Urology   • CYSTOSCOPY URETEROSCOPY LASER LITHOTRIPSY N/A 6/13/2019    Procedure: CYSTO LITHOPAXY;  Surgeon: Wang Soares Jr., MD;  Location: [facility] OR;  Service: Urology   • EYE SURGERY     • SKIN CANCER EXCISION Left     chest wall   • TRANSURETHRAL  RESECTION OF BLADDER TUMOR N/A 11/29/2018    Procedure: TUR BLADDER TUMOR  LARGE;  Surgeon: Wang Soares Jr., MD;  Location: [facility];  Service: Urology       PHQ-9 Depression Screening  Little interest or pleasure in doing things?     Feeling down, depressed, or hopeless?     Trouble falling or staying asleep, or sleeping too much?     Feeling tired or having little energy?     Poor appetite or overeating?     Feeling bad about yourself - or that you are a failure or have let yourself or your family down?     Trouble concentrating on things, such as reading the newspaper or watching television?     Moving or speaking so slowly that other people could have noticed? Or the opposite - being so fidgety or restless that you have been moving around a lot more than usual?     Thoughts that you would be better off dead, or of hurting yourself in some way?     PHQ-9 Total Score     If you checked off any problems, how difficult have these problems made it for you to do your work, take care of things at home, or get along with other people?       The following portions of the patient's history were reviewed and updated as appropriate: problem list, allergies, current medications, past medical history, past family history, past social history and past surgical history.    Review of Systems   HENT: Negative for congestion.    Eyes: Negative for blurred vision and double vision.   Cardiovascular: Positive for syncope. Negative for chest pain, dyspnea on exertion, irregular heartbeat and palpitations.   Genitourinary: Positive for hematuria. Negative for frequency.   Neurological: Positive for difficulty with concentration, disturbances in coordination and tremors. Negative for dizziness, focal weakness, headaches, light-headedness, seizures and vertigo.       Immunization History   Administered Date(s) Administered   • Flu Vaccine High Dose PF 65YR+ 10/12/2016   • flucelvax quad pfs =>4 YRS 11/30/2018  "      Objective   Vitals:    07/30/19 1335 07/30/19 1637   BP:  106/60   Pulse: 62    Resp: 15    Temp: 97.6 °F (36.4 °C)    TempSrc: Oral    Weight: 86.1 kg (189 lb 14.4 oz)    Height: 182 cm (71.65\")      Physical Exam   Constitutional: He is oriented to person, place, and time. He appears well-developed and well-nourished.   HENT:   Head: Normocephalic and atraumatic.   Right Ear: External ear normal.   Left Ear: External ear normal.   Mouth/Throat: Oropharynx is clear and moist.   Eyes: Conjunctivae and EOM are normal. Pupils are equal, round, and reactive to light.   Neck: Neck supple. Carotid bruit is not present. No thyromegaly present.   Cardiovascular: Normal rate, regular rhythm and normal heart sounds.   No murmur heard.  No peripheral edema.   Pulmonary/Chest: Effort normal and breath sounds normal.   Neurological: He is alert and oriented to person, place, and time. No cranial nerve deficit.   Shuffled gait.  Abnormal tandem walk.  DTRs intact bilaterally.  Patient has poor balance.   Vitals reviewed.        Assessment/Plan   Sukhdev was seen today for follow-up.    Diagnoses and all orders for this visit:    Syncope, unspecified syncope type  -     MRI Angiogram Head With & Without Contrast; Future  -     MRI Angiogram Neck With & Without Contrast; Future      To decrease his amlodipine from 5 mg to 2-1/2 mg daily.  Also had a get an MRA of his head and neck for any VBI, stenosis or abnormalities of arterial flow.  I reviewed his recent neurologic work-up and office visit as well as his cardiologist note and visit.  His hemoglobin was normal yesterday.  Will await imaging for further MDM.  They are to call if something changes or he has another episode.         "

## 2019-08-14 ENCOUNTER — TELEPHONE (OUTPATIENT)
Dept: INTERNAL MEDICINE | Facility: CLINIC | Age: 79
End: 2019-08-14

## 2019-08-14 NOTE — TELEPHONE ENCOUNTER
Spoke to Maddy from [facility], she called asking if the MRA could be changed? Spoke to Rachelle and she wanted it changed to MRA head without

## 2019-08-20 ENCOUNTER — HOSPITAL ENCOUNTER (OUTPATIENT)
Dept: MRI IMAGING | Facility: HOSPITAL | Age: 79
Discharge: HOME | End: 2019-08-20
Admitting: PHYSICIAN ASSISTANT

## 2019-08-20 ENCOUNTER — HOSPITAL ENCOUNTER (OUTPATIENT)
Dept: MRI IMAGING | Facility: HOSPITAL | Age: 79
Discharge: HOME | End: 2019-08-20

## 2019-08-20 ENCOUNTER — APPOINTMENT (OUTPATIENT)
Dept: MRI IMAGING | Facility: HOSPITAL | Age: 79
End: 2019-08-20

## 2019-08-20 DIAGNOSIS — R55 SYNCOPE, UNSPECIFIED SYNCOPE TYPE: ICD-10-CM

## 2019-08-20 DIAGNOSIS — R29.898 LEFT LEG WEAKNESS: ICD-10-CM

## 2019-08-20 PROCEDURE — 0 GADOBENATE DIMEGLUMINE 529 MG/ML SOLUTION: Performed by: PHYSICIAN ASSISTANT

## 2019-08-20 PROCEDURE — 72158 MRI LUMBAR SPINE W/O & W/DYE: CPT

## 2019-08-20 PROCEDURE — A9577 INJ MULTIHANCE: HCPCS | Performed by: PHYSICIAN ASSISTANT

## 2019-08-20 PROCEDURE — 70549 MR ANGIOGRAPH NECK W/O&W/DYE: CPT

## 2019-08-20 PROCEDURE — 70544 MR ANGIOGRAPHY HEAD W/O DYE: CPT

## 2019-08-20 PROCEDURE — 82565 ASSAY OF CREATININE: CPT

## 2019-08-20 RX ADMIN — GADOBENATE DIMEGLUMINE 17 ML: 529 INJECTION, SOLUTION INTRAVENOUS at 15:47

## 2019-08-21 LAB — CREAT BLDA-MCNC: 1.3 MG/DL (ref 0.6–1.3)

## 2019-08-22 ENCOUNTER — APPOINTMENT (OUTPATIENT)
Dept: MRI IMAGING | Facility: HOSPITAL | Age: 79
End: 2019-08-22

## 2019-08-29 ENCOUNTER — DOCUMENTATION (OUTPATIENT)
Dept: NEUROLOGY | Facility: CLINIC | Age: 79
End: 2019-08-29

## 2019-08-29 DIAGNOSIS — R29.898 LEFT LEG WEAKNESS: Primary | ICD-10-CM

## 2019-08-29 NOTE — PROGRESS NOTES
MRI L spine results reviewed with patient's wife. Notified her Dr Chaudhary's recommends EMG/NCS and they are agreeable to having this scheduled.

## 2019-08-30 ENCOUNTER — HOSPITAL ENCOUNTER (OUTPATIENT)
Dept: CT IMAGING | Facility: HOSPITAL | Age: 79
Discharge: HOME | End: 2019-08-30
Admitting: NURSE PRACTITIONER

## 2019-08-30 ENCOUNTER — HOSPITAL ENCOUNTER (INPATIENT)
Facility: HOSPITAL | Age: 79
LOS: 4 days | Discharge: HOME HEALTH | End: 2019-09-05
Attending: EMERGENCY MEDICINE | Admitting: INTERNAL MEDICINE

## 2019-08-30 ENCOUNTER — TELEPHONE (OUTPATIENT)
Dept: INTERNAL MEDICINE | Facility: CLINIC | Age: 79
End: 2019-08-30

## 2019-08-30 ENCOUNTER — LAB (OUTPATIENT)
Dept: LAB | Facility: HOSPITAL | Age: 79
End: 2019-08-30

## 2019-08-30 ENCOUNTER — OFFICE VISIT (OUTPATIENT)
Dept: INTERNAL MEDICINE | Facility: CLINIC | Age: 79
End: 2019-08-30

## 2019-08-30 ENCOUNTER — APPOINTMENT (OUTPATIENT)
Dept: ULTRASOUND IMAGING | Facility: HOSPITAL | Age: 79
End: 2019-08-30

## 2019-08-30 VITALS
HEIGHT: 72 IN | RESPIRATION RATE: 14 BRPM | DIASTOLIC BLOOD PRESSURE: 74 MMHG | WEIGHT: 184 LBS | SYSTOLIC BLOOD PRESSURE: 112 MMHG | HEART RATE: 72 BPM | BODY MASS INDEX: 24.92 KG/M2

## 2019-08-30 DIAGNOSIS — R11.0 NAUSEA: ICD-10-CM

## 2019-08-30 DIAGNOSIS — R10.11 RIGHT UPPER QUADRANT ABDOMINAL PAIN: ICD-10-CM

## 2019-08-30 DIAGNOSIS — K81.9 CHOLECYSTITIS: ICD-10-CM

## 2019-08-30 DIAGNOSIS — K80.20 CHOLELITHIASIS: ICD-10-CM

## 2019-08-30 DIAGNOSIS — R10.11 RIGHT UPPER QUADRANT PAIN: Primary | ICD-10-CM

## 2019-08-30 DIAGNOSIS — K80.20 CALCULUS OF GALLBLADDER WITHOUT CHOLECYSTITIS WITHOUT OBSTRUCTION: ICD-10-CM

## 2019-08-30 DIAGNOSIS — K80.42 CALCULUS OF BILE DUCT WITH ACUTE CHOLECYSTITIS WITHOUT OBSTRUCTION: ICD-10-CM

## 2019-08-30 DIAGNOSIS — R10.10 PAIN OF UPPER ABDOMEN: Primary | ICD-10-CM

## 2019-08-30 DIAGNOSIS — R74.8 ELEVATED LIVER ENZYMES: ICD-10-CM

## 2019-08-30 DIAGNOSIS — R10.10 PAIN OF UPPER ABDOMEN: ICD-10-CM

## 2019-08-30 DIAGNOSIS — R53.1 GENERALIZED WEAKNESS: ICD-10-CM

## 2019-08-30 LAB
ALBUMIN SERPL-MCNC: 4 G/DL (ref 3.5–5.2)
ALBUMIN SERPL-MCNC: 4.2 G/DL (ref 3.5–5.2)
ALBUMIN/GLOB SERPL: 1.3 G/DL
ALBUMIN/GLOB SERPL: 1.5 G/DL
ALP SERPL-CCNC: 501 U/L (ref 39–117)
ALP SERPL-CCNC: 528 U/L (ref 39–117)
ALT SERPL W P-5'-P-CCNC: 451 U/L (ref 1–41)
ALT SERPL W P-5'-P-CCNC: 456 U/L (ref 1–41)
ANION GAP SERPL CALCULATED.3IONS-SCNC: 11 MMOL/L (ref 5–15)
ANION GAP SERPL CALCULATED.3IONS-SCNC: 11.3 MMOL/L (ref 5–15)
AST SERPL-CCNC: 531 U/L (ref 1–40)
AST SERPL-CCNC: 576 U/L (ref 1–40)
BACTERIA UR QL AUTO: ABNORMAL /HPF
BASOPHILS # BLD AUTO: 0.01 10*3/MM3 (ref 0–0.2)
BASOPHILS # BLD AUTO: 0.03 10*3/MM3 (ref 0–0.2)
BASOPHILS NFR BLD AUTO: 0.1 % (ref 0–1.5)
BASOPHILS NFR BLD AUTO: 0.5 % (ref 0–1.5)
BILIRUB SERPL-MCNC: 3.2 MG/DL (ref 0.2–1.2)
BILIRUB SERPL-MCNC: 3.6 MG/DL (ref 0.2–1.2)
BILIRUB UR QL STRIP: NEGATIVE
BUN BLD-MCNC: 14 MG/DL (ref 8–23)
BUN BLD-MCNC: 15 MG/DL (ref 8–23)
BUN/CREAT SERPL: 14 (ref 7–25)
BUN/CREAT SERPL: 15.8 (ref 7–25)
CALCIUM SPEC-SCNC: 9.2 MG/DL (ref 8.6–10.5)
CALCIUM SPEC-SCNC: 9.2 MG/DL (ref 8.6–10.5)
CHLORIDE SERPL-SCNC: 102 MMOL/L (ref 98–107)
CHLORIDE SERPL-SCNC: 99 MMOL/L (ref 98–107)
CLARITY UR: ABNORMAL
CO2 SERPL-SCNC: 23 MMOL/L (ref 22–29)
CO2 SERPL-SCNC: 23.7 MMOL/L (ref 22–29)
COLOR UR: ABNORMAL
CREAT BLD-MCNC: 0.95 MG/DL (ref 0.76–1.27)
CREAT BLD-MCNC: 1 MG/DL (ref 0.76–1.27)
CREAT BLDA-MCNC: 1 MG/DL (ref 0.6–1.3)
DEPRECATED RDW RBC AUTO: 44.3 FL (ref 37–54)
DEPRECATED RDW RBC AUTO: 44.5 FL (ref 37–54)
EOSINOPHIL # BLD AUTO: 0.3 10*3/MM3 (ref 0–0.4)
EOSINOPHIL # BLD AUTO: 0.35 10*3/MM3 (ref 0–0.4)
EOSINOPHIL NFR BLD AUTO: 4.3 % (ref 0.3–6.2)
EOSINOPHIL NFR BLD AUTO: 6 % (ref 0.3–6.2)
ERYTHROCYTE [DISTWIDTH] IN BLOOD BY AUTOMATED COUNT: 12.2 % (ref 12.3–15.4)
ERYTHROCYTE [DISTWIDTH] IN BLOOD BY AUTOMATED COUNT: 12.4 % (ref 12.3–15.4)
GFR SERPL CREATININE-BSD FRML MDRD: 72 ML/MIN/1.73
GFR SERPL CREATININE-BSD FRML MDRD: 77 ML/MIN/1.73
GLOBULIN UR ELPH-MCNC: 2.8 GM/DL
GLOBULIN UR ELPH-MCNC: 3 GM/DL
GLUCOSE BLD-MCNC: 107 MG/DL (ref 65–99)
GLUCOSE BLD-MCNC: 119 MG/DL (ref 65–99)
GLUCOSE UR STRIP-MCNC: NEGATIVE MG/DL
HCT VFR BLD AUTO: 43.4 % (ref 37.5–51)
HCT VFR BLD AUTO: 44.2 % (ref 37.5–51)
HGB BLD-MCNC: 13.8 G/DL (ref 13–17.7)
HGB BLD-MCNC: 14 G/DL (ref 13–17.7)
HGB UR QL STRIP.AUTO: ABNORMAL
HOLD SPECIMEN: NORMAL
HYALINE CASTS UR QL AUTO: ABNORMAL /LPF
IMM GRANULOCYTES # BLD AUTO: 0.02 10*3/MM3 (ref 0–0.05)
IMM GRANULOCYTES # BLD AUTO: 0.04 10*3/MM3 (ref 0–0.05)
IMM GRANULOCYTES NFR BLD AUTO: 0.3 % (ref 0–0.5)
IMM GRANULOCYTES NFR BLD AUTO: 0.7 % (ref 0–0.5)
KETONES UR QL STRIP: NEGATIVE
LEUKOCYTE ESTERASE UR QL STRIP.AUTO: ABNORMAL
LIPASE SERPL-CCNC: 27 U/L (ref 13–60)
LIPASE SERPL-CCNC: 33 U/L (ref 13–60)
LYMPHOCYTES # BLD AUTO: 0.65 10*3/MM3 (ref 0.7–3.1)
LYMPHOCYTES # BLD AUTO: 1.01 10*3/MM3 (ref 0.7–3.1)
LYMPHOCYTES NFR BLD AUTO: 17.4 % (ref 19.6–45.3)
LYMPHOCYTES NFR BLD AUTO: 9.3 % (ref 19.6–45.3)
MCH RBC QN AUTO: 30.8 PG (ref 26.6–33)
MCH RBC QN AUTO: 31.2 PG (ref 26.6–33)
MCHC RBC AUTO-ENTMCNC: 31.7 G/DL (ref 31.5–35.7)
MCHC RBC AUTO-ENTMCNC: 31.8 G/DL (ref 31.5–35.7)
MCV RBC AUTO: 97.1 FL (ref 79–97)
MCV RBC AUTO: 98.2 FL (ref 79–97)
MONOCYTES # BLD AUTO: 0.52 10*3/MM3 (ref 0.1–0.9)
MONOCYTES # BLD AUTO: 0.62 10*3/MM3 (ref 0.1–0.9)
MONOCYTES NFR BLD AUTO: 8.9 % (ref 5–12)
MONOCYTES NFR BLD AUTO: 8.9 % (ref 5–12)
NEUTROPHILS # BLD AUTO: 3.87 10*3/MM3 (ref 1.7–7)
NEUTROPHILS # BLD AUTO: 5.36 10*3/MM3 (ref 1.7–7)
NEUTROPHILS NFR BLD AUTO: 66.5 % (ref 42.7–76)
NEUTROPHILS NFR BLD AUTO: 77.1 % (ref 42.7–76)
NITRITE UR QL STRIP: NEGATIVE
NRBC BLD AUTO-RTO: 0 /100 WBC (ref 0–0.2)
NRBC BLD AUTO-RTO: 0 /100 WBC (ref 0–0.2)
PH UR STRIP.AUTO: 6.5 [PH] (ref 5–8)
PLATELET # BLD AUTO: 263 10*3/MM3 (ref 140–450)
PLATELET # BLD AUTO: 314 10*3/MM3 (ref 140–450)
PMV BLD AUTO: 10.3 FL (ref 6–12)
PMV BLD AUTO: 9.8 FL (ref 6–12)
POTASSIUM BLD-SCNC: 4 MMOL/L (ref 3.5–5.2)
POTASSIUM BLD-SCNC: 4.1 MMOL/L (ref 3.5–5.2)
PROT SERPL-MCNC: 7 G/DL (ref 6–8.5)
PROT SERPL-MCNC: 7 G/DL (ref 6–8.5)
PROT UR QL STRIP: NEGATIVE
RBC # BLD AUTO: 4.42 10*6/MM3 (ref 4.14–5.8)
RBC # BLD AUTO: 4.55 10*6/MM3 (ref 4.14–5.8)
RBC # UR: ABNORMAL /HPF
REF LAB TEST METHOD: ABNORMAL
SODIUM BLD-SCNC: 133 MMOL/L (ref 136–145)
SODIUM BLD-SCNC: 137 MMOL/L (ref 136–145)
SP GR UR STRIP: >=1.03 (ref 1–1.03)
SQUAMOUS #/AREA URNS HPF: ABNORMAL /HPF
UROBILINOGEN UR QL STRIP: ABNORMAL
WBC NRBC COR # BLD: 5.82 10*3/MM3 (ref 3.4–10.8)
WBC NRBC COR # BLD: 6.96 10*3/MM3 (ref 3.4–10.8)
WBC UR QL AUTO: ABNORMAL /HPF
WHOLE BLOOD HOLD SPECIMEN: NORMAL

## 2019-08-30 PROCEDURE — 87186 SC STD MICRODIL/AGAR DIL: CPT | Performed by: NURSE PRACTITIONER

## 2019-08-30 PROCEDURE — 82565 ASSAY OF CREATININE: CPT

## 2019-08-30 PROCEDURE — 87088 URINE BACTERIA CULTURE: CPT | Performed by: NURSE PRACTITIONER

## 2019-08-30 PROCEDURE — 83690 ASSAY OF LIPASE: CPT | Performed by: PHYSICIAN ASSISTANT

## 2019-08-30 PROCEDURE — 81001 URINALYSIS AUTO W/SCOPE: CPT | Performed by: NURSE PRACTITIONER

## 2019-08-30 PROCEDURE — 81001 URINALYSIS AUTO W/SCOPE: CPT | Performed by: PHYSICIAN ASSISTANT

## 2019-08-30 PROCEDURE — 25010000002 ONDANSETRON PER 1 MG: Performed by: EMERGENCY MEDICINE

## 2019-08-30 PROCEDURE — 87086 URINE CULTURE/COLONY COUNT: CPT | Performed by: NURSE PRACTITIONER

## 2019-08-30 PROCEDURE — 85730 THROMBOPLASTIN TIME PARTIAL: CPT | Performed by: PHYSICIAN ASSISTANT

## 2019-08-30 PROCEDURE — 85610 PROTHROMBIN TIME: CPT | Performed by: PHYSICIAN ASSISTANT

## 2019-08-30 PROCEDURE — 83690 ASSAY OF LIPASE: CPT

## 2019-08-30 PROCEDURE — 36415 COLL VENOUS BLD VENIPUNCTURE: CPT

## 2019-08-30 PROCEDURE — 76705 ECHO EXAM OF ABDOMEN: CPT

## 2019-08-30 PROCEDURE — 85025 COMPLETE CBC W/AUTO DIFF WBC: CPT | Performed by: NURSE PRACTITIONER

## 2019-08-30 PROCEDURE — 80053 COMPREHEN METABOLIC PANEL: CPT | Performed by: PHYSICIAN ASSISTANT

## 2019-08-30 PROCEDURE — 74177 CT ABD & PELVIS W/CONTRAST: CPT

## 2019-08-30 PROCEDURE — 99214 OFFICE O/P EST MOD 30 MIN: CPT | Performed by: NURSE PRACTITIONER

## 2019-08-30 PROCEDURE — 85025 COMPLETE CBC W/AUTO DIFF WBC: CPT | Performed by: PHYSICIAN ASSISTANT

## 2019-08-30 PROCEDURE — 80053 COMPREHEN METABOLIC PANEL: CPT

## 2019-08-30 PROCEDURE — 25010000002 IOPAMIDOL 61 % SOLUTION: Performed by: NURSE PRACTITIONER

## 2019-08-30 PROCEDURE — 25010000002 HYDROMORPHONE PER 4 MG: Performed by: EMERGENCY MEDICINE

## 2019-08-30 PROCEDURE — 99284 EMERGENCY DEPT VISIT MOD MDM: CPT

## 2019-08-30 RX ORDER — SODIUM CHLORIDE 0.9 % (FLUSH) 0.9 %
10 SYRINGE (ML) INJECTION AS NEEDED
Status: DISCONTINUED | OUTPATIENT
Start: 2019-08-30 | End: 2019-09-05 | Stop reason: HOSPADM

## 2019-08-30 RX ORDER — HYDROMORPHONE HYDROCHLORIDE 1 MG/ML
0.5 INJECTION, SOLUTION INTRAMUSCULAR; INTRAVENOUS; SUBCUTANEOUS ONCE
Status: COMPLETED | OUTPATIENT
Start: 2019-08-30 | End: 2019-08-30

## 2019-08-30 RX ORDER — ONDANSETRON 4 MG/1
4 TABLET, FILM COATED ORAL EVERY 8 HOURS PRN
Qty: 30 TABLET | Refills: 0 | Status: SHIPPED | OUTPATIENT
Start: 2019-08-30 | End: 2019-09-26

## 2019-08-30 RX ORDER — ONDANSETRON 2 MG/ML
4 INJECTION INTRAMUSCULAR; INTRAVENOUS ONCE
Status: COMPLETED | OUTPATIENT
Start: 2019-08-30 | End: 2019-08-30

## 2019-08-30 RX ADMIN — ONDANSETRON 4 MG: 2 INJECTION INTRAMUSCULAR; INTRAVENOUS at 21:48

## 2019-08-30 RX ADMIN — IOPAMIDOL 85 ML: 612 INJECTION, SOLUTION INTRAVENOUS at 16:25

## 2019-08-30 RX ADMIN — HYDROMORPHONE HYDROCHLORIDE 0.5 MG: 1 INJECTION, SOLUTION INTRAMUSCULAR; INTRAVENOUS; SUBCUTANEOUS at 21:47

## 2019-08-30 RX ADMIN — SODIUM CHLORIDE 500 ML: 9 INJECTION, SOLUTION INTRAVENOUS at 21:49

## 2019-08-30 NOTE — TELEPHONE ENCOUNTER
Patient's wife called and left message stating that Mr. Zayas has been feeling nausea, having stomach cramps and headaches. She wants a refill of Zofran for him, she states that it usually works for him and they usually have it on hand. Last visit with Rachelle was 7/30/19 and next visit is 9/3/19.     Please advice

## 2019-08-30 NOTE — PROGRESS NOTES
"Subjective   Chief Complaint   Patient presents with   • Nausea   • Abdominal Pain       History of Present Illness     79 yo wm presents with cc as above ongoing times 3 days. He describes an \"explosion of pain that comes out no where\" from bed until 12-1 am. He notes nausea but denies vomiting with it. He denies diarrhea or constipation but describes hard stool. He denies hematochezia or melena He notes LYNN but no fever or chills. He notes this started after having his wife's meatloaf with hot sauce. He still has his gallbladder. He notes the pain was so intense that he considered going to the ER. He notes Pepto-Bismol hasn't helped. He has been taking Ibuprofen which hasn't helped. He takes his Nexium faithfully. He denies dysuria, frequency or hematuria.      Patient Active Problem List   Diagnosis   • Hyperlipidemia   • Coronary artery disease involving native coronary artery of native heart without angina pectoris   • Cognitive disorder   • Mood disorder (CMS/HCC)   • Closed wedge compression fracture of third thoracic vertebra with routine healing   • GERD (gastroesophageal reflux disease)   • S/P drug eluting coronary stent placement   • Chest pain   • Diastolic dysfunction   • Exertional angina (CMS/HCC)   • Unstable angina (CMS/HCC)   • Bladder mass   • Mixed action and resting tremor   • Bladder cancer (CMS/HCC)   • Acute cystitis without hematuria   • Generalized weakness   • Dyspnea on exertion   • BPH (benign prostatic hyperplasia)       No Known Allergies    Current Outpatient Medications on File Prior to Visit   Medication Sig Dispense Refill   • acetaminophen (TYLENOL) 325 MG tablet Take 650 mg by mouth Every 6 (Six) Hours As Needed for Mild Pain .     • amLODIPine (NORVASC) 5 MG tablet Take 5 mg by mouth Every Morning.     • atorvastatin (LIPITOR) 40 MG tablet Take 40 mg by mouth Every Morning.     • clopidogrel (PLAVIX) 75 MG tablet Take 75 mg by mouth Daily. Not taking for the next week     • " docusate sodium (COLACE) 250 MG capsule Take 1 capsule by mouth 2 (Two) Times a Day As Needed for Constipation. 30 capsule 0   • esomeprazole (nexIUM) 40 MG capsule Take 40 mg by mouth Every Morning Before Breakfast.     • FLUoxetine (PROzac) 20 MG capsule Take 20 mg by mouth Daily.     • FLUoxetine (PROzac) 40 MG capsule Take 40 mg by mouth Every Morning. Patient takes total of 60 mg a day     • ibuprofen (ADVIL,MOTRIN) 200 MG tablet Take 600 mg by mouth Every 6 (Six) Hours As Needed for Mild Pain . TO HOLD 1 WEEK BEFORE SURGERY     • nitroglycerin (NITROSTAT) 0.4 MG SL tablet Place 0.4 mg under the tongue as needed for chest pain. Take no more than 3 doses in 15 minutes.     • gabapentin (NEURONTIN) 600 MG tablet Gradually increase to three times daily. 270 tablet 2     No current facility-administered medications on file prior to visit.        Past Medical History:   Diagnosis Date   • Anxiety    • Back pain    • Bladder cancer (CMS/HCC)    • Coronary artery disease    • Depression    • Dizziness    • Fatigue    • GERD (gastroesophageal reflux disease)    • History of fractured rib     RIGHT SIDE   • Hyperlipidemia    • Hypertension    • Tremors of nervous system    • Urinary incontinence        Family History   Problem Relation Age of Onset   • Arthritis Mother    • Glaucoma Mother    • Heart disease Father    • Emphysema Father    • Tremor Father    • Malig Hyperthermia Neg Hx        Social History     Socioeconomic History   • Marital status:      Spouse name: Not on file   • Number of children: 4   • Years of education: 5 college degrees   • Highest education level: Not on file   Occupational History   • Occupation: Retired   Tobacco Use   • Smoking status: Never Smoker   • Smokeless tobacco: Never Used   Substance and Sexual Activity   • Alcohol use: No     Frequency: Never   • Drug use: No   • Sexual activity: Defer     Partners: Male       Past Surgical History:   Procedure Laterality Date   •  CARDIAC CATHETERIZATION      7x   • CATARACT EXTRACTION, BILATERAL     • COLONOSCOPY     • CORONARY ANGIOPLASTY WITH STENT PLACEMENT      X5    • CYSTOSCOPY BLADDER BIOPSY N/A 1/8/2019    Procedure: CYSTOSCOPY BLADDER BIOPSY;  Surgeon: Wang Soares Jr., MD;  Location: [facility] OR;  Service: Urology   • CYSTOSCOPY BLADDER BIOPSY N/A 6/13/2019    Procedure: CYSTOSCOPY BLADDER BIOPSY;  Surgeon: Wang Soares Jr., MD;  Location: [facility] OR;  Service: Urology   • CYSTOSCOPY TRANSURETHRAL RESECTION OF PROSTATE N/A 7/11/2019    Procedure: CYSTOSCOPY TRANSURETHRAL RESECTION OF PROSTATE;  Surgeon: Wang Soares Jr., MD;  Location: [facility] MAIN OR;  Service: Urology   • CYSTOSCOPY URETEROSCOPY LASER LITHOTRIPSY N/A 6/13/2019    Procedure: CYSTO LITHOPAXY;  Surgeon: Wang Soares Jr., MD;  Location: [facility] OR;  Service: Urology   • EYE SURGERY     • SKIN CANCER EXCISION Left     chest wall   • TRANSURETHRAL RESECTION OF BLADDER TUMOR N/A 11/29/2018    Procedure: TUR BLADDER TUMOR  LARGE;  Surgeon: Wang Soares Jr., MD;  Location: [facility] OR;  Service: Urology       The following portions of the patient's history were reviewed and updated as appropriate: problem list, allergies, current medications, past medical history, past social history and past surgical history.    Review of Systems   Constitution: Positive for malaise/fatigue and weight loss. Negative for chills and fever.   Cardiovascular: Negative for chest pain.   Respiratory: Negative for shortness of breath.    Gastrointestinal: Positive for abdominal pain, heartburn and nausea. Negative for constipation, diarrhea, hematochezia, melena and vomiting.   Genitourinary: Negative for dysuria, frequency and hematuria.       Immunization History   Administered Date(s) Administered   • Flu Vaccine High Dose PF 65YR+ 10/12/2016   • flucelvax quad pfs =>4 YRS 11/30/2018       Objective   Vitals:    08/30/19 1504 08/30/19 1526  "08/30/19 1546   BP:  112/74    Pulse:  (!) 12 72   Resp:  14    Weight: 83.5 kg (184 lb)     Height: 182.9 cm (72\")       PULSE of 12 listed above was entered in error. Pulse is 72 bpm.     Physical Exam   Constitutional: He is oriented to person, place, and time. He appears well-developed and well-nourished.   HENT:   Head: Normocephalic and atraumatic.   Cardiovascular: Normal rate, regular rhythm, S1 normal, S2 normal, normal heart sounds and intact distal pulses.   Pulmonary/Chest: Effort normal and breath sounds normal.   Abdominal: Soft. Bowel sounds are normal. He exhibits no distension. There is tenderness.   RUQ/LUQ TTP.    Neurological: He is alert and oriented to person, place, and time.   Skin: Skin is warm and dry.   Psychiatric: He has a normal mood and affect.   Vitals reviewed.      Procedures    Assessment/Plan   Sukhdev was seen today for nausea and abdominal pain.    Diagnoses and all orders for this visit:    Pain of upper abdomen  Comments:  New problem; ongoing for several days with tenderness on exam. Further MDM pending imaging and labs as below.   Orders:  -     CT Abdomen Pelvis With Contrast; Future  -     CBC & Differential  -     Comprehensive metabolic panel; Future  -     Lipase; Future  -     Urinalysis With Culture If Indicated -    Nausea  Comments:  New problem; further MDM pending labs and imaging. Encouraged to avoid spicy or fatty foods. BRAT diet. Avoid dairy.   Orders:  -     CT Abdomen Pelvis With Contrast; Future  -     CBC & Differential  -     Comprehensive metabolic panel; Future  -     Lipase; Future  -     Urinalysis With Culture If Indicated -  -     ondansetron (ZOFRAN) 4 MG tablet; Take 1 tablet by mouth Every 8 (Eight) Hours As Needed for Nausea or Vomiting.    He will follow up with Rachelle Patton on 09/03/19.     Return for Next scheduled follow up.           "

## 2019-08-31 ENCOUNTER — APPOINTMENT (OUTPATIENT)
Dept: GENERAL RADIOLOGY | Facility: HOSPITAL | Age: 79
End: 2019-08-31

## 2019-08-31 PROBLEM — R10.11 RIGHT UPPER QUADRANT PAIN: Status: ACTIVE | Noted: 2019-08-31

## 2019-08-31 PROBLEM — R79.89 ELEVATED LFTS: Status: ACTIVE | Noted: 2019-08-31

## 2019-08-31 PROBLEM — K80.20 CALCULUS OF GALLBLADDER WITHOUT CHOLECYSTITIS: Status: ACTIVE | Noted: 2019-08-31

## 2019-08-31 LAB
ABO GROUP BLD: NORMAL
ANION GAP SERPL CALCULATED.3IONS-SCNC: 13.3 MMOL/L (ref 5–15)
APTT PPP: 29.5 SECONDS (ref 22.7–35.4)
BACTERIA UR QL AUTO: ABNORMAL /HPF
BILIRUB UR QL STRIP: ABNORMAL
BLD GP AB SCN SERPL QL: NEGATIVE
BUN BLD-MCNC: 12 MG/DL (ref 8–23)
BUN/CREAT SERPL: 14.5 (ref 7–25)
CALCIUM SPEC-SCNC: 8.9 MG/DL (ref 8.6–10.5)
CHLORIDE SERPL-SCNC: 104 MMOL/L (ref 98–107)
CLARITY UR: CLEAR
CO2 SERPL-SCNC: 22.7 MMOL/L (ref 22–29)
COLOR UR: ABNORMAL
CREAT BLD-MCNC: 0.83 MG/DL (ref 0.76–1.27)
DEPRECATED RDW RBC AUTO: 44.5 FL (ref 37–54)
ERYTHROCYTE [DISTWIDTH] IN BLOOD BY AUTOMATED COUNT: 12.3 % (ref 12.3–15.4)
GFR SERPL CREATININE-BSD FRML MDRD: 90 ML/MIN/1.73
GLUCOSE BLD-MCNC: 82 MG/DL (ref 65–99)
GLUCOSE UR STRIP-MCNC: NEGATIVE MG/DL
HCT VFR BLD AUTO: 38.7 % (ref 37.5–51)
HGB BLD-MCNC: 12.3 G/DL (ref 13–17.7)
HGB UR QL STRIP.AUTO: ABNORMAL
HYALINE CASTS UR QL AUTO: ABNORMAL /LPF
INR PPP: 1.07 (ref 0.9–1.1)
KETONES UR QL STRIP: NEGATIVE
LEUKOCYTE ESTERASE UR QL STRIP.AUTO: ABNORMAL
MCH RBC QN AUTO: 31.1 PG (ref 26.6–33)
MCHC RBC AUTO-ENTMCNC: 31.8 G/DL (ref 31.5–35.7)
MCV RBC AUTO: 97.7 FL (ref 79–97)
NITRITE UR QL STRIP: POSITIVE
PH UR STRIP.AUTO: 6.5 [PH] (ref 5–8)
PLATELET # BLD AUTO: 238 10*3/MM3 (ref 140–450)
PMV BLD AUTO: 10.6 FL (ref 6–12)
POTASSIUM BLD-SCNC: 3.9 MMOL/L (ref 3.5–5.2)
PROT UR QL STRIP: NEGATIVE
PROTHROMBIN TIME: 13.6 SECONDS (ref 11.7–14.2)
RBC # BLD AUTO: 3.96 10*6/MM3 (ref 4.14–5.8)
RBC # UR: ABNORMAL /HPF
REF LAB TEST METHOD: ABNORMAL
RH BLD: NEGATIVE
SODIUM BLD-SCNC: 140 MMOL/L (ref 136–145)
SP GR UR STRIP: >=1.03 (ref 1–1.03)
SQUAMOUS #/AREA URNS HPF: ABNORMAL /HPF
T&S EXPIRATION DATE: NORMAL
UROBILINOGEN UR QL STRIP: ABNORMAL
WBC NRBC COR # BLD: 5.41 10*3/MM3 (ref 3.4–10.8)
WBC UR QL AUTO: ABNORMAL /HPF

## 2019-08-31 PROCEDURE — 86900 BLOOD TYPING SEROLOGIC ABO: CPT | Performed by: PHYSICIAN ASSISTANT

## 2019-08-31 PROCEDURE — 86901 BLOOD TYPING SEROLOGIC RH(D): CPT | Performed by: PHYSICIAN ASSISTANT

## 2019-08-31 PROCEDURE — 99204 OFFICE O/P NEW MOD 45 MIN: CPT | Performed by: SURGERY

## 2019-08-31 PROCEDURE — 93005 ELECTROCARDIOGRAM TRACING: CPT | Performed by: PHYSICIAN ASSISTANT

## 2019-08-31 PROCEDURE — G0378 HOSPITAL OBSERVATION PER HR: HCPCS

## 2019-08-31 PROCEDURE — 25010000002 PIPERACILLIN SOD-TAZOBACTAM PER 1 G: Performed by: PHYSICIAN ASSISTANT

## 2019-08-31 PROCEDURE — 85027 COMPLETE CBC AUTOMATED: CPT | Performed by: NURSE PRACTITIONER

## 2019-08-31 PROCEDURE — 93010 ELECTROCARDIOGRAM REPORT: CPT | Performed by: INTERNAL MEDICINE

## 2019-08-31 PROCEDURE — 80048 BASIC METABOLIC PNL TOTAL CA: CPT | Performed by: NURSE PRACTITIONER

## 2019-08-31 PROCEDURE — 86850 RBC ANTIBODY SCREEN: CPT | Performed by: PHYSICIAN ASSISTANT

## 2019-08-31 PROCEDURE — 71045 X-RAY EXAM CHEST 1 VIEW: CPT

## 2019-08-31 RX ORDER — ATORVASTATIN CALCIUM 20 MG/1
40 TABLET, FILM COATED ORAL EVERY MORNING
Status: DISCONTINUED | OUTPATIENT
Start: 2019-08-31 | End: 2019-09-05 | Stop reason: HOSPADM

## 2019-08-31 RX ORDER — SODIUM CHLORIDE 9 MG/ML
100 INJECTION, SOLUTION INTRAVENOUS CONTINUOUS
Status: DISCONTINUED | OUTPATIENT
Start: 2019-08-31 | End: 2019-09-05

## 2019-08-31 RX ORDER — SODIUM CHLORIDE 0.9 % (FLUSH) 0.9 %
10 SYRINGE (ML) INJECTION AS NEEDED
Status: DISCONTINUED | OUTPATIENT
Start: 2019-08-31 | End: 2019-09-05 | Stop reason: HOSPADM

## 2019-08-31 RX ORDER — FLUOXETINE HYDROCHLORIDE 20 MG/1
20 CAPSULE ORAL DAILY
Status: DISCONTINUED | OUTPATIENT
Start: 2019-08-31 | End: 2019-08-31 | Stop reason: ALTCHOICE

## 2019-08-31 RX ORDER — BISACODYL 10 MG
10 SUPPOSITORY, RECTAL RECTAL ONCE
Status: COMPLETED | OUTPATIENT
Start: 2019-08-31 | End: 2019-08-31

## 2019-08-31 RX ORDER — NALOXONE HCL 0.4 MG/ML
0.4 VIAL (ML) INJECTION
Status: DISCONTINUED | OUTPATIENT
Start: 2019-08-31 | End: 2019-09-05 | Stop reason: HOSPADM

## 2019-08-31 RX ORDER — HYDROMORPHONE HYDROCHLORIDE 1 MG/ML
0.5 INJECTION, SOLUTION INTRAMUSCULAR; INTRAVENOUS; SUBCUTANEOUS
Status: DISCONTINUED | OUTPATIENT
Start: 2019-08-31 | End: 2019-09-05 | Stop reason: HOSPADM

## 2019-08-31 RX ORDER — ONDANSETRON 2 MG/ML
4 INJECTION INTRAMUSCULAR; INTRAVENOUS EVERY 6 HOURS PRN
Status: DISCONTINUED | OUTPATIENT
Start: 2019-08-31 | End: 2019-09-05 | Stop reason: HOSPADM

## 2019-08-31 RX ORDER — FLUOXETINE HYDROCHLORIDE 20 MG/1
60 CAPSULE ORAL EVERY MORNING
Status: DISCONTINUED | OUTPATIENT
Start: 2019-08-31 | End: 2019-09-05 | Stop reason: HOSPADM

## 2019-08-31 RX ORDER — AMLODIPINE BESYLATE 5 MG/1
5 TABLET ORAL EVERY MORNING
Status: DISCONTINUED | OUTPATIENT
Start: 2019-08-31 | End: 2019-09-05 | Stop reason: HOSPADM

## 2019-08-31 RX ORDER — SODIUM CHLORIDE 0.9 % (FLUSH) 0.9 %
10 SYRINGE (ML) INJECTION EVERY 12 HOURS SCHEDULED
Status: DISCONTINUED | OUTPATIENT
Start: 2019-08-31 | End: 2019-09-05 | Stop reason: HOSPADM

## 2019-08-31 RX ORDER — HYDROCODONE BITARTRATE AND ACETAMINOPHEN 5; 325 MG/1; MG/1
1 TABLET ORAL EVERY 6 HOURS PRN
Status: DISCONTINUED | OUTPATIENT
Start: 2019-08-31 | End: 2019-09-05 | Stop reason: HOSPADM

## 2019-08-31 RX ADMIN — SODIUM CHLORIDE 100 ML/HR: 9 INJECTION, SOLUTION INTRAVENOUS at 04:19

## 2019-08-31 RX ADMIN — SODIUM CHLORIDE, PRESERVATIVE FREE 10 ML: 5 INJECTION INTRAVENOUS at 10:01

## 2019-08-31 RX ADMIN — TAZOBACTAM SODIUM AND PIPERACILLIN SODIUM 3.38 G: 375; 3 INJECTION, SOLUTION INTRAVENOUS at 01:17

## 2019-08-31 RX ADMIN — BISACODYL 10 MG RECTAL SUPPOSITORY 10 MG: at 20:00

## 2019-08-31 RX ADMIN — FLUOXETINE HYDROCHLORIDE 60 MG: 20 CAPSULE ORAL at 10:01

## 2019-08-31 RX ADMIN — SODIUM CHLORIDE, PRESERVATIVE FREE 10 ML: 5 INJECTION INTRAVENOUS at 20:00

## 2019-08-31 RX ADMIN — ATORVASTATIN CALCIUM 40 MG: 20 TABLET, FILM COATED ORAL at 10:01

## 2019-08-31 RX ADMIN — AMLODIPINE BESYLATE 5 MG: 5 TABLET ORAL at 10:01

## 2019-08-31 RX ADMIN — SODIUM CHLORIDE, PRESERVATIVE FREE 10 ML: 5 INJECTION INTRAVENOUS at 04:19

## 2019-09-01 LAB
ALBUMIN SERPL-MCNC: 3.6 G/DL (ref 3.5–5.2)
ALBUMIN/GLOB SERPL: 1.6 G/DL
ALP SERPL-CCNC: 503 U/L (ref 39–117)
ALT SERPL W P-5'-P-CCNC: 346 U/L (ref 1–41)
ANION GAP SERPL CALCULATED.3IONS-SCNC: 7.6 MMOL/L (ref 5–15)
AST SERPL-CCNC: 370 U/L (ref 1–40)
BACTERIA SPEC AEROBE CULT: ABNORMAL
BILIRUB SERPL-MCNC: 3.2 MG/DL (ref 0.2–1.2)
BUN BLD-MCNC: 7 MG/DL (ref 8–23)
BUN/CREAT SERPL: 8.8 (ref 7–25)
CALCIUM SPEC-SCNC: 8.7 MG/DL (ref 8.6–10.5)
CHLORIDE SERPL-SCNC: 105 MMOL/L (ref 98–107)
CO2 SERPL-SCNC: 22.4 MMOL/L (ref 22–29)
CREAT BLD-MCNC: 0.8 MG/DL (ref 0.76–1.27)
DEPRECATED RDW RBC AUTO: 43.7 FL (ref 37–54)
ERYTHROCYTE [DISTWIDTH] IN BLOOD BY AUTOMATED COUNT: 12.2 % (ref 12.3–15.4)
GFR SERPL CREATININE-BSD FRML MDRD: 93 ML/MIN/1.73
GLOBULIN UR ELPH-MCNC: 2.2 GM/DL
GLUCOSE BLD-MCNC: 83 MG/DL (ref 65–99)
HCT VFR BLD AUTO: 38.1 % (ref 37.5–51)
HGB BLD-MCNC: 12.1 G/DL (ref 13–17.7)
MCH RBC QN AUTO: 30.9 PG (ref 26.6–33)
MCHC RBC AUTO-ENTMCNC: 31.8 G/DL (ref 31.5–35.7)
MCV RBC AUTO: 97.4 FL (ref 79–97)
PLATELET # BLD AUTO: 215 10*3/MM3 (ref 140–450)
PMV BLD AUTO: 10.2 FL (ref 6–12)
POTASSIUM BLD-SCNC: 3.7 MMOL/L (ref 3.5–5.2)
PROT SERPL-MCNC: 5.8 G/DL (ref 6–8.5)
RBC # BLD AUTO: 3.91 10*6/MM3 (ref 4.14–5.8)
SODIUM BLD-SCNC: 135 MMOL/L (ref 136–145)
WBC NRBC COR # BLD: 5.27 10*3/MM3 (ref 3.4–10.8)

## 2019-09-01 PROCEDURE — 99231 SBSQ HOSP IP/OBS SF/LOW 25: CPT | Performed by: SURGERY

## 2019-09-01 PROCEDURE — 99222 1ST HOSP IP/OBS MODERATE 55: CPT | Performed by: INTERNAL MEDICINE

## 2019-09-01 PROCEDURE — 85027 COMPLETE CBC AUTOMATED: CPT | Performed by: INTERNAL MEDICINE

## 2019-09-01 PROCEDURE — 87040 BLOOD CULTURE FOR BACTERIA: CPT | Performed by: HOSPITALIST

## 2019-09-01 PROCEDURE — 80053 COMPREHEN METABOLIC PANEL: CPT | Performed by: INTERNAL MEDICINE

## 2019-09-01 RX ORDER — POLYETHYLENE GLYCOL 3350 17 G/17G
17 POWDER, FOR SOLUTION ORAL DAILY
Status: DISCONTINUED | OUTPATIENT
Start: 2019-09-01 | End: 2019-09-05 | Stop reason: HOSPADM

## 2019-09-01 RX ORDER — BISACODYL 10 MG
10 SUPPOSITORY, RECTAL RECTAL DAILY PRN
Status: DISCONTINUED | OUTPATIENT
Start: 2019-09-01 | End: 2019-09-05 | Stop reason: HOSPADM

## 2019-09-01 RX ORDER — ASPIRIN 81 MG/1
81 TABLET, CHEWABLE ORAL DAILY
Status: DISCONTINUED | OUTPATIENT
Start: 2019-09-01 | End: 2019-09-03

## 2019-09-01 RX ADMIN — SODIUM CHLORIDE 100 ML/HR: 9 INJECTION, SOLUTION INTRAVENOUS at 03:02

## 2019-09-01 RX ADMIN — ATORVASTATIN CALCIUM 40 MG: 20 TABLET, FILM COATED ORAL at 06:53

## 2019-09-01 RX ADMIN — FLUOXETINE HYDROCHLORIDE 60 MG: 20 CAPSULE ORAL at 06:53

## 2019-09-01 RX ADMIN — POLYETHYLENE GLYCOL 3350 17 G: 17 POWDER, FOR SOLUTION ORAL at 13:25

## 2019-09-01 RX ADMIN — ASPIRIN 81 MG: 81 TABLET, CHEWABLE ORAL at 15:32

## 2019-09-01 RX ADMIN — AMLODIPINE BESYLATE 5 MG: 5 TABLET ORAL at 06:53

## 2019-09-01 NOTE — CONSULTS
Referring Provider: Jose Salinas MD  [id number]  [address] [address]  Reason for Consultation: Positive urine culture    Subjective   History of present illness: This is a 78-year-old male with a history of bladder cancer, coronary artery disease, stroke, hypertension who was admitted on August 30 with  The patient was hospitalized in May of this year with acute cystitis urine cultures were negative but per urology he was treated with Bactrim.  After discharge 1 out of 2 blood cultures came back positive for ESBL E. coli.  He underwent cystoscopy with bladder biopsy on 6/13.  Pathology came back with no evidence of malignancy.  He developed a urinary retention in the setting of BPH and underwent transurethral resection of the prostate on July 11.  About 3 days prior to admission he developed acute onset of right upper quadrant and epigastric abdominal pain 7 out of 10 in intensity.  He was seen by his primary care provider and a CAT scan of the abdomen and pelvis was obtained which showed cholelithiasis.  His LFTs were elevated and he was sent to the emergency room.  She reports associated nausea but denies any vomiting or diarrhea.  He denies any fevers chills or night sweats.  [place] labs revealed a normal white blood cell count.  He received 1 dose of Zosyn in the ER.  He was seen by general surgery plan is to proceed with laparoscopic cholecystectomy tomorrow.  Urinalysis was obtained on admission and showed too numerous to count white blood cells.  Patient denies any dysuria increasing urinary frequency, suprapubic tenderness or CVA tenderness.  Urine cultures growing rater than 100,000 ESBL E. coli.    Past Medical History:   Diagnosis Date   • Anxiety    • Bladder cancer (CMS/HCC) status post BCG    • Coronary artery disease    • Depression    • GERD (gastroesophageal reflux disease)    • Hyperlipidemia    • Hypertension    CVA    Past Surgical History:   Procedure Laterality Date   •  CYSTOSCOPY BLADDER BIOPSY N/A 1/8/2019    Procedure: CYSTOSCOPY BLADDER BIOPSY;  Surgeon: Wang oSares Jr., MD;  Location: [facility] MAIN OR;  Service: Urology   • CYSTOSCOPY BLADDER BIOPSY N/A 6/13/2019    Procedure: CYSTOSCOPY BLADDER BIOPSY;  Surgeon: Wang Soares Jr., MD;  Location: [facility] MAIN OR;  Service: Urology   • CYSTOSCOPY TRANSURETHRAL RESECTION OF PROSTATE N/A 7/11/2019    Procedure: CYSTOSCOPY TRANSURETHRAL RESECTION OF PROSTATE;  Surgeon: Wang Soares Jr., MD;  Location: [facility] MAIN OR;  Service: Urology   • CYSTOSCOPY URETEROSCOPY LASER LITHOTRIPSY N/A 6/13/2019    Procedure: CYSTO LITHOPAXY;  Surgeon: Wang Soares Jr., MD;  Location: [facility] MAIN OR;  Service: Urology   • TRANSURETHRAL RESECTION OF BLADDER TUMOR N/A 11/29/2018    Procedure: TUR BLADDER TUMOR  LARGE;  Surgeon: Wang Soares Jr., MD;  Location: [facility] OR;  Service: Urology        reports that he has never smoked. He has never used smokeless tobacco. He reports that he does not drink alcohol or use drugs.    family history includes Arthritis in his mother; Emphysema in his father; Glaucoma in his mother; Heart disease in his father; Tremor in his father.    No Known Allergies    Medication:  Antibiotics:  None    Please refer to the medical record for a full medication list    Review of Systems  Pertinent items are noted in HPI, all other systems reviewed and negative    Objective   Vital Signs   Temp:  [97.2 °F (36.2 °C)-98.5 °F (36.9 °C)] 97.2 °F (36.2 °C)  Heart Rate:  [63-71] 71  Resp:  [16-18] 16  BP: (142-158)/(78-84) 143/78    Physical Exam:   General: In no acute distress  HEENT: Normocephalic, atraumatic, PERRL, EOMI, no scleral icterus. Oropharynx is clear and moist  Neck: Supple, trachea is midline  Cardiovascular: Normal rate, regular rhythm, sofia S1 and S2, no murmurs, rubs, or gallops    Respiratory: Lungs are cleat to ascultation bilaterally, no wheezing   GI: Abdomen is soft,  mildly tender to deep palpation right greater than left, non-distended, positive bowel sounds bilaterally, no masses  Musculoskeletal: Normal range of motion, no edema, tenderness or deformity, no CVA tenderness  Skin: No rashes or lesions  Extremities: no E/C/C  Neurological: Alert and oriented, moving all 4 extremities  Psychiatric: Normal mood and affect     Results Review:   I reviewed the patient's new clinical results.  I reviewed the patient's new imaging results and agree with the interpretation.    Lab Results   Component Value Date    WBC 5.27 09/01/2019    HGB 12.1 (L) 09/01/2019    HCT 38.1 09/01/2019    MCV 97.4 (H) 09/01/2019     09/01/2019       Lab Results   Component Value Date    GLUCOSE 83 09/01/2019    BUN 7 (L) 09/01/2019    CREATININE 0.80 09/01/2019    EGFRIFNONA 93 09/01/2019    BCR 8.8 09/01/2019    CO2 22.4 09/01/2019    CALCIUM 8.7 09/01/2019    ALBUMIN 3.60 09/01/2019     (H) 09/01/2019     (H) 09/01/2019     Urinalysis trace blood positive nitrite moderate leukocytes too numerous to count white blood cells    Microbiology:  9/1 BCx P x 2  8/30 UCx >100K ESBL E. Coli    5/24 BCx ESBL E coli 1/2 5/24 UCx >100K Mixed       Radiology:  Admission CAT scan of the abdomen pelvis shows cholelithiasis.  Normal liver spleen adrenals pancreas kidneys.  Calcification at the posterior aspect of the urinary bladder.  Possible bladder wall lesion cannot be excluded    Ultrasound of the gallbladder shows cholelithiasis    Chest x-ray personally reviewed by me shows no evidence of infiltrate pleural effusion or pneumothorax    Assessment/Plan   1.  Symptomatic cholelithiasis  2.  Elevated LFTs in the setting of 1 above  3.  Abdominal pain in the setting of 1 above  4.  Asymptomatic bacteriuria  5.  History of bladder cancer status post BCG treatment  6.  BPH with urinary retention status post TURP on 7/11    Patient does not have any signs or symptoms consistent with infection in  general furthermore he has no signs or symptoms consistent with a  infection.  This is asymptomatic bacteriuria and there is no need for antimicrobial treatment.    From an ID standpoint patient is okay to proceed to surgery    Thank you for this consult.  Please call us with further questions or concerns    I discussed the patients findings and my recommendations with patient, family and nursing staff

## 2019-09-01 NOTE — PROGRESS NOTES
Chief Complaint:    Cholecystitis    Subjective:    The patient is feeling well with minimal abdominal pain.    Objective:    Temp:  [97.2 °F (36.2 °C)-98.5 °F (36.9 °C)] 97.2 °F (36.2 °C)  Heart Rate:  [63-71] 71  Resp:  [16-18] 16  BP: (142-158)/(78-84) 143/78    Physical Exam   Constitutional: He is cooperative. He does not appear ill. No distress.   Pulmonary/Chest: Effort normal. No respiratory distress.   Abdominal: Soft. There is no tenderness.   Neurological: He is alert.       Results:    WBC is 5.27.  Liver function tests are slightly improved with , , alkaline phosphatase 503, and total bilirubin 3.2.    Assessment/Plan:    The patient has cholelithiasis with cholecystitis and probable choledocholithiasis.  The plan is to proceed with a laparoscopic cholecystectomy with intraoperative cholangiogram tomorrow, after being off Plavix for 5 days.    Bg Rodriguez Jr., M.D.

## 2019-09-01 NOTE — PLAN OF CARE
Problem: Pain, Acute (Adult)  Goal: Identify Related Risk Factors and Signs and Symptoms  Outcome: Ongoing (interventions implemented as appropriate)    Goal: Acceptable Pain Control/Comfort Level  Outcome: Ongoing (interventions implemented as appropriate)      Problem: Patient Care Overview  Goal: Plan of Care Review  Outcome: Ongoing (interventions implemented as appropriate)   09/01/19 0602   Coping/Psychosocial   Plan of Care Reviewed With patient   Plan of Care Review   Progress improving   OTHER   Outcome Summary Pt had a good night, rested well. Denied any pain. Had small BM after suppository. Refused bed alarm. VSS. Continue to monitor.       Problem: Fall Risk (Adult)  Goal: Identify Related Risk Factors and Signs and Symptoms  Outcome: Ongoing (interventions implemented as appropriate)    Goal: Absence of Fall  Outcome: Ongoing (interventions implemented as appropriate)

## 2019-09-01 NOTE — PLAN OF CARE
Problem: Patient Care Overview  Goal: Plan of Care Review  Outcome: Ongoing (interventions implemented as appropriate)   09/01/19 7440   Coping/Psychosocial   Plan of Care Reviewed With patient;spouse   Plan of Care Review   Progress no change   OTHER   Outcome Summary Pt awaiting surgery tomorrow. Still on clear liquid diet. NPO at midnight for lap chauncey. Contact isolation for ESBL E coli in urine. No c/o pain. Will continue to monitor.       Problem: Fall Risk (Adult)  Goal: Absence of Fall  Outcome: Ongoing (interventions implemented as appropriate)

## 2019-09-01 NOTE — PROGRESS NOTES
Pharmacy Consult - Zosyn Dosing   Day #1  Sukhdev Zayas is a 78 y.o. male 83.7 kg (184 lb 8 oz) Body mass index is 25.02 kg/m²..  Pharmacy  to dose Zosyn for esbl uti per Dr Soto' request.       Duration: 5 days       Past Medical History:   Diagnosis Date   • Anxiety    • Back pain    • Bladder cancer (CMS/HCC)    • Coronary artery disease    • Depression    • Dizziness    • Fatigue    • GERD (gastroesophageal reflux disease)    • History of fractured rib     RIGHT SIDE   • Hyperlipidemia    • Hypertension    • Tremors of nervous system    • Urinary incontinence        Estimated Creatinine Clearance: 90.1 mL/min (by C-G formula based on SCr of 0.8 mg/dL).  Creatinine   Date Value Ref Range Status   09/01/2019 0.80 0.76 - 1.27 mg/dL Final   08/31/2019 0.83 0.76 - 1.27 mg/dL Final   08/30/2019 1.00 0.76 - 1.27 mg/dL Final   08/30/2019 0.95 0.76 - 1.27 mg/dL Final   08/30/2019 1.00 0.60 - 1.30 mg/dL Final     Comment:     Serial Number: [id number]:  [id number]     WBC   Date Value Ref Range Status   09/01/2019 5.27 3.40 - 10.80 10*3/mm3 Final   08/31/2019 5.41 3.40 - 10.80 10*3/mm3 Final   08/30/2019 6.96 3.40 - 10.80 10*3/mm3 Final   08/30/2019 5.82 3.40 - 10.80 10*3/mm3 Final     Temp Readings from Last 2 Encounters:   09/01/19 97.2 °F (36.2 °C) (Oral)   07/30/19 97.6 °F (36.4 °C) (Oral)     Anti-Infectives (From admission, onward)    Ordered     Dose/Rate Route Frequency Start Stop    09/01/19 0838  piperacillin-tazobactam (ZOSYN) 3.375 g in iso-osmotic dextrose 50 ml (premix)     Ordering Provider:  Gregor Soto MD    3.375 g  over 4 Hours Intravenous Every 8 Hours 09/01/19 0930 09/06/19 0929    09/01/19 0809  Pharmacy to Dose Zosyn     Ordering Provider:  Gregor Soto MD     Does not apply Continuous PRN 09/01/19 0809 09/06/19 0808    08/31/19 0045  piperacillin-tazobactam (ZOSYN) 3.375 g in iso-osmotic dextrose 50 ml (premix)     Ordering Provider:  Raz Newell III, PA     3.375 g  over 30 Minutes Intravenous Once 08/31/19 0047 08/31/19 0147          Baseline cultures:     Microbiology Results (last 10 days)     Procedure Component Value - Date/Time    Urine Culture - Urine, Urine, Random Void [[id number]]  (Abnormal)  (Susceptibility) Collected:  08/30/19 1708    Lab Status:  Final result Specimen:  Urine, Random Void Updated:  09/01/19 0718     Urine Culture >100,000 CFU/mL Escherichia coli ESBL     Comment:   Consider infectious disease consult.  Susceptibility results may not correlate to clinical outcomes.       Susceptibility      Escherichia coli ESBL     LUISA     Gentamicin Susceptible     Levofloxacin Resistant     Meropenem Susceptible     Nitrofurantoin Susceptible     Piperacillin + Tazobactam Susceptible     Tetracycline Resistant     Trimethoprim + Sulfamethoxazole Resistant                          ZOSYN DOSING SCHEDULE  Begin Zosyn 3.375gm iv q 8 hours via extended infusion protocol     PLAN/ ASSESSMENT      1. Pharmacy will continue to follow and adjust accordingly   2.  “Pharmacy will discontinue the Pharmacy to Dose consult at this time. Renal function will continue to be monitored and dosing adjustments will be made by pharmacy based on renal function if necessary.”     Jennifer Adan, [facility]      09/01/19 8:41 AM

## 2019-09-02 ENCOUNTER — APPOINTMENT (OUTPATIENT)
Dept: GENERAL RADIOLOGY | Facility: HOSPITAL | Age: 79
End: 2019-09-02

## 2019-09-02 ENCOUNTER — ANESTHESIA EVENT (OUTPATIENT)
Dept: PERIOP | Facility: HOSPITAL | Age: 79
End: 2019-09-02

## 2019-09-02 ENCOUNTER — ANESTHESIA (OUTPATIENT)
Dept: PERIOP | Facility: HOSPITAL | Age: 79
End: 2019-09-02

## 2019-09-02 LAB
ALBUMIN SERPL-MCNC: 3.5 G/DL (ref 3.5–5.2)
ALBUMIN/GLOB SERPL: 1.5 G/DL
ALP SERPL-CCNC: 500 U/L (ref 39–117)
ALT SERPL W P-5'-P-CCNC: 308 U/L (ref 1–41)
ANION GAP SERPL CALCULATED.3IONS-SCNC: 8 MMOL/L (ref 5–15)
AST SERPL-CCNC: 278 U/L (ref 1–40)
BASOPHILS # BLD AUTO: 0.02 10*3/MM3 (ref 0–0.2)
BASOPHILS NFR BLD AUTO: 0.4 % (ref 0–1.5)
BILIRUB SERPL-MCNC: 1.8 MG/DL (ref 0.2–1.2)
BUN BLD-MCNC: 7 MG/DL (ref 8–23)
BUN/CREAT SERPL: 8.3 (ref 7–25)
CALCIUM SPEC-SCNC: 8.7 MG/DL (ref 8.6–10.5)
CHLORIDE SERPL-SCNC: 107 MMOL/L (ref 98–107)
CO2 SERPL-SCNC: 26 MMOL/L (ref 22–29)
CREAT BLD-MCNC: 0.84 MG/DL (ref 0.76–1.27)
DEPRECATED RDW RBC AUTO: 43.9 FL (ref 37–54)
EOSINOPHIL # BLD AUTO: 0.25 10*3/MM3 (ref 0–0.4)
EOSINOPHIL NFR BLD AUTO: 5.1 % (ref 0.3–6.2)
ERYTHROCYTE [DISTWIDTH] IN BLOOD BY AUTOMATED COUNT: 12.3 % (ref 12.3–15.4)
GFR SERPL CREATININE-BSD FRML MDRD: 88 ML/MIN/1.73
GLOBULIN UR ELPH-MCNC: 2.3 GM/DL
GLUCOSE BLD-MCNC: 80 MG/DL (ref 65–99)
HCT VFR BLD AUTO: 37.8 % (ref 37.5–51)
HGB BLD-MCNC: 12.2 G/DL (ref 13–17.7)
IMM GRANULOCYTES # BLD AUTO: 0.02 10*3/MM3 (ref 0–0.05)
IMM GRANULOCYTES NFR BLD AUTO: 0.4 % (ref 0–0.5)
LYMPHOCYTES # BLD AUTO: 0.97 10*3/MM3 (ref 0.7–3.1)
LYMPHOCYTES NFR BLD AUTO: 19.6 % (ref 19.6–45.3)
MCH RBC QN AUTO: 31.4 PG (ref 26.6–33)
MCHC RBC AUTO-ENTMCNC: 32.3 G/DL (ref 31.5–35.7)
MCV RBC AUTO: 97.2 FL (ref 79–97)
MONOCYTES # BLD AUTO: 0.49 10*3/MM3 (ref 0.1–0.9)
MONOCYTES NFR BLD AUTO: 9.9 % (ref 5–12)
NEUTROPHILS # BLD AUTO: 3.19 10*3/MM3 (ref 1.7–7)
NEUTROPHILS NFR BLD AUTO: 64.6 % (ref 42.7–76)
NRBC BLD AUTO-RTO: 0 /100 WBC (ref 0–0.2)
PLATELET # BLD AUTO: 183 10*3/MM3 (ref 140–450)
PMV BLD AUTO: 10.4 FL (ref 6–12)
POTASSIUM BLD-SCNC: 3.9 MMOL/L (ref 3.5–5.2)
PROT SERPL-MCNC: 5.8 G/DL (ref 6–8.5)
RBC # BLD AUTO: 3.89 10*6/MM3 (ref 4.14–5.8)
SODIUM BLD-SCNC: 141 MMOL/L (ref 136–145)
WBC NRBC COR # BLD: 4.94 10*3/MM3 (ref 3.4–10.8)

## 2019-09-02 PROCEDURE — 0 IOTHALAMATE 60 % SOLUTION: Performed by: SURGERY

## 2019-09-02 PROCEDURE — 88304 TISSUE EXAM BY PATHOLOGIST: CPT | Performed by: SURGERY

## 2019-09-02 PROCEDURE — 25010000002 DEXAMETHASONE PER 1 MG: Performed by: NURSE ANESTHETIST, CERTIFIED REGISTERED

## 2019-09-02 PROCEDURE — 25010000002 FENTANYL CITRATE (PF) 100 MCG/2ML SOLUTION: Performed by: NURSE ANESTHETIST, CERTIFIED REGISTERED

## 2019-09-02 PROCEDURE — 25010000002 ONDANSETRON PER 1 MG: Performed by: NURSE ANESTHETIST, CERTIFIED REGISTERED

## 2019-09-02 PROCEDURE — 47563 LAPARO CHOLECYSTECTOMY/GRAPH: CPT | Performed by: SURGERY

## 2019-09-02 PROCEDURE — 25010000002 HYDRALAZINE PER 20 MG: Performed by: NURSE ANESTHETIST, CERTIFIED REGISTERED

## 2019-09-02 PROCEDURE — 0FT44ZZ RESECTION OF GALLBLADDER, PERCUTANEOUS ENDOSCOPIC APPROACH: ICD-10-PCS | Performed by: INTERNAL MEDICINE

## 2019-09-02 PROCEDURE — 25010000002 NEOSTIGMINE PER 0.5 MG: Performed by: NURSE ANESTHETIST, CERTIFIED REGISTERED

## 2019-09-02 PROCEDURE — 25010000002 HYDROMORPHONE PER 4 MG: Performed by: NURSE ANESTHETIST, CERTIFIED REGISTERED

## 2019-09-02 PROCEDURE — BF131ZZ FLUOROSCOPY OF GALLBLADDER AND BILE DUCTS USING LOW OSMOLAR CONTRAST: ICD-10-PCS | Performed by: INTERNAL MEDICINE

## 2019-09-02 PROCEDURE — 85025 COMPLETE CBC W/AUTO DIFF WBC: CPT | Performed by: HOSPITALIST

## 2019-09-02 PROCEDURE — 80053 COMPREHEN METABOLIC PANEL: CPT | Performed by: INTERNAL MEDICINE

## 2019-09-02 PROCEDURE — 74300 X-RAY BILE DUCTS/PANCREAS: CPT

## 2019-09-02 PROCEDURE — 25010000002 PROPOFOL 10 MG/ML EMULSION: Performed by: NURSE ANESTHETIST, CERTIFIED REGISTERED

## 2019-09-02 DEVICE — CLIP LIGAT VASC HORIZON TI MD/LG GRN 6CT: Type: IMPLANTABLE DEVICE | Site: ABDOMEN | Status: FUNCTIONAL

## 2019-09-02 RX ORDER — DEXAMETHASONE SODIUM PHOSPHATE 10 MG/ML
INJECTION INTRAMUSCULAR; INTRAVENOUS AS NEEDED
Status: DISCONTINUED | OUTPATIENT
Start: 2019-09-02 | End: 2019-09-02 | Stop reason: SURG

## 2019-09-02 RX ORDER — PROMETHAZINE HYDROCHLORIDE 25 MG/1
25 SUPPOSITORY RECTAL ONCE AS NEEDED
Status: DISCONTINUED | OUTPATIENT
Start: 2019-09-02 | End: 2019-09-02

## 2019-09-02 RX ORDER — FLUMAZENIL 0.1 MG/ML
0.2 INJECTION INTRAVENOUS AS NEEDED
Status: DISCONTINUED | OUTPATIENT
Start: 2019-09-02 | End: 2019-09-02

## 2019-09-02 RX ORDER — PROPOFOL 10 MG/ML
VIAL (ML) INTRAVENOUS AS NEEDED
Status: DISCONTINUED | OUTPATIENT
Start: 2019-09-02 | End: 2019-09-02 | Stop reason: SURG

## 2019-09-02 RX ORDER — HYDRALAZINE HYDROCHLORIDE 20 MG/ML
5 INJECTION INTRAMUSCULAR; INTRAVENOUS
Status: DISCONTINUED | OUTPATIENT
Start: 2019-09-02 | End: 2019-09-02

## 2019-09-02 RX ORDER — SODIUM CHLORIDE, SODIUM LACTATE, POTASSIUM CHLORIDE, CALCIUM CHLORIDE 600; 310; 30; 20 MG/100ML; MG/100ML; MG/100ML; MG/100ML
9 INJECTION, SOLUTION INTRAVENOUS CONTINUOUS
Status: DISCONTINUED | OUTPATIENT
Start: 2019-09-02 | End: 2019-09-02

## 2019-09-02 RX ORDER — SODIUM CHLORIDE 0.9 % (FLUSH) 0.9 %
3-10 SYRINGE (ML) INJECTION AS NEEDED
Status: DISCONTINUED | OUTPATIENT
Start: 2019-09-02 | End: 2019-09-02 | Stop reason: HOSPADM

## 2019-09-02 RX ORDER — HYDROCODONE BITARTRATE AND ACETAMINOPHEN 7.5; 325 MG/1; MG/1
1 TABLET ORAL ONCE AS NEEDED
Status: DISCONTINUED | OUTPATIENT
Start: 2019-09-02 | End: 2019-09-02

## 2019-09-02 RX ORDER — BUPIVACAINE HYDROCHLORIDE AND EPINEPHRINE 5; 5 MG/ML; UG/ML
INJECTION, SOLUTION PERINEURAL AS NEEDED
Status: DISCONTINUED | OUTPATIENT
Start: 2019-09-02 | End: 2019-09-02 | Stop reason: HOSPADM

## 2019-09-02 RX ORDER — ACETAMINOPHEN 325 MG/1
650 TABLET ORAL ONCE AS NEEDED
Status: DISCONTINUED | OUTPATIENT
Start: 2019-09-02 | End: 2019-09-02

## 2019-09-02 RX ORDER — FENTANYL CITRATE 50 UG/ML
50 INJECTION, SOLUTION INTRAMUSCULAR; INTRAVENOUS
Status: DISCONTINUED | OUTPATIENT
Start: 2019-09-02 | End: 2019-09-02 | Stop reason: HOSPADM

## 2019-09-02 RX ORDER — MEPERIDINE HYDROCHLORIDE 25 MG/ML
12.5 INJECTION INTRAMUSCULAR; INTRAVENOUS; SUBCUTANEOUS
Status: DISCONTINUED | OUTPATIENT
Start: 2019-09-02 | End: 2019-09-02

## 2019-09-02 RX ORDER — ONDANSETRON 2 MG/ML
4 INJECTION INTRAMUSCULAR; INTRAVENOUS ONCE AS NEEDED
Status: DISCONTINUED | OUTPATIENT
Start: 2019-09-02 | End: 2019-09-02

## 2019-09-02 RX ORDER — PROMETHAZINE HYDROCHLORIDE 25 MG/ML
6.25 INJECTION, SOLUTION INTRAMUSCULAR; INTRAVENOUS
Status: DISCONTINUED | OUTPATIENT
Start: 2019-09-02 | End: 2019-09-02

## 2019-09-02 RX ORDER — GLYCOPYRROLATE 0.2 MG/ML
INJECTION INTRAMUSCULAR; INTRAVENOUS AS NEEDED
Status: DISCONTINUED | OUTPATIENT
Start: 2019-09-02 | End: 2019-09-02 | Stop reason: SURG

## 2019-09-02 RX ORDER — NALOXONE HCL 0.4 MG/ML
0.2 VIAL (ML) INJECTION AS NEEDED
Status: DISCONTINUED | OUTPATIENT
Start: 2019-09-02 | End: 2019-09-02

## 2019-09-02 RX ORDER — EPHEDRINE SULFATE 50 MG/ML
INJECTION, SOLUTION INTRAVENOUS AS NEEDED
Status: DISCONTINUED | OUTPATIENT
Start: 2019-09-02 | End: 2019-09-02 | Stop reason: SURG

## 2019-09-02 RX ORDER — OXYCODONE AND ACETAMINOPHEN 7.5; 325 MG/1; MG/1
1 TABLET ORAL ONCE AS NEEDED
Status: DISCONTINUED | OUTPATIENT
Start: 2019-09-02 | End: 2019-09-02

## 2019-09-02 RX ORDER — LIDOCAINE HYDROCHLORIDE 20 MG/ML
INJECTION, SOLUTION INFILTRATION; PERINEURAL AS NEEDED
Status: DISCONTINUED | OUTPATIENT
Start: 2019-09-02 | End: 2019-09-02 | Stop reason: SURG

## 2019-09-02 RX ORDER — HYDROMORPHONE HYDROCHLORIDE 1 MG/ML
0.25 INJECTION, SOLUTION INTRAMUSCULAR; INTRAVENOUS; SUBCUTANEOUS
Status: COMPLETED | OUTPATIENT
Start: 2019-09-02 | End: 2019-09-02

## 2019-09-02 RX ORDER — DIPHENHYDRAMINE HYDROCHLORIDE 50 MG/ML
12.5 INJECTION INTRAMUSCULAR; INTRAVENOUS
Status: DISCONTINUED | OUTPATIENT
Start: 2019-09-02 | End: 2019-09-02

## 2019-09-02 RX ORDER — SODIUM CHLORIDE 0.9 % (FLUSH) 0.9 %
3 SYRINGE (ML) INJECTION EVERY 12 HOURS SCHEDULED
Status: DISCONTINUED | OUTPATIENT
Start: 2019-09-02 | End: 2019-09-02 | Stop reason: HOSPADM

## 2019-09-02 RX ORDER — MAGNESIUM HYDROXIDE 1200 MG/15ML
LIQUID ORAL AS NEEDED
Status: DISCONTINUED | OUTPATIENT
Start: 2019-09-02 | End: 2019-09-02 | Stop reason: HOSPADM

## 2019-09-02 RX ORDER — LABETALOL HYDROCHLORIDE 5 MG/ML
5 INJECTION, SOLUTION INTRAVENOUS
Status: DISCONTINUED | OUTPATIENT
Start: 2019-09-02 | End: 2019-09-02

## 2019-09-02 RX ORDER — ONDANSETRON 2 MG/ML
INJECTION INTRAMUSCULAR; INTRAVENOUS AS NEEDED
Status: DISCONTINUED | OUTPATIENT
Start: 2019-09-02 | End: 2019-09-02 | Stop reason: SURG

## 2019-09-02 RX ORDER — ROCURONIUM BROMIDE 10 MG/ML
INJECTION, SOLUTION INTRAVENOUS AS NEEDED
Status: DISCONTINUED | OUTPATIENT
Start: 2019-09-02 | End: 2019-09-02 | Stop reason: SURG

## 2019-09-02 RX ORDER — HYDRALAZINE HYDROCHLORIDE 20 MG/ML
INJECTION INTRAMUSCULAR; INTRAVENOUS AS NEEDED
Status: DISCONTINUED | OUTPATIENT
Start: 2019-09-02 | End: 2019-09-02 | Stop reason: SURG

## 2019-09-02 RX ORDER — FENTANYL CITRATE 50 UG/ML
INJECTION, SOLUTION INTRAMUSCULAR; INTRAVENOUS AS NEEDED
Status: DISCONTINUED | OUTPATIENT
Start: 2019-09-02 | End: 2019-09-02 | Stop reason: SURG

## 2019-09-02 RX ORDER — EPHEDRINE SULFATE 50 MG/ML
5 INJECTION, SOLUTION INTRAVENOUS ONCE AS NEEDED
Status: DISCONTINUED | OUTPATIENT
Start: 2019-09-02 | End: 2019-09-02

## 2019-09-02 RX ORDER — PROMETHAZINE HYDROCHLORIDE 25 MG/1
25 TABLET ORAL ONCE AS NEEDED
Status: DISCONTINUED | OUTPATIENT
Start: 2019-09-02 | End: 2019-09-02

## 2019-09-02 RX ORDER — LIDOCAINE HYDROCHLORIDE 10 MG/ML
0.5 INJECTION, SOLUTION EPIDURAL; INFILTRATION; INTRACAUDAL; PERINEURAL ONCE AS NEEDED
Status: DISCONTINUED | OUTPATIENT
Start: 2019-09-02 | End: 2019-09-02 | Stop reason: HOSPADM

## 2019-09-02 RX ORDER — PROMETHAZINE HYDROCHLORIDE 25 MG/ML
12.5 INJECTION, SOLUTION INTRAMUSCULAR; INTRAVENOUS ONCE AS NEEDED
Status: DISCONTINUED | OUTPATIENT
Start: 2019-09-02 | End: 2019-09-02

## 2019-09-02 RX ORDER — FENTANYL CITRATE 50 UG/ML
50 INJECTION, SOLUTION INTRAMUSCULAR; INTRAVENOUS
Status: DISCONTINUED | OUTPATIENT
Start: 2019-09-02 | End: 2019-09-02

## 2019-09-02 RX ORDER — DIPHENHYDRAMINE HCL 25 MG
25 CAPSULE ORAL
Status: DISCONTINUED | OUTPATIENT
Start: 2019-09-02 | End: 2019-09-02

## 2019-09-02 RX ORDER — FAMOTIDINE 10 MG/ML
20 INJECTION, SOLUTION INTRAVENOUS ONCE
Status: COMPLETED | OUTPATIENT
Start: 2019-09-02 | End: 2019-09-02

## 2019-09-02 RX ORDER — FENTANYL CITRATE 50 UG/ML
INJECTION, SOLUTION INTRAMUSCULAR; INTRAVENOUS
Status: COMPLETED
Start: 2019-09-02 | End: 2019-09-02

## 2019-09-02 RX ADMIN — HYDROMORPHONE HYDROCHLORIDE 0.25 MG: 1 INJECTION, SOLUTION INTRAMUSCULAR; INTRAVENOUS; SUBCUTANEOUS at 11:10

## 2019-09-02 RX ADMIN — HYDRALAZINE HYDROCHLORIDE 4 MG: 20 INJECTION INTRAMUSCULAR; INTRAVENOUS at 09:38

## 2019-09-02 RX ADMIN — SODIUM CHLORIDE 100 ML/HR: 9 INJECTION, SOLUTION INTRAVENOUS at 18:35

## 2019-09-02 RX ADMIN — HYDROMORPHONE HYDROCHLORIDE 0.25 MG: 1 INJECTION, SOLUTION INTRAMUSCULAR; INTRAVENOUS; SUBCUTANEOUS at 10:55

## 2019-09-02 RX ADMIN — HYDROMORPHONE HYDROCHLORIDE 0.25 MG: 1 INJECTION, SOLUTION INTRAMUSCULAR; INTRAVENOUS; SUBCUTANEOUS at 11:15

## 2019-09-02 RX ADMIN — EPHEDRINE SULFATE 5 MG: 50 INJECTION INTRAMUSCULAR; INTRAVENOUS; SUBCUTANEOUS at 09:26

## 2019-09-02 RX ADMIN — GLYCOPYRROLATE 0.4 MG: 0.2 INJECTION INTRAMUSCULAR; INTRAVENOUS at 10:12

## 2019-09-02 RX ADMIN — NEOSTIGMINE METHYLSULFATE 4 MG: 1 INJECTION INTRAMUSCULAR; INTRAVENOUS; SUBCUTANEOUS at 10:12

## 2019-09-02 RX ADMIN — ONDANSETRON 4 MG: 2 INJECTION INTRAMUSCULAR; INTRAVENOUS at 10:12

## 2019-09-02 RX ADMIN — EPHEDRINE SULFATE 10 MG: 50 INJECTION INTRAMUSCULAR; INTRAVENOUS; SUBCUTANEOUS at 09:16

## 2019-09-02 RX ADMIN — ROCURONIUM BROMIDE 40 MG: 10 INJECTION INTRAVENOUS at 09:08

## 2019-09-02 RX ADMIN — SODIUM CHLORIDE, POTASSIUM CHLORIDE, SODIUM LACTATE AND CALCIUM CHLORIDE 9 ML/HR: 600; 310; 30; 20 INJECTION, SOLUTION INTRAVENOUS at 08:48

## 2019-09-02 RX ADMIN — AMLODIPINE BESYLATE 5 MG: 5 TABLET ORAL at 13:46

## 2019-09-02 RX ADMIN — HYDROMORPHONE HYDROCHLORIDE 0.25 MG: 1 INJECTION, SOLUTION INTRAMUSCULAR; INTRAVENOUS; SUBCUTANEOUS at 10:50

## 2019-09-02 RX ADMIN — DEXAMETHASONE SODIUM PHOSPHATE 8 MG: 10 INJECTION INTRAMUSCULAR; INTRAVENOUS at 09:34

## 2019-09-02 RX ADMIN — LIDOCAINE HYDROCHLORIDE 100 MG: 20 INJECTION, SOLUTION INFILTRATION; PERINEURAL at 09:08

## 2019-09-02 RX ADMIN — PROPOFOL 150 MG: 10 INJECTION, EMULSION INTRAVENOUS at 09:08

## 2019-09-02 RX ADMIN — FENTANYL CITRATE 50 MCG: 50 INJECTION INTRAMUSCULAR; INTRAVENOUS at 10:45

## 2019-09-02 RX ADMIN — FAMOTIDINE 20 MG: 10 INJECTION INTRAVENOUS at 08:48

## 2019-09-02 RX ADMIN — FENTANYL CITRATE 50 MCG: 50 INJECTION INTRAMUSCULAR; INTRAVENOUS at 09:36

## 2019-09-02 RX ADMIN — FENTANYL CITRATE 50 MCG: 50 INJECTION INTRAMUSCULAR; INTRAVENOUS at 11:25

## 2019-09-02 RX ADMIN — HYDRALAZINE HYDROCHLORIDE 4 MG: 20 INJECTION INTRAMUSCULAR; INTRAVENOUS at 10:04

## 2019-09-02 RX ADMIN — HYDROCODONE BITARTRATE AND ACETAMINOPHEN 1 TABLET: 5; 325 TABLET ORAL at 21:02

## 2019-09-02 RX ADMIN — FENTANYL CITRATE 50 MCG: 50 INJECTION INTRAMUSCULAR; INTRAVENOUS at 11:00

## 2019-09-02 NOTE — ANESTHESIA PREPROCEDURE EVALUATION
Anesthesia Evaluation     Patient summary reviewed and Nursing notes reviewed   NPO Solid Status: > 8 hours  NPO Liquid Status: > 2 hours           Airway   Mallampati: II  Dental - normal exam     Pulmonary - normal exam   Cardiovascular - normal exam    ECG reviewed  PT is on anticoagulation therapy    (+) hypertension, CAD, cardiac stents Drug eluting stent angina, hyperlipidemia,       Neuro/Psych  (+) tremors, psychiatric history Anxiety and Depression,     GI/Hepatic/Renal/Endo    (+)  GERD,      Musculoskeletal     Abdominal    Substance History      OB/GYN          Other      history of cancer                  Anesthesia Plan    ASA 3     general

## 2019-09-02 NOTE — OP NOTE
Surgeon: Bg Rodriguez Jr., M.D.    Assistant: Bg Rodriguez MD    Pre-Operative Diagnosis:     Cholelithiasis with cholecystitis    Post-Operative Diagnosis:    Cholelithiasis with cholecystitis and choledocholithiasis    Procedure Performed:     Laparoscopic cholecystectomy with intraoperative cholangiogram    Indications:     The patient is a pleasant 78-year-old male who presented to the emergency room with epigastric and right upper quadrant abdominal pain.  Right upper quadrant ultrasound showed gallstones.  A CT scan of the abdomen and pelvis showed gallstones and a dilated common bile duct.  He had elevation of his liver function test.  He had a benign abdomen but was on Plavix and was admitted while he was off Plavix long enough for a safe surgery.  He now presents for laparoscopic cholecystectomy with intraoperative cholangiogram.  The patient understands the indications, alternatives, risks, and benefits of the procedure and wishes to proceed.    Procedure:     The patient was identified and taken to the operating room where he was placed in the supine position on the operating table.  Monitors were placed and he underwent general endotracheal anesthesia and was appropriately monitored throughout the case by the anesthesia personnel.  The abdomen was prepped and draped in the standard surgical fashion.  Each incision was infiltrated with 0.5% Marcaine with epinephrine prior to making the incision.  A supraumbilical incision was made and carried through the skin into the subcutaneous tissue.  The abdominal wall was elevated with skin hooks and a Veress needle was inserted through the incision into the abdomen without difficulty.  The abdomen was then insufflated with low pressures encountered initially.  Once fully insufflated, the Veress needle was removed and a 5 mm port was placed in the same incision, again with traction applied to the abdominal wall using skin hooks.  The laparoscope was inserted  and the area of Veress needle and port insertion was inspected, no injury had occurred.  Attention was then turned to the upper abdomen.  A 10 mm subxiphoid port was placed by making skin incision carried through the skin into the subcutaneous tissue and inserting the port through the incision into the abdomen with direct visualization using the laparoscope.  Two 5 mm ports were placed in the right upper quadrant in the same fashion.  The liver was cirrhotic and the edge was carefully elevated and the gallbladder was visualized.  The gallbladder had a thickened wall consistent with cholecystitis.  The gallbladder was grasped and elevated over the liver.  Adhesions were taken down with blunt dissection.  The gallbladder was then grasped at the infundibulum and traction was applied to open the triangle of Calot.  The triangle of Calot was carefully and meticulously dissected.  The cystic duct was identified and surrounded.  A clip was placed at the infundibulum cystic duct junction.  A cholangiocatheter was introduced into the abdomen through an Angiocath.  A small incision was created in the cystic duct with scissors and the cholangiocatheter was inserted into the cystic duct.  It was secured with a clip.  A cholangiogram was then performed that confirmed our impression of the anatomy, showed a mildly dilated common bile duct with multiple filling defects distally, and rapid drainage of contrast material into the duodenum.  The cholangiocatheter was removed and the cystic duct was divided after placing 2 clips proximally and dividing with the scissors.  The cystic artery was also identified, surrounded, and divided after placing 2 clips proximally and dividing distally using Bovie electrocautery.  The gallbladder was removed from the gallbladder fossa using Bovie electrocautery.  It was then passed out of the abdomen through the epigastric port site in an Endo Catch bag.  The gallbladder fossa was carefully inspected  and and noted to have mild oozing, likely related to the cirrhosis.  It was cauterized to control the bleeding and then treated with Mirian.  Hemostasis was noted be adequate.  The abdomen was deflated and all ports were removed.  The fascia of the subxiphoid port was closed with an 0 vicryl suture in a figure-of-eight fashion.  All skin incisions were closed with a 4-0 Monocryl in a subcuticular fashion followed by benzoin and Steri-Strips.  The sponge, needle, and instrument counts were correct at the end of the case.  The patient tolerated the procedure well and was transferred to the recovery room in stable condition.    Estimated Blood Loss:      minimal    Specimens:       Order Name Source Comment Collection Info Order Time   TISSUE PATHOLOGY EXAM Gallbladder  Collected By: Bg Rodriguez Jr., MD 9/2/2019  9:43 AM       Bg Rodriguez Jr., M.D.

## 2019-09-02 NOTE — PROGRESS NOTES
Name: Sukhdev Zayas ADMIT: 2019   : 1940  PCP: Rachelle Patton PA-C    MRN: [id number] LOS: 1 days   AGE/SEX: 78 y.o. male  ROOM: \[facility] [place] [facility]\""     Subjective   Subjective   CC: abdominal discomfort  No acute events. Patient had laparoscopic cholecystectomy today and tolerated well.  He had some abdominal discomfort following the procedure. He is still quite drowsy. No f/c/CP/dyspnea.    Objective   Objective   Vital Signs  Temp:  [96.1 °F (35.6 °C)-98.4 °F (36.9 °C)] 96.9 °F (36.1 °C)  Heart Rate:  [65-98] 97  Resp:  [9-18] 16  BP: (138-178)/() 152/84  SpO2:  [93 %-98 %] 95 %  on  Flow (L/min):  [2-6] 2;   Device (Oxygen Therapy): nasal cannula  Body mass index is 25.02 kg/m².  Physical Exam   Constitutional: He is oriented to person, place, and time. He appears lethargic. No distress.   HENT:   Head: Normocephalic and atraumatic.   Mouth/Throat: Oropharynx is clear and moist.   Eyes: Conjunctivae and EOM are normal. Pupils are equal, round, and reactive to light.   Neck: Normal range of motion. Neck supple.   Cardiovascular: Normal rate, regular rhythm and intact distal pulses.   Pulmonary/Chest: Effort normal and breath sounds normal. He has no rales.   Abdominal: Soft. Bowel sounds are normal. There is tenderness (appropriate postop). There is no rebound and no guarding.   Musculoskeletal: He exhibits no edema or tenderness.   Neurological: He is oriented to person, place, and time. He appears lethargic. No cranial nerve deficit.   Awakens to voice   Skin: Skin is warm and dry. He is not diaphoretic.   Psychiatric: He has a normal mood and affect. His behavior is normal.   Nursing note and vitals reviewed.      Results Review:       I reviewed the patient's new clinical results.  Results from last 7 days   Lab Units 19  0548 19  0454 19  0639 19  2147   WBC 10*3/mm3 4.94 5.27 5.41 6.96   HEMOGLOBIN g/dL 12.2* 12.1* 12.3* 14.0   PLATELETS 10*3/mm3 183 215 238 263      Results from last 7 days   Lab Units 09/02/19  0548 09/01/19  0454 08/31/19 0639 08/30/19  2147   SODIUM mmol/L 141 135* 140 137   POTASSIUM mmol/L 3.9 3.7 3.9 4.1   CHLORIDE mmol/L 107 105 104 102   CO2 mmol/L 26.0 22.4 22.7 23.7   BUN mg/dL 7* 7* 12 14   CREATININE mg/dL 0.84 0.80 0.83 1.00   GLUCOSE mg/dL 80 83 82 119*   Estimated Creatinine Clearance: 85.8 mL/min (by C-G formula based on SCr of 0.84 mg/dL).  Results from last 7 days   Lab Units 09/02/19  0548 09/01/19 0454 08/30/19 2147 08/30/19  1707   ALBUMIN g/dL 3.50 3.60 4.20 4.00   BILIRUBIN mg/dL 1.8* 3.2* 3.6* 3.2*   ALK PHOS U/L 500* 503* 528* 501*   AST (SGOT) U/L 278* 370* 531* 576*   ALT (SGPT) U/L 308* 346* 451* 456*     Results from last 7 days   Lab Units 09/02/19  0548 09/01/19 0454 08/31/19 0639 08/30/19 2147 08/30/19  1707   CALCIUM mg/dL 8.7 8.7 8.9 9.2 9.2   ALBUMIN g/dL 3.50 3.60  --  4.20 4.00       No results found for: HGBA1C, POCGLU      amLODIPine 5 mg Oral QAM   aspirin 81 mg Oral Daily   atorvastatin 40 mg Oral QAM   FLUoxetine 60 mg Oral QAM   polyethylene glycol 17 g Oral Daily   sodium chloride 10 mL Intravenous Q12H       sodium chloride 100 mL/hr Last Rate: 100 mL/hr (09/01/19 0741)   NPO Diet       Assessment/Plan     Active Hospital Problems    Diagnosis  POA   • Right upper quadrant pain [R10.11]  Yes   • Calculus of gallbladder without cholecystitis [K80.20]  Yes   • Elevated LFTs [R94.5]  Yes   • BPH (benign prostatic hyperplasia) [N40.0]  Yes   • Bladder cancer (CMS/HCC) [C67.9]  Yes   • Diastolic dysfunction [I51.9]  Yes   • GERD (gastroesophageal reflux disease) [K21.9]  Yes   • S/P drug eluting coronary stent placement [Z95.5]  Not Applicable   • Coronary artery disease involving native coronary artery of native heart without angina pectoris [I25.10]  Yes      Resolved Hospital Problems   No resolved problems to display.   Symptomatic Cholelithiasis  - no e/o cholecystitis  - s/p laparoscopic cholecystectomy  9/2/19-intraoperative cholangiogram is positive and GI is consulted to evaluate for ERCP  - appreciate general surgery recs     Elevated LFTs  - secondary to above  - Improving.      ESBL Colonization  - no subjective UTI symptoms or evidence of infection  - no abx warranted at this time  - appreciate ID recs     CAD  - s/p ANJALI x2 8/2018  - plavix held, on ASA  - follows with Dr. Proctor  - appreciate cardiology recs     BPH/Bladder Cancer  - finished BCG 3/6/19  - s/p TURP 7/11/19  - no urinary symptoms currently      VTE Prophylaxis - SCDs  Code Status - Full code  Disposition - Anticipate discharge home with family later this week.      Jose Salinas MD  [facility]  09/02/19  4:36 PM

## 2019-09-02 NOTE — ANESTHESIA PROCEDURE NOTES
Airway  Urgency: elective    Date/Time: 9/2/2019 9:11 AM  Airway not difficult    General Information and Staff    Patient location during procedure: OR  Anesthesiologist: Jayleen Donis MD  CRNA: Betzaida Harrington CRNA    Indications and Patient Condition  Indications for airway management: airway protection    Preoxygenated: yes  MILS not maintained throughout  Mask difficulty assessment: 1 - vent by mask    Final Airway Details  Final airway type: endotracheal airway      Successful airway: ETT  Cuffed: yes   Successful intubation technique: direct laryngoscopy  Endotracheal tube insertion site: oral  Blade: Thalia  Blade size: 3  ETT size (mm): 8.0  Cormack-Lehane Classification: grade I - full view of glottis  Placement verified by: chest auscultation and capnometry   Cuff volume (mL): 8  Measured from: lips  ETT to lips (cm): 23  Number of attempts at approach: 1    Additional Comments  Pt preoxygenated prior to induction, easy mask airway, atraumatic intubation,+ ETCO2, + bs bilat,  ETT secured and connected to ventilator.

## 2019-09-02 NOTE — ANESTHESIA POSTPROCEDURE EVALUATION
"Patient: Sukhdev Zayas    Procedure Summary     Date:  09/02/19 Room / Location:  [facility] OR [address] [place] [address] MAIN OR    Anesthesia Start:  0902 Anesthesia Stop:  1031    Procedure:  CHOLECYSTECTOMY LAPAROSCOPIC WITH INTRAOPERATIVE CHOLANGIOGRAM (N/A Abdomen) Diagnosis:      Surgeon:  Bg Rodriguez Jr., MD Provider:  Jayleen Donis MD    Anesthesia Type:  general ASA Status:  3          Anesthesia Type: general  Last vitals  BP   153/85 (09/02/19 1130)   Temp   36.6 °C (97.8 °F) (09/02/19 1026)   Pulse   98 (09/02/19 1130)   Resp   16 (09/02/19 1130)     SpO2   94 % (09/02/19 1130)     Post Anesthesia Care and Evaluation    Patient location during evaluation: PACU  Patient participation: complete - patient participated  Level of consciousness: awake  Pain management: adequate  Airway patency: patent  Anesthetic complications: No anesthetic complications    Cardiovascular status: acceptable  Respiratory status: acceptable  Hydration status: acceptable    Comments: /85   Pulse 98   Temp 36.6 °C (97.8 °F) (Oral)   Resp 16   Ht 182.9 cm (72\")   Wt 83.7 kg (184 lb 8 oz)   SpO2 94%   BMI 25.02 kg/m²       "

## 2019-09-03 ENCOUNTER — APPOINTMENT (OUTPATIENT)
Dept: GENERAL RADIOLOGY | Facility: HOSPITAL | Age: 79
End: 2019-09-03

## 2019-09-03 ENCOUNTER — TELEPHONE (OUTPATIENT)
Dept: INTERNAL MEDICINE | Facility: CLINIC | Age: 79
End: 2019-09-03

## 2019-09-03 ENCOUNTER — ANESTHESIA (OUTPATIENT)
Dept: GASTROENTEROLOGY | Facility: HOSPITAL | Age: 79
End: 2019-09-03

## 2019-09-03 ENCOUNTER — ANESTHESIA EVENT (OUTPATIENT)
Dept: GASTROENTEROLOGY | Facility: HOSPITAL | Age: 79
End: 2019-09-03

## 2019-09-03 PROBLEM — I48.91 ATRIAL FIBRILLATION: Status: ACTIVE | Noted: 2019-09-03

## 2019-09-03 PROBLEM — K80.42 CALCULUS OF BILE DUCT WITH ACUTE CHOLECYSTITIS WITHOUT OBSTRUCTION: Status: ACTIVE | Noted: 2019-08-30

## 2019-09-03 LAB
ALBUMIN SERPL-MCNC: 3.5 G/DL (ref 3.5–5.2)
ALBUMIN/GLOB SERPL: 1.6 G/DL
ALP SERPL-CCNC: 637 U/L (ref 39–117)
ALT SERPL W P-5'-P-CCNC: 399 U/L (ref 1–41)
ANION GAP SERPL CALCULATED.3IONS-SCNC: 12.6 MMOL/L (ref 5–15)
AST SERPL-CCNC: 558 U/L (ref 1–40)
BILIRUB SERPL-MCNC: 4.7 MG/DL (ref 0.2–1.2)
BUN BLD-MCNC: 11 MG/DL (ref 8–23)
BUN/CREAT SERPL: 13.8 (ref 7–25)
CALCIUM SPEC-SCNC: 8.8 MG/DL (ref 8.6–10.5)
CHLORIDE SERPL-SCNC: 102 MMOL/L (ref 98–107)
CO2 SERPL-SCNC: 21.4 MMOL/L (ref 22–29)
CREAT BLD-MCNC: 0.8 MG/DL (ref 0.76–1.27)
GFR SERPL CREATININE-BSD FRML MDRD: 93 ML/MIN/1.73
GLOBULIN UR ELPH-MCNC: 2.2 GM/DL
GLUCOSE BLD-MCNC: 130 MG/DL (ref 65–99)
POTASSIUM BLD-SCNC: 3.6 MMOL/L (ref 3.5–5.2)
PROT SERPL-MCNC: 5.7 G/DL (ref 6–8.5)
SODIUM BLD-SCNC: 136 MMOL/L (ref 136–145)
TROPONIN T SERPL-MCNC: <0.01 NG/ML (ref 0–0.03)

## 2019-09-03 PROCEDURE — 43262 ENDO CHOLANGIOPANCREATOGRAPH: CPT | Performed by: INTERNAL MEDICINE

## 2019-09-03 PROCEDURE — 25010000002 PROPOFOL 10 MG/ML EMULSION: Performed by: ANESTHESIOLOGY

## 2019-09-03 PROCEDURE — 99222 1ST HOSP IP/OBS MODERATE 55: CPT | Performed by: INTERNAL MEDICINE

## 2019-09-03 PROCEDURE — 25010000002 ONDANSETRON PER 1 MG: Performed by: NURSE PRACTITIONER

## 2019-09-03 PROCEDURE — 0FC98ZZ EXTIRPATION OF MATTER FROM COMMON BILE DUCT, VIA NATURAL OR ARTIFICIAL OPENING ENDOSCOPIC: ICD-10-PCS | Performed by: INTERNAL MEDICINE

## 2019-09-03 PROCEDURE — 25010000002 HYDROMORPHONE PER 4 MG: Performed by: INTERNAL MEDICINE

## 2019-09-03 PROCEDURE — 84484 ASSAY OF TROPONIN QUANT: CPT | Performed by: INTERNAL MEDICINE

## 2019-09-03 PROCEDURE — 25010000002 IOPAMIDOL 61 % SOLUTION: Performed by: INTERNAL MEDICINE

## 2019-09-03 PROCEDURE — 93005 ELECTROCARDIOGRAM TRACING: CPT | Performed by: ANESTHESIOLOGY

## 2019-09-03 PROCEDURE — C1769 GUIDE WIRE: HCPCS | Performed by: INTERNAL MEDICINE

## 2019-09-03 PROCEDURE — 74328 X-RAY BILE DUCT ENDOSCOPY: CPT

## 2019-09-03 PROCEDURE — 99024 POSTOP FOLLOW-UP VISIT: CPT | Performed by: SURGERY

## 2019-09-03 PROCEDURE — 43264 ERCP REMOVE DUCT CALCULI: CPT | Performed by: INTERNAL MEDICINE

## 2019-09-03 PROCEDURE — 80053 COMPREHEN METABOLIC PANEL: CPT | Performed by: INTERNAL MEDICINE

## 2019-09-03 PROCEDURE — 93010 ELECTROCARDIOGRAM REPORT: CPT | Performed by: INTERNAL MEDICINE

## 2019-09-03 RX ORDER — SODIUM CHLORIDE, SODIUM LACTATE, POTASSIUM CHLORIDE, CALCIUM CHLORIDE 600; 310; 30; 20 MG/100ML; MG/100ML; MG/100ML; MG/100ML
INJECTION, SOLUTION INTRAVENOUS CONTINUOUS PRN
Status: DISCONTINUED | OUTPATIENT
Start: 2019-09-03 | End: 2019-09-03 | Stop reason: SURG

## 2019-09-03 RX ORDER — NITROGLYCERIN 0.4 MG/1
0.4 TABLET SUBLINGUAL
Status: DISCONTINUED | OUTPATIENT
Start: 2019-09-03 | End: 2019-09-05 | Stop reason: HOSPADM

## 2019-09-03 RX ORDER — PROPOFOL 10 MG/ML
VIAL (ML) INTRAVENOUS AS NEEDED
Status: DISCONTINUED | OUTPATIENT
Start: 2019-09-03 | End: 2019-09-03 | Stop reason: SURG

## 2019-09-03 RX ORDER — CHOLECALCIFEROL (VITAMIN D3) 125 MCG
5 CAPSULE ORAL NIGHTLY PRN
Status: DISCONTINUED | OUTPATIENT
Start: 2019-09-03 | End: 2019-09-05 | Stop reason: HOSPADM

## 2019-09-03 RX ORDER — SODIUM CHLORIDE 9 MG/ML
30 INJECTION, SOLUTION INTRAVENOUS CONTINUOUS PRN
Status: DISCONTINUED | OUTPATIENT
Start: 2019-09-03 | End: 2019-09-05 | Stop reason: HOSPADM

## 2019-09-03 RX ORDER — LIDOCAINE HYDROCHLORIDE 20 MG/ML
INJECTION, SOLUTION INFILTRATION; PERINEURAL AS NEEDED
Status: DISCONTINUED | OUTPATIENT
Start: 2019-09-03 | End: 2019-09-03 | Stop reason: SURG

## 2019-09-03 RX ORDER — ASPIRIN 81 MG/1
81 TABLET, CHEWABLE ORAL DAILY
Status: DISCONTINUED | OUTPATIENT
Start: 2019-09-03 | End: 2019-09-05 | Stop reason: HOSPADM

## 2019-09-03 RX ADMIN — HYDROMORPHONE HYDROCHLORIDE 0.5 MG: 1 INJECTION, SOLUTION INTRAMUSCULAR; INTRAVENOUS; SUBCUTANEOUS at 13:00

## 2019-09-03 RX ADMIN — LIDOCAINE HYDROCHLORIDE 100 MG: 20 INJECTION, SOLUTION INFILTRATION; PERINEURAL at 18:20

## 2019-09-03 RX ADMIN — SODIUM CHLORIDE 100 ML/HR: 9 INJECTION, SOLUTION INTRAVENOUS at 14:29

## 2019-09-03 RX ADMIN — HYDROMORPHONE HYDROCHLORIDE 0.5 MG: 1 INJECTION, SOLUTION INTRAMUSCULAR; INTRAVENOUS; SUBCUTANEOUS at 07:42

## 2019-09-03 RX ADMIN — ONDANSETRON 4 MG: 2 INJECTION INTRAMUSCULAR; INTRAVENOUS at 21:05

## 2019-09-03 RX ADMIN — HYDROMORPHONE HYDROCHLORIDE 0.5 MG: 1 INJECTION, SOLUTION INTRAMUSCULAR; INTRAVENOUS; SUBCUTANEOUS at 10:30

## 2019-09-03 RX ADMIN — SODIUM CHLORIDE, PRESERVATIVE FREE 10 ML: 5 INJECTION INTRAVENOUS at 08:56

## 2019-09-03 RX ADMIN — SODIUM CHLORIDE, PRESERVATIVE FREE 10 ML: 5 INJECTION INTRAVENOUS at 23:14

## 2019-09-03 RX ADMIN — HYDROMORPHONE HYDROCHLORIDE 0.5 MG: 1 INJECTION, SOLUTION INTRAMUSCULAR; INTRAVENOUS; SUBCUTANEOUS at 21:05

## 2019-09-03 RX ADMIN — HYDROMORPHONE HYDROCHLORIDE 0.5 MG: 1 INJECTION, SOLUTION INTRAMUSCULAR; INTRAVENOUS; SUBCUTANEOUS at 23:05

## 2019-09-03 RX ADMIN — SODIUM CHLORIDE, PRESERVATIVE FREE 10 ML: 5 INJECTION INTRAVENOUS at 16:58

## 2019-09-03 RX ADMIN — PROPOFOL 300 MG: 10 INJECTION, EMULSION INTRAVENOUS at 18:23

## 2019-09-03 RX ADMIN — SODIUM CHLORIDE, POTASSIUM CHLORIDE, SODIUM LACTATE AND CALCIUM CHLORIDE: 600; 310; 30; 20 INJECTION, SOLUTION INTRAVENOUS at 18:15

## 2019-09-03 RX ADMIN — SODIUM CHLORIDE 100 ML/HR: 9 INJECTION, SOLUTION INTRAVENOUS at 16:58

## 2019-09-03 NOTE — BRIEF OP NOTE
ENDOSCOPIC RETROGRADE CHOLANGIOPANCREATOGRAPHY  Progress Note    Sukhdev Zayas  9/3/2019    Pre-op Diagnosis:   Calculus of bile duct with acute cholecystitis without obstruction [K80.42]       Post-Op Diagnosis Codes:     * Calculus of bile duct with acute cholecystitis without obstruction [K80.42]    Procedure/CPT® Codes:      Procedure(s):  ENDOSCOPIC RETROGRADE CHOLANGIOPANCREATOGRAPHY with sphincterotomy and balloon sweep    Surgeon(s):  Ashok Amaya MD    Anesthesia: Monitored Anesthesia Care    Staff:   Radiology Technologist: Karina Byrnes  Endo Technician: Cindy Mckeon RN  Endo Nurse: Rhoda Stanton RN    Estimated Blood Loss: minimal    Urine Voided: * No values recorded between 9/3/2019  6:17 PM and 9/3/2019  6:49 PM *    Specimens:                None          Drains:      Findings: ERCP with sphincterotomy balloon stone extraction with several stones and sludge removed, noted in Afib during procedure.    Complications: None      Ashok Amaya MD     Date: 9/3/2019  Time: 6:58 PM

## 2019-09-03 NOTE — PROGRESS NOTES
Discharge Planning Assessment  [facility]     Patient Name: Sukhdev Zayas  MRN: [id number]  Today's Date: 9/3/2019    Admit Date: 8/30/2019    Discharge Needs Assessment     Row Name 09/03/19 0759       Living Environment    Lives With  spouse    Name(s) of Who Lives With Patient  wife Taty Zayas [phone]    Current Living Arrangements  home/apartment/condo    Primary Care Provided by  self;spouse/significant other    Provides Primary Care For  no one, unable/limited ability to care for self    Family Caregiver if Needed  spouse    Family Caregiver Names  wife Taty Zayas [phone]    Quality of Family Relationships  helpful;involved;supportive    Able to Return to Prior Arrangements  yes       Resource/Environmental Concerns    Resource/Environmental Concerns  home accessibility    Home Accessibility Concerns  stairs to enter home 9 steps with 2 handrails to enter the single story home with 13 steps and 1 handrail to get to the basement of the home.      Transportation Concerns  car, none       Transition Planning    Patient/Family Anticipates Transition to  home with family    Transportation Anticipated  family or friend will provide       Discharge Needs Assessment    Concerns to be Addressed  discharge planning    Equipment Currently Used at Home  none    Anticipated Changes Related to Illness  none    Equipment Needed After Discharge  none    Offered/Gave Vendor List  yes    Patient's Choice of Community Agency(s)  HH/SNF list provided to patient's wife.     Current Discharge Risk  physical impairment        Discharge Plan     Row Name 09/03/19 8704       Plan    Plan  Plans home;  follow for any P.T. eval and recommendations and will assist with any d/c needs that may arise as the patient and his wife currently deny any d/c needs.     Patient/Family in Agreement with Plan  yes    Plan Comments  Met with the patient and his wife Taty Zayas [phone]; explained role of CCP,  checked IMM, verified facesheet, discussed IMM (copy provided to the patient's spouse, copy placed in HIM basket and copy placed in patient's chart) and discussed discharge planning needs.  The patient resides at home with his spouse who can assist him as needed.  The patient has 9 steps with 2 handrails to enter the single story home with 13 steps and 1 handrail to get to the basement of the home.  The patient's PCP is Rachelle Patton, pharmacy is [facility] on [address] and [address].  The patient's wife assists him with remembering to take his medication and arranging his medication in his pill box.  The patient denies any HH/SNF history, was provided with a HH/SNF list and  currently denies any HH/SNF needs.  The patient was encouraged to bring his living will to [facility] to get scanned into [facility].  The patient still drives himself to appointments but states that his wife will transport him home upon d/c.  Spoke to [facility] liaison Iesha per patient's wife request to see if the MD will request a P.T. eval or if the MD himself will discuss with the patient and his wife perimeters for ambulation (too much vs. not enough) considering all of the patient's recent surgeries and hospitalizations.  CCP will follow for any P.T. eval and recommendations and will assist with any d/c needs that may arise as the patient and his wife currently deny any d/c needs. TOMMY Menjivar        Destination      No service coordination in this encounter.      Durable Medical Equipment      No service coordination in this encounter.      Dialysis/Infusion      No service coordination in this encounter.      Home Medical Care      No service coordination in this encounter.      Therapy      No service coordination in this encounter.      Community Resources      No service coordination in this encounter.          Demographic Summary     Row Name 09/03/19 1230       General Information    Admission Type  inpatient     Arrived From  home    Required Notices Provided  Important Message from Medicare    Referral Source  admission list    Reason for Consult  discharge planning    Preferred Language  English     Used During This Interaction  no        Functional Status     Row Name 09/03/19 1350       Functional Status    Usual Activity Tolerance  moderate    Current Activity Tolerance  fair       Functional Status, IADL    Medications  independent    Meal Preparation  independent    Housekeeping  independent    Laundry  independent    Shopping  independent       Mental Status    General Appearance WDL  WDL       Mental Status Summary    Recent Changes in Mental Status/Cognitive Functioning  no changes        Psychosocial    No documentation.       Abuse/Neglect    No documentation.       Legal    No documentation.       Substance Abuse    No documentation.       Patient Forms     Row Name 09/03/19 1327       Patient Forms    Provider Choice List  Delivered    Delivered to  Support person wife Taty Zayas [phone]    Method of delivery  In person    Important Message from Medicare (IMM)  Delivered    Delivered to  Support person wife Virginia Chaan [phone]    Method of delivery  In person            TOMMY Rehman

## 2019-09-03 NOTE — CONSULTS
[facility]  Initial Inpatient Consult Note    Referring Provider: Dr. Bg Rodriguez    Reason for Consultation: Bile duct calculus    Subjective     History of present illness:    78 y.o. male with history of bladder cancer, coronary artery disease, hyperlipidemia hypertension who presented with 3-day history of epigastric and right upper quadrant pain.  Patient noted worsening pain with nausea but no vomiting and presented to the emergency room.  Ultrasound showed gallstones and CT with mildly dilated biliary tree.  Patient noted with elevated LFTs underwent laparoscopic cholecystectomy yesterday.  Intra-Op cholangiogram revealed multiple small common duct stones referred now for evaluation.  Patient complaining of only incisional pain otherwise doing well with no fevers or chills.    Past Medical History:  Past Medical History:   Diagnosis Date   • Anxiety    • Back pain    • Bladder cancer (CMS/HCC)    • Coronary artery disease    • Depression    • Dizziness    • Fatigue    • GERD (gastroesophageal reflux disease)    • History of fractured rib     RIGHT SIDE   • Hyperlipidemia    • Hypertension    • Tremors of nervous system    • Urinary incontinence      Past Surgical History:  Past Surgical History:   Procedure Laterality Date   • CARDIAC CATHETERIZATION      7x   • CATARACT EXTRACTION, BILATERAL     • COLONOSCOPY     • CORONARY ANGIOPLASTY WITH STENT PLACEMENT      X5    • CYSTOSCOPY BLADDER BIOPSY N/A 1/8/2019    Procedure: CYSTOSCOPY BLADDER BIOPSY;  Surgeon: Wang Soares Jr., MD;  Location: [facility] OR;  Service: Urology   • CYSTOSCOPY BLADDER BIOPSY N/A 6/13/2019    Procedure: CYSTOSCOPY BLADDER BIOPSY;  Surgeon: Wang Soares Jr., MD;  Location: [facility] OR;  Service: Urology   • CYSTOSCOPY TRANSURETHRAL RESECTION OF PROSTATE N/A 7/11/2019    Procedure: CYSTOSCOPY TRANSURETHRAL RESECTION OF PROSTATE;  Surgeon: Wang Soares Jr., MD;  Location: [facility]  MAIN OR;  Service: Urology   • CYSTOSCOPY URETEROSCOPY LASER LITHOTRIPSY N/A 6/13/2019    Procedure: CYSTO LITHOPAXY;  Surgeon: Wang Soares Jr., MD;  Location: [facility] MAIN OR;  Service: Urology   • EYE SURGERY     • SKIN CANCER EXCISION Left     chest wall   • TRANSURETHRAL RESECTION OF BLADDER TUMOR N/A 11/29/2018    Procedure: TUR BLADDER TUMOR  LARGE;  Surgeon: Wang Soares Jr., MD;  Location: [facility] MAIN OR;  Service: Urology      Social History:   Social History     Tobacco Use   • Smoking status: Never Smoker   • Smokeless tobacco: Never Used   Substance Use Topics   • Alcohol use: No     Frequency: Never      Family History:  Family History   Problem Relation Age of Onset   • Arthritis Mother    • Glaucoma Mother    • Heart disease Father    • Emphysema Father    • Tremor Father    • Malig Hyperthermia Neg Hx        Home Meds:  Medications Prior to Admission   Medication Sig Dispense Refill Last Dose   • acetaminophen (TYLENOL) 325 MG tablet Take 650 mg by mouth Every 6 (Six) Hours As Needed for Mild Pain .   Past Month at Unknown time   • atorvastatin (LIPITOR) 40 MG tablet Take 40 mg by mouth Every Morning.   Past Week at Unknown time   • clopidogrel (PLAVIX) 75 MG tablet Take 75 mg by mouth Daily. Not taking for the next week   Past Week at Unknown time   • esomeprazole (nexIUM) 40 MG capsule Take 40 mg by mouth Every Morning Before Breakfast.   Past Week at Unknown time   • FLUoxetine (PROzac) 20 MG capsule Take 20 mg by mouth Daily.   Past Week at Unknown time   • FLUoxetine (PROzac) 40 MG capsule Take 40 mg by mouth Every Morning. Patient takes total of 60 mg a day   Past Week at Unknown time   • ibuprofen (ADVIL,MOTRIN) 200 MG tablet Take 600 mg by mouth Every 6 (Six) Hours As Needed for Mild Pain . TO HOLD 1 WEEK BEFORE SURGERY   8/30/2019 at Unknown time   • ondansetron (ZOFRAN) 4 MG tablet Take 1 tablet by mouth Every 8 (Eight) Hours As Needed for Nausea or Vomiting. 30 tablet 0  8/30/2019 at Unknown time   • amLODIPine (NORVASC) 5 MG tablet Take 5 mg by mouth Every Morning.   Taking   • nitroglycerin (NITROSTAT) 0.4 MG SL tablet Place 0.4 mg under the tongue as needed for chest pain. Take no more than 3 doses in 15 minutes.   Taking     Current Meds:     amLODIPine 5 mg Oral QAM   aspirin 81 mg Oral Daily   atorvastatin 40 mg Oral QAM   FLUoxetine 60 mg Oral QAM   polyethylene glycol 17 g Oral Daily   sodium chloride 10 mL Intravenous Q12H     Allergies:  No Known Allergies  Review of Systems  All systems were reviewed and negative except for:  Gastrointestinal: positive for  nausea and pain     Objective     Vital Signs  Temp:  [96.1 °F (35.6 °C)-97.9 °F (36.6 °C)] 97.5 °F (36.4 °C)  Heart Rate:  [65-98] 71  Resp:  [12-18] 18  BP: (129-173)/() 142/79  Physical Exam:  General Appearance:    Alert, cooperative, in no acute distress   Head:    Normocephalic, without obvious abnormality, atraumatic   Eyes:            Lids and lashes normal, conjunctivae and sclerae normal, no   icterus   Throat:   No oral lesions, no thrush, oral mucosa moist   Neck:   No adenopathy, supple, trachea midline, no thyromegaly, no   carotid bruit, no JVD   Lungs:     Clear to auscultation,respirations regular, even and                   unlabored    Heart:    Regular rhythm and normal rate, normal S1 and S2, no            murmur, no gallop, no rub, no click   Chest Wall:    No abnormalities observed   Abdomen:     Normal bowel sounds, no masses, no organomegaly, soft        incisional tenderness, non-distended, no guarding, no rebound tenderness   Rectal:     Deferred   Extremities:   no edema, no cyanosis, no redness   Skin:   No bleeding, bruising or rash   Lymph nodes:   No palpable adenopathy   Psychiatric:  Judgement and insight: normal   Orientation to person place and time: normal   Mood and affect: normal   Results Review:   I reviewed the patient's new clinical results.  I reviewed the patient's new  imaging results and agree with the interpretation.    Results from last 7 days   Lab Units 09/02/19  0548 09/01/19  0454 08/31/19  0639   WBC 10*3/mm3 4.94 5.27 5.41   HEMOGLOBIN g/dL 12.2* 12.1* 12.3*   HEMATOCRIT % 37.8 38.1 38.7   PLATELETS 10*3/mm3 183 215 238     Results from last 7 days   Lab Units 09/03/19  0607 09/02/19  0548 09/01/19  0454   SODIUM mmol/L 136 141 135*   POTASSIUM mmol/L 3.6 3.9 3.7   CHLORIDE mmol/L 102 107 105   CO2 mmol/L 21.4* 26.0 22.4   BUN mg/dL 11 7* 7*   CREATININE mg/dL 0.80 0.84 0.80   CALCIUM mg/dL 8.8 8.7 8.7   BILIRUBIN mg/dL 4.7* 1.8* 3.2*   ALK PHOS U/L 637* 500* 503*   ALT (SGPT) U/L 399* 308* 346*   AST (SGOT) U/L 558* 278* 370*   GLUCOSE mg/dL 130* 80 83     Results from last 7 days   Lab Units 08/30/19  2147   INR  1.07     Lab Results   Lab Value Date/Time    LIPASE 27 08/30/2019 2147    LIPASE 33 08/30/2019 1707       Radiology:  FL Cholangiogram Operative   Final Result      XR Chest 1 View   Final Result       No active disease by portable imaging.       This report was finalized on 8/31/2019 12:41 AM by Jose Varela M.D.          US Gallbladder   Final Result   1. Cholelithiasis.       This report was finalized on 8/30/2019 10:31 PM by Jose Varela M.D.              Assessment/Plan   Patient Active Problem List   Diagnosis   • Hyperlipidemia   • Coronary artery disease involving native coronary artery of native heart without angina pectoris   • Cognitive disorder   • Mood disorder (CMS/HCC)   • Closed wedge compression fracture of third thoracic vertebra with routine healing   • GERD (gastroesophageal reflux disease)   • S/P drug eluting coronary stent placement   • Chest pain   • Diastolic dysfunction   • Exertional angina (CMS/HCC)   • Unstable angina (CMS/HCC)   • Bladder mass   • Mixed action and resting tremor   • Bladder cancer (CMS/HCC)   • Acute cystitis without hematuria   • Generalized weakness   • Dyspnea on exertion   • BPH (benign prostatic  hyperplasia)   • Right upper quadrant pain   • Calculus of gallbladder without cholecystitis   • Elevated LFTs     78-year-old male with history of bladder cancer, coronary artery disease, hypertension, hyperlipidemia presenting with cholecystitis found with common bile duct stones on intraoperative cholangiogram.  Agree with proceeding with ERCP to clear biliary tree.  Risk benefits discussed with patient patient family agreed to proceed.    I discussed the patients findings and my recommendations with patient and family.    Ashok Amaya MD

## 2019-09-03 NOTE — PLAN OF CARE
Problem: Pain, Acute (Adult)  Goal: Acceptable Pain Control/Comfort Level  Outcome: Ongoing (interventions implemented as appropriate)      Problem: Patient Care Overview  Goal: Plan of Care Review  Outcome: Ongoing (interventions implemented as appropriate)   09/03/19 0423   Coping/Psychosocial   Plan of Care Reviewed With patient   Plan of Care Review   Progress no change   OTHER   Outcome Summary Pt had lap chauncey yesterday, 4 lap sites w steri strips, NPO cont. w gastro consult ordered...poss scope today to remove additional stones, oral pain med given x1 thus far, IVF cont       Problem: Fall Risk (Adult)  Goal: Absence of Fall  Outcome: Ongoing (interventions implemented as appropriate)

## 2019-09-03 NOTE — TELEPHONE ENCOUNTER
Called and wife answered stating that patient will not be able to make appt, due to being in hospital. She will call and reschedule when able to. She is on his forms, she also states she will be at her appt.

## 2019-09-03 NOTE — ANESTHESIA PREPROCEDURE EVALUATION
Anesthesia Evaluation     Patient summary reviewed and Nursing notes reviewed   NPO Solid Status: > 8 hours  NPO Liquid Status: > 2 hours           Airway   Mallampati: II  TM distance: >3 FB  Neck ROM: full  no difficulty expected  Dental - normal exam     Pulmonary - normal exam   (+) shortness of breath,   Cardiovascular - normal exam    (+) hypertension, CAD, angina, CHF, hyperlipidemia,       Neuro/Psych  (+) dizziness/light headedness, tremors, psychiatric history Anxiety and Depression,     GI/Hepatic/Renal/Endo    (+)  GERD,      Musculoskeletal     (+) back pain,   Abdominal  - normal exam   Substance History      OB/GYN          Other      history of cancer                    Anesthesia Plan    ASA 4     MAC     Anesthetic plan, all risks, benefits, and alternatives have been provided, discussed and informed consent has been obtained with: patient.

## 2019-09-03 NOTE — NURSING NOTE
Spoke with both Dr Segal about BP and consult and Dr Amaya about oxygen.  Dr Segal stated for hospitalist or cardiologist to see tonight.  Dr Segal aware of elevated BP.  Said to have Dr's on floor to address.  Dr Amaya said Ok to start lovenox if needed

## 2019-09-03 NOTE — PROGRESS NOTES
Chief Complaint:    S/P Laparoscopic cholecystectomy with intraoperative cholangiogram, POD 1    Subjective:    The patient is complaining of ongoing abdominal pain requiring pain medications.    Objective:    Temp:  [96.1 °F (35.6 °C)-98.4 °F (36.9 °C)] 97.5 °F (36.4 °C)  Heart Rate:  [65-98] 71  Resp:  [9-18] 18  BP: (129-178)/() 142/79    Physical Exam   Constitutional: He is cooperative. He does not appear ill. No distress.   Pulmonary/Chest: Effort normal. No respiratory distress.   Abdominal: Soft. There is generalized tenderness (Mildly tender).   Neurological: He is alert.       Results:    Labs are pending this morning.    Impression/Plan:    The patient is POD 1 from a laparoscopic cholecystectomy with intraoperative cholangiogram.  He has ongoing pain that is likely related to the recent surgery and the ongoing choledocholithiasis.  The plan is for an ERCP with CBD stone removal later today.    Bg Rodriguez Jr., M.D.

## 2019-09-03 NOTE — PROGRESS NOTES
Sukhdev Zayas   78 y.o.  male    LOS: 2 days   Patient Care Team:  Rachelle Patton PA-C as PCP - General (Physician Assistant)  Christie Pacheco APRN as PCP - Claims Attributed      Subjective   No abdominal pain no chest pain today  Interval History:     Patient Complaints:     Review of Systems:       Medication Review:   Current Facility-Administered Medications:   •  amLODIPine (NORVASC) tablet 5 mg, 5 mg, Oral, Brad AMANDA Matthew D, MD, 5 mg at 09/02/19 1346  •  aspirin chewable tablet 81 mg, 81 mg, Oral, Daily, Bg Rodriguez Jr., MD, 81 mg at 09/01/19 1532  •  atorvastatin (LIPITOR) tablet 40 mg, 40 mg, Oral, Brad AMANDA Matthew D, MD, 40 mg at 09/01/19 0653  •  bisacodyl (DULCOLAX) suppository 10 mg, 10 mg, Rectal, Daily PRN, Gregor Soto MD  •  FLUoxetine (PROzac) capsule 60 mg, 60 mg, Oral, Brad AMANDA Matthew D, MD, 60 mg at 09/01/19 0653  •  HYDROcodone-acetaminophen (NORCO) 5-325 MG per tablet 1 tablet, 1 tablet, Oral, Q6H PRN, Jose Salinas MD, 1 tablet at 09/02/19 2102  •  HYDROmorphone (DILAUDID) injection 0.5 mg, 0.5 mg, Intravenous, Q2H PRN, 0.5 mg at 09/03/19 0742 **AND** naloxone (NARCAN) injection 0.4 mg, 0.4 mg, Intravenous, Q5 Min PRN, Jose Salinas MD  •  ondansetron (ZOFRAN) injection 4 mg, 4 mg, Intravenous, Q6H PRN, Marlena Velazquez APRN  •  polyethylene glycol (MIRALAX) powder 17 g, 17 g, Oral, Daily, Gregor Soto MD, 17 g at 09/01/19 1325  •  [COMPLETED] Insert peripheral IV, , , Once **AND** sodium chloride 0.9 % flush 10 mL, 10 mL, Intravenous, PRN, Cotton, Raz Jasen III, PA  •  sodium chloride 0.9 % flush 10 mL, 10 mL, Intravenous, Q12H, Marlena Velazquez, APRN, 10 mL at 09/03/19 0856  •  sodium chloride 0.9 % flush 10 mL, 10 mL, Intravenous, PRN, Marlena Velazquez, APRN  •  sodium chloride 0.9 % infusion, 100 mL/hr, Intravenous, Continuous, Marlena Velazquez APRN, Last Rate: 100 mL/hr at 09/02/19 1835, 100 mL/hr at 09/02/19  1835      Objective   Vital Sign Min/Max for last 24 hours  Temp  Min: 96.1 °F (35.6 °C)  Max: 97.9 °F (36.6 °C)   BP  Min: 129/77  Max: 173/97    Pulse  Min: 71  Max: 98     Wt Readings from Last 3 Encounters:   08/31/19 83.7 kg (184 lb 8 oz)   08/30/19 83.5 kg (184 lb)   07/30/19 86.1 kg (189 lb 14.4 oz)        Intake/Output Summary (Last 24 hours) at 9/3/2019 0902  Last data filed at 9/3/2019 0735  Gross per 24 hour   Intake 2730.67 ml   Output 300 ml   Net 2430.67 ml     Physical Exam:      General Appearance:    Well developed and well nourished in no acute distress   Head:    Normocephalic, atraumatic   Eyes:            Conjunctivae normal, non icteric, no xanthelasma   Neck:   supple, trachea midline, no thyromegaly, no carotid bruit, no JVD, no elevated CVP   Lungs:     Clear to auscultation,respirations regular, even and                  unlabored    Heart:    Regular rhythm and normal rate, normal S1 and S2,            No murmur, no gallop, no rub, no click   Chest Wall:    No abnormalities observed   Abdomen:     No  bowel sounds, no masses, no organomegaly, soft        -tender, non-distended, no guarding, no rebound                tenderness   Rectal:     Deferred   Extremities:   No edema. Moves all extremities well, no cyanosis, no erythema   Pulses:   Pulses palpable and equal bilaterally   Skin:   No bleeding, bruising or rash   Neurologic:   awake alert and oriented x3, speech clear and approp, no facial drooping     :    Monitor:      Results Review:         Sodium Sodium   Date Value Ref Range Status   09/03/2019 136 136 - 145 mmol/L Final   09/02/2019 141 136 - 145 mmol/L Final   09/01/2019 135 (L) 136 - 145 mmol/L Final      Potassium Potassium   Date Value Ref Range Status   09/03/2019 3.6 3.5 - 5.2 mmol/L Final   09/02/2019 3.9 3.5 - 5.2 mmol/L Final   09/01/2019 3.7 3.5 - 5.2 mmol/L Final      Chloride Chloride   Date Value Ref Range Status   09/03/2019 102 98 - 107 mmol/L Final    09/02/2019 107 98 - 107 mmol/L Final   09/01/2019 105 98 - 107 mmol/L Final      Bicarbonate No results found for: PLASMABICARB   BUN BUN   Date Value Ref Range Status   09/03/2019 11 8 - 23 mg/dL Final   09/02/2019 7 (L) 8 - 23 mg/dL Final   09/01/2019 7 (L) 8 - 23 mg/dL Final      Creatinine Creatinine   Date Value Ref Range Status   09/03/2019 0.80 0.76 - 1.27 mg/dL Final   09/02/2019 0.84 0.76 - 1.27 mg/dL Final   09/01/2019 0.80 0.76 - 1.27 mg/dL Final      Calcium Calcium   Date Value Ref Range Status   09/03/2019 8.8 8.6 - 10.5 mg/dL Final   09/02/2019 8.7 8.6 - 10.5 mg/dL Final   09/01/2019 8.7 8.6 - 10.5 mg/dL Final      Magnesium No results found for: MG     Results from last 7 days   Lab Units 09/02/19  0548   WBC 10*3/mm3 4.94   HEMOGLOBIN g/dL 12.2*   HEMATOCRIT % 37.8   PLATELETS 10*3/mm3 183     Lab Results   Lab Value Date/Time    TROPONINT <0.010 05/25/2019 0603    TROPONINT <0.010 05/24/2019 2029     Lab Results   Component Value Date    CHOL 124 11/15/2017    CHOL 119 02/22/2017    CHOL 121 06/09/2016     Lab Results   Component Value Date    HDL 74 (H) 11/15/2017    HDL 66 02/22/2017    HDL 62 06/09/2016     Lab Results   Component Value Date    LDL 35 11/15/2017    LDL 39 02/22/2017    LDL 45 06/09/2016     Lab Results   Component Value Date    TRIG 73 11/15/2017    TRIG 71 02/22/2017    TRIG 68 06/09/2016     No components found for: CHOLHDL  No results found for: PTT  No components found for: PT/INR  No results found for: HGBA1C   Lab Results   Component Value Date    TSH 4.300 (H) 07/29/2019        Echo EF Estimated  No results found for: ECHOEFEST      Assessment/ Plan    Active Hospital Problems    Diagnosis POA   • Right upper quadrant pain [R10.11] Yes   • Calculus of gallbladder without cholecystitis [K80.20] Yes   • Elevated LFTs [R94.5] Yes   • BPH (benign prostatic hyperplasia) [N40.0] Yes   • Bladder cancer (CMS/HCC) [C67.9] Yes   • Diastolic dysfunction [I51.9] Yes   • GERD  (gastroesophageal reflux disease) [K21.9] Yes   • S/P drug eluting coronary stent placement [Z95.5] Not Applicable   • Coronary artery disease involving native coronary artery of native heart without angina pectoris [I25.10] Yes       Show:Clear all  [x]Manual[x]Template[]Copied    Added by:  [x]Ana Knutson MD    []Jeanie for details  Addendum: obtained cath report from [facility] showing his last cath was  showing patent stents, no obstructive cad, and his last stents wer 8-2018, as such he should be ok to hold plavix for surgery, will see him back post op now on 9-3-2019   CAD,s/p stents, with Dr Proctor, get cath and echo reports from this year  -currently no angina, euvolemic, no known chf, sinus rhythm  -had held plavix twice since stents placed this year in march, for surgeries and done well, more recent data suggesting even if ANJALI, after 6 months dapt could be interrupted, with low risk for stent thrombosis. so should be ok to hold plavix in the event of need for surgery on GB, would prefer to continue aspirin 81 mg periop if ok with surgery team, continue statin periop  -Platelet reactivity can be checked if earlier need for surgery like today  -requested [facility] records  -moderate overall CV risk for this surgery given co morbidities and functional status     3. HTN     4. S/p surgery for bladder cancer this year, in remission  Status post laparoscopic cholecystectomy  ERCP today for residual biliary stones.               Pema Cao MD  09/03/19  9:02 AM

## 2019-09-04 LAB
ALBUMIN SERPL-MCNC: 3.3 G/DL (ref 3.5–5.2)
ALBUMIN/GLOB SERPL: 1.7 G/DL
ALP SERPL-CCNC: 724 U/L (ref 39–117)
ALT SERPL W P-5'-P-CCNC: 495 U/L (ref 1–41)
ANION GAP SERPL CALCULATED.3IONS-SCNC: 9.4 MMOL/L (ref 5–15)
AST SERPL-CCNC: 749 U/L (ref 1–40)
BASOPHILS # BLD AUTO: 0.02 10*3/MM3 (ref 0–0.2)
BASOPHILS NFR BLD AUTO: 0.2 % (ref 0–1.5)
BILIRUB SERPL-MCNC: 5.2 MG/DL (ref 0.2–1.2)
BUN BLD-MCNC: 16 MG/DL (ref 8–23)
BUN/CREAT SERPL: 20.8 (ref 7–25)
CALCIUM SPEC-SCNC: 8.6 MG/DL (ref 8.6–10.5)
CHLORIDE SERPL-SCNC: 109 MMOL/L (ref 98–107)
CO2 SERPL-SCNC: 22.6 MMOL/L (ref 22–29)
CREAT BLD-MCNC: 0.77 MG/DL (ref 0.76–1.27)
DEPRECATED RDW RBC AUTO: 44.1 FL (ref 37–54)
EOSINOPHIL # BLD AUTO: 0.24 10*3/MM3 (ref 0–0.4)
EOSINOPHIL NFR BLD AUTO: 2.3 % (ref 0.3–6.2)
ERYTHROCYTE [DISTWIDTH] IN BLOOD BY AUTOMATED COUNT: 12.7 % (ref 12.3–15.4)
GFR SERPL CREATININE-BSD FRML MDRD: 98 ML/MIN/1.73
GLOBULIN UR ELPH-MCNC: 2 GM/DL
GLUCOSE BLD-MCNC: 94 MG/DL (ref 65–99)
HCT VFR BLD AUTO: 37.5 % (ref 37.5–51)
HGB BLD-MCNC: 13.6 G/DL (ref 13–17.7)
LIPASE SERPL-CCNC: 14 U/L (ref 13–60)
LYMPHOCYTES # BLD AUTO: 1.08 10*3/MM3 (ref 0.7–3.1)
LYMPHOCYTES NFR BLD AUTO: 10.4 % (ref 19.6–45.3)
MCH RBC QN AUTO: 34.3 PG (ref 26.6–33)
MCHC RBC AUTO-ENTMCNC: 36.3 G/DL (ref 31.5–35.7)
MCV RBC AUTO: 94.7 FL (ref 79–97)
MONOCYTES # BLD AUTO: 0.85 10*3/MM3 (ref 0.1–0.9)
MONOCYTES NFR BLD AUTO: 8.2 % (ref 5–12)
NEUTROPHILS # BLD AUTO: 8.14 10*3/MM3 (ref 1.7–7)
NEUTROPHILS NFR BLD AUTO: 78.4 % (ref 42.7–76)
PLATELET # BLD AUTO: 224 10*3/MM3 (ref 140–450)
PMV BLD AUTO: 11 FL (ref 6–12)
POTASSIUM BLD-SCNC: 3.5 MMOL/L (ref 3.5–5.2)
PROT SERPL-MCNC: 5.3 G/DL (ref 6–8.5)
RBC # BLD AUTO: 3.96 10*6/MM3 (ref 4.14–5.8)
SODIUM BLD-SCNC: 141 MMOL/L (ref 136–145)
WBC NRBC COR # BLD: 10.38 10*3/MM3 (ref 3.4–10.8)

## 2019-09-04 PROCEDURE — 85027 COMPLETE CBC AUTOMATED: CPT | Performed by: SURGERY

## 2019-09-04 PROCEDURE — 25010000002 HYDROMORPHONE PER 4 MG: Performed by: INTERNAL MEDICINE

## 2019-09-04 PROCEDURE — 99024 POSTOP FOLLOW-UP VISIT: CPT | Performed by: SURGERY

## 2019-09-04 PROCEDURE — 25010000002 ENOXAPARIN PER 10 MG: Performed by: INTERNAL MEDICINE

## 2019-09-04 PROCEDURE — 93005 ELECTROCARDIOGRAM TRACING: CPT | Performed by: INTERNAL MEDICINE

## 2019-09-04 PROCEDURE — 80053 COMPREHEN METABOLIC PANEL: CPT | Performed by: INTERNAL MEDICINE

## 2019-09-04 PROCEDURE — 83690 ASSAY OF LIPASE: CPT | Performed by: SURGERY

## 2019-09-04 PROCEDURE — 94799 UNLISTED PULMONARY SVC/PX: CPT

## 2019-09-04 RX ORDER — SENNA AND DOCUSATE SODIUM 50; 8.6 MG/1; MG/1
2 TABLET, FILM COATED ORAL 2 TIMES DAILY
Status: DISCONTINUED | OUTPATIENT
Start: 2019-09-04 | End: 2019-09-05 | Stop reason: HOSPADM

## 2019-09-04 RX ADMIN — Medication 5 MG: at 00:08

## 2019-09-04 RX ADMIN — ATORVASTATIN CALCIUM 40 MG: 20 TABLET, FILM COATED ORAL at 09:10

## 2019-09-04 RX ADMIN — FLUOXETINE HYDROCHLORIDE 60 MG: 20 CAPSULE ORAL at 09:09

## 2019-09-04 RX ADMIN — ENOXAPARIN SODIUM 80 MG: 80 INJECTION SUBCUTANEOUS at 20:49

## 2019-09-04 RX ADMIN — SODIUM CHLORIDE, PRESERVATIVE FREE 10 ML: 5 INJECTION INTRAVENOUS at 09:11

## 2019-09-04 RX ADMIN — ASPIRIN 81 MG: 81 TABLET, CHEWABLE ORAL at 00:08

## 2019-09-04 RX ADMIN — HYDROCODONE BITARTRATE AND ACETAMINOPHEN 1 TABLET: 5; 325 TABLET ORAL at 00:08

## 2019-09-04 RX ADMIN — SENNOSIDES AND DOCUSATE SODIUM 2 TABLET: 8.6; 5 TABLET ORAL at 14:30

## 2019-09-04 RX ADMIN — POLYETHYLENE GLYCOL 3350 17 G: 17 POWDER, FOR SOLUTION ORAL at 09:09

## 2019-09-04 RX ADMIN — BISACODYL 10 MG: 10 SUPPOSITORY RECTAL at 14:30

## 2019-09-04 RX ADMIN — ENOXAPARIN SODIUM 80 MG: 80 INJECTION SUBCUTANEOUS at 00:08

## 2019-09-04 RX ADMIN — HYDROMORPHONE HYDROCHLORIDE 0.5 MG: 1 INJECTION, SOLUTION INTRAMUSCULAR; INTRAVENOUS; SUBCUTANEOUS at 16:20

## 2019-09-04 RX ADMIN — HYDROCODONE BITARTRATE AND ACETAMINOPHEN 1 TABLET: 5; 325 TABLET ORAL at 19:05

## 2019-09-04 RX ADMIN — ENOXAPARIN SODIUM 80 MG: 80 INJECTION SUBCUTANEOUS at 09:10

## 2019-09-04 RX ADMIN — AMLODIPINE BESYLATE 5 MG: 5 TABLET ORAL at 09:10

## 2019-09-04 RX ADMIN — SENNOSIDES AND DOCUSATE SODIUM 2 TABLET: 8.6; 5 TABLET ORAL at 20:49

## 2019-09-04 NOTE — PROGRESS NOTES
Name: Sukhdev Zayas ADMIT: 2019   : 1940  PCP: Rachelle Patton PA-C    MRN: [id number] LOS: 2 days   AGE/SEX: 78 y.o. male  ROOM: [facility]     Subjective   Subjective   CC: abdominal discomfort  Patient went to endoscopy earlier for ERCP. He has some abdominal pain following this.  He was found to be in atrial fibrillation following the procedure.  No f/c/CP.  He is unable to take a deep breath due to pain.    Objective   Objective   Vital Signs  Temp:  [97 °F (36.1 °C)-97.8 °F (36.6 °C)] 97 °F (36.1 °C)  Heart Rate:  [] 104  Resp:  [18-20] 20  BP: (132-166)/() 159/87  SpO2:  [92 %-97 %] 95 %  on  Flow (L/min):  [2] 2;   Device (Oxygen Therapy): nasal cannula  Body mass index is 25.02 kg/m².  Physical Exam   Constitutional: He is oriented to person, place, and time. No distress.   HENT:   Head: Normocephalic and atraumatic.   Mouth/Throat: Oropharynx is clear and moist.   Eyes: Conjunctivae and EOM are normal. Pupils are equal, round, and reactive to light.   Neck: Normal range of motion. Neck supple.   Cardiovascular: Normal rate and intact distal pulses. An irregularly irregular rhythm present.   Pulmonary/Chest: Effort normal and breath sounds normal. He has no rales.   Abdominal: Soft. Bowel sounds are normal. There is tenderness (appropriate postop). There is no rebound and no guarding.   Musculoskeletal: He exhibits no edema or tenderness.   Neurological: He is alert and oriented to person, place, and time. No cranial nerve deficit.   Skin: Skin is warm and dry. He is not diaphoretic.   Psychiatric: He has a normal mood and affect. His behavior is normal.   Nursing note and vitals reviewed.      Results Review:       I reviewed the patient's new clinical results.  Results from last 7 days   Lab Units 19  0548 19  0454 19  0639 19  2147   WBC 10*3/mm3 4.94 5.27 5.41 6.96   HEMOGLOBIN g/dL 12.2* 12.1* 12.3* 14.0   PLATELETS 10*3/mm3 183 215 238 430      Results from last 7 days   Lab Units 09/03/19  0607 09/02/19  0548 09/01/19  0454 08/31/19  0639   SODIUM mmol/L 136 141 135* 140   POTASSIUM mmol/L 3.6 3.9 3.7 3.9   CHLORIDE mmol/L 102 107 105 104   CO2 mmol/L 21.4* 26.0 22.4 22.7   BUN mg/dL 11 7* 7* 12   CREATININE mg/dL 0.80 0.84 0.80 0.83   GLUCOSE mg/dL 130* 80 83 82   Estimated Creatinine Clearance: 90.1 mL/min (by C-G formula based on SCr of 0.8 mg/dL).  Results from last 7 days   Lab Units 09/03/19  0607 09/02/19  0548 09/01/19  0454 08/30/19  2147   ALBUMIN g/dL 3.50 3.50 3.60 4.20   BILIRUBIN mg/dL 4.7* 1.8* 3.2* 3.6*   ALK PHOS U/L 637* 500* 503* 528*   AST (SGOT) U/L 558* 278* 370* 531*   ALT (SGPT) U/L 399* 308* 346* 451*     Results from last 7 days   Lab Units 09/03/19  0607 09/02/19  0548 09/01/19  0454 08/31/19  0639 08/30/19  2147   CALCIUM mg/dL 8.8 8.7 8.7 8.9 9.2   ALBUMIN g/dL 3.50 3.50 3.60  --  4.20       No results found for: HGBA1C, POCGLU      amLODIPine 5 mg Oral QAM   aspirin 81 mg Oral Daily   atorvastatin 40 mg Oral QAM   enoxaparin 1 mg/kg Subcutaneous Q12H   FLUoxetine 60 mg Oral QAM   polyethylene glycol 17 g Oral Daily   sodium chloride 10 mL Intravenous Q12H       sodium chloride 100 mL/hr Last Rate: 100 mL/hr (09/03/19 1658)   sodium chloride 30 mL/hr    Diet Regular; Cardiac       Assessment/Plan     Active Hospital Problems    Diagnosis  POA   • **Calculus of bile duct with acute cholecystitis without obstruction [K80.42]  Unknown   • Atrial fibrillation (CMS/HCC) [I48.91]  No   • Right upper quadrant pain [R10.11]  Yes   • Calculus of gallbladder without cholecystitis [K80.20]  Yes   • Elevated LFTs [R94.5]  Yes   • BPH (benign prostatic hyperplasia) [N40.0]  Yes   • Bladder cancer (CMS/HCC) [C67.9]  Yes   • Diastolic dysfunction [I51.9]  Yes   • GERD (gastroesophageal reflux disease) [K21.9]  Yes   • S/P drug eluting coronary stent placement [Z95.5]  Not Applicable   • Coronary artery disease involving native coronary  artery of native heart without angina pectoris [I25.10]  Yes      Resolved Hospital Problems   No resolved problems to display.   Symptomatic Cholelithiasis  - no e/o cholecystitis  - s/p laparoscopic cholecystectomy 9/2/19-intraoperative cholangiogram is positive and GI is consulted to evaluate for ERCP  - appreciate general surgery recs     Elevated LFTs  - secondary to above  - Improving.      Atrial Fibrillation  - not previously known  - ecg reviewed-atrial fibrillation, no specific acute ischemic changes  - [id number] is 3, will start on lovenox-okay'd with Dr. Amaya  - his epigastric pain is likely from his procedure and is not entirely unexpected but with his history and potential ambiguity will also check troponin  - transfer to telemetry floor  - I have asked RN to notify cardiology-they are already following    ESBL Colonization  - no subjective UTI symptoms or evidence of infection  - no abx warranted at this time  - appreciate ID recs     CAD  - s/p ANJALI x2 8/2018  - plavix held, on ASA  - follows with Dr. Proctor  - appreciate cardiology recs     BPH/Bladder Cancer  - finished BCG 3/6/19  - s/p TURP 7/11/19  - no urinary symptoms currently      VTE Prophylaxis - SCDs  Code Status - Full code  Disposition - Anticipate discharge home with family later this week.      Jose Salinas MD  [facility]  09/03/19  8:29 PM

## 2019-09-04 NOTE — PLAN OF CARE
Problem: Pain, Acute (Adult)  Goal: Acceptable Pain Control/Comfort Level  Outcome: Ongoing (interventions implemented as appropriate)      Problem: Patient Care Overview  Goal: Plan of Care Review  Outcome: Ongoing (interventions implemented as appropriate)   09/04/19 0543   Coping/Psychosocial   Plan of Care Reviewed With patient;spouse   Plan of Care Review   Progress improving   OTHER   Outcome Summary Pt transfer from [facility] due to new onset afib, reg HH diet- pt no appitite, pain reported as 9 of 10 on arrival, Iv meds and po meds given with good relief, some vomitin also- meds given, Vitals stable, 2L NC, midline incision-dressing with old drainage,- 4 lap sites,        Problem: Fall Risk (Adult)  Goal: Identify Related Risk Factors and Signs and Symptoms  Outcome: Outcome(s) achieved Date Met: 09/04/19    Goal: Absence of Fall  Outcome: Ongoing (interventions implemented as appropriate)   09/04/19 0543   Fall Risk (Adult)   Absence of Fall making progress toward outcome

## 2019-09-04 NOTE — PROGRESS NOTES
Sukhdev Zayas   78 y.o.  male    LOS: 3 days   Patient Care Team:  Rachelle Patton PA-C as PCP - General (Physician Assistant)  Christie Pacheco APRN as PCP - Claims Attributed      Subjective     Interval History:     Patient Complaints: Abdominal discomfort improved.  No significant chest pain.  No shortness of air.    Review of Systems:   No subjective fever or chills  No bleeding    Medication Review:   Current Facility-Administered Medications:   •  amLODIPine (NORVASC) tablet 5 mg, 5 mg, Oral, Brad AMANDA Matthew D, MD, 5 mg at 09/04/19 0910  •  aspirin chewable tablet 81 mg, 81 mg, Oral, Daily, Jose Salinas MD, 81 mg at 09/04/19 0008  •  atorvastatin (LIPITOR) tablet 40 mg, 40 mg, Oral, Brad AMANDA Matthew D, MD, 40 mg at 09/04/19 0910  •  bisacodyl (DULCOLAX) suppository 10 mg, 10 mg, Rectal, Daily PRN, Gregor Soto MD  •  enoxaparin (LOVENOX) syringe 80 mg, 1 mg/kg, Subcutaneous, Q12H, Jose Salinas MD, 80 mg at 09/04/19 0910  •  FLUoxetine (PROzac) capsule 60 mg, 60 mg, Oral, Brad AMANDA Matthew D, MD, 60 mg at 09/04/19 0909  •  HYDROcodone-acetaminophen (NORCO) 5-325 MG per tablet 1 tablet, 1 tablet, Oral, Q6H PRN, Jose Salinas MD, 1 tablet at 09/04/19 0008  •  HYDROmorphone (DILAUDID) injection 0.5 mg, 0.5 mg, Intravenous, Q2H PRN, 0.5 mg at 09/03/19 2305 **AND** naloxone (NARCAN) injection 0.4 mg, 0.4 mg, Intravenous, Q5 Min PRN, Jose Salinas MD  •  melatonin tablet 5 mg, 5 mg, Oral, Nightly PRN, Jose Salinas MD, 5 mg at 09/04/19 0008  •  nitroglycerin (NITROSTAT) SL tablet 0.4 mg, 0.4 mg, Sublingual, Q5 Min PRN, Jose Salinas MD  •  ondansetron (ZOFRAN) injection 4 mg, 4 mg, Intravenous, Q6H PRN, Marlena Velazquez APRN, 4 mg at 09/03/19 2105  •  polyethylene glycol (MIRALAX) powder 17 g, 17 g, Oral, Daily, Gregor Soto MD, 17 g at 09/04/19 0909  •  sennosides-docusate sodium (SENOKOT-S) 8.6-50 MG tablet 2 tablet, 2 tablet, Oral, BID, Brad,  Jose MELGOZA MD  •  [COMPLETED] Insert peripheral IV, , , Once **AND** sodium chloride 0.9 % flush 10 mL, 10 mL, Intravenous, PRN, Raz Newell III, PA  •  sodium chloride 0.9 % flush 10 mL, 10 mL, Intravenous, Q12H, English, Marlena C, APRN, 10 mL at 09/04/19 0911  •  sodium chloride 0.9 % flush 10 mL, 10 mL, Intravenous, PRN, English, Marlena C, APRN, 10 mL at 09/03/19 1658  •  sodium chloride 0.9 % infusion, 100 mL/hr, Intravenous, Continuous, English, Marlena C, APRN, Last Rate: 100 mL/hr at 09/03/19 1658, 100 mL/hr at 09/03/19 1658  •  sodium chloride 0.9 % infusion, 30 mL/hr, Intravenous, Continuous PRN, Ashok Amaya MD      Objective     Vital Sign Min/Max for last 24 hours  Temp  Min: 96.2 °F (35.7 °C)  Max: 98.7 °F (37.1 °C)   BP  Min: 97/67  Max: 166/79    Pulse  Min: 72  Max: 107     Wt Readings from Last 3 Encounters:   09/03/19 83 kg (182 lb 14.4 oz)   08/30/19 83.5 kg (184 lb)   07/30/19 86.1 kg (189 lb 14.4 oz)        Intake/Output Summary (Last 24 hours) at 9/4/2019 1144  Last data filed at 9/4/2019 0934  Gross per 24 hour   Intake 810 ml   Output 725 ml   Net 85 ml     Physical Exam:      General Appearance:    Well developed and well nourished in no acute distress   Head:    Normocephalic, atraumatic   Eyes:            Conjunctivae normal, non icteric, no xanthelasma   Neck:   no carotid bruit, no JVD   Lungs:    Some decreased breath sounds on the left but otherwise clear on the right    Heart:    Regular rate and rhythm.  Normal S1 and S2. No murmur, no gallop, rub or lift.    Chest Wall:    No abnormalities observed   Abdomen:     Normal bowel sounds, no masses, no organomegaly, soft        non-tender, non-distended, no guarding, no rebound                tenderness   Rectal:     Deferred   Extremities:   No clubbing, cyanosis or edema.     Pulses:   Pulses palpable and equal bilaterally   Skin:   No bleeding, bruising or rash   Neurologic:   awake alert and oriented x3, speech clear  and approp, no facial drooping      Monitor:  nsr  Results Review:     I reviewed the patient's new clinical results.    Sodium Sodium   Date Value Ref Range Status   09/04/2019 141 136 - 145 mmol/L Final   09/03/2019 136 136 - 145 mmol/L Final   09/02/2019 141 136 - 145 mmol/L Final      Potassium Potassium   Date Value Ref Range Status   09/04/2019 3.5 3.5 - 5.2 mmol/L Final   09/03/2019 3.6 3.5 - 5.2 mmol/L Final   09/02/2019 3.9 3.5 - 5.2 mmol/L Final      Chloride Chloride   Date Value Ref Range Status   09/04/2019 109 (H) 98 - 107 mmol/L Final   09/03/2019 102 98 - 107 mmol/L Final   09/02/2019 107 98 - 107 mmol/L Final      Bicarbonate No results found for: PLASMABICARB   BUN BUN   Date Value Ref Range Status   09/04/2019 16 8 - 23 mg/dL Final   09/03/2019 11 8 - 23 mg/dL Final   09/02/2019 7 (L) 8 - 23 mg/dL Final      Creatinine Creatinine   Date Value Ref Range Status   09/04/2019 0.77 0.76 - 1.27 mg/dL Final   09/03/2019 0.80 0.76 - 1.27 mg/dL Final   09/02/2019 0.84 0.76 - 1.27 mg/dL Final      Calcium Calcium   Date Value Ref Range Status   09/04/2019 8.6 8.6 - 10.5 mg/dL Final   09/03/2019 8.8 8.6 - 10.5 mg/dL Final   09/02/2019 8.7 8.6 - 10.5 mg/dL Final      Magnesium No results found for: MG     Results from last 7 days   Lab Units 09/04/19  0700   WBC 10*3/mm3 10.38   HEMOGLOBIN g/dL 13.6   HEMATOCRIT % 37.5   PLATELETS 10*3/mm3 224     Lab Results   Lab Value Date/Time    TROPONINT <0.010 09/03/2019 2039    TROPONINT <0.010 05/25/2019 0603    TROPONINT <0.010 05/24/2019 2029     Lab Results   Component Value Date    CHOL 124 11/15/2017    CHOL 119 02/22/2017    CHOL 121 06/09/2016     Lab Results   Component Value Date    HDL 74 (H) 11/15/2017    HDL 66 02/22/2017    HDL 62 06/09/2016     Lab Results   Component Value Date    LDL 35 11/15/2017    LDL 39 02/22/2017    LDL 45 06/09/2016     Lab Results   Component Value Date    TRIG 73 11/15/2017    TRIG 71 02/22/2017    TRIG 68 06/09/2016     No  components found for: CHOLHDL    Results from last 7 days   Lab Units 08/30/19  2147   INR  1.07         Echo EF Estimated  No results found for: ECHOEFEST       Assessment/ Plan  Coronary artery disease, last stent was in August 2018, cardiac cath October 18 showed patent stents and no obstructive coronary disease  New onset atrial fibrillation yesterday  Postop day 2 from laparoscopic cholecystectomy and ERCP with common bile duct stone removal yesterday  Plan 1 now on Lovenox for anticoagulation in view of the atrial fibrillation yesterday.  On the monitor appears to be back in sinus rhythm but will recheck EKG to be sure.  #2 Plavix has not been restarted yet.  He continues on his aspirin.  Stable cardiac wise at present  #3 I would like to watch him rhythm wise here in the hospital today and may be go home tomorrow.  I will discuss tomorrow as to the need for oral anticoagulation at the time of discharge and what to do with the aspirin and Plavix at that time.  4.  Discussed with the patient and his wife.  Gordo Luna MD  09/04/19  11:44 AM      Time: 19 min

## 2019-09-04 NOTE — PROGRESS NOTES
Chief Complaint:    S/P Laparoscopic cholecystectomy with intraoperative cholangiogram, POD 2    Subjective:    The patient had an ERCP yesterday and is feeling fine this morning.  He did have atrial fibrillation that required transfer to a monitored bed.  He is having minimal abdominal pain.    Objective:    Temp:  [96.2 °F (35.7 °C)-98.7 °F (37.1 °C)] 97.6 °F (36.4 °C)  Heart Rate:  [] 73  Resp:  [18-20] 18  BP: ()/() 114/66    Physical Exam   Constitutional: He is cooperative. He does not appear ill. No distress.   Abdominal: Soft. There is generalized tenderness (Appropriate postop tenderness).   Neurological: He is alert.       Results:    WBC is 10.3.  Liver function tests are abnormal with , , alkaline phosphatase 724, and total bilirubin 5.2.  Lipase is 14.    Impression/Plan:    The patient is POD 2 from a laparoscopic cholecystectomy with intraoperative cholangiogram and POD 1 from an ERCP with CBD stone removal.  The patient is doing well from a surgical standpoint and is ready for discharge at any time.    Bg Rodriguez Jr., M.D.

## 2019-09-04 NOTE — PLAN OF CARE
Problem: Pain, Acute (Adult)  Goal: Acceptable Pain Control/Comfort Level  Outcome: Ongoing (interventions implemented as appropriate)   09/03/19 2013   Pain, Acute (Adult)   Acceptable Pain Control/Comfort Level making progress toward outcome       Problem: Patient Care Overview  Goal: Plan of Care Review  Outcome: Ongoing (interventions implemented as appropriate)   09/03/19 2013   Coping/Psychosocial   Plan of Care Reviewed With patient   Plan of Care Review   Progress improving   OTHER   Outcome Summary Pt was NPO before scope. Mostly pain control today. Contact precautions maintained. Gave 0.5mg dilaudid q2h to treat. VSS before scope. Continue to monitor progress when he returns.     Goal: Individualization and Mutuality  Outcome: Ongoing (interventions implemented as appropriate)   09/03/19 1646 09/03/19 2013   Individualization   Patient Specific Preferences goes by ray  --    Patient Specific Goals (Include Timeframe) --  resolve stones/pain control before discharge   Patient Specific Interventions --  treat pain, cluster care to promote rest, monitor incision sites.    Mutuality/Individual Preferences   What Anxieties, Fears, Concerns, or Questions Do You Have About Your Care? --  none   What Information Would Help Us Give You More Personalized Care? --  none   How Would You and/or Your Support Person Like to Participate in Your Care? --  wife at bedside, assisting with care   Mutuality/Individual Preferences   How to Address Anxieties/Fears --  N/A       Problem: Fall Risk (Adult)  Goal: Identify Related Risk Factors and Signs and Symptoms  Outcome: Ongoing (interventions implemented as appropriate)   09/03/19 2013   Fall Risk (Adult)   Related Risk Factors (Fall Risk) age-related changes;environment unfamiliar;polypharmacy   Signs and Symptoms (Fall Risk) presence of risk factors

## 2019-09-04 NOTE — ANESTHESIA POSTPROCEDURE EVALUATION
"Patient: Sukhdev Zayas    Procedure Summary     Date:  09/03/19 Room / Location:   [facility] /  [facility]    Anesthesia Start:  1817 Anesthesia Stop:  1858    Procedure:  ENDOSCOPIC RETROGRADE CHOLANGIOPANCREATOGRAPHY with sphincterotomy and balloon sweep (N/A ) Diagnosis:       Calculus of bile duct with acute cholecystitis without obstruction      (Calculus of bile duct with acute cholecystitis without obstruction [K80.42])    Surgeon:  Ashok Amaya MD Provider:  Wang Segal MD    Anesthesia Type:  MAC ASA Status:  4          Anesthesia Type: MAC  Last vitals  BP   157/93 (09/03/19 2036)   Temp   36.1 °C (97 °F) (09/03/19 2036)   Pulse   88 (09/03/19 2036)   Resp   18 (09/03/19 2036)     SpO2   96 % (09/03/19 2036)     Post Anesthesia Care and Evaluation    Patient location during evaluation: bedside  Patient participation: complete - patient participated  Level of consciousness: awake and alert  Pain management: adequate  Airway patency: patent  Anesthetic complications: No anesthetic complications    Cardiovascular status: acceptable  Respiratory status: acceptable  Hydration status: acceptable    Comments: /93 (BP Location: Right arm, Patient Position: Lying)   Pulse 88   Temp 36.1 °C (97 °F) (Oral)   Resp 18   Ht 182.9 cm (72\")   Wt 83.7 kg (184 lb 8 oz)   SpO2 96%   BMI 25.02 kg/m²       "

## 2019-09-04 NOTE — PROGRESS NOTES
Name: Sukhdev Zayas ADMIT: 2019   : 1940  PCP: Rachelle Patton PA-C    MRN: [id number] LOS: 3 days   AGE/SEX: 78 y.o. male  ROOM: [facility]     Subjective   Subjective   CC: abdominal discomfort  Patient went into Afib yesterday evening following ERCP.  He has since converted to NSR.  He feels much better.  His pain is well-controlled.  He is taking good PO.  No CP/dyspnea/f/c/n/v/d.  +constipation.    Objective   Objective   Vital Signs  Temp:  [96.2 °F (35.7 °C)-98.7 °F (37.1 °C)] 97.6 °F (36.4 °C)  Heart Rate:  [] 73  Resp:  [18-20] 18  BP: ()/() 114/66  SpO2:  [93 %-97 %] 95 %  on  Flow (L/min):  [2] 2;   Device (Oxygen Therapy): nasal cannula  Body mass index is 24.81 kg/m².  Physical Exam   Constitutional: He is oriented to person, place, and time. No distress.   HENT:   Head: Normocephalic and atraumatic.   Mouth/Throat: Oropharynx is clear and moist.   Eyes: Conjunctivae and EOM are normal. Pupils are equal, round, and reactive to light.   Neck: Normal range of motion. Neck supple.   Cardiovascular: Normal rate, regular rhythm and intact distal pulses.   Pulmonary/Chest: Effort normal and breath sounds normal. He has no rales.   Abdominal: Soft. Bowel sounds are normal. There is tenderness (appropriate postop). There is no rebound and no guarding.   Musculoskeletal: He exhibits no edema or tenderness.   Neurological: He is alert and oriented to person, place, and time. No cranial nerve deficit.   Skin: Skin is warm and dry. He is not diaphoretic.   Psychiatric: He has a normal mood and affect. His behavior is normal.   Nursing note and vitals reviewed.      Results Review:       I reviewed the patient's new clinical results.  Results from last 7 days   Lab Units 19  0700 19  0548 19  0454 19  0639   WBC 10*3/mm3 10.38 4.94 5.27 5.41   HEMOGLOBIN g/dL 13.6 12.2* 12.1* 12.3*   PLATELETS 10*3/mm3 224 183 215 238     Results from last 7 days   Lab Units  09/04/19  0700 09/03/19  0607 09/02/19  0548 09/01/19  0454   SODIUM mmol/L 141 136 141 135*   POTASSIUM mmol/L 3.5 3.6 3.9 3.7   CHLORIDE mmol/L 109* 102 107 105   CO2 mmol/L 22.6 21.4* 26.0 22.4   BUN mg/dL 16 11 7* 7*   CREATININE mg/dL 0.77 0.80 0.84 0.80   GLUCOSE mg/dL 94 130* 80 83   Estimated Creatinine Clearance: 89.3 mL/min (by C-G formula based on SCr of 0.77 mg/dL).  Results from last 7 days   Lab Units 09/04/19  0700 09/03/19  0607 09/02/19  0548 09/01/19  0454   ALBUMIN g/dL 3.30* 3.50 3.50 3.60   BILIRUBIN mg/dL 5.2* 4.7* 1.8* 3.2*   ALK PHOS U/L 724* 637* 500* 503*   AST (SGOT) U/L 749* 558* 278* 370*   ALT (SGPT) U/L 495* 399* 308* 346*     Results from last 7 days   Lab Units 09/04/19  0700 09/03/19  0607 09/02/19  0548 09/01/19  0454   CALCIUM mg/dL 8.6 8.8 8.7 8.7   ALBUMIN g/dL 3.30* 3.50 3.50 3.60       No results found for: HGBA1C, POCGLU      amLODIPine 5 mg Oral QAM   aspirin 81 mg Oral Daily   atorvastatin 40 mg Oral QAM   enoxaparin 1 mg/kg Subcutaneous Q12H   FLUoxetine 60 mg Oral QAM   polyethylene glycol 17 g Oral Daily   sodium chloride 10 mL Intravenous Q12H       sodium chloride 100 mL/hr Last Rate: 100 mL/hr (09/03/19 1658)   sodium chloride 30 mL/hr    Diet Regular; Cardiac       Assessment/Plan     Active Hospital Problems    Diagnosis  POA   • **Calculus of bile duct with acute cholecystitis without obstruction [K80.42]  Unknown   • Atrial fibrillation (CMS/HCC) [I48.91]  No   • Right upper quadrant pain [R10.11]  Yes   • Calculus of gallbladder without cholecystitis [K80.20]  Yes   • Elevated LFTs [R94.5]  Yes   • BPH (benign prostatic hyperplasia) [N40.0]  Yes   • Bladder cancer (CMS/HCC) [C67.9]  Yes   • Diastolic dysfunction [I51.9]  Yes   • GERD (gastroesophageal reflux disease) [K21.9]  Yes   • S/P drug eluting coronary stent placement [Z95.5]  Not Applicable   • Coronary artery disease involving native coronary artery of native heart without angina pectoris [I25.10]  Yes       Resolved Hospital Problems   No resolved problems to display.   Symptomatic Cholelithiasis  - no e/o cholecystitis  - s/p laparoscopic cholecystectomy 9/2/19 with positive intraoperative cholangiogram  - s/p ERCP with sphincterotomy and stone extraction 9/3/19   - appreciate general surgery and GI recs     Elevated LFTs  - secondary to above  - Improving.      Atrial Fibrillation  - not previously known  - converted to NSR  - [id number] is 3, started on lovenox-will defer to cardiology as to whether to continue this as well as when to restart plavix     ESBL Colonization  - no subjective UTI symptoms or evidence of infection  - no abx warranted at this time  - appreciate ID recs     CAD  - s/p ANJALI x2 8/2018  - plavix held, on ASA-defer to cardiology when to restart on plavix  - follows with Dr. Proctor  - appreciate cardiology recs     BPH/Bladder Cancer  - finished BCG 3/6/19  - s/p TURP 7/11/19  - no urinary symptoms currently    Constipation  - bowel regimen  - encourage mobility    VTE Prophylaxis - SCDs  Code Status - Full code  Disposition - Anticipate discharge home with family tomorrow if okay with cardiology.      Jose Salinas MD  [facility]  09/04/19  11:28 AM

## 2019-09-04 NOTE — PLAN OF CARE
Problem: Pain, Acute (Adult)  Goal: Acceptable Pain Control/Comfort Level  Outcome: Ongoing (interventions implemented as appropriate)      Problem: Patient Care Overview  Goal: Plan of Care Review  Outcome: Ongoing (interventions implemented as appropriate)   09/04/19 2107   Coping/Psychosocial   Plan of Care Reviewed With patient   OTHER   Outcome Summary pt complains of mild abd pain, but does not want pain meds, dsg changed to belly button lap site, tolerating diet, given suppository, awaiting bowel function, sr most of my shift, vss, will ctm.     Goal: Individualization and Mutuality  Outcome: Ongoing (interventions implemented as appropriate)    Goal: Discharge Needs Assessment  Outcome: Ongoing (interventions implemented as appropriate)    Goal: Interprofessional Rounds/Family Conf  Outcome: Ongoing (interventions implemented as appropriate)      Problem: Fall Risk (Adult)  Goal: Absence of Fall  Outcome: Ongoing (interventions implemented as appropriate)

## 2019-09-05 VITALS
SYSTOLIC BLOOD PRESSURE: 108 MMHG | HEIGHT: 72 IN | TEMPERATURE: 98.2 F | DIASTOLIC BLOOD PRESSURE: 68 MMHG | RESPIRATION RATE: 18 BRPM | OXYGEN SATURATION: 92 % | WEIGHT: 182.9 LBS | BODY MASS INDEX: 24.77 KG/M2 | HEART RATE: 76 BPM

## 2019-09-05 LAB
ALBUMIN SERPL-MCNC: 3.3 G/DL (ref 3.5–5.2)
ALBUMIN/GLOB SERPL: 1.5 G/DL
ALP SERPL-CCNC: 855 U/L (ref 39–117)
ALT SERPL W P-5'-P-CCNC: 461 U/L (ref 1–41)
ANION GAP SERPL CALCULATED.3IONS-SCNC: 10.9 MMOL/L (ref 5–15)
AST SERPL-CCNC: 611 U/L (ref 1–40)
BILIRUB SERPL-MCNC: 5.8 MG/DL (ref 0.2–1.2)
BUN BLD-MCNC: 16 MG/DL (ref 8–23)
BUN/CREAT SERPL: 22.5 (ref 7–25)
CALCIUM SPEC-SCNC: 8.3 MG/DL (ref 8.6–10.5)
CHLORIDE SERPL-SCNC: 103 MMOL/L (ref 98–107)
CO2 SERPL-SCNC: 23.1 MMOL/L (ref 22–29)
CREAT BLD-MCNC: 0.71 MG/DL (ref 0.76–1.27)
CYTO UR: NORMAL
GFR SERPL CREATININE-BSD FRML MDRD: 107 ML/MIN/1.73
GLOBULIN UR ELPH-MCNC: 2.2 GM/DL
GLUCOSE BLD-MCNC: 90 MG/DL (ref 65–99)
LAB AP CASE REPORT: NORMAL
PATH REPORT.FINAL DX SPEC: NORMAL
PATH REPORT.GROSS SPEC: NORMAL
POTASSIUM BLD-SCNC: 3.1 MMOL/L (ref 3.5–5.2)
PROT SERPL-MCNC: 5.5 G/DL (ref 6–8.5)
SODIUM BLD-SCNC: 137 MMOL/L (ref 136–145)

## 2019-09-05 PROCEDURE — 97110 THERAPEUTIC EXERCISES: CPT

## 2019-09-05 PROCEDURE — 80053 COMPREHEN METABOLIC PANEL: CPT | Performed by: INTERNAL MEDICINE

## 2019-09-05 PROCEDURE — 25010000002 HYDROMORPHONE PER 4 MG: Performed by: INTERNAL MEDICINE

## 2019-09-05 PROCEDURE — 97161 PT EVAL LOW COMPLEX 20 MIN: CPT

## 2019-09-05 PROCEDURE — 25010000002 ENOXAPARIN PER 10 MG: Performed by: INTERNAL MEDICINE

## 2019-09-05 RX ORDER — CLOPIDOGREL BISULFATE 75 MG/1
75 TABLET ORAL DAILY
Status: DISCONTINUED | OUTPATIENT
Start: 2019-09-05 | End: 2019-09-05 | Stop reason: HOSPADM

## 2019-09-05 RX ORDER — ASPIRIN 81 MG/1
81 TABLET, CHEWABLE ORAL DAILY
Qty: 30 TABLET | Refills: 0 | Status: SHIPPED | OUTPATIENT
Start: 2019-09-06

## 2019-09-05 RX ORDER — POTASSIUM CHLORIDE 750 MG/1
40 CAPSULE, EXTENDED RELEASE ORAL EVERY 4 HOURS
Status: COMPLETED | OUTPATIENT
Start: 2019-09-05 | End: 2019-09-05

## 2019-09-05 RX ORDER — SENNA AND DOCUSATE SODIUM 50; 8.6 MG/1; MG/1
2 TABLET, FILM COATED ORAL 2 TIMES DAILY
Qty: 60 TABLET | Refills: 0 | Status: SHIPPED | OUTPATIENT
Start: 2019-09-05 | End: 2019-09-17 | Stop reason: HOSPADM

## 2019-09-05 RX ORDER — HYDROCODONE BITARTRATE AND ACETAMINOPHEN 5; 325 MG/1; MG/1
1 TABLET ORAL EVERY 4 HOURS PRN
Qty: 18 TABLET | Refills: 0 | Status: SHIPPED | OUTPATIENT
Start: 2019-09-05 | End: 2019-09-26

## 2019-09-05 RX ADMIN — HYDROMORPHONE HYDROCHLORIDE 0.5 MG: 1 INJECTION, SOLUTION INTRAMUSCULAR; INTRAVENOUS; SUBCUTANEOUS at 03:32

## 2019-09-05 RX ADMIN — POLYETHYLENE GLYCOL 3350 17 G: 17 POWDER, FOR SOLUTION ORAL at 09:11

## 2019-09-05 RX ADMIN — FLUOXETINE HYDROCHLORIDE 60 MG: 20 CAPSULE ORAL at 06:01

## 2019-09-05 RX ADMIN — CLOPIDOGREL 75 MG: 75 TABLET, FILM COATED ORAL at 14:14

## 2019-09-05 RX ADMIN — HYDROMORPHONE HYDROCHLORIDE 0.5 MG: 1 INJECTION, SOLUTION INTRAMUSCULAR; INTRAVENOUS; SUBCUTANEOUS at 01:10

## 2019-09-05 RX ADMIN — SODIUM CHLORIDE, PRESERVATIVE FREE 10 ML: 5 INJECTION INTRAVENOUS at 09:11

## 2019-09-05 RX ADMIN — POTASSIUM CHLORIDE 40 MEQ: 750 CAPSULE, EXTENDED RELEASE ORAL at 14:14

## 2019-09-05 RX ADMIN — ATORVASTATIN CALCIUM 40 MG: 20 TABLET, FILM COATED ORAL at 06:02

## 2019-09-05 RX ADMIN — HYDROMORPHONE HYDROCHLORIDE 0.5 MG: 1 INJECTION, SOLUTION INTRAMUSCULAR; INTRAVENOUS; SUBCUTANEOUS at 09:23

## 2019-09-05 RX ADMIN — AMLODIPINE BESYLATE 5 MG: 5 TABLET ORAL at 06:01

## 2019-09-05 RX ADMIN — ASPIRIN 81 MG: 81 TABLET, CHEWABLE ORAL at 09:10

## 2019-09-05 RX ADMIN — HYDROMORPHONE HYDROCHLORIDE 0.5 MG: 1 INJECTION, SOLUTION INTRAMUSCULAR; INTRAVENOUS; SUBCUTANEOUS at 06:02

## 2019-09-05 RX ADMIN — ENOXAPARIN SODIUM 80 MG: 80 INJECTION SUBCUTANEOUS at 09:10

## 2019-09-05 RX ADMIN — SENNOSIDES AND DOCUSATE SODIUM 2 TABLET: 8.6; 5 TABLET ORAL at 09:10

## 2019-09-05 RX ADMIN — POTASSIUM CHLORIDE 40 MEQ: 750 CAPSULE, EXTENDED RELEASE ORAL at 09:10

## 2019-09-05 NOTE — THERAPY EVALUATION
Patient Name: Sukhdev Zayas  : 1940    MRN: [id number]                              Today's Date: 2019       Admit Date: 2019    Visit Dx:     ICD-10-CM ICD-9-CM   1. Right upper quadrant pain R10.11 789.01   2. Elevated liver enzymes R74.8 790.5   3. Calculus of gallbladder without cholecystitis without obstruction K80.20 574.20   4. Cholelithiasis K80.20 574.20   5. Cholecystitis K81.9 575.10   6. Calculus of bile duct with acute cholecystitis without obstruction K80.42 574.30     Patient Active Problem List   Diagnosis   • Hyperlipidemia   • Coronary artery disease involving native coronary artery of native heart without angina pectoris   • Cognitive disorder   • Mood disorder (CMS/HCC)   • Closed wedge compression fracture of third thoracic vertebra with routine healing   • GERD (gastroesophageal reflux disease)   • S/P drug eluting coronary stent placement   • Chest pain   • Diastolic dysfunction   • Exertional angina (CMS/HCC)   • Unstable angina (CMS/HCC)   • Bladder mass   • Mixed action and resting tremor   • Bladder cancer (CMS/HCC)   • Acute cystitis without hematuria   • Generalized weakness   • Dyspnea on exertion   • BPH (benign prostatic hyperplasia)   • Right upper quadrant pain   • Calculus of gallbladder without cholecystitis   • Elevated LFTs   • Calculus of bile duct with acute cholecystitis without obstruction   • Atrial fibrillation (CMS/HCC)     Past Medical History:   Diagnosis Date   • Anxiety    • Back pain    • Bladder cancer (CMS/HCC)    • Coronary artery disease    • Depression    • Dizziness    • Fatigue    • GERD (gastroesophageal reflux disease)    • History of fractured rib     RIGHT SIDE   • Hyperlipidemia    • Hypertension    • Tremors of nervous system    • Urinary incontinence      Past Surgical History:   Procedure Laterality Date   • CARDIAC CATHETERIZATION      7x   • CATARACT EXTRACTION, BILATERAL     • CHOLECYSTECTOMY N/A 2019    Procedure:  CHOLECYSTECTOMY LAPAROSCOPIC WITH INTRAOPERATIVE CHOLANGIOGRAM;  Surgeon: Bg Rodriguez Jr., MD;  Location: [facility] MAIN OR;  Service: General   • COLONOSCOPY     • CORONARY ANGIOPLASTY WITH STENT PLACEMENT      X5    • CYSTOSCOPY BLADDER BIOPSY N/A 1/8/2019    Procedure: CYSTOSCOPY BLADDER BIOPSY;  Surgeon: Wang Soares Jr., MD;  Location: [facility] MAIN OR;  Service: Urology   • CYSTOSCOPY BLADDER BIOPSY N/A 6/13/2019    Procedure: CYSTOSCOPY BLADDER BIOPSY;  Surgeon: Wang Soares Jr., MD;  Location: [facility] MAIN OR;  Service: Urology   • CYSTOSCOPY TRANSURETHRAL RESECTION OF PROSTATE N/A 7/11/2019    Procedure: CYSTOSCOPY TRANSURETHRAL RESECTION OF PROSTATE;  Surgeon: Wang Soares Jr., MD;  Location: [facility] MAIN OR;  Service: Urology   • CYSTOSCOPY URETEROSCOPY LASER LITHOTRIPSY N/A 6/13/2019    Procedure: CYSTO LITHOPAXY;  Surgeon: Wang Soares Jr., MD;  Location: [facility] MAIN OR;  Service: Urology   • ERCP N/A 9/3/2019    Procedure: ENDOSCOPIC RETROGRADE CHOLANGIOPANCREATOGRAPHY with sphincterotomy and balloon sweep;  Surgeon: Ashok Amaya MD;  Location: [facility] ENDOSCOPY;  Service: Gastroenterology   • EYE SURGERY     • SKIN CANCER EXCISION Left     chest wall   • TRANSURETHRAL RESECTION OF BLADDER TUMOR N/A 11/29/2018    Procedure: TUR BLADDER TUMOR  LARGE;  Surgeon: Wang Soares Jr., MD;  Location: [facility] MAIN OR;  Service: Urology     General Information     Row Name 09/05/19 1452          PT Evaluation Time/Intention    Document Type  evaluation  -MS     Mode of Treatment  physical therapy;individual therapy  -MS     Row Name 09/05/19 1450          General Information    Patient Profile Reviewed?  yes  -MS     Prior Level of Function  independent:  -MS     Barriers to Rehab  none identified  -MS     Row Name 09/05/19 1452          Relationship/Environment    Lives With  spouse  -MS     Row Name 09/05/19 1452          Cognitive Assessment/Intervention- PT/OT     Orientation Status (Cognition)  oriented x 3  -MS     Row Name 09/05/19 1452          Safety Issues, Functional Mobility    Comment, Safety Issues/Impairments (Mobility)  Gait belt used for safety.  -MS       User Key  (r) = Recorded By, (t) = Taken By, (c) = Cosigned By    Initials Name Provider Type    MS Mahan Jose ALEXANDER, PT Physical Therapist        Mobility     Row Name 09/05/19 1453          Bed Mobility Assessment/Treatment    Bed Mobility Assessment/Treatment  supine-sit;sit-supine  -MS     Supine-Sit [place] (Bed Mobility)  independent  -MS     Sit-Supine [place] (Bed Mobility)  independent  -MS     Row Name 09/05/19 1453          Sit-Stand Transfer    Sit-Stand [place] (Transfers)  contact guard  -MS     Row Name 09/05/19 1453          Gait/Stairs Assessment/Training    [place] Level (Gait)  contact guard  -MS     Distance in Feet (Gait)  300 feet  -MS     Pattern (Gait)  step-through  -MS     Comment (Gait/Stairs)  Unsteady at times but no overt losses of balance.   -MS       User Key  (r) = Recorded By, (t) = Taken By, (c) = Cosigned By    Initials Name Provider Type    MS Mahan Jose ALEXANDER, PT Physical Therapist        Obj/Interventions     Row Name 09/05/19 1454          General ROM    GENERAL ROM COMMENTS  BUE/LE (WFL's)  -MS     Row Name 09/05/19 1454          MMT (Manual Muscle Testing)    General MMT Comments  BUE/LE (4-/5)  -MS       User Key  (r) = Recorded By, (t) = Taken By, (c) = Cosigned By    Initials Name Provider Type    MS MahanJose, PT Physical Therapist        Goals/Plan     Row Name 09/05/19 1456          Transfer Goal 1 (PT)    Activity/Assistive Device (Transfer Goal 1, PT)  transfers, all  -MS     [place] Level/Cues Needed (Transfer Goal 1, PT)  independent  -MS     Time Frame (Transfer Goal 1, PT)  long term goal (LTG);3 days  -MS     Row Name 09/05/19 1456          Gait Training Goal 1 (PT)    Activity/Assistive Device (Gait Training Goal 1, PT)   gait (walking locomotion)  -MS     [place] Level (Gait Training Goal 1, PT)  independent  -MS     Distance (Gait Goal 1, PT)  300 feet  -MS     Time Frame (Gait Training Goal 1, PT)  long term goal (LTG);3 days  -MS       User Key  (r) = Recorded By, (t) = Taken By, (c) = Cosigned By    Initials Name Provider Type    MS KalliJose, PT Physical Therapist        Clinical Impression     Row Name 09/05/19 1456          Pain Assessment    Additional Documentation  Pain Scale: Numbers Pre/Post-Treatment (Group)  -MS     Row Name 09/05/19 1452          Pain Scale: Numbers Pre/Post-Treatment    Pain Scale: Numbers, Pretreatment  3/10  -MS     Pain Scale: Numbers, Post-Treatment  3/10  -MS     Pain Location  abdomen  -MS     Row Name 09/05/19 1109          Plan of Care Review    Plan of Care Reviewed With  patient  -MS     Row Name 09/05/19 1456          Physical Therapy Clinical Impression    Criteria for Skilled Interventions Met (PT Clinical Impression)  yes  -MS     Rehab Potential (PT Clinical Summary)  good, to achieve stated therapy goals  -MS     Row Name 09/05/19 1453          Positioning and Restraints    Pre-Treatment Position  in bed  -MS     Post Treatment Position  bathroom  -MS     Bathroom  notified nsg;sitting;call light within reach;encouraged to call for assist;with family/caregiver  -MS       User Key  (r) = Recorded By, (t) = Taken By, (c) = Cosigned By    Initials Name Provider Type    MS MahanJose, PT Physical Therapist        Outcome Measures     Row Name 09/05/19 5521          How much help from another person do you currently need...    Turning from your back to your side while in flat bed without using bedrails?  4  -MS     Moving from lying on back to sitting on the side of a flat bed without bedrails?  4  -MS     Moving to and from a bed to a chair (including a wheelchair)?  3  -MS     Standing up from a chair using your arms (e.g., wheelchair, bedside chair)?  3  -MS      Climbing 3-5 steps with a railing?  3  -MS     To walk in hospital room?  3  -MS     AM-PAC 6 Clicks Score (PT)  20  -MS     Row Name 09/05/19 1457          Functional Assessment    Outcome Measure Options  AM-PAC 6 Clicks Basic Mobility (PT)  -MS       User Key  (r) = Recorded By, (t) = Taken By, (c) = Cosigned By    Initials Name Provider Type    MS Jose Mahan PT Physical Therapist        Physical Therapy Education     Title: PT OT SLP Therapies (Done)     Topic: Physical Therapy (Done)     Point: Mobility training (Done)     Learning Progress Summary           Patient Acceptance, E,D, VU,NR by MS at 9/5/2019  2:57 PM                   Point: Home exercise program (Done)     Learning Progress Summary           Patient Acceptance, E,D, VU,NR by MS at 9/5/2019  2:57 PM                   Point: Body mechanics (Done)     Learning Progress Summary           Patient Acceptance, E,D, VU,NR by MS at 9/5/2019  2:57 PM                   Point: Precautions (Done)     Learning Progress Summary           Patient Acceptance, E,D, VU,NR by MS at 9/5/2019  2:57 PM                               User Key     Initials Effective Dates Name Provider Type Discipline    MS 04/03/18 -  Jose Mahan PT Physical Therapist PT              PT Recommendation and Plan  Planned Therapy Interventions (PT Eval): balance training, bed mobility training, gait training, home exercise program, patient/family education, postural re-education, strengthening, transfer training  Outcome Summary/Treatment Plan (PT)  Anticipated Discharge Disposition (PT): home with assist, home with home health  Plan of Care Reviewed With: patient, family  Outcome Summary: Pt. will benefit from skilled inpt. P.T. to address his functional deficits and to assist pt. in regaining his maximum level of independence with functional mobility.      Time Calculation:   PT Charges     Row Name 09/05/19 6148             Time Calculation    Start Time  1415  -MS       Stop Time  1430  -MS      Time Calculation (min)  15 min  -MS      PT Received On  09/05/19  -MS      PT - Next Appointment  09/06/19  -MS      PT Goal Re-Cert Due Date  09/08/19  -MS         Time Calculation- PT    Total Timed Code Minutes- PT  13 minute(s)  -MS        User Key  (r) = Recorded By, (t) = Taken By, (c) = Cosigned By    Initials Name Provider Type    MS Jose Mahan, PT Physical Therapist        Therapy Charges for Today     Code Description Service Date Service Provider Modifiers Qty    99132290452 HC PT EVAL LOW COMPLEXITY 1 9/5/2019 Jose Mahan, PT GP 1    19160941067 HC PT THER PROC EA 15 MIN 9/5/2019 Jose Mahan, PT GP 1          PT G-Codes  Outcome Measure Options: AM-PAC 6 Clicks Basic Mobility (PT)  AM-PAC 6 Clicks Score (PT): 20    Jose Mahan, PT  9/5/2019

## 2019-09-05 NOTE — PROGRESS NOTES
Continued Stay Note  [facility]     Patient Name: Sukhdev Zayas  MRN: [id number]  Today's Date: 9/5/2019    Admit Date: 8/30/2019    Discharge Plan     Row Name 09/05/19 1322       Plan    Plan  Plan home with [facility].  FELICIA Egan RN    Patient/Family in Agreement with Plan  yes    Plan Comments  Spoke with pt's wife (Taty Zayas [phone]).  She denies the need for SNF.  Awaiting PT eval.  Provided a HH list and she is requesting [facility]  follow pt.  Fabiana  ( 0914) called to follow.  Plan home with [facility].  FELICIA Egna RN        Discharge Codes    No documentation.             Domitila Egan, RN

## 2019-09-05 NOTE — PLAN OF CARE
Problem: Pain, Acute (Adult)  Goal: Acceptable Pain Control/Comfort Level  Outcome: Ongoing (interventions implemented as appropriate)      Problem: Patient Care Overview  Goal: Plan of Care Review  Outcome: Ongoing (interventions implemented as appropriate)   09/05/19 0430   Coping/Psychosocial   Plan of Care Reviewed With patient   Plan of Care Review   Progress no change   OTHER   Outcome Summary Patient alert and oriented. Complaints of abdominal pain at laproscopic sites. PRN pain medication given. Dressing to umbilical site changed. Up with assitance to restroom. Vital signs stable. No acute distress noted. Will continue to monitor.        Problem: Fall Risk (Adult)  Goal: Absence of Fall  Outcome: Ongoing (interventions implemented as appropriate)

## 2019-09-05 NOTE — PROGRESS NOTES
Sukhdev Zayas   78 y.o.  male    LOS: 4 days   Patient Care Team:  Rachelle Patton PA-C as PCP - General (Physician Assistant)  Christie Pacheco APRN as PCP - Claims Attributed      Subjective     Interval History:     Patient Complaints: No chest pain.  Says his breathing is better.  No feeling of palpitations or lightheadedness.    Review of Systems:   Still having some abdominal pain.  Last evening he said it was very severe and he received some IV pain meds.  He apparently also received another dose of IV pain meds this morning says the pain is better in the abdomen.    Medication Review:   Current Facility-Administered Medications:   •  amLODIPine (NORVASC) tablet 5 mg, 5 mg, Oral, Brad AMANDA Matthew D, MD, 5 mg at 09/05/19 0601  •  aspirin chewable tablet 81 mg, 81 mg, Oral, Daily, Jose Salinas MD, 81 mg at 09/05/19 0910  •  atorvastatin (LIPITOR) tablet 40 mg, 40 mg, Oral, Brad AMANDA Matthew D, MD, 40 mg at 09/05/19 0602  •  bisacodyl (DULCOLAX) suppository 10 mg, 10 mg, Rectal, Daily PRN, Gregor Soto MD, 10 mg at 09/04/19 1430  •  clopidogrel (PLAVIX) tablet 75 mg, 75 mg, Oral, Daily, Gordo Luna MD  •  FLUoxetine (PROzac) capsule 60 mg, 60 mg, Oral, Brad AMANDA Matthew D, MD, 60 mg at 09/05/19 0601  •  HYDROcodone-acetaminophen (NORCO) 5-325 MG per tablet 1 tablet, 1 tablet, Oral, Q6H PRN, Jose Salinas MD, 1 tablet at 09/04/19 1905  •  HYDROmorphone (DILAUDID) injection 0.5 mg, 0.5 mg, Intravenous, Q2H PRN, 0.5 mg at 09/05/19 0923 **AND** naloxone (NARCAN) injection 0.4 mg, 0.4 mg, Intravenous, Q5 Min PRN, Jose Salinas MD  •  melatonin tablet 5 mg, 5 mg, Oral, Nightly PRN, Jose Salinas MD, 5 mg at 09/04/19 0008  •  nitroglycerin (NITROSTAT) SL tablet 0.4 mg, 0.4 mg, Sublingual, Q5 Min PRN, Jose Salinas MD  •  ondansetron (ZOFRAN) injection 4 mg, 4 mg, Intravenous, Q6H PRN, Marlena Velazquez, ANIL, 4 mg at 09/03/19 2105  •  polyethylene glycol  (MIRALAX) powder 17 g, 17 g, Oral, Daily, Gregor Soto MD, 17 g at 09/05/19 0911  •  potassium chloride (MICRO-K) CR capsule 40 mEq, 40 mEq, Oral, Q4H, Jose Salinas MD, 40 mEq at 09/05/19 0910  •  sennosides-docusate sodium (SENOKOT-S) 8.6-50 MG tablet 2 tablet, 2 tablet, Oral, BID, Jose Salinas MD, 2 tablet at 09/05/19 0910  •  [COMPLETED] Insert peripheral IV, , , Once **AND** sodium chloride 0.9 % flush 10 mL, 10 mL, Intravenous, PRN, Raz Newell III, PA  •  sodium chloride 0.9 % flush 10 mL, 10 mL, Intravenous, Q12H, Marlena Velazquez, APRN, 10 mL at 09/05/19 0911  •  sodium chloride 0.9 % flush 10 mL, 10 mL, Intravenous, PRN, Marlena Velazquez, APRN, 10 mL at 09/03/19 1658  •  sodium chloride 0.9 % infusion, 30 mL/hr, Intravenous, Continuous PRN, Ashok Amaya MD      Objective     Vital Sign Min/Max for last 24 hours  Temp  Min: 97.6 °F (36.4 °C)  Max: 98.4 °F (36.9 °C)   BP  Min: 122/72  Max: 153/89    Pulse  Min: 62  Max: 73     Wt Readings from Last 3 Encounters:   09/03/19 83 kg (182 lb 14.4 oz)   08/30/19 83.5 kg (184 lb)   07/30/19 86.1 kg (189 lb 14.4 oz)        Intake/Output Summary (Last 24 hours) at 9/5/2019 1243  Last data filed at 9/4/2019 1335  Gross per 24 hour   Intake 360 ml   Output 100 ml   Net 260 ml     Physical Exam:      General Appearance:    Well developed and well nourished in no acute distress   Head:    Normocephalic, atraumatic   Eyes:            Conjunctivae normal, non icteric, no xanthelasma   Neck:   no carotid bruit, no JVD   Lungs:    Some decreased breath sounds at bases    Heart:    Regular rate and rhythm.  Normal S1 and S2. No murmur, no gallop, rub or lift.    Chest Wall:    No abnormalities observed   Abdomen:     Normal bowel sounds, no masses, no organomegaly, soft        non-tender, non-distended, no guarding, no rebound                tenderness   Rectal:     Deferred   Extremities:   No clubbing, cyanosis or edema.     Pulses:    Pulses palpable and equal bilaterally   Skin:   No bleeding, bruising or rash   Neurologic:   awake alert and oriented x3, speech clear and approp, no facial drooping      Monitor:  nsr  Results Review:     I reviewed the patient's new clinical results.    Sodium Sodium   Date Value Ref Range Status   09/05/2019 137 136 - 145 mmol/L Final   09/04/2019 141 136 - 145 mmol/L Final   09/03/2019 136 136 - 145 mmol/L Final      Potassium Potassium   Date Value Ref Range Status   09/05/2019 3.1 (L) 3.5 - 5.2 mmol/L Final   09/04/2019 3.5 3.5 - 5.2 mmol/L Final   09/03/2019 3.6 3.5 - 5.2 mmol/L Final      Chloride Chloride   Date Value Ref Range Status   09/05/2019 103 98 - 107 mmol/L Final   09/04/2019 109 (H) 98 - 107 mmol/L Final   09/03/2019 102 98 - 107 mmol/L Final      Bicarbonate No results found for: PLASMABICARB   BUN BUN   Date Value Ref Range Status   09/05/2019 16 8 - 23 mg/dL Final   09/04/2019 16 8 - 23 mg/dL Final   09/03/2019 11 8 - 23 mg/dL Final      Creatinine Creatinine   Date Value Ref Range Status   09/05/2019 0.71 (L) 0.76 - 1.27 mg/dL Final   09/04/2019 0.77 0.76 - 1.27 mg/dL Final   09/03/2019 0.80 0.76 - 1.27 mg/dL Final      Calcium Calcium   Date Value Ref Range Status   09/05/2019 8.3 (L) 8.6 - 10.5 mg/dL Final   09/04/2019 8.6 8.6 - 10.5 mg/dL Final   09/03/2019 8.8 8.6 - 10.5 mg/dL Final      Magnesium No results found for: MG     Results from last 7 days   Lab Units 09/04/19  0700   WBC 10*3/mm3 10.38   HEMOGLOBIN g/dL 13.6   HEMATOCRIT % 37.5   PLATELETS 10*3/mm3 224     Lab Results   Lab Value Date/Time    TROPONINT <0.010 09/03/2019 2039    TROPONINT <0.010 05/25/2019 0603    TROPONINT <0.010 05/24/2019 2029     Lab Results   Component Value Date    CHOL 124 11/15/2017    CHOL 119 02/22/2017    CHOL 121 06/09/2016     Lab Results   Component Value Date    HDL 74 (H) 11/15/2017    HDL 66 02/22/2017    HDL 62 06/09/2016     Lab Results   Component Value Date    LDL 35 11/15/2017    LDL 39  02/22/2017    LDL 45 06/09/2016     Lab Results   Component Value Date    TRIG 73 11/15/2017    TRIG 71 02/22/2017    TRIG 68 06/09/2016     No components found for: CHOLHDL    Results from last 7 days   Lab Units 08/30/19  2147   INR  1.07         Echo EF Estimated  No results found for: ECHOEFEST       Assessment/ Plan  Coronary artery disease, last stents was in August 2018 to the proximal and mid LAD, cardiac cath October 18 showed patent stents and no obstructive coronary disease  New onset atrial fibrillation yesterday  Postop day 2 from laparoscopic cholecystectomy and ERCP with common bile duct stone removal yesterday  Plan #1 he has not had any further atrial fibrillation since the brief episode.  No prior mention of any atrial fibrillation in the outside records from Dr. Proctor's office.  I had thought about placing him on a DOAC at discharge and then recommending outpatient monitoring for 2 to 4 weeks and then if no atrial fibrillation deciding whether to continue it.  However I am concerned about the abnormal liver functions with an oral anticoagulant at this time.  At his level of liver abnormality it is recommended caution or not use.  Therefore I am not going to start anything at this point and will discontinue the Lovenox.  I will restart the Plavix and will have him on aspirin and Plavix view of his multiple stents and the fact that the stents a year ago were LAD stents.  I think the risk of triple therapy at this point outweighs benefit.  2.  He will need some outpatient monitoring as discussed above and determine if he has any further atrial fibrillation which he does not seem to feel.  #3 I had a long discussion with the patient and mostly with his wife.  Also discussed with the patient's nurse    Gordo Luna MD  09/05/19  12:43 PM      Time: 21 min

## 2019-09-05 NOTE — DISCHARGE PLACEMENT REQUEST
"Angie Zayas (78 y.o. Male)     Date of Birth Social Security Number Address Home Phone MRN    1940  [address] [place] [id number] [phone] [id number]    Orthodox Marital Status          None        Admission Date Admission Type Admitting Provider Attending Provider Department, Room/Bed    8/30/19 Emergency Jose Salinas MD Lykins, Matthew D, MD [address], [address]/1    Discharge Date Discharge Disposition Discharge Destination                       Attending Provider:  Jose Salinas MD    Allergies:  No Known Allergies    Isolation:  Contact   Infection:  ESBL E coli (09/01/19)   Code Status:  CPR    Ht:  182.9 cm (72\")   Wt:  83 kg (182 lb 14.4 oz)    Admission Cmt:  None   Principal Problem:  Calculus of bile duct with acute cholecystitis without obstruction [K80.42] More...                 Active Insurance as of 8/30/2019     Primary Coverage     Payor Plan Insurance Group Employer/Plan Group    MEDICARE MEDICARE A & B      Payor Plan Address Payor Plan Phone Number Payor Plan Fax Number Effective Dates    [address] [phone]  11/1/2005 - None Entered    [facility] [id number]       Subscriber Name Subscriber Birth Date Member ID       ANGIE ZAYAS 1940 [id number]           Secondary Coverage     Payor Plan Insurance Group Employer/Plan Group     FOR LIFE  FOR LIFE MC SUPP [id number]     Payor Plan Address Payor Plan Phone Number Payor Plan Fax Number Effective Dates    [address] [phone]  5/9/2016 - None Entered    [facility] [address]       Subscriber Name Subscriber Birth Date Member ID       ANGIE ZAYAS 1940 [id number]                 Emergency Contacts      (Rel.) Home Phone Work Phone Mobile Phone    Taty Zayas (Spouse) -- -- [phone]    Del ToroRosa Elena owusu (Daughter) -- -- [phone]              "

## 2019-09-05 NOTE — PLAN OF CARE
Problem: Patient Care Overview  Goal: Plan of Care Review   09/05/19 1210   Coping/Psychosocial   Plan of Care Reviewed With patient;family   OTHER   Outcome Summary Pt. will benefit from skilled inpt. P.T. to address his functional deficits and to assist pt. in regaining his maximum level of independence with functional mobility.

## 2019-09-05 NOTE — DISCHARGE SUMMARY
Date of Admission: 8/30/2019  Date of Discharge:  9/5/2019  Primary Care Physician: Rachelle Patton PA-C     Discharge Diagnosis:  Active Hospital Problems    Diagnosis  POA   • **Calculus of bile duct with acute cholecystitis without obstruction [K80.42]  Yes   • Atrial fibrillation (CMS/HCC) [I48.91]  No   • Right upper quadrant pain [R10.11]  Yes   • Calculus of gallbladder without cholecystitis [K80.20]  Yes   • Elevated LFTs [R94.5]  Yes   • BPH (benign prostatic hyperplasia) [N40.0]  Yes   • Bladder cancer (CMS/HCC) [C67.9]  Yes   • Diastolic dysfunction [I51.9]  Yes   • GERD (gastroesophageal reflux disease) [K21.9]  Yes   • S/P drug eluting coronary stent placement [Z95.5]  Not Applicable   • Coronary artery disease involving native coronary artery of native heart without angina pectoris [I25.10]  Yes      Resolved Hospital Problems   No resolved problems to display.       Presenting Problem/History of Present Illness:  Right upper quadrant pain [R10.11]  Elevated liver enzymes [R74.8]  Right upper quadrant pain [R10.11]     Hospital Course:  The patient is a 78 y.o. male with a history of CAD s/p stents to proximal and mid LAD August 2018 on ASA and plavix, CAD, and BPH with pladder cancer s/p TURP 7/11/19 who presented with RUQ pain and was found to have symptomatic cholelithiasis without evidence of acute obstruction or infection. Please see admission H&P from 8/31/19 for further details.  Dr. Bg Rodriguez with general surgery was consulted and he was taken for cholecystectomy on 9/2/19 which he tolerated well. He had a positive intraoperative cholangiogram and had an ERCP with sphincterotomy and stone extraction the following day with Dr. Amaya.  His abdominal symptoms resolved and his diet was advanced which he tolerated well.  He should remain on a low fat diet.  He did have some small amounts of blood oozing from the umbilical laparoscopic site and general surgery was notified-they cleared him for  "discharge and instructed him to continue with gauze changes. His INR was normal. He did have some elevated LFTs which were likely secondary to his biliary issues and these did improve following the above procedures.  He should follow up with his PCP in a week or two to check on these.  He will be continued on his atorvastatin for now as this does not appear to have been the cause and he did improve while on this.  Of note, the patient had some bacteruria which grew ESBL.  He had no urinary symptoms or signs of infection.  Dr. Niyah Neal with ID was consulted and recommended no antibiotics at this time as this likely represents a colonization.    He did have an episode of atrial fibrillation following his ERCP and he was started on therapeutic lovenox.  His plavix that he takes for CAD had been held for his surgery.  He spontaneously converted to sinus rhythm by the following morning.  Cardiology recommended restarting the plavix and continuing him in the ASA/plavix without anticoagulation for the time being.  He will follow up with his primary cardiologist, Dr. Proctor.   He had a low potassium at 3.1 on the day of discharge and was given 80mEQ total of potassium chloride orally prior to discharge.    Exam Today:  Blood pressure 108/68, pulse 76, temperature 98.2 °F (36.8 °C), temperature source Oral, resp. rate 18, height 182.9 cm (72\"), weight 83 kg (182 lb 14.4 oz), SpO2 92 %.  Constitutional: He is oriented to person, place, and time. No distress.   Head: Normocephalic and atraumatic.   Mouth/Throat: Oropharynx is clear and moist.   Eyes: Conjunctivae and EOM are normal. Pupils are equal, round, and reactive to light.   Neck: Normal range of motion. Neck supple.   Cardiovascular: Normal rate, regular rhythm and intact distal pulses.   Pulmonary/Chest: Effort normal and breath sounds normal. He has no rales.   Abdominal: Soft. Bowel sounds are normal. There is There is no rebound and no guarding. "   Musculoskeletal: He exhibits no edema or tenderness.   Neurological: He is alert and oriented to person, place, and time. No cranial nerve deficit.   Skin: Skin is warm and dry. He is not diaphoretic.   Psychiatric: He has a normal mood and affect. His behavior is normal.   Nursing note and vitals reviewed.    Procedures Performed:  Procedure(s):  ENDOSCOPIC RETROGRADE CHOLANGIOPANCREATOGRAPHY with sphincterotomy and balloon sweep  09/03 1648 ERCP        Consults:   Consults     Date and Time Order Name Status Description    9/3/2019 1900 Inpatient Cardiology Consult      9/2/2019 1218 Inpatient Gastroenterology Consult Completed     9/1/2019 0808 Inpatient Infectious Diseases Consult Completed     8/31/2019 0753 Inpatient Cardiology Consult Completed     8/31/2019 0119 Inpatient General Surgery Consult Completed     8/31/2019 0004 [facility] (on-call MD unless specified) Details Completed     8/30/2019 2348 Surgery (on-call MD unless specified) Completed            Discharge Disposition:  Home or Self Care    Discharge Medications:     Discharge Medications      New Medications      Instructions Start Date   aspirin 81 MG chewable tablet   81 mg, Oral, Daily   Start Date:  9/6/2019     HYDROcodone-acetaminophen 5-325 MG per tablet  Commonly known as:  NORCO   1 tablet, Oral, Every 4 Hours PRN      sennosides-docusate sodium 8.6-50 MG tablet  Commonly known as:  SENOKOT-S   2 tablets, Oral, 2 Times Daily         Continue These Medications      Instructions Start Date   acetaminophen 325 MG tablet  Commonly known as:  TYLENOL   650 mg, Oral, Every 6 Hours PRN      amLODIPine 5 MG tablet  Commonly known as:  NORVASC   5 mg, Oral, Every Morning      atorvastatin 40 MG tablet  Commonly known as:  LIPITOR   40 mg, Oral, Every Morning      clopidogrel 75 MG tablet  Commonly known as:  PLAVIX   75 mg, Oral, Daily, Not taking for the next week      esomeprazole 40 MG capsule  Commonly known as:  nexIUM   40 mg, Oral, Every  Morning Before Breakfast      FLUoxetine 40 MG capsule  Commonly known as:  PROzac   40 mg, Oral, Every Morning, Patient takes total of 60 mg a day      FLUoxetine 20 MG capsule  Commonly known as:  PROzac   20 mg, Oral, Daily      nitroglycerin 0.4 MG SL tablet  Commonly known as:  NITROSTAT   0.4 mg, Sublingual, As Needed, Take no more than 3 doses in 15 minutes.      ondansetron 4 MG tablet  Commonly known as:  ZOFRAN   4 mg, Oral, Every 8 Hours PRN         Stop These Medications    ibuprofen 200 MG tablet  Commonly known as:  ADVIL,MOTRIN            Discharge Diet:   Diet Instructions     Diet: Cardiac, Specialty Diet; Thin Liquids, No Restrictions; Low Fat      Discharge Diet:   Cardiac  Specialty Diet       Fluid Consistency:  Thin Liquids, No Restrictions    Specialty Diets:  Low Fat          Activity at Discharge:   Activity Instructions     Activity as Tolerated      Do not lift, push, or pull more than 20lbs for the next 2 weeks          Follow-up Appointments:  Future Appointments   Date Time Provider Department Center   10/18/2019 11:00 AM Bacilio Chaudhary MD [facility] None   1/30/2020 11:00 AM Ben Patton PA-C [facility]     Additional Instructions for the Follow-ups that You Need to Schedule     Ambulatory Referral to Home Health   As directed      Face to Face Visit Date:  9/5/2019    Follow-up provider for Plan of Care?:  I treated the patient in an acute care facility and will not continue treatment after discharge.    Follow-up provider:  BEN PATTON [[id number]]    Reason/Clinical Findings:  generalized weakness    Describe mobility limitations that make leaving home difficult:  generalized weakness    Nursing/Therapeutic Services Requested:  Physical Therapy    PT orders:  Strengthening    Frequency:  1 Week 1         Discharge Follow-up with PCP   As directed       Currently Documented PCP:    Ben Patton PA-C    PCP Phone Number:    [phone]     Follow Up Details:  1-2 weeks          Discharge Follow-up with Specified Provider: Dr. Bg Rodriguez (General Surgery)   As directed      To:  Dr. Bg Rodriguez (General Surgery)               Test Results Pending at Discharge:   Order Current Status    Blood Culture - Blood, Arm, Right Preliminary result    Blood Culture - Blood, Hand, Right Preliminary result           Jose Salinas MD  09/05/19  8:39 PM    Time Spent on Discharge Activities: Greater than 30 minutes.

## 2019-09-06 ENCOUNTER — TELEPHONE (OUTPATIENT)
Dept: SURGERY | Facility: CLINIC | Age: 79
End: 2019-09-06

## 2019-09-06 ENCOUNTER — READMISSION MANAGEMENT (OUTPATIENT)
Dept: CALL CENTER | Facility: HOSPITAL | Age: 79
End: 2019-09-06

## 2019-09-06 ENCOUNTER — TELEPHONE (OUTPATIENT)
Dept: INTERNAL MEDICINE | Facility: CLINIC | Age: 79
End: 2019-09-06

## 2019-09-06 LAB
BACTERIA SPEC AEROBE CULT: NORMAL
BACTERIA SPEC AEROBE CULT: NORMAL

## 2019-09-06 NOTE — TELEPHONE ENCOUNTER
----- Message from Rachelle Patton PA-C sent at 9/6/2019  9:26 AM EDT -----  I need to see him in next 1-2 weeks for hospital follow up please and thank you!    Rachelle

## 2019-09-06 NOTE — TELEPHONE ENCOUNTER
Patients wife had called stating that the patients umbilical site is actively bleeding/oozing w/ BRB causing her to change his bandages multiple times. While in the hospital he was on Lovenox and resumed his Plavix yesterday before discharge. Patients wife also stated that a HH nurse will be out sometime this weekend to assess the area. I spoke with Dr. Rodriguez while in the office and he was already aware of this and stated the oozing is due to the Plavix and ASA and the patient should continue these and needs to keep the site covered with a clean dry dressing. I informed Mr. Zayas's wife of Dr. Rodriguez's response and that if there are any issues over the weekend, the HH nurse can call the office to speak with the on call MD. Patients wife verbalized understanding.

## 2019-09-06 NOTE — OUTREACH NOTE
Prep Survey      Responses   Facility patient discharged from?  [facility]   Is patient eligible?  Yes   Discharge diagnosis  Calculus of bile duct with acute cholecystitis w/o obstruction, AFib., RUQ pain, Calculus of Gallbladder w/o cholecystitis, elevated LFTs, BPH, bladder cancer, diastolic dysfunction, GERd, s/p ANJALI placement 08/2019, s/p cholecystectomy 09/02/19 and ERCP with sphincterotomy and stone extraction   Does the patient have one of the following disease processes/diagnoses(primary or secondary)?  General Surgery   Does the patient have Home health ordered?  Yes   What is the Home health agency?   [facility]   Is there a DME ordered?  No   Comments regarding appointments  See AVS   Prep survey completed?  Yes          Jennifer Jc RN

## 2019-09-06 NOTE — PROGRESS NOTES
Case Management Discharge Note    Final Note: Pt discharged home with [facility] on 9/5.   FELICIA Egan RN    Destination      No service has been selected for the patient.      Durable Medical Equipment      No service has been selected for the patient.      Dialysis/Infusion      No service has been selected for the patient.      Home Medical Care      Service Provider Request Status Selected Services Address Phone Number Fax Number    [facility] [address] [address] [phone]      Therapy      No service has been selected for the patient.      Community Resources      No service has been selected for the patient.        Transportation Services  Private: Car    Final Discharge Disposition Code: 06 - home with home health care

## 2019-09-07 ENCOUNTER — READMISSION MANAGEMENT (OUTPATIENT)
Dept: CALL CENTER | Facility: HOSPITAL | Age: 79
End: 2019-09-07

## 2019-09-07 NOTE — OUTREACH NOTE
General Surgery Week 1 Survey      Responses   Facility patient discharged from?  [facility]   Does the patient have one of the following disease processes/diagnoses(primary or secondary)?  General Surgery   Is there a successful TCM telephone encounter documented?  No   Week 1 attempt successful?  Yes   Call start time  0910   Call end time  0920   Discharge diagnosis  Calculus of bile duct with acute cholecystitis w/o obstruction, AFib., RUQ pain, Calculus of Gallbladder w/o cholecystitis, elevated LFTs, BPH, bladder cancer, diastolic dysfunction, GERd, s/p ANJALI placement 08/2019, s/p cholecystectomy 09/02/19 and ERCP with sphincterotomy and stone extraction   Is patient permission given to speak with other caregiver?  Yes   List who call center can speak with  Taty Zayas, spouse   Person spoke with today (if not patient) and relationship  [place], spouse   Meds reviewed with patient/caregiver?  Yes   Is the patient having any side effects they believe may be caused by any medication additions or changes?  No   Does the patient have all medications related to this admission filled (includes all antibiotics, pain medications, etc.)  Yes   Is the patient taking all medications as directed (includes completed medication regime)?  Yes   Does the patient have a follow up appointment scheduled with their surgeon?  Yes   Has the patient kept scheduled appointments due by today?  Yes   What is the Home health agency?   [facility]   Has home health visited the patient within 72 hours of discharge?  Yes   Home health comments  Wife states that  nurse scheduled to visit today.    Psychosocial issues?  No   Did the patient receive a copy of their discharge instructions?  Yes   Nursing interventions  Reviewed instructions with patient   What is the patient's perception of their health status since discharge?  Improving [Wife states that there is still some oozing from the umbilical incision, but it is improving. States that   nurse will be checking today. ]   Nursing interventions  Nurse provided patient education   Is the patient /caregiver able to teach back basic post-op care?  Continue use of incentive spirometry at least 1 week post discharge, Practice 'cough and deep breath', No tub bath, swimming, or hot tub until instructed by MD, Keep incision areas clean,dry and protected, Lifting as instructed by MD in discharge instructions, Drive as instructed by MD in discharge instructions   Is the patient/caregiver able to teach back signs and symptoms of incisional infection?  Increased redness, swelling or pain at the incisonal site, Increased drainage or bleeding, Incisional warmth, Pus or odor from incision, Fever   Is the patient/caregiver able to teach back steps to recovery at home?  Set small, achievable goals for return to baseline health, Rest and rebuild strength, gradually increase activity   Is the patient/caregiver able to teach back the hierarchy of who to call/visit for symptoms/problems? PCP, Specialist, Home health nurse, Urgent Care, ED, 911  Yes   Week 1 call completed?  Yes          Jennifer Muniz RN

## 2019-09-09 ENCOUNTER — OFFICE VISIT (OUTPATIENT)
Dept: INTERNAL MEDICINE | Facility: CLINIC | Age: 79
End: 2019-09-09

## 2019-09-09 ENCOUNTER — TELEPHONE (OUTPATIENT)
Dept: INTERNAL MEDICINE | Facility: CLINIC | Age: 79
End: 2019-09-09

## 2019-09-09 VITALS
WEIGHT: 181 LBS | BODY MASS INDEX: 24.52 KG/M2 | SYSTOLIC BLOOD PRESSURE: 90 MMHG | DIASTOLIC BLOOD PRESSURE: 60 MMHG | HEIGHT: 72 IN

## 2019-09-09 DIAGNOSIS — R79.89 ELEVATED LFTS: Primary | ICD-10-CM

## 2019-09-09 DIAGNOSIS — E87.6 HYPOKALEMIA: ICD-10-CM

## 2019-09-09 PROCEDURE — 99496 TRANSJ CARE MGMT HIGH F2F 7D: CPT | Performed by: PHYSICIAN ASSISTANT

## 2019-09-09 NOTE — PROGRESS NOTES
Subjective   Chief Complaint   Patient presents with   • Follow-up     hospital fup       History of Present Illness     Mr. Zayas is a 78 yr old white male who presents today with his wife for a hospital follow up. He saw Lucy Smith in the office on 8/30 with nausea and abdominal pain. He had CT abdomen and pelvis showing gallstones and labs. His liver enzymes were in the 400's he was recommended to go to the ER for further evaluation and admission over the weekend. Pt presented to ER and gen surgery was consulted after admission to [facility].     Pt had US of gallbladder showing obstruction or pericholecystic fluid. His urine grew ESBL and ID was consulted. Dr. Neal with ID felt this was from colonization. He did have blood cx that were negative, but also after receiving Zosyn in ER.  Cardiology cleared for surgery. He had a lap chauncey and intraop cholangiogram. LFTs were still elevated consulted by GI, Dr. Amaya did ERCP. He was in afib post op and spontaneously converted. He sees Dr. Proctor tomorrow for fup.     His LFTs were still elevated on discharge and he notes today that his urine has been very dark. His bili was 5.3 at discharge. He states he feels very tired and week. Still feels dizzy. Not drinking much fluids. His appetite is good and he is eating well.      Patient Active Problem List   Diagnosis   • Hyperlipidemia   • Coronary artery disease involving native coronary artery of native heart without angina pectoris   • Cognitive disorder   • Mood disorder (CMS/HCC)   • Closed wedge compression fracture of third thoracic vertebra with routine healing   • GERD (gastroesophageal reflux disease)   • S/P drug eluting coronary stent placement   • Chest pain   • Diastolic dysfunction   • Exertional angina (CMS/HCC)   • Unstable angina (CMS/HCC)   • Bladder mass   • Mixed action and resting tremor   • Bladder cancer (CMS/HCC)   • Acute cystitis without hematuria   • Generalized weakness   • Dyspnea on exertion    • BPH (benign prostatic hyperplasia)   • Right upper quadrant pain   • Calculus of gallbladder without cholecystitis   • Elevated LFTs   • Calculus of bile duct with acute cholecystitis without obstruction   • Atrial fibrillation (CMS/HCC)       No Known Allergies    Current Outpatient Medications on File Prior to Visit   Medication Sig Dispense Refill   • acetaminophen (TYLENOL) 325 MG tablet Take 650 mg by mouth Every 6 (Six) Hours As Needed for Mild Pain .     • aspirin 81 MG chewable tablet Chew 1 tablet Daily. 30 tablet 0   • atorvastatin (LIPITOR) 40 MG tablet Take 40 mg by mouth Every Morning.     • clopidogrel (PLAVIX) 75 MG tablet Take 75 mg by mouth Daily. Not taking for the next week     • esomeprazole (nexIUM) 40 MG capsule Take 40 mg by mouth Every Morning Before Breakfast.     • FLUoxetine (PROzac) 20 MG capsule Take 20 mg by mouth Daily.     • FLUoxetine (PROzac) 40 MG capsule Take 40 mg by mouth Every Morning. Patient takes total of 60 mg a day     • HYDROcodone-acetaminophen (NORCO) 5-325 MG per tablet Take 1 tablet by mouth Every 4 (Four) Hours As Needed for Moderate Pain  or Severe Pain . 18 tablet 0   • nitroglycerin (NITROSTAT) 0.4 MG SL tablet Place 0.4 mg under the tongue as needed for chest pain. Take no more than 3 doses in 15 minutes.     • ondansetron (ZOFRAN) 4 MG tablet Take 1 tablet by mouth Every 8 (Eight) Hours As Needed for Nausea or Vomiting. 30 tablet 0   • amLODIPine (NORVASC) 5 MG tablet Take 5 mg by mouth Every Morning.     • sennosides-docusate sodium (SENOKOT-S) 8.6-50 MG tablet Take 2 tablets by mouth 2 Times a Day. 60 tablet 0     No current facility-administered medications on file prior to visit.        Past Medical History:   Diagnosis Date   • Anxiety    • Back pain    • Bladder cancer (CMS/HCC)    • Coronary artery disease    • Depression    • Dizziness    • Fatigue    • GERD (gastroesophageal reflux disease)    • History of fractured rib     RIGHT SIDE   •  Hyperlipidemia    • Hypertension    • Tremors of nervous system    • Urinary incontinence        Family History   Problem Relation Age of Onset   • Arthritis Mother    • Glaucoma Mother    • Heart disease Father    • Emphysema Father    • Tremor Father    • Malig Hyperthermia Neg Hx        Social History     Socioeconomic History   • Marital status:      Spouse name: Not on file   • Number of children: 4   • Years of education: 5 college degrees   • Highest education level: Not on file   Occupational History   • Occupation: Retired   Tobacco Use   • Smoking status: Never Smoker   • Smokeless tobacco: Never Used   Substance and Sexual Activity   • Alcohol use: No     Frequency: Never   • Drug use: No   • Sexual activity: Defer     Partners: Male       Past Surgical History:   Procedure Laterality Date   • CARDIAC CATHETERIZATION      7x   • CATARACT EXTRACTION, BILATERAL     • CHOLECYSTECTOMY N/A 9/2/2019    Procedure: CHOLECYSTECTOMY LAPAROSCOPIC WITH INTRAOPERATIVE CHOLANGIOGRAM;  Surgeon: Bg Rodriguez Jr., MD;  Location: [facility] MAIN OR;  Service: General   • COLONOSCOPY     • CORONARY ANGIOPLASTY WITH STENT PLACEMENT      X5    • CYSTOSCOPY BLADDER BIOPSY N/A 1/8/2019    Procedure: CYSTOSCOPY BLADDER BIOPSY;  Surgeon: Wang Soares Jr., MD;  Location: [facility] MAIN OR;  Service: Urology   • CYSTOSCOPY BLADDER BIOPSY N/A 6/13/2019    Procedure: CYSTOSCOPY BLADDER BIOPSY;  Surgeon: Wang Soares Jr., MD;  Location: [facility] MAIN OR;  Service: Urology   • CYSTOSCOPY TRANSURETHRAL RESECTION OF PROSTATE N/A 7/11/2019    Procedure: CYSTOSCOPY TRANSURETHRAL RESECTION OF PROSTATE;  Surgeon: Wang Soares Jr., MD;  Location: [facility] MAIN OR;  Service: Urology   • CYSTOSCOPY URETEROSCOPY LASER LITHOTRIPSY N/A 6/13/2019    Procedure: CYSTO LITHOPAXY;  Surgeon: Wang Soares Jr., MD;  Location: [facility] MAIN OR;  Service: Urology   • ERCP N/A 9/3/2019    Procedure: ENDOSCOPIC RETROGRADE  "CHOLANGIOPANCREATOGRAPHY with sphincterotomy and balloon sweep;  Surgeon: Ashok Amaya MD;  Location: [facility] ENDOSCOPY;  Service: Gastroenterology   • EYE SURGERY     • SKIN CANCER EXCISION Left     chest wall   • TRANSURETHRAL RESECTION OF BLADDER TUMOR N/A 11/29/2018    Procedure: TUR BLADDER TUMOR  LARGE;  Surgeon: Wang Soares Jr., MD;  Location: [facility] MAIN OR;  Service: Urology           The following portions of the patient's history were reviewed and updated as appropriate: problem list, allergies, current medications, past medical history, past family history, past social history and past surgical history.    Review of Systems   Constitution: Positive for malaise/fatigue and weight loss.   Cardiovascular: Negative for palpitations.   Skin:        Jaundice.   Gastrointestinal: Positive for abdominal pain.   Genitourinary:        Dark urine. No frequency or dysuria.    Neurological: Positive for dizziness.       Immunization History   Administered Date(s) Administered   • Flu Vaccine High Dose PF 65YR+ 10/12/2016   • flucelvax quad pfs =>4 YRS 11/30/2018       Objective   Vitals:    09/09/19 1312 09/09/19 1339   BP:  90/60   Weight: 82.1 kg (181 lb)    Height: 182.9 cm (72.01\")      Physical Exam   Constitutional: He appears well-developed and well-nourished.   HENT:   Mucous membranes are dry.    Eyes:   Conjunctiva are icteric.    Cardiovascular: Normal rate, regular rhythm and normal heart sounds.   No murmur heard.  Pulmonary/Chest: Effort normal and breath sounds normal. He has no wheezes. He has no rales.   Abdominal:   Incisions are healing well.    Skin: Skin is warm and dry.   jaundiced   Vitals reviewed.        Assessment/Plan   Sukhdev was seen today for follow-up.    Diagnoses and all orders for this visit:    Elevated LFTs  -     Hepatic Function Panel  -     Urinalysis With Culture If Indicated -    Hypokalemia  -     Basic Metabolic Panel    Reviewed all hospital records " including admission, op, labs, imaging and discharge. His BP is low he is to stop his amlodipine right now. He needs to significantly increase his fluid intake to at least 64 oz of water daily. I will repeat his liver enzymes as well as his potassium as was low on discharge and supplementation was given. He is to see cardiology tomorrow, needs to keep fup. Will continue to follow his lfts and bilirubin until trend back to nml.     Return in about 4 weeks (around 10/7/2019).

## 2019-09-11 ENCOUNTER — HOSPITAL ENCOUNTER (INPATIENT)
Facility: HOSPITAL | Age: 79
LOS: 4 days | Discharge: HOME | End: 2019-09-17
Attending: EMERGENCY MEDICINE | Admitting: HOSPITALIST

## 2019-09-11 ENCOUNTER — TELEPHONE (OUTPATIENT)
Dept: INTERNAL MEDICINE | Facility: CLINIC | Age: 79
End: 2019-09-11

## 2019-09-11 DIAGNOSIS — R53.1 GENERALIZED WEAKNESS: ICD-10-CM

## 2019-09-11 DIAGNOSIS — E86.0 DEHYDRATION: Primary | ICD-10-CM

## 2019-09-11 DIAGNOSIS — R79.89 ELEVATED LFTS: ICD-10-CM

## 2019-09-11 LAB
ALBUMIN SERPL-MCNC: 3.6 G/DL (ref 3.5–5.2)
ALBUMIN/GLOB SERPL: 1.2 G/DL
ALP SERPL-CCNC: 1705 U/L (ref 39–117)
ALT SERPL W P-5'-P-CCNC: 299 U/L (ref 1–41)
ANION GAP SERPL CALCULATED.3IONS-SCNC: 10.1 MMOL/L (ref 5–15)
APTT PPP: 31.2 SECONDS (ref 22.7–35.4)
AST SERPL-CCNC: 460 U/L (ref 1–40)
BACTERIA UR QL AUTO: ABNORMAL /HPF
BASOPHILS # BLD AUTO: 0.04 10*3/MM3 (ref 0–0.2)
BASOPHILS NFR BLD AUTO: 0.5 % (ref 0–1.5)
BILIRUB SERPL-MCNC: 5.8 MG/DL (ref 0.2–1.2)
BILIRUB UR QL STRIP: ABNORMAL
BUN BLD-MCNC: 8 MG/DL (ref 8–23)
BUN/CREAT SERPL: 8.7 (ref 7–25)
CALCIUM SPEC-SCNC: 9 MG/DL (ref 8.6–10.5)
CHLORIDE SERPL-SCNC: 99 MMOL/L (ref 98–107)
CK SERPL-CCNC: 24 U/L (ref 20–200)
CLARITY UR: ABNORMAL
CO2 SERPL-SCNC: 26.9 MMOL/L (ref 22–29)
COLOR UR: ABNORMAL
CREAT BLD-MCNC: 0.92 MG/DL (ref 0.76–1.27)
D-LACTATE SERPL-SCNC: 1.6 MMOL/L (ref 0.5–2)
DEPRECATED RDW RBC AUTO: 48.7 FL (ref 37–54)
EOSINOPHIL # BLD AUTO: 0.75 10*3/MM3 (ref 0–0.4)
EOSINOPHIL NFR BLD AUTO: 10.2 % (ref 0.3–6.2)
ERYTHROCYTE [DISTWIDTH] IN BLOOD BY AUTOMATED COUNT: 14.6 % (ref 12.3–15.4)
GFR SERPL CREATININE-BSD FRML MDRD: 80 ML/MIN/1.73
GLOBULIN UR ELPH-MCNC: 2.9 GM/DL
GLUCOSE BLD-MCNC: 95 MG/DL (ref 65–99)
GLUCOSE UR STRIP-MCNC: NEGATIVE MG/DL
HCT VFR BLD AUTO: 35.4 % (ref 37.5–51)
HGB BLD-MCNC: 11.8 G/DL (ref 13–17.7)
HGB UR QL STRIP.AUTO: NEGATIVE
HOLD SPECIMEN: NORMAL
HOLD SPECIMEN: NORMAL
HYALINE CASTS UR QL AUTO: ABNORMAL /LPF
IMM GRANULOCYTES # BLD AUTO: 0.04 10*3/MM3 (ref 0–0.05)
IMM GRANULOCYTES NFR BLD AUTO: 0.5 % (ref 0–0.5)
INR PPP: 1.12 (ref 0.9–1.1)
KETONES UR QL STRIP: NEGATIVE
LEUKOCYTE ESTERASE UR QL STRIP.AUTO: ABNORMAL
LIPASE SERPL-CCNC: 61 U/L (ref 13–60)
LYMPHOCYTES # BLD AUTO: 0.96 10*3/MM3 (ref 0.7–3.1)
LYMPHOCYTES NFR BLD AUTO: 13 % (ref 19.6–45.3)
MAGNESIUM SERPL-MCNC: 2.5 MG/DL (ref 1.6–2.4)
MCH RBC QN AUTO: 31 PG (ref 26.6–33)
MCHC RBC AUTO-ENTMCNC: 33.3 G/DL (ref 31.5–35.7)
MCV RBC AUTO: 92.9 FL (ref 79–97)
MONOCYTES # BLD AUTO: 0.83 10*3/MM3 (ref 0.1–0.9)
MONOCYTES NFR BLD AUTO: 11.3 % (ref 5–12)
NEUTROPHILS # BLD AUTO: 4.74 10*3/MM3 (ref 1.7–7)
NEUTROPHILS NFR BLD AUTO: 64.5 % (ref 42.7–76)
NITRITE UR QL STRIP: NEGATIVE
NRBC BLD AUTO-RTO: 0 /100 WBC (ref 0–0.2)
NT-PROBNP SERPL-MCNC: 121.1 PG/ML (ref 5–1800)
PH UR STRIP.AUTO: 7 [PH] (ref 5–8)
PLATELET # BLD AUTO: 207 10*3/MM3 (ref 140–450)
PMV BLD AUTO: 11.5 FL (ref 6–12)
POTASSIUM BLD-SCNC: 3.2 MMOL/L (ref 3.5–5.2)
PROT SERPL-MCNC: 6.5 G/DL (ref 6–8.5)
PROT UR QL STRIP: NEGATIVE
PROTHROMBIN TIME: 14.1 SECONDS (ref 11.7–14.2)
RBC # BLD AUTO: 3.81 10*6/MM3 (ref 4.14–5.8)
RBC # UR: ABNORMAL /HPF
REF LAB TEST METHOD: ABNORMAL
SODIUM BLD-SCNC: 136 MMOL/L (ref 136–145)
SP GR UR STRIP: 1.01 (ref 1–1.03)
SQUAMOUS #/AREA URNS HPF: ABNORMAL /HPF
TROPONIN T SERPL-MCNC: <0.01 NG/ML (ref 0–0.03)
UROBILINOGEN UR QL STRIP: ABNORMAL
WBC NRBC COR # BLD: 7.36 10*3/MM3 (ref 3.4–10.8)
WBC UR QL AUTO: ABNORMAL /HPF
WHOLE BLOOD HOLD SPECIMEN: NORMAL
WHOLE BLOOD HOLD SPECIMEN: NORMAL

## 2019-09-11 PROCEDURE — 83605 ASSAY OF LACTIC ACID: CPT | Performed by: EMERGENCY MEDICINE

## 2019-09-11 PROCEDURE — 85610 PROTHROMBIN TIME: CPT | Performed by: EMERGENCY MEDICINE

## 2019-09-11 PROCEDURE — 85025 COMPLETE CBC W/AUTO DIFF WBC: CPT | Performed by: EMERGENCY MEDICINE

## 2019-09-11 PROCEDURE — 83735 ASSAY OF MAGNESIUM: CPT | Performed by: EMERGENCY MEDICINE

## 2019-09-11 PROCEDURE — 84484 ASSAY OF TROPONIN QUANT: CPT | Performed by: EMERGENCY MEDICINE

## 2019-09-11 PROCEDURE — 87088 URINE BACTERIA CULTURE: CPT | Performed by: NURSE PRACTITIONER

## 2019-09-11 PROCEDURE — G0378 HOSPITAL OBSERVATION PER HR: HCPCS

## 2019-09-11 PROCEDURE — 99284 EMERGENCY DEPT VISIT MOD MDM: CPT

## 2019-09-11 PROCEDURE — 83690 ASSAY OF LIPASE: CPT | Performed by: EMERGENCY MEDICINE

## 2019-09-11 PROCEDURE — 80053 COMPREHEN METABOLIC PANEL: CPT | Performed by: EMERGENCY MEDICINE

## 2019-09-11 PROCEDURE — 81001 URINALYSIS AUTO W/SCOPE: CPT | Performed by: EMERGENCY MEDICINE

## 2019-09-11 PROCEDURE — 87186 SC STD MICRODIL/AGAR DIL: CPT | Performed by: NURSE PRACTITIONER

## 2019-09-11 PROCEDURE — 87086 URINE CULTURE/COLONY COUNT: CPT | Performed by: NURSE PRACTITIONER

## 2019-09-11 PROCEDURE — 83880 ASSAY OF NATRIURETIC PEPTIDE: CPT | Performed by: EMERGENCY MEDICINE

## 2019-09-11 PROCEDURE — 85730 THROMBOPLASTIN TIME PARTIAL: CPT | Performed by: EMERGENCY MEDICINE

## 2019-09-11 PROCEDURE — 25010000002 PIPERACILLIN SOD-TAZOBACTAM PER 1 G: Performed by: EMERGENCY MEDICINE

## 2019-09-11 PROCEDURE — 82550 ASSAY OF CK (CPK): CPT | Performed by: EMERGENCY MEDICINE

## 2019-09-11 RX ORDER — SODIUM CHLORIDE 0.9 % (FLUSH) 0.9 %
10 SYRINGE (ML) INJECTION AS NEEDED
Status: DISCONTINUED | OUTPATIENT
Start: 2019-09-11 | End: 2019-09-17 | Stop reason: HOSPADM

## 2019-09-11 RX ADMIN — TAZOBACTAM SODIUM AND PIPERACILLIN SODIUM 3.38 G: 375; 3 INJECTION, SOLUTION INTRAVENOUS at 21:48

## 2019-09-11 RX ADMIN — SODIUM CHLORIDE 1000 ML: 9 INJECTION, SOLUTION INTRAVENOUS at 19:00

## 2019-09-11 NOTE — TELEPHONE ENCOUNTER
Please let her know that his labs are not improving like I would prefer my recommendation would go back to ER for IV fluids and hydration. He may need to be readmitted based on his lft's as they are actually increasing along with his bilirubin.

## 2019-09-11 NOTE — ED TRIAGE NOTES
"Pt reports he was told to come to ED per LUIS FERNANDO Edward for generalized weakness and increased \"enzyme levels\" after recent gallbladder removal surgery and gallstone removal surgery. Pt wife also reports low bp, near syncope dizziness, headaches and ongoing abd pain.   "

## 2019-09-11 NOTE — ED PROVIDER NOTES
EMERGENCY DEPARTMENT ENCOUNTER    CHIEF COMPLAINT  Chief Complaint: Generalized Weakness  History given by: patient and pt's wife  History limited by: nothing  Room Number: 16/16  PMD: Rachelle Patton PA-C  GI: Dr. Amaya  Cardio: Dr. Proctor    HPI:  Pt is a 78 y.o. male with hx of Hypertension, Hyperlipidemia, CAD presents complaining of generalized weakness that started a few days ago. Pt admits to nausea, unsteady gait, CP, lightheadedness, and abd pain. He denies fever and chills. Pt associates the sx with being dehydrated. Pt states that he has recently undergone 4 surgeries. Pt's wife states the pt was DC from the hospital 6 days ago and the liver enzymes have not improved which has caused the pt to be jaundie. Pt's wife states that the PCP was called and they referred the pt to the ED.     Duration:  A few days  Onset: gradual  Timing: constant  Location: generalized  Radiation: generalized  Quality: weakness  Intensity/Severity: mild  Progression: unchanged  Associated Symptoms: nausea, unsteady gait, CP, lightheadedness, abd pain, and jaundice  Aggravating Factors: none  Alleviating Factors: none      PAST MEDICAL HISTORY  Active Ambulatory Problems     Diagnosis Date Noted   • Hyperlipidemia 05/09/2016   • Coronary artery disease involving native coronary artery of native heart without angina pectoris 05/09/2016   • Cognitive disorder 05/09/2016   • Mood disorder (CMS/HCC) 10/20/2016   • Closed wedge compression fracture of third thoracic vertebra with routine healing 02/06/2018   • GERD (gastroesophageal reflux disease) 07/31/2018   • S/P drug eluting coronary stent placement 07/31/2018   • Chest pain 08/16/2018   • Diastolic dysfunction 08/16/2018   • Exertional angina (CMS/HCC) 08/20/2018   • Unstable angina (CMS/HCC) 08/23/2018   • Bladder mass 09/18/2018   • Mixed action and resting tremor 11/19/2018   • Bladder cancer (CMS/HCC) 01/08/2019   • Acute cystitis without hematuria 05/24/2019   •  Generalized weakness 05/25/2019   • Dyspnea on exertion 05/25/2019   • BPH (benign prostatic hyperplasia) 07/11/2019   • Right upper quadrant pain 08/31/2019   • Calculus of gallbladder without cholecystitis 08/31/2019   • Elevated LFTs 08/31/2019   • Calculus of bile duct with acute cholecystitis without obstruction 08/30/2019   • Atrial fibrillation (CMS/HCC) 09/03/2019     Resolved Ambulatory Problems     Diagnosis Date Noted   • No Resolved Ambulatory Problems     Past Medical History:   Diagnosis Date   • Anxiety    • Back pain    • Bladder cancer (CMS/HCC)    • Coronary artery disease    • Depression    • Dizziness    • Fatigue    • GERD (gastroesophageal reflux disease)    • History of fractured rib    • Hyperlipidemia    • Hypertension    • Tremors of nervous system    • Urinary incontinence        PAST SURGICAL HISTORY  Past Surgical History:   Procedure Laterality Date   • CARDIAC CATHETERIZATION      7x   • CATARACT EXTRACTION, BILATERAL     • CHOLECYSTECTOMY N/A 9/2/2019    Procedure: CHOLECYSTECTOMY LAPAROSCOPIC WITH INTRAOPERATIVE CHOLANGIOGRAM;  Surgeon: Bg Rodriguez Jr., MD;  Location: [facility] OR;  Service: General   • COLONOSCOPY     • CORONARY ANGIOPLASTY WITH STENT PLACEMENT      X5    • CYSTOSCOPY BLADDER BIOPSY N/A 1/8/2019    Procedure: CYSTOSCOPY BLADDER BIOPSY;  Surgeon: Wang Soares Jr., MD;  Location: [facility] OR;  Service: Urology   • CYSTOSCOPY BLADDER BIOPSY N/A 6/13/2019    Procedure: CYSTOSCOPY BLADDER BIOPSY;  Surgeon: Wang Soares Jr., MD;  Location: [facility] OR;  Service: Urology   • CYSTOSCOPY TRANSURETHRAL RESECTION OF PROSTATE N/A 7/11/2019    Procedure: CYSTOSCOPY TRANSURETHRAL RESECTION OF PROSTATE;  Surgeon: Wang Soares Jr., MD;  Location: [facility] OR;  Service: Urology   • CYSTOSCOPY URETEROSCOPY LASER LITHOTRIPSY N/A 6/13/2019    Procedure: CYSTO LITHOPAXY;  Surgeon: Wang Soares Jr., MD;  Location: [facility] OR;  Service:  Urology   • ERCP N/A 9/3/2019    Procedure: ENDOSCOPIC RETROGRADE CHOLANGIOPANCREATOGRAPHY with sphincterotomy and balloon sweep;  Surgeon: Ashok Amaya MD;  Location: [facility] ENDOSCOPY;  Service: Gastroenterology   • EYE SURGERY     • SKIN CANCER EXCISION Left     chest wall   • TRANSURETHRAL RESECTION OF BLADDER TUMOR N/A 11/29/2018    Procedure: TUR BLADDER TUMOR  LARGE;  Surgeon: Wang Soares Jr., MD;  Location: [facility] MAIN OR;  Service: Urology       FAMILY HISTORY  Family History   Problem Relation Age of Onset   • Arthritis Mother    • Glaucoma Mother    • Heart disease Father    • Emphysema Father    • Tremor Father    • Malig Hyperthermia Neg Hx        SOCIAL HISTORY  Social History     Socioeconomic History   • Marital status:      Spouse name: Not on file   • Number of children: 4   • Years of education: 5 college degrees   • Highest education level: Not on file   Occupational History   • Occupation: Retired   Tobacco Use   • Smoking status: Never Smoker   • Smokeless tobacco: Never Used   Substance and Sexual Activity   • Alcohol use: No     Frequency: Never   • Drug use: No   • Sexual activity: Defer     Partners: Male       ALLERGIES  Patient has no known allergies.    REVIEW OF SYSTEMS  Review of Systems   Constitutional: Negative for activity change, appetite change and fever.   HENT: Negative for congestion and sore throat.    Eyes: Negative.    Respiratory: Negative for cough and shortness of breath.    Cardiovascular: Positive for chest pain. Negative for leg swelling.   Gastrointestinal: Positive for abdominal pain and nausea. Negative for diarrhea and vomiting.   Endocrine: Negative.    Genitourinary: Negative for decreased urine volume and dysuria.   Musculoskeletal: Positive for gait problem (unsteady). Negative for neck pain.   Skin: Positive for color change (jaundice). Negative for rash and wound.   Allergic/Immunologic: Negative.    Neurological: Positive for weakness  (generalized) and light-headedness. Negative for numbness and headaches.   Hematological: Negative.    Psychiatric/Behavioral: Negative.    All other systems reviewed and are negative.      PHYSICAL EXAM  ED Triage Vitals   Temp Heart Rate Resp BP SpO2   09/11/19 1633 09/11/19 1633 09/11/19 1633 09/11/19 1829 09/11/19 1633   98.2 °F (36.8 °C) 72 18 126/78 97 %      Temp src Heart Rate Source Patient Position BP Location FiO2 (%)   09/11/19 1633 09/11/19 1633 -- -- --   Tympanic Monitor          Physical Exam   Constitutional: He is oriented to person, place, and time. He appears jaundiced. No distress.   HENT:   Head: Normocephalic and atraumatic.   Mouth/Throat: Mucous membranes are dry.   Eyes: EOM are normal. Pupils are equal, round, and reactive to light.   Neck: Normal range of motion. Neck supple.   Cardiovascular: Normal rate, regular rhythm and normal heart sounds.   Pulmonary/Chest: Effort normal and breath sounds normal. No respiratory distress.   Abdominal: Soft. There is no tenderness. There is no rebound and no guarding.   Steri-strips on the 4 ports noted.   Musculoskeletal: Normal range of motion. He exhibits no edema.   No calf tenderness noted bilaterally   Neurological: He is alert and oriented to person, place, and time. He has normal sensation and normal strength.   Skin: Skin is warm and dry.   Psychiatric: Mood and affect normal.   Nursing note and vitals reviewed.      LAB RESULTS  Lab Results (last 24 hours)     Procedure Component Value Units Date/Time    CBC & Differential [[id number]] Collected:  09/11/19 1835    Specimen:  Blood Updated:  09/11/19 1859    Narrative:       The following orders were created for panel order CBC & Differential.  Procedure                               Abnormality         Status                     ---------                               -----------         ------                     CBC Auto Differential[[id number]]        Abnormal            Final result                  Please view results for these tests on the individual orders.    Comprehensive Metabolic Panel [[id number]]  (Abnormal) Collected:  09/11/19 1835    Specimen:  Blood Updated:  09/11/19 1953     Glucose 95 mg/dL      BUN 8 mg/dL      Creatinine 0.92 mg/dL      Sodium 136 mmol/L      Potassium 3.2 mmol/L      Chloride 99 mmol/L      CO2 26.9 mmol/L      Calcium 9.0 mg/dL      Total Protein 6.5 g/dL      Albumin 3.60 g/dL      ALT (SGPT) 299 U/L      AST (SGOT) 460 U/L      Alkaline Phosphatase 1,705 U/L      Total Bilirubin 5.8 mg/dL      eGFR Non African Amer 80 mL/min/1.73      Globulin 2.9 gm/dL      A/G Ratio 1.2 g/dL      BUN/Creatinine Ratio 8.7     Anion Gap 10.1 mmol/L     Narrative:       GFR Normal >60  Chronic Kidney Disease <60  Kidney Failure <15    Protime-INR [[id number]]  (Abnormal) Collected:  09/11/19 1835    Specimen:  Blood Updated:  09/11/19 1909     Protime 14.1 Seconds      INR 1.12    aPTT [[id number]]  (Normal) Collected:  09/11/19 1835    Specimen:  Blood Updated:  09/11/19 1909     PTT 31.2 seconds     Lipase [[id number]]  (Abnormal) Collected:  09/11/19 1835    Specimen:  Blood Updated:  09/11/19 1928     Lipase 61 U/L     BNP [[id number]]  (Normal) Collected:  09/11/19 1835    Specimen:  Blood Updated:  09/11/19 1923     proBNP 121.1 pg/mL     Narrative:       Among patients with dyspnea, NT-proBNP is highly sensitive for the detection of acute congestive heart failure. In addition NT-proBNP of <300 pg/ml effectively rules out acute congestive heart failure with 99% negative predictive value.    Troponin [[id number]]  (Normal) Collected:  09/11/19 1835    Specimen:  Blood Updated:  09/11/19 1928     Troponin T <0.010 ng/mL     Narrative:       Troponin T Reference Range:  <= 0.03 ng/mL-   Negative for AMI  >0.03 ng/mL-     Abnormal for myocardial necrosis.  Clinicians would have to utilize clinical acumen, EKG, Troponin and serial changes to determine if it is an Acute Myocardial  Infarction or myocardial injury due to an underlying chronic condition.     CK [[id number]]  (Normal) Collected:  09/11/19 1835    Specimen:  Blood Updated:  09/11/19 1928     Creatine Kinase 24 U/L     Magnesium [[id number]]  (Abnormal) Collected:  09/11/19 1835    Specimen:  Blood Updated:  09/11/19 1928     Magnesium 2.5 mg/dL     CBC Auto Differential [[id number]]  (Abnormal) Collected:  09/11/19 1835    Specimen:  Blood Updated:  09/11/19 1859     WBC 7.36 10*3/mm3      RBC 3.81 10*6/mm3      Hemoglobin 11.8 g/dL      Hematocrit 35.4 %      MCV 92.9 fL      MCH 31.0 pg      MCHC 33.3 g/dL      RDW 14.6 %      RDW-SD 48.7 fl      MPV 11.5 fL      Platelets 207 10*3/mm3      Neutrophil % 64.5 %      Lymphocyte % 13.0 %      Monocyte % 11.3 %      Eosinophil % 10.2 %      Basophil % 0.5 %      Immature Grans % 0.5 %      Neutrophils, Absolute 4.74 10*3/mm3      Lymphocytes, Absolute 0.96 10*3/mm3      Monocytes, Absolute 0.83 10*3/mm3      Eosinophils, Absolute 0.75 10*3/mm3      Basophils, Absolute 0.04 10*3/mm3      Immature Grans, Absolute 0.04 10*3/mm3      nRBC 0.0 /100 WBC     Lactic Acid, Plasma [[id number]]  (Normal) Collected:  09/11/19 1901    Specimen:  Blood Updated:  09/11/19 1929     Lactate 1.6 mmol/L     Urinalysis With Microscopic If Indicated (No Culture) - Urine, Clean Catch [[id number]]  (Abnormal) Collected:  09/11/19 2000    Specimen:  Urine, Clean Catch Updated:  09/11/19 2025     Color, UA Dark Yellow     Appearance, UA Cloudy     pH, UA 7.0     Specific Gravity, UA 1.012     Glucose, UA Negative     Ketones, UA Negative     Bilirubin, UA Moderate (2+)     Blood, UA Negative     Protein, UA Negative     Leuk Esterase, UA Large (3+)     Nitrite, UA Negative     Urobilinogen, UA 1.0 E.U./dL    Urinalysis, Microscopic Only - Urine, Clean Catch [[id number]]  (Abnormal) Collected:  09/11/19 2000    Specimen:  Urine, Clean Catch Updated:  09/11/19 2022     RBC, UA 3-5 /HPF      WBC, UA Too Numerous to  Count /HPF      Bacteria, UA 2+ /HPF      Squamous Epithelial Cells, UA None Seen /HPF      Hyaline Casts, UA 0-2 /LPF      Methodology Automated Microscopy          I ordered the above labs and reviewed the results      PROCEDURES  Procedures      PROGRESS AND CONSULTS     2138- Rechecked pt who is resting comfortably in NAD. Informed pt of the lab results. D/w pt the plan to admit for further care. Pt understands and agrees with plan. All questions answered.      2154- Call placed to [facility].    2158- Rechecked pt who is resting comfortably in NAD. Informed the pt that abx will not be started as medical records review has been obtained. Pt understands and agrees with plan. All questions answered.      2208- Discussed pt's case with ANIL Vázquez ([facility]) who agrees with plan to admit the pt for further care to Dr. Castro ([facility])          MEDICATIONS GIVEN IN ER  Medications   sodium chloride 0.9 % flush 10 mL (not administered)   sodium chloride 0.9 % bolus 1,000 mL (0 mL Intravenous Stopped 9/11/19 2026)         MEDICAL DECISION MAKING  Results were reviewed/discussed with the patient and they were also made aware of online access. Pt also made aware that some labs, such as cultures, will not be resulted during ER visit and follow up with PMD is necessary.     MDM  Number of Diagnoses or Management Options  Dehydration:      Amount and/or Complexity of Data Reviewed  Clinical lab tests: reviewed and ordered (See lab note for official results)  Discuss the patient with other providers: yes (ANIL Vázquez ([facility]))           DIAGNOSIS  Final diagnoses:   Dehydration       DISPOSITION  ADMISSION    Discussed treatment plan and reason for admission with pt/family and admitting physician.  Pt/family voiced understanding of the plan for admission for further testing/treatment as needed.         Latest Documented Vital Signs:  As of 10:12 PM  BP- 154/88 HR- 67 Temp- 98.2 °F (36.8 °C) (Tympanic) O2 sat-  98%    --  Documentation assistance provided by martha Remy for Dr. Bernstein.  Information recorded by the scribe was done at my direction and has been verified and validated by me.     Yovana Remy  09/11/19 2212       Hilton Bernstein MD  09/12/19 0003

## 2019-09-11 NOTE — TELEPHONE ENCOUNTER
Patient wife called and stated that Mr. Zayas BP has sustained at 90/60, the wife said she has been pushing him to take more liquids. She also wanted to know about his lab results and urinalysis. Please read

## 2019-09-12 ENCOUNTER — READMISSION MANAGEMENT (OUTPATIENT)
Dept: CALL CENTER | Facility: HOSPITAL | Age: 79
End: 2019-09-12

## 2019-09-12 ENCOUNTER — APPOINTMENT (OUTPATIENT)
Dept: CT IMAGING | Facility: HOSPITAL | Age: 79
End: 2019-09-12

## 2019-09-12 PROBLEM — I10 ESSENTIAL HYPERTENSION: Status: ACTIVE | Noted: 2019-09-12

## 2019-09-12 PROBLEM — R10.9 ABDOMINAL PAIN: Status: ACTIVE | Noted: 2019-09-12

## 2019-09-12 PROBLEM — R11.0 NAUSEA WITHOUT VOMITING: Status: ACTIVE | Noted: 2019-09-12

## 2019-09-12 LAB
ALBUMIN SERPL-MCNC: 2.9 G/DL (ref 3.5–5.2)
ALBUMIN SERPL-MCNC: 3.4 G/DL (ref 3.5–5.2)
ALBUMIN/GLOB SERPL: 0.9 G/DL
ALP SERPL-CCNC: 1385 U/L (ref 39–117)
ALP SERPL-CCNC: 1641 U/L (ref 39–117)
ALT SERPL W P-5'-P-CCNC: 234 U/L (ref 1–41)
ALT SERPL-CCNC: 353 U/L (ref 1–41)
ANION GAP SERPL CALCULATED.3IONS-SCNC: 8.9 MMOL/L (ref 5–15)
ANION GAP SERPL CALCULATED.3IONS-SCNC: 9.2 MMOL/L (ref 5–15)
APPEARANCE UR: ABNORMAL
AST SERPL-CCNC: 332 U/L (ref 1–40)
AST SERPL-CCNC: 554 U/L (ref 1–40)
BACTERIA #/AREA URNS HPF: ABNORMAL /HPF
BACTERIA UR CULT: ABNORMAL
BACTERIA UR CULT: ABNORMAL
BILIRUB DIRECT SERPL-MCNC: 6.4 MG/DL (ref 0.2–0.3)
BILIRUB SERPL-MCNC: 4.7 MG/DL (ref 0.2–1.2)
BILIRUB SERPL-MCNC: 7.3 MG/DL (ref 0.2–1.2)
BILIRUB UR QL STRIP: POSITIVE
BUN BLD-MCNC: 6 MG/DL (ref 8–23)
BUN BLD-MCNC: 7 MG/DL (ref 8–23)
BUN SERPL-MCNC: 14 MG/DL (ref 8–23)
BUN/CREAT SERPL: 10.3 (ref 7–25)
BUN/CREAT SERPL: 14.9 (ref 7–25)
BUN/CREAT SERPL: 7.9 (ref 7–25)
CALCIUM SERPL-MCNC: 8.9 MG/DL (ref 8.6–10.5)
CALCIUM SPEC-SCNC: 8.5 MG/DL (ref 8.6–10.5)
CALCIUM SPEC-SCNC: 8.7 MG/DL (ref 8.6–10.5)
CHLORIDE SERPL-SCNC: 100 MMOL/L (ref 98–107)
CHLORIDE SERPL-SCNC: 105 MMOL/L (ref 98–107)
CHLORIDE SERPL-SCNC: 105 MMOL/L (ref 98–107)
CO2 SERPL-SCNC: 22.8 MMOL/L (ref 22–29)
CO2 SERPL-SCNC: 23.3 MMOL/L (ref 22–29)
CO2 SERPL-SCNC: 24.1 MMOL/L (ref 22–29)
COLOR UR: ABNORMAL
CREAT BLD-MCNC: 0.68 MG/DL (ref 0.76–1.27)
CREAT BLD-MCNC: 0.76 MG/DL (ref 0.76–1.27)
CREAT SERPL-MCNC: 0.94 MG/DL (ref 0.76–1.27)
DEPRECATED RDW RBC AUTO: 48.9 FL (ref 37–54)
EPI CELLS #/AREA URNS HPF: ABNORMAL /HPF
ERYTHROCYTE [DISTWIDTH] IN BLOOD BY AUTOMATED COUNT: 14.7 % (ref 12.3–15.4)
GFR SERPL CREATININE-BSD FRML MDRD: 113 ML/MIN/1.73
GFR SERPL CREATININE-BSD FRML MDRD: 99 ML/MIN/1.73
GLOBULIN UR ELPH-MCNC: 3.1 GM/DL
GLUCOSE BLD-MCNC: 105 MG/DL (ref 65–99)
GLUCOSE BLD-MCNC: 95 MG/DL (ref 65–99)
GLUCOSE SERPL-MCNC: 137 MG/DL (ref 65–99)
GLUCOSE UR QL: NEGATIVE
HCT VFR BLD AUTO: 31.8 % (ref 37.5–51)
HGB BLD-MCNC: 10.7 G/DL (ref 13–17.7)
HGB UR QL STRIP: ABNORMAL
KETONES UR QL STRIP: NEGATIVE
LEUKOCYTE ESTERASE UR QL STRIP: ABNORMAL
MCH RBC QN AUTO: 30.5 PG (ref 26.6–33)
MCHC RBC AUTO-ENTMCNC: 33.6 G/DL (ref 31.5–35.7)
MCV RBC AUTO: 90.6 FL (ref 79–97)
MICRO URNS: ABNORMAL
MUCOUS THREADS URNS QL MICRO: PRESENT /HPF
NITRITE UR QL STRIP: NEGATIVE
OTHER ANTIBIOTIC SUSC ISLT: ABNORMAL
PH UR STRIP: 6 [PH] (ref 5–7.5)
PLATELET # BLD AUTO: 194 10*3/MM3 (ref 140–450)
PMV BLD AUTO: 11.3 FL (ref 6–12)
POTASSIUM BLD-SCNC: 3.4 MMOL/L (ref 3.5–5.2)
POTASSIUM BLD-SCNC: 4 MMOL/L (ref 3.5–5.2)
POTASSIUM BLD-SCNC: 4.2 MMOL/L (ref 3.5–5.2)
POTASSIUM SERPL-SCNC: 3.7 MMOL/L (ref 3.5–5.2)
PROT SERPL-MCNC: 5.9 G/DL (ref 6–8.5)
PROT SERPL-MCNC: 6 G/DL (ref 6–8.5)
PROT UR QL STRIP: ABNORMAL
RBC # BLD AUTO: 3.51 10*6/MM3 (ref 4.14–5.8)
RBC #/AREA URNS HPF: ABNORMAL /HPF
SODIUM BLD-SCNC: 137 MMOL/L (ref 136–145)
SODIUM BLD-SCNC: 138 MMOL/L (ref 136–145)
SODIUM SERPL-SCNC: 137 MMOL/L (ref 136–145)
SP GR UR: >=1.03 (ref 1–1.03)
URINALYSIS REFLEX: ABNORMAL
UROBILINOGEN UR STRIP-MCNC: 1 MG/DL (ref 0.2–1)
WBC #/AREA URNS HPF: >30 /HPF
WBC NRBC COR # BLD: 8.75 10*3/MM3 (ref 3.4–10.8)

## 2019-09-12 PROCEDURE — 97110 THERAPEUTIC EXERCISES: CPT

## 2019-09-12 PROCEDURE — 84132 ASSAY OF SERUM POTASSIUM: CPT | Performed by: NURSE PRACTITIONER

## 2019-09-12 PROCEDURE — 93005 ELECTROCARDIOGRAM TRACING: CPT | Performed by: NURSE PRACTITIONER

## 2019-09-12 PROCEDURE — G0378 HOSPITAL OBSERVATION PER HR: HCPCS

## 2019-09-12 PROCEDURE — 74176 CT ABD & PELVIS W/O CONTRAST: CPT

## 2019-09-12 PROCEDURE — 93010 ELECTROCARDIOGRAM REPORT: CPT | Performed by: INTERNAL MEDICINE

## 2019-09-12 PROCEDURE — 80053 COMPREHEN METABOLIC PANEL: CPT | Performed by: NURSE PRACTITIONER

## 2019-09-12 PROCEDURE — 99214 OFFICE O/P EST MOD 30 MIN: CPT | Performed by: INTERNAL MEDICINE

## 2019-09-12 PROCEDURE — 97161 PT EVAL LOW COMPLEX 20 MIN: CPT

## 2019-09-12 PROCEDURE — 85027 COMPLETE CBC AUTOMATED: CPT | Performed by: NURSE PRACTITIONER

## 2019-09-12 RX ORDER — POTASSIUM CHLORIDE 7.45 MG/ML
10 INJECTION INTRAVENOUS
Status: DISCONTINUED | OUTPATIENT
Start: 2019-09-12 | End: 2019-09-17 | Stop reason: HOSPADM

## 2019-09-12 RX ORDER — POTASSIUM CHLORIDE 750 MG/1
40 CAPSULE, EXTENDED RELEASE ORAL AS NEEDED
Status: DISCONTINUED | OUTPATIENT
Start: 2019-09-12 | End: 2019-09-17 | Stop reason: HOSPADM

## 2019-09-12 RX ORDER — FLUOXETINE HYDROCHLORIDE 20 MG/1
20 CAPSULE ORAL DAILY
Status: DISCONTINUED | OUTPATIENT
Start: 2019-09-12 | End: 2019-09-17 | Stop reason: HOSPADM

## 2019-09-12 RX ORDER — ACETAMINOPHEN 160 MG/5ML
650 SOLUTION ORAL EVERY 4 HOURS PRN
Status: DISCONTINUED | OUTPATIENT
Start: 2019-09-12 | End: 2019-09-17 | Stop reason: HOSPADM

## 2019-09-12 RX ORDER — ACETAMINOPHEN 650 MG/1
650 SUPPOSITORY RECTAL EVERY 4 HOURS PRN
Status: DISCONTINUED | OUTPATIENT
Start: 2019-09-12 | End: 2019-09-17 | Stop reason: HOSPADM

## 2019-09-12 RX ORDER — ACETAMINOPHEN 325 MG/1
650 TABLET ORAL EVERY 4 HOURS PRN
Status: DISCONTINUED | OUTPATIENT
Start: 2019-09-12 | End: 2019-09-17 | Stop reason: HOSPADM

## 2019-09-12 RX ORDER — FLUOXETINE HYDROCHLORIDE 20 MG/1
40 CAPSULE ORAL EVERY MORNING
Status: DISCONTINUED | OUTPATIENT
Start: 2019-09-13 | End: 2019-09-17 | Stop reason: HOSPADM

## 2019-09-12 RX ORDER — CLOPIDOGREL BISULFATE 75 MG/1
75 TABLET ORAL DAILY
Status: DISCONTINUED | OUTPATIENT
Start: 2019-09-12 | End: 2019-09-17 | Stop reason: HOSPADM

## 2019-09-12 RX ORDER — ONDANSETRON 4 MG/1
4 TABLET, FILM COATED ORAL EVERY 8 HOURS PRN
Status: DISCONTINUED | OUTPATIENT
Start: 2019-09-12 | End: 2019-09-17 | Stop reason: HOSPADM

## 2019-09-12 RX ORDER — HYDROCODONE BITARTRATE AND ACETAMINOPHEN 5; 325 MG/1; MG/1
1 TABLET ORAL EVERY 4 HOURS PRN
Status: DISCONTINUED | OUTPATIENT
Start: 2019-09-12 | End: 2019-09-17 | Stop reason: HOSPADM

## 2019-09-12 RX ORDER — ONDANSETRON 2 MG/ML
4 INJECTION INTRAMUSCULAR; INTRAVENOUS EVERY 6 HOURS PRN
Status: DISCONTINUED | OUTPATIENT
Start: 2019-09-12 | End: 2019-09-17 | Stop reason: HOSPADM

## 2019-09-12 RX ORDER — NITROGLYCERIN 0.4 MG/1
0.4 TABLET SUBLINGUAL
Status: DISCONTINUED | OUTPATIENT
Start: 2019-09-12 | End: 2019-09-17 | Stop reason: HOSPADM

## 2019-09-12 RX ORDER — ASPIRIN 81 MG/1
81 TABLET, CHEWABLE ORAL DAILY
Status: DISCONTINUED | OUTPATIENT
Start: 2019-09-12 | End: 2019-09-17 | Stop reason: HOSPADM

## 2019-09-12 RX ORDER — SODIUM CHLORIDE 0.9 % (FLUSH) 0.9 %
10 SYRINGE (ML) INJECTION EVERY 12 HOURS SCHEDULED
Status: DISCONTINUED | OUTPATIENT
Start: 2019-09-12 | End: 2019-09-17 | Stop reason: HOSPADM

## 2019-09-12 RX ORDER — NITROGLYCERIN 0.4 MG/1
0.4 TABLET SUBLINGUAL AS NEEDED
Status: DISCONTINUED | OUTPATIENT
Start: 2019-09-12 | End: 2019-09-17 | Stop reason: HOSPADM

## 2019-09-12 RX ORDER — POTASSIUM CHLORIDE 1.5 G/1.77G
40 POWDER, FOR SOLUTION ORAL AS NEEDED
Status: DISCONTINUED | OUTPATIENT
Start: 2019-09-12 | End: 2019-09-17 | Stop reason: HOSPADM

## 2019-09-12 RX ORDER — SODIUM CHLORIDE 9 MG/ML
75 INJECTION, SOLUTION INTRAVENOUS CONTINUOUS
Status: DISCONTINUED | OUTPATIENT
Start: 2019-09-12 | End: 2019-09-13

## 2019-09-12 RX ORDER — PANTOPRAZOLE SODIUM 40 MG/1
40 TABLET, DELAYED RELEASE ORAL EVERY MORNING
Status: DISCONTINUED | OUTPATIENT
Start: 2019-09-13 | End: 2019-09-17 | Stop reason: HOSPADM

## 2019-09-12 RX ORDER — SODIUM CHLORIDE 0.9 % (FLUSH) 0.9 %
10 SYRINGE (ML) INJECTION AS NEEDED
Status: DISCONTINUED | OUTPATIENT
Start: 2019-09-12 | End: 2019-09-17 | Stop reason: HOSPADM

## 2019-09-12 RX ADMIN — POTASSIUM CHLORIDE 40 MEQ: 750 CAPSULE, EXTENDED RELEASE ORAL at 03:06

## 2019-09-12 RX ADMIN — SODIUM CHLORIDE 75 ML/HR: 9 INJECTION, SOLUTION INTRAVENOUS at 00:33

## 2019-09-12 RX ADMIN — SODIUM CHLORIDE, PRESERVATIVE FREE 10 ML: 5 INJECTION INTRAVENOUS at 20:15

## 2019-09-12 RX ADMIN — ASPIRIN 81 MG: 81 TABLET, CHEWABLE ORAL at 20:15

## 2019-09-12 RX ADMIN — SODIUM CHLORIDE 75 ML/HR: 9 INJECTION, SOLUTION INTRAVENOUS at 16:01

## 2019-09-12 RX ADMIN — CLOPIDOGREL 75 MG: 75 TABLET, FILM COATED ORAL at 20:14

## 2019-09-12 RX ADMIN — POTASSIUM CHLORIDE 40 MEQ: 750 CAPSULE, EXTENDED RELEASE ORAL at 06:48

## 2019-09-12 NOTE — H&P
History and Physical    Patient Name: Sukhdev Zayas  Age/Sex: 78 y.o. male  : 1940  MRN: [id number]    Date of Admission: 2019  Date of Encounter Visit: 19  Encounter Provider: ANIL Seo  Place of Service: [facility]  Patient Care Team:  Rachelle Patton PA-C as PCP - General (Physician Assistant)  Christie Pacheco APRN as PCP - Claims Attributed    Subjective:     Chief Complaint:   Chief Complaint   Patient presents with   • Weakness - Generalized   • Abnormal Lab       History of Present Illness  Sukhdev Zayas is a 78 y.o. male with a history of calculus of bile duct with acute cholecystitis with recent cholecystectomy on 19 and ERCP on 09/3/2019, HLD,CAD, HTN an dprior ESBL UTI . Patient presented with complaints of nausea, chest pain, lightheadedness, abdominal pain, decreased appetite, dark urine and stool.  His wife took his blood pressure and said it was low and called his PCP who advise him to go to the ER. The patient was discharged a week ago after recent cholecystectomy and still had some elevated liver enzymes and jaundice that has improved, but not resoloved.  Abdominal pain is currently uncomfortable described as achy, cramping, twisting, and stabbing. He has some nausea, but has been tolerating diet and fluids at home without any vomiting.     He was previously a heavy drinker, drinking 3-6 whiskey our bourbon drinks nightly but quit about a year ago when he started having problems with his bladder and prostate. He reports chronic chest pains as a ache in mid sternum with occasional indigestion. He also reports feeling groggy, but denies any recent fever, chills, dysuria, diarrhea or shortness of breath.     Labs in ED showed persistent elevated liver enzymes slightly better than at discharge, except increased ALk phos and now lipase is elevated at 61.       Review of Systems   Constitutional: Positive for activity change, appetite  change and fatigue. Negative for chills, fever and unexpected weight change.   HENT: Negative for congestion.    Respiratory: Positive for chest tightness and shortness of breath.    Cardiovascular: Positive for chest pain. Negative for palpitations and leg swelling.   Gastrointestinal: Positive for abdominal distention, abdominal pain and nausea. Negative for anal bleeding, blood in stool, constipation, diarrhea and vomiting.   Genitourinary: Negative for decreased urine volume, hematuria and urgency.   Musculoskeletal: Positive for back pain. Negative for gait problem.   Skin: Positive for color change and wound.   Neurological: Positive for dizziness, tremors, weakness and light-headedness. Negative for syncope, facial asymmetry, numbness and headaches.   Psychiatric/Behavioral: Negative for confusion.       Past Medical and Surgical History:  Past Medical History:   Diagnosis Date   • Anxiety    • Back pain    • Bladder cancer (CMS/HCC)    • Coronary artery disease    • Depression    • Dizziness    • Fatigue    • GERD (gastroesophageal reflux disease)    • History of fractured rib     RIGHT SIDE   • Hyperlipidemia    • Hypertension    • Tremors of nervous system    • Urinary incontinence    • Vertigo        Past Surgical History:   Procedure Laterality Date   • CARDIAC CATHETERIZATION      7x, 5 stents   • CATARACT EXTRACTION, BILATERAL     • CHOLECYSTECTOMY N/A 9/2/2019    Procedure: CHOLECYSTECTOMY LAPAROSCOPIC WITH INTRAOPERATIVE CHOLANGIOGRAM;  Surgeon: Bg Rodriguez Jr., MD;  Location: [facility];  Service: General   • COLONOSCOPY     • CORONARY ANGIOPLASTY WITH STENT PLACEMENT      X5    • CYSTOSCOPY BLADDER BIOPSY N/A 1/8/2019    Procedure: CYSTOSCOPY BLADDER BIOPSY;  Surgeon: Wang Soares Jr., MD;  Location: [facility];  Service: Urology   • CYSTOSCOPY BLADDER BIOPSY N/A 6/13/2019    Procedure: CYSTOSCOPY BLADDER BIOPSY;  Surgeon: Wang Soares Jr., MD;  Location: [facility];   Service: Urology   • CYSTOSCOPY TRANSURETHRAL RESECTION OF PROSTATE N/A 7/11/2019    Procedure: CYSTOSCOPY TRANSURETHRAL RESECTION OF PROSTATE;  Surgeon: Wang Soares Jr., MD;  Location: [facility] OR;  Service: Urology   • CYSTOSCOPY URETEROSCOPY LASER LITHOTRIPSY N/A 6/13/2019    Procedure: CYSTO LITHOPAXY;  Surgeon: Wang Soares Jr., MD;  Location: [facility] MAIN OR;  Service: Urology   • ERCP N/A 9/3/2019    Procedure: ENDOSCOPIC RETROGRADE CHOLANGIOPANCREATOGRAPHY with sphincterotomy and balloon sweep;  Surgeon: Ashok Amaya MD;  Location: [facility]OSCOPY;  Service: Gastroenterology   • EYE SURGERY      Lens implants   • LUMBAR DISCECTOMY FUSION INSTRUMENTATION     • SKIN CANCER EXCISION Left     chest wall   • TRANSURETHRAL RESECTION OF BLADDER TUMOR N/A 11/29/2018    Procedure: TUR BLADDER TUMOR  LARGE;  Surgeon: Wang Soares Jr., MD;  Location: [facility] OR;  Service: Urology       Home Medications:   Medications Prior to Admission   Medication Sig Dispense Refill Last Dose   • acetaminophen (TYLENOL) 325 MG tablet Take 650 mg by mouth Every 6 (Six) Hours As Needed for Mild Pain .   Past Week at Unknown time   • amLODIPine (NORVASC) 5 MG tablet Take 5 mg by mouth Every Morning.   Past Week at Unknown time   • aspirin 81 MG chewable tablet Chew 1 tablet Daily. 30 tablet 0 9/11/2019 at Unknown time   • atorvastatin (LIPITOR) 40 MG tablet Take 40 mg by mouth Every Morning.   Past Week at Unknown time   • clopidogrel (PLAVIX) 75 MG tablet Take 75 mg by mouth Daily. Not taking for the next week   9/11/2019 at Unknown time   • esomeprazole (nexIUM) 40 MG capsule Take 40 mg by mouth Every Morning Before Breakfast.   9/11/2019 at Unknown time   • FLUoxetine (PROzac) 20 MG capsule Take 20 mg by mouth Daily.   9/11/2019 at Unknown time   • FLUoxetine (PROzac) 40 MG capsule Take 40 mg by mouth Every Morning. Patient takes total of 60 mg a day   9/11/2019 at Unknown time   •  HYDROcodone-acetaminophen (NORCO) 5-325 MG per tablet Take 1 tablet by mouth Every 4 (Four) Hours As Needed for Moderate Pain  or Severe Pain . 18 tablet 0 Past Week at Unknown time   • ondansetron (ZOFRAN) 4 MG tablet Take 1 tablet by mouth Every 8 (Eight) Hours As Needed for Nausea or Vomiting. 30 tablet 0 Past Month at Unknown time   • nitroglycerin (NITROSTAT) 0.4 MG SL tablet Place 0.4 mg under the tongue as needed for chest pain. Take no more than 3 doses in 15 minutes.   Taking   • sennosides-docusate sodium (SENOKOT-S) 8.6-50 MG tablet Take 2 tablets by mouth 2 Times a Day. 60 tablet 0 Not Taking       Inpatient Medications:  Scheduled Meds:    sodium chloride 10 mL Intravenous Q12H     Continuous Infusions:    sodium chloride 75 mL/hr Last Rate: 75 mL/hr (09/12/19 1014)     PRN Meds:.•  acetaminophen **OR** acetaminophen **OR** acetaminophen  •  nitroglycerin  •  ondansetron  •  potassium chloride **OR** potassium chloride **OR** potassium chloride  •  [COMPLETED] Insert peripheral IV **AND** sodium chloride  •  sodium chloride    Allergies:  No Known Allergies    Past Social History:  Social History     Socioeconomic History   • Marital status:      Spouse name: Not on file   • Number of children: 4   • Years of education: 5 college degrees   • Highest education level: Not on file   Occupational History   • Occupation: Retired   Tobacco Use   • Smoking status: Never Smoker   • Smokeless tobacco: Never Used   Substance and Sexual Activity   • Alcohol use: Yes     Frequency: Never     Drinks per session: 5 or 6     Binge frequency: Less than monthly     Comment: last drink about 1 year ago    • Drug use: No   • Sexual activity: Defer       Past Family History:  Family History   Problem Relation Age of Onset   • Arthritis Mother    • Glaucoma Mother    • Heart disease Father    • Emphysema Father    • Tremor Father    • Malig Hyperthermia Neg Hx        Objective:   Temp:  [98.2 °F (36.8 °C)-99 °F (37.2  °C)] 99 °F (37.2 °C)  Heart Rate:  [67-72] 69  Resp:  [16-18] 16  BP: (126-154)/(77-88) 130/77   SpO2:  [97 %-98 %] 97 %  on    Device (Oxygen Therapy): room air    Intake/Output Summary (Last 24 hours) at 9/12/2019 1111  Last data filed at 9/12/2019 0923  Gross per 24 hour   Intake 1290 ml   Output 751 ml   Net 539 ml     Body mass index is 24.83 kg/m².      09/11/19  1829   Weight: 83.1 kg (183 lb 1.6 oz)     Weight change:     Physical Exam   Constitutional: He is oriented to person, place, and time. He appears well-developed. No distress.   HENT:   Head: Normocephalic.   Eyes: Conjunctivae are normal. Pupils are equal, round, and reactive to light. Scleral icterus is present.   Neck: No tracheal deviation present. No thyromegaly present.   Cardiovascular: Normal rate, regular rhythm, normal heart sounds and intact distal pulses.   No murmur heard.  Pulmonary/Chest: Effort normal and breath sounds normal. He has no wheezes. He has no rales.   Abdominal: Soft. Bowel sounds are normal. He exhibits no distension. There is no hepatosplenomegaly, splenomegaly or hepatomegaly. There is generalized tenderness. There is no rigidity, no rebound and no CVA tenderness. No hernia.   Musculoskeletal: Normal range of motion. He exhibits no edema.   Neurological: He is alert and oriented to person, place, and time. He displays tremor.   Skin: Skin is warm and dry. Bruising (Abdominal bruising ) noted. No cyanosis. Nails show no clubbing.         jaundice    Vitals reviewed.       Lab Review:     Results from last 7 days   Lab Units 09/12/19  0408 09/11/19  1835   SODIUM mmol/L 137 136   POTASSIUM mmol/L 3.4* 3.2*   CHLORIDE mmol/L 105 99   CO2 mmol/L 22.8 26.9   BUN mg/dL 7* 8   CREATININE mg/dL 0.68* 0.92   GLUCOSE mg/dL 95 95   CALCIUM mg/dL 8.5* 9.0   AST (SGOT) U/L  --  460*   ALT (SGPT) U/L  --  299*     Estimated Creatinine Clearance: 89.4 mL/min (A) (by C-G formula based on SCr of 0.68 mg/dL (L)).          Results from  last 7 days   Lab Units 09/11/19  1835   CK TOTAL U/L 24   TROPONIN T ng/mL <0.010     Results from last 7 days   Lab Units 09/11/19  1835   PROBNP pg/mL 121.1         Results from last 7 days   Lab Units 09/11/19  1835   MAGNESIUM mg/dL 2.5*         Results from last 7 days   Lab Units 09/12/19  0408 09/11/19  1835   WBC 10*3/mm3 8.75 7.36   HEMOGLOBIN g/dL 10.7* 11.8*   HEMATOCRIT % 31.8* 35.4*   PLATELETS 10*3/mm3 194 207   MCV fL 90.6 92.9   MCH pg 30.5 31.0   MCHC g/dL 33.6 33.3   RDW % 14.7 14.6   RDW-SD fl 48.9 48.7   MPV fL 11.3 11.5   NEUTROPHIL % %  --  64.5   LYMPHOCYTE % %  --  13.0*   MONOCYTES % %  --  11.3   EOSINOPHIL % %  --  10.2*   BASOPHIL % %  --  0.5   IMM GRAN % %  --  0.5   NEUTROS ABS 10*3/mm3  --  4.74   LYMPHS ABS 10*3/mm3  --  0.96   MONOS ABS 10*3/mm3  --  0.83   EOS ABS 10*3/mm3  --  0.75*   BASOS ABS 10*3/mm3  --  0.04   IMMATURE GRANS (ABS) 10*3/mm3  --  0.04   NRBC /100 WBC  --  0.0     Results from last 7 days   Lab Units 09/11/19  1835   INR  1.12*   APTT seconds 31.2       Results from last 7 days   Lab Units 09/11/19  1901   LACTATE mmol/L 1.6         Results from last 7 days   Lab Units 09/11/19  1835   LIPASE U/L 61*         Results from last 7 days   Lab Units 09/11/19  2000   NITRITE UA  Negative   WBC UA /HPF Too Numerous to Count*   BACTERIA UA /HPF 2+*   SQUAM EPITHEL UA /HPF None Seen               Imaging:  Imaging Results (last 24 hours)     ** No results found for the last 24 hours. **          EKG:  ECG 12 Lead   Preliminary Result   HEART RATE= 72  bpm   RR Interval= 836  ms   HI Interval= 181  ms   P Horizontal Axis= 51  deg   P Front Axis= 1  deg   QRSD Interval= 97  ms   QT Interval= 421  ms   QRS Axis= -2  deg   T Wave Axis= 1  deg   - ABNORMAL ECG -   Sinus rhythm   Ventricular premature complex   Inferior infarct, old   Electronically Signed By:    Date and Time of Study: 2019-09-12 09:57:43          I reviewed the patient's new clinical results and  medications.  I reviewed the patient's new imaging results and agree with the interpretation.  I personally viewed and interpreted the patient's EKG/Telemetry data.      Problem List:     Active Hospital Problems    Diagnosis  POA   • **Dehydration [E86.0]  Yes   • Essential hypertension [I10]  No   • Abdominal pain [R10.9]  Yes   • Nausea without vomiting [R11.0]  Unknown   • Elevated LFTs [R94.5]  Yes   • Dyspnea on exertion [R06.09]  Yes   • Generalized weakness [R53.1]  Yes   • Chest pain [R07.9]  Yes   • Diastolic dysfunction [I51.9]  Yes   • GERD (gastroesophageal reflux disease) [K21.9]  Yes   • Hyperlipidemia [E78.5]  Yes   • Coronary artery disease involving native coronary artery of native heart without angina pectoris [I25.10]  Yes      Resolved Hospital Problems   No resolved problems to display.       Assessment and Plan:     Dehydration/hypokalemia  -Normal Saline Bolus given in ER   -Normal Saline infusion 75ml/hr  -not orthostatic.  -Replace electrolytes per protocol.  -check magnesium    Abdominal Pain/Nausea  - generalized abdominal pain through out the entire abdomen that is slightly worse in RUQ. - Zofran PRN for nausea   -Clear liquid diet  -Consult GI   - CT of abdomen and pelvis to assess the pancreas  -consider MRCP or ERCP, but will defer to GI     -Elevated LFT's  -Jaundice that has worsened since discharge   - Check CMP and Lipase in the AM ,  - CT of abdomen and pelvis as above    - hold lipitor    Generalized weakness  -Consult PT      Coronary artery disease/Dyspnea on exertion/chest pain  -EKG reviewed- no changes. Troponin negative. Reports pain as chronic. He did have an episode of afib last admission, but converted and no OAC recommended at that time.   -Continue home Plavix and Aspirin   -Continue home Nitro PRN   -Consider cardiology consult if not improved     GERD  Change home Nexium to Protonix 40mg     Essential hypertension   - stopped norvasc recently due to hypotension and  will hold     Abnormal UA  - Culture ordered will hold off treating at this time due to history of colonized ESBL and asymptomatic.     Admit to Telemetry   DVT prophylaxis: SCD's  Code status addressed: Full   Diet: Clear liquid     I discussed the patients findings and my recommendations with patient and family.    ANIL Seo  [facility]  09/12/19  10:18 AM    Dictated utilizing Dragon dictation

## 2019-09-12 NOTE — CONSULTS
[facility]  Initial Inpatient Consult Note    Referring Provider: Dr. Castro    Reason for Consultation: Abnormal liver test    Subjective     History of present illness:    78 y.o. male with GI past medical history significant for calculus of the bile duct with acute cholecystitis with cholecystectomy 10 days ago and ERCP.  ERCP revealed common duct stones that were removed.  Admitted yesterday for nausea, lightheadedness, near syncope, dark urine and stool.  Blood pressure at home very low brought to the emergency room.  Continuing with jaundice and elevated liver tests.  Abdominal pain is resolving today but yesterday was epigastric did not radiate, worse with movement better at rest no relation to eating.  He has had no vomiting at home since discharge.  Previously heavy alcohol use drinking 6 bourbon drinks nightly but quit a year ago.    Past Medical History:  Past Medical History:   Diagnosis Date   • Anxiety    • Back pain    • Bladder cancer (CMS/HCC)    • Coronary artery disease    • Depression    • Dizziness    • Fatigue    • GERD (gastroesophageal reflux disease)    • History of fractured rib     RIGHT SIDE   • Hyperlipidemia    • Hypertension    • Tremors of nervous system    • Urinary incontinence    • Vertigo      Past Surgical History:  Past Surgical History:   Procedure Laterality Date   • CARDIAC CATHETERIZATION      7x, 5 stents   • CATARACT EXTRACTION, BILATERAL     • CHOLECYSTECTOMY N/A 9/2/2019    Procedure: CHOLECYSTECTOMY LAPAROSCOPIC WITH INTRAOPERATIVE CHOLANGIOGRAM;  Surgeon: Bg Rodriguez Jr., MD;  Location: [facility];  Service: General   • COLONOSCOPY     • CORONARY ANGIOPLASTY WITH STENT PLACEMENT      X5    • CYSTOSCOPY BLADDER BIOPSY N/A 1/8/2019    Procedure: CYSTOSCOPY BLADDER BIOPSY;  Surgeon: Wang Soares Jr., MD;  Location: [facility];  Service: Urology   • CYSTOSCOPY BLADDER BIOPSY N/A 6/13/2019    Procedure: CYSTOSCOPY BLADDER BIOPSY;   Surgeon: Wang Soares Jr., MD;  Location: [facility] MAIN OR;  Service: Urology   • CYSTOSCOPY TRANSURETHRAL RESECTION OF PROSTATE N/A 7/11/2019    Procedure: CYSTOSCOPY TRANSURETHRAL RESECTION OF PROSTATE;  Surgeon: Wang Soares Jr., MD;  Location: [facility] MAIN OR;  Service: Urology   • CYSTOSCOPY URETEROSCOPY LASER LITHOTRIPSY N/A 6/13/2019    Procedure: CYSTO LITHOPAXY;  Surgeon: Wang Soares Jr., MD;  Location: [facility]IN OR;  Service: Urology   • ERCP N/A 9/3/2019    Procedure: ENDOSCOPIC RETROGRADE CHOLANGIOPANCREATOGRAPHY with sphincterotomy and balloon sweep;  Surgeon: Ashok Amaya MD;  Location: [facility] ENDOSCOPY;  Service: Gastroenterology   • EYE SURGERY      Lens implants   • LUMBAR DISCECTOMY FUSION INSTRUMENTATION     • SKIN CANCER EXCISION Left     chest wall   • TRANSURETHRAL RESECTION OF BLADDER TUMOR N/A 11/29/2018    Procedure: TUR BLADDER TUMOR  LARGE;  Surgeon: aWng Soares Jr., MD;  Location: [facility] MAIN OR;  Service: Urology      Social History:   Social History     Tobacco Use   • Smoking status: Never Smoker   • Smokeless tobacco: Never Used   Substance Use Topics   • Alcohol use: Yes     Frequency: Never     Drinks per session: 5 or 6     Binge frequency: Less than monthly     Comment: last drink about 1 year ago       Family History:  Family History   Problem Relation Age of Onset   • Arthritis Mother    • Glaucoma Mother    • Heart disease Father    • Emphysema Father    • Tremor Father    • Malig Hyperthermia Neg Hx        Home Meds:  Medications Prior to Admission   Medication Sig Dispense Refill Last Dose   • acetaminophen (TYLENOL) 325 MG tablet Take 650 mg by mouth Every 6 (Six) Hours As Needed for Mild Pain .   Past Week at Unknown time   • amLODIPine (NORVASC) 5 MG tablet Take 5 mg by mouth Every Morning.   Past Week at Unknown time   • aspirin 81 MG chewable tablet Chew 1 tablet Daily. 30 tablet 0 9/11/2019 at Unknown time   • atorvastatin  (LIPITOR) 40 MG tablet Take 40 mg by mouth Every Morning.   Past Week at Unknown time   • clopidogrel (PLAVIX) 75 MG tablet Take 75 mg by mouth Daily. Not taking for the next week   9/11/2019 at Unknown time   • esomeprazole (nexIUM) 40 MG capsule Take 40 mg by mouth Every Morning Before Breakfast.   9/11/2019 at Unknown time   • FLUoxetine (PROzac) 20 MG capsule Take 20 mg by mouth Daily.   9/11/2019 at Unknown time   • FLUoxetine (PROzac) 40 MG capsule Take 40 mg by mouth Every Morning. Patient takes total of 60 mg a day   9/11/2019 at Unknown time   • HYDROcodone-acetaminophen (NORCO) 5-325 MG per tablet Take 1 tablet by mouth Every 4 (Four) Hours As Needed for Moderate Pain  or Severe Pain . 18 tablet 0 Past Week at Unknown time   • ondansetron (ZOFRAN) 4 MG tablet Take 1 tablet by mouth Every 8 (Eight) Hours As Needed for Nausea or Vomiting. 30 tablet 0 Past Month at Unknown time   • nitroglycerin (NITROSTAT) 0.4 MG SL tablet Place 0.4 mg under the tongue as needed for chest pain. Take no more than 3 doses in 15 minutes.   Taking   • sennosides-docusate sodium (SENOKOT-S) 8.6-50 MG tablet Take 2 tablets by mouth 2 Times a Day. 60 tablet 0 Not Taking     Current Meds:     aspirin 81 mg Oral Daily   clopidogrel 75 mg Oral Daily   FLUoxetine 20 mg Oral Daily   [START ON 9/13/2019] FLUoxetine 40 mg Oral QAM   [START ON 9/13/2019] pantoprazole 40 mg Oral QAM   sodium chloride 10 mL Intravenous Q12H     Allergies:  No Known Allergies  Review of Systems  He has weakness and fatigue all other systems reviewed and negative     Objective     Vital Signs  Temp:  [97.5 °F (36.4 °C)-99 °F (37.2 °C)] 97.5 °F (36.4 °C)  Heart Rate:  [67-72] 67  Resp:  [16-18] 18  BP: (110-154)/(74-88) 111/75  Physical Exam:  General Appearance:    Alert, cooperative, in no acute distress   Head:    Normocephalic, without obvious abnormality, atraumatic   Eyes:          conjunctivae and sclerae normal, no   icterus   Throat:   no thrush, oral  mucosa moist   Neck:   Supple, no adenopathy   Lungs:     Clear to auscultation bilaterally    Heart:    Regular rhythm and normal rate    Chest Wall:    No abnormalities observed   Abdomen:     Soft, nondistended, nontender; normal bowel sounds   Extremities:   no edema, no redness   Skin:   No bruising or rash   Psychiatric:  normal mood and insight     Results Review:   I reviewed the patient's new clinical results.    Results from last 7 days   Lab Units 09/12/19  0408 09/11/19  1835   WBC 10*3/mm3 8.75 7.36   HEMOGLOBIN g/dL 10.7* 11.8*   HEMATOCRIT % 31.8* 35.4*   PLATELETS 10*3/mm3 194 207     Results from last 7 days   Lab Units 09/12/19  1103 09/12/19  0408 09/11/19  1835 09/09/19  1402   SODIUM mmol/L  --  137 136 137   POTASSIUM mmol/L 4.0 3.4* 3.2* 3.7   CHLORIDE mmol/L  --  105 99 100   TOTAL CO2 mmol/L  --   --   --  23.3   CO2 mmol/L  --  22.8 26.9  --    BUN mg/dL  --  7* 8 14   CREATININE mg/dL  --  0.68* 0.92 0.94   CALCIUM mg/dL  --  8.5* 9.0 8.9   BILIRUBIN mg/dL  --   --  5.8* 7.3*   ALK PHOS U/L  --   --  1,705* 1,641*   ALT (SGPT) U/L  --   --  299* 353*   AST (SGOT) U/L  --   --  460* 554*   GLUCOSE mg/dL  --  95 95  --      Results from last 7 days   Lab Units 09/11/19  1835   INR  1.12*     Lab Results   Lab Value Date/Time    LIPASE 61 (H) 09/11/2019 1835    LIPASE 14 09/04/2019 0700    LIPASE 27 08/30/2019 2147    LIPASE 33 08/30/2019 1707       Radiology:  CT Abdomen Pelvis Without Contrast   Preliminary Result   1.  Pancreas has an unremarkable noncontrast CT appearance. Please note   that early pancreatitis can be occult on imaging and correlation with   serum lipase levels recommended.   2.  Postsurgical changes from recent cholecystectomy with a mixed hypo   and hyperdense collection within the gallbladder fossa likely   representing mixture of postoperative seroma and hemorrhage. The   sterility of this collection cannot be determined based on imaging and   correlation with patient  history is recommended to exclude developing   abscess.   3.  Small amount of ascites.   4.  Other findings as above.                      Assessment/Plan   Patient Active Problem List   Diagnosis   • Hyperlipidemia   • Coronary artery disease involving native coronary artery of native heart without angina pectoris   • Cognitive disorder   • Mood disorder (CMS/HCC)   • Closed wedge compression fracture of third thoracic vertebra with routine healing   • GERD (gastroesophageal reflux disease)   • S/P drug eluting coronary stent placement   • Chest pain   • Diastolic dysfunction   • Exertional angina (CMS/HCC)   • Unstable angina (CMS/HCC)   • Bladder mass   • Mixed action and resting tremor   • Bladder cancer (CMS/HCC)   • Acute cystitis without hematuria   • Generalized weakness   • Dyspnea on exertion   • BPH (benign prostatic hyperplasia)   • Right upper quadrant pain   • Calculus of gallbladder without cholecystitis   • Elevated LFTs   • Calculus of bile duct with acute cholecystitis without obstruction   • Atrial fibrillation (CMS/HCC)   • Dehydration   • Essential hypertension   • Abdominal pain   • Nausea without vomiting       Assessment:  1. Abdominal pain  2. Abnormal liver tests  3. Recent cholecystectomy with choledocholithiasis requiring ERCP and stone removal  4. Dehydration    Plan:  · Currently receiving CAT scan  · Depending on those results may need MRCP  · No indication for repeat ERCP at this time  · Full serological work-up for abnormal liver tests ordered for tomorrow morning  · Advance diet per primary team  · Follow liver tests daily      I discussed the patients findings and my recommendations with patient, family and nursing staff.    Aubrey Villasenor MD

## 2019-09-12 NOTE — PLAN OF CARE
Problem: Patient Care Overview  Goal: Plan of Care Review  Outcome: Ongoing (interventions implemented as appropriate)   09/12/19 9000   Coping/Psychosocial   Plan of Care Reviewed With patient   OTHER   Outcome Summary pt presents with general weakness and deconditioning due to illness, he will benefit from PT to address, pt with recent hospitalization, went home with spouse, has rwx if needed. Pt readmitted with dehydration, weakness. anticipate pt will return home with spouse at discharge, PT will maximize independence for safe return home

## 2019-09-12 NOTE — DISCHARGE PLACEMENT REQUEST
"Angie Zayas (78 y.o. Male)     Date of Birth Social Security Number Address Home Phone MRN    1940  [address] [place] [address] [phone] [id number]    [employer] Marital Status          None        Admission Date Admission Type Admitting Provider Attending Provider Department, Room/Bed    8/30/19 Emergency Jose Salinas MD  [address], E668/1    Discharge Date Discharge Disposition Discharge Destination        9/5/2019 Home or Self Care              Attending Provider:  (none)   Allergies:  No Known Allergies    Isolation:  Contact   Infection:  ESBL E coli (09/01/19)   Code Status:  CPR    Ht:  182.9 cm (72\")   Wt:  83 kg (182 lb 14.4 oz)    Admission Cmt:  None   Principal Problem:  Calculus of bile duct with acute cholecystitis without obstruction [K80.42] More...                 Active Insurance as of 8/30/2019     Primary Coverage     Payor Plan Insurance Group Employer/Plan Group    MEDICARE MEDICARE A & B      Payor Plan Address Payor Plan Phone Number Payor Plan Fax Number Effective Dates    [address] [phone]  11/1/2005 - None Entered    [facility] [address]       Subscriber Name Subscriber Birth Date Member ID       ANGIE ZAYAS 1940 [id number]           Secondary Coverage     Payor Plan Insurance Group Employer/Plan Group     FOR LIFE  FOR LIFE MC SUPP NGN     Payor Plan Address Payor Plan Phone Number Payor Plan Fax Number Effective Dates    [address] [phone]  5/9/2016 - None Entered    [facility] [address]       Subscriber Name Subscriber Birth Date Member ID       ANGIE ZAYAS 1940 [id number]                 Emergency Contacts      (Rel.) Home Phone Work Phone Mobile Phone    Taty Zayas (Spouse) [phone] -- [phone]    Del ToroRosa Elena owusu (Daughter) [phone] -- [phone]              "

## 2019-09-12 NOTE — CONSULTS
Adult Nutrition  Assessment/PES    Patient Name:  Sukhdev Zayas  YOB: 1940  MRN: [id number]  Admit Date:  9/11/2019    Assessment Date:  9/12/2019    Comments:  Consult per nurse admission screen: unintentional weight loss    Pt admitted with dehydration following a recent hospitalization (8/30-9/5) where he had acute cholecystitis and obstruction, resulting in cholecystectomy on 9/2 and ERCP with sphincterotomy and stone extraction on 9/3. He continues to have abd pain, n/v. He's lost a total of ~10 lb since May 2019. He's had other surgeries, as well, including cystoscopy on 6/13 and TURP on 7/11 (underlying bladder cancer).     Currently on Clear Liquids and IVF @ 75 cc/hr. Awaiting GI consult and KUB. Will follow clinical course and diet tolerance as it is advanced beyond liquids.     Reason for Assessment     Row Name 09/12/19 1110          Reason for Assessment    Reason For Assessment  nurse/nurse practitioner consult     Diagnosis  cardiac disease;fluid status Dehydration; hx CAD, reflux, ^ LFTs, HTN, ca of bladder     Identified At Risk by Screening Criteria  unintentional loss of 10 lbs or more in the past 2 mos         Nutrition/Diet History     Row Name 09/12/19 1111          Nutrition/Diet History    Typical Food/Fluid Intake  Currently on CLD. Just dc from hospital 6 days ago, had elevated LFTs and jaundice (cholelithiasis noted). Cholecystectomy on 9/2. Intake/appetite poor at present; has had appetite issues since May. Very weak, deconditioned per PT note.     Factors Affecting Nutritional Intake  nausea;vomiting;abdominal pain;altered gastrointestinal function         Anthropometrics     Row Name 09/12/19 1114          Admit Weight    Admit Weight  83 kg (183 lb)        Usual Body Weight (UBW)    Usual Body Weight  -- 185-190#     Weight Loss  unintentional     Weight Loss Time Frame  10 lb since May (5% x 4 mos)        Body Mass Index (BMI)    BMI Assessment  BMI 18.5-24.9:  normal         Labs/Tests/Procedures/Meds     Row Name 09/12/19 1115          Labs/Procedures/Meds    Lab Results Reviewed  reviewed, pertinent     Lab Results Comments  K low, BUN 7, Mg, , , Alk phos 1705, Lipase 61, Tbili 5.8        Diagnostic Tests/Procedures    Diagnostic Test/Procedure Reviewed  reviewed, pertinent     Diagnostic Test/Procedures Comments  recent xrays, ERCP, cholangiogram: gallstones in CBD --> cholecystectomy. Cardiac stents in Aug 2018,        Medications    Pertinent Medications Reviewed  reviewed, pertinent     Pertinent Medications Comments  antiemetics, IVF 75 cc/hr         Physical Findings     Row Name 09/12/19 1114          Physical Findings    Gastrointestinal  nausea     Skin  jaundice;other (see comments) abd incision         Estimated/Assessed Needs     Row Name 09/12/19 1121          Calculation Measurements    Weight Used For Calculations  83.1 kg (183 lb 3.2 oz)        Estimated/Assessed Needs    Additional Documentation  Protein Requirements (Group);Fluid Requirements (Group);KCAL/KG (Group)        KCAL/KG    KCAL/KG  25 Kcal/Kg (kcal)     25 Kcal/Kg (kcal)  2077.5        Protein Requirements    Est Protein Requirement Amount (gms/kg)  1.0 gm protein     Estimated Protein Requirements (gms/day)  83.1        Fluid Requirements    Estimated Fluid Requirements (mL/day)  2493     Estimated Fluid Requirement Method  RDA Method     RDA Method (mL)  2493               Nutrition Prescription Ordered     Row Name 09/12/19 1122          Nutrition Prescription PO    Current PO Diet  Clear Liquid         Evaluation of Received Nutrient/Fluid Intake     Row Name 09/12/19 1122          Fluid Intake Evaluation    IV Fluid (mL)  1800        PO Evaluation    % PO Intake  tolerated clears               Problem/Interventions:  Problem 1     Row Name 09/12/19 1123          Nutrition Diagnoses Problem 1    Problem 1  Inadequate Intake/Infusion     Inadequate Intake Type  Oral      Macronutrient  Kcal;Protein;Fluid     Etiology (related to)  Medical Diagnosis     Gastrointestinal  Cholecystitis;Nausea;Vomiting now s/p cholecystectomy     Metabolic/ICU  Elevated LFTs;Hyperbilirubinemia     Signs/Symptoms (evidenced by)  Report of Mnimal PO Intake;Unintended Weight Change     Unintended Weight Change  Loss     Number of Pounds Lost  10#     Weight loss time period  4 mos                 Intervention Goal     Row Name 09/12/19 1125          Intervention Goal    General  Maintain nutrition;Reduce/improve symptoms;Disease management/therapy     PO  Tolerate PO;Advance diet     Weight  Maintain weight         Nutrition Intervention     Row Name 09/12/19 1125          Nutrition Intervention    RD/Tech Action  Follow Tx progress;Care plan reviewd;Other (comment) await GI consult, KUB pending           Education/Evaluation     Row Name 09/12/19 1126          Education    Education  Will Instruct as appropriate        Monitor/Evaluation    Monitor  Per protocol           Electronically signed by:  Sandrita oHwe RD  09/12/19 11:26 AM

## 2019-09-12 NOTE — PLAN OF CARE
Problem: Nutrition, Imbalanced: Inadequate Oral Intake (Adult)  Goal: Identify Related Risk Factors and Signs and Symptoms  Outcome: Outcome(s) achieved Date Met: 09/12/19 09/12/19 1139   Nutrition, Imbalanced: Inadequate Oral Intake (Adult)   Related Risk Factors (Nutrition Imbalance, Inadequate Oral Intake) chronic illness/infection;appetite decreased;eating difficulty   Signs and Symptoms (Nutrition Imbalance, Inadequate Oral Intake: Signs and Symptoms) nausea and vomiting;weakness/lethargy;weight decreased (percent weight loss, percent usual body weight, body mass index less than 18.5) (Adults)     Goal: Improved Oral Intake  Outcome: Ongoing (interventions implemented as appropriate)   09/12/19 1139   Nutrition, Imbalanced: Inadequate Oral Intake (Adult)   Improved Oral Intake making progress toward outcome     Goal: Prevent Further Weight Loss  Outcome: Ongoing (interventions implemented as appropriate)   09/12/19 1139   Nutrition, Imbalanced: Inadequate Oral Intake (Adult)   Prevent Further Weight Loss making progress toward outcome

## 2019-09-12 NOTE — DISCHARGE PLACEMENT REQUEST
"Angie Zayas (78 y.o. Male)     Date of Birth Social Security Number Address Home Phone MRN    1940  [address] [place] [address] [phone] [id number]    [employer]ae[employer] Marital Status          None        Admission Date Admission Type Admitting Provider Attending Provider Department, Room/Bed    9/11/19 Emergency Juve Turcios MD Nguyen, Minh Loc, MD [address] CVI, 2209/1    Discharge Date Discharge Disposition Discharge Destination                       Attending Provider:  Juve Turcios MD    Allergies:  No Known Allergies    Isolation:  Contact   Infection:  ESBL E coli (09/01/19)   Code Status:  CPR    Ht:  182.9 cm (72\")   Wt:  83.1 kg (183 lb 1.6 oz)    Admission Cmt:  None   Principal Problem:  Dehydration [E86.0]                 Active Insurance as of 9/11/2019     Primary Coverage     Payor Plan Insurance Group Employer/Plan Group    MEDICARE MEDICARE A & B      Payor Plan Address Payor Plan Phone Number Payor Plan Fax Number Effective Dates    [address] [phone]  11/1/2005 - None Entered    [facility] [address]       Subscriber Name Subscriber Birth Date Member ID       ANGIE ZAYAS 1940 [id number]           Secondary Coverage     Payor Plan Insurance Group Employer/Plan Group     FOR LIFE  FOR LIFE MC SUPP NGN     Payor Plan Address Payor Plan Phone Number Payor Plan Fax Number Effective Dates    [address] [phone]  5/9/2016 - None Entered    [facility] [address]       Subscriber Name Subscriber Birth Date Member ID       ANGIE ZAYAS 1940 [id number]                 Emergency Contacts      (Rel.) Home Phone Work Phone Mobile Phone    Taty Zayas (Spouse) [phone] -- [phone]    Del ToroRosa Elena owusu (Daughter) [phone] -- [phone]              "

## 2019-09-12 NOTE — OUTREACH NOTE
General Surgery Week 2 Survey      Responses   Facility patient discharged from?  [facility]   Does the patient have one of the following disease processes/diagnoses(primary or secondary)?  General Surgery   Week 2 attempt successful?  No   Revoke  Readmitted          Alana Coates RN

## 2019-09-12 NOTE — THERAPY EVALUATION
Patient Name: Sukhdev Zayas  : 1940    MRN: [id number]                              Today's Date: 2019       Admit Date: 2019    Visit Dx:     ICD-10-CM ICD-9-CM   1. Dehydration E86.0 276.51     Patient Active Problem List   Diagnosis   • Hyperlipidemia   • Coronary artery disease involving native coronary artery of native heart without angina pectoris   • Cognitive disorder   • Mood disorder (CMS/HCC)   • Closed wedge compression fracture of third thoracic vertebra with routine healing   • GERD (gastroesophageal reflux disease)   • S/P drug eluting coronary stent placement   • Chest pain   • Diastolic dysfunction   • Exertional angina (CMS/HCC)   • Unstable angina (CMS/HCC)   • Bladder mass   • Mixed action and resting tremor   • Bladder cancer (CMS/HCC)   • Acute cystitis without hematuria   • Generalized weakness   • Dyspnea on exertion   • BPH (benign prostatic hyperplasia)   • Right upper quadrant pain   • Calculus of gallbladder without cholecystitis   • Elevated LFTs   • Calculus of bile duct with acute cholecystitis without obstruction   • Atrial fibrillation (CMS/HCC)   • Dehydration   • Essential hypertension   • Abdominal pain   • Nausea without vomiting     Past Medical History:   Diagnosis Date   • Anxiety    • Back pain    • Bladder cancer (CMS/HCC)    • Coronary artery disease    • Depression    • Dizziness    • Fatigue    • GERD (gastroesophageal reflux disease)    • History of fractured rib     RIGHT SIDE   • Hyperlipidemia    • Hypertension    • Tremors of nervous system    • Urinary incontinence    • Vertigo      Past Surgical History:   Procedure Laterality Date   • CARDIAC CATHETERIZATION      7x, 5 stents   • CATARACT EXTRACTION, BILATERAL     • CHOLECYSTECTOMY N/A 2019    Procedure: CHOLECYSTECTOMY LAPAROSCOPIC WITH INTRAOPERATIVE CHOLANGIOGRAM;  Surgeon: Bg Rodriguez Jr., MD;  Location: [facility];  Service: General   • COLONOSCOPY     • CORONARY ANGIOPLASTY  WITH STENT PLACEMENT      X5    • CYSTOSCOPY BLADDER BIOPSY N/A 1/8/2019    Procedure: CYSTOSCOPY BLADDER BIOPSY;  Surgeon: Wang Soares Jr., MD;  Location: [facility] MAIN OR;  Service: Urology   • CYSTOSCOPY BLADDER BIOPSY N/A 6/13/2019    Procedure: CYSTOSCOPY BLADDER BIOPSY;  Surgeon: Wang Soares Jr., MD;  Location: [facility] MAIN OR;  Service: Urology   • CYSTOSCOPY TRANSURETHRAL RESECTION OF PROSTATE N/A 7/11/2019    Procedure: CYSTOSCOPY TRANSURETHRAL RESECTION OF PROSTATE;  Surgeon: Wang Soares Jr., MD;  Location: [facility] MAIN OR;  Service: Urology   • CYSTOSCOPY URETEROSCOPY LASER LITHOTRIPSY N/A 6/13/2019    Procedure: CYSTO LITHOPAXY;  Surgeon: Wang Soares Jr., MD;  Location: [facility] MAIN OR;  Service: Urology   • ERCP N/A 9/3/2019    Procedure: ENDOSCOPIC RETROGRADE CHOLANGIOPANCREATOGRAPHY with sphincterotomy and balloon sweep;  Surgeon: Ashok Amaya MD;  Location: [facility] ENDOSCOPY;  Service: Gastroenterology   • EYE SURGERY      Lens implants   • LUMBAR DISCECTOMY FUSION INSTRUMENTATION     • SKIN CANCER EXCISION Left     chest wall   • TRANSURETHRAL RESECTION OF BLADDER TUMOR N/A 11/29/2018    Procedure: TUR BLADDER TUMOR  LARGE;  Surgeon: Wang Soares Jr., MD;  Location: [facility] MAIN OR;  Service: Urology     General Information     Row Name 09/12/19 1111          PT Evaluation Time/Intention    Document Type  evaluation  -PC     Mode of Treatment  physical therapy  -PC     Row Name 09/12/19 1111          General Information    Patient Profile Reviewed?  yes  -PC     Prior Level of Function  independent:  -PC     Existing Precautions/Restrictions  fall  -PC     Barriers to Rehab  medically complex  -PC     Row Name 09/12/19 1111          Cognitive Assessment/Intervention- PT/OT    Orientation Status (Cognition)  oriented x 4  -PC     Row Name 09/12/19 1111          Safety Issues, Functional Mobility    Impairments Affecting Function (Mobility)  strength  -PC        User Key  (r) = Recorded By, (t) = Taken By, (c) = Cosigned By    Initials Name Provider Type    PC Elidia Noe, PT Physical Therapist        Mobility     Row Name 09/12/19 1112          Bed Mobility Assessment/Treatment    Bed Mobility Assessment/Treatment  supine-sit  -PC     Supine-Sit [place] (Bed Mobility)  contact guard  -PC     Row Name 09/12/19 1112          Sit-Stand Transfer    Sit-Stand [place] (Transfers)  contact guard  -PC     Row Name 09/12/19 1112          Gait/Stairs Assessment/Training    [place] Level (Gait)  contact guard  -PC     Assistive Device (Gait)  walker, front-wheeled  -PC     Distance in Feet (Gait)  300 ft  -PC     Pattern (Gait)  step-through  -PC     Comment (Gait/Stairs)  mild gait unsteadiness  -PC       User Key  (r) = Recorded By, (t) = Taken By, (c) = Cosigned By    Initials Name Provider Type    PC Elidia Noe, PT Physical Therapist        Obj/Interventions     Row Name 09/12/19 1112          General ROM    GENERAL ROM COMMENTS  WFL  -PC     Row Name 09/12/19 1112          MMT (Manual Muscle Testing)    General MMT Comments  no focal deficits,general weakness from illness, deconditioning  -PC     Row Name 09/12/19 1112          Static Sitting Balance    Level of [place] (Unsupported Sitting, Static Balance)  independent  -PC     Row Name 09/12/19 1112          Dynamic Sitting Balance    Level of [place], Reaches Outside Midline (Sitting, Dynamic Balance)  independent  -PC     Row Name 09/12/19 1112          Static Standing Balance    Level of [place] (Supported Standing, Static Balance)  supervision  -PC     Row Name 09/12/19 1112          Dynamic Standing Balance    Level of [place], Reaches Outside Midline (Standing, Dynamic Balance)  contact guard assist  -PC       User Key  (r) = Recorded By, (t) = Taken By, (c) = Cosigned By    Initials Name Provider Type    PC Elidia Noe, PT Physical Therapist        Goals/Plan     Row  Name 09/12/19 1114          Transfer Goal 1 (PT)    Activity/Assistive Device (Transfer Goal 1, PT)  sit-to-stand/stand-to-sit  -PC     [place] Level/Cues Needed (Transfer Goal 1, PT)  supervision required  -PC     Row Name 09/12/19 1114          Gait Training Goal 1 (PT)    Activity/Assistive Device (Gait Training Goal 1, PT)  gait (walking locomotion);assistive device use  -PC     [place] Level (Gait Training Goal 1, PT)  supervision required  -PC     Distance (Gait Goal 1, PT)  300 ft  -PC       User Key  (r) = Recorded By, (t) = Taken By, (c) = Cosigned By    Initials Name Provider Type    PC Elidia Noe, PT Physical Therapist        Clinical Impression     Row Name 09/12/19 1113          Pain Assessment    Additional Documentation  Pain Scale: Numbers Pre/Post-Treatment (Group)  -PC     Row Name 09/12/19 1113          Pain Scale: Numbers Pre/Post-Treatment    Pain Scale: Numbers, Pretreatment  0/10 - no pain  -PC     Row Name 09/12/19 1113          Physical Therapy Clinical Impression    Criteria for Skilled Interventions Met (PT Clinical Impression)  yes;treatment indicated  -PC     Rehab Potential (PT Clinical Summary)  good, to achieve stated therapy goals  -PC     Row Name 09/12/19 1113          Positioning and Restraints    Pre-Treatment Position  in bed  -PC     Post Treatment Position  chair  -PC     In Chair  sitting;call light within reach;encouraged to call for assist;with family/caregiver  -PC       User Key  (r) = Recorded By, (t) = Taken By, (c) = Cosigned By    Initials Name Provider Type    PC Elidia Noe, PT Physical Therapist        Outcome Measures     Row Name 09/12/19 1118          How much help from another person do you currently need...    Turning from your back to your side while in flat bed without using bedrails?  4  -PC     Moving from lying on back to sitting on the side of a flat bed without bedrails?  4  -PC     Moving to and from a bed to a chair (including a  wheelchair)?  4  -PC     Standing up from a chair using your arms (e.g., wheelchair, bedside chair)?  3  -PC     Climbing 3-5 steps with a railing?  3  -PC     To walk in hospital room?  3  -PC     AM-PAC 6 Clicks Score (PT)  21  -PC     Row Name 09/12/19 1118          Functional Assessment    Outcome Measure Options  AM-PAC 6 Clicks Basic Mobility (PT)  -PC       User Key  (r) = Recorded By, (t) = Taken By, (c) = Cosigned By    Initials Name Provider Type    PC Elidia Noe, PT Physical Therapist        Physical Therapy Education     Title: PT OT SLP Therapies (Done)     Topic: Physical Therapy (Done)     Point: Mobility training (Done)     Learning Progress Summary           Patient Acceptance, E,D, DU by PC at 9/12/2019 11:15 AM                   Point: Home exercise program (Done)     Learning Progress Summary           Patient Acceptance, E,D, DU by PC at 9/12/2019 11:15 AM                   Point: Body mechanics (Done)     Learning Progress Summary           Patient Acceptance, E,D, DU by PC at 9/12/2019 11:15 AM                   Point: Precautions (Done)     Learning Progress Summary           Patient Acceptance, E,D, DU by PC at 9/12/2019 11:15 AM                               User Key     Initials Effective Dates Name Provider Type Discipline     04/03/18 -  Elidia Noe PT Physical Therapist PT              PT Recommendation and Plan     Plan of Care Reviewed With: patient  Outcome Summary: pt presents with general weakness and deconditioning due to illness, he will benefit from PT to address, pt with recent hospitalization, went home with spouse, has rwx if needed. Pt readmitted with dehydration, weakness.  anticipate pt will return home with spouse at discharge, PT will maximize independence for safe return home     Time Calculation:   PT Charges     Row Name 09/12/19 1118             Time Calculation    Start Time  1044  -PC      Stop Time  1101  -PC      Time Calculation (min)  17 min  -PC       PT Received On  09/12/19  -PC      PT - Next Appointment  09/13/19  -PC      PT Goal Re-Cert Due Date  09/19/19  -PC        User Key  (r) = Recorded By, (t) = Taken By, (c) = Cosigned By    Initials Name Provider Type    PC Elidia Noe PT Physical Therapist        Therapy Charges for Today     Code Description Service Date Service Provider Modifiers Qty    99943897553 HC PT EVAL LOW COMPLEXITY 2 9/12/2019 Elidia Noe, PT GP 1    73387675314 HC PT THER PROC EA 15 MIN 9/12/2019 Elidia Noe, PT GP 1          PT G-Codes  Outcome Measure Options: AM-PAC 6 Clicks Basic Mobility (PT)  AM-PAC 6 Clicks Score (PT): 21    Elidia Noe PT  9/12/2019

## 2019-09-12 NOTE — ED NOTES
/  Nursing report ED to floor  Sukhdev Zayas  78 y.o.  male    HPI (triage note):   Chief Complaint   Patient presents with   • Weakness - Generalized   • Abnormal Lab       Admitting doctor:   Eyal Castro MD    Admitting diagnosis:   The encounter diagnosis was Dehydration.    Code status:   Current Code Status     Date Active Code Status Order ID Comments User Context       Prior          Allergies:   Patient has no known allergies.    Weight:       09/11/19 1829   Weight: 83.1 kg (183 lb 1.6 oz)       Most recent vitals:   Vitals:    09/11/19 1829 09/11/19 1830 09/11/19 1833 09/11/19 2003   BP: 126/78 126/78 134/85 154/88   Pulse:  69 69 67   Resp:    16   Temp:       TempSrc:       SpO2:  98% 98% 98%   Weight: 83.1 kg (183 lb 1.6 oz)      Height:           Active LDAs/IV Access:   Lines, Drains & Airways    Active LDAs     Name:   Placement date:   Placement time:   Site:   Days:    Peripheral IV 09/11/19 1834 Left Antecubital   09/11/19 1834    Antecubital   less than 1                Labs (abnormal labs have a star):   Labs Reviewed   COMPREHENSIVE METABOLIC PANEL - Abnormal; Notable for the following components:       Result Value    Potassium 3.2 (*)     ALT (SGPT) 299 (*)     AST (SGOT) 460 (*)     Alkaline Phosphatase 1,705 (*)     Total Bilirubin 5.8 (*)     All other components within normal limits    Narrative:     GFR Normal >60  Chronic Kidney Disease <60  Kidney Failure <15   PROTIME-INR - Abnormal; Notable for the following components:    INR 1.12 (*)     All other components within normal limits   LIPASE - Abnormal; Notable for the following components:    Lipase 61 (*)     All other components within normal limits   URINALYSIS W/ MICROSCOPIC IF INDICATED (NO CULTURE) - Abnormal; Notable for the following components:    Color, UA Dark Yellow (*)     Appearance, UA Cloudy (*)     Bilirubin, UA Moderate (2+) (*)     Leuk Esterase, UA Large (3+) (*)     All other components within normal  limits   MAGNESIUM - Abnormal; Notable for the following components:    Magnesium 2.5 (*)     All other components within normal limits   CBC WITH AUTO DIFFERENTIAL - Abnormal; Notable for the following components:    RBC 3.81 (*)     Hemoglobin 11.8 (*)     Hematocrit 35.4 (*)     Lymphocyte % 13.0 (*)     Eosinophil % 10.2 (*)     Eosinophils, Absolute 0.75 (*)     All other components within normal limits   URINALYSIS, MICROSCOPIC ONLY - Abnormal; Notable for the following components:    RBC, UA 3-5 (*)     WBC, UA Too Numerous to Count (*)     Bacteria, UA 2+ (*)     All other components within normal limits   APTT - Normal   BNP (IN-HOUSE) - Normal    Narrative:     Among patients with dyspnea, NT-proBNP is highly sensitive for the detection of acute congestive heart failure. In addition NT-proBNP of <300 pg/ml effectively rules out acute congestive heart failure with 99% negative predictive value.   TROPONIN (IN-HOUSE) - Normal    Narrative:     Troponin T Reference Range:  <= 0.03 ng/mL-   Negative for AMI  >0.03 ng/mL-     Abnormal for myocardial necrosis.  Clinicians would have to utilize clinical acumen, EKG, Troponin and serial changes to determine if it is an Acute Myocardial Infarction or myocardial injury due to an underlying chronic condition.    LACTIC ACID, PLASMA - Normal   CK - Normal   RAINBOW DRAW    Narrative:     The following orders were created for panel order [place] Draw.  Procedure                               Abnormality         Status                     ---------                               -----------         ------                     Light Blue Top[[id number]]                                   Final result               Green Top (Gel)[[id number]]                                  Final result               Lavender Top[[id number]]                                     Final result               Gold Top - SST[[id number]]                                   Final result                  Please view results for these tests on the individual orders.   LIGHT BLUE TOP   GREEN TOP   LAVENDER TOP   GOLD TOP - SST   CBC AND DIFFERENTIAL    Narrative:     The following orders were created for panel order CBC & Differential.  Procedure                               Abnormality         Status                     ---------                               -----------         ------                     CBC Auto Differential[[id number]]        Abnormal            Final result                 Please view results for these tests on the individual orders.       EKG:   No orders to display       Meds given in ED:   Medications   sodium chloride 0.9 % flush 10 mL (not administered)   sodium chloride 0.9 % bolus 1,000 mL (0 mL Intravenous Stopped 9/11/19 2026)       Imaging results:  No radiology results for the last day    Ambulatory status:   - up ad real    Social issues:   Social History     Socioeconomic History   • Marital status:      Spouse name: Not on file   • Number of children: 4   • Years of education: 5 college degrees   • Highest education level: Not on file   Occupational History   • Occupation: Retired   Tobacco Use   • Smoking status: Never Smoker   • Smokeless tobacco: Never Used   Substance and Sexual Activity   • Alcohol use: No     Frequency: Never   • Drug use: No   • Sexual activity: Defer     Partners: Male        Rosa Stanford, RN  09/11/19 7879

## 2019-09-12 NOTE — PROGRESS NOTES
Discharge Planning Assessment  [facility]     Patient Name: Sukhdev Zayas  MRN: [id number]  Today's Date: 9/12/2019    Admit Date: 9/11/2019    Discharge Needs Assessment     Row Name 09/12/19 1723       Living Environment    Lives With  spouse    Name(s) of Who Lives With Patient  Taty Zayas, spouse    Current Living Arrangements  home/apartment/condo    Primary Care Provided by  self    Provides Primary Care For  no one, unable/limited ability to care for self    Family Caregiver if Needed  spouse    Quality of Family Relationships  helpful;involved;supportive    Able to Return to Prior Arrangements  yes       Resource/Environmental Concerns    Resource/Environmental Concerns  none       Transition Planning    Patient/Family Anticipates Transition to  home with family    Patient/Family Anticipated Services at Transition  home health care    Transportation Anticipated  family or friend will provide       Discharge Needs Assessment    Readmission Within the Last 30 Days  current reason for admission unrelated to previous admission    Concerns to be Addressed  adjustment to diagnosis/illness    Equipment Currently Used at Home  walker, rolling;shower chair    Anticipated Changes Related to Illness  none    Equipment Needed After Discharge  none    Discharge Facility/Level of Care Needs  home with home health    Offered/Gave Vendor List  yes    Patient's Choice of Community Agency(s)  Pt is current with [facility]        Discharge Plan     Row Name 09/12/19 8323       Plan    Plan  Home w/ continued [facility]    Patient/Family in Agreement with Plan  yes    Plan Comments  Spoke with Pt spouse at bedside.  Pt resting with eyes closed.  CCP introduced self and role.  Spouse, [place] (Faiza Zayas ([phone]) confirmed information on face sheet.  Spouse reports Pt has required assistance with putting his medications in his pillbox recently.  Spouse reports Pt does drive however, has not driven since he has been  sick.  Pt lives in a house with nine entrance steps and 13 inside steps.  Railing is available on both sets of stairs.  Pt PCP is Rachelle Patton PA-C.  Spouse confirms pharmacy as [facility] at [address].  Spouse reports Pt was just d/c from hospital last week and he is current with [facility].  Spouse definitely wants [facility] to continue following Pt once he discharges home.  CCP called Renée, ext. [phone] to advise Pt was in hospital.  CCP placed referral in [facility] in-basket.  Pt has recently been using a rolling walker.  Pt has a shower chair at home.  Spouse denies that Pt has ever been to a sub-acute rehab in the past.  Pt plans to return home at discharge with continued [facility] to follow.  CCP will continue to follow…MARIAM RN/CCP        Destination      No service coordination in this encounter.      Durable Medical Equipment      No service coordination in this encounter.      Dialysis/Infusion      No service coordination in this encounter.      Home Medical Care      Service Provider Request Status Selected Services Address Phone Number Fax Number    [facility] Pending - [facility] N[facility]A [address] [address] [phone]      Therapy      No service coordination in this encounter.      Community Resources      No service coordination in this encounter.          Demographic Summary     Row Name 09/12/19 1727       General Information    Admission Type  observation    Arrived From  emergency department    Required Notices Provided  Observation Status Notice    Referral Source  admission list;physician    Reason for Consult  discharge planning    Preferred Language  English     Used During This Interaction  no        Functional Status     Row Name 09/12/19 4180       Functional Status    Usual Activity Tolerance  moderate    Current Activity Tolerance  fair       Functional Status, IADL    Medications  assistive  person    Meal Preparation  assistive person    Housekeeping  assistive person    Laundry  assistive person    Shopping  assistive person       Mental Status    General Appearance WDL  WDL       Mental Status Summary    Recent Changes in Mental Status/Cognitive Functioning  no changes       Employment/    Employment Status  retired;, previous service    Current or Previous Occupation         [employer] RETIRED        Psychosocial    No documentation.       Abuse/Neglect    No documentation.       Legal    No documentation.       Substance Abuse    No documentation.       Patient Forms    No documentation.           Snow Martinez RN

## 2019-09-12 NOTE — PLAN OF CARE
Problem: Patient Care Overview  Goal: Plan of Care Review  Outcome: Ongoing (interventions implemented as appropriate)   09/12/19 0357   Coping/Psychosocial   Plan of Care Reviewed With patient   Plan of Care Review   Progress no change   OTHER   Outcome Summary Pt came to CVU around 0045 from ED. Potassium protocol initiated for K 3.2. NSR, no c/o CP, SOA, no falls. NS running at 75cc/hour. VSS, will continue to monitor.        Problem: Fall Risk (Adult)  Goal: Absence of Fall  Outcome: Ongoing (interventions implemented as appropriate)      Problem: Skin Injury Risk (Adult)  Goal: Identify Related Risk Factors and Signs and Symptoms  Outcome: Outcome(s) achieved Date Met: 09/12/19    Goal: Skin Health and Integrity  Outcome: Ongoing (interventions implemented as appropriate)

## 2019-09-13 ENCOUNTER — APPOINTMENT (OUTPATIENT)
Dept: MRI IMAGING | Facility: HOSPITAL | Age: 79
End: 2019-09-13

## 2019-09-13 LAB
ALBUMIN SERPL-MCNC: 2.9 G/DL (ref 3.5–5.2)
ALBUMIN/GLOB SERPL: 0.9 G/DL
ALP SERPL-CCNC: 1350 U/L (ref 39–117)
ALPHA1 GLOB MFR UR ELPH: 208 MG/DL (ref 90–200)
ALT SERPL W P-5'-P-CCNC: 205 U/L (ref 1–41)
ANION GAP SERPL CALCULATED.3IONS-SCNC: 11 MMOL/L (ref 5–15)
AST SERPL-CCNC: 269 U/L (ref 1–40)
BASOPHILS # BLD AUTO: 0.05 10*3/MM3 (ref 0–0.2)
BASOPHILS NFR BLD AUTO: 0.6 % (ref 0–1.5)
BILIRUB SERPL-MCNC: 5 MG/DL (ref 0.2–1.2)
BUN BLD-MCNC: 6 MG/DL (ref 8–23)
BUN/CREAT SERPL: 8.3 (ref 7–25)
CALCIUM SPEC-SCNC: 8.6 MG/DL (ref 8.6–10.5)
CHLORIDE SERPL-SCNC: 101 MMOL/L (ref 98–107)
CO2 SERPL-SCNC: 23 MMOL/L (ref 22–29)
CREAT BLD-MCNC: 0.72 MG/DL (ref 0.76–1.27)
DEPRECATED RDW RBC AUTO: 51.8 FL (ref 37–54)
EOSINOPHIL # BLD AUTO: 0.97 10*3/MM3 (ref 0–0.4)
EOSINOPHIL NFR BLD AUTO: 12.3 % (ref 0.3–6.2)
ERYTHROCYTE [DISTWIDTH] IN BLOOD BY AUTOMATED COUNT: 15.2 % (ref 12.3–15.4)
FERRITIN SERPL-MCNC: 216 NG/ML (ref 30–400)
GFR SERPL CREATININE-BSD FRML MDRD: 106 ML/MIN/1.73
GLOBULIN UR ELPH-MCNC: 3.2 GM/DL
GLUCOSE BLD-MCNC: 82 MG/DL (ref 65–99)
HAV IGM SERPL QL IA: NORMAL
HBV CORE IGM SERPL QL IA: NORMAL
HBV SURFACE AG SERPL QL IA: NORMAL
HCT VFR BLD AUTO: 35.5 % (ref 37.5–51)
HCV AB SER DONR QL: NORMAL
HGB BLD-MCNC: 11.6 G/DL (ref 13–17.7)
IMM GRANULOCYTES # BLD AUTO: 0.04 10*3/MM3 (ref 0–0.05)
IMM GRANULOCYTES NFR BLD AUTO: 0.5 % (ref 0–0.5)
INR PPP: 1.12 (ref 0.9–1.1)
IRON 24H UR-MRATE: 110 MCG/DL (ref 59–158)
IRON SATN MFR SERPL: 33 % (ref 20–50)
LIPASE SERPL-CCNC: 31 U/L (ref 13–60)
LYMPHOCYTES # BLD AUTO: 1.31 10*3/MM3 (ref 0.7–3.1)
LYMPHOCYTES NFR BLD AUTO: 16.6 % (ref 19.6–45.3)
MCH RBC QN AUTO: 30.5 PG (ref 26.6–33)
MCHC RBC AUTO-ENTMCNC: 32.7 G/DL (ref 31.5–35.7)
MCV RBC AUTO: 93.4 FL (ref 79–97)
MONOCYTES # BLD AUTO: 0.75 10*3/MM3 (ref 0.1–0.9)
MONOCYTES NFR BLD AUTO: 9.5 % (ref 5–12)
NEUTROPHILS # BLD AUTO: 4.78 10*3/MM3 (ref 1.7–7)
NEUTROPHILS NFR BLD AUTO: 60.5 % (ref 42.7–76)
NRBC BLD AUTO-RTO: 0 /100 WBC (ref 0–0.2)
PLATELET # BLD AUTO: 221 10*3/MM3 (ref 140–450)
PMV BLD AUTO: 11.2 FL (ref 6–12)
POTASSIUM BLD-SCNC: 3.9 MMOL/L (ref 3.5–5.2)
PROT SERPL-MCNC: 6.1 G/DL (ref 6–8.5)
PROTHROMBIN TIME: 14.1 SECONDS (ref 11.7–14.2)
RBC # BLD AUTO: 3.8 10*6/MM3 (ref 4.14–5.8)
SODIUM BLD-SCNC: 135 MMOL/L (ref 136–145)
TIBC SERPL-MCNC: 329 MCG/DL (ref 298–536)
TRANSFERRIN SERPL-MCNC: 221 MG/DL (ref 200–360)
WBC NRBC COR # BLD: 7.9 10*3/MM3 (ref 3.4–10.8)

## 2019-09-13 PROCEDURE — 84165 PROTEIN E-PHORESIS SERUM: CPT | Performed by: INTERNAL MEDICINE

## 2019-09-13 PROCEDURE — 85025 COMPLETE CBC W/AUTO DIFF WBC: CPT | Performed by: INTERNAL MEDICINE

## 2019-09-13 PROCEDURE — 0 GADOBENATE DIMEGLUMINE 529 MG/ML SOLUTION: Performed by: HOSPITALIST

## 2019-09-13 PROCEDURE — 83540 ASSAY OF IRON: CPT | Performed by: INTERNAL MEDICINE

## 2019-09-13 PROCEDURE — 99232 SBSQ HOSP IP/OBS MODERATE 35: CPT | Performed by: INTERNAL MEDICINE

## 2019-09-13 PROCEDURE — 80053 COMPREHEN METABOLIC PANEL: CPT | Performed by: NURSE PRACTITIONER

## 2019-09-13 PROCEDURE — 83690 ASSAY OF LIPASE: CPT | Performed by: NURSE PRACTITIONER

## 2019-09-13 PROCEDURE — 83516 IMMUNOASSAY NONANTIBODY: CPT | Performed by: INTERNAL MEDICINE

## 2019-09-13 PROCEDURE — 85610 PROTHROMBIN TIME: CPT | Performed by: INTERNAL MEDICINE

## 2019-09-13 PROCEDURE — 80074 ACUTE HEPATITIS PANEL: CPT | Performed by: INTERNAL MEDICINE

## 2019-09-13 PROCEDURE — A9577 INJ MULTIHANCE: HCPCS | Performed by: HOSPITALIST

## 2019-09-13 PROCEDURE — 82103 ALPHA-1-ANTITRYPSIN TOTAL: CPT | Performed by: INTERNAL MEDICINE

## 2019-09-13 PROCEDURE — 84466 ASSAY OF TRANSFERRIN: CPT | Performed by: INTERNAL MEDICINE

## 2019-09-13 PROCEDURE — 82728 ASSAY OF FERRITIN: CPT | Performed by: INTERNAL MEDICINE

## 2019-09-13 PROCEDURE — 74183 MRI ABD W/O CNTR FLWD CNTR: CPT

## 2019-09-13 RX ORDER — SODIUM CHLORIDE 9 MG/ML
75 INJECTION, SOLUTION INTRAVENOUS CONTINUOUS
Status: ACTIVE | OUTPATIENT
Start: 2019-09-13 | End: 2019-09-13

## 2019-09-13 RX ADMIN — PANTOPRAZOLE SODIUM 40 MG: 40 TABLET, DELAYED RELEASE ORAL at 06:31

## 2019-09-13 RX ADMIN — SODIUM CHLORIDE, PRESERVATIVE FREE 10 ML: 5 INJECTION INTRAVENOUS at 21:37

## 2019-09-13 RX ADMIN — FLUOXETINE HYDROCHLORIDE 40 MG: 20 CAPSULE ORAL at 06:31

## 2019-09-13 RX ADMIN — SODIUM CHLORIDE, PRESERVATIVE FREE 10 ML: 5 INJECTION INTRAVENOUS at 09:55

## 2019-09-13 RX ADMIN — SODIUM CHLORIDE 75 ML/HR: 9 INJECTION, SOLUTION INTRAVENOUS at 06:32

## 2019-09-13 RX ADMIN — CLOPIDOGREL 75 MG: 75 TABLET, FILM COATED ORAL at 21:36

## 2019-09-13 RX ADMIN — ASPIRIN 81 MG: 81 TABLET, CHEWABLE ORAL at 21:36

## 2019-09-13 RX ADMIN — GADOBENATE DIMEGLUMINE 17 ML: 529 INJECTION, SOLUTION INTRAVENOUS at 19:30

## 2019-09-13 RX ADMIN — SODIUM CHLORIDE 75 ML/HR: 9 INJECTION, SOLUTION INTRAVENOUS at 15:08

## 2019-09-13 NOTE — PLAN OF CARE
Problem: Fall Risk (Adult)  Goal: Absence of Fall  Outcome: Outcome(s) achieved Date Met: 09/13/19 09/13/19 9479   Fall Risk (Adult)   Absence of Fall achieves outcome

## 2019-09-13 NOTE — PLAN OF CARE
Problem: Patient Care Overview  Goal: Plan of Care Review  Outcome: Ongoing (interventions implemented as appropriate)   09/13/19 0255   Coping/Psychosocial   Plan of Care Reviewed With patient   Plan of Care Review   Progress improving   OTHER   Outcome Summary Pt has no c/o pain, no cp, no SOA. NSR. VSS, will continue to monitor.        Problem: Fall Risk (Adult)  Goal: Identify Related Risk Factors and Signs and Symptoms  Outcome: Outcome(s) achieved Date Met: 09/13/19    Goal: Absence of Fall  Outcome: Ongoing (interventions implemented as appropriate)

## 2019-09-13 NOTE — CONSULTS
Inpatient General Surgery Consult  Consult performed by: Bg Rodriguez Jr., MD  Consult ordered by: Gita Roach APRN          Patient Care Team:  Rachelle Patton PA-C as PCP - General (Physician Assistant)  Christie Pacheco APRN as PCP - Claims Attributed    Chief complaint: Jaundice    Subjective     History of Present Illness     The patient is a very pleasant 78-year-old white male whom I first met on 8/31/2019 after being admitted to the hospital with obstructive jaundice.  He had eaten 3 days prior and developed severe epigastric and right upper quadrant abdominal pain.  It lasted about 6 hours and resolved.  He then developed recurrent symptoms at night but none during the day.  When he developed jaundice he presented to the emergency room was diagnosed with cholelithiasis and choledocholithiasis.  He had been on aspirin and Plavix and his Plavix was held so that he could safely undergo surgery.  He underwent a laparoscopic cholecystectomy with intraoperative cholangiogram on 9/2/2019 with the finding of multiple stones in the common bile duct as well as cirrhosis of the liver which led to some mild oozing in the gallbladder fossa.  This area was treated with Mirian at the time of surgery.  He underwent an ERCP the following day at which time multiple common bile duct stones were removed.  He was discharged on 9/5/2019 doing well.    The patient then returned to the emergency room on 9/11/2019 with generalized weakness and the redevelopment of jaundice.  He had mild abdominal pain.  He was noted to be dehydrated.  His labs show a pattern of obstructive jaundice based on elevation of the liver function tests.  A CT scan of the abdomen and pelvis was performed that shows a small collection in the gallbladder fossa but no other significant abnormalities.  The patient is currently feeling better after hydration.    Review of Systems   Constitutional: Negative for fatigue and fever.   Respiratory:  Positive for shortness of breath. Negative for chest tightness.    Cardiovascular: Negative for chest pain and palpitations.   Gastrointestinal: Positive for abdominal pain. Negative for blood in stool, constipation, diarrhea, nausea and vomiting.        Past Medical History:   Diagnosis Date   • Anxiety    • Back pain    • Bladder cancer (CMS/HCC)    • Coronary artery disease    • Depression    • Dizziness    • Fatigue    • GERD (gastroesophageal reflux disease)    • History of fractured rib     RIGHT SIDE   • Hyperlipidemia    • Hypertension    • Tremors of nervous system    • Urinary incontinence    • Vertigo    ,   Past Surgical History:   Procedure Laterality Date   • CARDIAC CATHETERIZATION      7x, 5 stents   • CATARACT EXTRACTION, BILATERAL     • CHOLECYSTECTOMY N/A 9/2/2019    Procedure: CHOLECYSTECTOMY LAPAROSCOPIC WITH INTRAOPERATIVE CHOLANGIOGRAM;  Surgeon: Bg Rodriguez Jr., MD;  Location: [facility] OR;  Service: General   • COLONOSCOPY     • CORONARY ANGIOPLASTY WITH STENT PLACEMENT      X5    • CYSTOSCOPY BLADDER BIOPSY N/A 1/8/2019    Procedure: CYSTOSCOPY BLADDER BIOPSY;  Surgeon: Wang Soares Jr., MD;  Location: [facility] OR;  Service: Urology   • CYSTOSCOPY BLADDER BIOPSY N/A 6/13/2019    Procedure: CYSTOSCOPY BLADDER BIOPSY;  Surgeon: Wang Soares Jr., MD;  Location: [facility] OR;  Service: Urology   • CYSTOSCOPY TRANSURETHRAL RESECTION OF PROSTATE N/A 7/11/2019    Procedure: CYSTOSCOPY TRANSURETHRAL RESECTION OF PROSTATE;  Surgeon: Wang Soares Jr., MD;  Location: [facility] OR;  Service: Urology   • CYSTOSCOPY URETEROSCOPY LASER LITHOTRIPSY N/A 6/13/2019    Procedure: CYSTO LITHOPAXY;  Surgeon: Wang Soares Jr., MD;  Location: [facility] OR;  Service: Urology   • ERCP N/A 9/3/2019    Procedure: ENDOSCOPIC RETROGRADE CHOLANGIOPANCREATOGRAPHY with sphincterotomy and balloon sweep;  Surgeon: Ashok Amaya MD;  Location: [facility] ENDOSCOPY;  Service:  Gastroenterology   • EYE SURGERY      Lens implants   • LUMBAR DISCECTOMY FUSION INSTRUMENTATION     • SKIN CANCER EXCISION Left     chest wall   • TRANSURETHRAL RESECTION OF BLADDER TUMOR N/A 11/29/2018    Procedure: TUR BLADDER TUMOR  LARGE;  Surgeon: Wang Soares Jr., MD;  Location: [facility];  Service: Urology   ,   Family History   Problem Relation Age of Onset   • Arthritis Mother    • Glaucoma Mother    • Heart disease Father    • Emphysema Father    • Tremor Father    • Malig Hyperthermia Neg Hx    ,   Social History     Tobacco Use   • Smoking status: Never Smoker   • Smokeless tobacco: Never Used   Substance Use Topics   • Alcohol use: Yes     Frequency: Never     Drinks per session: 5 or 6     Binge frequency: Less than monthly     Comment: last drink about 1 year ago    • Drug use: No    and Allergies:  Patient has no known allergies.    Objective      Vital Signs  Temp:  [97.3 °F (36.3 °C)-98.8 °F (37.1 °C)] 97.3 °F (36.3 °C)  Heart Rate:  [66-73] 66  Resp:  [18] 18  BP: (127-151)/(83-94) 127/88    Physical Exam   Constitutional: He is oriented to person, place, and time. He appears well-developed and well-nourished.  Non-toxic appearance.   Eyes: EOM are normal. No scleral icterus.   Pulmonary/Chest: Effort normal. No respiratory distress.   Abdominal: Soft. Normal appearance. There is no tenderness.   Incisions: Intact with no evidence of infection.   Neurological: He is alert and oriented to person, place, and time.   Skin: Skin is warm and dry.   There is jaundice of the skin.   Psychiatric: He has a normal mood and affect. His behavior is normal. Judgment and thought content normal.       Results Review:    I reviewed the patient's new clinical results.        Assessment/Plan       Dehydration    Hyperlipidemia    Coronary artery disease involving native coronary artery of native heart without angina pectoris    GERD (gastroesophageal reflux disease)    Chest pain    Diastolic  dysfunction    Generalized weakness    Dyspnea on exertion    Elevated LFTs    Essential hypertension    Abdominal pain    Nausea without vomiting      Assessment & Plan     1.  Obstructive jaundice: The patient recently had a laparoscopic cholecystectomy with intraoperative cholangiogram with the identification of multiple common bile duct stones.  He then had an ERCP with removal of the CBD stones.  He has returned with obstructive jaundice that is most likely related to either retained common bile duct stones causing obstruction or edema at the ampulla of Vater causing obstruction.  I agree with MRCP.  He may need a repeat ERCP.    2.  Gallbladder fossa collection: The CT scan of the abdomen and pelvis shows a small collection in the gallbladder fossa that does not appear to be significant.  Mirian was used at the time of surgery due to the cirrhosis and the collection is likely related to the Mirian and an expected seroma.  There is no evidence of infection and there is no need for drainage of the collection.    I discussed the patients findings and my recommendations with patient and family    Bg Rodriguez Jr., MD  09/13/19  3:31 PM

## 2019-09-13 NOTE — PROGRESS NOTES
[facility]  Inpatient Progress Note    Reason for Follow Up: Liver tests    Subjective     Interval History:   No significant change overnight, CAT scan reviewed    Current Facility-Administered Medications:   •  acetaminophen (TYLENOL) tablet 650 mg, 650 mg, Oral, Q4H PRN **OR** acetaminophen (TYLENOL) 160 MG/5ML solution 650 mg, 650 mg, Oral, Q4H PRN **OR** acetaminophen (TYLENOL) suppository 650 mg, 650 mg, Rectal, Q4H PRN, Marlena Velazquez, APRN  •  aspirin chewable tablet 81 mg, 81 mg, Oral, Daily, Gita Roach, APRN, 81 mg at 09/12/19 2015  •  clopidogrel (PLAVIX) tablet 75 mg, 75 mg, Oral, Daily, Gita Roach APRN, 75 mg at 09/12/19 2014  •  FLUoxetine (PROzac) capsule 20 mg, 20 mg, Oral, Daily, Gita Roach, APRN  •  FLUoxetine (PROzac) capsule 40 mg, 40 mg, Oral, QAM, Gita Roach, APRN, 40 mg at 09/13/19 0631  •  HYDROcodone-acetaminophen (NORCO) 5-325 MG per tablet 1 tablet, 1 tablet, Oral, Q4H PRN, Gita Roach, APRN  •  nitroglycerin (NITROSTAT) SL tablet 0.4 mg, 0.4 mg, Sublingual, Q5 Min PRN, Marlena Velazquez, APRN  •  nitroglycerin (NITROSTAT) SL tablet 0.4 mg, 0.4 mg, Sublingual, PRN, Gita Roach, APRN  •  ondansetron (ZOFRAN) injection 4 mg, 4 mg, Intravenous, Q6H PRN, Marlena Velazquez, APRN  •  ondansetron (ZOFRAN) tablet 4 mg, 4 mg, Oral, Q8H PRN, Gita Roach, APRN  •  pantoprazole (PROTONIX) EC tablet 40 mg, 40 mg, Oral, QAM, Gita Roach, APRN, 40 mg at 09/13/19 0631  •  potassium chloride (MICRO-K) CR capsule 40 mEq, 40 mEq, Oral, PRN, 40 mEq at 09/12/19 0648 **OR** potassium chloride (KLOR-CON) packet 40 mEq, 40 mEq, Oral, PRN **OR** potassium chloride 10 mEq in 100 mL IVPB, 10 mEq, Intravenous, Q1H PRN, Marlena Velazquez, ANIL  •  [COMPLETED] Insert peripheral IV, , , Once **AND** sodium chloride 0.9 % flush 10 mL, 10 mL, Intravenous, PRN, Hilton Bernstein MD  •  sodium chloride 0.9 % flush 10 mL, 10 mL, Intravenous, Q12H, English, Marlena  ANIL RAMIREZ, 10 mL at 09/13/19 0955  •  sodium chloride 0.9 % flush 10 mL, 10 mL, Intravenous, PRN, Marlena Velazquez APRN  •  sodium chloride 0.9 % infusion, 75 mL/hr, Intravenous, Continuous, Marlena Velazquez APRN, Stopped at 09/13/19 0955  Review of Systems:    He has weakness and fatigue all other systems reviewed and negative    Objective     Vital Signs  Temp:  [97.5 °F (36.4 °C)-98.8 °F (37.1 °C)] 98.8 °F (37.1 °C)  Heart Rate:  [67-73] 73  Resp:  [18] 18  BP: (110-151)/(74-94) 151/94  Body mass index is 24.83 kg/m².    Intake/Output Summary (Last 24 hours) at 9/13/2019 1005  Last data filed at 9/13/2019 0628  Gross per 24 hour   Intake 1285 ml   Output 1900 ml   Net -615 ml     No intake/output data recorded.     Physical Exam:   General: patient awake, alert and cooperative   Eyes: Normal lids and lashes, no scleral icterus   Neck: supple, normal ROM   Skin: warm and dry, not jaundiced   Cardiovascular: regular rhythm and rate, no murmurs auscultated   Pulm: clear to auscultation bilaterally, regular and unlabored   Abdomen: soft, nontender, nondistended; normal bowel sounds   Extremities: no rash or edema   Psychiatric: Normal mood and behavior; memory intact     Results Review:     I reviewed the patient's new clinical results.    Results from last 7 days   Lab Units 09/13/19  0424 09/12/19  0408 09/11/19  1835   WBC 10*3/mm3 7.90 8.75 7.36   HEMOGLOBIN g/dL 11.6* 10.7* 11.8*   HEMATOCRIT % 35.5* 31.8* 35.4*   PLATELETS 10*3/mm3 221 194 207     Results from last 7 days   Lab Units 09/13/19  0424 09/12/19  1816 09/12/19  1103 09/12/19  0408 09/11/19  1835   SODIUM mmol/L 135* 138  --  137 136   POTASSIUM mmol/L 3.9 4.2 4.0 3.4* 3.2*   CHLORIDE mmol/L 101 105  --  105 99   CO2 mmol/L 23.0 24.1  --  22.8 26.9   BUN mg/dL 6* 6*  --  7* 8   CREATININE mg/dL 0.72* 0.76  --  0.68* 0.92   CALCIUM mg/dL 8.6 8.7  --  8.5* 9.0   BILIRUBIN mg/dL 5.0* 4.7*  --   --  5.8*   ALK PHOS U/L 1,350* 1,385*  --   --  1,705*    ALT (SGPT) U/L 205* 234*  --   --  299*   AST (SGOT) U/L 269* 332*  --   --  460*   GLUCOSE mg/dL 82 105*  --  95 95     Results from last 7 days   Lab Units 09/13/19  0424 09/11/19  1835   INR  1.12* 1.12*     Lab Results   Lab Value Date/Time    LIPASE 31 09/13/2019 0424    LIPASE 61 (H) 09/11/2019 1835    LIPASE 14 09/04/2019 0700    LIPASE 27 08/30/2019 2147    LIPASE 33 08/30/2019 1707       Radiology:  CT Abdomen Pelvis Without Contrast   Final Result   1.  Pancreas has an unremarkable noncontrast CT appearance. Please note   that early pancreatitis can be occult on imaging and correlation with   serum lipase levels recommended.   2.  Postsurgical changes from recent cholecystectomy with a mixed hypo   and hyperdense collection within the gallbladder fossa likely   representing mixture of postoperative seroma and hemorrhage. The   sterility of this collection cannot be determined based on imaging and   correlation with patient history is recommended to exclude developing   abscess.   3.  Small amount of ascites.   4.  Other findings as above.       This report was finalized on 9/12/2019 9:30 PM by Dr. Kulwinder Odom M.D.              Assessment/Plan     Patient Active Problem List   Diagnosis   • Hyperlipidemia   • Coronary artery disease involving native coronary artery of native heart without angina pectoris   • Cognitive disorder   • Mood disorder (CMS/HCC)   • Closed wedge compression fracture of third thoracic vertebra with routine healing   • GERD (gastroesophageal reflux disease)   • S/P drug eluting coronary stent placement   • Chest pain   • Diastolic dysfunction   • Exertional angina (CMS/HCC)   • Unstable angina (CMS/HCC)   • Bladder mass   • Mixed action and resting tremor   • Bladder cancer (CMS/HCC)   • Acute cystitis without hematuria   • Generalized weakness   • Dyspnea on exertion   • BPH (benign prostatic hyperplasia)   • Right upper quadrant pain   • Calculus of gallbladder without  cholecystitis   • Elevated LFTs   • Calculus of bile duct with acute cholecystitis without obstruction   • Atrial fibrillation (CMS/HCC)   • Dehydration   • Essential hypertension   • Abdominal pain   • Nausea without vomiting   Assessment:  1. Abdominal pain  2. Abnormal liver tests  3. Recent cholecystectomy with choledocholithiasis requiring ERCP and stone removal  4. Dehydration     Plan:  · CAT scan without definitive source for elevated liver tests but certainly there could be something brewing in the postsurgical area, lets get an MRCP and general surgery may need to get back involved  · Await MRCP results  · No indication for repeat ERCP at this time  · Full serological work-up for abnormal liver tests ordered and pending  · Advance diet per primary team    Follow liver tests daily    I discussed the patients findings and my recommendations with patient.    Aubrey Villasenor MD

## 2019-09-13 NOTE — PLAN OF CARE
Problem: Patient Care Overview  Goal: Plan of Care Review  Outcome: Ongoing (interventions implemented as appropriate)   09/13/19 9569   Coping/Psychosocial   Plan of Care Reviewed With patient;spouse   Plan of Care Review   Progress no change   OTHER   Outcome Summary Pt has MRI to assess pancreas for possible pancreatitis and liver because liver enzymes are elevated. Still awaiting results. Pt's ab still is bruised and has acities. Will continue to monitor.

## 2019-09-13 NOTE — PROGRESS NOTES
Progress Note    Name: Sukhdev Zayas ADMIT: 2019   : 1940  PCP: Rachelle Patton PA-C    MRN: [id number] LOS: 0 days   AGE/SEX: 78 y.o. male  ROOM: [facility]   Date of Encounter Visit: 19    Subjective:     Interval History: CT abdomen reviewed. LFTs mildly improved.     REVIEW OF SYSTEMS:   no fever or chills. No dizziness. Still weak.   Stable chronic chest pain in sternum. no palpitations or edema.   No shortness of breath, cough or sputum.   No nausea, abdominal discomfort improved. Outpouching of right abdomen.   Asking for pudding and real food.     Objective:   Temp:  [97.3 °F (36.3 °C)-98.8 °F (37.1 °C)] 97.3 °F (36.3 °C)  Heart Rate:  [66-73] 66  Resp:  [18] 18  BP: (127-151)/(83-94) 127/88   SpO2:  [96 %] 96 %  on    Device (Oxygen Therapy): room air    Intake/Output Summary (Last 24 hours) at 2019 1421  Last data filed at 2019 0628  Gross per 24 hour   Intake 1045 ml   Output 1900 ml   Net -855 ml     Body mass index is 24.83 kg/m².      19  1829   Weight: 83.1 kg (183 lb 1.6 oz)     Weight change:     Physical Exam   Constitutional: No distress.   jaundice   HENT:   Head: Atraumatic.   Nose: Nose normal.   Eyes: Conjunctivae are normal.   Cardiovascular: Normal rate and regular rhythm.   No murmur heard.  Pulmonary/Chest: Effort normal. He has no wheezes. He has no rales.   Abdominal: Soft. Bowel sounds are normal. He exhibits distension (right upper quadrant ). There is tenderness. There is no rebound and no guarding.   Scattered bruises largest in epigastric region. Laparoscopic incisions- CDI    Musculoskeletal: He exhibits no edema.   Neurological: He is alert.   Skin: Skin is warm and dry. He is not diaphoretic.       Results Review:      Results from last 7 days   Lab Units 19  0424 19  1816 19  1103 19  0408 19  1835 19  1402   SODIUM mmol/L 135* 138  --  137 136 137   POTASSIUM mmol/L 3.9 4.2 4.0 3.4* 3.2* 3.7   CHLORIDE  The right coronary artery was selectively engaged and injected. A Catheter 6fr Jr5 Crv 100cm [facility] was used. Multiple views of the injected vessel were taken.  mmol/L 101 105  --  105 99 100   TOTAL CO2 mmol/L  --   --   --   --   --  23.3   CO2 mmol/L 23.0 24.1  --  22.8 26.9  --    BUN mg/dL 6* 6*  --  7* 8 14   CREATININE mg/dL 0.72* 0.76  --  0.68* 0.92 0.94   GLUCOSE mg/dL 82 105*  --  95 95  --    CALCIUM mg/dL 8.6 8.7  --  8.5* 9.0 8.9   AST (SGOT) U/L 269* 332*  --   --  460* 554*   ALT (SGPT) U/L 205* 234*  --   --  299* 353*     Estimated Creatinine Clearance: 89.4 mL/min (A) (by C-G formula based on SCr of 0.72 mg/dL (L)).          Results from last 7 days   Lab Units 09/11/19  1835   CK TOTAL U/L 24   TROPONIN T ng/mL <0.010     Results from last 7 days   Lab Units 09/11/19  1835   PROBNP pg/mL 121.1         Results from last 7 days   Lab Units 09/11/19  1835   MAGNESIUM mg/dL 2.5*           Invalid input(s): LDLCALC  Results from last 7 days   Lab Units 09/13/19  0424 09/12/19  0408 09/11/19  1835   WBC 10*3/mm3 7.90 8.75 7.36   HEMOGLOBIN g/dL 11.6* 10.7* 11.8*   HEMATOCRIT % 35.5* 31.8* 35.4*   PLATELETS 10*3/mm3 221 194 207   MCV fL 93.4 90.6 92.9   MCH pg 30.5 30.5 31.0   MCHC g/dL 32.7 33.6 33.3   RDW % 15.2 14.7 14.6   RDW-SD fl 51.8 48.9 48.7   MPV fL 11.2 11.3 11.5   NEUTROPHIL % % 60.5  --  64.5   LYMPHOCYTE % % 16.6*  --  13.0*   MONOCYTES % % 9.5  --  11.3   EOSINOPHIL % % 12.3*  --  10.2*   BASOPHIL % % 0.6  --  0.5   IMM GRAN % % 0.5  --  0.5   NEUTROS ABS 10*3/mm3 4.78  --  4.74   LYMPHS ABS 10*3/mm3 1.31  --  0.96   MONOS ABS 10*3/mm3 0.75  --  0.83   EOS ABS 10*3/mm3 0.97*  --  0.75*   BASOS ABS 10*3/mm3 0.05  --  0.04   IMMATURE GRANS (ABS) 10*3/mm3 0.04  --  0.04   NRBC /100 WBC 0.0  --  0.0     Results from last 7 days   Lab Units 09/13/19  0424 09/11/19  1835   INR  1.12* 1.12*   APTT seconds  --  31.2         Results from last 7 days   Lab Units 09/11/19  1901   LACTATE mmol/L 1.6         Results from last 7 days   Lab Units 09/13/19  0424 09/11/19  1835   LIPASE U/L 31 61*     Results from last 7 days   Lab Units 09/11/19 2000  09/09/19  1402   URINECX  >100,000 CFU/mL Escherichia coli*  --    URINE CULTURE   --  Final report*         Results from last 7 days   Lab Units 09/11/19  2000   NITRITE UA  Negative   WBC UA /HPF Too Numerous to Count*   BACTERIA UA /HPF 2+*   SQUAM EPITHEL UA /HPF None Seen   URINECX  >100,000 CFU/mL Escherichia coli*           Imaging:  Imaging Results (last 24 hours)     Procedure Component Value Units Date/Time    CT Abdomen Pelvis Without Contrast [[id number]] Collected:  09/12/19 1507     Updated:  09/12/19 2133    Narrative:       CT ABDOMEN AND PELVIS WITHOUT IV CONTRAST     HISTORY: Abdominal pain, recent ERCP; question pancreatitis.     TECHNIQUE: Radiation dose reduction techniques were utilized, including  automated exposure control and exposure modulation based on body size.   3 mm images were obtained through the abdomen and pelvis without IV  contrast.      COMPARISON: CT abdomen and pelvis 08/30/2019.     FINDINGS:  Bibasilar atelectasis and/or pleural parenchyma scarring is present.     There are no findings of small bowel obstruction.     The appendix is unremarkable.     Postsurgical changes from recent cholecystectomy are present. A mixed  hyper and hypodense collection is present within the gallbladder fossa  and measures 5.4 x 2.8 cm. A focus of air is present within the residual  cystic duct. No focal noncontrast abnormality is visualized within the  hepatic parenchyma.     The pancreas, spleen and adrenal glands have an unremarkable noncontrast  CT appearance. Subcentimeter hypodense lesion within the left kidney is  too small to characterize. There is no hydronephrosis. Area of fat  stranding along the anterior peritoneal cavity with overlying  subcutaneous stranding within the epigastrium likely postsurgical.     Small amount of ascites is present. There is no abdominopelvic  adenopathy by size criteria.     Colonic diverticulosis is present.     There is no free intraperitoneal air. No  suspicious lytic or blastic  osseous lesions are present.     The bladder is unremarkable for its degree of distention.       Impression:       1.  Pancreas has an unremarkable noncontrast CT appearance. Please note  that early pancreatitis can be occult on imaging and correlation with  serum lipase levels recommended.  2.  Postsurgical changes from recent cholecystectomy with a mixed hypo  and hyperdense collection within the gallbladder fossa likely  representing mixture of postoperative seroma and hemorrhage. The  sterility of this collection cannot be determined based on imaging and  correlation with patient history is recommended to exclude developing  abscess.  3.  Small amount of ascites.  4.  Other findings as above.     This report was finalized on 9/12/2019 9:30 PM by Dr. Kulwinder Odom M.D.             I reviewed the patient's new clinical results and medications.         Medication Review:   Scheduled Meds:  aspirin 81 mg Oral Daily   clopidogrel 75 mg Oral Daily   FLUoxetine 20 mg Oral Daily   FLUoxetine 40 mg Oral QAM   pantoprazole 40 mg Oral QAM   sodium chloride 10 mL Intravenous Q12H     Continuous Infusions:  sodium chloride 75 mL/hr Last Rate: 75 mL/hr (09/13/19 1145)     PRN Meds:.•  acetaminophen **OR** acetaminophen **OR** acetaminophen  •  HYDROcodone-acetaminophen  •  nitroglycerin  •  nitroglycerin  •  ondansetron  •  ondansetron  •  potassium chloride **OR** potassium chloride **OR** potassium chloride  •  [COMPLETED] Insert peripheral IV **AND** sodium chloride  •  sodium chloride    Problem List:     Active Hospital Problems    Diagnosis  POA   • **Dehydration [E86.0]  Yes   • Essential hypertension [I10]  No   • Abdominal pain [R10.9]  Yes   • Nausea without vomiting [R11.0]  Unknown   • Elevated LFTs [R94.5]  Yes   • Dyspnea on exertion [R06.09]  Yes   • Generalized weakness [R53.1]  Yes   • Chest pain [R07.9]  Yes   • Diastolic dysfunction [I51.9]  Yes   • GERD (gastroesophageal  reflux disease) [K21.9]  Yes   • Hyperlipidemia [E78.5]  Yes   • Coronary artery disease involving native coronary artery of native heart without angina pectoris [I25.10]  Yes      Resolved Hospital Problems   No resolved problems to display.       Assessment and Plan:     Dehydration/hypokalemia  -K better. Mag normal.   - continue IVF till this evening then stop  - advance diet to goal of GI soft after MRCP completed.      Elevated LFT's/ recent cholecystectomy and ERCP/ abdominal pain/nausea  -Jaundice. LFT's stable with slight improvement. Lipase improved.   -CT abdomen showed dense collection of fluid suggestive of possible seroma/ hemorrhage  - MRCP today  - consult general surgery   - hold lipitor until LFT's resolve  -PRN zofran     Generalized weakness  -PT following      Coronary artery disease/Dyspnea on exertion/chest pain  -Troponin negative. Reports pain as chronic. He did have an episode of afib last admission, but converted and no OAC recommended at that time.   -Continue home Plavix and aspirin     GERD  - Protonix 40mg      Essential hypertension   - stopped norvasc recently due to hypotension and encouraged to remain off after discharge      Abnormal UA  - history of colonized ESBL and asymptomatic with no indication to treat    VTE prophylaxis: SCDs  CODE status: full code  Disposition: possible dc in 1-2 days depending on status and work up    I discussed the patients findings and my recommendations with patient and family.    Gita Roach, APRN  09/13/19  2:21 PM

## 2019-09-14 LAB
ACTIN IGG SERPL-ACNC: 8 UNITS (ref 0–19)
ALBUMIN SERPL-MCNC: 2.9 G/DL (ref 3.5–5.2)
ALBUMIN/GLOB SERPL: 1 G/DL
ALP SERPL-CCNC: 1321 U/L (ref 39–117)
ALT SERPL W P-5'-P-CCNC: 185 U/L (ref 1–41)
ANION GAP SERPL CALCULATED.3IONS-SCNC: 11.7 MMOL/L (ref 5–15)
AST SERPL-CCNC: 292 U/L (ref 1–40)
BACTERIA SPEC AEROBE CULT: ABNORMAL
BILIRUB SERPL-MCNC: 4.6 MG/DL (ref 0.2–1.2)
BUN BLD-MCNC: 10 MG/DL (ref 8–23)
BUN/CREAT SERPL: 13 (ref 7–25)
CALCIUM SPEC-SCNC: 8.6 MG/DL (ref 8.6–10.5)
CHLORIDE SERPL-SCNC: 99 MMOL/L (ref 98–107)
CO2 SERPL-SCNC: 23.3 MMOL/L (ref 22–29)
CREAT BLD-MCNC: 0.77 MG/DL (ref 0.76–1.27)
DEPRECATED MITOCHONDRIA M2 IGG SER-ACNC: <20 UNITS (ref 0–20)
GFR SERPL CREATININE-BSD FRML MDRD: 98 ML/MIN/1.73
GLOBULIN UR ELPH-MCNC: 3 GM/DL
GLUCOSE BLD-MCNC: 80 MG/DL (ref 65–99)
POTASSIUM BLD-SCNC: 3.7 MMOL/L (ref 3.5–5.2)
PROT SERPL-MCNC: 5.9 G/DL (ref 6–8.5)
SODIUM BLD-SCNC: 134 MMOL/L (ref 136–145)

## 2019-09-14 PROCEDURE — 99024 POSTOP FOLLOW-UP VISIT: CPT | Performed by: SURGERY

## 2019-09-14 PROCEDURE — 99232 SBSQ HOSP IP/OBS MODERATE 35: CPT | Performed by: INTERNAL MEDICINE

## 2019-09-14 PROCEDURE — 97110 THERAPEUTIC EXERCISES: CPT

## 2019-09-14 PROCEDURE — 80053 COMPREHEN METABOLIC PANEL: CPT | Performed by: NURSE PRACTITIONER

## 2019-09-14 RX ADMIN — CLOPIDOGREL 75 MG: 75 TABLET, FILM COATED ORAL at 21:06

## 2019-09-14 RX ADMIN — SODIUM CHLORIDE, PRESERVATIVE FREE 10 ML: 5 INJECTION INTRAVENOUS at 21:06

## 2019-09-14 RX ADMIN — SODIUM CHLORIDE, PRESERVATIVE FREE 10 ML: 5 INJECTION INTRAVENOUS at 09:40

## 2019-09-14 RX ADMIN — PANTOPRAZOLE SODIUM 40 MG: 40 TABLET, DELAYED RELEASE ORAL at 09:40

## 2019-09-14 RX ADMIN — FLUOXETINE HYDROCHLORIDE 20 MG: 20 CAPSULE ORAL at 21:05

## 2019-09-14 RX ADMIN — FLUOXETINE HYDROCHLORIDE 40 MG: 20 CAPSULE ORAL at 21:06

## 2019-09-14 RX ADMIN — ASPIRIN 81 MG: 81 TABLET, CHEWABLE ORAL at 21:06

## 2019-09-14 NOTE — PROGRESS NOTES
Postop cholecystectomy    Subjective:  Feels well.  Strength is little bit better.  Tolerating a regular diet and no significant pain.    Objective  Abdomen is soft and benign, no tenderness    Total bilirubin 4.6 alk phos 1321      MRCP reviewed, no evidence for retained stone, biliary ductal dilatation or other distinct abnormality.  Cystic duct is truncated appropriately.  Small benign-appearing fluid collection.    Assessment and plan:  -Cholecystitis and choledocholithiasis status post cholecystectomy and ERCP  -Fluid collection in the gallbladder fossa probably just reflect some hematoma and the Mirian powder that was placed at the time of surgery.  Hard to imagine that it could be contributing to these elevated liver function studies.  I suspect that this will resolve on its own over the course of a couple of weeks.  -Agree that there is no indication for ERCP at this time  -I wonder to what extent this represents intrinsic liver disease  -Okay for diet as tolerated from my standpoint, no plans for any other surgical intervention    Priyank Winter MD  General and Endoscopic Surgery  [facility]    [address], [address]  [place], [place], [address]  P: [phone]  F: [phone]

## 2019-09-14 NOTE — PLAN OF CARE
Problem: Patient Care Overview  Goal: Plan of Care Review   09/14/19 1602   Coping/Psychosocial   Plan of Care Reviewed With patient   Plan of Care Review   Progress improving   OTHER   Outcome Summary Mr. Zayas ambulates with Front wheeled walker with supervision-  feet and without AD with CGA, specifically during turning, 100 feet. Pt reports has 7 LAMIN, plan to trial during next PT session. Held further functional mobility secondary to pt request with family present.

## 2019-09-14 NOTE — THERAPY TREATMENT NOTE
Patient Name: Sukhdev Zayas  : 1940    MRN: [id number]                              Today's Date: 2019       Admit Date: 2019    Visit Dx:     ICD-10-CM ICD-9-CM   1. Dehydration E86.0 276.51     Patient Active Problem List   Diagnosis   • Hyperlipidemia   • Coronary artery disease involving native coronary artery of native heart without angina pectoris   • Cognitive disorder   • Mood disorder (CMS/HCC)   • Closed wedge compression fracture of third thoracic vertebra with routine healing   • GERD (gastroesophageal reflux disease)   • S/P drug eluting coronary stent placement   • Chest pain   • Diastolic dysfunction   • Exertional angina (CMS/HCC)   • Unstable angina (CMS/HCC)   • Bladder mass   • Mixed action and resting tremor   • Bladder cancer (CMS/HCC)   • Acute cystitis without hematuria   • Generalized weakness   • Dyspnea on exertion   • BPH (benign prostatic hyperplasia)   • Right upper quadrant pain   • Calculus of gallbladder without cholecystitis   • Elevated LFTs   • Calculus of bile duct with acute cholecystitis without obstruction   • Atrial fibrillation (CMS/HCC)   • Dehydration   • Essential hypertension   • Abdominal pain   • Nausea without vomiting     Past Medical History:   Diagnosis Date   • Anxiety    • Back pain    • Bladder cancer (CMS/HCC)    • Coronary artery disease    • Depression    • Dizziness    • Fatigue    • GERD (gastroesophageal reflux disease)    • History of fractured rib     RIGHT SIDE   • Hyperlipidemia    • Hypertension    • Tremors of nervous system    • Urinary incontinence    • Vertigo      Past Surgical History:   Procedure Laterality Date   • CARDIAC CATHETERIZATION      7x, 5 stents   • CATARACT EXTRACTION, BILATERAL     • CHOLECYSTECTOMY N/A 2019    Procedure: CHOLECYSTECTOMY LAPAROSCOPIC WITH INTRAOPERATIVE CHOLANGIOGRAM;  Surgeon: Bg Rodriguez Jr., MD;  Location: [facility];  Service: General   • COLONOSCOPY     • CORONARY ANGIOPLASTY  WITH STENT PLACEMENT      X5    • CYSTOSCOPY BLADDER BIOPSY N/A 1/8/2019    Procedure: CYSTOSCOPY BLADDER BIOPSY;  Surgeon: Wang Soares Jr., MD;  Location: [facility] MAIN OR;  Service: Urology   • CYSTOSCOPY BLADDER BIOPSY N/A 6/13/2019    Procedure: CYSTOSCOPY BLADDER BIOPSY;  Surgeon: Wang Soares Jr., MD;  Location: [facility] MAIN OR;  Service: Urology   • CYSTOSCOPY TRANSURETHRAL RESECTION OF PROSTATE N/A 7/11/2019    Procedure: CYSTOSCOPY TRANSURETHRAL RESECTION OF PROSTATE;  Surgeon: Wang Soares Jr., MD;  Location: [facility] MAIN OR;  Service: Urology   • CYSTOSCOPY URETEROSCOPY LASER LITHOTRIPSY N/A 6/13/2019    Procedure: CYSTO LITHOPAXY;  Surgeon: Wang Soares Jr., MD;  Location: [facility] MAIN OR;  Service: Urology   • ERCP N/A 9/3/2019    Procedure: ENDOSCOPIC RETROGRADE CHOLANGIOPANCREATOGRAPHY with sphincterotomy and balloon sweep;  Surgeon: Ashok Amaya MD;  Location: [facility] ENDOSCOPY;  Service: Gastroenterology   • EYE SURGERY      Lens implants   • LUMBAR DISCECTOMY FUSION INSTRUMENTATION     • SKIN CANCER EXCISION Left     chest wall   • TRANSURETHRAL RESECTION OF BLADDER TUMOR N/A 11/29/2018    Procedure: TUR BLADDER TUMOR  LARGE;  Surgeon: Wang Soares Jr., MD;  Location: [facility] OR;  Service: Urology     General Information     Row Name 09/14/19 6474          PT Evaluation Time/Intention    Document Type  therapy note (daily note)  -RS     Mode of Treatment  physical therapy  -RS     Row Name 09/14/19 2543          General Information    Patient Profile Reviewed?  yes  -RS       User Key  (r) = Recorded By, (t) = Taken By, (c) = Cosigned By    Initials Name Provider Type    RS Fauzia Wasserman, PT Physical Therapist        Mobility     Row Name 09/14/19 4637          Bed Mobility Assessment/Treatment    Supine-Sit Bow[place] (Bed Mobility)  independent  -RS     Row Name 09/14/19 1750          Sit-Stand Transfer    Sit-Stand Bowie (Transfers)   supervision  -RS     Row Name 09/14/19 1557          Gait/Stairs Assessment/Training    [place] Level (Gait)  contact guard;supervision  -RS     Assistive Device (Gait)  walker, front-wheeled  -RS     Distance in Feet (Gait)  400 ft  -RS     Comment (Gait/Stairs)  The pt ambulates 300 ft with CGA-supervision when using RW and ambulates 100 feet without AD and CGA. Noted mild gait unsteadiness specifically after turning.  -RS       User Key  (r) = Recorded By, (t) = Taken By, (c) = Cosigned By    Initials Name Provider Type    RS Fauzia Wasserman, PT Physical Therapist        Obj/Interventions    No documentation.       Goals/Plan    No documentation.       Clinical Impression     Row Name 09/14/19 1601          Pain Scale: Numbers Pre/Post-Treatment    Pain Scale: Numbers, Pretreatment  0/10 - no pain  -RS     Row Name 09/14/19 1601          Plan of Care Review    Plan of Care Reviewed With  patient;spouse  -RS     Row Name 09/14/19 1601          Physical Therapy Clinical Impression    Rehab Potential (PT Clinical Summary)  good, to achieve stated therapy goals  -RS     Row Name 09/14/19 1601          Positioning and Restraints    Pre-Treatment Position  in bed  -RS     Post Treatment Position  bed  -RS     In Bed  with family/caregiver;supine;call light within reach  -RS       User Key  (r) = Recorded By, (t) = Taken By, (c) = Cosigned By    Initials Name Provider Type    Fauzia Velarde, PT Physical Therapist        Outcome Measures    No documentation.       Physical Therapy Education     Title: PT OT SLP Therapies (Done)     Topic: Physical Therapy (Done)     Point: Mobility training (Done)     Learning Progress Summary           Patient Acceptance, E, NR,VU by ELIAS at 9/14/2019  4:02 PM    Acceptance, E,D, DU by  at 9/12/2019 11:15 AM   Family Acceptance, E, NR,VU by ELIAS at 9/14/2019  4:02 PM                   Point: Home exercise program (Done)     Learning Progress Summary           Patient  Acceptance, E, NR,VU by RS at 9/14/2019  4:02 PM    Acceptance, E,D, DU by  at 9/12/2019 11:15 AM   Family Acceptance, E, NR,VU by RS at 9/14/2019  4:02 PM                   Point: Body mechanics (Done)     Learning Progress Summary           Patient Acceptance, E, NR,VU by RS at 9/14/2019  4:02 PM    Acceptance, E,D, DU by PC at 9/12/2019 11:15 AM   Family Acceptance, E, NR,VU by RS at 9/14/2019  4:02 PM                   Point: Precautions (Done)     Learning Progress Summary           Patient Acceptance, E, NR,VU by RS at 9/14/2019  4:02 PM    Acceptance, E,D, DU by  at 9/12/2019 11:15 AM   Family Acceptance, E, NR,VU by  at 9/14/2019  4:02 PM                               User Key     Initials Effective Dates Name Provider Type Discipline     04/03/18 -  Elidia Noe PT Physical Therapist PT     04/03/19 -  Fauzia Wasserman PT Physical Therapist PT              PT Recommendation and Plan     Plan of Care Reviewed With: patient  Progress: improving  Outcome Summary: Mr. Zayas ambulates with Front wheeled walker with supervision-  feet and without AD with CGA, specifically during turning, 100 feet. Pt reports has 7 LAMIN, plan to trial during next PT session. Held further functional mobility secondary to pt request with family present.     Time Calculation:   PT Charges     Row Name 09/14/19 1604             Time Calculation    Start Time  1540  -RS      Stop Time  1555  -RS      Time Calculation (min)  15 min  -RS        User Key  (r) = Recorded By, (t) = Taken By, (c) = Cosigned By    Initials Name Provider Type     Fauzia Wasserman, PT Physical Therapist        Therapy Charges for Today     Code Description Service Date Service Provider Modifiers Qty    17094914691 HC PT THER PROC EA 15 MIN 9/14/2019 Fauzia Wasserman, PT GP 2          PT G-Codes  Outcome Measure Options: AM-PAC 6 Clicks Basic Mobility (PT)  AM-PAC 6 Clicks Score (PT): 21    Fauzia Wasserman PT  9/14/2019

## 2019-09-14 NOTE — PROGRESS NOTES
"   LOS: 1 day   Patient Care Team:  Rachelle Patton PA-C as PCP - General (Physician Assistant)  Christie Pacheco APRN as PCP - Claims Attributed    Chief Complaint: tired    Subjective     Feels okay today. Very tired all the time. Still not eating as well as his wife would like. No N/V/D.         Subjective:  Symptoms:  Stable.  He reports malaise and weakness.  No shortness of breath, cough, chest pain, headache, chest pressure, anorexia, diarrhea or anxiety.    Diet:  Adequate intake.  No nausea or vomiting.    Activity level: Impaired due to weakness.    Pain:  He reports no pain.        History taken from: patient chart family RN    Objective     Vital Signs  Temp:  [96.4 °F (35.8 °C)-98.4 °F (36.9 °C)] 98.4 °F (36.9 °C)  Heart Rate:  [65-75] 68  Resp:  [18] 18  BP: (103-131)/(66-85) 103/68    Objective:  General Appearance:  Comfortable and in no acute distress.    Vital signs: (most recent): Blood pressure 103/68, pulse 68, temperature 98.4 °F (36.9 °C), temperature source Oral, resp. rate 18, height 182.9 cm (72\"), weight 83.1 kg (183 lb 1.6 oz), SpO2 96 %.  Vital signs are normal.  No fever.    Output: Producing urine and producing stool.    HEENT: Normal HEENT exam.    Lungs:  Normal effort and normal respiratory rate.  Breath sounds clear to auscultation.    Heart: Normal rate.  Regular rhythm.    Abdomen: Abdomen is soft.  Bowel sounds are normal.   There is no abdominal tenderness.     Extremities: There is no dependent edema.    Pulses: Distal pulses are intact.    Neurological: Patient is alert and oriented to person, place and time.  Normal strength.  Patient has normal muscle tone.    Pupils:  Pupils are equal, round, and reactive to light.    Skin:  Warm and dry.  (Very, very mild jaundice)            Results Review:     I reviewed the patient's new clinical results.  I reviewed the patient's new imaging results and agree with the interpretation.  I reviewed the patient's other test results " and agree with the interpretation  I personally viewed and interpreted the patient's EKG/Telemetry data  Discussed with pt, wife, brendon, and RN    Medication Review: reviewed    Assessment/Plan       Dehydration    Hyperlipidemia    Coronary artery disease involving native coronary artery of native heart without angina pectoris    GERD (gastroesophageal reflux disease)    Chest pain    Diastolic dysfunction    Generalized weakness    Dyspnea on exertion    Elevated LFTs    Essential hypertension    Abdominal pain    Nausea without vomiting          Plan:   (Dehydration/HypoK+  - K better. Mg++ normal.   - advanced diet to goal of GI soft after MRCP was completed.      Elevated LFT's/ recent cholecystectomy and ERCP/ abdominal pain/nausea/ juandice  - LFT's stable today, lipase normalized  - CT abdomen showed dense collection of fluid   - MRCP unremarkable  - Gen Surgery eval noted, fluid collection unlikely to be causing LFT elevation  - holding Lipitor until LFT's normalize  - PRN Zofran     Generalized weakness  - PT following  - currently recommending home with [facility]     Coronary artery disease/Dyspnea on exertion/chest pain  -Troponin negative. Reports pain as chronic. He did have an episode of afib last admission, but converted and no OAC recommended at that time.   -Continue home Plavix and aspirin     GERD  - Protonix 40mg      Essential hypertension   - stopped Norvasc recently due to hypotension and encouraged to remain off after discharge      Abnormal UA  - history of colonization with ESBL E.coli and is currently asymptomatic with no indication to treat     VTE prophylaxis: SCDs  CODE status: full code).       Wang Roa MD  09/14/19  4:31 PM    Time: 30min

## 2019-09-14 NOTE — PLAN OF CARE
Problem: Patient Care Overview  Goal: Plan of Care Review  Outcome: Ongoing (interventions implemented as appropriate)   09/14/19 1602 09/14/19 1757   Coping/Psychosocial   Plan of Care Reviewed With patient --    Plan of Care Review   Progress --  improving   OTHER   Outcome Summary --  VSS; no complaints of pain or discomfort; PT working with patient; monitor labs and vitals       Problem: Skin Injury Risk (Adult)  Goal: Skin Health and Integrity  Outcome: Ongoing (interventions implemented as appropriate)      Problem: Nutrition, Imbalanced: Inadequate Oral Intake (Adult)  Goal: Improved Oral Intake  Outcome: Ongoing (interventions implemented as appropriate)    Goal: Prevent Further Weight Loss  Outcome: Ongoing (interventions implemented as appropriate)

## 2019-09-14 NOTE — PROGRESS NOTES
[facility]  Inpatient Progress Note    Reason for Follow Up: Abnormal liver test    Subjective     Interval History:   Still with jaundice, I reviewed Dr. Blackwell note    Current Facility-Administered Medications:   •  acetaminophen (TYLENOL) tablet 650 mg, 650 mg, Oral, Q4H PRN **OR** acetaminophen (TYLENOL) 160 MG/5ML solution 650 mg, 650 mg, Oral, Q4H PRN **OR** acetaminophen (TYLENOL) suppository 650 mg, 650 mg, Rectal, Q4H PRN, Marlena Velazquez, APRN  •  aspirin chewable tablet 81 mg, 81 mg, Oral, Daily, Gita Roach, APRN, 81 mg at 09/13/19 2136  •  clopidogrel (PLAVIX) tablet 75 mg, 75 mg, Oral, Daily, Gita Roach, APRN, 75 mg at 09/13/19 2136  •  FLUoxetine (PROzac) capsule 20 mg, 20 mg, Oral, Daily, Gita Roach, APRN  •  FLUoxetine (PROzac) capsule 40 mg, 40 mg, Oral, QAM, Gita Roach, APRN, 40 mg at 09/13/19 0631  •  HYDROcodone-acetaminophen (NORCO) 5-325 MG per tablet 1 tablet, 1 tablet, Oral, Q4H PRN, Gita Roach, APRN  •  nitroglycerin (NITROSTAT) SL tablet 0.4 mg, 0.4 mg, Sublingual, Q5 Min PRN, Marlena Velazquez, APRN  •  nitroglycerin (NITROSTAT) SL tablet 0.4 mg, 0.4 mg, Sublingual, PRN, Gita Roach, APRN  •  ondansetron (ZOFRAN) injection 4 mg, 4 mg, Intravenous, Q6H PRN, Marlena Velazquez, APRN  •  ondansetron (ZOFRAN) tablet 4 mg, 4 mg, Oral, Q8H PRN, Gita Roach, APRN  •  pantoprazole (PROTONIX) EC tablet 40 mg, 40 mg, Oral, QAM, Gita Roach, APRN, 40 mg at 09/14/19 0940  •  potassium chloride (MICRO-K) CR capsule 40 mEq, 40 mEq, Oral, PRN, 40 mEq at 09/12/19 0648 **OR** potassium chloride (KLOR-CON) packet 40 mEq, 40 mEq, Oral, PRN **OR** potassium chloride 10 mEq in 100 mL IVPB, 10 mEq, Intravenous, Q1H PRN, Marlena Velazquez APRN  •  [COMPLETED] Insert peripheral IV, , , Once **AND** sodium chloride 0.9 % flush 10 mL, 10 mL, Intravenous, PRN, Hilton Bernstein MD  •  sodium chloride 0.9 % flush 10 mL, 10 mL, Intravenous, Q12H, English,  ANIL [place], 10 mL at 09/14/19 0940  •  sodium chloride 0.9 % flush 10 mL, 10 mL, Intravenous, PRN, , ANIL [place]  Review of Systems:    He has weakness fatigue all other systems reviewed and negative    Objective     Vital Signs  Temp:  [96.4 °F (35.8 °C)-98.4 °F (36.9 °C)] 98.4 °F (36.9 °C)  Heart Rate:  [65-75] 68  Resp:  [18] 18  BP: (103-131)/(66-85) 103/68  Body mass index is 24.83 kg/m².    Intake/Output Summary (Last 24 hours) at 9/14/2019 1355  Last data filed at 9/14/2019 0910  Gross per 24 hour   Intake 997.12 ml   Output 460 ml   Net 537.12 ml     I/O this shift:  In: 240 [P.O.:240]  Out: -      Physical Exam:   General: patient awake, alert and cooperative   Eyes: Normal lids and lashes, no scleral icterus   Neck: supple, normal ROM   Skin: warm and dry, not jaundiced   Cardiovascular: regular rhythm and rate, no murmurs auscultated   Pulm: clear to auscultation bilaterally, regular and unlabored   Abdomen: soft, nontender, nondistended; normal bowel sounds   Extremities: no rash or edema   Psychiatric: Normal mood and behavior; memory intact     Results Review:     I reviewed the patient's new clinical results.    Results from last 7 days   Lab Units 09/13/19  0424 09/12/19  0408 09/11/19  1835   WBC 10*3/mm3 7.90 8.75 7.36   HEMOGLOBIN g/dL 11.6* 10.7* 11.8*   HEMATOCRIT % 35.5* 31.8* 35.4*   PLATELETS 10*3/mm3 221 194 207     Results from last 7 days   Lab Units 09/14/19  0359 09/13/19  0424 09/12/19  1816   SODIUM mmol/L 134* 135* 138   POTASSIUM mmol/L 3.7 3.9 4.2   CHLORIDE mmol/L 99 101 105   CO2 mmol/L 23.3 23.0 24.1   BUN mg/dL 10 6* 6*   CREATININE mg/dL 0.77 0.72* 0.76   CALCIUM mg/dL 8.6 8.6 8.7   BILIRUBIN mg/dL 4.6* 5.0* 4.7*   ALK PHOS U/L 1,321* 1,350* 1,385*   ALT (SGPT) U/L 185* 205* 234*   AST (SGOT) U/L 292* 269* 332*   GLUCOSE mg/dL 80 82 105*     Results from last 7 days   Lab Units 09/13/19  0424 09/11/19  1835   INR  1.12* 1.12*     Lab Results   Lab Value Date/Time     LIPASE 31 09/13/2019 0424    LIPASE 61 (H) 09/11/2019 1835    LIPASE 14 09/04/2019 0700    LIPASE 27 08/30/2019 2147    LIPASE 33 08/30/2019 1707       Radiology:  MRI abdomen w wo contrast mrcp   Final Result      CT Abdomen Pelvis Without Contrast   Final Result   1.  Pancreas has an unremarkable noncontrast CT appearance. Please note   that early pancreatitis can be occult on imaging and correlation with   serum lipase levels recommended.   2.  Postsurgical changes from recent cholecystectomy with a mixed hypo   and hyperdense collection within the gallbladder fossa likely   representing mixture of postoperative seroma and hemorrhage. The   sterility of this collection cannot be determined based on imaging and   correlation with patient history is recommended to exclude developing   abscess.   3.  Small amount of ascites.   4.  Other findings as above.       This report was finalized on 9/12/2019 9:30 PM by Dr. Kulwinder Odom M.D.              Assessment/Plan     Patient Active Problem List   Diagnosis   • Hyperlipidemia   • Coronary artery disease involving native coronary artery of native heart without angina pectoris   • Cognitive disorder   • Mood disorder (CMS/HCC)   • Closed wedge compression fracture of third thoracic vertebra with routine healing   • GERD (gastroesophageal reflux disease)   • S/P drug eluting coronary stent placement   • Chest pain   • Diastolic dysfunction   • Exertional angina (CMS/HCC)   • Unstable angina (CMS/HCC)   • Bladder mass   • Mixed action and resting tremor   • Bladder cancer (CMS/HCC)   • Acute cystitis without hematuria   • Generalized weakness   • Dyspnea on exertion   • BPH (benign prostatic hyperplasia)   • Right upper quadrant pain   • Calculus of gallbladder without cholecystitis   • Elevated LFTs   • Calculus of bile duct with acute cholecystitis without obstruction   • Atrial fibrillation (CMS/HCC)   • Dehydration   • Essential hypertension   • Abdominal pain   •  Nausea without vomiting     Study Result     MRI ABDOMEN W WO CONTRAST MRCP-     CLINICAL: Recent cholecystectomy with a complex collection demonstrated  along the gallbladder fossa. Previous common duct stones with ERCP and  balloon catheter extraction. Currently obstructive jaundice.     TECHNIQUE: Multiplanar and multiecho images acquired prior to and  following intravenous contrast. MRCP images also obtained.     FINDINGS: There is a complex mixed signal collection demonstrated along  the gallbladder fossa, the size is similar to the previous CT abdomen.  There is a suggestion of perhaps a minimal amount of enhancing tissue  along its periphery. It is indeterminant.     Caliber of the CBD and CHD are normal. No intraluminal filling defect to  suggest retained calculus. No intrahepatic biliary duct dilatation. The  cystic duct is appropriately truncated at the site of resection.     Liver size and signal within normal limits. The kidneys have a  satisfactory appearance. No pancreatic abnormality is seen.     CONCLUSION:  1. Indeterminate complex collection demonstrated along the gallbladder  fossa. Size is similar to prior CT.  2. No extrahepatic or intrahepatic biliary duct dilatation, CBD tapers  in a satisfactory fashion without intraluminal filling defect. Diameter  of the CBD and CHD within normal limits.     Assessment:  1. Abdominal pain  2. Abnormal liver tests  3. Recent cholecystectomy with choledocholithiasis requiring ERCP and stone removal  4. Dehydration     Plan:  · CAT scan without definitive source for elevated liver tests but certainly there could be something brewing in the postsurgical area, MRCP rules out ductal dilation, retained stone, obstructive process  · No indication for repeat ERCP at this time  · Full serological work-up for abnormal liver tests ordered and pending  · Advance diet per primary team  · I wonder if the collection along the gallbladder fossa is causing the elevated liver  tests, I would like the general surgeon to comment on that  · Numbers seem to have plateaued and our coming down but if they continue to rise I am going to recommend a liver biopsy      I discussed the patients findings and my recommendations with patient, family and nursing staff.    Aubrey Villasenor MD

## 2019-09-15 LAB
ALBUMIN SERPL-MCNC: 2.7 G/DL (ref 3.5–5.2)
ALBUMIN/GLOB SERPL: 0.9 G/DL
ALP SERPL-CCNC: 1349 U/L (ref 39–117)
ALT SERPL W P-5'-P-CCNC: 188 U/L (ref 1–41)
ANION GAP SERPL CALCULATED.3IONS-SCNC: 11.1 MMOL/L (ref 5–15)
AST SERPL-CCNC: 309 U/L (ref 1–40)
BILIRUB SERPL-MCNC: 3.4 MG/DL (ref 0.2–1.2)
BUN BLD-MCNC: 12 MG/DL (ref 8–23)
BUN/CREAT SERPL: 13.3 (ref 7–25)
CALCIUM SPEC-SCNC: 8.4 MG/DL (ref 8.6–10.5)
CHLORIDE SERPL-SCNC: 101 MMOL/L (ref 98–107)
CO2 SERPL-SCNC: 23.9 MMOL/L (ref 22–29)
CREAT BLD-MCNC: 0.9 MG/DL (ref 0.76–1.27)
DEPRECATED RDW RBC AUTO: 53.1 FL (ref 37–54)
ERYTHROCYTE [DISTWIDTH] IN BLOOD BY AUTOMATED COUNT: 15.7 % (ref 12.3–15.4)
GFR SERPL CREATININE-BSD FRML MDRD: 82 ML/MIN/1.73
GLOBULIN UR ELPH-MCNC: 3 GM/DL
GLUCOSE BLD-MCNC: 100 MG/DL (ref 65–99)
HCT VFR BLD AUTO: 33.2 % (ref 37.5–51)
HGB BLD-MCNC: 10.9 G/DL (ref 13–17.7)
MCH RBC QN AUTO: 30.8 PG (ref 26.6–33)
MCHC RBC AUTO-ENTMCNC: 32.8 G/DL (ref 31.5–35.7)
MCV RBC AUTO: 93.8 FL (ref 79–97)
PLATELET # BLD AUTO: 233 10*3/MM3 (ref 140–450)
PMV BLD AUTO: 11.4 FL (ref 6–12)
POTASSIUM BLD-SCNC: 3.6 MMOL/L (ref 3.5–5.2)
PROT SERPL-MCNC: 5.7 G/DL (ref 6–8.5)
RBC # BLD AUTO: 3.54 10*6/MM3 (ref 4.14–5.8)
SODIUM BLD-SCNC: 136 MMOL/L (ref 136–145)
WBC NRBC COR # BLD: 7.45 10*3/MM3 (ref 3.4–10.8)

## 2019-09-15 PROCEDURE — 85027 COMPLETE CBC AUTOMATED: CPT | Performed by: HOSPITALIST

## 2019-09-15 PROCEDURE — 99232 SBSQ HOSP IP/OBS MODERATE 35: CPT | Performed by: INTERNAL MEDICINE

## 2019-09-15 PROCEDURE — 80053 COMPREHEN METABOLIC PANEL: CPT | Performed by: NURSE PRACTITIONER

## 2019-09-15 RX ADMIN — CLOPIDOGREL 75 MG: 75 TABLET, FILM COATED ORAL at 21:06

## 2019-09-15 RX ADMIN — SODIUM CHLORIDE, PRESERVATIVE FREE 10 ML: 5 INJECTION INTRAVENOUS at 08:46

## 2019-09-15 RX ADMIN — FLUOXETINE HYDROCHLORIDE 20 MG: 20 CAPSULE ORAL at 21:07

## 2019-09-15 RX ADMIN — SODIUM CHLORIDE, PRESERVATIVE FREE 10 ML: 5 INJECTION INTRAVENOUS at 21:07

## 2019-09-15 RX ADMIN — PANTOPRAZOLE SODIUM 40 MG: 40 TABLET, DELAYED RELEASE ORAL at 06:09

## 2019-09-15 RX ADMIN — ASPIRIN 81 MG: 81 TABLET, CHEWABLE ORAL at 21:06

## 2019-09-15 RX ADMIN — FLUOXETINE HYDROCHLORIDE 40 MG: 20 CAPSULE ORAL at 21:06

## 2019-09-15 NOTE — PLAN OF CARE
Problem: Patient Care Overview  Goal: Plan of Care Review  Outcome: Ongoing (interventions implemented as appropriate)   09/15/19 0224   Coping/Psychosocial   Plan of Care Reviewed With patient   Plan of Care Review   Progress improving   OTHER   Outcome Summary Pt VSS, no c/o pain or nausea. Jaundice is resolving. Pt ambulating independent around nursing station. MRCP shows fluid colleciton in gallbladder fossa, no surgical intervention per MD. Will continue to encourage PO intake. Will continue to monitor.      Goal: Individualization and Mutuality  Outcome: Ongoing (interventions implemented as appropriate)    Goal: Discharge Needs Assessment  Outcome: Ongoing (interventions implemented as appropriate)    Goal: Interprofessional Rounds/Family Conf  Outcome: Ongoing (interventions implemented as appropriate)      Problem: Skin Injury Risk (Adult)  Goal: Skin Health and Integrity  Outcome: Ongoing (interventions implemented as appropriate)      Problem: Nutrition, Imbalanced: Inadequate Oral Intake (Adult)  Goal: Improved Oral Intake  Outcome: Ongoing (interventions implemented as appropriate)    Goal: Prevent Further Weight Loss  Outcome: Ongoing (interventions implemented as appropriate)

## 2019-09-15 NOTE — PROGRESS NOTES
"   LOS: 2 days   Patient Care Team:  Rachelle Patton PA-C as PCP - General (Physician Assistant)  Christie Pacheco APRN as PCP - Claims Attributed    Chief Complaint: none today    Subjective     Feels okay today. Very tired all the time. Still not eating as well as his wife would like. No N/V/D.         Subjective:  Symptoms:  Stable.  He reports malaise and weakness.  No shortness of breath, cough, chest pain, headache, chest pressure, anorexia, diarrhea or anxiety.    Diet:  Adequate intake.  No nausea or vomiting.    Activity level: Impaired due to weakness.    Pain:  He reports no pain.        History taken from: patient chart family RN    Objective     Vital Signs  Temp:  [96.1 °F (35.6 °C)-99.3 °F (37.4 °C)] 96.1 °F (35.6 °C)  Heart Rate:  [69-73] 70  Resp:  [18] 18  BP: (123-139)/(76-89) 127/80    Objective:  General Appearance:  Comfortable and in no acute distress.    Vital signs: (most recent): Blood pressure 127/80, pulse 70, temperature 96.1 °F (35.6 °C), temperature source Oral, resp. rate 18, height 182.9 cm (72\"), weight 83.1 kg (183 lb 1.6 oz), SpO2 96 %.  Vital signs are normal.  No fever.    Output: Producing urine and producing stool.    HEENT: Normal HEENT exam.    Lungs:  Normal effort and normal respiratory rate.  Breath sounds clear to auscultation.    Heart: Normal rate.  Regular rhythm.    Abdomen: Abdomen is soft.  Bowel sounds are normal.   There is no abdominal tenderness.     Extremities: There is no dependent edema.    Pulses: Distal pulses are intact.    Neurological: Patient is alert and oriented to person, place and time.  Normal strength.  Patient has normal muscle tone.    Pupils:  Pupils are equal, round, and reactive to light.    Skin:  Warm and dry.  (Very, very mild jaundice)            Results Review:     I reviewed the patient's new clinical results.  I reviewed the patient's other test results and agree with the interpretation  I personally viewed and interpreted the " patient's EKG/Telemetry data  Discussed with pt and wife    Medication Review: reviewed    Assessment/Plan       Dehydration    Hyperlipidemia    Coronary artery disease involving native coronary artery of native heart without angina pectoris    GERD (gastroesophageal reflux disease)    Chest pain    Diastolic dysfunction    Generalized weakness    Dyspnea on exertion    Elevated LFTs    Essential hypertension    Abdominal pain    Nausea without vomiting          Plan:   (Dehydration/HypoK+  - K better. Mg++ normal.   - advanced diet to goal of GI soft after MRCP was completed.      Elevated LFT's/ recent cholecystectomy and ERCP/ abdominal pain/nausea/ juandice  - LFT's stable again today, lipase normalized  - CT abdomen showed dense collection of fluid   - MRCP unremarkable  - Gen Surgery eval noted, fluid collection unlikely to be causing LFT elevation  - holding Lipitor until LFT's normalize  - PRN Zofran     Generalized weakness  - PT following  - currently recommending home with [facility]     Coronary artery disease/Dyspnea on exertion/chest pain  -Troponin negative. Reports pain as chronic. He did have an episode of afib last admission, but converted and no OAC recommended at that time.   -Continue home Plavix and aspirin     GERD  - Protonix 40mg      Essential hypertension   - stopped Norvasc recently due to hypotension and encouraged to remain off after discharge   - BPs okay      Abnormal UA  - history of colonization with ESBL E.coli and is currently asymptomatic with no indication to treat     VTE prophylaxis: SCDs  CODE status: full code).       Wang Roa MD  09/15/19  3:21 PM    Time: 20min

## 2019-09-15 NOTE — PLAN OF CARE
Problem: Patient Care Overview  Goal: Plan of Care Review  Outcome: Ongoing (interventions implemented as appropriate)   09/15/19 1443 09/15/19 7594   Coping/Psychosocial   Plan of Care Reviewed With patient --    Plan of Care Review   Progress --  improving   OTHER   Outcome Summary --  VSS; no complaints of pain or discomfort; pt has been walking with spouse in the hallway and room; pt is independent; monitor vitals and labs; continue care       Problem: Skin Injury Risk (Adult)  Goal: Skin Health and Integrity  Outcome: Ongoing (interventions implemented as appropriate)      Problem: Nutrition, Imbalanced: Inadequate Oral Intake (Adult)  Goal: Improved Oral Intake  Outcome: Ongoing (interventions implemented as appropriate)    Goal: Prevent Further Weight Loss  Outcome: Ongoing (interventions implemented as appropriate)

## 2019-09-15 NOTE — PROGRESS NOTES
[facility]  Inpatient Progress Note    Reason for Follow Up: Elevated LFTS    Subjective     Interval History:   Still with jaundice, does feel better today    Current Facility-Administered Medications:   •  acetaminophen (TYLENOL) tablet 650 mg, 650 mg, Oral, Q4H PRN **OR** acetaminophen (TYLENOL) 160 MG/5ML solution 650 mg, 650 mg, Oral, Q4H PRN **OR** acetaminophen (TYLENOL) suppository 650 mg, 650 mg, Rectal, Q4H PRN, Marlena Velazquez, APRN  •  aspirin chewable tablet 81 mg, 81 mg, Oral, Daily, Gita Rocah, APRN, 81 mg at 09/14/19 2106  •  clopidogrel (PLAVIX) tablet 75 mg, 75 mg, Oral, Daily, Gita Roach, APRN, 75 mg at 09/14/19 2106  •  FLUoxetine (PROzac) capsule 20 mg, 20 mg, Oral, Daily, Gita Roach, APRN, 20 mg at 09/14/19 2105  •  FLUoxetine (PROzac) capsule 40 mg, 40 mg, Oral, QAM, Gita Roach, APRN, 40 mg at 09/14/19 2106  •  HYDROcodone-acetaminophen (NORCO) 5-325 MG per tablet 1 tablet, 1 tablet, Oral, Q4H PRN, Gita Roach, APRN  •  nitroglycerin (NITROSTAT) SL tablet 0.4 mg, 0.4 mg, Sublingual, Q5 Min PRN, Marlena Velazquez, APRN  •  nitroglycerin (NITROSTAT) SL tablet 0.4 mg, 0.4 mg, Sublingual, PRN, Gita Roach, APRN  •  ondansetron (ZOFRAN) injection 4 mg, 4 mg, Intravenous, Q6H PRN, Marlena Velazquez, APRN  •  ondansetron (ZOFRAN) tablet 4 mg, 4 mg, Oral, Q8H PRN, Gita Roach, APRN  •  pantoprazole (PROTONIX) EC tablet 40 mg, 40 mg, Oral, QAM, Gita Roach, APRN, 40 mg at 09/15/19 0609  •  potassium chloride (MICRO-K) CR capsule 40 mEq, 40 mEq, Oral, PRN, 40 mEq at 09/12/19 0648 **OR** potassium chloride (KLOR-CON) packet 40 mEq, 40 mEq, Oral, PRN **OR** potassium chloride 10 mEq in 100 mL IVPB, 10 mEq, Intravenous, Q1H PRN, Marlena Velazquez, ANIL  •  [COMPLETED] Insert peripheral IV, , , Once **AND** sodium chloride 0.9 % flush 10 mL, 10 mL, Intravenous, PRN, Hilton Bernstein MD  •  sodium chloride 0.9 % flush 10 mL, 10 mL, Intravenous,  Q12H, Marlena Velazquez APRN, 10 mL at 09/15/19 0846  •  sodium chloride 0.9 % flush 10 mL, 10 mL, Intravenous, PRN, Marlena Velazquez APRN  Review of Systems:    He has weakness fatigue all other systems reviewed and negative    Objective     Vital Signs  Temp:  [96.1 °F (35.6 °C)-99.3 °F (37.4 °C)] 96.1 °F (35.6 °C)  Heart Rate:  [69-73] 70  Resp:  [18] 18  BP: (123-139)/(76-89) 127/80  Body mass index is 24.83 kg/m².    Intake/Output Summary (Last 24 hours) at 9/15/2019 1338  Last data filed at 9/15/2019 1315  Gross per 24 hour   Intake 840 ml   Output --   Net 840 ml     I/O this shift:  In: 600 [P.O.:600]  Out: -      Physical Exam:   General: patient awake, alert and cooperative   Eyes: Normal lids and lashes, no scleral icterus   Neck: supple, normal ROM   Skin: warm and dry, not jaundiced   Cardiovascular: regular rhythm and rate, no murmurs auscultated   Pulm: clear to auscultation bilaterally, regular and unlabored   Abdomen: soft, nontender, nondistended; normal bowel sounds   Extremities: no rash or edema   Psychiatric: Normal mood and behavior; memory intact     Results Review:     I reviewed the patient's new clinical results.    Results from last 7 days   Lab Units 09/15/19  0605 09/13/19  0424 09/12/19  0408   WBC 10*3/mm3 7.45 7.90 8.75   HEMOGLOBIN g/dL 10.9* 11.6* 10.7*   HEMATOCRIT % 33.2* 35.5* 31.8*   PLATELETS 10*3/mm3 233 221 194     Results from last 7 days   Lab Units 09/15/19  0605 09/14/19  0359 09/13/19  0424   SODIUM mmol/L 136 134* 135*   POTASSIUM mmol/L 3.6 3.7 3.9   CHLORIDE mmol/L 101 99 101   CO2 mmol/L 23.9 23.3 23.0   BUN mg/dL 12 10 6*   CREATININE mg/dL 0.90 0.77 0.72*   CALCIUM mg/dL 8.4* 8.6 8.6   BILIRUBIN mg/dL 3.4* 4.6* 5.0*   ALK PHOS U/L 1,349* 1,321* 1,350*   ALT (SGPT) U/L 188* 185* 205*   AST (SGOT) U/L 309* 292* 269*   GLUCOSE mg/dL 100* 80 82     Results from last 7 days   Lab Units 09/13/19  0424 09/11/19  1835   INR  1.12* 1.12*     Lab Results   Lab Value  Date/Time    LIPASE 31 09/13/2019 0424    LIPASE 61 (H) 09/11/2019 1835    LIPASE 14 09/04/2019 0700    LIPASE 27 08/30/2019 2147    LIPASE 33 08/30/2019 1707       Radiology:  MRI abdomen w wo contrast mrcp   Final Result      CT Abdomen Pelvis Without Contrast   Final Result   1.  Pancreas has an unremarkable noncontrast CT appearance. Please note   that early pancreatitis can be occult on imaging and correlation with   serum lipase levels recommended.   2.  Postsurgical changes from recent cholecystectomy with a mixed hypo   and hyperdense collection within the gallbladder fossa likely   representing mixture of postoperative seroma and hemorrhage. The   sterility of this collection cannot be determined based on imaging and   correlation with patient history is recommended to exclude developing   abscess.   3.  Small amount of ascites.   4.  Other findings as above.       This report was finalized on 9/12/2019 9:30 PM by Dr. Kulwinder Odom M.D.              Assessment/Plan     Patient Active Problem List   Diagnosis   • Hyperlipidemia   • Coronary artery disease involving native coronary artery of native heart without angina pectoris   • Cognitive disorder   • Mood disorder (CMS/HCC)   • Closed wedge compression fracture of third thoracic vertebra with routine healing   • GERD (gastroesophageal reflux disease)   • S/P drug eluting coronary stent placement   • Chest pain   • Diastolic dysfunction   • Exertional angina (CMS/HCC)   • Unstable angina (CMS/HCC)   • Bladder mass   • Mixed action and resting tremor   • Bladder cancer (CMS/HCC)   • Acute cystitis without hematuria   • Generalized weakness   • Dyspnea on exertion   • BPH (benign prostatic hyperplasia)   • Right upper quadrant pain   • Calculus of gallbladder without cholecystitis   • Elevated LFTs   • Calculus of bile duct with acute cholecystitis without obstruction   • Atrial fibrillation (CMS/HCC)   • Dehydration   • Essential hypertension   •  Abdominal pain   • Nausea without vomiting        Assessment:  1. Abdominal pain  2. Abnormal liver tests  3. Recent cholecystectomy with choledocholithiasis requiring ERCP and stone removal  4. Dehydration     Plan:  · CAT scan without definitive source for elevated liver tests but certainly there could be something brewing in the postsurgical area, MRCP rules out ductal dilation, retained stone, obstructive process  · No indication for repeat ERCP at this time  · Full serological work-up for abnormal liver tests ordered and pending (most test back and negative waiting on a couple more)  · Advance diet per primary team  · I wonder if the collection along the gallbladder fossa is causing the elevated liver tests?  January surgery thinks this is unlikely  · Numbers seem to have plateaued and our coming down but if they continue to rise I am going to recommend a liver biopsy    I discussed the patients findings and my recommendations with patient and nursing staff.    Aubrey Villasenor MD

## 2019-09-16 ENCOUNTER — TELEPHONE (OUTPATIENT)
Dept: GASTROENTEROLOGY | Facility: CLINIC | Age: 79
End: 2019-09-16

## 2019-09-16 DIAGNOSIS — R79.89 ABNORMAL LIVER FUNCTION TESTS: Primary | ICD-10-CM

## 2019-09-16 LAB
ALBUMIN SERPL-MCNC: 2.3 G/DL (ref 2.9–4.4)
ALBUMIN SERPL-MCNC: 2.8 G/DL (ref 3.5–5.2)
ALBUMIN/GLOB SERPL: 0.8 {RATIO} (ref 0.7–1.7)
ALBUMIN/GLOB SERPL: 0.9 G/DL
ALP SERPL-CCNC: 1282 U/L (ref 39–117)
ALPHA1 GLOB FLD ELPH-MCNC: 0.4 G/DL (ref 0–0.4)
ALPHA2 GLOB SERPL ELPH-MCNC: 0.7 G/DL (ref 0.4–1)
ALT SERPL W P-5'-P-CCNC: 178 U/L (ref 1–41)
ANION GAP SERPL CALCULATED.3IONS-SCNC: 10 MMOL/L (ref 5–15)
AST SERPL-CCNC: 256 U/L (ref 1–40)
B-GLOBULIN SERPL ELPH-MCNC: 1.1 G/DL (ref 0.7–1.3)
BASOPHILS # BLD AUTO: 0.04 10*3/MM3 (ref 0–0.2)
BASOPHILS NFR BLD AUTO: 0.5 % (ref 0–1.5)
BILIRUB SERPL-MCNC: 3.4 MG/DL (ref 0.2–1.2)
BUN BLD-MCNC: 11 MG/DL (ref 8–23)
BUN/CREAT SERPL: 12.4 (ref 7–25)
CALCIUM SPEC-SCNC: 8.4 MG/DL (ref 8.6–10.5)
CHLORIDE SERPL-SCNC: 103 MMOL/L (ref 98–107)
CO2 SERPL-SCNC: 23 MMOL/L (ref 22–29)
CREAT BLD-MCNC: 0.89 MG/DL (ref 0.76–1.27)
DEPRECATED RDW RBC AUTO: 54.1 FL (ref 37–54)
EOSINOPHIL # BLD AUTO: 0.72 10*3/MM3 (ref 0–0.4)
EOSINOPHIL NFR BLD AUTO: 9.7 % (ref 0.3–6.2)
ERYTHROCYTE [DISTWIDTH] IN BLOOD BY AUTOMATED COUNT: 15.9 % (ref 12.3–15.4)
GAMMA GLOB SERPL ELPH-MCNC: 0.7 G/DL (ref 0.4–1.8)
GFR SERPL CREATININE-BSD FRML MDRD: 83 ML/MIN/1.73
GLOBULIN SER CALC-MCNC: 2.9 G/DL (ref 2.2–3.9)
GLOBULIN UR ELPH-MCNC: 3 GM/DL
GLUCOSE BLD-MCNC: 92 MG/DL (ref 65–99)
HCT VFR BLD AUTO: 33.4 % (ref 37.5–51)
HGB BLD-MCNC: 10.9 G/DL (ref 13–17.7)
IMM GRANULOCYTES # BLD AUTO: 0.04 10*3/MM3 (ref 0–0.05)
IMM GRANULOCYTES NFR BLD AUTO: 0.5 % (ref 0–0.5)
INR PPP: 1.06 (ref 0.9–1.1)
LYMPHOCYTES # BLD AUTO: 1.16 10*3/MM3 (ref 0.7–3.1)
LYMPHOCYTES NFR BLD AUTO: 15.6 % (ref 19.6–45.3)
Lab: ABNORMAL
M-SPIKE: ABNORMAL G/DL
MCH RBC QN AUTO: 30.5 PG (ref 26.6–33)
MCHC RBC AUTO-ENTMCNC: 32.6 G/DL (ref 31.5–35.7)
MCV RBC AUTO: 93.6 FL (ref 79–97)
MONOCYTES # BLD AUTO: 0.65 10*3/MM3 (ref 0.1–0.9)
MONOCYTES NFR BLD AUTO: 8.7 % (ref 5–12)
NEUTROPHILS # BLD AUTO: 4.84 10*3/MM3 (ref 1.7–7)
NEUTROPHILS NFR BLD AUTO: 65 % (ref 42.7–76)
NRBC BLD AUTO-RTO: 0.1 /100 WBC (ref 0–0.2)
PLATELET # BLD AUTO: 239 10*3/MM3 (ref 140–450)
PMV BLD AUTO: 11.3 FL (ref 6–12)
POTASSIUM BLD-SCNC: 3.9 MMOL/L (ref 3.5–5.2)
PROT SERPL-MCNC: 5.2 G/DL (ref 6–8.5)
PROT SERPL-MCNC: 5.8 G/DL (ref 6–8.5)
PROTHROMBIN TIME: 13.5 SECONDS (ref 11.7–14.2)
RBC # BLD AUTO: 3.57 10*6/MM3 (ref 4.14–5.8)
SODIUM BLD-SCNC: 136 MMOL/L (ref 136–145)
WBC NRBC COR # BLD: 7.45 10*3/MM3 (ref 3.4–10.8)

## 2019-09-16 PROCEDURE — 80053 COMPREHEN METABOLIC PANEL: CPT | Performed by: NURSE PRACTITIONER

## 2019-09-16 PROCEDURE — 85025 COMPLETE CBC W/AUTO DIFF WBC: CPT | Performed by: INTERNAL MEDICINE

## 2019-09-16 PROCEDURE — 99232 SBSQ HOSP IP/OBS MODERATE 35: CPT | Performed by: INTERNAL MEDICINE

## 2019-09-16 PROCEDURE — 85610 PROTHROMBIN TIME: CPT | Performed by: INTERNAL MEDICINE

## 2019-09-16 RX ADMIN — FLUOXETINE HYDROCHLORIDE 40 MG: 20 CAPSULE ORAL at 20:36

## 2019-09-16 RX ADMIN — SODIUM CHLORIDE, PRESERVATIVE FREE 10 ML: 5 INJECTION INTRAVENOUS at 20:36

## 2019-09-16 RX ADMIN — PANTOPRAZOLE SODIUM 40 MG: 40 TABLET, DELAYED RELEASE ORAL at 09:42

## 2019-09-16 RX ADMIN — FLUOXETINE HYDROCHLORIDE 20 MG: 20 CAPSULE ORAL at 20:36

## 2019-09-16 RX ADMIN — SODIUM CHLORIDE, PRESERVATIVE FREE 10 ML: 5 INJECTION INTRAVENOUS at 09:43

## 2019-09-16 RX ADMIN — ASPIRIN 81 MG: 81 TABLET, CHEWABLE ORAL at 20:36

## 2019-09-16 NOTE — PROGRESS NOTES
"   LOS: 3 days   Patient Care Team:  Rachelle Patton PA-C as PCP - General (Physician Assistant)  Christie Pacheco APRN as PCP - Claims Attributed    Chief Complaint: none    Subjective     Feels okay today. Very tired all the time. Still not eating as well as his wife would like. No N/V/D.         Subjective:  Symptoms:  Stable.  He reports malaise and weakness.  No shortness of breath, cough, chest pain, headache, chest pressure, anorexia, diarrhea or anxiety.    Diet:  Adequate intake.  No nausea or vomiting.    Activity level: Impaired due to weakness.    Pain:  He reports no pain.        History taken from: patient chart family RN    Objective     Vital Signs  Temp:  [97 °F (36.1 °C)-98.6 °F (37 °C)] 97.2 °F (36.2 °C)  Heart Rate:  [65-71] 65  Resp:  [18] 18  BP: (131-141)/(86-88) 131/87    Objective:  General Appearance:  Comfortable and in no acute distress.    Vital signs: (most recent): Blood pressure 131/87, pulse 65, temperature 97.2 °F (36.2 °C), temperature source Oral, resp. rate 18, height 182.9 cm (72\"), weight 83.1 kg (183 lb 1.6 oz), SpO2 95 %.  Vital signs are normal.  No fever.    Output: Producing urine and producing stool.    HEENT: Normal HEENT exam.    Lungs:  Normal effort and normal respiratory rate.  Breath sounds clear to auscultation.    Heart: Normal rate.  Regular rhythm.    Abdomen: Abdomen is soft.  Bowel sounds are normal.   There is no abdominal tenderness.     Extremities: There is no dependent edema.    Pulses: Distal pulses are intact.    Neurological: Patient is alert and oriented to person, place and time.  Normal strength.  Patient has normal muscle tone.    Pupils:  Pupils are equal, round, and reactive to light.    Skin:  Warm and dry.  (Very, very mild jaundice)            Results Review:     I reviewed the patient's new clinical results.  I reviewed the patient's other test results and agree with the interpretation  Discussed with pt, wife, and RN    Medication " Review: reviewed    Assessment/Plan       Dehydration    Hyperlipidemia    Coronary artery disease involving native coronary artery of native heart without angina pectoris    GERD (gastroesophageal reflux disease)    Chest pain    Diastolic dysfunction    Generalized weakness    Dyspnea on exertion    Elevated LFTs    Essential hypertension    Abdominal pain    Nausea without vomiting          Plan:   (Dehydration/HypoK+  - K better. Mg++ normal.   - advanced diet to goal of GI soft after MRCP was completed.      Elevated LFT's/ recent cholecystectomy and ERCP/ abdominal pain/nausea/ juandice  - LFT's stable again today, lipase normalized  - CT abdomen showed dense collection of fluid   - MRCP unremarkable  - Gen Surgery eval noted, fluid collection unlikely to be causing LFT elevation  - holding Lipitor until LFT's normalize  - PRN Zofran  - pt will need liver bx per GI, needs to be off Plavix for 7 days prior, Dr. Villasenor wants Card to okay holding the Plavix given that pt has 5 coronary artery stents that are barely a year old  - biopsy is scheduled for 9/25, will need to f/u with Jack[facility] on 9/24 to have LFTs repeated (if normalized then can dc plans for biopsy)     Generalized weakness  - PT following  - currently recommending home with [facility]     Coronary artery disease/Dyspnea on exertion/chest pain  -Troponin negative. Reports pain as chronic. He did have an episode of afib last admission, but converted and no OAC recommended at that time.   -Continue home Plavix and aspirin for now, await Card opinion on holding DAPT for liver biopsy     GERD  - Protonix 40mg      Essential hypertension   - stopped Norvasc recently due to hypotension and encouraged to remain off after discharge   - BPs okay      Abnormal UA  - history of colonization with ESBL E.coli and is currently asymptomatic with no indication to treat     VTE prophylaxis: SCDs  CODE status: full code).       Wang Roa MD  09/16/19  4:49 PM    Time:  30min

## 2019-09-16 NOTE — PLAN OF CARE
Problem: Patient Care Overview  Goal: Plan of Care Review  Outcome: Ongoing (interventions implemented as appropriate)   09/16/19 4731   Coping/Psychosocial   Plan of Care Reviewed With patient   Plan of Care Review   Progress improving   OTHER   Outcome Summary PT is independent and is up in room. Pt has no c/o pain or discomfort. Rested well overnight. Cont to monitor, safety maintained.

## 2019-09-16 NOTE — TELEPHONE ENCOUNTER
----- Message from Aubrey Villasenor MD sent at 9/16/2019  4:10 PM EDT -----  Regarding: CMP next Monday, September 23  Patient needs a CMP in our office next Monday.  Please order

## 2019-09-16 NOTE — SIGNIFICANT NOTE
09/16/19 1159   Rehab Treatment   Discipline physical therapist   Reason Treatment Not Performed other (see comments)  (pt independent with all mobility at this time. Pt and wife state that pt has already made 4 laps around the unit today. no further PT needs at this time. Will sign off.)

## 2019-09-16 NOTE — PROGRESS NOTES
[facility]  Inpatient Progress Note    Reason for Follow Up: Abnormal LFTs    Subjective     Interval History:   Full serological work-up is negative, I still have no answer for elevated liver tests and they are not improving    Current Facility-Administered Medications:   •  acetaminophen (TYLENOL) tablet 650 mg, 650 mg, Oral, Q4H PRN **OR** acetaminophen (TYLENOL) 160 MG/5ML solution 650 mg, 650 mg, Oral, Q4H PRN **OR** acetaminophen (TYLENOL) suppository 650 mg, 650 mg, Rectal, Q4H PRN, Marlena Velazquez, APRN  •  aspirin chewable tablet 81 mg, 81 mg, Oral, Daily, Gita Roach, APRN, 81 mg at 09/15/19 2106  •  clopidogrel (PLAVIX) tablet 75 mg, 75 mg, Oral, Daily, Gita Roach, APRN, 75 mg at 09/15/19 2106  •  FLUoxetine (PROzac) capsule 20 mg, 20 mg, Oral, Daily, Gita Roach, APRN, 20 mg at 09/15/19 2107  •  FLUoxetine (PROzac) capsule 40 mg, 40 mg, Oral, QAM, Marley, Gita, APRN, 40 mg at 09/15/19 2106  •  HYDROcodone-acetaminophen (NORCO) 5-325 MG per tablet 1 tablet, 1 tablet, Oral, Q4H PRN, Gita Roach, APRN  •  nitroglycerin (NITROSTAT) SL tablet 0.4 mg, 0.4 mg, Sublingual, Q5 Min PRN, Marlena Velazquez, APRN  •  nitroglycerin (NITROSTAT) SL tablet 0.4 mg, 0.4 mg, Sublingual, PRN, Gita Roach, APRN  •  ondansetron (ZOFRAN) injection 4 mg, 4 mg, Intravenous, Q6H PRN, Marlena Velazquez, ANIL  •  ondansetron (ZOFRAN) tablet 4 mg, 4 mg, Oral, Q8H PRN, Gita Roach, APRN  •  pantoprazole (PROTONIX) EC tablet 40 mg, 40 mg, Oral, QAM, Marley, Gita, APRN, 40 mg at 09/16/19 0942  •  potassium chloride (MICRO-K) CR capsule 40 mEq, 40 mEq, Oral, PRN, 40 mEq at 09/12/19 0648 **OR** potassium chloride (KLOR-CON) packet 40 mEq, 40 mEq, Oral, PRN **OR** potassium chloride 10 mEq in 100 mL IVPB, 10 mEq, Intravenous, Q1H PRN, Marlena Velazquez, ANIL  •  [COMPLETED] Insert peripheral IV, , , Once **AND** sodium chloride 0.9 % flush 10 mL, 10 mL, Intravenous, PRN, Orthober,  Hilton ROGERS MD  •  sodium chloride 0.9 % flush 10 mL, 10 mL, Intravenous, Q12H, Marlena Velazquez APRN, 10 mL at 09/16/19 0943  •  sodium chloride 0.9 % flush 10 mL, 10 mL, Intravenous, PRN, Marlena Velazquez APRN  Review of Systems:    He has jaundice all other systems reviewed and negative    Objective     Vital Signs  Temp:  [97 °F (36.1 °C)-98.6 °F (37 °C)] 97.2 °F (36.2 °C)  Heart Rate:  [65-71] 65  Resp:  [18] 18  BP: (131-141)/(86-88) 131/87  Body mass index is 24.83 kg/m².    Intake/Output Summary (Last 24 hours) at 9/16/2019 1611  Last data filed at 9/16/2019 1306  Gross per 24 hour   Intake 570 ml   Output --   Net 570 ml     I/O this shift:  In: 570 [P.O.:570]  Out: -      Physical Exam:   General: patient awake, alert and cooperative   Eyes: Normal lids and lashes, no scleral icterus   Neck: supple, normal ROM   Skin: warm and dry, not jaundiced   Cardiovascular: regular rhythm and rate, no murmurs auscultated   Pulm: clear to auscultation bilaterally, regular and unlabored   Abdomen: soft, nontender, nondistended; normal bowel sounds   Extremities: no rash or edema   Psychiatric: Normal mood and behavior; memory intact     Results Review:     I reviewed the patient's new clinical results.    Results from last 7 days   Lab Units 09/16/19  0435 09/15/19  0605 09/13/19  0424   WBC 10*3/mm3 7.45 7.45 7.90   HEMOGLOBIN g/dL 10.9* 10.9* 11.6*   HEMATOCRIT % 33.4* 33.2* 35.5*   PLATELETS 10*3/mm3 239 233 221     Results from last 7 days   Lab Units 09/16/19  0435 09/15/19  0605 09/14/19  0359   SODIUM mmol/L 136 136 134*   POTASSIUM mmol/L 3.9 3.6 3.7   CHLORIDE mmol/L 103 101 99   CO2 mmol/L 23.0 23.9 23.3   BUN mg/dL 11 12 10   CREATININE mg/dL 0.89 0.90 0.77   CALCIUM mg/dL 8.4* 8.4* 8.6   BILIRUBIN mg/dL 3.4* 3.4* 4.6*   ALK PHOS U/L 1,282* 1,349* 1,321*   ALT (SGPT) U/L 178* 188* 185*   AST (SGOT) U/L 256* 309* 292*   GLUCOSE mg/dL 92 100* 80     Results from last 7 days   Lab Units 09/16/19  7363  09/13/19  0424 09/11/19  1835   INR  1.06 1.12* 1.12*     Lab Results   Lab Value Date/Time    LIPASE 31 09/13/2019 0424    LIPASE 61 (H) 09/11/2019 1835    LIPASE 14 09/04/2019 0700    LIPASE 27 08/30/2019 2147    LIPASE 33 08/30/2019 1707       Radiology:  MRI abdomen w wo contrast mrcp   Final Result      CT Abdomen Pelvis Without Contrast   Final Result   1.  Pancreas has an unremarkable noncontrast CT appearance. Please note   that early pancreatitis can be occult on imaging and correlation with   serum lipase levels recommended.   2.  Postsurgical changes from recent cholecystectomy with a mixed hypo   and hyperdense collection within the gallbladder fossa likely   representing mixture of postoperative seroma and hemorrhage. The   sterility of this collection cannot be determined based on imaging and   correlation with patient history is recommended to exclude developing   abscess.   3.  Small amount of ascites.   4.  Other findings as above.       This report was finalized on 9/12/2019 9:30 PM by Dr. Kulwinder Odom M.D.          CT Needle Biopsy Liver    (Results Pending)       Assessment/Plan     Patient Active Problem List   Diagnosis   • Hyperlipidemia   • Coronary artery disease involving native coronary artery of native heart without angina pectoris   • Cognitive disorder   • Mood disorder (CMS/HCC)   • Closed wedge compression fracture of third thoracic vertebra with routine healing   • GERD (gastroesophageal reflux disease)   • S/P drug eluting coronary stent placement   • Chest pain   • Diastolic dysfunction   • Exertional angina (CMS/HCC)   • Unstable angina (CMS/HCC)   • Bladder mass   • Mixed action and resting tremor   • Bladder cancer (CMS/HCC)   • Acute cystitis without hematuria   • Generalized weakness   • Dyspnea on exertion   • BPH (benign prostatic hyperplasia)   • Right upper quadrant pain   • Calculus of gallbladder without cholecystitis   • Elevated LFTs   • Calculus of bile duct with  acute cholecystitis without obstruction   • Atrial fibrillation (CMS/HCC)   • Dehydration   • Essential hypertension   • Abdominal pain   • Nausea without vomiting       Assessment:  1. Elevated liver tests, mixed pattern  2. Jaundice      Plan:  · We need to move to liver biopsy  · This cannot be done on Plavix  · Radiologist will require 7 days off of Plavix to do CT-guided liver biopsy, then probably an additional 3 days to recover from liver biopsy  · Cardiology consultation today or tomorrow for permission to hold Plavix 10 days in a patient received a cardiac stent 1 year ago  · I will check a CMP in my office in 6 days, if liver numbers are drastically improved I will cancel outpatient liver biopsy  · I have ordered a liver biopsy for Wednesday September 25  · I am okay with discharge home when cardiology has seen the patient and given permission to hold Plavix 10 days  I discussed the patients findings and my recommendations with patient, family and nursing staff.    Aubrey Villasenor MD

## 2019-09-17 ENCOUNTER — TELEPHONE (OUTPATIENT)
Dept: GASTROENTEROLOGY | Facility: CLINIC | Age: 79
End: 2019-09-17

## 2019-09-17 ENCOUNTER — RESULTS ENCOUNTER (OUTPATIENT)
Dept: GASTROENTEROLOGY | Facility: CLINIC | Age: 79
End: 2019-09-17

## 2019-09-17 VITALS
WEIGHT: 183.1 LBS | HEIGHT: 72 IN | OXYGEN SATURATION: 95 % | HEART RATE: 68 BPM | TEMPERATURE: 97.8 F | DIASTOLIC BLOOD PRESSURE: 81 MMHG | RESPIRATION RATE: 16 BRPM | BODY MASS INDEX: 24.8 KG/M2 | SYSTOLIC BLOOD PRESSURE: 121 MMHG

## 2019-09-17 DIAGNOSIS — R79.89 ABNORMAL LIVER FUNCTION TESTS: ICD-10-CM

## 2019-09-17 DIAGNOSIS — R74.8 ELEVATED LIVER ENZYMES: Primary | ICD-10-CM

## 2019-09-17 LAB
ALBUMIN SERPL-MCNC: 3.2 G/DL (ref 3.5–5.2)
ALBUMIN/GLOB SERPL: 1.1 G/DL
ALP SERPL-CCNC: 1251 U/L (ref 39–117)
ALT SERPL W P-5'-P-CCNC: 169 U/L (ref 1–41)
ANION GAP SERPL CALCULATED.3IONS-SCNC: 11.7 MMOL/L (ref 5–15)
AST SERPL-CCNC: 234 U/L (ref 1–40)
BILIRUB SERPL-MCNC: 3.2 MG/DL (ref 0.2–1.2)
BUN BLD-MCNC: 11 MG/DL (ref 8–23)
BUN/CREAT SERPL: 12.2 (ref 7–25)
CALCIUM SPEC-SCNC: 8.7 MG/DL (ref 8.6–10.5)
CHLORIDE SERPL-SCNC: 101 MMOL/L (ref 98–107)
CO2 SERPL-SCNC: 24.3 MMOL/L (ref 22–29)
CREAT BLD-MCNC: 0.9 MG/DL (ref 0.76–1.27)
DEPRECATED RDW RBC AUTO: 57.1 FL (ref 37–54)
ERYTHROCYTE [DISTWIDTH] IN BLOOD BY AUTOMATED COUNT: 16.1 % (ref 12.3–15.4)
GFR SERPL CREATININE-BSD FRML MDRD: 82 ML/MIN/1.73
GLOBULIN UR ELPH-MCNC: 3 GM/DL
GLUCOSE BLD-MCNC: 84 MG/DL (ref 65–99)
HCT VFR BLD AUTO: 36.1 % (ref 37.5–51)
HGB BLD-MCNC: 11.4 G/DL (ref 13–17.7)
MCH RBC QN AUTO: 30.6 PG (ref 26.6–33)
MCHC RBC AUTO-ENTMCNC: 31.6 G/DL (ref 31.5–35.7)
MCV RBC AUTO: 96.8 FL (ref 79–97)
PLATELET # BLD AUTO: 235 10*3/MM3 (ref 140–450)
PMV BLD AUTO: 11.2 FL (ref 6–12)
POTASSIUM BLD-SCNC: 4.3 MMOL/L (ref 3.5–5.2)
PROT SERPL-MCNC: 6.2 G/DL (ref 6–8.5)
RBC # BLD AUTO: 3.73 10*6/MM3 (ref 4.14–5.8)
SODIUM BLD-SCNC: 137 MMOL/L (ref 136–145)
WBC NRBC COR # BLD: 7.49 10*3/MM3 (ref 3.4–10.8)

## 2019-09-17 PROCEDURE — 85027 COMPLETE CBC AUTOMATED: CPT | Performed by: HOSPITALIST

## 2019-09-17 PROCEDURE — 99232 SBSQ HOSP IP/OBS MODERATE 35: CPT | Performed by: INTERNAL MEDICINE

## 2019-09-17 PROCEDURE — 80053 COMPREHEN METABOLIC PANEL: CPT | Performed by: NURSE PRACTITIONER

## 2019-09-17 RX ADMIN — PANTOPRAZOLE SODIUM 40 MG: 40 TABLET, DELAYED RELEASE ORAL at 08:33

## 2019-09-17 NOTE — CONSULTS
Sukhdev Zayas   78 y.o.  male    LOS: 4 days   Patient Care Team:  Rachelle Patton PA-C as PCP - General (Physician Assistant)  Christie Pacheco APRN as PCP - Claims Attributed      Subjective     Chief Complaint:    History of Present Illness: 78-year-old white male patient is admitted with the generalized weakness abdominal pain and low blood pressure.  He had a TURP on 2019 for BPH and bladder cancer.  Patient had acute cholecystitis and had a cholecystectomy on 2019.  The following day he had a ERCP and removal of stones by Dr. Amaya.  Since then he is liver enzymes were high.  Initially it was 1705 alkaline phosphatase.   and .  Total bilirubin 5.8.  MRI of the abdomen shows complex collection in the gallbladder fossa.  He is to be scheduled for a liver biopsy in about a week.  Patient has multiple stents last was drug-eluting stent to proximal and mid LAD in 2018.  This was done by Dr. Proctor.  Patient continues to have chest pain and he had another heart catheterization 2018 which showed a patent stents to proximal and mid LAD first diagonal second diagonal and circumflex.  Medical therapy was recommended.  His Plavix needs to be discontinued for a week for liver biopsy.  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  He is a non-smoker all his life.  His brother  of a massive heart attack at age 50.          Past Medical History:   Diagnosis Date   • Anxiety    • Back pain    • Bladder cancer (CMS/HCC)    • Coronary artery disease    • Depression    • Dizziness    • Fatigue    • GERD (gastroesophageal reflux disease)    • History of fractured rib     RIGHT SIDE   • Hyperlipidemia    • Hypertension    • Tremors of nervous system    • Urinary incontinence    • Vertigo      Past Surgical History:   Procedure Laterality Date   • CARDIAC CATHETERIZATION      7x, 5 stents   • CATARACT EXTRACTION, BILATERAL      • CHOLECYSTECTOMY N/A 9/2/2019    Procedure: CHOLECYSTECTOMY LAPAROSCOPIC WITH INTRAOPERATIVE CHOLANGIOGRAM;  Surgeon: Bg Rodriguez Jr., MD;  Location: [facility] MAIN OR;  Service: General   • COLONOSCOPY     • CORONARY ANGIOPLASTY WITH STENT PLACEMENT      X5    • CYSTOSCOPY BLADDER BIOPSY N/A 1/8/2019    Procedure: CYSTOSCOPY BLADDER BIOPSY;  Surgeon: Wang Soares Jr., MD;  Location: [facility] MAIN OR;  Service: Urology   • CYSTOSCOPY BLADDER BIOPSY N/A 6/13/2019    Procedure: CYSTOSCOPY BLADDER BIOPSY;  Surgeon: Wang Soares Jr., MD;  Location: [facility] MAIN OR;  Service: Urology   • CYSTOSCOPY TRANSURETHRAL RESECTION OF PROSTATE N/A 7/11/2019    Procedure: CYSTOSCOPY TRANSURETHRAL RESECTION OF PROSTATE;  Surgeon: Wang Soares Jr., MD;  Location: [facility] MAIN OR;  Service: Urology   • CYSTOSCOPY URETEROSCOPY LASER LITHOTRIPSY N/A 6/13/2019    Procedure: CYSTO LITHOPAXY;  Surgeon: Wang Soares Jr., MD;  Location: [facility] MAIN OR;  Service: Urology   • ERCP N/A 9/3/2019    Procedure: ENDOSCOPIC RETROGRADE CHOLANGIOPANCREATOGRAPHY with sphincterotomy and balloon sweep;  Surgeon: Ashok Amaya MD;  Location: [facility] ENDOSCOPY;  Service: Gastroenterology   • EYE SURGERY      Lens implants   • LUMBAR DISCECTOMY FUSION INSTRUMENTATION     • SKIN CANCER EXCISION Left     chest wall   • TRANSURETHRAL RESECTION OF BLADDER TUMOR N/A 11/29/2018    Procedure: TUR BLADDER TUMOR  LARGE;  Surgeon: Wang Soares Jr., MD;  Location: [facility] MAIN OR;  Service: Urology     Medications Prior to Admission   Medication Sig Dispense Refill Last Dose   • acetaminophen (TYLENOL) 325 MG tablet Take 650 mg by mouth Every 6 (Six) Hours As Needed for Mild Pain .   Past Week at Unknown time   • amLODIPine (NORVASC) 5 MG tablet Take 5 mg by mouth Every Morning.   Past Week at Unknown time   • aspirin 81 MG chewable tablet Chew 1 tablet Daily. 30 tablet 0 9/11/2019 at Unknown time   • atorvastatin  (LIPITOR) 40 MG tablet Take 40 mg by mouth Every Morning.   Past Week at Unknown time   • clopidogrel (PLAVIX) 75 MG tablet Take 75 mg by mouth Daily. Not taking for the next week   9/11/2019 at Unknown time   • esomeprazole (nexIUM) 40 MG capsule Take 40 mg by mouth Every Morning Before Breakfast.   9/11/2019 at Unknown time   • FLUoxetine (PROzac) 20 MG capsule Take 20 mg by mouth Daily.   9/11/2019 at Unknown time   • FLUoxetine (PROzac) 40 MG capsule Take 40 mg by mouth Every Morning. Patient takes total of 60 mg a day   9/11/2019 at Unknown time   • HYDROcodone-acetaminophen (NORCO) 5-325 MG per tablet Take 1 tablet by mouth Every 4 (Four) Hours As Needed for Moderate Pain  or Severe Pain . 18 tablet 0 Past Week at Unknown time   • ondansetron (ZOFRAN) 4 MG tablet Take 1 tablet by mouth Every 8 (Eight) Hours As Needed for Nausea or Vomiting. 30 tablet 0 Past Month at Unknown time   • nitroglycerin (NITROSTAT) 0.4 MG SL tablet Place 0.4 mg under the tongue as needed for chest pain. Take no more than 3 doses in 15 minutes.   Taking   • sennosides-docusate sodium (SENOKOT-S) 8.6-50 MG tablet Take 2 tablets by mouth 2 Times a Day. 60 tablet 0 Not Taking       Family History   Problem Relation Age of Onset   • Arthritis Mother    • Glaucoma Mother    • Heart disease Father    • Emphysema Father    • Tremor Father    • Malig Hyperthermia Neg Hx      Social History     Socioeconomic History   • Marital status:      Spouse name: Not on file   • Number of children: 4   • Years of education: 5 college degrees   • Highest education level: Not on file   Occupational History   • Occupation: Retired   Tobacco Use   • Smoking status: Never Smoker   • Smokeless tobacco: Never Used   Substance and Sexual Activity   • Alcohol use: Yes     Frequency: Never     Drinks per session: 5 or 6     Binge frequency: Less than monthly     Comment: last drink about 1 year ago    • Drug use: No   • Sexual activity: Defer     Objective        Review of Systems:   Constitutional: Negative for diaphoresis, fatigue, fever and unexpected weight change.   HENT: Negative.    Eyes: Negative.    Respiratory: Negative for cough, shortness of breath and wheezing.    Cardiovascular: Negative for chest pain, palpitations and leg swelling.   Gastrointestinal: Negative for abdominal pain, blood in stool, constipation, diarrhea, nausea and vomiting.   Endocrine: Negative.    Genitourinary: Negative for difficulty urinating, dysuria and frequency.   Musculoskeletal: Negative.    Skin: Negative.    Allergic/Immunologic: Negative for environmental allergies and food allergies.   Neurological: Negative.    Hematological: Negative.    Psychiatric/Behavioral: Negative.        Current Facility-Administered Medications:   •  acetaminophen (TYLENOL) tablet 650 mg, 650 mg, Oral, Q4H PRN **OR** acetaminophen (TYLENOL) 160 MG/5ML solution 650 mg, 650 mg, Oral, Q4H PRN **OR** acetaminophen (TYLENOL) suppository 650 mg, 650 mg, Rectal, Q4H PRN, Marlena Velazquez APRN  •  aspirin chewable tablet 81 mg, 81 mg, Oral, Daily, Gita Roach APRN, 81 mg at 09/16/19 2036  •  clopidogrel (PLAVIX) tablet 75 mg, 75 mg, Oral, Daily, Gita Roach, APRN, 75 mg at 09/15/19 2106  •  FLUoxetine (PROzac) capsule 20 mg, 20 mg, Oral, Daily, Gita Roach, APRN, 20 mg at 09/16/19 2036  •  FLUoxetine (PROzac) capsule 40 mg, 40 mg, Oral, QAM, Gita Roach, APRN, 40 mg at 09/16/19 2036  •  HYDROcodone-acetaminophen (NORCO) 5-325 MG per tablet 1 tablet, 1 tablet, Oral, Q4H PRN, Gita Roach, APRN  •  nitroglycerin (NITROSTAT) SL tablet 0.4 mg, 0.4 mg, Sublingual, Q5 Min PRN, Marlena Velazquez APRN  •  nitroglycerin (NITROSTAT) SL tablet 0.4 mg, 0.4 mg, Sublingual, PRN, Gita Roach APRN  •  ondansetron (ZOFRAN) injection 4 mg, 4 mg, Intravenous, Q6H PRN, Marlena Velazquez APRN  •  ondansetron (ZOFRAN) tablet 4 mg, 4 mg, Oral, Q8H PRN, Gita Roach APRN  •  pantoprazole (PROTONIX)  EC tablet 40 mg, 40 mg, Oral, VASILIY, Gita Roach, APRN, 40 mg at 09/17/19 0833  •  potassium chloride (MICRO-K) CR capsule 40 mEq, 40 mEq, Oral, PRN, 40 mEq at 09/12/19 0648 **OR** potassium chloride (KLOR-CON) packet 40 mEq, 40 mEq, Oral, PRN **OR** potassium chloride 10 mEq in 100 mL IVPB, 10 mEq, Intravenous, Q1H PRN, Marlena Velazquez, APRN  •  [COMPLETED] Insert peripheral IV, , , Once **AND** sodium chloride 0.9 % flush 10 mL, 10 mL, Intravenous, PRN, Hilton Bernstein MD  •  sodium chloride 0.9 % flush 10 mL, 10 mL, Intravenous, Q12H, Marlena Velazquez, APRN, 10 mL at 09/16/19 2036  •  sodium chloride 0.9 % flush 10 mL, 10 mL, Intravenous, PRN, Marlena Velazquez, APRN      Physical Exam:   Vital Sign Min/Max for last 24 hours  Temp  Min: 97.2 °F (36.2 °C)  Max: 98.6 °F (37 °C)   BP  Min: 121/81  Max: 136/72    Pulse  Min: 65  Max: 72     Wt Readings from Last 3 Encounters:   09/11/19 83.1 kg (183 lb 1.6 oz)   09/09/19 82.1 kg (181 lb)   09/03/19 83 kg (182 lb 14.4 oz)       General Appearance:   Awake,  Alert, cooperative, in no acute distress   Head:    Normocephalic, without obvious abnormality, atraumatic   Eyes:            Conjunctivae normal, non icteric, eom intact      Neck:   No adenopathy, supple, trachea midline, no thyromegaly, no   carotid bruit, no JVD, no elevated cvp   Lungs:     Clear to auscultation,respirations regular, even and                  unlabored    Heart:    Regular rhythm and normal rate, normal S1 and S2,            No murmur, no gallop, no rub, no click    Chest Wall:    No abnormalities observed   Abdomen:     Normal bowel sounds, no masses, soft non-tender, non-distended,                    Rectal:     Deferred   Extremities:   No edema. Moves all extremities well, no cyanosis, no erythema   Pulses:   Pulses palpable and equal bilaterally   Skin:   No bleeding, bruising or rash   Neurologic:   Speech clear and appropriate, no facial drooping     :       MONITOR:    Results  Review:     Sodium Sodium   Date Value Ref Range Status   09/17/2019 137 136 - 145 mmol/L Final   09/16/2019 136 136 - 145 mmol/L Final   09/15/2019 136 136 - 145 mmol/L Final      Potassium Potassium   Date Value Ref Range Status   09/17/2019 4.3 3.5 - 5.2 mmol/L Final   09/16/2019 3.9 3.5 - 5.2 mmol/L Final   09/15/2019 3.6 3.5 - 5.2 mmol/L Final      Chloride Chloride   Date Value Ref Range Status   09/17/2019 101 98 - 107 mmol/L Final   09/16/2019 103 98 - 107 mmol/L Final   09/15/2019 101 98 - 107 mmol/L Final      Bicarbonate No results found for: PLASMABICARB   BUN BUN   Date Value Ref Range Status   09/17/2019 11 8 - 23 mg/dL Final   09/16/2019 11 8 - 23 mg/dL Final   09/15/2019 12 8 - 23 mg/dL Final      Creatinine Creatinine   Date Value Ref Range Status   09/17/2019 0.90 0.76 - 1.27 mg/dL Final   09/16/2019 0.89 0.76 - 1.27 mg/dL Final   09/15/2019 0.90 0.76 - 1.27 mg/dL Final      Calcium Calcium   Date Value Ref Range Status   09/17/2019 8.7 8.6 - 10.5 mg/dL Final   09/16/2019 8.4 (L) 8.6 - 10.5 mg/dL Final   09/15/2019 8.4 (L) 8.6 - 10.5 mg/dL Final      Magnesium No results found for: MG     Results from last 7 days   Lab Units 09/17/19  0451   WBC 10*3/mm3 7.49   HEMOGLOBIN g/dL 11.4*   HEMATOCRIT % 36.1*   PLATELETS 10*3/mm3 235     Lab Results   Lab Value Date/Time    TROPONINT <0.010 09/11/2019 1835    TROPONINT <0.010 09/03/2019 2039    TROPONINT <0.010 05/25/2019 0603    TROPONINT <0.010 05/24/2019 2029     Lab Results   Component Value Date    CHOL 124 11/15/2017    CHOL 119 02/22/2017    CHOL 121 06/09/2016     Lab Results   Component Value Date    HDL 74 (H) 11/15/2017    HDL 66 02/22/2017    HDL 62 06/09/2016     Lab Results   Component Value Date    LDL 35 11/15/2017    LDL 39 02/22/2017    LDL 45 06/09/2016     Lab Results   Component Value Date    TRIG 73 11/15/2017    TRIG 71 02/22/2017    TRIG 68 06/09/2016     No components found for: CHOLHDL  No results found for: PTT  No components  found for: PT/INR  No results found for: HGBA1C   Lab Results   Component Value Date    TSH 4.300 (H) 07/29/2019        Echo EF Estimated  )No results found for: ECHOEFEST      Assessment/ Plan    Active Hospital Problems    Diagnosis POA   • **Dehydration [E86.0] Yes   • Essential hypertension [I10] No   • Abdominal pain [R10.9] Yes   • Nausea without vomiting [R11.0] Unknown   • Elevated LFTs [R94.5] Yes   • Dyspnea on exertion [R06.09] Yes   • Generalized weakness [R53.1] Yes   • Chest pain [R07.9] Yes   • Diastolic dysfunction [I51.9] Yes   • GERD (gastroesophageal reflux disease) [K21.9] Yes   • Hyperlipidemia [E78.5] Yes   • Coronary artery disease involving native coronary artery of native heart without angina pectoris [I25.10] Yes     His last stents were in August 2018 which is more than a year now.  We can safely discontinue his Plavix for a week.  Liver biopsy in a week.        Pema Cao MD  09/17/19  10:21 AM    Discussed with  Time:

## 2019-09-17 NOTE — DISCHARGE SUMMARY
Patient Name: Sukhdev Zayas  : 1940  MRN: [id number]    Date of Admission: 2019  Date of Discharge:  2019  Primary Care Physician: Rachelle Patton PA-C      Chief Complaint:   Weakness - Generalized and Abnormal Lab      Discharge Diagnoses     Active Hospital Problems    Diagnosis  POA   • **Elevated LFTs [R94.5]  Yes   • Essential hypertension [I10]  No   • Abdominal pain [R10.9]  Yes   • Nausea without vomiting [R11.0]  Yes   • Dehydration [E86.0]  Yes   • Dyspnea on exertion [R06.09]  Yes   • Generalized weakness [R53.1]  Yes   • Chest pain [R07.9]  Yes   • Diastolic dysfunction [I51.9]  Yes   • GERD (gastroesophageal reflux disease) [K21.9]  Yes   • Hyperlipidemia [E78.5]  Yes   • Coronary artery disease involving native coronary artery of native heart without angina pectoris [I25.10]  Yes      Resolved Hospital Problems   No resolved problems to display.        Hospital Course     Very pleasant 77yo male with a history of calculus of bile duct with acute cholecystitis with recent cholecystectomy on 19 and ERCP on 09/3/2019, HLD, CAD, HTN and prior ESBL UTI . Patient presented with complaints of nausea, chest pain, lightheadedness, abdominal pain, decreased appetite, dark urine and stool.  His wife took his blood pressure and said it was low and called his PCP who advised him to go to the ER. The patient was just discharged a week prior after cholecystectomy and still had some elevated liver enzymes and jaundice that had improved, but not resolved.  Please see below for details of admission:    Dehydration/HypoK+  - K better. Mg++ normal.   - advanced diet to goal of GI soft after MRCP was completed and he is tolerating fine.      Elevated LFT's/ recent cholecystectomy and ERCP/ abdominal pain/nausea/ juandice  - LFT's stable again today, lipase normalized  - CT abdomen showed dense collection of fluid around GB fossa   - MRCP unremarkable  - Gen Surgery eval noted, fluid  collection unlikely to be causing LFT elevation  - holding Lipitor until LFT's normalize  - pt will need liver bx per GI, needs to be off Plavix for 7 days prior, Dr. Villasenor wants Card to okay holding the Plavix given that pt has 5 coronary artery stents that are barely a year old  - biopsy is scheduled for 9/25, will need to f/u with Jacklyn on 9/24 to have LFTs repeated (if normalized then can dc plans for biopsy)     Generalized weakness  - PT following  - currently recommending home with [facility]     Coronary artery disease/Dyspnea on exertion/chest pain  -Troponin negative. Reports pain as chronic. He did have an episode of afib last admission, but converted and no OAC recommended at that time.   - Okay to hold Plavix for 7d per Card     GERD  - Protonix 40mg      Essential hypertension   - stopped Norvasc recently due to hypotension and encouraged to remain off after discharge   - BPs okay      Abnormal UA  - history of colonization with ESBL E.coli and is currently asymptomatic with no indication to treat    Day of Discharge     Feeling fine today. Still tired. No GI symptoms. Eager to go home.     Physical Exam:  Temp:  [97.2 °F (36.2 °C)-98.6 °F (37 °C)] 97.8 °F (36.6 °C)  Heart Rate:  [65-72] 68  Resp:  [16-18] 16  BP: (121-136)/(72-84) 121/81  Body mass index is 24.83 kg/m².  Physical Exam  General Appearance:  Comfortable and in no acute distress.    Vital signs are normal.  No fever.    Output: Producing urine and producing stool.    HEENT: Normal HEENT exam.    Lungs:  Normal effort and normal respiratory rate.  Breath sounds clear to auscultation.    Heart: Normal rate.  Regular rhythm.    Abdomen: Abdomen is soft.  Bowel sounds are normal.   There is no abdominal tenderness.     Extremities: There is no dependent edema.    Pulses: Distal pulses are intact.    Neurological: Patient is alert and oriented to person, place and time.  Normal strength.  Patient has normal muscle tone.    Pupils:  Pupils are  equal, round, and reactive to light.    Skin:  Warm and dry.  (Very, very mild jaundice)  Consultants     Consult Orders (all) (From admission, onward)    Start     Ordered    09/16/19 1654  Inpatient Cardiology Consult  Once     Specialty:  Cardiology  Provider:  Gordo Luna MD    09/16/19 1654    09/16/19 1649  Inpatient Cardiology Consult  Once,   Status:  Canceled     Specialty:  Cardiology  Provider:  (Not yet assigned)    09/16/19 1648    09/13/19 1423  Inpatient General Surgery Consult  Once     Specialty:  General Surgery  Provider:  Bg Rodriguez Jr., MD    09/13/19 1423    09/12/19 0944  Inpatient Gastroenterology Consult  Once     Specialty:  Gastroenterology  Provider:  Ashok Amaya MD    09/12/19 0944    09/12/19 0101  Inpatient Nutrition Consult  Once     Provider:  (Not yet assigned)    09/12/19 0100 09/12/19 0100  Inpatient Case Management  Consult  Once     Provider:  (Not yet assigned)    09/12/19 0100 09/11/19 2155  [facility] (on-call MD unless specified) Details  Once     Specialty:  Hospitalist  Provider:  (Not yet assigned)    09/11/19 2154        Procedures     * Surgery not found *    Imaging Results (all)     Procedure Component Value Units Date/Time    MRI abdomen w wo contrast mrcp [[id number]] Collected:  09/13/19 2026     Updated:  09/13/19 2049    Narrative:       MRI ABDOMEN W WO CONTRAST MRCP-     CLINICAL: Recent cholecystectomy with a complex collection demonstrated  along the gallbladder fossa. Previous common duct stones with ERCP and  balloon catheter extraction. Currently obstructive jaundice.     TECHNIQUE: Multiplanar and multiecho images acquired prior to and  following intravenous contrast. MRCP images also obtained.     FINDINGS: There is a complex mixed signal collection demonstrated along  the gallbladder fossa, the size is similar to the previous CT abdomen.  There is a suggestion of perhaps a minimal amount of enhancing tissue  along  its periphery. It is indeterminant.     Caliber of the CBD and CHD are normal. No intraluminal filling defect to  suggest retained calculus. No intrahepatic biliary duct dilatation. The  cystic duct is appropriately truncated at the site of resection.     Liver size and signal within normal limits. The kidneys have a  satisfactory appearance. No pancreatic abnormality is seen.     CONCLUSION:  1. Indeterminate complex collection demonstrated along the gallbladder  fossa. Size is similar to prior CT.  2. No extrahepatic or intrahepatic biliary duct dilatation, CBD tapers  in a satisfactory fashion without intraluminal filling defect. Diameter  of the CBD and CHD within normal limits.     This report was finalized on 9/13/2019 8:46 PM by Dr. Russell Ham M.D.       CT Abdomen Pelvis Without Contrast [[id number]] Collected:  09/12/19 1507     Updated:  09/12/19 2133    Narrative:       CT ABDOMEN AND PELVIS WITHOUT IV CONTRAST     HISTORY: Abdominal pain, recent ERCP; question pancreatitis.     TECHNIQUE: Radiation dose reduction techniques were utilized, including  automated exposure control and exposure modulation based on body size.   3 mm images were obtained through the abdomen and pelvis without IV  contrast.      COMPARISON: CT abdomen and pelvis 08/30/2019.     FINDINGS:  Bibasilar atelectasis and/or pleural parenchyma scarring is present.     There are no findings of small bowel obstruction.     The appendix is unremarkable.     Postsurgical changes from recent cholecystectomy are present. A mixed  hyper and hypodense collection is present within the gallbladder fossa  and measures 5.4 x 2.8 cm. A focus of air is present within the residual  cystic duct. No focal noncontrast abnormality is visualized within the  hepatic parenchyma.     The pancreas, spleen and adrenal glands have an unremarkable noncontrast  CT appearance. Subcentimeter hypodense lesion within the left kidney is  too small to characterize.  There is no hydronephrosis. Area of fat  stranding along the anterior peritoneal cavity with overlying  subcutaneous stranding within the epigastrium likely postsurgical.     Small amount of ascites is present. There is no abdominopelvic  adenopathy by size criteria.     Colonic diverticulosis is present.     There is no free intraperitoneal air. No suspicious lytic or blastic  osseous lesions are present.     The bladder is unremarkable for its degree of distention.       Impression:       1.  Pancreas has an unremarkable noncontrast CT appearance. Please note  that early pancreatitis can be occult on imaging and correlation with  serum lipase levels recommended.  2.  Postsurgical changes from recent cholecystectomy with a mixed hypo  and hyperdense collection within the gallbladder fossa likely  representing mixture of postoperative seroma and hemorrhage. The  sterility of this collection cannot be determined based on imaging and  correlation with patient history is recommended to exclude developing  abscess.  3.  Small amount of ascites.  4.  Other findings as above.     This report was finalized on 9/12/2019 9:30 PM by Dr. Kulwinder Odom M.D.                     Pertinent Labs     Results from last 7 days   Lab Units 09/17/19  0451 09/16/19  0435 09/15/19  0605 09/13/19  0424   WBC 10*3/mm3 7.49 7.45 7.45 7.90   HEMOGLOBIN g/dL 11.4* 10.9* 10.9* 11.6*   PLATELETS 10*3/mm3 235 239 233 221     Results from last 7 days   Lab Units 09/17/19  0451 09/16/19  0435 09/15/19  0605 09/14/19  0359   SODIUM mmol/L 137 136 136 134*   POTASSIUM mmol/L 4.3 3.9 3.6 3.7   CHLORIDE mmol/L 101 103 101 99   CO2 mmol/L 24.3 23.0 23.9 23.3   BUN mg/dL 11 11 12 10   CREATININE mg/dL 0.90 0.89 0.90 0.77   GLUCOSE mg/dL 84 92 100* 80   Estimated Creatinine Clearance: 79.5 mL/min (by C-G formula based on SCr of 0.9 mg/dL).  Results from last 7 days   Lab Units 09/17/19  0451 09/16/19  0435 09/15/19  0605 09/14/19  0359   ALBUMIN g/dL  3.20* 2.80* 2.70* 2.90*   BILIRUBIN mg/dL 3.2* 3.4* 3.4* 4.6*   ALK PHOS U/L 1,251* 1,282* 1,349* 1,321*   AST (SGOT) U/L 234* 256* 309* 292*   ALT (SGPT) U/L 169* 178* 188* 185*     Results from last 7 days   Lab Units 09/17/19  0451 09/16/19  0435 09/15/19  0605 09/14/19  0359  09/11/19  1835   CALCIUM mg/dL 8.7 8.4* 8.4* 8.6   < > 9.0   ALBUMIN g/dL 3.20* 2.80* 2.70* 2.90*   < > 3.60   MAGNESIUM mg/dL  --   --   --   --   --  2.5*    < > = values in this interval not displayed.     Results from last 7 days   Lab Units 09/13/19  0424 09/11/19  1835   LIPASE U/L 31 61*     Results from last 7 days   Lab Units 09/11/19  1835   CK TOTAL U/L 24   TROPONIN T ng/mL <0.010   PROBNP pg/mL 121.1           Invalid input(s): LDLCALC  Results from last 7 days   Lab Units 09/11/19 2000   URINECX  >100,000 CFU/mL Escherichia coli ESBL*       Test Results Pending at Discharge   None    Discharge Details        Discharge Medications      Continue These Medications      Instructions Start Date   aspirin 81 MG chewable tablet   81 mg, Oral, Daily      esomeprazole 40 MG capsule  Commonly known as:  nexIUM   40 mg, Oral, Every Morning Before Breakfast      FLUoxetine 40 MG capsule  Commonly known as:  PROzac   40 mg, Oral, Every Morning, Patient takes total of 60 mg a day      FLUoxetine 20 MG capsule  Commonly known as:  PROzac   20 mg, Oral, Daily      HYDROcodone-acetaminophen 5-325 MG per tablet  Commonly known as:  NORCO   1 tablet, Oral, Every 4 Hours PRN      nitroglycerin 0.4 MG SL tablet  Commonly known as:  NITROSTAT   0.4 mg, Sublingual, As Needed, Take no more than 3 doses in 15 minutes.      ondansetron 4 MG tablet  Commonly known as:  ZOFRAN   4 mg, Oral, Every 8 Hours PRN         Stop These Medications    acetaminophen 325 MG tablet  Commonly known as:  TYLENOL     amLODIPine 5 MG tablet  Commonly known as:  NORVASC     atorvastatin 40 MG tablet  Commonly known as:  LIPITOR     clopidogrel 75 MG tablet  Commonly  known as:  PLAVIX     sennosides-docusate sodium 8.6-50 MG tablet  Commonly known as:  SENOKOT-S            No Known Allergies      Discharge Disposition:  Home-Health Care Svc    Discharge Diet:  Diet Order   Procedures   • Diet Regular; Cardiac, GI Soft / [place]       Discharge Activity:   as tolerated    CODE STATUS:    Code Status and Medical Interventions:   Ordered at: 09/12/19 0018     Code Status:    CPR     Medical Interventions (Level of Support Prior to Arrest):    Full       Future Appointments   Date Time Provider Department Center   9/26/2019  2:10 PM Bg Rodriguez Jr., MD [facility] None   10/7/2019  9:30 AM Ben Patton PA-C [facility]   10/18/2019 11:00 AM Bacilio Chaudhary MD [facility] None   1/30/2020 11:00 AM Ben Patton PA-C [facility]     Additional Instructions for the Follow-ups that You Need to Schedule     Ambulatory Referral to Home Health   As directed      Face to Face Visit Date:  9/17/2019    Follow-up provider for Plan of Care?:  I treated the patient in an acute care facility and will not continue treatment after discharge.    Follow-up provider:  BEN PATTON [[id number]]    Reason/Clinical Findings:  Elevated LFTs    Describe mobility limitations that make leaving home difficult:  requires the assistance of another to leave the home    Nursing/Therapeutic Services Requested:  Skilled Nursing    Skilled nursing orders:  Medication education    Frequency:  1 Week 1         Discharge Follow-up with PCP   As directed       Currently Documented PCP:    Ben Patton PA-C    PCP Phone Number:    [phone]     Follow Up Details:  Abdi NP (PCP) in 2 weeks         Discharge Follow-up with Specified Provider: Dr. Villasenor (GI)   As directed      To:  Dr. Villasenor (GI)    Follow Up Details:  september 24th         Additional information on Labs and Follow-ups:      Call to have liver biopsy scheduled. [phone]                Follow-up Information     Ben Patton PA-C .     Specialties:  Physician Assistant, Internal Medicine, Family Medicine  Why:  Abdi NP (PCP) in 2 weeks  Contact information:  [address]  [facility] [place] [address]  [phone]             [facility] REFERR[facility] [place] .    Specialty:  Home Health Services  Contact information:  [address]  [place] [facility] [facility] [address]                 Additional Instructions for the Follow-ups that You Need to Schedule     Ambulatory Referral to Home Health   As directed      Face to Face Visit Date:  9/17/2019    Follow-up provider for Plan of Care?:  I treated the patient in an acute care facility and will not continue treatment after discharge.    Follow-up provider:  RACHELLE IRWIN [[id number]]    Reason/Clinical Findings:  Elevated LFTs    Describe mobility limitations that make leaving home difficult:  requires the assistance of another to leave the home    Nursing/Therapeutic Services Requested:  Skilled Nursing    Skilled nursing orders:  Medication education    Frequency:  1 Week 1         Discharge Follow-up with PCP   As directed       Currently Documented PCP:    Rachelle Irwin, PA-C    PCP Phone Number:    [phone]     Follow Up Details:  Abdi NP (PCP) in 2 weeks         Discharge Follow-up with Specified Provider: Dr. Villasenor (GI)   As directed      To:  Dr. Villasenor ()    Follow Up Details:  september 24th         Additional information on Labs and Follow-ups:      Call to have liver biopsy scheduled. [phone]                Time Spent on Discharge:  Greater than 30 minutes      Wang Roa MD  [facility]  09/17/19  12:20 PM

## 2019-09-17 NOTE — PLAN OF CARE
Problem: Patient Care Overview  Goal: Plan of Care Review  Outcome: Ongoing (interventions implemented as appropriate)   09/17/19 0142   Coping/Psychosocial   Plan of Care Reviewed With patient   Plan of Care Review   Progress improving   OTHER   Outcome Summary Pt has no complaints of pain. Pt refused Plavix tonight for possible liver biopsy. Pt up ad real and on RA. VSS. Continue to monitor. Safety maintained.        Problem: Fall Risk (Adult)  Goal: Absence of Fall  Outcome: Ongoing (interventions implemented as appropriate)      Problem: Skin Injury Risk (Adult)  Goal: Skin Health and Integrity  Outcome: Ongoing (interventions implemented as appropriate)      Problem: Nutrition, Imbalanced: Inadequate Oral Intake (Adult)  Goal: Improved Oral Intake  Outcome: Ongoing (interventions implemented as appropriate)    Goal: Prevent Further Weight Loss  Outcome: Ongoing (interventions implemented as appropriate)

## 2019-09-17 NOTE — PROGRESS NOTES
Case Management Discharge Note    Final Note: Discharged home.    Destination      No service has been selected for the patient.      Durable Medical Equipment      No service has been selected for the patient.      Dialysis/Infusion      No service has been selected for the patient.      Home Medical Care - Selection Complete      Service Provider Request Status Selected Services Address Phone Number Fax Number    [facility] Selected [facility] Health Services [address] [address] [phone]      Therapy      No service has been selected for the patient.      Community Resources      No service has been selected for the patient.        Transportation Services  Private: Car    Final Discharge Disposition Code: 06 - home with home health care

## 2019-09-17 NOTE — PROGRESS NOTES
[facility]  Inpatient Progress Note    Reason for Follow Up:  Abnormal LFTs    Subjective     Interval History:   LFT's are about the same. No c/o.     Current Facility-Administered Medications:   •  acetaminophen (TYLENOL) tablet 650 mg, 650 mg, Oral, Q4H PRN **OR** acetaminophen (TYLENOL) 160 MG/5ML solution 650 mg, 650 mg, Oral, Q4H PRN **OR** acetaminophen (TYLENOL) suppository 650 mg, 650 mg, Rectal, Q4H PRN, Marlena Velazquez, APRN  •  aspirin chewable tablet 81 mg, 81 mg, Oral, Daily, Gita Roach, APRN, 81 mg at 09/16/19 2036  •  clopidogrel (PLAVIX) tablet 75 mg, 75 mg, Oral, Daily, Gita Roach, APRN, 75 mg at 09/15/19 2106  •  FLUoxetine (PROzac) capsule 20 mg, 20 mg, Oral, Daily, Marley, Gita, APRN, 20 mg at 09/16/19 2036  •  FLUoxetine (PROzac) capsule 40 mg, 40 mg, Oral, QAM, Marley, Gita, APRN, 40 mg at 09/16/19 2036  •  HYDROcodone-acetaminophen (NORCO) 5-325 MG per tablet 1 tablet, 1 tablet, Oral, Q4H PRN, Gita Roach, APRN  •  nitroglycerin (NITROSTAT) SL tablet 0.4 mg, 0.4 mg, Sublingual, Q5 Min PRN, Marlena Velazquez, APRN  •  nitroglycerin (NITROSTAT) SL tablet 0.4 mg, 0.4 mg, Sublingual, PRN, Gita Roach, APRN  •  ondansetron (ZOFRAN) injection 4 mg, 4 mg, Intravenous, Q6H PRN, Marlena Velazquez, APRN  •  ondansetron (ZOFRAN) tablet 4 mg, 4 mg, Oral, Q8H PRN, Gita Roach, APRN  •  pantoprazole (PROTONIX) EC tablet 40 mg, 40 mg, Oral, QAM, Marley, Gita, APRN, 40 mg at 09/17/19 0833  •  potassium chloride (MICRO-K) CR capsule 40 mEq, 40 mEq, Oral, PRN, 40 mEq at 09/12/19 0648 **OR** potassium chloride (KLOR-CON) packet 40 mEq, 40 mEq, Oral, PRN **OR** potassium chloride 10 mEq in 100 mL IVPB, 10 mEq, Intravenous, Q1H PRN, Marlena Velazquez APRN  •  [COMPLETED] Insert peripheral IV, , , Once **AND** sodium chloride 0.9 % flush 10 mL, 10 mL, Intravenous, PRN, Hilton Bernstein MD  •  sodium chloride 0.9 % flush 10 mL, 10 mL, Intravenous, Q12H,  Marlena Velazquez APRN, 10 mL at 09/16/19 2036  •  sodium chloride 0.9 % flush 10 mL, 10 mL, Intravenous, PRN, Marlena Velazquez, ANIL  Review of Systems:    The following systems were reviewed and negative;  respiratory, cardiovascular, musculoskeletal and neurological    Objective     Vital Signs  Temp:  [97.2 °F (36.2 °C)-98.6 °F (37 °C)] 97.8 °F (36.6 °C)  Heart Rate:  [65-72] 68  Resp:  [16-18] 16  BP: (121-136)/(72-84) 121/81  Body mass index is 24.83 kg/m².    Intake/Output Summary (Last 24 hours) at 9/17/2019 0940  Last data filed at 9/16/2019 1710  Gross per 24 hour   Intake 720 ml   Output --   Net 720 ml     No intake/output data recorded.     Physical Exam:   General: patient awake, alert and cooperative   Eyes: Normal lids and lashes, no scleral icterus   Neck: supple, normal ROM   Skin: warm and dry, not jaundiced   Cardiovascular: regular rhythm and rate, no murmurs auscultated   Pulm: clear to auscultation bilaterally, regular and unlabored   Abdomen: soft, nontender, nondistended; normal bowel sounds   Extremities: no rash or edema   Psychiatric: Normal mood and behavior; memory intact     Results Review:     I reviewed the patient's new clinical results.    Results from last 7 days   Lab Units 09/17/19  0451 09/16/19  0435 09/15/19  0605   WBC 10*3/mm3 7.49 7.45 7.45   HEMOGLOBIN g/dL 11.4* 10.9* 10.9*   HEMATOCRIT % 36.1* 33.4* 33.2*   PLATELETS 10*3/mm3 235 239 233     Results from last 7 days   Lab Units 09/17/19  0451 09/16/19  0435 09/15/19  0605   SODIUM mmol/L 137 136 136   POTASSIUM mmol/L 4.3 3.9 3.6   CHLORIDE mmol/L 101 103 101   CO2 mmol/L 24.3 23.0 23.9   BUN mg/dL 11 11 12   CREATININE mg/dL 0.90 0.89 0.90   CALCIUM mg/dL 8.7 8.4* 8.4*   BILIRUBIN mg/dL 3.2* 3.4* 3.4*   ALK PHOS U/L 1,251* 1,282* 1,349*   ALT (SGPT) U/L 169* 178* 188*   AST (SGOT) U/L 234* 256* 309*   GLUCOSE mg/dL 84 92 100*     Results from last 7 days   Lab Units 09/16/19  0435 09/13/19  0424 09/11/19  1835   INR   1.06 1.12* 1.12*     Lab Results   Lab Value Date/Time    LIPASE 31 09/13/2019 0424    LIPASE 61 (H) 09/11/2019 1835    LIPASE 14 09/04/2019 0700    LIPASE 27 08/30/2019 2147    LIPASE 33 08/30/2019 1707       Radiology:  MRI abdomen w wo contrast mrcp   Final Result      CT Abdomen Pelvis Without Contrast   Final Result   1.  Pancreas has an unremarkable noncontrast CT appearance. Please note   that early pancreatitis can be occult on imaging and correlation with   serum lipase levels recommended.   2.  Postsurgical changes from recent cholecystectomy with a mixed hypo   and hyperdense collection within the gallbladder fossa likely   representing mixture of postoperative seroma and hemorrhage. The   sterility of this collection cannot be determined based on imaging and   correlation with patient history is recommended to exclude developing   abscess.   3.  Small amount of ascites.   4.  Other findings as above.       This report was finalized on 9/12/2019 9:30 PM by Dr. Kulwinder Odom M.D.          CT Needle Biopsy Liver    (Results Pending)       Assessment/Plan     Patient Active Problem List   Diagnosis   • Hyperlipidemia   • Coronary artery disease involving native coronary artery of native heart without angina pectoris   • Cognitive disorder   • Mood disorder (CMS/HCC)   • Closed wedge compression fracture of third thoracic vertebra with routine healing   • GERD (gastroesophageal reflux disease)   • S/P drug eluting coronary stent placement   • Chest pain   • Diastolic dysfunction   • Exertional angina (CMS/HCC)   • Unstable angina (CMS/HCC)   • Bladder mass   • Mixed action and resting tremor   • Bladder cancer (CMS/HCC)   • Acute cystitis without hematuria   • Generalized weakness   • Dyspnea on exertion   • BPH (benign prostatic hyperplasia)   • Right upper quadrant pain   • Calculus of gallbladder without cholecystitis   • Elevated LFTs   • Calculus of bile duct with acute cholecystitis without  obstruction   • Atrial fibrillation (CMS/HCC)   • Dehydration   • Essential hypertension   • Abdominal pain   • Nausea without vomiting       Assessment/Recommendations:  1. Elevated liver tests, mixed pattern. The next thing to do would be a liver biopsy but he needs to be off of Plavix for 7 days prior to the liver biopsy if OK with Cardiology. He has a h/o cardiac stents. Liver biopsy has been scheduled by Dr. FELICIA Villasenor to be done next week if Cardiology is OK with holding Plavix for 7 days. He could be discharged after being seen by Cardiology.     I discussed the patients findings and my recommendations with patient and family.    Ubaldo James MD

## 2019-09-17 NOTE — TELEPHONE ENCOUNTER
Called Central Scheduling and spoke with Eve. Liver biopsy scheduled for first available, 9/27 arrive at 0730. Nothing to eat or drink after midnight, must have a  and bring meds. Stop Plavix 5 days prior.

## 2019-09-17 NOTE — TELEPHONE ENCOUNTER
----- Message from Aubrey Villasenor MD sent at 9/16/2019  5:40 PM EDT -----  Regarding: liver biopsy  Schedule CT-guided liver biopsy for September 25, Wednesday

## 2019-09-18 ENCOUNTER — READMISSION MANAGEMENT (OUTPATIENT)
Dept: CALL CENTER | Facility: HOSPITAL | Age: 79
End: 2019-09-18

## 2019-09-18 NOTE — TELEPHONE ENCOUNTER
Pt returned call per a staff message.    Called back and spoke with pt's wife. Yazmin hubbardt made for Mon at 1PM.

## 2019-09-18 NOTE — TELEPHONE ENCOUNTER
Pt returned call per a staff message.    Called back and spoke with pt's wife, [place], (ok per LOULOU). Advised that the liver biopsy has been scheduled for pt next Friday, 9/27 with arrival time of 0730. Advised pt cannot have anything to eat or drink after midnight the night before. Advised he must have a  and bring his meds to the appt. Advised he needs to stop Plavix 5 days prior. She verb understanding.

## 2019-09-18 NOTE — OUTREACH NOTE
Prep Survey      Responses   Facility patient discharged from?  [place]   Is patient eligible?  Yes   Discharge diagnosis  Elevated LFTs,    Generalized weakness,    Dehydration   Does the patient have one of the following disease processes/diagnoses(primary or secondary)?  Other   Does the patient have Home health ordered?  Yes   What is the Home health agency?   [facility]   Is there a DME ordered?  No   Prep survey completed?  Yes          Rachel Cortes RN

## 2019-09-19 ENCOUNTER — READMISSION MANAGEMENT (OUTPATIENT)
Dept: CALL CENTER | Facility: HOSPITAL | Age: 79
End: 2019-09-19

## 2019-09-19 ENCOUNTER — TELEPHONE (OUTPATIENT)
Dept: INTERNAL MEDICINE | Facility: CLINIC | Age: 79
End: 2019-09-19

## 2019-09-19 NOTE — TELEPHONE ENCOUNTER
Felicity from [facility] called and left message that patient B/P sitting was 100/62 and standing was 70/52 and he felt dizzy. She stated she knows he has an appointment tomorrow and she wanted to know what she should do. Left her number [phone]. I did speak with Rachelle and she told me to have her call Dr. Proctor the patient's cardiologist.    I did call the number back and left voicemail for her to give us a call back

## 2019-09-19 NOTE — OUTREACH NOTE
Medical Week 1 Survey      Responses   Facility patient discharged from?  [place]   Does the patient have one of the following disease processes/diagnoses(primary or secondary)?  Other   Is there a successful TCM telephone encounter documented?  No   Week 1 attempt successful?  Yes   Call start time  1232   Call end time  1235   Discharge diagnosis  Elevated LFTs,    Generalized weakness,    Dehydration   Is patient permission given to speak with other caregiver?  Yes   List who call center can speak with  spouse- [place]   Person spoke with today (if not patient) and relationship  spouse- [place]   Meds reviewed with patient/caregiver?  Yes   Is the patient having any side effects they believe may be caused by any medication additions or changes?  No   Does the patient have all medications ordered at discharge?  N/A   Is the patient taking all medications as directed (includes completed medication regime)?  Yes   Does the patient have a primary care provider?   Yes   Does the patient have an appointment with their PCP within 7 days of discharge?  Yes   Comments regarding PCP  will see PCP 9/20   Has the patient kept scheduled appointments due by today?  N/A   What is the Home health agency?   [facility]   Has home health visited the patient within 72 hours of discharge?  Yes   Psychosocial issues?  No   Did the patient receive a copy of their discharge instructions?  Yes   Nursing interventions  Reviewed instructions with patient   What is the patient's perception of their health status since discharge?  Improving   Is the patient/caregiver able to teach back the hierarchy of who to call/visit for symptoms/problems? PCP, Specialist, Home health nurse, Urgent Care, ED, 911  Yes   Additional teach back comments  Per wife, pt is doing well, no questions or concerns at this time.   Week 1 call completed?  Yes          Sherri Anderson RN

## 2019-09-20 ENCOUNTER — OFFICE VISIT (OUTPATIENT)
Dept: INTERNAL MEDICINE | Facility: CLINIC | Age: 79
End: 2019-09-20

## 2019-09-20 VITALS
WEIGHT: 180.8 LBS | BODY MASS INDEX: 24.49 KG/M2 | DIASTOLIC BLOOD PRESSURE: 58 MMHG | SYSTOLIC BLOOD PRESSURE: 94 MMHG | HEIGHT: 72 IN

## 2019-09-20 DIAGNOSIS — R79.89 ELEVATED LFTS: ICD-10-CM

## 2019-09-20 DIAGNOSIS — R53.1 GENERALIZED WEAKNESS: ICD-10-CM

## 2019-09-20 DIAGNOSIS — E86.0 DEHYDRATION: Primary | ICD-10-CM

## 2019-09-20 PROCEDURE — 99496 TRANSJ CARE MGMT HIGH F2F 7D: CPT | Performed by: PHYSICIAN ASSISTANT

## 2019-09-20 RX ORDER — ESOMEPRAZOLE MAGNESIUM 40 MG/1
40 CAPSULE, DELAYED RELEASE ORAL
Qty: 90 CAPSULE | Refills: 3 | Status: SHIPPED | OUTPATIENT
Start: 2019-09-20 | End: 2019-09-27

## 2019-09-20 NOTE — PROGRESS NOTES
Subjective   Chief Complaint   Patient presents with   • Hypertension     hospital follow up       History of Present Illness     Pt is here today for hospital follow up. He is feeling better, liver enzymes are still elevated.Workup for abnormal LFTs was unremarkable in hospital as well as MRCP. He is scheduled to have a liver biopsy next week with Dr. Villasenor. He is holding his atorvastatin until his LFTs normalize and is holding plavix until after procedure. Hydration is improving. Dizziness and unsteadiness is also improving. He is still orthostatic in the mornings, by afternoon his BP is much better. They have been increasing dietary salt intake and water which is helping.      Patient Active Problem List   Diagnosis   • Hyperlipidemia   • Coronary artery disease involving native coronary artery of native heart without angina pectoris   • Cognitive disorder   • Mood disorder (CMS/HCC)   • Closed wedge compression fracture of third thoracic vertebra with routine healing   • GERD (gastroesophageal reflux disease)   • S/P drug eluting coronary stent placement   • Chest pain   • Diastolic dysfunction   • Exertional angina (CMS/HCC)   • Unstable angina (CMS/HCC)   • Bladder mass   • Mixed action and resting tremor   • Bladder cancer (CMS/HCC)   • Acute cystitis without hematuria   • Generalized weakness   • Dyspnea on exertion   • BPH (benign prostatic hyperplasia)   • Right upper quadrant pain   • Calculus of gallbladder without cholecystitis   • Elevated LFTs   • Calculus of bile duct with acute cholecystitis without obstruction   • Atrial fibrillation (CMS/HCC)   • Dehydration   • Essential hypertension   • Abdominal pain   • Nausea without vomiting       No Known Allergies    Current Outpatient Medications on File Prior to Visit   Medication Sig Dispense Refill   • aspirin 81 MG chewable tablet Chew 1 tablet Daily. 30 tablet 0   • esomeprazole (nexIUM) 40 MG capsule Take 40 mg by mouth Every Morning Before  Breakfast.     • FLUoxetine (PROzac) 20 MG capsule Take 20 mg by mouth Daily.     • FLUoxetine (PROzac) 40 MG capsule Take 40 mg by mouth Every Morning. Patient takes total of 60 mg a day     • HYDROcodone-acetaminophen (NORCO) 5-325 MG per tablet Take 1 tablet by mouth Every 4 (Four) Hours As Needed for Moderate Pain  or Severe Pain . 18 tablet 0   • nitroglycerin (NITROSTAT) 0.4 MG SL tablet Place 0.4 mg under the tongue as needed for chest pain. Take no more than 3 doses in 15 minutes.     • ondansetron (ZOFRAN) 4 MG tablet Take 1 tablet by mouth Every 8 (Eight) Hours As Needed for Nausea or Vomiting. 30 tablet 0     No current facility-administered medications on file prior to visit.        Past Medical History:   Diagnosis Date   • Anxiety    • Back pain    • Bladder cancer (CMS/HCC)    • Coronary artery disease    • Depression    • Dizziness    • Fatigue    • GERD (gastroesophageal reflux disease)    • History of fractured rib     RIGHT SIDE   • Hyperlipidemia    • Hypertension    • Tremors of nervous system    • Urinary incontinence    • Vertigo        Family History   Problem Relation Age of Onset   • Arthritis Mother    • Glaucoma Mother    • Heart disease Father    • Emphysema Father    • Tremor Father    • Malig Hyperthermia Neg Hx        Social History     Socioeconomic History   • Marital status:      Spouse name: Not on file   • Number of children: 4   • Years of education: 5 college degrees   • Highest education level: Not on file   Occupational History   • Occupation: Retired   Tobacco Use   • Smoking status: Never Smoker   • Smokeless tobacco: Never Used   Substance and Sexual Activity   • Alcohol use: Yes     Frequency: Never     Drinks per session: 5 or 6     Binge frequency: Less than monthly     Comment: last drink about 1 year ago    • Drug use: No   • Sexual activity: Defer       Past Surgical History:   Procedure Laterality Date   • CARDIAC CATHETERIZATION      7x, 5 stents   •  CATARACT EXTRACTION, BILATERAL     • CHOLECYSTECTOMY N/A 9/2/2019    Procedure: CHOLECYSTECTOMY LAPAROSCOPIC WITH INTRAOPERATIVE CHOLANGIOGRAM;  Surgeon: Bg Rodriguez Jr., MD;  Location: [facility] MAIN OR;  Service: General   • COLONOSCOPY     • CORONARY ANGIOPLASTY WITH STENT PLACEMENT      X5    • CYSTOSCOPY BLADDER BIOPSY N/A 1/8/2019    Procedure: CYSTOSCOPY BLADDER BIOPSY;  Surgeon: Wang Soares Jr., MD;  Location: [facility] MAIN OR;  Service: Urology   • CYSTOSCOPY BLADDER BIOPSY N/A 6/13/2019    Procedure: CYSTOSCOPY BLADDER BIOPSY;  Surgeon: Wang Soares Jr., MD;  Location: [facility] MAIN OR;  Service: Urology   • CYSTOSCOPY TRANSURETHRAL RESECTION OF PROSTATE N/A 7/11/2019    Procedure: CYSTOSCOPY TRANSURETHRAL RESECTION OF PROSTATE;  Surgeon: Wang Soares Jr., MD;  Location: [facility] MAIN OR;  Service: Urology   • CYSTOSCOPY URETEROSCOPY LASER LITHOTRIPSY N/A 6/13/2019    Procedure: CYSTO LITHOPAXY;  Surgeon: Wang Soares Jr., MD;  Location: [facility] MAIN OR;  Service: Urology   • ERCP N/A 9/3/2019    Procedure: ENDOSCOPIC RETROGRADE CHOLANGIOPANCREATOGRAPHY with sphincterotomy and balloon sweep;  Surgeon: Ashok Amaya MD;  Location: [facility] ENDOSCOPY;  Service: Gastroenterology   • EYE SURGERY      Lens implants   • LUMBAR DISCECTOMY FUSION INSTRUMENTATION     • SKIN CANCER EXCISION Left     chest wall   • TRANSURETHRAL RESECTION OF BLADDER TUMOR N/A 11/29/2018    Procedure: TUR BLADDER TUMOR  LARGE;  Surgeon: Wang Soares Jr., MD;  Location: [facility] MAIN OR;  Service: Urology       PHQ-9 Depression Screening  Little interest or pleasure in doing things?     Feeling down, depressed, or hopeless?     Trouble falling or staying asleep, or sleeping too much?     Feeling tired or having little energy?     Poor appetite or overeating?     Feeling bad about yourself - or that you are a failure or have let yourself or your family down?     Trouble concentrating on things, such  "as reading the newspaper or watching television?     Moving or speaking so slowly that other people could have noticed? Or the opposite - being so fidgety or restless that you have been moving around a lot more than usual?     Thoughts that you would be better off dead, or of hurting yourself in some way?     PHQ-9 Total Score     If you checked off any problems, how difficult have these problems made it for you to do your work, take care of things at home, or get along with other people?       The following portions of the patient's history were reviewed and updated as appropriate: problem list, allergies, current medications, past medical history, past family history, past social history and past surgical history.    Review of Systems   Constitution: Positive for malaise/fatigue.   Skin: Negative for color change.   Neurological: Positive for dizziness.       Immunization History   Administered Date(s) Administered   • Flu Vaccine High Dose PF 65YR+ 10/12/2016   • flucelvax quad pfs =>4 YRS 11/30/2018       Objective   Vitals:    09/20/19 1055 09/20/19 1106 09/20/19 1111   BP:  122/68 94/58   Weight: 82 kg (180 lb 12.8 oz)     Height: 182.9 cm (72.01\")       Physical Exam   Constitutional: He appears well-developed and well-nourished.   HENT:   Head: Normocephalic and atraumatic.   Mouth/Throat: Oropharynx is clear and moist.   Eyes: Conjunctivae are normal.   Cardiovascular: Normal rate, regular rhythm and normal heart sounds.   No murmur heard.  Pulmonary/Chest: Effort normal and breath sounds normal.   Skin: Skin is warm and dry.   Vitals reviewed.      Assessment/Plan   Sukhdev was seen today for hypertension.    Diagnoses and all orders for this visit:    Dehydration    Elevated LFTs    Generalized weakness    Other orders  -     esomeprazole (NEXIUM) 40 MG capsule; Take 1 capsule by mouth Every Morning Before Breakfast.    Hydration is improved, he is orthostatic. They have brought his BP readings throughout " the day from the last few days. As the day continues, his BP is up. He is still orthostatic however it can get as high as in the low 140's. I want to add compression stockings during the day, continue salt and hydration for the next week. He is not symptomatic today in the office. If something changes they will call me.     Reviewed all hospital records.

## 2019-09-24 LAB
ALBUMIN SERPL-MCNC: 3.6 G/DL (ref 3.5–5.2)
ALBUMIN/GLOB SERPL: 1.4 G/DL
ALP SERPL-CCNC: 996 U/L (ref 39–117)
ALT SERPL-CCNC: 115 U/L (ref 1–41)
AST SERPL-CCNC: 128 U/L (ref 1–40)
BILIRUB SERPL-MCNC: 2.3 MG/DL (ref 0.2–1.2)
BUN SERPL-MCNC: 13 MG/DL (ref 8–23)
BUN/CREAT SERPL: 13 (ref 7–25)
CALCIUM SERPL-MCNC: 8.8 MG/DL (ref 8.6–10.5)
CHLORIDE SERPL-SCNC: 103 MMOL/L (ref 98–107)
CO2 SERPL-SCNC: 25.8 MMOL/L (ref 22–29)
CREAT SERPL-MCNC: 1 MG/DL (ref 0.76–1.27)
GLOBULIN SER CALC-MCNC: 2.6 GM/DL
GLUCOSE SERPL-MCNC: 137 MG/DL (ref 65–99)
LIPASE SERPL-CCNC: 32 U/L (ref 13–60)
POTASSIUM SERPL-SCNC: 3.9 MMOL/L (ref 3.5–5.2)
PROT SERPL-MCNC: 6.2 G/DL (ref 6–8.5)
SODIUM SERPL-SCNC: 142 MMOL/L (ref 136–145)

## 2019-09-25 ENCOUNTER — TELEPHONE (OUTPATIENT)
Dept: GASTROENTEROLOGY | Facility: CLINIC | Age: 79
End: 2019-09-25

## 2019-09-25 ENCOUNTER — TELEPHONE (OUTPATIENT)
Dept: INTERNAL MEDICINE | Facility: CLINIC | Age: 79
End: 2019-09-25

## 2019-09-25 NOTE — TELEPHONE ENCOUNTER
Returned spouse's phone call. She requested results from Monday's lab work. Advised the results were erroneously sent to the patient's PCP. Advised will send an update to Dr. Villasenor. She verb understanding.

## 2019-09-25 NOTE — TELEPHONE ENCOUNTER
----- Message from Herminio Kidd sent at 9/25/2019  3:06 PM EDT -----  Regarding: lab result  Contact: [phone]  Pt wife is calling about lab results.

## 2019-09-25 NOTE — TELEPHONE ENCOUNTER
Patient wife called and left message wanting to know the results to his labs regarding his enzymes level and was told Rachelle will get them as well it determines weather he has a biopsy on Fri. She said it has been 48 hours and was done 1 on Monday.

## 2019-09-26 ENCOUNTER — OFFICE VISIT (OUTPATIENT)
Dept: SURGERY | Facility: CLINIC | Age: 79
End: 2019-09-26

## 2019-09-26 VITALS — HEART RATE: 76 BPM | OXYGEN SATURATION: 98 %

## 2019-09-26 DIAGNOSIS — Z48.89 POSTOPERATIVE VISIT: Primary | ICD-10-CM

## 2019-09-26 PROCEDURE — 99024 POSTOP FOLLOW-UP VISIT: CPT | Performed by: SURGERY

## 2019-09-26 NOTE — PROGRESS NOTES
Subjective   Sukhdev Zayas is a 78 y.o. male who returns to the office after undergoing a laparoscopic cholecystectomy on 9/2/2019 followed by an ERCP with CBD stone removal on 9/3/2019.     History of Present Illness     The patient is recovering well from his surgery but had a rehospitalization on 9/11/2019 with continued elevation of his liver function test.  The pattern was consistent with obstructive jaundice.  A MRCP showed no retained common bile duct stones.  He is scheduled for a liver biopsy tomorrow.    He had liver function test drawn 2 days ago which show improvement from his hospital levels but they are still elevated.  He has a previous history of heavy drinking but has not been drinking for several years now.    He is generally feeling better.  He has no abdominal pain.  He is eating well with normal bowel function.  His energy level is poor.    Review of Systems   Constitutional: Positive for fatigue. Negative for activity change, appetite change and fever.   Respiratory: Negative for chest tightness and shortness of breath.    Cardiovascular: Negative for chest pain and palpitations.   Gastrointestinal: Negative for abdominal pain, constipation, diarrhea and nausea.   Skin: Negative for rash and wound.   Psychiatric/Behavioral: Negative for agitation and confusion.       Objective   Physical Exam   Constitutional:  Non-toxic appearance. He does not appear ill. No distress.   Pulmonary/Chest: Effort normal. No respiratory distress.   Abdominal: Soft. Normal appearance. There is no tenderness.   Neurological: He is alert.   Skin:   Incision: intact with no evidence of infection.   Psychiatric: He has a normal mood and affect. His behavior is normal.       Assessment/Plan   The encounter diagnosis was Postoperative visit.    The patient is recovering well from his laparoscopic cholecystectomy with intraoperative cholangiogram.  He continues to have abnormal liver function test and is undergoing an  evaluation for primary liver disease.

## 2019-09-26 NOTE — TELEPHONE ENCOUNTER
"Per Dr Villasenor: \"Numbers have not improved enough to cancel liver biopsy, proceed with liver biopsy\"       Called pt's wife (ok per LOULOU) and advised of the note from Dr Villasenor. She verb understanding.   "

## 2019-09-27 ENCOUNTER — HOSPITAL ENCOUNTER (OUTPATIENT)
Dept: CT IMAGING | Facility: HOSPITAL | Age: 79
Discharge: HOME | End: 2019-09-27
Admitting: INTERNAL MEDICINE

## 2019-09-27 VITALS
HEART RATE: 63 BPM | WEIGHT: 180 LBS | SYSTOLIC BLOOD PRESSURE: 137 MMHG | OXYGEN SATURATION: 96 % | DIASTOLIC BLOOD PRESSURE: 88 MMHG | RESPIRATION RATE: 16 BRPM | BODY MASS INDEX: 24.38 KG/M2 | HEIGHT: 72 IN | TEMPERATURE: 98.1 F

## 2019-09-27 DIAGNOSIS — R74.8 ELEVATED LIVER ENZYMES: ICD-10-CM

## 2019-09-27 LAB
INR PPP: 1.1 (ref 0.8–1.2)
PLATELET # BLD AUTO: 223 10*3/MM3 (ref 140–450)
PROTHROMBIN TIME: 13.4 SECONDS (ref 12.8–15.2)

## 2019-09-27 PROCEDURE — 88312 SPECIAL STAINS GROUP 1: CPT | Performed by: INTERNAL MEDICINE

## 2019-09-27 PROCEDURE — 63710000001 ALPRAZOLAM 0.25 MG TABLET: Performed by: RADIOLOGY

## 2019-09-27 PROCEDURE — 85049 AUTOMATED PLATELET COUNT: CPT | Performed by: RADIOLOGY

## 2019-09-27 PROCEDURE — 88307 TISSUE EXAM BY PATHOLOGIST: CPT | Performed by: INTERNAL MEDICINE

## 2019-09-27 PROCEDURE — 85610 PROTHROMBIN TIME: CPT

## 2019-09-27 PROCEDURE — 88313 SPECIAL STAINS GROUP 2: CPT | Performed by: INTERNAL MEDICINE

## 2019-09-27 PROCEDURE — 25010000003 LIDOCAINE 1 % SOLUTION: Performed by: INTERNAL MEDICINE

## 2019-09-27 PROCEDURE — 77012 CT SCAN FOR NEEDLE BIOPSY: CPT

## 2019-09-27 PROCEDURE — A9270 NON-COVERED ITEM OR SERVICE: HCPCS | Performed by: RADIOLOGY

## 2019-09-27 RX ORDER — CLOPIDOGREL BISULFATE 75 MG/1
75 TABLET ORAL DAILY
Status: ON HOLD | COMMUNITY
Start: 2019-09-25 | End: 2020-03-15 | Stop reason: SDUPTHER

## 2019-09-27 RX ORDER — OMEPRAZOLE 40 MG/1
40 CAPSULE, DELAYED RELEASE ORAL DAILY
Qty: 90 CAPSULE | Refills: 4 | Status: SHIPPED | OUTPATIENT
Start: 2019-09-27 | End: 2020-08-18 | Stop reason: SDUPTHER

## 2019-09-27 RX ORDER — ALPRAZOLAM 0.25 MG/1
0.25 TABLET ORAL ONCE
Status: COMPLETED | OUTPATIENT
Start: 2019-09-27 | End: 2019-09-27

## 2019-09-27 RX ORDER — LIDOCAINE HYDROCHLORIDE 10 MG/ML
20 INJECTION, SOLUTION INFILTRATION; PERINEURAL ONCE
Status: COMPLETED | OUTPATIENT
Start: 2019-09-27 | End: 2019-09-27

## 2019-09-27 RX ADMIN — LIDOCAINE HYDROCHLORIDE 20 ML: 10 INJECTION, SOLUTION INFILTRATION; PERINEURAL at 08:15

## 2019-09-27 RX ADMIN — ALPRAZOLAM 0.25 MG: 0.25 TABLET ORAL at 07:30

## 2019-09-28 ENCOUNTER — READMISSION MANAGEMENT (OUTPATIENT)
Dept: CALL CENTER | Facility: HOSPITAL | Age: 79
End: 2019-09-28

## 2019-09-28 NOTE — OUTREACH NOTE
Medical Week 2 Survey      Responses   Facility patient discharged from?  [place]   Does the patient have one of the following disease processes/diagnoses(primary or secondary)?  Other   Week 2 attempt successful?  Yes   Call start time  1444   Call end time  1447   Is patient permission given to speak with other caregiver?  Yes   Person spoke with today (if not patient) and relationship  spouse- [place]   Meds reviewed with patient/caregiver?  Yes   Is the patient having any side effects they believe may be caused by any medication additions or changes?  No   Does the patient have all medications ordered at discharge?  Yes   Is the patient taking all medications as directed (includes completed medication regime)?  Yes   Does the patient have a primary care provider?   Yes   Does the patient have an appointment with their PCP within 7 days of discharge?  Yes   Has the patient kept scheduled appointments due by today?  Yes   What is the [facility] health agency?   [facility]   Has home health visited the patient within 72 hours of discharge?  Yes   Psychosocial issues?  No   Comments  No major complaints. Had a CT BX yesterday. Still recovering.   Did the patient receive a copy of their discharge instructions?  Yes   Nursing interventions  Reviewed instructions with patient   What is the patient's perception of their health status since discharge?  Improving   Is the patient/caregiver able to teach back signs and symptoms related to disease process for when to call PCP?  Yes   Is the patient/caregiver able to teach back signs and symptoms related to disease process for when to call 911?  Yes   Is the patient/caregiver able to teach back the hierarchy of who to call/visit for symptoms/problems? PCP, Specialist, Home health nurse, Urgent Care, ED, 911  Yes   Week 2 Call Completed?  Yes          Jose Quintanilla RN

## 2019-10-01 NOTE — PROGRESS NOTES
The pathologist believes it is a drug reaction causing this.  No evidence of infection or cancer.  The numbers are improving so I would not stop any of his essential medications  I would like a second opinion from the [facility] otology clinic, Dr. Santana or 1 of his partners  Please schedule an office visit and make sure that all of his labs and pathology are printed up so Mr. soliman can take them with him    Office visit nurse practitioner 1 month, we will recheck CMP on that day    thx

## 2019-10-02 ENCOUNTER — TELEPHONE (OUTPATIENT)
Dept: INTERNAL MEDICINE | Facility: CLINIC | Age: 79
End: 2019-10-02

## 2019-10-02 NOTE — TELEPHONE ENCOUNTER
Wife called and left a message stating that patient's blood pressure has been low for the past few weeks, and then he started the Plavix yesterday and his blood pressure numbers was 103/75, 130/88. She says that today they are extremely high above normal, 140/94, 139/97, 104/74. She wants to know is it high because of the Plavix? Please advise

## 2019-10-02 NOTE — TELEPHONE ENCOUNTER
plavix would not increase his BP. Have her keep an eye on it and if it continues to stay elevated I will have him start his BP meds again.

## 2019-10-03 ENCOUNTER — TELEPHONE (OUTPATIENT)
Dept: INTERNAL MEDICINE | Facility: CLINIC | Age: 79
End: 2019-10-03

## 2019-10-03 ENCOUNTER — APPOINTMENT (OUTPATIENT)
Dept: CT IMAGING | Facility: HOSPITAL | Age: 79
End: 2019-10-03

## 2019-10-03 ENCOUNTER — HOSPITAL ENCOUNTER (EMERGENCY)
Facility: HOSPITAL | Age: 79
Discharge: HOME | End: 2019-10-03
Admitting: EMERGENCY MEDICINE

## 2019-10-03 ENCOUNTER — TELEPHONE (OUTPATIENT)
Dept: GASTROENTEROLOGY | Facility: CLINIC | Age: 79
End: 2019-10-03

## 2019-10-03 VITALS
BODY MASS INDEX: 24.38 KG/M2 | WEIGHT: 180 LBS | OXYGEN SATURATION: 94 % | RESPIRATION RATE: 16 BRPM | TEMPERATURE: 98.6 F | HEIGHT: 72 IN | HEART RATE: 96 BPM | DIASTOLIC BLOOD PRESSURE: 76 MMHG | SYSTOLIC BLOOD PRESSURE: 123 MMHG

## 2019-10-03 DIAGNOSIS — N39.0 ACUTE URINARY TRACT INFECTION: ICD-10-CM

## 2019-10-03 DIAGNOSIS — R10.84 GENERALIZED ABDOMINAL PAIN: Primary | ICD-10-CM

## 2019-10-03 DIAGNOSIS — R79.89 ELEVATED LIVER FUNCTION TESTS: ICD-10-CM

## 2019-10-03 LAB
ALBUMIN SERPL-MCNC: 3.7 G/DL (ref 3.5–5.2)
ALBUMIN/GLOB SERPL: 1.4 G/DL
ALP SERPL-CCNC: 735 U/L (ref 39–117)
ALT SERPL W P-5'-P-CCNC: 177 U/L (ref 1–41)
ANION GAP SERPL CALCULATED.3IONS-SCNC: 14.3 MMOL/L (ref 5–15)
AST SERPL-CCNC: 329 U/L (ref 1–40)
BACTERIA UR QL AUTO: ABNORMAL /HPF
BASOPHILS # BLD AUTO: 0.04 10*3/MM3 (ref 0–0.2)
BASOPHILS NFR BLD AUTO: 0.3 % (ref 0–1.5)
BILIRUB SERPL-MCNC: 3.2 MG/DL (ref 0.2–1.2)
BILIRUB UR QL STRIP: NEGATIVE
BUN BLD-MCNC: 18 MG/DL (ref 8–23)
BUN/CREAT SERPL: 16.1 (ref 7–25)
CALCIUM SPEC-SCNC: 8.5 MG/DL (ref 8.6–10.5)
CHLORIDE SERPL-SCNC: 103 MMOL/L (ref 98–107)
CLARITY UR: ABNORMAL
CO2 SERPL-SCNC: 21.7 MMOL/L (ref 22–29)
COLOR UR: ABNORMAL
CREAT BLD-MCNC: 1.12 MG/DL (ref 0.76–1.27)
DEPRECATED RDW RBC AUTO: 49.7 FL (ref 37–54)
EOSINOPHIL # BLD AUTO: 0.01 10*3/MM3 (ref 0–0.4)
EOSINOPHIL NFR BLD AUTO: 0.1 % (ref 0.3–6.2)
ERYTHROCYTE [DISTWIDTH] IN BLOOD BY AUTOMATED COUNT: 14.7 % (ref 12.3–15.4)
GFR SERPL CREATININE-BSD FRML MDRD: 63 ML/MIN/1.73
GLOBULIN UR ELPH-MCNC: 2.6 GM/DL
GLUCOSE BLD-MCNC: 93 MG/DL (ref 65–99)
GLUCOSE UR STRIP-MCNC: NEGATIVE MG/DL
HCT VFR BLD AUTO: 36.6 % (ref 37.5–51)
HGB BLD-MCNC: 12.1 G/DL (ref 13–17.7)
HGB UR QL STRIP.AUTO: NEGATIVE
HOLD SPECIMEN: NORMAL
HOLD SPECIMEN: NORMAL
HYALINE CASTS UR QL AUTO: ABNORMAL /LPF
IMM GRANULOCYTES # BLD AUTO: 0.1 10*3/MM3 (ref 0–0.05)
IMM GRANULOCYTES NFR BLD AUTO: 0.7 % (ref 0–0.5)
INR PPP: 1.06 (ref 0.9–1.1)
KETONES UR QL STRIP: ABNORMAL
LEUKOCYTE ESTERASE UR QL STRIP.AUTO: ABNORMAL
LIPASE SERPL-CCNC: 26 U/L (ref 13–60)
LYMPHOCYTES # BLD AUTO: 0.31 10*3/MM3 (ref 0.7–3.1)
LYMPHOCYTES NFR BLD AUTO: 2.3 % (ref 19.6–45.3)
MCH RBC QN AUTO: 30.9 PG (ref 26.6–33)
MCHC RBC AUTO-ENTMCNC: 33.1 G/DL (ref 31.5–35.7)
MCV RBC AUTO: 93.4 FL (ref 79–97)
MONOCYTES # BLD AUTO: 0.86 10*3/MM3 (ref 0.1–0.9)
MONOCYTES NFR BLD AUTO: 6.4 % (ref 5–12)
NEUTROPHILS # BLD AUTO: 12.06 10*3/MM3 (ref 1.7–7)
NEUTROPHILS NFR BLD AUTO: 90.2 % (ref 42.7–76)
NITRITE UR QL STRIP: NEGATIVE
NRBC BLD AUTO-RTO: 0 /100 WBC (ref 0–0.2)
PH UR STRIP.AUTO: 7 [PH] (ref 5–8)
PLATELET # BLD AUTO: 178 10*3/MM3 (ref 140–450)
PMV BLD AUTO: 10.5 FL (ref 6–12)
POTASSIUM BLD-SCNC: 4 MMOL/L (ref 3.5–5.2)
PROT SERPL-MCNC: 6.3 G/DL (ref 6–8.5)
PROT UR QL STRIP: ABNORMAL
PROTHROMBIN TIME: 13.5 SECONDS (ref 11.7–14.2)
RBC # BLD AUTO: 3.92 10*6/MM3 (ref 4.14–5.8)
RBC # UR: ABNORMAL /HPF
REF LAB TEST METHOD: ABNORMAL
SODIUM BLD-SCNC: 139 MMOL/L (ref 136–145)
SP GR UR STRIP: 1.02 (ref 1–1.03)
SQUAMOUS #/AREA URNS HPF: ABNORMAL /HPF
UROBILINOGEN UR QL STRIP: ABNORMAL
WBC NRBC COR # BLD: 13.38 10*3/MM3 (ref 3.4–10.8)
WBC UR QL AUTO: ABNORMAL /HPF
WHOLE BLOOD HOLD SPECIMEN: NORMAL
WHOLE BLOOD HOLD SPECIMEN: NORMAL

## 2019-10-03 PROCEDURE — 83690 ASSAY OF LIPASE: CPT | Performed by: EMERGENCY MEDICINE

## 2019-10-03 PROCEDURE — 99284 EMERGENCY DEPT VISIT MOD MDM: CPT

## 2019-10-03 PROCEDURE — 85025 COMPLETE CBC W/AUTO DIFF WBC: CPT | Performed by: EMERGENCY MEDICINE

## 2019-10-03 PROCEDURE — 87086 URINE CULTURE/COLONY COUNT: CPT | Performed by: EMERGENCY MEDICINE

## 2019-10-03 PROCEDURE — 80053 COMPREHEN METABOLIC PANEL: CPT | Performed by: EMERGENCY MEDICINE

## 2019-10-03 PROCEDURE — 51798 US URINE CAPACITY MEASURE: CPT

## 2019-10-03 PROCEDURE — 85610 PROTHROMBIN TIME: CPT | Performed by: NURSE PRACTITIONER

## 2019-10-03 PROCEDURE — 74177 CT ABD & PELVIS W/CONTRAST: CPT

## 2019-10-03 PROCEDURE — 87077 CULTURE AEROBIC IDENTIFY: CPT | Performed by: EMERGENCY MEDICINE

## 2019-10-03 PROCEDURE — 81001 URINALYSIS AUTO W/SCOPE: CPT | Performed by: EMERGENCY MEDICINE

## 2019-10-03 PROCEDURE — 87186 SC STD MICRODIL/AGAR DIL: CPT | Performed by: EMERGENCY MEDICINE

## 2019-10-03 PROCEDURE — 25010000002 IOPAMIDOL 61 % SOLUTION: Performed by: NURSE PRACTITIONER

## 2019-10-03 RX ORDER — NITROFURANTOIN 25; 75 MG/1; MG/1
100 CAPSULE ORAL ONCE
Status: COMPLETED | OUTPATIENT
Start: 2019-10-03 | End: 2019-10-03

## 2019-10-03 RX ORDER — NITROFURANTOIN 25; 75 MG/1; MG/1
100 CAPSULE ORAL 2 TIMES DAILY
Qty: 10 CAPSULE | Refills: 0 | Status: ON HOLD | OUTPATIENT
Start: 2019-10-03 | End: 2019-10-07

## 2019-10-03 RX ORDER — SODIUM CHLORIDE 0.9 % (FLUSH) 0.9 %
10 SYRINGE (ML) INJECTION AS NEEDED
Status: DISCONTINUED | OUTPATIENT
Start: 2019-10-03 | End: 2019-10-03 | Stop reason: HOSPADM

## 2019-10-03 RX ADMIN — NITROFURANTOIN MONOHYDRATE/MACROCRYSTALLINE 100 MG: 25; 75 CAPSULE ORAL at 21:07

## 2019-10-03 RX ADMIN — IOPAMIDOL 85 ML: 612 INJECTION, SOLUTION INTRAVENOUS at 18:50

## 2019-10-03 NOTE — TELEPHONE ENCOUNTER
Called pt's wife and advised of the note from Dr Villasenor. Advised will send the referral to Dr Kuo office and someone there will call to arrange an office visit. She verb understanding and stets they are currently in the ER. Pt had the liver biopsy done on 9/27/19. The last few days, he has had increased pain at the site, a high fever, chills and nausea. She is afraid the biopsy has led to an infection. She wants Dr Villasenor apprised of the situation. Advised will send a message to MD so he can be kept in the loop and will also make sure the referral is sent to Dr Santana's office. She verb understanding and states she will call back to make the appt at this office.

## 2019-10-03 NOTE — TELEPHONE ENCOUNTER
----- Message from Tatiana Luu sent at 10/3/2019  2:52 PM EDT -----  Regarding: path results and question  Contact: [phone]  Pt wife is calling for pt results and other question

## 2019-10-03 NOTE — TELEPHONE ENCOUNTER
----- Message from Aubrey Villasenor MD sent at 10/1/2019  2:33 PM EDT -----  The pathologist believes it is a drug reaction causing this.  No evidence of infection or cancer.  The numbers are improving so I would not stop any of his essential medications  I would like a second opinion from the [facility] otology clinic, Dr. Santana or 1 of his partners  Please schedule an office visit and make sure that all of his labs and pathology are printed up so Mr. soliman can take them with him    Office visit nurse practitioner 1 month, we will recheck CMP on that day    thx

## 2019-10-03 NOTE — TELEPHONE ENCOUNTER
Felicity Yin  From  Home health pt looks really bad not feeling well pain around liver area wants to be seen pleaase advise [phone] pt sees BEN

## 2019-10-03 NOTE — TELEPHONE ENCOUNTER
I reviewed his notes. He was hospitalized recently. If he feels that bad then he needs to go to ER here.

## 2019-10-04 ENCOUNTER — HOSPITAL ENCOUNTER (INPATIENT)
Facility: HOSPITAL | Age: 79
LOS: 5 days | Discharge: HOME HEALTH | End: 2019-10-09
Attending: EMERGENCY MEDICINE | Admitting: INTERNAL MEDICINE

## 2019-10-04 ENCOUNTER — READMISSION MANAGEMENT (OUTPATIENT)
Dept: CALL CENTER | Facility: HOSPITAL | Age: 79
End: 2019-10-04

## 2019-10-04 ENCOUNTER — TELEPHONE (OUTPATIENT)
Dept: INTERNAL MEDICINE | Facility: CLINIC | Age: 79
End: 2019-10-04

## 2019-10-04 DIAGNOSIS — R65.20 SEPSIS WITH ACUTE LIVER FAILURE WITHOUT HEPATIC COMA OR SEPTIC SHOCK, DUE TO UNSPECIFIED ORGANISM (HCC): ICD-10-CM

## 2019-10-04 DIAGNOSIS — R10.9 ABDOMINAL PAIN, UNSPECIFIED ABDOMINAL LOCATION: Primary | ICD-10-CM

## 2019-10-04 DIAGNOSIS — I10 ESSENTIAL HYPERTENSION: ICD-10-CM

## 2019-10-04 DIAGNOSIS — E87.20 LACTIC ACIDOSIS: ICD-10-CM

## 2019-10-04 DIAGNOSIS — R50.9 FEVER, UNSPECIFIED: ICD-10-CM

## 2019-10-04 DIAGNOSIS — A41.9 SEPSIS WITH ACUTE LIVER FAILURE WITHOUT HEPATIC COMA OR SEPTIC SHOCK, DUE TO UNSPECIFIED ORGANISM (HCC): ICD-10-CM

## 2019-10-04 DIAGNOSIS — R17 ELEVATED BILIRUBIN: ICD-10-CM

## 2019-10-04 DIAGNOSIS — K72.00 SEPSIS WITH ACUTE LIVER FAILURE WITHOUT HEPATIC COMA OR SEPTIC SHOCK, DUE TO UNSPECIFIED ORGANISM (HCC): ICD-10-CM

## 2019-10-04 PROBLEM — N39.0 SEPSIS DUE TO URINARY TRACT INFECTION (HCC): Status: ACTIVE | Noted: 2019-10-04

## 2019-10-04 PROBLEM — R42 DIZZINESS: Status: ACTIVE | Noted: 2019-10-04

## 2019-10-04 LAB
ALBUMIN SERPL-MCNC: 4.3 G/DL (ref 3.5–5.2)
ALBUMIN/GLOB SERPL: 1.7 G/DL
ALP SERPL-CCNC: 799 U/L (ref 39–117)
ALT SERPL W P-5'-P-CCNC: 216 U/L (ref 1–41)
ANION GAP SERPL CALCULATED.3IONS-SCNC: 21.7 MMOL/L (ref 5–15)
AST SERPL-CCNC: 297 U/L (ref 1–40)
BACTERIA BLD CULT: ABNORMAL
BASOPHILS # BLD AUTO: 0.02 10*3/MM3 (ref 0–0.2)
BASOPHILS NFR BLD AUTO: 0.2 % (ref 0–1.5)
BILIRUB CONJ SERPL-MCNC: 4.9 MG/DL (ref 0.2–0.3)
BILIRUB INDIRECT SERPL-MCNC: 0.6 MG/DL
BILIRUB SERPL-MCNC: 5.5 MG/DL (ref 0.2–1.2)
BILIRUB SERPL-MCNC: 6.5 MG/DL (ref 0.2–1.2)
BUN BLD-MCNC: 19 MG/DL (ref 8–23)
BUN/CREAT SERPL: 14.3 (ref 7–25)
CALCIUM SPEC-SCNC: 9.5 MG/DL (ref 8.6–10.5)
CERULOPLASMIN SERPL-MCNC: 29 MG/DL (ref 16–31)
CHLORIDE SERPL-SCNC: 99 MMOL/L (ref 98–107)
CO2 SERPL-SCNC: 18.3 MMOL/L (ref 22–29)
CREAT BLD-MCNC: 1.33 MG/DL (ref 0.76–1.27)
D-LACTATE SERPL-SCNC: 2.2 MMOL/L (ref 0.5–2)
D-LACTATE SERPL-SCNC: 2.6 MMOL/L (ref 0.5–2)
DEPRECATED RDW RBC AUTO: 52.3 FL (ref 37–54)
EOSINOPHIL # BLD AUTO: 0 10*3/MM3 (ref 0–0.4)
EOSINOPHIL NFR BLD AUTO: 0 % (ref 0.3–6.2)
ERYTHROCYTE [DISTWIDTH] IN BLOOD BY AUTOMATED COUNT: 15.2 % (ref 12.3–15.4)
FLUAV AG NPH QL: NEGATIVE
FLUBV AG NPH QL IA: NEGATIVE
GFR SERPL CREATININE-BSD FRML MDRD: 52 ML/MIN/1.73
GLOBULIN UR ELPH-MCNC: 2.5 GM/DL
GLUCOSE BLD-MCNC: 65 MG/DL (ref 65–99)
HCT VFR BLD AUTO: 35.1 % (ref 37.5–51)
HGB BLD-MCNC: 11.4 G/DL (ref 13–17.7)
HOLD SPECIMEN: NORMAL
IMM GRANULOCYTES # BLD AUTO: 0.08 10*3/MM3 (ref 0–0.05)
IMM GRANULOCYTES NFR BLD AUTO: 0.6 % (ref 0–0.5)
LIPASE SERPL-CCNC: 19 U/L (ref 13–60)
LYMPHOCYTES # BLD AUTO: 0.24 10*3/MM3 (ref 0.7–3.1)
LYMPHOCYTES NFR BLD AUTO: 1.8 % (ref 19.6–45.3)
MCH RBC QN AUTO: 30.2 PG (ref 26.6–33)
MCHC RBC AUTO-ENTMCNC: 32.5 G/DL (ref 31.5–35.7)
MCV RBC AUTO: 93.1 FL (ref 79–97)
MONOCYTES # BLD AUTO: 0.5 10*3/MM3 (ref 0.1–0.9)
MONOCYTES NFR BLD AUTO: 3.8 % (ref 5–12)
NEUTROPHILS # BLD AUTO: 12.2 10*3/MM3 (ref 1.7–7)
NEUTROPHILS NFR BLD AUTO: 93.6 % (ref 42.7–76)
NRBC BLD AUTO-RTO: 0 /100 WBC (ref 0–0.2)
PLAT MORPH BLD: NORMAL
PLATELET # BLD AUTO: 154 10*3/MM3 (ref 140–450)
PMV BLD AUTO: 10.5 FL (ref 6–12)
POTASSIUM BLD-SCNC: 4.5 MMOL/L (ref 3.5–5.2)
PROT SERPL-MCNC: 6.8 G/DL (ref 6–8.5)
RBC # BLD AUTO: 3.77 10*6/MM3 (ref 4.14–5.8)
RBC MORPH BLD: NORMAL
SODIUM BLD-SCNC: 139 MMOL/L (ref 136–145)
TROPONIN T SERPL-MCNC: <0.01 NG/ML (ref 0–0.03)
TROPONIN T SERPL-MCNC: <0.01 NG/ML (ref 0–0.03)
WBC MORPH BLD: NORMAL
WBC NRBC COR # BLD: 13.04 10*3/MM3 (ref 3.4–10.8)
WHOLE BLOOD HOLD SPECIMEN: NORMAL
WHOLE BLOOD HOLD SPECIMEN: NORMAL

## 2019-10-04 PROCEDURE — 25010000002 ONDANSETRON PER 1 MG: Performed by: NURSE PRACTITIONER

## 2019-10-04 PROCEDURE — 86235 NUCLEAR ANTIGEN ANTIBODY: CPT

## 2019-10-04 PROCEDURE — 87186 SC STD MICRODIL/AGAR DIL: CPT | Performed by: EMERGENCY MEDICINE

## 2019-10-04 PROCEDURE — 87040 BLOOD CULTURE FOR BACTERIA: CPT | Performed by: EMERGENCY MEDICINE

## 2019-10-04 PROCEDURE — 25010000002 PIPERACILLIN SOD-TAZOBACTAM PER 1 G: Performed by: NURSE PRACTITIONER

## 2019-10-04 PROCEDURE — 25010000002 PIPERACILLIN SOD-TAZOBACTAM PER 1 G: Performed by: EMERGENCY MEDICINE

## 2019-10-04 PROCEDURE — 83690 ASSAY OF LIPASE: CPT | Performed by: EMERGENCY MEDICINE

## 2019-10-04 PROCEDURE — 80053 COMPREHEN METABOLIC PANEL: CPT | Performed by: EMERGENCY MEDICINE

## 2019-10-04 PROCEDURE — 82247 BILIRUBIN TOTAL: CPT | Performed by: SURGERY

## 2019-10-04 PROCEDURE — 87150 DNA/RNA AMPLIFIED PROBE: CPT | Performed by: EMERGENCY MEDICINE

## 2019-10-04 PROCEDURE — 93010 ELECTROCARDIOGRAM REPORT: CPT | Performed by: INTERNAL MEDICINE

## 2019-10-04 PROCEDURE — 86038 ANTINUCLEAR ANTIBODIES: CPT | Performed by: INTERNAL MEDICINE

## 2019-10-04 PROCEDURE — 93005 ELECTROCARDIOGRAM TRACING: CPT | Performed by: EMERGENCY MEDICINE

## 2019-10-04 PROCEDURE — 25010000002 VANCOMYCIN 10 G RECONSTITUTED SOLUTION: Performed by: EMERGENCY MEDICINE

## 2019-10-04 PROCEDURE — 85007 BL SMEAR W/DIFF WBC COUNT: CPT | Performed by: EMERGENCY MEDICINE

## 2019-10-04 PROCEDURE — 87040 BLOOD CULTURE FOR BACTERIA: CPT

## 2019-10-04 PROCEDURE — 82248 BILIRUBIN DIRECT: CPT | Performed by: SURGERY

## 2019-10-04 PROCEDURE — 86225 DNA ANTIBODY NATIVE: CPT | Performed by: INTERNAL MEDICINE

## 2019-10-04 PROCEDURE — 83605 ASSAY OF LACTIC ACID: CPT | Performed by: EMERGENCY MEDICINE

## 2019-10-04 PROCEDURE — 84484 ASSAY OF TROPONIN QUANT: CPT | Performed by: NURSE PRACTITIONER

## 2019-10-04 PROCEDURE — 99222 1ST HOSP IP/OBS MODERATE 55: CPT | Performed by: INTERNAL MEDICINE

## 2019-10-04 PROCEDURE — 85025 COMPLETE CBC W/AUTO DIFF WBC: CPT | Performed by: EMERGENCY MEDICINE

## 2019-10-04 PROCEDURE — 99222 1ST HOSP IP/OBS MODERATE 55: CPT | Performed by: SURGERY

## 2019-10-04 PROCEDURE — 87077 CULTURE AEROBIC IDENTIFY: CPT | Performed by: EMERGENCY MEDICINE

## 2019-10-04 PROCEDURE — 82390 ASSAY OF CERULOPLASMIN: CPT | Performed by: INTERNAL MEDICINE

## 2019-10-04 PROCEDURE — 87804 INFLUENZA ASSAY W/OPTIC: CPT | Performed by: EMERGENCY MEDICINE

## 2019-10-04 PROCEDURE — 99285 EMERGENCY DEPT VISIT HI MDM: CPT

## 2019-10-04 RX ORDER — SODIUM CHLORIDE 9 MG/ML
75 INJECTION, SOLUTION INTRAVENOUS CONTINUOUS
Status: DISCONTINUED | OUTPATIENT
Start: 2019-10-04 | End: 2019-10-06

## 2019-10-04 RX ORDER — ONDANSETRON 2 MG/ML
4 INJECTION INTRAMUSCULAR; INTRAVENOUS EVERY 6 HOURS PRN
Status: DISCONTINUED | OUTPATIENT
Start: 2019-10-04 | End: 2019-10-09 | Stop reason: HOSPADM

## 2019-10-04 RX ORDER — SODIUM CHLORIDE 0.9 % (FLUSH) 0.9 %
10 SYRINGE (ML) INJECTION AS NEEDED
Status: DISCONTINUED | OUTPATIENT
Start: 2019-10-04 | End: 2019-10-09 | Stop reason: HOSPADM

## 2019-10-04 RX ORDER — FLUOXETINE HYDROCHLORIDE 20 MG/1
20 CAPSULE ORAL DAILY
Status: DISCONTINUED | OUTPATIENT
Start: 2019-10-04 | End: 2019-10-04

## 2019-10-04 RX ORDER — SODIUM CHLORIDE 0.9 % (FLUSH) 0.9 %
10 SYRINGE (ML) INJECTION EVERY 12 HOURS SCHEDULED
Status: DISCONTINUED | OUTPATIENT
Start: 2019-10-04 | End: 2019-10-09 | Stop reason: HOSPADM

## 2019-10-04 RX ORDER — ASPIRIN 81 MG/1
81 TABLET, CHEWABLE ORAL DAILY
Status: DISCONTINUED | OUTPATIENT
Start: 2019-10-04 | End: 2019-10-09 | Stop reason: HOSPADM

## 2019-10-04 RX ORDER — PANTOPRAZOLE SODIUM 40 MG/1
40 TABLET, DELAYED RELEASE ORAL
Status: DISCONTINUED | OUTPATIENT
Start: 2019-10-05 | End: 2019-10-09 | Stop reason: HOSPADM

## 2019-10-04 RX ORDER — FLUOXETINE HYDROCHLORIDE 20 MG/1
40 CAPSULE ORAL EVERY MORNING
Status: DISCONTINUED | OUTPATIENT
Start: 2019-10-05 | End: 2019-10-04

## 2019-10-04 RX ORDER — ACETAMINOPHEN 325 MG/1
650 TABLET ORAL EVERY 4 HOURS PRN
Status: DISCONTINUED | OUTPATIENT
Start: 2019-10-04 | End: 2019-10-09 | Stop reason: HOSPADM

## 2019-10-04 RX ORDER — ACETAMINOPHEN 160 MG/5ML
650 SOLUTION ORAL EVERY 4 HOURS PRN
Status: DISCONTINUED | OUTPATIENT
Start: 2019-10-04 | End: 2019-10-09 | Stop reason: HOSPADM

## 2019-10-04 RX ORDER — NITROGLYCERIN 0.4 MG/1
0.4 TABLET SUBLINGUAL
Status: DISCONTINUED | OUTPATIENT
Start: 2019-10-04 | End: 2019-10-09 | Stop reason: HOSPADM

## 2019-10-04 RX ORDER — FLUOXETINE HYDROCHLORIDE 20 MG/1
60 CAPSULE ORAL NIGHTLY
COMMUNITY
End: 2021-09-15

## 2019-10-04 RX ORDER — CLOPIDOGREL BISULFATE 75 MG/1
75 TABLET ORAL DAILY
Status: DISCONTINUED | OUTPATIENT
Start: 2019-10-04 | End: 2019-10-09 | Stop reason: HOSPADM

## 2019-10-04 RX ORDER — ACETAMINOPHEN 650 MG/1
650 SUPPOSITORY RECTAL EVERY 4 HOURS PRN
Status: DISCONTINUED | OUTPATIENT
Start: 2019-10-04 | End: 2019-10-09 | Stop reason: HOSPADM

## 2019-10-04 RX ORDER — FLUOXETINE HYDROCHLORIDE 20 MG/1
60 CAPSULE ORAL NIGHTLY
Status: DISCONTINUED | OUTPATIENT
Start: 2019-10-04 | End: 2019-10-09 | Stop reason: HOSPADM

## 2019-10-04 RX ADMIN — VANCOMYCIN HYDROCHLORIDE 1500 MG: 10 INJECTION, POWDER, LYOPHILIZED, FOR SOLUTION INTRAVENOUS at 10:33

## 2019-10-04 RX ADMIN — TAZOBACTAM SODIUM AND PIPERACILLIN SODIUM 3.38 G: 375; 3 INJECTION, SOLUTION INTRAVENOUS at 16:37

## 2019-10-04 RX ADMIN — TAZOBACTAM SODIUM AND PIPERACILLIN SODIUM 3.38 G: 375; 3 INJECTION, SOLUTION INTRAVENOUS at 10:02

## 2019-10-04 RX ADMIN — SODIUM CHLORIDE 1000 ML: 9 INJECTION, SOLUTION INTRAVENOUS at 10:19

## 2019-10-04 RX ADMIN — SODIUM CHLORIDE, PRESERVATIVE FREE 10 ML: 5 INJECTION INTRAVENOUS at 21:11

## 2019-10-04 RX ADMIN — FLUOXETINE HYDROCHLORIDE 60 MG: 20 CAPSULE ORAL at 21:10

## 2019-10-04 RX ADMIN — ASPIRIN 81 MG: 81 TABLET, CHEWABLE ORAL at 18:02

## 2019-10-04 RX ADMIN — SODIUM CHLORIDE 100 ML/HR: 9 INJECTION, SOLUTION INTRAVENOUS at 17:57

## 2019-10-04 RX ADMIN — SODIUM CHLORIDE 100 ML/HR: 9 INJECTION, SOLUTION INTRAVENOUS at 13:11

## 2019-10-04 RX ADMIN — CLOPIDOGREL 75 MG: 75 TABLET, FILM COATED ORAL at 18:02

## 2019-10-04 RX ADMIN — SODIUM CHLORIDE, PRESERVATIVE FREE 10 ML: 5 INJECTION INTRAVENOUS at 13:16

## 2019-10-04 RX ADMIN — ONDANSETRON 4 MG: 2 INJECTION INTRAMUSCULAR; INTRAVENOUS at 17:57

## 2019-10-04 NOTE — TELEPHONE ENCOUNTER
Patient wife called and stated that patient went into hospital yesterday afternoon and was released and then was admitted this morning at 5

## 2019-10-05 ENCOUNTER — APPOINTMENT (OUTPATIENT)
Dept: MRI IMAGING | Facility: HOSPITAL | Age: 79
End: 2019-10-05

## 2019-10-05 LAB
ALBUMIN SERPL-MCNC: 2.9 G/DL (ref 3.5–5.2)
ALBUMIN/GLOB SERPL: 1.1 G/DL
ALP SERPL-CCNC: 425 U/L (ref 39–117)
ALT SERPL W P-5'-P-CCNC: 169 U/L (ref 1–41)
AMYLASE SERPL-CCNC: 17 U/L (ref 28–100)
ANION GAP SERPL CALCULATED.3IONS-SCNC: 10.8 MMOL/L (ref 5–15)
ANISOCYTOSIS BLD QL: ABNORMAL
AST SERPL-CCNC: 192 U/L (ref 1–40)
BACTERIA SPEC AEROBE CULT: ABNORMAL
BILIRUB SERPL-MCNC: 4.9 MG/DL (ref 0.2–1.2)
BUN BLD-MCNC: 14 MG/DL (ref 8–23)
BUN/CREAT SERPL: 17.7 (ref 7–25)
CALCIUM SPEC-SCNC: 8.3 MG/DL (ref 8.6–10.5)
CHLORIDE SERPL-SCNC: 106 MMOL/L (ref 98–107)
CHOLEST SERPL-MCNC: 126 MG/DL (ref 0–200)
CO2 SERPL-SCNC: 20.2 MMOL/L (ref 22–29)
CREAT BLD-MCNC: 0.79 MG/DL (ref 0.76–1.27)
DEPRECATED RDW RBC AUTO: 49.8 FL (ref 37–54)
EOSINOPHIL # BLD MANUAL: 0.15 10*3/MM3 (ref 0–0.4)
EOSINOPHIL NFR BLD MANUAL: 2.1 % (ref 0.3–6.2)
ERYTHROCYTE [DISTWIDTH] IN BLOOD BY AUTOMATED COUNT: 14.8 % (ref 12.3–15.4)
GFR SERPL CREATININE-BSD FRML MDRD: 95 ML/MIN/1.73
GIANT PLATELETS: ABNORMAL
GLOBULIN UR ELPH-MCNC: 2.6 GM/DL
GLUCOSE BLD-MCNC: 83 MG/DL (ref 65–99)
HAV IGM SERPL QL IA: NORMAL
HBV CORE IGM SERPL QL IA: NORMAL
HBV SURFACE AG SERPL QL IA: NORMAL
HCT VFR BLD AUTO: 30.2 % (ref 37.5–51)
HCV AB SER DONR QL: NORMAL
HDLC SERPL-MCNC: 39 MG/DL (ref 40–60)
HGB BLD-MCNC: 10.1 G/DL (ref 13–17.7)
HYPOCHROMIA BLD QL: ABNORMAL
LDLC SERPL CALC-MCNC: 69 MG/DL (ref 0–100)
LDLC/HDLC SERPL: 1.77 {RATIO}
LIPASE SERPL-CCNC: 13 U/L (ref 13–60)
LYMPHOCYTES # BLD MANUAL: 0.52 10*3/MM3 (ref 0.7–3.1)
LYMPHOCYTES NFR BLD MANUAL: 5.2 % (ref 5–12)
LYMPHOCYTES NFR BLD MANUAL: 7.2 % (ref 19.6–45.3)
MCH RBC QN AUTO: 30.3 PG (ref 26.6–33)
MCHC RBC AUTO-ENTMCNC: 33.4 G/DL (ref 31.5–35.7)
MCV RBC AUTO: 90.7 FL (ref 79–97)
MONOCYTES # BLD AUTO: 0.37 10*3/MM3 (ref 0.1–0.9)
NEUTROPHILS # BLD AUTO: 6.17 10*3/MM3 (ref 1.7–7)
NEUTROPHILS NFR BLD MANUAL: 85.6 % (ref 42.7–76)
OVALOCYTES BLD QL SMEAR: ABNORMAL
PLATELET # BLD AUTO: 107 10*3/MM3 (ref 140–450)
PMV BLD AUTO: 11.4 FL (ref 6–12)
POIKILOCYTOSIS BLD QL SMEAR: ABNORMAL
POLYCHROMASIA BLD QL SMEAR: ABNORMAL
POTASSIUM BLD-SCNC: 3.7 MMOL/L (ref 3.5–5.2)
PROT SERPL-MCNC: 5.5 G/DL (ref 6–8.5)
RBC # BLD AUTO: 3.33 10*6/MM3 (ref 4.14–5.8)
SODIUM BLD-SCNC: 137 MMOL/L (ref 136–145)
TRIGL SERPL-MCNC: 89 MG/DL (ref 0–150)
TROPONIN T SERPL-MCNC: <0.01 NG/ML (ref 0–0.03)
VLDLC SERPL-MCNC: 17.8 MG/DL (ref 5–40)
WBC MORPH BLD: NORMAL
WBC NRBC COR # BLD: 7.21 10*3/MM3 (ref 3.4–10.8)

## 2019-10-05 PROCEDURE — 0 GADOBENATE DIMEGLUMINE 529 MG/ML SOLUTION: Performed by: HOSPITALIST

## 2019-10-05 PROCEDURE — 85007 BL SMEAR W/DIFF WBC COUNT: CPT | Performed by: INTERNAL MEDICINE

## 2019-10-05 PROCEDURE — 99223 1ST HOSP IP/OBS HIGH 75: CPT | Performed by: INTERNAL MEDICINE

## 2019-10-05 PROCEDURE — 25010000002 PIPERACILLIN SOD-TAZOBACTAM PER 1 G: Performed by: NURSE PRACTITIONER

## 2019-10-05 PROCEDURE — 87040 BLOOD CULTURE FOR BACTERIA: CPT | Performed by: INTERNAL MEDICINE

## 2019-10-05 PROCEDURE — 80074 ACUTE HEPATITIS PANEL: CPT | Performed by: INTERNAL MEDICINE

## 2019-10-05 PROCEDURE — 83516 IMMUNOASSAY NONANTIBODY: CPT | Performed by: INTERNAL MEDICINE

## 2019-10-05 PROCEDURE — 85025 COMPLETE CBC W/AUTO DIFF WBC: CPT | Performed by: INTERNAL MEDICINE

## 2019-10-05 PROCEDURE — 93005 ELECTROCARDIOGRAM TRACING: CPT | Performed by: NURSE PRACTITIONER

## 2019-10-05 PROCEDURE — 99024 POSTOP FOLLOW-UP VISIT: CPT | Performed by: SURGERY

## 2019-10-05 PROCEDURE — 82150 ASSAY OF AMYLASE: CPT | Performed by: INTERNAL MEDICINE

## 2019-10-05 PROCEDURE — 80061 LIPID PANEL: CPT | Performed by: NURSE PRACTITIONER

## 2019-10-05 PROCEDURE — 93010 ELECTROCARDIOGRAM REPORT: CPT | Performed by: INTERNAL MEDICINE

## 2019-10-05 PROCEDURE — 83690 ASSAY OF LIPASE: CPT | Performed by: INTERNAL MEDICINE

## 2019-10-05 PROCEDURE — 84484 ASSAY OF TROPONIN QUANT: CPT | Performed by: INTERNAL MEDICINE

## 2019-10-05 PROCEDURE — 80053 COMPREHEN METABOLIC PANEL: CPT | Performed by: INTERNAL MEDICINE

## 2019-10-05 PROCEDURE — 97110 THERAPEUTIC EXERCISES: CPT

## 2019-10-05 PROCEDURE — 97161 PT EVAL LOW COMPLEX 20 MIN: CPT

## 2019-10-05 PROCEDURE — 86255 FLUORESCENT ANTIBODY SCREEN: CPT | Performed by: INTERNAL MEDICINE

## 2019-10-05 PROCEDURE — 25010000002 ERTAPENEM 1 GM/100ML SOLUTION: Performed by: INTERNAL MEDICINE

## 2019-10-05 PROCEDURE — 86665 EPSTEIN-BARR CAPSID VCA: CPT | Performed by: INTERNAL MEDICINE

## 2019-10-05 PROCEDURE — A9577 INJ MULTIHANCE: HCPCS | Performed by: HOSPITALIST

## 2019-10-05 PROCEDURE — 99232 SBSQ HOSP IP/OBS MODERATE 35: CPT | Performed by: INTERNAL MEDICINE

## 2019-10-05 PROCEDURE — 74183 MRI ABD W/O CNTR FLWD CNTR: CPT

## 2019-10-05 PROCEDURE — 82784 ASSAY IGA/IGD/IGG/IGM EACH: CPT | Performed by: INTERNAL MEDICINE

## 2019-10-05 RX ADMIN — SODIUM CHLORIDE, PRESERVATIVE FREE 10 ML: 5 INJECTION INTRAVENOUS at 09:52

## 2019-10-05 RX ADMIN — CLOPIDOGREL 75 MG: 75 TABLET, FILM COATED ORAL at 09:52

## 2019-10-05 RX ADMIN — ASPIRIN 81 MG: 81 TABLET, CHEWABLE ORAL at 09:52

## 2019-10-05 RX ADMIN — ERTAPENEM SODIUM 1 G: 1 INJECTION, POWDER, LYOPHILIZED, FOR SOLUTION INTRAMUSCULAR; INTRAVENOUS at 11:45

## 2019-10-05 RX ADMIN — TAZOBACTAM SODIUM AND PIPERACILLIN SODIUM 3.38 G: 375; 3 INJECTION, SOLUTION INTRAVENOUS at 03:41

## 2019-10-05 RX ADMIN — GADOBENATE DIMEGLUMINE 16 ML: 529 INJECTION, SOLUTION INTRAVENOUS at 20:33

## 2019-10-05 RX ADMIN — PANTOPRAZOLE SODIUM 40 MG: 40 TABLET, DELAYED RELEASE ORAL at 09:52

## 2019-10-05 RX ADMIN — FLUOXETINE HYDROCHLORIDE 60 MG: 20 CAPSULE ORAL at 21:16

## 2019-10-05 NOTE — OUTREACH NOTE
Medical Week 3 Survey      Responses   Facility patient discharged from?  [place]   Does the patient have one of the following disease processes/diagnoses(primary or secondary)?  Other   Week 3 attempt successful?  No   Revoke  Readmitted          Aure Chaudhary RN

## 2019-10-06 ENCOUNTER — APPOINTMENT (OUTPATIENT)
Dept: GENERAL RADIOLOGY | Facility: HOSPITAL | Age: 79
End: 2019-10-06

## 2019-10-06 LAB
ALBUMIN SERPL-MCNC: 2.8 G/DL (ref 3.5–5.2)
ALBUMIN/GLOB SERPL: 1.3 G/DL
ALP SERPL-CCNC: 443 U/L (ref 39–117)
ALT SERPL W P-5'-P-CCNC: 140 U/L (ref 1–41)
ANION GAP SERPL CALCULATED.3IONS-SCNC: 9.8 MMOL/L (ref 5–15)
AST SERPL-CCNC: 154 U/L (ref 1–40)
BACTERIA SPEC AEROBE CULT: ABNORMAL
BILIRUB SERPL-MCNC: 4.1 MG/DL (ref 0.2–1.2)
BUN BLD-MCNC: 12 MG/DL (ref 8–23)
BUN/CREAT SERPL: 17.1 (ref 7–25)
CALCIUM SPEC-SCNC: 7.7 MG/DL (ref 8.6–10.5)
CHLORIDE SERPL-SCNC: 107 MMOL/L (ref 98–107)
CO2 SERPL-SCNC: 20.2 MMOL/L (ref 22–29)
CREAT BLD-MCNC: 0.7 MG/DL (ref 0.76–1.27)
GFR SERPL CREATININE-BSD FRML MDRD: 109 ML/MIN/1.73
GLOBULIN UR ELPH-MCNC: 2.2 GM/DL
GLUCOSE BLD-MCNC: 89 MG/DL (ref 65–99)
GRAM STN SPEC: ABNORMAL
GRAM STN SPEC: ABNORMAL
ISOLATED FROM: ABNORMAL
POTASSIUM BLD-SCNC: 3.5 MMOL/L (ref 3.5–5.2)
PROT SERPL-MCNC: 5 G/DL (ref 6–8.5)
SODIUM BLD-SCNC: 137 MMOL/L (ref 136–145)

## 2019-10-06 PROCEDURE — 99232 SBSQ HOSP IP/OBS MODERATE 35: CPT | Performed by: INTERNAL MEDICINE

## 2019-10-06 PROCEDURE — 74018 RADEX ABDOMEN 1 VIEW: CPT

## 2019-10-06 PROCEDURE — 25010000002 MORPHINE PER 10 MG: Performed by: HOSPITALIST

## 2019-10-06 PROCEDURE — 80053 COMPREHEN METABOLIC PANEL: CPT | Performed by: SURGERY

## 2019-10-06 PROCEDURE — 25010000003 AMPICILLIN-SULBACTAM PER 1.5 G: Performed by: INTERNAL MEDICINE

## 2019-10-06 PROCEDURE — 25010000002 ONDANSETRON PER 1 MG: Performed by: NURSE PRACTITIONER

## 2019-10-06 PROCEDURE — 99024 POSTOP FOLLOW-UP VISIT: CPT | Performed by: SURGERY

## 2019-10-06 RX ORDER — MORPHINE SULFATE 2 MG/ML
2 INJECTION, SOLUTION INTRAMUSCULAR; INTRAVENOUS ONCE
Status: COMPLETED | OUTPATIENT
Start: 2019-10-06 | End: 2019-10-06

## 2019-10-06 RX ADMIN — SODIUM CHLORIDE 3 G: 900 INJECTION, SOLUTION INTRAVENOUS at 11:21

## 2019-10-06 RX ADMIN — ONDANSETRON 4 MG: 2 INJECTION INTRAMUSCULAR; INTRAVENOUS at 10:30

## 2019-10-06 RX ADMIN — CLOPIDOGREL 75 MG: 75 TABLET, FILM COATED ORAL at 10:01

## 2019-10-06 RX ADMIN — SODIUM CHLORIDE 3 G: 900 INJECTION, SOLUTION INTRAVENOUS at 17:27

## 2019-10-06 RX ADMIN — SODIUM CHLORIDE, PRESERVATIVE FREE 10 ML: 5 INJECTION INTRAVENOUS at 10:02

## 2019-10-06 RX ADMIN — SODIUM CHLORIDE 75 ML/HR: 9 INJECTION, SOLUTION INTRAVENOUS at 12:29

## 2019-10-06 RX ADMIN — SODIUM CHLORIDE, PRESERVATIVE FREE 10 ML: 5 INJECTION INTRAVENOUS at 21:31

## 2019-10-06 RX ADMIN — SODIUM CHLORIDE 3 G: 900 INJECTION, SOLUTION INTRAVENOUS at 21:31

## 2019-10-06 RX ADMIN — ASPIRIN 81 MG: 81 TABLET, CHEWABLE ORAL at 10:02

## 2019-10-06 RX ADMIN — FLUOXETINE HYDROCHLORIDE 60 MG: 20 CAPSULE ORAL at 21:31

## 2019-10-06 RX ADMIN — PANTOPRAZOLE SODIUM 40 MG: 40 TABLET, DELAYED RELEASE ORAL at 10:01

## 2019-10-06 RX ADMIN — MORPHINE SULFATE 2 MG: 2 INJECTION, SOLUTION INTRAMUSCULAR; INTRAVENOUS at 23:12

## 2019-10-07 LAB
ALBUMIN SERPL-MCNC: 3 G/DL (ref 3.5–5.2)
ALBUMIN/GLOB SERPL: 1.4 G/DL
ALP SERPL-CCNC: 582 U/L (ref 39–117)
ALT SERPL W P-5'-P-CCNC: 145 U/L (ref 1–41)
ANA SER QL: POSITIVE
ANION GAP SERPL CALCULATED.3IONS-SCNC: 10.4 MMOL/L (ref 5–15)
AST SERPL-CCNC: 164 U/L (ref 1–40)
BACTERIA SPEC AEROBE CULT: ABNORMAL
BASOPHILS # BLD AUTO: 0.02 10*3/MM3 (ref 0–0.2)
BASOPHILS NFR BLD AUTO: 0.3 % (ref 0–1.5)
BILIRUB SERPL-MCNC: 5.1 MG/DL (ref 0.2–1.2)
BUN BLD-MCNC: 9 MG/DL (ref 8–23)
BUN/CREAT SERPL: 11.5 (ref 7–25)
CALCIUM SPEC-SCNC: 7.8 MG/DL (ref 8.6–10.5)
CENTROMERE B AB SER-ACNC: 6.7 AI (ref 0–0.9)
CHLORIDE SERPL-SCNC: 104 MMOL/L (ref 98–107)
CHROMATIN AB SERPL-ACNC: 0.2 AI (ref 0–0.9)
CO2 SERPL-SCNC: 23.6 MMOL/L (ref 22–29)
CREAT BLD-MCNC: 0.78 MG/DL (ref 0.76–1.27)
DEPRECATED RDW RBC AUTO: 51.9 FL (ref 37–54)
DSDNA AB SER-ACNC: <1 IU/ML (ref 0–9)
EBV VCA IGG SER-ACNC: 244 U/ML (ref 0–17.9)
EBV VCA IGM SER-ACNC: <36 U/ML (ref 0–35.9)
ENA JO1 AB SER-ACNC: <0.2 AI (ref 0–0.9)
ENA RNP AB SER-ACNC: <0.2 AI (ref 0–0.9)
ENA SCL70 AB SER-ACNC: <0.2 AI (ref 0–0.9)
ENA SM AB SER-ACNC: 0.3 AI (ref 0–0.9)
ENA SS-A AB SER-ACNC: <0.2 AI (ref 0–0.9)
ENA SS-B AB SER-ACNC: <0.2 AI (ref 0–0.9)
ENDOMYSIUM IGA SER QL: NEGATIVE
EOSINOPHIL # BLD AUTO: 0.12 10*3/MM3 (ref 0–0.4)
EOSINOPHIL NFR BLD AUTO: 2.1 % (ref 0.3–6.2)
ERYTHROCYTE [DISTWIDTH] IN BLOOD BY AUTOMATED COUNT: 15 % (ref 12.3–15.4)
GFR SERPL CREATININE-BSD FRML MDRD: 96 ML/MIN/1.73
GLIADIN PEPTIDE IGA SER-ACNC: 4 UNITS (ref 0–19)
GLIADIN PEPTIDE IGG SER-ACNC: 2 UNITS (ref 0–19)
GLOBULIN UR ELPH-MCNC: 2.2 GM/DL
GLUCOSE BLD-MCNC: 96 MG/DL (ref 65–99)
GRAM STN SPEC: ABNORMAL
GRAM STN SPEC: ABNORMAL
HCT VFR BLD AUTO: 32.2 % (ref 37.5–51)
HGB BLD-MCNC: 10.2 G/DL (ref 13–17.7)
IGA SERPL-MCNC: 204 MG/DL (ref 61–437)
IMM GRANULOCYTES # BLD AUTO: 0.02 10*3/MM3 (ref 0–0.05)
IMM GRANULOCYTES NFR BLD AUTO: 0.3 % (ref 0–0.5)
INR PPP: 1.11 (ref 0.9–1.1)
ISOLATED FROM: ABNORMAL
LYMPHOCYTES # BLD AUTO: 0.81 10*3/MM3 (ref 0.7–3.1)
LYMPHOCYTES NFR BLD AUTO: 14.1 % (ref 19.6–45.3)
Lab: ABNORMAL
MAGNESIUM SERPL-MCNC: 1.9 MG/DL (ref 1.6–2.4)
MCH RBC QN AUTO: 29.7 PG (ref 26.6–33)
MCHC RBC AUTO-ENTMCNC: 31.7 G/DL (ref 31.5–35.7)
MCV RBC AUTO: 93.6 FL (ref 79–97)
MONOCYTES # BLD AUTO: 0.59 10*3/MM3 (ref 0.1–0.9)
MONOCYTES NFR BLD AUTO: 10.3 % (ref 5–12)
NEUTROPHILS # BLD AUTO: 4.18 10*3/MM3 (ref 1.7–7)
NEUTROPHILS NFR BLD AUTO: 72.9 % (ref 42.7–76)
NRBC BLD AUTO-RTO: 0 /100 WBC (ref 0–0.2)
PLATELET # BLD AUTO: 146 10*3/MM3 (ref 140–450)
PMV BLD AUTO: 11.2 FL (ref 6–12)
POTASSIUM BLD-SCNC: 3.7 MMOL/L (ref 3.5–5.2)
PROT SERPL-MCNC: 5.2 G/DL (ref 6–8.5)
PROTHROMBIN TIME: 14.1 SECONDS (ref 11.7–14.2)
RBC # BLD AUTO: 3.44 10*6/MM3 (ref 4.14–5.8)
SODIUM BLD-SCNC: 138 MMOL/L (ref 136–145)
TTG IGA SER-ACNC: <2 U/ML (ref 0–3)
TTG IGG SER-ACNC: <2 U/ML (ref 0–5)
WBC NRBC COR # BLD: 5.74 10*3/MM3 (ref 3.4–10.8)

## 2019-10-07 PROCEDURE — 80053 COMPREHEN METABOLIC PANEL: CPT | Performed by: INTERNAL MEDICINE

## 2019-10-07 PROCEDURE — 97110 THERAPEUTIC EXERCISES: CPT

## 2019-10-07 PROCEDURE — 99232 SBSQ HOSP IP/OBS MODERATE 35: CPT | Performed by: INTERNAL MEDICINE

## 2019-10-07 PROCEDURE — 85025 COMPLETE CBC W/AUTO DIFF WBC: CPT | Performed by: INTERNAL MEDICINE

## 2019-10-07 PROCEDURE — 93005 ELECTROCARDIOGRAM TRACING: CPT | Performed by: INTERNAL MEDICINE

## 2019-10-07 PROCEDURE — 85610 PROTHROMBIN TIME: CPT | Performed by: INTERNAL MEDICINE

## 2019-10-07 PROCEDURE — 25010000003 AMPICILLIN-SULBACTAM PER 1.5 G: Performed by: INTERNAL MEDICINE

## 2019-10-07 PROCEDURE — 93010 ELECTROCARDIOGRAM REPORT: CPT | Performed by: INTERNAL MEDICINE

## 2019-10-07 PROCEDURE — 83735 ASSAY OF MAGNESIUM: CPT | Performed by: NURSE PRACTITIONER

## 2019-10-07 PROCEDURE — 90791 PSYCH DIAGNOSTIC EVALUATION: CPT | Performed by: SOCIAL WORKER

## 2019-10-07 PROCEDURE — 93005 ELECTROCARDIOGRAM TRACING: CPT | Performed by: NURSE PRACTITIONER

## 2019-10-07 PROCEDURE — 99024 POSTOP FOLLOW-UP VISIT: CPT | Performed by: SURGERY

## 2019-10-07 RX ORDER — BISACODYL 10 MG
10 SUPPOSITORY, RECTAL RECTAL DAILY PRN
Status: DISCONTINUED | OUTPATIENT
Start: 2019-10-07 | End: 2019-10-09 | Stop reason: HOSPADM

## 2019-10-07 RX ORDER — SENNA AND DOCUSATE SODIUM 50; 8.6 MG/1; MG/1
2 TABLET, FILM COATED ORAL NIGHTLY
Status: DISCONTINUED | OUTPATIENT
Start: 2019-10-07 | End: 2019-10-09 | Stop reason: HOSPADM

## 2019-10-07 RX ADMIN — SODIUM CHLORIDE 3 G: 900 INJECTION, SOLUTION INTRAVENOUS at 10:13

## 2019-10-07 RX ADMIN — FLUOXETINE HYDROCHLORIDE 60 MG: 20 CAPSULE ORAL at 21:20

## 2019-10-07 RX ADMIN — SODIUM CHLORIDE 3 G: 900 INJECTION, SOLUTION INTRAVENOUS at 23:00

## 2019-10-07 RX ADMIN — POLYETHYLENE GLYCOL 3350 17 G: 17 POWDER, FOR SOLUTION ORAL at 13:46

## 2019-10-07 RX ADMIN — SODIUM CHLORIDE 3 G: 900 INJECTION, SOLUTION INTRAVENOUS at 17:09

## 2019-10-07 RX ADMIN — PANTOPRAZOLE SODIUM 40 MG: 40 TABLET, DELAYED RELEASE ORAL at 06:17

## 2019-10-07 RX ADMIN — SENNOSIDES AND DOCUSATE SODIUM 2 TABLET: 8.6; 5 TABLET ORAL at 21:20

## 2019-10-07 RX ADMIN — ASPIRIN 81 MG: 81 TABLET, CHEWABLE ORAL at 09:00

## 2019-10-07 RX ADMIN — SODIUM CHLORIDE 3 G: 900 INJECTION, SOLUTION INTRAVENOUS at 04:47

## 2019-10-07 RX ADMIN — CLOPIDOGREL 75 MG: 75 TABLET, FILM COATED ORAL at 09:00

## 2019-10-07 RX ADMIN — SODIUM CHLORIDE, PRESERVATIVE FREE 10 ML: 5 INJECTION INTRAVENOUS at 09:00

## 2019-10-08 ENCOUNTER — TELEPHONE (OUTPATIENT)
Dept: GASTROENTEROLOGY | Facility: CLINIC | Age: 79
End: 2019-10-08

## 2019-10-08 LAB
ALBUMIN SERPL-MCNC: 2.9 G/DL (ref 3.5–5.2)
ALBUMIN/GLOB SERPL: 1.1 G/DL
ALP SERPL-CCNC: 728 U/L (ref 39–117)
ALT SERPL W P-5'-P-CCNC: 141 U/L (ref 1–41)
ANION GAP SERPL CALCULATED.3IONS-SCNC: 11 MMOL/L (ref 5–15)
AST SERPL-CCNC: 148 U/L (ref 1–40)
BILIRUB SERPL-MCNC: 5.9 MG/DL (ref 0.2–1.2)
BUN BLD-MCNC: 9 MG/DL (ref 8–23)
BUN/CREAT SERPL: 12.3 (ref 7–25)
CALCIUM SPEC-SCNC: 8.1 MG/DL (ref 8.6–10.5)
CHLORIDE SERPL-SCNC: 103 MMOL/L (ref 98–107)
CO2 SERPL-SCNC: 24 MMOL/L (ref 22–29)
CREAT BLD-MCNC: 0.73 MG/DL (ref 0.76–1.27)
DEPRECATED RDW RBC AUTO: 49.9 FL (ref 37–54)
ERYTHROCYTE [DISTWIDTH] IN BLOOD BY AUTOMATED COUNT: 15 % (ref 12.3–15.4)
GFR SERPL CREATININE-BSD FRML MDRD: 104 ML/MIN/1.73
GLOBULIN UR ELPH-MCNC: 2.7 GM/DL
GLUCOSE BLD-MCNC: 96 MG/DL (ref 65–99)
HCT VFR BLD AUTO: 31.2 % (ref 37.5–51)
HGB BLD-MCNC: 10.2 G/DL (ref 13–17.7)
MCH RBC QN AUTO: 29.5 PG (ref 26.6–33)
MCHC RBC AUTO-ENTMCNC: 32.7 G/DL (ref 31.5–35.7)
MCV RBC AUTO: 90.2 FL (ref 79–97)
PLATELET # BLD AUTO: 180 10*3/MM3 (ref 140–450)
PMV BLD AUTO: 11 FL (ref 6–12)
POTASSIUM BLD-SCNC: 3.7 MMOL/L (ref 3.5–5.2)
PROT SERPL-MCNC: 5.6 G/DL (ref 6–8.5)
RBC # BLD AUTO: 3.46 10*6/MM3 (ref 4.14–5.8)
SODIUM BLD-SCNC: 138 MMOL/L (ref 136–145)
WBC NRBC COR # BLD: 5.78 10*3/MM3 (ref 3.4–10.8)

## 2019-10-08 PROCEDURE — 63710000001 PREDNISONE PER 1 MG: Performed by: INTERNAL MEDICINE

## 2019-10-08 PROCEDURE — 80053 COMPREHEN METABOLIC PANEL: CPT | Performed by: INTERNAL MEDICINE

## 2019-10-08 PROCEDURE — 85027 COMPLETE CBC AUTOMATED: CPT | Performed by: INTERNAL MEDICINE

## 2019-10-08 PROCEDURE — 25010000003 AMPICILLIN-SULBACTAM PER 1.5 G: Performed by: INTERNAL MEDICINE

## 2019-10-08 PROCEDURE — 99232 SBSQ HOSP IP/OBS MODERATE 35: CPT | Performed by: INTERNAL MEDICINE

## 2019-10-08 RX ORDER — PREDNISONE 20 MG/1
40 TABLET ORAL
Status: DISCONTINUED | OUTPATIENT
Start: 2019-10-08 | End: 2019-10-09 | Stop reason: HOSPADM

## 2019-10-08 RX ORDER — LEVOFLOXACIN 750 MG/1
750 TABLET ORAL EVERY 24 HOURS
Status: DISCONTINUED | OUTPATIENT
Start: 2019-10-08 | End: 2019-10-09 | Stop reason: HOSPADM

## 2019-10-08 RX ADMIN — SODIUM CHLORIDE 3 G: 900 INJECTION, SOLUTION INTRAVENOUS at 05:17

## 2019-10-08 RX ADMIN — POLYETHYLENE GLYCOL 3350 17 G: 17 POWDER, FOR SOLUTION ORAL at 08:24

## 2019-10-08 RX ADMIN — PREDNISONE 40 MG: 20 TABLET ORAL at 12:08

## 2019-10-08 RX ADMIN — CLOPIDOGREL 75 MG: 75 TABLET, FILM COATED ORAL at 08:24

## 2019-10-08 RX ADMIN — LEVOFLOXACIN 750 MG: 750 TABLET, FILM COATED ORAL at 10:47

## 2019-10-08 RX ADMIN — ASPIRIN 81 MG: 81 TABLET, CHEWABLE ORAL at 08:24

## 2019-10-08 RX ADMIN — SENNOSIDES AND DOCUSATE SODIUM 2 TABLET: 8.6; 5 TABLET ORAL at 20:07

## 2019-10-08 RX ADMIN — PANTOPRAZOLE SODIUM 40 MG: 40 TABLET, DELAYED RELEASE ORAL at 05:53

## 2019-10-08 RX ADMIN — FLUOXETINE HYDROCHLORIDE 60 MG: 20 CAPSULE ORAL at 20:07

## 2019-10-08 RX ADMIN — SODIUM CHLORIDE, PRESERVATIVE FREE 10 ML: 5 INJECTION INTRAVENOUS at 08:25

## 2019-10-08 NOTE — TELEPHONE ENCOUNTER
----- Message from Aubrey Villasenor MD sent at 10/7/2019  1:19 PM EDT -----  Regarding: path  Please call the pathology lab and have his liver biopsy slides sent to [facility] for an over read.    History includes elevated liver tests, alkaline phosphatase 1000, AST and , total bilirubin of 10, negative mitochondrial antibody.  Negative serological work-up.

## 2019-10-09 ENCOUNTER — TELEPHONE (OUTPATIENT)
Dept: GASTROENTEROLOGY | Facility: CLINIC | Age: 79
End: 2019-10-09

## 2019-10-09 VITALS
SYSTOLIC BLOOD PRESSURE: 128 MMHG | RESPIRATION RATE: 18 BRPM | TEMPERATURE: 96.8 F | HEIGHT: 72 IN | OXYGEN SATURATION: 96 % | HEART RATE: 70 BPM | WEIGHT: 178.35 LBS | DIASTOLIC BLOOD PRESSURE: 88 MMHG | BODY MASS INDEX: 24.16 KG/M2

## 2019-10-09 DIAGNOSIS — R74.8 ELEVATED LIVER ENZYMES: Primary | ICD-10-CM

## 2019-10-09 PROBLEM — B96.1 BACTEREMIA DUE TO KLEBSIELLA PNEUMONIAE: Status: ACTIVE | Noted: 2019-10-09

## 2019-10-09 PROBLEM — R78.81 BACTEREMIA DUE TO KLEBSIELLA PNEUMONIAE: Status: ACTIVE | Noted: 2019-10-09

## 2019-10-09 LAB
ALBUMIN SERPL-MCNC: 3.1 G/DL (ref 3.5–5.2)
ALBUMIN/GLOB SERPL: 1.1 G/DL
ALP SERPL-CCNC: 741 U/L (ref 39–117)
ALT SERPL W P-5'-P-CCNC: 121 U/L (ref 1–41)
ANION GAP SERPL CALCULATED.3IONS-SCNC: 11.6 MMOL/L (ref 5–15)
AST SERPL-CCNC: 89 U/L (ref 1–40)
BILIRUB SERPL-MCNC: 3.6 MG/DL (ref 0.2–1.2)
BUN BLD-MCNC: 10 MG/DL (ref 8–23)
BUN/CREAT SERPL: 13.2 (ref 7–25)
CALCIUM SPEC-SCNC: 8.2 MG/DL (ref 8.6–10.5)
CHLORIDE SERPL-SCNC: 106 MMOL/L (ref 98–107)
CO2 SERPL-SCNC: 22.4 MMOL/L (ref 22–29)
CREAT BLD-MCNC: 0.76 MG/DL (ref 0.76–1.27)
DEPRECATED RDW RBC AUTO: 50.2 FL (ref 37–54)
ERYTHROCYTE [DISTWIDTH] IN BLOOD BY AUTOMATED COUNT: 15 % (ref 12.3–15.4)
GFR SERPL CREATININE-BSD FRML MDRD: 99 ML/MIN/1.73
GLOBULIN UR ELPH-MCNC: 2.7 GM/DL
GLUCOSE BLD-MCNC: 133 MG/DL (ref 65–99)
HCT VFR BLD AUTO: 30.2 % (ref 37.5–51)
HGB BLD-MCNC: 9.9 G/DL (ref 13–17.7)
MCH RBC QN AUTO: 30.2 PG (ref 26.6–33)
MCHC RBC AUTO-ENTMCNC: 32.8 G/DL (ref 31.5–35.7)
MCV RBC AUTO: 92.1 FL (ref 79–97)
PLATELET # BLD AUTO: 168 10*3/MM3 (ref 140–450)
PMV BLD AUTO: 11.1 FL (ref 6–12)
POTASSIUM BLD-SCNC: 3.7 MMOL/L (ref 3.5–5.2)
PROT SERPL-MCNC: 5.8 G/DL (ref 6–8.5)
RBC # BLD AUTO: 3.28 10*6/MM3 (ref 4.14–5.8)
SODIUM BLD-SCNC: 140 MMOL/L (ref 136–145)
WBC NRBC COR # BLD: 7.55 10*3/MM3 (ref 3.4–10.8)

## 2019-10-09 PROCEDURE — 80053 COMPREHEN METABOLIC PANEL: CPT | Performed by: INTERNAL MEDICINE

## 2019-10-09 PROCEDURE — 99232 SBSQ HOSP IP/OBS MODERATE 35: CPT | Performed by: INTERNAL MEDICINE

## 2019-10-09 PROCEDURE — 85027 COMPLETE CBC AUTOMATED: CPT | Performed by: INTERNAL MEDICINE

## 2019-10-09 PROCEDURE — 63710000001 PREDNISONE PER 1 MG: Performed by: INTERNAL MEDICINE

## 2019-10-09 RX ORDER — LEVOFLOXACIN 750 MG/1
750 TABLET ORAL EVERY 24 HOURS
Qty: 3 TABLET | Refills: 0 | Status: SHIPPED | OUTPATIENT
Start: 2019-10-09 | End: 2019-10-12

## 2019-10-09 RX ORDER — PREDNISONE 20 MG/1
20 TABLET ORAL DAILY
Qty: 14 TABLET | Refills: 0 | Status: SHIPPED | OUTPATIENT
Start: 2019-10-24 | End: 2019-10-31

## 2019-10-09 RX ORDER — PREDNISONE 20 MG/1
40 TABLET ORAL
Qty: 26 TABLET | Refills: 0 | Status: SHIPPED | OUTPATIENT
Start: 2019-10-10 | End: 2019-10-23

## 2019-10-09 RX ADMIN — PREDNISONE 40 MG: 20 TABLET ORAL at 08:52

## 2019-10-09 RX ADMIN — POLYETHYLENE GLYCOL 3350 17 G: 17 POWDER, FOR SOLUTION ORAL at 08:52

## 2019-10-09 RX ADMIN — CLOPIDOGREL 75 MG: 75 TABLET, FILM COATED ORAL at 08:52

## 2019-10-09 RX ADMIN — SODIUM CHLORIDE, PRESERVATIVE FREE 10 ML: 5 INJECTION INTRAVENOUS at 08:53

## 2019-10-09 RX ADMIN — ASPIRIN 81 MG: 81 TABLET, CHEWABLE ORAL at 08:52

## 2019-10-09 RX ADMIN — LEVOFLOXACIN 750 MG: 750 TABLET, FILM COATED ORAL at 08:52

## 2019-10-09 RX ADMIN — PANTOPRAZOLE SODIUM 40 MG: 40 TABLET, DELAYED RELEASE ORAL at 06:08

## 2019-10-09 NOTE — TELEPHONE ENCOUNTER
----- Message from Betsy Chappell sent at 10/9/2019  2:45 PM EDT -----  Regarding: lab/ov fu  Lab appt made for mon at 200 pm / fu ov with gowin on 10-30 at 1045 am only order need to be put in for the labs  ----- Message -----  From: Rhoda Andre MD  Sent: 10/9/2019   2:39 PM  To: Niyah Medley, ANNABEL, Dorinda Le RN, #    Patient to be discharged today.  Needs CMP on October 14, please place labs    Needs office follow-up with mk or NP in 2 to 3 weeks, thanks

## 2019-10-10 ENCOUNTER — READMISSION MANAGEMENT (OUTPATIENT)
Dept: CALL CENTER | Facility: HOSPITAL | Age: 79
End: 2019-10-10

## 2019-10-10 ENCOUNTER — RESULTS ENCOUNTER (OUTPATIENT)
Dept: GASTROENTEROLOGY | Facility: CLINIC | Age: 79
End: 2019-10-10

## 2019-10-10 DIAGNOSIS — R74.8 ELEVATED LIVER ENZYMES: ICD-10-CM

## 2019-10-10 LAB
BACTERIA SPEC AEROBE CULT: NORMAL
BACTERIA SPEC AEROBE CULT: NORMAL

## 2019-10-10 NOTE — OUTREACH NOTE
Prep Survey      Responses   Facility patient discharged from?  [place]   Is patient eligible?  Yes   Discharge diagnosis  Bacteremia D/T Klebsiella Pneumoniae, elevated bilirubin, dizziness, abdominal pain, essential HTN, AFib., elevated LFTs, calculus of bile duct with Acute cholecystitis w/o obstruction, bladder cancer, chest pain, s/p ANJALI placement, GERD, mood disorder, HLD, CAD   Does the patient have one of the following disease processes/diagnoses(primary or secondary)?  Other   Does the patient have Home health ordered?  Yes   What is the Home health agency?   [facility]   Is there a DME ordered?  No   Comments regarding appointments  See AVS   Prep survey completed?  Yes          Jennifer Jc RN

## 2019-10-11 ENCOUNTER — READMISSION MANAGEMENT (OUTPATIENT)
Dept: CALL CENTER | Facility: HOSPITAL | Age: 79
End: 2019-10-11

## 2019-10-11 ENCOUNTER — TELEPHONE (OUTPATIENT)
Dept: INTERNAL MEDICINE | Facility: CLINIC | Age: 79
End: 2019-10-11

## 2019-10-11 NOTE — OUTREACH NOTE
Medical Week 1 Survey      Responses   Facility patient discharged from?  [place]   Does the patient have one of the following disease processes/diagnoses(primary or secondary)?  Other   Is there a successful TCM telephone encounter documented?  No   Week 1 attempt successful?  No   Unsuccessful attempts  Attempt 1          Jennifer Mathis RN

## 2019-10-11 NOTE — TELEPHONE ENCOUNTER
Analia from [facility] calling pt just released recently from hospital had orders to resume home health on calling the pt to  go out for services he told the  he was going out to eat and to see a movie which since he is not home bound he does not qualify for services or skilled care

## 2019-10-15 ENCOUNTER — READMISSION MANAGEMENT (OUTPATIENT)
Dept: CALL CENTER | Facility: HOSPITAL | Age: 79
End: 2019-10-15

## 2019-10-15 LAB
ALBUMIN SERPL-MCNC: 3.5 G/DL (ref 3.5–5.2)
ALBUMIN/GLOB SERPL: 1.4 G/DL
ALP SERPL-CCNC: 584 U/L (ref 39–117)
ALT SERPL-CCNC: 88 U/L (ref 1–41)
AST SERPL-CCNC: 77 U/L (ref 1–40)
BILIRUB SERPL-MCNC: 2 MG/DL (ref 0.2–1.2)
BUN SERPL-MCNC: 20 MG/DL (ref 8–23)
BUN/CREAT SERPL: 19.2 (ref 7–25)
CALCIUM SERPL-MCNC: 9.3 MG/DL (ref 8.6–10.5)
CHLORIDE SERPL-SCNC: 103 MMOL/L (ref 98–107)
CO2 SERPL-SCNC: 25.8 MMOL/L (ref 22–29)
CREAT SERPL-MCNC: 1.04 MG/DL (ref 0.76–1.27)
GLOBULIN SER CALC-MCNC: 2.5 GM/DL
GLUCOSE SERPL-MCNC: 139 MG/DL (ref 65–99)
POTASSIUM SERPL-SCNC: 4.2 MMOL/L (ref 3.5–5.2)
PROT SERPL-MCNC: 6 G/DL (ref 6–8.5)
SODIUM SERPL-SCNC: 140 MMOL/L (ref 136–145)

## 2019-10-15 NOTE — OUTREACH NOTE
Medical Week 1 Survey      Responses   Facility patient discharged from?  [place]   Does the patient have one of the following disease processes/diagnoses(primary or secondary)?  Other   Is there a successful TCM telephone encounter documented?  No   Week 1 attempt successful?  Yes   Call start time  1500   Call end time  1513   Discharge diagnosis  Bacteremia D/T Klebsiella Pneumoniae, elevated bilirubin, dizziness, abdominal pain, essential HTN, AFib., elevated LFTs, calculus of bile duct with Acute cholecystitis w/o obstruction, bladder cancer, chest pain, s/p ANJALI placement, GERD, mood disorder, HLD, CAD   Person spoke with today (if not patient) and relationship  spouse- [place], and patient   Meds reviewed with patient/caregiver?  Yes   Is the patient having any side effects they believe may be caused by any medication additions or changes?  No   Does the patient have all medications ordered at discharge?  Yes   Is the patient taking all medications as directed (includes completed medication regime)?  Yes   Does the patient have a primary care provider?   Yes   Does the patient have an appointment with their PCP within 7 days of discharge?  Yes   Has the patient kept scheduled appointments due by today?  N/A   Has home health visited the patient within 72 hours of discharge?  Yes   Psychosocial issues?  No   Comments  pt has been drinking increased quantities of Excell, similiar to Boost, having some dizziness, pt has vertigo, episodes in the guanaco's , advised pt to increase water intake, to decrease dehydration which can reduce dizziness, wife and pt agreed with plan   Did the patient receive a copy of their discharge instructions?  Yes   Nursing interventions  Reviewed instructions with patient   What is the patient's perception of their health status since discharge?  Improving   Is the patient/caregiver able to teach back signs and symptoms related to disease process for when to call PCP?  Yes   Is the  patient/caregiver able to teach back signs and symptoms related to disease process for when to call 911?  Yes   Is the patient/caregiver able to teach back the hierarchy of who to call/visit for symptoms/problems? PCP, Specialist, Home health nurse, Urgent Care, ED, 911  Yes   Week 1 call completed?  Yes          Hien Mac RN

## 2019-10-16 ENCOUNTER — TELEPHONE (OUTPATIENT)
Dept: GASTROENTEROLOGY | Facility: CLINIC | Age: 79
End: 2019-10-16

## 2019-10-16 ENCOUNTER — OFFICE VISIT (OUTPATIENT)
Dept: INTERNAL MEDICINE | Facility: CLINIC | Age: 79
End: 2019-10-16

## 2019-10-16 VITALS
SYSTOLIC BLOOD PRESSURE: 104 MMHG | BODY MASS INDEX: 24.92 KG/M2 | HEIGHT: 72 IN | DIASTOLIC BLOOD PRESSURE: 60 MMHG | WEIGHT: 184 LBS

## 2019-10-16 DIAGNOSIS — R78.81 BACTEREMIA DUE TO KLEBSIELLA PNEUMONIAE: Primary | ICD-10-CM

## 2019-10-16 DIAGNOSIS — R74.8 ELEVATED LIVER ENZYMES: Primary | ICD-10-CM

## 2019-10-16 DIAGNOSIS — B96.1 BACTEREMIA DUE TO KLEBSIELLA PNEUMONIAE: Primary | ICD-10-CM

## 2019-10-16 DIAGNOSIS — R79.89 ELEVATED LFTS: ICD-10-CM

## 2019-10-16 PROCEDURE — 99213 OFFICE O/P EST LOW 20 MIN: CPT | Performed by: PHYSICIAN ASSISTANT

## 2019-10-16 NOTE — PROGRESS NOTES
Subjective   Chief Complaint   Patient presents with   • Dehydration     hosptial follow up/ results of blood work monday and liver biopsy       History of Present Illness     Pt is here today for hospital follow up. Was discharged after presenting with fevers, chills and rigors. Was found to have klebsiella uti as well as biliary tract infection. He was treated inpatient with antibiotics. His LFTs increased again during hospitalization and his DAVID and anticentromere antibody were elevated. He had just had outpatient liver biopsy showing iflammation either secondary from med reaction or autoimmune hepatitis. The specimen has been sent to [facility] for a second opinion, results are still pending. He has been started on prednisone taper of 40 mg x 2 weeks and will then taper down to 20 mg for 2 weeks. He had a CMP earlier this week and his LFTs are trending down. Overall he feels much better, he does still have some dizzines. He has no urinary symptoms currently.      Patient Active Problem List   Diagnosis   • Hyperlipidemia   • Coronary artery disease involving native coronary artery of native heart without angina pectoris   • Cognitive disorder   • Mood disorder (CMS/HCC)   • Closed wedge compression fracture of third thoracic vertebra with routine healing   • GERD (gastroesophageal reflux disease)   • S/P drug eluting coronary stent placement   • Chest pain   • Diastolic dysfunction   • Exertional angina (CMS/HCC)   • Unstable angina (CMS/HCC)   • Bladder mass   • Mixed action and resting tremor   • Bladder cancer (CMS/HCC)   • Acute cystitis without hematuria   • Generalized weakness   • Dyspnea on exertion   • BPH (benign prostatic hyperplasia)   • Right upper quadrant pain   • Calculus of gallbladder without cholecystitis   • Elevated LFTs   • Calculus of bile duct with acute cholecystitis without obstruction   • Atrial fibrillation (CMS/HCC)   • Dehydration   • Essential hypertension   • Abdominal pain   •  Nausea without vomiting   • Elevated bilirubin   • Dizziness   • Bacteremia due to Klebsiella pneumoniae       No Known Allergies    Current Outpatient Medications on File Prior to Visit   Medication Sig Dispense Refill   • aspirin 81 MG chewable tablet Chew 1 tablet Daily. 30 tablet 0   • clopidogrel (PLAVIX) 75 MG tablet Take 75 mg by mouth Daily.     • FLUoxetine (PROzac) 20 MG capsule Take 60 mg by mouth Every Night. Pt takes 40 mg + 20 mg capsules = 60 mg daily     • omeprazole (priLOSEC) 40 MG capsule Take 1 capsule by mouth Daily. 90 capsule 4   • predniSONE (DELTASONE) 20 MG tablet Take 2 tablets by mouth daily with breakfast for 13 days. 26 tablet 0   • [START ON 10/24/2019] predniSONE (DELTASONE) 20 MG tablet Take 1 tablet by mouth daily for 14 days. 14 tablet 0     No current facility-administered medications on file prior to visit.        Past Medical History:   Diagnosis Date   • Anxiety    • Back pain    • Bladder cancer (CMS/[facility]) 2018    bladder cancer has been removed.   • Coronary artery disease    • Depression    • Dizziness    • Fatigue    • GERD (gastroesophageal reflux disease)    • History of fractured rib     RIGHT SIDE   • Hyperlipidemia    • Hypertension    • Tremors of nervous system    • Urinary incontinence    • Vertigo        Family History   Problem Relation Age of Onset   • Arthritis Mother    • Glaucoma Mother    • Heart disease Father    • Emphysema Father    • Tremor Father    • Malig Hyperthermia Neg Hx        Social History     Socioeconomic History   • Marital status:      Spouse name: Not on file   • Number of children: 4   • Years of education: 5 college degrees   • Highest education level: Not on file   Occupational History   • Occupation: Retired   Tobacco Use   • Smoking status: Never Smoker   • Smokeless tobacco: Never Used   Substance and Sexual Activity   • Alcohol use: Yes     Frequency: Never     Drinks per session: 5 or 6     Binge frequency: Less than monthly      Comment: last drink about 1 year ago    • Drug use: No   • Sexual activity: Defer       Past Surgical History:   Procedure Laterality Date   • CARDIAC CATHETERIZATION      7x, 5 stents   • CATARACT EXTRACTION, BILATERAL     • CHOLECYSTECTOMY N/A 9/2/2019    Procedure: CHOLECYSTECTOMY LAPAROSCOPIC WITH INTRAOPERATIVE CHOLANGIOGRAM;  Surgeon: Bg Rodriguez Jr., MD;  Location: [facility] OR;  Service: General   • COLONOSCOPY     • CORONARY ANGIOPLASTY WITH STENT PLACEMENT      X5    • CYSTOSCOPY BLADDER BIOPSY N/A 1/8/2019    Procedure: CYSTOSCOPY BLADDER BIOPSY;  Surgeon: Wang Soares Jr., MD;  Location: [facility] OR;  Service: Urology   • CYSTOSCOPY BLADDER BIOPSY N/A 6/13/2019    Procedure: CYSTOSCOPY BLADDER BIOPSY;  Surgeon: Wang Soares Jr., MD;  Location: [facility] OR;  Service: Urology   • CYSTOSCOPY TRANSURETHRAL RESECTION OF PROSTATE N/A 7/11/2019    Procedure: CYSTOSCOPY TRANSURETHRAL RESECTION OF PROSTATE;  Surgeon: Wang Soares Jr., MD;  Location: [facility] OR;  Service: Urology   • CYSTOSCOPY URETEROSCOPY LASER LITHOTRIPSY N/A 6/13/2019    Procedure: CYSTO LITHOPAXY;  Surgeon: Wang Soares Jr., MD;  Location: [facility] OR;  Service: Urology   • ERCP N/A 9/3/2019    Procedure: ENDOSCOPIC RETROGRADE CHOLANGIOPANCREATOGRAPHY with sphincterotomy and balloon sweep;  Surgeon: Ashok Amaya MD;  Location: [facility] ENDOSCOPY;  Service: Gastroenterology   • EYE SURGERY      Lens implants   • LUMBAR DISCECTOMY FUSION INSTRUMENTATION     • SKIN CANCER EXCISION Left     chest wall   • TRANSURETHRAL RESECTION OF BLADDER TUMOR N/A 11/29/2018    Procedure: TUR BLADDER TUMOR  LARGE;  Surgeon: Wang Soares Jr., MD;  Location: [facility] OR;  Service: Urology           The following portions of the patient's history were reviewed and updated as appropriate: problem list, allergies, current medications, past medical history, past family history, past social  "history and past surgical history.    Review of Systems   Constitution: Positive for malaise/fatigue.   Gastrointestinal: Negative for abdominal pain.   Neurological: Positive for dizziness.       Immunization History   Administered Date(s) Administered   • Flu Vaccine High Dose PF 65YR+ 10/12/2016   • flucelvax quad pfs =>4 YRS 11/30/2018       Objective   Vitals:    10/16/19 1100 10/16/19 1115   BP:  104/60   Weight: 83.5 kg (184 lb)    Height: 182.9 cm (72.01\")      Physical Exam   Constitutional: He appears well-developed and well-nourished.   HENT:   Head: Normocephalic and atraumatic.   Cardiovascular: Normal rate, regular rhythm and normal heart sounds.   No edema.    Pulmonary/Chest: Effort normal and breath sounds normal.   Vitals reviewed.        Assessment/Plan   Sukhdev was seen today for dehydration.    Diagnoses and all orders for this visit:    Bacteremia due to Klebsiella pneumoniae    Elevated LFTs    Still waiting on [facility] for second opinion regarding final diagnosis of elevated LFTs and inflammation. He is improving with the prednisone and his transaminases are trending down still. Continue to hold statin. Keep fup with Gi. Do not want to give flu shot today as  If this is autoimmune do not want to rev up his immune system while in process of suppressing.     Return in about 2 months (around 12/16/2019).           "

## 2019-10-16 NOTE — TELEPHONE ENCOUNTER
----- Message from Rhoda Andre MD sent at 10/15/2019 11:44 AM EDT -----  Liver labs improving, needs follow up with [facility] 2-3 weeks, x

## 2019-10-21 LAB
CYTO UR: NORMAL
LAB AP CASE REPORT: NORMAL
LAB AP CLINICAL INFORMATION: NORMAL
PATH REPORT.ADDENDUM SPEC: NORMAL
PATH REPORT.FINAL DX SPEC: NORMAL
PATH REPORT.GROSS SPEC: NORMAL

## 2019-10-21 NOTE — TELEPHONE ENCOUNTER
Spouse called, advised of Dr. Villasenor's and Dr. Andre' note. Advised the report from [facility] is back as of today. Advised will send an update to Dr. Villasenor regarding the results and if it is still necessary for the referral to Dr. Santana. She verb understanding and is in agreement with the plan.

## 2019-10-21 NOTE — TELEPHONE ENCOUNTER
Notes recorded by Aubrey Villasenor MD on 10/1/2019 at 2:33 PM EDT  The pathologist believes it is a drug reaction causing this.  No evidence of infection or cancer.  The numbers are improving so I would not stop any of his essential medications  I would like a second opinion from the [facility] otology clinic, Dr. Santana or 1 of his partners  Please schedule an office visit and make sure that all of his labs and pathology are printed up so Mr. soliman can take them with him    Office visit nurse practitioner 1 month, we will recheck CMP on that day    thx

## 2019-10-21 NOTE — PROGRESS NOTES
The [facility] pathology over reads as likely drug reaction, possibly infection.  His numbers are much improved, most recently checked last week, I would recheck a CMP in 1 month.  He can follow-up with [place] if it is already scheduled, if not he can see my nurse practitioner in 6 weeks.  [place] confirms there is nothing serious going on in the biopsies

## 2019-10-23 ENCOUNTER — READMISSION MANAGEMENT (OUTPATIENT)
Dept: CALL CENTER | Facility: HOSPITAL | Age: 79
End: 2019-10-23

## 2019-10-25 ENCOUNTER — TELEPHONE (OUTPATIENT)
Dept: INTERNAL MEDICINE | Facility: CLINIC | Age: 79
End: 2019-10-25

## 2019-10-25 NOTE — TELEPHONE ENCOUNTER
Notes recorded by Aubrey Villasenor MD on 10/21/2019 at 2:28 PM EDT  The [facility] pathology over reads as likely drug reaction, possibly infection.  His numbers are much improved, most recently checked last week, I would recheck a CMP in 1 month.  He can follow-up with [place] if it is already scheduled, if not he can see my nurse practitioner in 6 weeks.  [place] confirms there is nothing serious going on in the biopsies

## 2019-10-25 NOTE — TELEPHONE ENCOUNTER
"Per Dr Villasenor: \"If his numbers normalize probably not, but there is nothing ever wrong with a second opinion and Dr. Santana is a dedicated liver specialist.  If it was me I would  go see what he has to say     Its up to the patient\"  "

## 2019-10-25 NOTE — TELEPHONE ENCOUNTER
Called Virginia's(spouse) phone call. Advised as per Dr. Villasenor's note. She verb understanding. The patient would like to move forward with a referral to Dr. Santana's office. F/u lab work is scheduled for 11/11/19@1000. She verb understanding and will relay message to the patient.

## 2019-10-25 NOTE — TELEPHONE ENCOUNTER
Received a call from Felicity from [facility] stating that patient's blood pressure this morning sitting down was 122/70 and standing it was 82/52 , also patient felt dizzy. Felicity left her number if Rachelle wants her to do anything [phone]

## 2019-10-29 ENCOUNTER — TELEPHONE (OUTPATIENT)
Dept: INTERNAL MEDICINE | Facility: CLINIC | Age: 79
End: 2019-10-29

## 2019-10-29 DIAGNOSIS — G90.3 NEUROGENIC ORTHOSTATIC HYPOTENSION (HCC): Primary | ICD-10-CM

## 2019-10-29 RX ORDER — MIDODRINE HYDROCHLORIDE 2.5 MG/1
2.5 TABLET ORAL 3 TIMES DAILY
Qty: 90 TABLET | Refills: 1 | Status: SHIPPED | OUTPATIENT
Start: 2019-10-29 | End: 2019-10-29

## 2019-10-29 RX ORDER — MIDODRINE HYDROCHLORIDE 2.5 MG/1
2.5 TABLET ORAL 3 TIMES DAILY
Qty: 90 TABLET | Refills: 1 | Status: SHIPPED | OUTPATIENT
Start: 2019-10-29 | End: 2020-01-16

## 2019-10-29 RX ORDER — MIDODRINE HYDROCHLORIDE 2.5 MG/1
2.5 TABLET ORAL 3 TIMES DAILY
Qty: 90 TABLET | Refills: 1 | Status: SHIPPED | OUTPATIENT
Start: 2019-10-29 | End: 2019-12-03

## 2019-10-29 NOTE — TELEPHONE ENCOUNTER
I spoke with the nurse practitioner with Dr. Proctor's office, wanted to clear with her prior to sending in the prescription to raise his BP a little. She is ok with the changes to his meds I want to make. We are going to start him on the Midodrine 2.5 mg three times a day. However if his blood pressure is over 140/90 with him sitting I don't want him to take the scheduled dose. Have the nurse call me early next week and give a report of how his blood pressure is doing since starting this. Please find out which pharmacy they want this sent to.

## 2019-10-29 NOTE — TELEPHONE ENCOUNTER
Can you please call Dr. Proctor's office his cardiologist, I left a message with him Friday afternoon regarding Mr. Zayas and possibility of starting him on medication to help with his symptomatic orthostatics.

## 2019-10-30 ENCOUNTER — OFFICE VISIT (OUTPATIENT)
Dept: GASTROENTEROLOGY | Facility: CLINIC | Age: 79
End: 2019-10-30

## 2019-10-30 VITALS
HEIGHT: 72 IN | WEIGHT: 183 LBS | SYSTOLIC BLOOD PRESSURE: 112 MMHG | BODY MASS INDEX: 24.79 KG/M2 | DIASTOLIC BLOOD PRESSURE: 68 MMHG | TEMPERATURE: 98.2 F

## 2019-10-30 DIAGNOSIS — R74.8 ELEVATED LIVER ENZYMES: Primary | ICD-10-CM

## 2019-10-30 LAB
ALBUMIN SERPL-MCNC: 4 G/DL (ref 3.5–5.2)
ALBUMIN/GLOB SERPL: 1.8 G/DL
ALP SERPL-CCNC: 290 U/L (ref 39–117)
ALT SERPL-CCNC: 59 U/L (ref 1–41)
AST SERPL-CCNC: 45 U/L (ref 1–40)
BILIRUB SERPL-MCNC: 1.1 MG/DL (ref 0.2–1.2)
BUN SERPL-MCNC: 15 MG/DL (ref 8–23)
BUN/CREAT SERPL: 15.2 (ref 7–25)
CALCIUM SERPL-MCNC: 8.8 MG/DL (ref 8.6–10.5)
CHLORIDE SERPL-SCNC: 100 MMOL/L (ref 98–107)
CO2 SERPL-SCNC: 29.2 MMOL/L (ref 22–29)
CREAT SERPL-MCNC: 0.99 MG/DL (ref 0.76–1.27)
GLOBULIN SER CALC-MCNC: 2.2 GM/DL
GLUCOSE SERPL-MCNC: 73 MG/DL (ref 65–99)
POTASSIUM SERPL-SCNC: 4.1 MMOL/L (ref 3.5–5.2)
PROT SERPL-MCNC: 6.2 G/DL (ref 6–8.5)
SODIUM SERPL-SCNC: 140 MMOL/L (ref 136–145)

## 2019-10-30 PROCEDURE — 99213 OFFICE O/P EST LOW 20 MIN: CPT | Performed by: NURSE PRACTITIONER

## 2019-10-30 NOTE — PROGRESS NOTES
Chief Complaint   Patient presents with   • Elevated liver function test     HPI    Sukhdev Zayas is a  78 y.o. male here for a follow up visit for elevated liver function tests.    This patient was in the hospital the first week in October seen by our service for elevated liver function test.  I reviewed discharge summary as follows:    Hospital course  This is a 78-year-old male with a history of elevated liver function tests along with prior history of common bile duct stone with cholecystitis status post cholecystectomy in September of this year, CAD, urine colonization with ESBL E. coli who presented to the emergency room with complaints of shaking chills along with nausea and abdominal pain.  Please see H&P for full details of admission.  He had an elevated lactic acid and leukocytosis upon presentation.  There is also concern for urinary tract infection and possibly biliary tract infection.  He was placed upon Zosyn for antibiotic coverage, general surgery, GI, and infectious disease were consulted.  Blood cultures returned positive for Klebsiella pneumoniae.  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  Antibiotics were adjusted accordingly and he is much improved from infection standpoint with resolution of his abdominal pain.  His liver function tests were monitored closely.  These trended down initially but did stall out and around that time we did receive additional information including an elevated DAVID and anticentromere antibody.  He had a liver biopsy in the outpatient setting just prior to his presentation and this was concerning for inflammation due to medication reaction but the specimen has also been sent to the [facility] for a second opinion with results of that consultation pending.  The patient was initiated on prednisone with improvement of his liver function tests.  Overall he is clinically stable and doing much  better at this point.  Infectious disease and GI have cleared him for discharge and he will be followed up closely in the outpatient setting.  I have discussed with gastroenterology today.  We will send him out on 2 weeks worth of prednisone 40 mg and then decrease to 20 mg for 2 weeks until he is seen in the office and they will take the rest of his taper from there.  Gastroenterology also plans to recheck a CMP on October 14 in their office.     Additionally, the patient was seen by cardiology for his recent cardiac stenting.  They recommended continuation of his aspirin and Plavix but he was otherwise stable from a cardiovascular perspective.    Liver biopsy with over read at [place] felt to be related to drug interaction no evidence of infection or cancer.  Patient was recommend to have a second opinion from the [facility] hematology clinic w/ Dr. Santana.    I reviewed labs dated 10/14/2019 CMP-- alkaline phosphatase 584, AST 77, ALT 88, total bilirubin 2.0 normal kidney function.    On visit today he is asymptomatic from a GI standpoint.  Denies right upper quadrant pain, jaundice, icterus, or lower extremity edema.  His appetite is good.  His weight is stable.  No nausea, vomiting, or dysphagia.  Bowels move daily with soft stools.  Denies rectal bleeding.    He continues on prednisone 20 mg once daily.  He tells me he has yet to schedule appointment with Dr. Santana, hematologist.  He is also followed closely with his primary care provider and cardiologist for orthostatic hypotension.    Past Medical History:   Diagnosis Date   • Anxiety    • Back pain    • Bladder cancer (CMS/HCC) 2018    bladder cancer has been removed.   • Coronary artery disease    • Depression    • Dizziness    • Fatigue    • GERD (gastroesophageal reflux disease)    • History of fractured rib     RIGHT SIDE   • Hyperlipidemia    • Hypertension    • Tremors of nervous system    • Urinary incontinence    • Vertigo        Past  Surgical History:   Procedure Laterality Date   • CARDIAC CATHETERIZATION      7x, 5 stents   • CATARACT EXTRACTION, BILATERAL     • CHOLECYSTECTOMY N/A 9/2/2019    Procedure: CHOLECYSTECTOMY LAPAROSCOPIC WITH INTRAOPERATIVE CHOLANGIOGRAM;  Surgeon: Bg Rodriguez Jr., MD;  Location: [facility] MAIN OR;  Service: General   • COLONOSCOPY     • CORONARY ANGIOPLASTY WITH STENT PLACEMENT      X5    • CYSTOSCOPY BLADDER BIOPSY N/A 1/8/2019    Procedure: CYSTOSCOPY BLADDER BIOPSY;  Surgeon: Wang Soares Jr., MD;  Location: [facility] MAIN OR;  Service: Urology   • CYSTOSCOPY BLADDER BIOPSY N/A 6/13/2019    Procedure: CYSTOSCOPY BLADDER BIOPSY;  Surgeon: Wang Soares Jr., MD;  Location: [facility] MAIN OR;  Service: Urology   • CYSTOSCOPY TRANSURETHRAL RESECTION OF PROSTATE N/A 7/11/2019    Procedure: CYSTOSCOPY TRANSURETHRAL RESECTION OF PROSTATE;  Surgeon: Wang Soares Jr., MD;  Location: [facility] MAIN OR;  Service: Urology   • CYSTOSCOPY URETEROSCOPY LASER LITHOTRIPSY N/A 6/13/2019    Procedure: CYSTO LITHOPAXY;  Surgeon: Wang Soares Jr., MD;  Location: [facility] MAIN OR;  Service: Urology   • ERCP N/A 9/3/2019    Procedure: ENDOSCOPIC RETROGRADE CHOLANGIOPANCREATOGRAPHY with sphincterotomy and balloon sweep;  Surgeon: Ashok Amaya MD;  Location: [facility] ENDOSCOPY;  Service: Gastroenterology   • EYE SURGERY      Lens implants   • LUMBAR DISCECTOMY FUSION INSTRUMENTATION     • SKIN CANCER EXCISION Left     chest wall   • TRANSURETHRAL RESECTION OF BLADDER TUMOR N/A 11/29/2018    Procedure: TUR BLADDER TUMOR  LARGE;  Surgeon: Wang Soares Jr., MD;  Location: [facility] OR;  Service: Urology       Scheduled Meds:  Outpatient Encounter Medications as of 10/30/2019   Medication Sig Dispense Refill   • aspirin 81 MG chewable tablet Chew 1 tablet Daily. 30 tablet 0   • clopidogrel (PLAVIX) 75 MG tablet Take 75 mg by mouth Daily.     • FLUoxetine (PROzac) 20 MG capsule Take 60 mg by mouth Every  Night. Pt takes 40 mg + 20 mg capsules = 60 mg daily     • midodrine (PROAMATINE) 2.5 MG tablet Take 1 tablet by mouth 3 (Three) Times a Day. 90 tablet 1   • midodrine (PROAMATINE) 2.5 MG tablet Take 1 tablet by mouth 3 (Three) Times a Day. 90 tablet 1   • omeprazole (priLOSEC) 40 MG capsule Take 1 capsule by mouth Daily. 90 capsule 4   • predniSONE (DELTASONE) 20 MG tablet Take 1 tablet by mouth daily for 14 days. 14 tablet 0     No facility-administered encounter medications on file as of 10/30/2019.        Continuous Infusions:  No current facility-administered medications for this visit.     PRN Meds:.    No Known Allergies    Social History     Socioeconomic History   • Marital status:      Spouse name: Not on file   • Number of children: 4   • Years of education: 5 college degrees   • Highest education level: Not on file   Occupational History   • Occupation: Retired   Tobacco Use   • Smoking status: Never Smoker   • Smokeless tobacco: Never Used   Substance and Sexual Activity   • Alcohol use: No     Frequency: Never     Drinks per session: 5 or 6     Binge frequency: Less than monthly     Comment: last drink about 1 year ago    • Drug use: No   • Sexual activity: Defer       Family History   Problem Relation Age of Onset   • Arthritis Mother    • Glaucoma Mother    • Heart disease Father    • Emphysema Father    • Tremor Father    • Malig Hyperthermia Neg Hx        Review of Systems   Constitutional: Negative for activity change, appetite change, fatigue, fever and unexpected weight change.   HENT: Negative for trouble swallowing.    Respiratory: Negative for apnea, cough, choking, chest tightness, shortness of breath and wheezing.    Cardiovascular: Negative for chest pain, palpitations and leg swelling.   Gastrointestinal: Negative for abdominal distention, abdominal pain, anal bleeding, blood in stool, constipation, diarrhea, nausea, rectal pain and vomiting.       Vitals:    10/30/19 1053   BP:  112/68   Temp: 98.2 °F (36.8 °C)       Physical Exam   Constitutional: He is oriented to person, place, and time. He appears well-developed and well-nourished.   Eyes: Pupils are equal, round, and reactive to light.   Cardiovascular: Normal rate, regular rhythm and normal heart sounds.   Pulmonary/Chest: Effort normal and breath sounds normal. No respiratory distress. He has no wheezes.   Abdominal: Soft. Bowel sounds are normal. He exhibits no distension and no mass. There is no tenderness. There is no guarding. No hernia.   Musculoskeletal: Normal range of motion.   Neurological: He is alert and oriented to person, place, and time.   Skin: Skin is warm and dry. Capillary refill takes less than 2 seconds.   Psychiatric: He has a normal mood and affect. His behavior is normal.       No images are attached to the encounter.    Sukhdev was seen today for elevated liver function test.    Diagnoses and all orders for this visit:    Elevated liver enzymes  -     Comprehensive Metabolic Panel    Assessment/plan    Pleasant 78-year-old male seen today in follow-up for recent hospitalization as summarized above.  Patient's case was reviewed by Dr. Amaya during his hospital stay and was not felt that he had cholangitis nor was ERCP recommended.  MRI was reviewed and again no indication for ERCP.  The liver biopsy was nonspecific but it did mention cholestatic changes suggestive of possible medication reaction.  Also could not rule out element of AIH although serology negative from this regard.  Over read from [facility] was felt to be drug reaction no evidence of infection or cancer.  Liver function tests continue to improve.  Patient was recommended to see Dr. Santana, hepatologist at [facility].  The patient has yet to make a follow-up with this service.    At this point CMP today to assess liver function.  Based on results will arrange for taper of steroids.    Patient follow-up in 1 month or sooner if needed.  I will update  Dr. Amaya's the patient's progress.

## 2019-10-31 ENCOUNTER — DOCUMENTATION (OUTPATIENT)
Dept: GASTROENTEROLOGY | Facility: CLINIC | Age: 79
End: 2019-10-31

## 2019-10-31 DIAGNOSIS — R74.8 ELEVATED LIVER ENZYMES: Primary | ICD-10-CM

## 2019-10-31 RX ORDER — PREDNISONE 1 MG/1
10 TABLET ORAL DAILY
Qty: 40 TABLET | Refills: 0 | Status: SHIPPED | OUTPATIENT
Start: 2019-10-31 | End: 2019-12-03

## 2019-10-31 NOTE — PROGRESS NOTES
I spoke with the patient's wife over the phone.  We discussed his CMP results which continue to improve.  The patient and his wife would like to continue with his care here at [facility] with Dr. Amaya versus going to see Dr. Santana.  I did review this case with Dr. Amaya and he agrees with plan of care to taper the patient's prednisone, repeat CMP in 2 weeks, follow-up in the office with him in 1 month.  I instructed the patient to decrease prednisone to 10 mg a day for 1 week then decrease by 5 mg a week until off.  Patient wife to call with any question concerns in the interim.  CMP in 2 weeks.

## 2019-11-01 ENCOUNTER — READMISSION MANAGEMENT (OUTPATIENT)
Dept: CALL CENTER | Facility: HOSPITAL | Age: 79
End: 2019-11-01

## 2019-11-01 NOTE — OUTREACH NOTE
"Medical Week 3 Survey      Responses   Facility patient discharged from?  [place]   Does the patient have one of the following disease processes/diagnoses(primary or secondary)?  Other   Week 3 attempt successful?  Yes   Call start time  0903   Call end time  0908   Discharge diagnosis  Bacteremia due to Klebsiella pneumoniae, Calculus of bile duct with acute cholecystitis without obstruction , Abdominal pain     Is patient permission given to speak with other caregiver?  Yes   List who call center can speak with  spouse- [place]   Person spoke with today (if not patient) and relationship  spouse- [place], and patient   Meds reviewed with patient/caregiver?  Yes   Is the patient taking all medications as directed (includes completed medication regime)?  Yes   Does the patient have a primary care provider?   Yes   Has the patient kept scheduled appointments due by today?  Yes   What is the Home health agency?   [facility]   Has home health visited the patient within 72 hours of discharge?  Yes   Psychosocial issues?  No   Did the patient receive a copy of their discharge instructions?  Yes   Nursing interventions  Reviewed instructions with patient   What is the patient's perception of their health status since discharge?  Same [Patient states, \" I have good days and bad days.\" ]   Is the patient/caregiver able to teach back signs and symptoms related to disease process for when to call PCP?  Yes   Is the patient/caregiver able to teach back signs and symptoms related to disease process for when to call 911?  Yes   Is the patient/caregiver able to teach back the hierarchy of who to call/visit for symptoms/problems? PCP, Specialist, Home health nurse, Urgent Care, ED, 911  Yes   Week 3 Call Completed?  Yes          Jennifer Muniz RN  "

## 2019-11-05 ENCOUNTER — TELEPHONE (OUTPATIENT)
Dept: GASTROENTEROLOGY | Facility: CLINIC | Age: 79
End: 2019-11-05

## 2019-11-05 NOTE — TELEPHONE ENCOUNTER
----- Message from Herminio Kidd sent at 11/5/2019 10:56 AM EST -----  Regarding: predniSONE (DELTASONE  Contact: [phone]  [facility] called and has some questions about the prescription.  [phone] ref # [id number]

## 2019-11-10 ENCOUNTER — RESULTS ENCOUNTER (OUTPATIENT)
Dept: GASTROENTEROLOGY | Facility: CLINIC | Age: 79
End: 2019-11-10

## 2019-11-10 DIAGNOSIS — R74.8 ELEVATED LIVER ENZYMES: ICD-10-CM

## 2019-11-11 ENCOUNTER — READMISSION MANAGEMENT (OUTPATIENT)
Dept: CALL CENTER | Facility: HOSPITAL | Age: 79
End: 2019-11-11

## 2019-11-11 LAB
ALBUMIN SERPL-MCNC: 3.9 G/DL (ref 3.5–5.2)
ALBUMIN/GLOB SERPL: 1.9 G/DL
ALP SERPL-CCNC: 204 U/L (ref 39–117)
ALT SERPL-CCNC: 48 U/L (ref 1–41)
AST SERPL-CCNC: 53 U/L (ref 1–40)
BILIRUB SERPL-MCNC: 0.7 MG/DL (ref 0.2–1.2)
BUN SERPL-MCNC: 14 MG/DL (ref 8–23)
BUN/CREAT SERPL: 13.1 (ref 7–25)
CALCIUM SERPL-MCNC: 9 MG/DL (ref 8.6–10.5)
CHLORIDE SERPL-SCNC: 104 MMOL/L (ref 98–107)
CO2 SERPL-SCNC: 25.5 MMOL/L (ref 22–29)
CREAT SERPL-MCNC: 1.07 MG/DL (ref 0.76–1.27)
GLOBULIN SER CALC-MCNC: 2.1 GM/DL
GLUCOSE SERPL-MCNC: 87 MG/DL (ref 65–99)
POTASSIUM SERPL-SCNC: 4.2 MMOL/L (ref 3.5–5.2)
PROT SERPL-MCNC: 6 G/DL (ref 6–8.5)
SODIUM SERPL-SCNC: 142 MMOL/L (ref 136–145)

## 2019-11-11 NOTE — OUTREACH NOTE
Medical Week 4 Survey      Responses   Facility patient discharged from?  [place]   Does the patient have one of the following disease processes/diagnoses(primary or secondary)?  Other   Week 4 attempt successful?  Yes   Call start time  0835   Call end time  0839   Discharge diagnosis  Bacteremia due to Klebsiella pneumoniae, Calculus of bile duct with acute cholecystitis without obstruction , Abdominal pain     Meds reviewed with patient/caregiver?  Yes   Is the patient taking all medications as directed (includes completed medication regime)?  Yes   Has the patient kept scheduled appointments due by today?  Yes   Is the patient still receiving Home Health Services?  Yes   Home health comments  HH comes twice a week   What is the patient's perception of their health status since discharge?  Same   Additional teach back comments  C/o weakness, intemittent dizziness.   Week 4 Call Completed?  Yes          Rosemary Mireles RN

## 2019-11-12 ENCOUNTER — LAB REQUISITION (OUTPATIENT)
Dept: LAB | Facility: HOSPITAL | Age: 79
End: 2019-11-12

## 2019-11-12 DIAGNOSIS — N32.9 BLADDER DISORDER, UNSPECIFIED: ICD-10-CM

## 2019-11-12 PROCEDURE — 88305 TISSUE EXAM BY PATHOLOGIST: CPT | Performed by: UROLOGY

## 2019-11-12 NOTE — PROGRESS NOTES
Please notify Sukhdev that liver function tests continue to improve.  Continue with prednisone taper and keep follow-up with Dr. Amaya as previously recommended.

## 2019-11-13 LAB
LAB AP CASE REPORT: NORMAL
PATH REPORT.FINAL DX SPEC: NORMAL
PATH REPORT.GROSS SPEC: NORMAL

## 2019-11-14 ENCOUNTER — RESULTS ENCOUNTER (OUTPATIENT)
Dept: GASTROENTEROLOGY | Facility: CLINIC | Age: 79
End: 2019-11-14

## 2019-11-14 ENCOUNTER — TELEPHONE (OUTPATIENT)
Dept: GASTROENTEROLOGY | Facility: CLINIC | Age: 79
End: 2019-11-14

## 2019-11-14 DIAGNOSIS — R74.8 ELEVATED LIVER ENZYMES: ICD-10-CM

## 2019-11-14 NOTE — TELEPHONE ENCOUNTER
Called and spoke with pt's wife (ok per LOULOU). Advised of the note from Chelsey. Advised to keep appt with Dr Amaya as scheduled on 12/3 at 10AM.

## 2019-11-14 NOTE — TELEPHONE ENCOUNTER
----- Message from ANIL Darby sent at 11/12/2019  1:38 PM EST -----  Please notify Sukhdev that liver function tests continue to improve.  Continue with prednisone taper and keep follow-up with Dr. Amaya as previously recommended.

## 2019-12-03 ENCOUNTER — OFFICE VISIT (OUTPATIENT)
Dept: GASTROENTEROLOGY | Facility: CLINIC | Age: 79
End: 2019-12-03

## 2019-12-03 ENCOUNTER — OFFICE VISIT (OUTPATIENT)
Dept: NEUROLOGY | Facility: CLINIC | Age: 79
End: 2019-12-03

## 2019-12-03 VITALS
WEIGHT: 190.1 LBS | HEIGHT: 72 IN | OXYGEN SATURATION: 98 % | BODY MASS INDEX: 25.75 KG/M2 | DIASTOLIC BLOOD PRESSURE: 80 MMHG | HEART RATE: 81 BPM | SYSTOLIC BLOOD PRESSURE: 138 MMHG

## 2019-12-03 VITALS
HEIGHT: 72 IN | DIASTOLIC BLOOD PRESSURE: 62 MMHG | TEMPERATURE: 98.4 F | WEIGHT: 185 LBS | SYSTOLIC BLOOD PRESSURE: 122 MMHG | BODY MASS INDEX: 25.06 KG/M2

## 2019-12-03 DIAGNOSIS — R41.3 MEMORY LOSS: ICD-10-CM

## 2019-12-03 DIAGNOSIS — K71.9 DRUG-INDUCED LIVER INJURY: Primary | ICD-10-CM

## 2019-12-03 DIAGNOSIS — G20 PARKINSON'S DISEASE (HCC): Primary | ICD-10-CM

## 2019-12-03 DIAGNOSIS — R25.9 MIXED ACTION AND RESTING TREMOR: ICD-10-CM

## 2019-12-03 DIAGNOSIS — I95.1 ORTHOSTATIC HYPOTENSION: ICD-10-CM

## 2019-12-03 PROBLEM — Z78.9 BETA-BLOCKER INTOLERANCE: Status: ACTIVE | Noted: 2019-12-03

## 2019-12-03 PROCEDURE — 99212 OFFICE O/P EST SF 10 MIN: CPT | Performed by: INTERNAL MEDICINE

## 2019-12-03 PROCEDURE — 99215 OFFICE O/P EST HI 40 MIN: CPT | Performed by: PSYCHIATRY & NEUROLOGY

## 2019-12-03 RX ORDER — FLUOXETINE HYDROCHLORIDE 40 MG/1
CAPSULE ORAL
COMMUNITY
Start: 2019-10-14 | End: 2020-01-20 | Stop reason: SDUPTHER

## 2019-12-03 NOTE — PROGRESS NOTES
CC: Tremor    HPI:  Sukhdev Zayas is a  79 y.o.  right-handed white male who I am seeing in follow-up with mixed tremor.  I saw him previously 7/9/2018 he was seen in follow-up several times by Christie Pacheco NP.  He was tried on a beta-blocker, primidone, Topamax, amantadine and gabapentin.  His wife indicates 1 of them seem to help some but he had sedation to most of these medications or no particular effect.  He has had some change in gait lately and more tremor.  He had predominantly left hand tremor now it has spread to the right hand and he also has some jaw tremor.  He has significant orthostatic hypotension at times to the point of near syncope.    MRI of the brain with and without contrast which was completed April 20, 2019.  It showed mild small vessel disease but no acute findings.  In April 2019 check labs and RPR was nonreactive, TSH was slightly elevated at 5.3, and vitamin B12 level was 519.    Over the last 4 months he has been hospitalized 4 times.  He had a bladder tumor resected, a prostate procedure, gallbladder surgery and then bile duct surgery and liver biopsy due to significantly elevated liver enzymes.  Results apparently still pending.  He has not really recovered his stamina.  He wears out very easily.    The patient's wife indicates that he spoken with her daughter-in-law who is a med/[employer] physician who suggested carbidopa/levodopa given the evolution of a more parkinsonian tremor type.    Past Medical History:   Diagnosis Date   • Anxiety    • Back pain    • Bladder cancer (CMS/HCC) 2018    bladder cancer has been removed.   • Coronary artery disease    • Depression    • Dizziness    • Fatigue    • GERD (gastroesophageal reflux disease)    • History of fractured rib     RIGHT SIDE   • Hyperlipidemia    • Hypertension    • Tremors of nervous system    • Urinary incontinence    • Vertigo          Past Surgical History:   Procedure Laterality Date   • CARDIAC CATHETERIZATION       7x, 5 stents   • CATARACT EXTRACTION, BILATERAL     • CHOLECYSTECTOMY N/A 9/2/2019    Procedure: CHOLECYSTECTOMY LAPAROSCOPIC WITH INTRAOPERATIVE CHOLANGIOGRAM;  Surgeon: Bg Rodriguez Jr., MD;  Location: [facility] MAIN OR;  Service: General   • COLONOSCOPY     • CORONARY ANGIOPLASTY WITH STENT PLACEMENT      X5    • CYSTOSCOPY BLADDER BIOPSY N/A 1/8/2019    Procedure: CYSTOSCOPY BLADDER BIOPSY;  Surgeon: Wang Soares Jr., MD;  Location: [facility] MAIN OR;  Service: Urology   • CYSTOSCOPY BLADDER BIOPSY N/A 6/13/2019    Procedure: CYSTOSCOPY BLADDER BIOPSY;  Surgeon: Wang Soares Jr., MD;  Location: [facility] MAIN OR;  Service: Urology   • CYSTOSCOPY TRANSURETHRAL RESECTION OF PROSTATE N/A 7/11/2019    Procedure: CYSTOSCOPY TRANSURETHRAL RESECTION OF PROSTATE;  Surgeon: Wang Soares Jr., MD;  Location: [facility] MAIN OR;  Service: Urology   • CYSTOSCOPY URETEROSCOPY LASER LITHOTRIPSY N/A 6/13/2019    Procedure: CYSTO LITHOPAXY;  Surgeon: Wang Soares Jr., MD;  Location: [facility] MAIN OR;  Service: Urology   • ERCP N/A 9/3/2019    Procedure: ENDOSCOPIC RETROGRADE CHOLANGIOPANCREATOGRAPHY with sphincterotomy and balloon sweep;  Surgeon: Ashok Amaya MD;  Location: [facility] ENDOSCOPY;  Service: Gastroenterology   • EYE SURGERY      Lens implants   • LUMBAR DISCECTOMY FUSION INSTRUMENTATION     • SKIN CANCER EXCISION Left     chest wall   • TRANSURETHRAL RESECTION OF BLADDER TUMOR N/A 11/29/2018    Procedure: TUR BLADDER TUMOR  LARGE;  Surgeon: Wang Soares Jr., MD;  Location: [facility] MAIN OR;  Service: Urology           Current Outpatient Medications:   •  aspirin 81 MG chewable tablet, Chew 1 tablet Daily., Disp: 30 tablet, Rfl: 0  •  clopidogrel (PLAVIX) 75 MG tablet, Take 75 mg by mouth Daily., Disp: , Rfl:   •  FLUoxetine (PROzac) 40 MG capsule, , Disp: , Rfl:   •  midodrine (PROAMATINE) 2.5 MG tablet, Take 1 tablet by mouth 3 (Three) Times a Day., Disp: 90 tablet, Rfl: 1  •   omeprazole (priLOSEC) 40 MG capsule, Take 1 capsule by mouth Daily., Disp: 90 capsule, Rfl: 4  •  carbidopa-levodopa (SINEMET)  MG per tablet, Take 1 tablet by mouth 3 (Three) Times a Day., Disp: 90 tablet, Rfl: 3  •  FLUoxetine (PROzac) 20 MG capsule, Take 60 mg by mouth Every Night. Pt takes 40 mg + 20 mg capsules = 60 mg daily, Disp: , Rfl:       Family History   Problem Relation Age of Onset   • Arthritis Mother    • Glaucoma Mother    • Heart disease Father    • Emphysema Father    • Tremor Father    • Malig Hyperthermia Neg Hx          Social History     Socioeconomic History   • Marital status:      Spouse name: Not on file   • Number of children: 4   • Years of education: 5 college degrees   • Highest education level: Not on file   Occupational History   • Occupation: Retired   Tobacco Use   • Smoking status: Never Smoker   • Smokeless tobacco: Never Used   Substance and Sexual Activity   • Alcohol use: No     Frequency: Never     Drinks per session: 5 or 6     Binge frequency: Less than monthly     Comment: last drink about 1 year ago    • Drug use: No   • Sexual activity: Defer         No Known Allergies      Pain Scale: 0/10        ROS:  Review of Systems   Constitutional: Positive for fatigue. Negative for activity change and appetite change.   Eyes: Negative for pain, redness and itching.   Respiratory: Negative for cough, choking and shortness of breath.    Cardiovascular: Negative for chest pain and leg swelling.   Gastrointestinal: Negative for abdominal pain, nausea and vomiting.   Endocrine: Negative for cold intolerance and heat intolerance.   Skin: Negative for color change, pallor and rash.   Allergic/Immunologic: Negative for environmental allergies and food allergies.   Neurological: Positive for dizziness, tremors, syncope, weakness, light-headedness and headaches. Negative for seizures, facial asymmetry, speech difficulty and numbness.   Psychiatric/Behavioral: Positive for  "confusion and decreased concentration. Negative for agitation, behavioral problems, dysphoric mood, hallucinations, self-injury, sleep disturbance and suicidal ideas. The patient is not nervous/anxious and is not hyperactive.          I have reviewed and agree with the above ROS completed by the medical assistant.      Physical Exam:  Vitals:    12/03/19 1639   BP: 138/80   Pulse: 81   SpO2: 98%   Weight: 86.2 kg (190 lb 1.6 oz)   Height: 182.9 cm (72.01\")     Orthostatic BP: Supine blood pressure 140/80; standing pressure 110/70    Body mass index is 25.78 kg/m².    Physical Exam  General: Well-developed white male no acute distress  HEENT: Normocephalic no evidence of trauma  Neck: Supple  Heart: Regular rate and rhythm  Extremities: No pedal edema      Neurological Exam:   Mental Status: Awake, alert, oriented to person, place and time.  Conversant without evidence of an affective disorder, thought disorder, delusions or hallucinations.  Attention span and concentration are normal.  HCF: No aphasia, apraxia or dysarthria.  Recent and remote memory intact.  Knowledge of recent events intact.  CN: I:   II: Visual fields full without left inattention   III, IV, VI: Eye movements intact without nystagmus or ptosis.  Pupils equal round and reactive to light.   V,VII: Light touch and pinprick intact all 3 divisions of V.  Facial muscles symmetrical.   VIII: Hearing intact to finger rub   IX,X: Soft palate elevates symmetrically   XI: Sternomastoid and trapezius are strong.   XII: Tongue midline without atrophy or fasciculations  Motor: Normal tone and bulk in the upper and lower extremities   Power testing: Full power in all muscles tested arms and legs  Reflexes: Upper extremities: Trace symmetric        Lower extremities: Trace symmetric        Toe signs:  Sensory: Light touch: Diffusely intact        Pinprick: Diffusely intact        Vibration:        Position:    Cerebellar: Finger-to-nose: There is a resting " tremor left hand greater than right with some thumb rotation.  There is also a jaw tremor.  There is a postural tremor which is less prominent and an endpoint tremor with both hands.  Thumb tremor is noted while ambulating in both hands           Rapid movement: Intact           Heel-to-shin: Intact  Gait and Station: Comes to stand easily.  No shuffling.  Step length normal.  Turns around pretty well.  Armswing is reasonable with thumb tremor noted as above.  No Romberg no drift    Results:      Lab Results   Component Value Date    GLUCOSE 133 (H) 10/09/2019    BUN 14 11/11/2019    CREATININE 1.07 11/11/2019    EGFRIFNONA 67 11/11/2019    EGFRIFAFRI 81 11/11/2019    BCR 13.1 11/11/2019    CO2 25.5 11/11/2019    CALCIUM 9.0 11/11/2019    PROTENTOTREF 6.0 11/11/2019    ALBUMIN 3.90 11/11/2019    LABIL2 1.9 11/11/2019    AST 53 (H) 11/11/2019    ALT 48 (H) 11/11/2019       Lab Results   Component Value Date    WBC 7.55 10/09/2019    HGB 9.9 (L) 10/09/2019    HCT 30.2 (L) 10/09/2019    MCV 92.1 10/09/2019     10/09/2019         .  Lab Results   Component Value Date    RPR Non-Reactive 04/11/2019         Lab Results   Component Value Date    TSH 4.300 (H) 07/29/2019         Lab Results   Component Value Date    WFVMMYFJ80 519 04/11/2019         No results found for: FOLATE      No results found for: HGBA1C      Lab Results   Component Value Date    GLUCOSE 133 (H) 10/09/2019    BUN 14 11/11/2019    CREATININE 1.07 11/11/2019    EGFRIFNONA 67 11/11/2019    EGFRIFAFRI 81 11/11/2019    BCR 13.1 11/11/2019    K 4.2 11/11/2019    CO2 25.5 11/11/2019    CALCIUM 9.0 11/11/2019    PROTENTOTREF 6.0 11/11/2019    ALBUMIN 3.90 11/11/2019    LABIL2 1.9 11/11/2019    AST 53 (H) 11/11/2019    ALT 48 (H) 11/11/2019         Lab Results   Component Value Date    WBC 7.55 10/09/2019    HGB 9.9 (L) 10/09/2019    HCT 30.2 (L) 10/09/2019    MCV 92.1 10/09/2019     10/09/2019             Assessment:   1.  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  Failed multiple medications mostly for side effects.  2.  Mild gait disturbance  3.  Syncope/presyncope  4.  Mild memory loss        Plan:  1.  Trial of carbidopa/levodopa, 25/100, half tablet 3 times daily and if tolerated in 1 to 2 weeks to try 1 tablet 3 times daily.  Side effects reviewed.  2.  Follow-up in 3 months with Christie Pacheco NP  3.  Follow-up on orthostatic blood pressure checks.  He is currently on Midodrine 2.5 mg 3 times daily            Greater than 50% of this 40-minute follow-up was spent counseling the patient on the treatment of Parkinson's disease, tremor and orthostasis            Dictated utilizing Dragon dictation.

## 2019-12-03 NOTE — PROGRESS NOTES
Chief Complaint   Patient presents with   • ABN LFT's       Sukhdev Zayas is a  79 y.o. male here for a follow up visit for drug-induced liver injury.    HPI     Patient 79-year-old male with history of elevated LFTs status post drug-induced liver injury per biopsy.  Patient continues to improve with each lab.  Patient reports only complaints of some fatigue here for follow-up.    Past Medical History:   Diagnosis Date   • Anxiety    • Back pain    • Bladder cancer (CMS/HCC) 2018    bladder cancer has been removed.   • Coronary artery disease    • Depression    • Dizziness    • Fatigue    • GERD (gastroesophageal reflux disease)    • History of fractured rib     RIGHT SIDE   • Hyperlipidemia    • Hypertension    • Tremors of nervous system    • Urinary incontinence    • Vertigo          Current Outpatient Medications:   •  aspirin 81 MG chewable tablet, Chew 1 tablet Daily., Disp: 30 tablet, Rfl: 0  •  clopidogrel (PLAVIX) 75 MG tablet, Take 75 mg by mouth Daily., Disp: , Rfl:   •  FLUoxetine (PROzac) 20 MG capsule, Take 60 mg by mouth Every Night. Pt takes 40 mg + 20 mg capsules = 60 mg daily, Disp: , Rfl:   •  midodrine (PROAMATINE) 2.5 MG tablet, Take 1 tablet by mouth 3 (Three) Times a Day., Disp: 90 tablet, Rfl: 1  •  omeprazole (priLOSEC) 40 MG capsule, Take 1 capsule by mouth Daily., Disp: 90 capsule, Rfl: 4    No Known Allergies    Social History     Socioeconomic History   • Marital status:      Spouse name: Not on file   • Number of children: 4   • Years of education: 5 college degrees   • Highest education level: Not on file   Occupational History   • Occupation: Retired   Tobacco Use   • Smoking status: Never Smoker   • Smokeless tobacco: Never Used   Substance and Sexual Activity   • Alcohol use: No     Frequency: Never     Drinks per session: 5 or 6     Binge frequency: Less than monthly     Comment: last drink about 1 year ago    • Drug use: No   • Sexual activity: Defer       Family  History   Problem Relation Age of Onset   • Arthritis Mother    • Glaucoma Mother    • Heart disease Father    • Emphysema Father    • Tremor Father    • Malig Hyperthermia Neg Hx        Review of Systems   Constitutional: Positive for fatigue. Negative for activity change, appetite change, chills, diaphoresis and fever.   Respiratory: Negative.    Cardiovascular: Negative.    Gastrointestinal: Negative.    Musculoskeletal: Negative.    Skin: Negative.    Hematological: Negative.        Vitals:    12/03/19 1005   BP: 122/62   Temp: 98.4 °F (36.9 °C)       Physical Exam   Constitutional: He is oriented to person, place, and time. He appears well-developed and well-nourished.   HENT:   Head: Normocephalic and atraumatic.   Eyes: Pupils are equal, round, and reactive to light. No scleral icterus.   Neck: Normal range of motion.   Cardiovascular: Normal rate, regular rhythm and normal heart sounds.   Pulmonary/Chest: Effort normal and breath sounds normal.   Abdominal: Soft. Bowel sounds are normal. He exhibits no distension and no mass. There is no tenderness. No hernia.   Lymphadenopathy:     He has no cervical adenopathy.   Neurological: He is alert and oriented to person, place, and time.   Skin: Skin is warm and dry.   Psychiatric: He has a normal mood and affect. His behavior is normal.   Vitals reviewed.      Results Encounter on 11/14/2019   Component Date Value Ref Range Status   • Glucose 11/11/2019 87  65 - 99 mg/dL Final   • BUN 11/11/2019 14  8 - 23 mg/dL Final   • Creatinine 11/11/2019 1.07  0.76 - 1.27 mg/dL Final   • eGFR Non  Am 11/11/2019 67  >60 mL/min/1.73 Final   • eGFR African Am 11/11/2019 81  >60 mL/min/1.73 Final   • BUN/Creatinine Ratio 11/11/2019 13.1  7.0 - 25.0 Final   • Sodium 11/11/2019 142  136 - 145 mmol/L Final   • Potassium 11/11/2019 4.2  3.5 - 5.2 mmol/L Final   • Chloride 11/11/2019 104  98 - 107 mmol/L Final   • Total CO2 11/11/2019 25.5  22.0 - 29.0 mmol/L Final   • Calcium  11/11/2019 9.0  8.6 - 10.5 mg/dL Final   • Total Protein 11/11/2019 6.0  6.0 - 8.5 g/dL Final   • Albumin 11/11/2019 3.90  3.50 - 5.20 g/dL Final   • Globulin 11/11/2019 2.1  gm/dL Final   • A/G Ratio 11/11/2019 1.9  g/dL Final   • Total Bilirubin 11/11/2019 0.7  0.2 - 1.2 mg/dL Final   • Alkaline Phosphatase 11/11/2019 204* 39 - 117 U/L Final   • AST (SGOT) 11/11/2019 53* 1 - 40 U/L Final   • ALT (SGPT) 11/11/2019 48* 1 - 41 U/L Final   Lab Requisition on 11/12/2019   Component Date Value Ref Range Status   • Case Report 11/12/2019    Final                    Value:Surgical Pathology Report                         Case: [id number]                                  Authorizing Provider:  Wang Soares Jr.,  Collected:           11/12/2019 01:00 PM                                 MD                                                                           Pathologist:           Jose Thomas MD         Received:            11/12/2019 01:48 PM          Specimens:   1) - Urinary Bladder, Posterior wall                                                                2) - Urinary Bladder, Dome                                                                          3) - Urinary Bladder, Trigone                                                                       4) - Urinary Bladder, Left lateral wall                                                             5) - Urinary Bladder, Right lateral wall                                                  • Final Diagnosis 11/12/2019    Final                    Value:This result contains rich text formatting which cannot be displayed here.   • Gross Description 11/12/2019    Final                    Value:This result contains rich text formatting which cannot be displayed here.   Office Visit on 10/30/2019   Component Date Value Ref Range Status   • Glucose 10/30/2019 73  65 - 99 mg/dL Final   • BUN 10/30/2019 15  8 - 23 mg/dL Final   • Creatinine 10/30/2019 0.99  0.76  - 1.27 mg/dL Final   • eGFR Non African Am 10/30/2019 73  >60 mL/min/1.73 Final   • eGFR African Am 10/30/2019 89  >60 mL/min/1.73 Final   • BUN/Creatinine Ratio 10/30/2019 15.2  7.0 - 25.0 Final   • Sodium 10/30/2019 140  136 - 145 mmol/L Final   • Potassium 10/30/2019 4.1  3.5 - 5.2 mmol/L Final   • Chloride 10/30/2019 100  98 - 107 mmol/L Final   • Total CO2 10/30/2019 29.2* 22.0 - 29.0 mmol/L Final   • Calcium 10/30/2019 8.8  8.6 - 10.5 mg/dL Final   • Total Protein 10/30/2019 6.2  6.0 - 8.5 g/dL Final   • Albumin 10/30/2019 4.00  3.50 - 5.20 g/dL Final   • Globulin 10/30/2019 2.2  gm/dL Final   • A/G Ratio 10/30/2019 1.8  g/dL Final   • Total Bilirubin 10/30/2019 1.1  0.2 - 1.2 mg/dL Final   • Alkaline Phosphatase 10/30/2019 290* 39 - 117 U/L Final   • AST (SGOT) 10/30/2019 45* 1 - 40 U/L Final   • ALT (SGPT) 10/30/2019 59* 1 - 41 U/L Final   Results Encounter on 10/10/2019   Component Date Value Ref Range Status   • Glucose 10/14/2019 139* 65 - 99 mg/dL Final   • BUN 10/14/2019 20  8 - 23 mg/dL Final   • Creatinine 10/14/2019 1.04  0.76 - 1.27 mg/dL Final   • eGFR Non  Am 10/14/2019 69  >60 mL/min/1.73 Final   • eGFR African Am 10/14/2019 84  >60 mL/min/1.73 Final   • BUN/Creatinine Ratio 10/14/2019 19.2  7.0 - 25.0 Final   • Sodium 10/14/2019 140  136 - 145 mmol/L Final   • Potassium 10/14/2019 4.2  3.5 - 5.2 mmol/L Final   • Chloride 10/14/2019 103  98 - 107 mmol/L Final   • Total CO2 10/14/2019 25.8  22.0 - 29.0 mmol/L Final   • Calcium 10/14/2019 9.3  8.6 - 10.5 mg/dL Final   • Total Protein 10/14/2019 6.0  6.0 - 8.5 g/dL Final   • Albumin 10/14/2019 3.50  3.50 - 5.20 g/dL Final   • Globulin 10/14/2019 2.5  gm/dL Final   • A/G Ratio 10/14/2019 1.4  g/dL Final   • Total Bilirubin 10/14/2019 2.0* 0.2 - 1.2 mg/dL Final   • Alkaline Phosphatase 10/14/2019 584* 39 - 117 U/L Final   • AST (SGOT) 10/14/2019 77* 1 - 40 U/L Final   • ALT (SGPT) 10/14/2019 88* 1 - 41 U/L Final   Admission on 10/04/2019,  Discharged on 10/09/2019   No results displayed because visit has over 200 results.          Sukhdev was seen today for abn lft's.    Diagnoses and all orders for this visit:    Drug-induced liver injury  -     Comprehensive Metabolic Panel  -     Protime-INR      Patient 79-year-old male with history of drug-induced liver injury here for follow-up.  Patient with continued improvement slowly resolving LFTs good hepatic function.  We will recheck LFTs today including PT/INR to monitor healing and follow-up clinically.

## 2019-12-04 LAB
ALBUMIN SERPL-MCNC: 3.9 G/DL (ref 3.5–4.8)
ALBUMIN/GLOB SERPL: 1.6 {RATIO} (ref 1.2–2.2)
ALP SERPL-CCNC: 192 IU/L (ref 39–117)
ALT SERPL-CCNC: 38 IU/L (ref 0–44)
AST SERPL-CCNC: 59 IU/L (ref 0–40)
BILIRUB SERPL-MCNC: 0.5 MG/DL (ref 0–1.2)
BUN SERPL-MCNC: 14 MG/DL (ref 8–27)
BUN/CREAT SERPL: 13 (ref 10–24)
CALCIUM SERPL-MCNC: 9.1 MG/DL (ref 8.6–10.2)
CHLORIDE SERPL-SCNC: 107 MMOL/L (ref 96–106)
CO2 SERPL-SCNC: 18 MMOL/L (ref 20–29)
CREAT SERPL-MCNC: 1.12 MG/DL (ref 0.76–1.27)
GLOBULIN SER CALC-MCNC: 2.4 G/DL (ref 1.5–4.5)
GLUCOSE SERPL-MCNC: 85 MG/DL (ref 65–99)
INR PPP: 1.1 (ref 0.8–1.2)
POTASSIUM SERPL-SCNC: 4.4 MMOL/L (ref 3.5–5.2)
PROT SERPL-MCNC: 6.3 G/DL (ref 6–8.5)
PROTHROMBIN TIME: 11.1 SEC (ref 9.1–12)
SODIUM SERPL-SCNC: 143 MMOL/L (ref 134–144)

## 2019-12-05 ENCOUNTER — TELEPHONE (OUTPATIENT)
Dept: INTERNAL MEDICINE | Facility: CLINIC | Age: 79
End: 2019-12-05

## 2019-12-05 NOTE — TELEPHONE ENCOUNTER
Felicity Correa from [facility] called and stated that they have discharged the patient from [facility] if any questions, she left her number [phone]

## 2019-12-16 ENCOUNTER — OFFICE VISIT (OUTPATIENT)
Dept: INTERNAL MEDICINE | Facility: CLINIC | Age: 79
End: 2019-12-16

## 2019-12-16 VITALS
BODY MASS INDEX: 26.14 KG/M2 | WEIGHT: 193 LBS | DIASTOLIC BLOOD PRESSURE: 60 MMHG | SYSTOLIC BLOOD PRESSURE: 90 MMHG | HEIGHT: 72 IN

## 2019-12-16 DIAGNOSIS — I95.1 ORTHOSTATIC HYPOTENSION: ICD-10-CM

## 2019-12-16 DIAGNOSIS — R42 DIZZINESS: ICD-10-CM

## 2019-12-16 DIAGNOSIS — R79.89 ELEVATED LFTS: Primary | ICD-10-CM

## 2019-12-16 PROBLEM — R11.0 NAUSEA WITHOUT VOMITING: Status: RESOLVED | Noted: 2019-09-12 | Resolved: 2019-12-16

## 2019-12-16 PROBLEM — R10.9 ABDOMINAL PAIN: Status: RESOLVED | Noted: 2019-09-12 | Resolved: 2019-12-16

## 2019-12-16 PROBLEM — R78.81 BACTEREMIA DUE TO KLEBSIELLA PNEUMONIAE: Status: RESOLVED | Noted: 2019-10-09 | Resolved: 2019-12-16

## 2019-12-16 PROBLEM — B96.1 BACTEREMIA DUE TO KLEBSIELLA PNEUMONIAE: Status: RESOLVED | Noted: 2019-10-09 | Resolved: 2019-12-16

## 2019-12-16 PROBLEM — N30.00 ACUTE CYSTITIS WITHOUT HEMATURIA: Status: RESOLVED | Noted: 2019-05-24 | Resolved: 2019-12-16

## 2019-12-16 PROBLEM — E86.0 DEHYDRATION: Status: RESOLVED | Noted: 2019-09-11 | Resolved: 2019-12-16

## 2019-12-16 PROBLEM — R10.11 RIGHT UPPER QUADRANT PAIN: Status: RESOLVED | Noted: 2019-08-31 | Resolved: 2019-12-16

## 2019-12-16 PROCEDURE — 99213 OFFICE O/P EST LOW 20 MIN: CPT | Performed by: PHYSICIAN ASSISTANT

## 2019-12-16 NOTE — PROGRESS NOTES
Subjective   Chief Complaint   Patient presents with   • Hyperlipidemia     elevated lfts 2 month f/u       History of Present Illness     Pt is here today for fup. He is back on his plavix and ASA and tolerating well. Still not on his statin. He has finished prednisone. It has been recommended he not have flu shot due to possibility of autoimmune hepatitis vs med induced. He was recently started on carbidopa-levodopa for his tremors, been taking for 2 weeks does not feel it is helping yet.     He has not been taking midodrine, bp has been good. He is drinking plenty of water and salt intake is increased. He is feeling better, still fatigued. Tremors are starting to cause difficulty with eating.        Patient Active Problem List   Diagnosis   • Hyperlipidemia   • Coronary artery disease involving native coronary artery of native heart without angina pectoris   • Cognitive disorder   • Mood disorder (CMS/HCC)   • Closed wedge compression fracture of third thoracic vertebra with routine healing   • GERD (gastroesophageal reflux disease)   • S/P drug eluting coronary stent placement   • Chest pain   • Diastolic dysfunction   • Exertional angina (CMS/HCC)   • Unstable angina (CMS/HCC)   • Bladder mass   • Mixed action and resting tremor   • Bladder cancer (CMS/HCC)   • Generalized weakness   • Dyspnea on exertion   • BPH (benign prostatic hyperplasia)   • Calculus of gallbladder without cholecystitis   • Elevated LFTs   • Calculus of bile duct with acute cholecystitis without obstruction   • Atrial fibrillation (CMS/HCC)   • Essential hypertension   • Elevated bilirubin   • Dizziness   • Beta-blocker intolerance   • Orthostatic hypotension       No Known Allergies    Current Outpatient Medications on File Prior to Visit   Medication Sig Dispense Refill   • aspirin 81 MG chewable tablet Chew 1 tablet Daily. 30 tablet 0   • carbidopa-levodopa (SINEMET)  MG per tablet Take 1 tablet by mouth 3 (Three) Times a Day. 90  tablet 3   • clopidogrel (PLAVIX) 75 MG tablet Take 75 mg by mouth Daily.     • FLUoxetine (PROzac) 20 MG capsule Take 60 mg by mouth Every Night. Pt takes 40 mg + 20 mg capsules = 60 mg daily     • FLUoxetine (PROzac) 40 MG capsule      • omeprazole (priLOSEC) 40 MG capsule Take 1 capsule by mouth Daily. 90 capsule 4   • midodrine (PROAMATINE) 2.5 MG tablet Take 1 tablet by mouth 3 (Three) Times a Day. 90 tablet 1     No current facility-administered medications on file prior to visit.        Past Medical History:   Diagnosis Date   • Anxiety    • Back pain    • Bladder cancer (CMS/[facility]) 2018    bladder cancer has been removed.   • Coronary artery disease    • Depression    • Dizziness    • Fatigue    • GERD (gastroesophageal reflux disease)    • History of fractured rib     RIGHT SIDE   • Hyperlipidemia    • Hypertension    • Tremors of nervous system    • Urinary incontinence    • Vertigo        Family History   Problem Relation Age of Onset   • Arthritis Mother    • Glaucoma Mother    • Heart disease Father    • Emphysema Father    • Tremor Father    • Malig Hyperthermia Neg Hx        Social History     Socioeconomic History   • Marital status:      Spouse name: Not on file   • Number of children: 4   • Years of education: 5 college degrees   • Highest education level: Not on file   Occupational History   • Occupation: Retired   Tobacco Use   • Smoking status: Never Smoker   • Smokeless tobacco: Never Used   Substance and Sexual Activity   • Alcohol use: No     Frequency: Never     Drinks per session: 5 or 6     Binge frequency: Less than monthly     Comment: last drink about 1 year ago    • Drug use: No   • Sexual activity: Defer       Past Surgical History:   Procedure Laterality Date   • CARDIAC CATHETERIZATION      7x, 5 stents   • CATARACT EXTRACTION, BILATERAL     • CHOLECYSTECTOMY N/A 9/2/2019    Procedure: CHOLECYSTECTOMY LAPAROSCOPIC WITH INTRAOPERATIVE CHOLANGIOGRAM;  Surgeon: Bg Rodriguez  MD Chaparro;  Location: [facility] OR;  Service: General   • COLONOSCOPY     • CORONARY ANGIOPLASTY WITH STENT PLACEMENT      X5    • CYSTOSCOPY BLADDER BIOPSY N/A 1/8/2019    Procedure: CYSTOSCOPY BLADDER BIOPSY;  Surgeon: Wang Soares Jr., MD;  Location: [facility] OR;  Service: Urology   • CYSTOSCOPY BLADDER BIOPSY N/A 6/13/2019    Procedure: CYSTOSCOPY BLADDER BIOPSY;  Surgeon: Wang Soares Jr., MD;  Location: [facility] OR;  Service: Urology   • CYSTOSCOPY TRANSURETHRAL RESECTION OF PROSTATE N/A 7/11/2019    Procedure: CYSTOSCOPY TRANSURETHRAL RESECTION OF PROSTATE;  Surgeon: Wang Soares Jr., MD;  Location: [facility] OR;  Service: Urology   • CYSTOSCOPY URETEROSCOPY LASER LITHOTRIPSY N/A 6/13/2019    Procedure: CYSTO LITHOPAXY;  Surgeon: Wang Soares Jr., MD;  Location: [facility] OR;  Service: Urology   • ERCP N/A 9/3/2019    Procedure: ENDOSCOPIC RETROGRADE CHOLANGIOPANCREATOGRAPHY with sphincterotomy and balloon sweep;  Surgeon: Ashok Amaya MD;  Location: [facility] ENDOSCOPY;  Service: Gastroenterology   • EYE SURGERY      Lens implants   • LUMBAR DISCECTOMY FUSION INSTRUMENTATION     • SKIN CANCER EXCISION Left     chest wall   • TRANSURETHRAL RESECTION OF BLADDER TUMOR N/A 11/29/2018    Procedure: TUR BLADDER TUMOR  LARGE;  Surgeon: Wang Soares Jr., MD;  Location: [facility] OR;  Service: Urology         The following portions of the patient's history were reviewed and updated as appropriate: problem list, allergies, current medications, past medical history, past family history, past social history and past surgical history.    Review of Systems   Constitution: Positive for malaise/fatigue.   Cardiovascular: Negative for chest pain.   Gastrointestinal: Negative for abdominal pain.   Neurological: Positive for dizziness.       Immunization History   Administered Date(s) Administered   • Fluzone High Dose =>65 Years ([employer] ONLY) 10/12/2016   • flucelvax  "quad pfs =>4 YRS 11/30/2018       Objective   Vitals:    12/16/19 1027 12/16/19 1052   BP:  90/60   Weight: 87.5 kg (193 lb)    Height: 182.9 cm (72.01\")      Body mass index is 26.17 kg/m².  Physical Exam   Constitutional: He appears well-developed and well-nourished.   HENT:   Head: Normocephalic and atraumatic.   Cardiovascular: Normal rate and regular rhythm.   Pulmonary/Chest: Effort normal and breath sounds normal. He has no wheezes. He has no rales.   Vitals reviewed.        Assessment/Plan   Sukhdev was seen today for hyperlipidemia.    Diagnoses and all orders for this visit:    Elevated LFTs    Dizziness    Orthostatic hypotension    Still having some dizziness associated with orthostatic hypotension. His BP is low today I have recommended he take a midodrine when he gets home. His LFTs are almost back to normal. He is off prednisone and improving. He has recently started carbidopa-levodopa not much improvement yet, but tolerating without side effects. We discussed different utensils to help with eating today that are made for pt's with tremor he is going to look into this. It has been recommended he not have a flu shot. I have recommended all family members who are able to make sure they are vaccinated.     Return in about 5 months (around 5/16/2020).           "

## 2020-01-15 ENCOUNTER — TELEPHONE (OUTPATIENT)
Dept: INTERNAL MEDICINE | Facility: CLINIC | Age: 80
End: 2020-01-15

## 2020-01-15 ENCOUNTER — TELEPHONE (OUTPATIENT)
Dept: GASTROENTEROLOGY | Facility: CLINIC | Age: 80
End: 2020-01-15

## 2020-01-15 NOTE — TELEPHONE ENCOUNTER
Patient wife called stating patient went to see cardiologist and they did blood work and DX him with anemia, she also said they told her to contact you to let them know what to do next. The wife also said that he is suffering from fatigue and shortness of breath. Did you want me to get them in today or have them come in tomorrow morning? Please advise

## 2020-01-15 NOTE — TELEPHONE ENCOUNTER
Have him come in either tomorrow or early next week will pursue further workup as to cause. I reviewed his labs this morning at [facility].

## 2020-01-16 ENCOUNTER — OFFICE VISIT (OUTPATIENT)
Dept: INTERNAL MEDICINE | Facility: CLINIC | Age: 80
End: 2020-01-16

## 2020-01-16 VITALS
SYSTOLIC BLOOD PRESSURE: 118 MMHG | DIASTOLIC BLOOD PRESSURE: 70 MMHG | BODY MASS INDEX: 26.28 KG/M2 | HEIGHT: 72 IN | WEIGHT: 194 LBS

## 2020-01-16 DIAGNOSIS — D64.9 ANEMIA, UNSPECIFIED TYPE: ICD-10-CM

## 2020-01-16 DIAGNOSIS — R06.02 SHORTNESS OF BREATH: Primary | ICD-10-CM

## 2020-01-16 PROCEDURE — 99213 OFFICE O/P EST LOW 20 MIN: CPT | Performed by: PHYSICIAN ASSISTANT

## 2020-01-16 RX ORDER — FERROUS SULFATE 325(65) MG
325 TABLET ORAL
Qty: 90 TABLET | Refills: 1 | Status: SHIPPED | OUTPATIENT
Start: 2020-01-16 | End: 2020-12-03

## 2020-01-16 NOTE — PROGRESS NOTES
Subjective   Chief Complaint   Patient presents with   • Anemia   • Shortness of Breath   • Fatigue       History of Present Illness     Pt is here today for ongoing SOA, fatigue and anemia. Cardiology rechecked a CBC yesterday showing a hgb of 10.2. Pt denies any rectal bleeding. No dizzines or lightheadedness. No changes in his medications. No heartburn or reflux and no abdominal pain.      Patient Active Problem List   Diagnosis   • Hyperlipidemia   • Coronary artery disease involving native coronary artery of native heart without angina pectoris   • Cognitive disorder   • Mood disorder (CMS/HCC)   • Closed wedge compression fracture of third thoracic vertebra with routine healing   • GERD (gastroesophageal reflux disease)   • S/P drug eluting coronary stent placement   • Chest pain   • Diastolic dysfunction   • Exertional angina (CMS/HCC)   • Unstable angina (CMS/HCC)   • Bladder mass   • Mixed action and resting tremor   • Bladder cancer (CMS/HCC)   • Generalized weakness   • Dyspnea on exertion   • BPH (benign prostatic hyperplasia)   • Calculus of gallbladder without cholecystitis   • Elevated LFTs   • Calculus of bile duct with acute cholecystitis without obstruction   • Atrial fibrillation (CMS/HCC)   • Essential hypertension   • Elevated bilirubin   • Dizziness   • Beta-blocker intolerance   • Orthostatic hypotension       No Known Allergies    Current Outpatient Medications on File Prior to Visit   Medication Sig Dispense Refill   • aspirin 81 MG chewable tablet Chew 1 tablet Daily. 30 tablet 0   • carbidopa-levodopa (SINEMET)  MG per tablet Take 1 tablet by mouth 3 (Three) Times a Day. 90 tablet 3   • clopidogrel (PLAVIX) 75 MG tablet Take 75 mg by mouth Daily.     • FLUoxetine (PROzac) 20 MG capsule Take 60 mg by mouth Every Night. Pt takes 40 mg + 20 mg capsules = 60 mg daily     • FLUoxetine (PROzac) 40 MG capsule      • omeprazole (priLOSEC) 40 MG capsule Take 1 capsule by mouth Daily. 90  capsule 4     No current facility-administered medications on file prior to visit.        Past Medical History:   Diagnosis Date   • Anxiety    • Back pain    • Bladder cancer (CMS/HCC) 2018    bladder cancer has been removed.   • Coronary artery disease    • Depression    • Dizziness    • Fatigue    • GERD (gastroesophageal reflux disease)    • History of fractured rib     RIGHT SIDE   • Hyperlipidemia    • Hypertension    • Tremors of nervous system    • Urinary incontinence    • Vertigo        Family History   Problem Relation Age of Onset   • Arthritis Mother    • Glaucoma Mother    • Heart disease Father    • Emphysema Father    • Tremor Father    • Malig Hyperthermia Neg Hx        Social History     Socioeconomic History   • Marital status:      Spouse name: Not on file   • Number of children: 4   • Years of education: 5 college degrees   • Highest education level: Not on file   Occupational History   • Occupation: Retired   Tobacco Use   • Smoking status: Never Smoker   • Smokeless tobacco: Never Used   Substance and Sexual Activity   • Alcohol use: No     Frequency: Never     Drinks per session: 5 or 6     Binge frequency: Less than monthly     Comment: last drink about 1 year ago    • Drug use: No   • Sexual activity: Defer       Past Surgical History:   Procedure Laterality Date   • CARDIAC CATHETERIZATION      7x, 5 stents   • CATARACT EXTRACTION, BILATERAL     • CHOLECYSTECTOMY N/A 9/2/2019    Procedure: CHOLECYSTECTOMY LAPAROSCOPIC WITH INTRAOPERATIVE CHOLANGIOGRAM;  Surgeon: Bg Rodriguez Jr., MD;  Location: [facility];  Service: General   • COLONOSCOPY     • CORONARY ANGIOPLASTY WITH STENT PLACEMENT      X5    • CYSTOSCOPY BLADDER BIOPSY N/A 1/8/2019    Procedure: CYSTOSCOPY BLADDER BIOPSY;  Surgeon: Wang Soares Jr., MD;  Location: [facility];  Service: Urology   • CYSTOSCOPY BLADDER BIOPSY N/A 6/13/2019    Procedure: CYSTOSCOPY BLADDER BIOPSY;  Surgeon: Wang Soares  "MD Chaparro;  Location: [facility] OR;  Service: Urology   • CYSTOSCOPY TRANSURETHRAL RESECTION OF PROSTATE N/A 7/11/2019    Procedure: CYSTOSCOPY TRANSURETHRAL RESECTION OF PROSTATE;  Surgeon: Wang Soares Jr., MD;  Location: [facility] OR;  Service: Urology   • CYSTOSCOPY URETEROSCOPY LASER LITHOTRIPSY N/A 6/13/2019    Procedure: CYSTO LITHOPAXY;  Surgeon: Wang Soares Jr., MD;  Location: [facility] OR;  Service: Urology   • ERCP N/A 9/3/2019    Procedure: ENDOSCOPIC RETROGRADE CHOLANGIOPANCREATOGRAPHY with sphincterotomy and balloon sweep;  Surgeon: Ashok Amaya MD;  Location: [facility] ENDOSCOPY;  Service: Gastroenterology   • EYE SURGERY      Lens implants   • LUMBAR DISCECTOMY FUSION INSTRUMENTATION     • SKIN CANCER EXCISION Left     chest wall   • TRANSURETHRAL RESECTION OF BLADDER TUMOR N/A 11/29/2018    Procedure: TUR BLADDER TUMOR  LARGE;  Surgeon: Wang Soares Jr., MD;  Location: [facility] OR;  Service: Urology       The following portions of the patient's history were reviewed and updated as appropriate: problem list, allergies, current medications, past medical history, past family history, past social history and past surgical history.    Review of Systems   Constitution: Positive for malaise/fatigue.   Respiratory: Positive for shortness of breath.        Immunization History   Administered Date(s) Administered   • Fluzone High Dose =>65 Years ([employer] ONLY) 10/12/2016   • flucelvax quad pfs =>4 YRS 11/30/2018       Objective   Vitals:    01/16/20 1017 01/16/20 1036   BP:  118/70   Weight: 88 kg (194 lb)    Height: 182.9 cm (72.01\")      Body mass index is 26.31 kg/m².  Physical Exam   Constitutional: He appears well-developed and well-nourished.   HENT:   Head: Normocephalic and atraumatic.   Eyes: Conjunctivae are normal.   Cardiovascular: Normal rate, regular rhythm and normal heart sounds.   Pulmonary/Chest: Effort normal and breath sounds normal.   Vitals " reviewed.      Assessment/Plan   Sukhdev was seen today for anemia, shortness of breath and fatigue.    Diagnoses and all orders for this visit:    Shortness of breath  -     CBC & Differential  -     Ferritin  -     Iron Profile  -     Protein Elec + Interp, Serum  -     Protein Electrophoresis, 24 Hr Urine - Urine, Clean Catch  -     Vitamin B12    Anemia, unspecified type  -     CBC & Differential  -     Ferritin  -     Iron Profile  -     Protein Elec + Interp, Serum  -     Protein Electrophoresis, 24 Hr Urine - Urine, Clean Catch  -     Vitamin B12  -     ferrous sulfate 325 (65 FE) MG tablet; Take 1 tablet by mouth Daily With Breakfast.        Will workup anemia today. Cardiology is concerned this could be the cause of his SOA. We discussed today that is may be some of it, however I think it is probably multifactorial. He does have deconditioning as well over the last 6 months through his [facility] hospitalizations etc. Will discuss with cardiology about going forward with [facility].     I have started him on oral iron today as well. His liver enzymes are much improved and almost at baseline. He had an iron panel in September which was normal along with a normal ferritin. Therefor will also check SPEP and UPEP.     He is going to work on some conditioning over the next couple of weeks. I would like him to start walking on his treadmill for 5 minutes in the morning and 5 minutes at night for the next two weeks and see how he does.

## 2020-01-17 LAB
ALBUMIN SERPL ELPH-MCNC: 3.6 G/DL (ref 2.9–4.4)
ALBUMIN/GLOB SERPL: 1.2 {RATIO} (ref 0.7–1.7)
ALPHA1 GLOB SERPL ELPH-MCNC: 0.3 G/DL (ref 0–0.4)
ALPHA2 GLOB SERPL ELPH-MCNC: 0.8 G/DL (ref 0.4–1)
B-GLOBULIN SERPL ELPH-MCNC: 1.2 G/DL (ref 0.7–1.3)
BASOPHILS # BLD AUTO: 0.06 10*3/MM3 (ref 0–0.2)
BASOPHILS NFR BLD AUTO: 1 % (ref 0–1.5)
EOSINOPHIL # BLD AUTO: 0.2 10*3/MM3 (ref 0–0.4)
EOSINOPHIL NFR BLD AUTO: 3.2 % (ref 0.3–6.2)
ERYTHROCYTE [DISTWIDTH] IN BLOOD BY AUTOMATED COUNT: 14.3 % (ref 12.3–15.4)
FERRITIN SERPL-MCNC: 17.4 NG/ML (ref 30–400)
GAMMA GLOB SERPL ELPH-MCNC: 0.9 G/DL (ref 0.4–1.8)
GLOBULIN SER CALC-MCNC: 3.1 G/DL (ref 2.2–3.9)
HCT VFR BLD AUTO: 34.9 % (ref 37.5–51)
HGB BLD-MCNC: 10.9 G/DL (ref 13–17.7)
IMM GRANULOCYTES # BLD AUTO: 0.02 10*3/MM3 (ref 0–0.05)
IMM GRANULOCYTES NFR BLD AUTO: 0.3 % (ref 0–0.5)
IRON SATN MFR SERPL: 7 % (ref 20–50)
IRON SERPL-MCNC: 35 MCG/DL (ref 59–158)
LABORATORY COMMENT REPORT: NORMAL
LYMPHOCYTES # BLD AUTO: 1.14 10*3/MM3 (ref 0.7–3.1)
LYMPHOCYTES NFR BLD AUTO: 18.4 % (ref 19.6–45.3)
M PROTEIN SERPL ELPH-MCNC: NORMAL G/DL
MCH RBC QN AUTO: 25.4 PG (ref 26.6–33)
MCHC RBC AUTO-ENTMCNC: 31.2 G/DL (ref 31.5–35.7)
MCV RBC AUTO: 81.4 FL (ref 79–97)
MONOCYTES # BLD AUTO: 0.65 10*3/MM3 (ref 0.1–0.9)
MONOCYTES NFR BLD AUTO: 10.5 % (ref 5–12)
NEUTROPHILS # BLD AUTO: 4.14 10*3/MM3 (ref 1.7–7)
NEUTROPHILS NFR BLD AUTO: 66.6 % (ref 42.7–76)
NRBC BLD AUTO-RTO: 0 /100 WBC (ref 0–0.2)
PLATELET # BLD AUTO: 305 10*3/MM3 (ref 140–450)
PROT PATTERN SERPL ELPH-IMP: NORMAL
PROT SERPL-MCNC: 6.7 G/DL (ref 6–8.5)
RBC # BLD AUTO: 4.29 10*6/MM3 (ref 4.14–5.8)
TIBC SERPL-MCNC: 507 MCG/DL
UIBC SERPL-MCNC: 472 MCG/DL (ref 112–346)
VIT B12 SERPL-MCNC: 564 PG/ML (ref 211–946)
WBC # BLD AUTO: 6.21 10*3/MM3 (ref 3.4–10.8)

## 2020-01-20 DIAGNOSIS — F39 MOOD DISORDER (HCC): Primary | ICD-10-CM

## 2020-01-20 RX ORDER — FLUOXETINE HYDROCHLORIDE 40 MG/1
40 CAPSULE ORAL DAILY
Qty: 90 CAPSULE | Refills: 2 | Status: SHIPPED | OUTPATIENT
Start: 2020-01-20 | End: 2021-12-22

## 2020-01-22 ENCOUNTER — TELEPHONE (OUTPATIENT)
Dept: NEUROLOGY | Facility: CLINIC | Age: 80
End: 2020-01-22

## 2020-01-22 NOTE — TELEPHONE ENCOUNTER
----- Message from Tamela Olivarez sent at 1/22/2020  2:08 PM EST -----  Contact: [phone]   was started on Iron 325 at breakfast by his PCP for being anemia.  Does Iron have an effect on his Sinemet 25/100?

## 2020-01-23 NOTE — TELEPHONE ENCOUNTER
S/w pt's wife told her iron can reduce the effectiveness of carbidopa and levodopa and should be taken at a much separate time.

## 2020-01-23 NOTE — TELEPHONE ENCOUNTER
Yes it reduces effectiveness of both carbidopa and levodopa. Should be taken at a time much  from C/L    gns

## 2020-01-30 ENCOUNTER — OFFICE VISIT (OUTPATIENT)
Dept: INTERNAL MEDICINE | Facility: CLINIC | Age: 80
End: 2020-01-30

## 2020-01-30 VITALS
BODY MASS INDEX: 26.03 KG/M2 | DIASTOLIC BLOOD PRESSURE: 66 MMHG | SYSTOLIC BLOOD PRESSURE: 110 MMHG | HEIGHT: 72 IN | WEIGHT: 192.2 LBS

## 2020-01-30 DIAGNOSIS — D50.9 IRON DEFICIENCY ANEMIA, UNSPECIFIED IRON DEFICIENCY ANEMIA TYPE: ICD-10-CM

## 2020-01-30 DIAGNOSIS — G89.29 CHRONIC NONINTRACTABLE HEADACHE, UNSPECIFIED HEADACHE TYPE: Primary | ICD-10-CM

## 2020-01-30 DIAGNOSIS — R51.9 CHRONIC NONINTRACTABLE HEADACHE, UNSPECIFIED HEADACHE TYPE: Primary | ICD-10-CM

## 2020-01-30 DIAGNOSIS — R06.02 SHORTNESS OF BREATH: ICD-10-CM

## 2020-01-30 LAB
BASOPHILS # BLD AUTO: 0.03 10*3/MM3 (ref 0–0.2)
BASOPHILS NFR BLD AUTO: 0.5 % (ref 0–1.5)
EOSINOPHIL # BLD AUTO: 0.18 10*3/MM3 (ref 0–0.4)
EOSINOPHIL NFR BLD AUTO: 2.8 % (ref 0.3–6.2)
ERYTHROCYTE [DISTWIDTH] IN BLOOD BY AUTOMATED COUNT: 14.7 % (ref 12.3–15.4)
HCT VFR BLD AUTO: 37 % (ref 37.5–51)
HGB BLD-MCNC: 11.4 G/DL (ref 13–17.7)
IMM GRANULOCYTES # BLD AUTO: 0.02 10*3/MM3 (ref 0–0.05)
IMM GRANULOCYTES NFR BLD AUTO: 0.3 % (ref 0–0.5)
LYMPHOCYTES # BLD AUTO: 1.17 10*3/MM3 (ref 0.7–3.1)
LYMPHOCYTES NFR BLD AUTO: 18.1 % (ref 19.6–45.3)
MCH RBC QN AUTO: 25.6 PG (ref 26.6–33)
MCHC RBC AUTO-ENTMCNC: 30.8 G/DL (ref 31.5–35.7)
MCV RBC AUTO: 83.1 FL (ref 79–97)
MONOCYTES # BLD AUTO: 0.7 10*3/MM3 (ref 0.1–0.9)
MONOCYTES NFR BLD AUTO: 10.8 % (ref 5–12)
NEUTROPHILS # BLD AUTO: 4.38 10*3/MM3 (ref 1.7–7)
NEUTROPHILS NFR BLD AUTO: 67.5 % (ref 42.7–76)
NRBC BLD AUTO-RTO: 0 /100 WBC (ref 0–0.2)
PLATELET # BLD AUTO: 263 10*3/MM3 (ref 140–450)
RBC # BLD AUTO: 4.45 10*6/MM3 (ref 4.14–5.8)
WBC # BLD AUTO: 6.48 10*3/MM3 (ref 3.4–10.8)

## 2020-01-30 PROCEDURE — 99213 OFFICE O/P EST LOW 20 MIN: CPT | Performed by: PHYSICIAN ASSISTANT

## 2020-01-30 RX ORDER — AMITRIPTYLINE HYDROCHLORIDE 10 MG/1
10 TABLET, FILM COATED ORAL NIGHTLY
Qty: 30 TABLET | Refills: 1 | Status: SHIPPED | OUTPATIENT
Start: 2020-01-30 | End: 2020-02-28 | Stop reason: SDUPTHER

## 2020-02-07 NOTE — PROGRESS NOTES
"Subjective   Chief Complaint   Patient presents with   • Anemia     2 week f/u, was given oral iron   • Shortness of Breath     2 week f/u, was instructed to walk on treadmil 5 min in the morning and 5 minutes at night to help with him feeling SOA. Patient states that he has not done this much because he states that he has not \"felt like doing it.\" He is still having the SOA       History of Present Illness     Pt is here today for fup on his SOA. He is still very short of breath. He is taking oral iron once daily as well. He is unable to work on walking on the treadmill as he still has a great amount of fatigue. His hgb is improving and is now 11.4.     Pt states he is having headaches almost daily. He has spoke with neurology in regards to this. He has a history of migraine headaches, however these are not as severe. Typically frontal. Can be dull or throbbing. Does have photophobia with them. Some nausea no vomiting. Tylenol does not help much, makes them tolerable.     Pt has not tolerated BB in the past. He has not tried amitriptyline though.      Patient Active Problem List   Diagnosis   • Hyperlipidemia   • Coronary artery disease involving native coronary artery of native heart without angina pectoris   • Cognitive disorder   • Mood disorder (CMS/HCC)   • Closed wedge compression fracture of third thoracic vertebra with routine healing   • GERD (gastroesophageal reflux disease)   • S/P drug eluting coronary stent placement   • Chest pain   • Diastolic dysfunction   • Exertional angina (CMS/HCC)   • Unstable angina (CMS/HCC)   • Bladder mass   • Mixed action and resting tremor   • Bladder cancer (CMS/HCC)   • Generalized weakness   • Dyspnea on exertion   • BPH (benign prostatic hyperplasia)   • Calculus of gallbladder without cholecystitis   • Elevated LFTs   • Calculus of bile duct with acute cholecystitis without obstruction   • Atrial fibrillation (CMS/HCC)   • Essential hypertension   • Elevated " bilirubin   • Dizziness   • Beta-blocker intolerance   • Orthostatic hypotension       No Known Allergies    Current Outpatient Medications on File Prior to Visit   Medication Sig Dispense Refill   • aspirin 81 MG chewable tablet Chew 1 tablet Daily. 30 tablet 0   • carbidopa-levodopa (SINEMET)  MG per tablet Take 1 tablet by mouth 3 (Three) Times a Day. 90 tablet 3   • clopidogrel (PLAVIX) 75 MG tablet Take 75 mg by mouth Daily.     • ferrous sulfate 325 (65 FE) MG tablet Take 1 tablet by mouth Daily With Breakfast. 90 tablet 1   • FLUoxetine (PROzac) 20 MG capsule Take 60 mg by mouth Every Night. Pt takes 40 mg + 20 mg capsules = 60 mg daily     • FLUoxetine (PROzac) 40 MG capsule Take 1 capsule by mouth Daily. 90 capsule 2   • omeprazole (priLOSEC) 40 MG capsule Take 1 capsule by mouth Daily. 90 capsule 4     No current facility-administered medications on file prior to visit.        Past Medical History:   Diagnosis Date   • Anxiety    • Back pain    • Bladder cancer (CMS/[facility]) 2018    bladder cancer has been removed.   • Coronary artery disease    • Depression    • Dizziness    • Fatigue    • GERD (gastroesophageal reflux disease)    • History of fractured rib     RIGHT SIDE   • Hyperlipidemia    • Hypertension    • Tremors of nervous system    • Urinary incontinence    • Vertigo        Family History   Problem Relation Age of Onset   • Arthritis Mother    • Glaucoma Mother    • Heart disease Father    • Emphysema Father    • Tremor Father    • Malig Hyperthermia Neg Hx        Social History     Socioeconomic History   • Marital status:      Spouse name: Not on file   • Number of children: 4   • Years of education: 5 college degrees   • Highest education level: Not on file   Occupational History   • Occupation: Retired   Tobacco Use   • Smoking status: Never Smoker   • Smokeless tobacco: Never Used   Substance and Sexual Activity   • Alcohol use: No     Frequency: Never     Drinks per session: 5 or  6     Binge frequency: Less than monthly     Comment: last drink about 1 year ago    • Drug use: No   • Sexual activity: Defer       Past Surgical History:   Procedure Laterality Date   • CARDIAC CATHETERIZATION      7x, 5 stents   • CATARACT EXTRACTION, BILATERAL     • CHOLECYSTECTOMY N/A 9/2/2019    Procedure: CHOLECYSTECTOMY LAPAROSCOPIC WITH INTRAOPERATIVE CHOLANGIOGRAM;  Surgeon: Bg Rodriguez Jr., MD;  Location: [facility] OR;  Service: General   • COLONOSCOPY     • CORONARY ANGIOPLASTY WITH STENT PLACEMENT      X5    • CYSTOSCOPY BLADDER BIOPSY N/A 1/8/2019    Procedure: CYSTOSCOPY BLADDER BIOPSY;  Surgeon: Wang Soares Jr., MD;  Location: [facility] OR;  Service: Urology   • CYSTOSCOPY BLADDER BIOPSY N/A 6/13/2019    Procedure: CYSTOSCOPY BLADDER BIOPSY;  Surgeon: Wang Soares Jr., MD;  Location: [facility] OR;  Service: Urology   • CYSTOSCOPY TRANSURETHRAL RESECTION OF PROSTATE N/A 7/11/2019    Procedure: CYSTOSCOPY TRANSURETHRAL RESECTION OF PROSTATE;  Surgeon: Wang Soares Jr., MD;  Location: [facility] OR;  Service: Urology   • CYSTOSCOPY URETEROSCOPY LASER LITHOTRIPSY N/A 6/13/2019    Procedure: CYSTO LITHOPAXY;  Surgeon: Wang Soares Jr., MD;  Location: [facility] OR;  Service: Urology   • ERCP N/A 9/3/2019    Procedure: ENDOSCOPIC RETROGRADE CHOLANGIOPANCREATOGRAPHY with sphincterotomy and balloon sweep;  Surgeon: Ashok Amaya MD;  Location: [facility] ENDOSCOPY;  Service: Gastroenterology   • EYE SURGERY      Lens implants   • LUMBAR DISCECTOMY FUSION INSTRUMENTATION     • SKIN CANCER EXCISION Left     chest wall   • TRANSURETHRAL RESECTION OF BLADDER TUMOR N/A 11/29/2018    Procedure: TUR BLADDER TUMOR  LARGE;  Surgeon: Wang Soares Jr., MD;  Location: [facility] OR;  Service: Urology     The following portions of the patient's history were reviewed and updated as appropriate: problem list, allergies, current medications, past medical history, past  "family history, past social history and past surgical history.    Review of Systems   Constitution: Positive for malaise/fatigue.   Respiratory: Positive for shortness of breath.    Neurological: Positive for headaches.       Immunization History   Administered Date(s) Administered   • Fluzone High Dose =>65 Years ([employer] ONLY) 10/12/2016   • flucelvax quad pfs =>4 YRS 11/30/2018       Objective   Vitals:    01/30/20 1055 01/30/20 1129   BP:  110/66   Weight: 87.2 kg (192 lb 3.2 oz)    Height: 182.9 cm (72.01\")      Body mass index is 26.06 kg/m².  Physical Exam   Constitutional: He is oriented to person, place, and time. He appears well-developed and well-nourished.   HENT:   Head: Normocephalic and atraumatic.   Cardiovascular: Normal rate, regular rhythm and normal heart sounds.   Pulmonary/Chest: Effort normal and breath sounds normal.   Neurological: He is alert and oriented to person, place, and time. No cranial nerve deficit.   Vitals reviewed.      Assessment/Plan   Sukhdev was seen today for anemia and shortness of breath.    Diagnoses and all orders for this visit:    Chronic nonintractable headache, unspecified headache type  -     amitriptyline (ELAVIL) 10 MG tablet; Take 1 tablet by mouth Every Night.    Iron deficiency anemia, unspecified iron deficiency anemia type  -     CBC & Differential    Shortness of breath    1. Shortness of breath: anemia is improving with oral iron. To continue. Will call cardiology about scheduling stress pet for ongoing symptoms.     2. Atypical migraines: Start amitriptyline 10 mg at bedtime and fup with me in 4 weeks. Pt does not tolerate BB. He would not do well with topamax either due to tremors and dizziness that are already existing. Will consider aimovig as well.     Return in about 1 month (around 2/29/2020).           "

## 2020-02-28 ENCOUNTER — OFFICE VISIT (OUTPATIENT)
Dept: INTERNAL MEDICINE | Facility: CLINIC | Age: 80
End: 2020-02-28

## 2020-02-28 VITALS
BODY MASS INDEX: 26.71 KG/M2 | WEIGHT: 197.2 LBS | SYSTOLIC BLOOD PRESSURE: 110 MMHG | HEIGHT: 72 IN | DIASTOLIC BLOOD PRESSURE: 70 MMHG

## 2020-02-28 DIAGNOSIS — G89.29 CHRONIC NONINTRACTABLE HEADACHE, UNSPECIFIED HEADACHE TYPE: ICD-10-CM

## 2020-02-28 DIAGNOSIS — R51.9 CHRONIC NONINTRACTABLE HEADACHE, UNSPECIFIED HEADACHE TYPE: ICD-10-CM

## 2020-02-28 DIAGNOSIS — J30.9 ALLERGIC RHINITIS, UNSPECIFIED SEASONALITY, UNSPECIFIED TRIGGER: Primary | ICD-10-CM

## 2020-02-28 PROCEDURE — 99213 OFFICE O/P EST LOW 20 MIN: CPT | Performed by: PHYSICIAN ASSISTANT

## 2020-02-28 RX ORDER — FLUTICASONE PROPIONATE 50 MCG
2 SPRAY, SUSPENSION (ML) NASAL DAILY
Qty: 3 BOTTLE | Refills: 3 | Status: SHIPPED | OUTPATIENT
Start: 2020-02-28 | End: 2021-12-28

## 2020-02-28 RX ORDER — AMITRIPTYLINE HYDROCHLORIDE 10 MG/1
10 TABLET, FILM COATED ORAL NIGHTLY
Qty: 90 TABLET | Refills: 1 | Status: SHIPPED | OUTPATIENT
Start: 2020-02-28 | End: 2020-03-03 | Stop reason: ALTCHOICE

## 2020-02-28 RX ORDER — LORATADINE 10 MG/1
10 TABLET ORAL DAILY
Qty: 90 TABLET | Refills: 3 | Status: SHIPPED | OUTPATIENT
Start: 2020-02-28 | End: 2020-12-03

## 2020-02-29 LAB
BASOPHILS # BLD AUTO: 0.04 10*3/MM3 (ref 0–0.2)
BASOPHILS NFR BLD AUTO: 0.7 % (ref 0–1.5)
EOSINOPHIL # BLD AUTO: 0.16 10*3/MM3 (ref 0–0.4)
EOSINOPHIL NFR BLD AUTO: 2.9 % (ref 0.3–6.2)
ERYTHROCYTE [DISTWIDTH] IN BLOOD BY AUTOMATED COUNT: 20 % (ref 12.3–15.4)
HCT VFR BLD AUTO: 43.5 % (ref 37.5–51)
HGB BLD-MCNC: 14.3 G/DL (ref 13–17.7)
IMM GRANULOCYTES # BLD AUTO: 0.02 10*3/MM3 (ref 0–0.05)
IMM GRANULOCYTES NFR BLD AUTO: 0.4 % (ref 0–0.5)
LYMPHOCYTES # BLD AUTO: 1.08 10*3/MM3 (ref 0.7–3.1)
LYMPHOCYTES NFR BLD AUTO: 19.6 % (ref 19.6–45.3)
MCH RBC QN AUTO: 28.4 PG (ref 26.6–33)
MCHC RBC AUTO-ENTMCNC: 32.9 G/DL (ref 31.5–35.7)
MCV RBC AUTO: 86.5 FL (ref 79–97)
MONOCYTES # BLD AUTO: 0.55 10*3/MM3 (ref 0.1–0.9)
MONOCYTES NFR BLD AUTO: 10 % (ref 5–12)
NEUTROPHILS # BLD AUTO: 3.66 10*3/MM3 (ref 1.7–7)
NEUTROPHILS NFR BLD AUTO: 66.4 % (ref 42.7–76)
NRBC BLD AUTO-RTO: 0 /100 WBC (ref 0–0.2)
PLATELET # BLD AUTO: 247 10*3/MM3 (ref 140–450)
RBC # BLD AUTO: 5.03 10*6/MM3 (ref 4.14–5.8)
WBC # BLD AUTO: 5.51 10*3/MM3 (ref 3.4–10.8)

## 2020-03-03 ENCOUNTER — OFFICE VISIT (OUTPATIENT)
Dept: NEUROLOGY | Facility: CLINIC | Age: 80
End: 2020-03-03

## 2020-03-03 ENCOUNTER — OFFICE VISIT (OUTPATIENT)
Dept: GASTROENTEROLOGY | Facility: CLINIC | Age: 80
End: 2020-03-03

## 2020-03-03 VITALS
OXYGEN SATURATION: 98 % | HEIGHT: 72 IN | BODY MASS INDEX: 26.44 KG/M2 | SYSTOLIC BLOOD PRESSURE: 128 MMHG | HEART RATE: 77 BPM | WEIGHT: 195.2 LBS | DIASTOLIC BLOOD PRESSURE: 88 MMHG

## 2020-03-03 VITALS
TEMPERATURE: 97.9 F | SYSTOLIC BLOOD PRESSURE: 130 MMHG | WEIGHT: 194.8 LBS | BODY MASS INDEX: 26.38 KG/M2 | HEIGHT: 72 IN | DIASTOLIC BLOOD PRESSURE: 82 MMHG

## 2020-03-03 DIAGNOSIS — Z01.818 PRE-OP TESTING: Primary | ICD-10-CM

## 2020-03-03 DIAGNOSIS — R79.89 ELEVATED LFTS: Primary | ICD-10-CM

## 2020-03-03 DIAGNOSIS — R41.3 MEMORY LOSS: ICD-10-CM

## 2020-03-03 DIAGNOSIS — Z01.818 PRE-OP TESTING: ICD-10-CM

## 2020-03-03 DIAGNOSIS — I95.1 ORTHOSTASIS: ICD-10-CM

## 2020-03-03 DIAGNOSIS — R25.9 MIXED ACTION AND RESTING TREMOR: Primary | ICD-10-CM

## 2020-03-03 LAB
BUN SERPL-MCNC: 13 MG/DL (ref 8–23)
BUN/CREAT SERPL: 11.1 (ref 7–25)
CALCIUM SERPL-MCNC: 9.2 MG/DL (ref 8.6–10.5)
CHLORIDE SERPL-SCNC: 102 MMOL/L (ref 98–107)
CO2 SERPL-SCNC: 25.9 MMOL/L (ref 22–29)
CREAT SERPL-MCNC: 1.17 MG/DL (ref 0.76–1.27)
GLUCOSE SERPL-MCNC: 89 MG/DL (ref 65–99)
POTASSIUM SERPL-SCNC: 4.8 MMOL/L (ref 3.5–5.2)
SODIUM SERPL-SCNC: 140 MMOL/L (ref 136–145)

## 2020-03-03 PROCEDURE — 99215 OFFICE O/P EST HI 40 MIN: CPT | Performed by: PSYCHIATRY & NEUROLOGY

## 2020-03-03 PROCEDURE — 99212 OFFICE O/P EST SF 10 MIN: CPT | Performed by: INTERNAL MEDICINE

## 2020-03-03 RX ORDER — DULOXETIN HYDROCHLORIDE 20 MG/1
20 CAPSULE, DELAYED RELEASE ORAL DAILY
Qty: 30 CAPSULE | Refills: 5 | Status: SHIPPED | OUTPATIENT
Start: 2020-03-03 | End: 2020-12-03

## 2020-03-03 RX ORDER — NAPROXEN SODIUM 220 MG
220 TABLET ORAL
COMMUNITY
End: 2021-11-05

## 2020-03-03 NOTE — PROGRESS NOTES
CC: Mixed tremor, gait disturbance, syncope/presyncope/mild memory loss    HPI:  Sukhdev Zayas is a  79 y.o. right-handed white male who I am seeing in follow-up regarding combination of multiple neurologic symptoms as noted above.  I saw him last 12/3/2019.  He had initially mostly simply essential tremor but he developed some parkinsonian features to his tremor and so he was started on carbidopa/levodopa, 25/100, half tablet 3 times daily and increase to 1 tablet 3 times daily.  His wife indicates that he had a significant improvement initially regarding his tremor but the tremor has progressively worsened again back to where it was before.  He has not developed any additional gait disturbance such as shuffling.  She noticed recently he was shaking in his sleep also.    The patient has orthostasis and he had been on Midodrine but had significant lability of blood pressure with a getting really high and so the Midodrine had been stopped.  He has had other episodes of presyncope the last one within a week.  His wife indicates that the patient gets confused more often.  Some days he does not seem fit to drive she says and he does not other days he seems okay.  He has been placed on amitriptyline about 1 month ago as an adjunct of treatment of his depression but he had a benefit of improvement of headache.  This is on top of his Prozac which she states he is on 60 mg daily.  He has chronic depression but never suicidality.    The patient has history of bladder cancer surgery about 7 months ago and he is scheduled to be scoped again and have a couple of other small tumors removed from the bladder.  He apparently has a cardiology evaluation very soon        Past Medical History:   Diagnosis Date   • Anxiety    • Back pain    • Bladder cancer (CMS/[facility]) 2018    bladder cancer has been removed.   • Coronary artery disease    • Depression    • Dizziness    • Fatigue    • GERD (gastroesophageal reflux disease)    •  History of fractured rib     RIGHT SIDE   • Hyperlipidemia    • Hypertension    • Tremors of nervous system    • Urinary incontinence    • Vertigo          Past Surgical History:   Procedure Laterality Date   • CARDIAC CATHETERIZATION      7x, 5 stents   • CATARACT EXTRACTION, BILATERAL     • CHOLECYSTECTOMY N/A 9/2/2019    Procedure: CHOLECYSTECTOMY LAPAROSCOPIC WITH INTRAOPERATIVE CHOLANGIOGRAM;  Surgeon: Bg Rodriguez Jr., MD;  Location: [facility] MAIN OR;  Service: General   • COLONOSCOPY     • CORONARY ANGIOPLASTY WITH STENT PLACEMENT      X5    • CYSTOSCOPY BLADDER BIOPSY N/A 1/8/2019    Procedure: CYSTOSCOPY BLADDER BIOPSY;  Surgeon: Wang Soares Jr., MD;  Location: [facility] MAIN OR;  Service: Urology   • CYSTOSCOPY BLADDER BIOPSY N/A 6/13/2019    Procedure: CYSTOSCOPY BLADDER BIOPSY;  Surgeon: Wang Soares Jr., MD;  Location: [facility] MAIN OR;  Service: Urology   • CYSTOSCOPY TRANSURETHRAL RESECTION OF PROSTATE N/A 7/11/2019    Procedure: CYSTOSCOPY TRANSURETHRAL RESECTION OF PROSTATE;  Surgeon: Wang Soares Jr., MD;  Location: [facility] MAIN OR;  Service: Urology   • CYSTOSCOPY URETEROSCOPY LASER LITHOTRIPSY N/A 6/13/2019    Procedure: CYSTO LITHOPAXY;  Surgeon: Wang Soares Jr., MD;  Location: [facility] MAIN OR;  Service: Urology   • ERCP N/A 9/3/2019    Procedure: ENDOSCOPIC RETROGRADE CHOLANGIOPANCREATOGRAPHY with sphincterotomy and balloon sweep;  Surgeon: Ashok Amaya MD;  Location: [facility] ENDOSCOPY;  Service: Gastroenterology   • EYE SURGERY      Lens implants   • LUMBAR DISCECTOMY FUSION INSTRUMENTATION     • SKIN CANCER EXCISION Left     chest wall   • TRANSURETHRAL RESECTION OF BLADDER TUMOR N/A 11/29/2018    Procedure: TUR BLADDER TUMOR  LARGE;  Surgeon: Wang Soares Jr., MD;  Location: [facility] OR;  Service: Urology           Current Outpatient Medications:   •  aspirin 81 MG chewable tablet, Chew 1 tablet Daily., Disp: 30 tablet, Rfl: 0  •   carbidopa-levodopa (SINEMET)  MG per tablet, Take 2 tablets by mouth 3 (Three) Times a Day., Disp: 120 tablet, Rfl: 5  •  ferrous sulfate 325 (65 FE) MG tablet, Take 1 tablet by mouth Daily With Breakfast., Disp: 90 tablet, Rfl: 1  •  FLUoxetine (PROzac) 20 MG capsule, Take 60 mg by mouth Every Night. Pt takes 40 mg + 20 mg capsules = 60 mg daily, Disp: , Rfl:   •  FLUoxetine (PROzac) 40 MG capsule, Take 1 capsule by mouth Daily., Disp: 90 capsule, Rfl: 2  •  fluticasone (FLONASE) 50 MCG/ACT nasal spray, 2 sprays into the nostril(s) as directed by provider Daily., Disp: 3 bottle, Rfl: 3  •  loratadine (CLARITIN) 10 MG tablet, Take 1 tablet by mouth Daily., Disp: 90 tablet, Rfl: 3  •  omeprazole (priLOSEC) 40 MG capsule, Take 1 capsule by mouth Daily., Disp: 90 capsule, Rfl: 4  •  clopidogrel (PLAVIX) 75 MG tablet, Take 75 mg by mouth Daily., Disp: , Rfl:   •  DULoxetine (CYMBALTA) 20 MG capsule, Take 1 capsule by mouth Daily., Disp: 30 capsule, Rfl: 5  •  naproxen sodium (ALEVE) 220 MG tablet, Take 220 mg by mouth., Disp: , Rfl:       Family History   Problem Relation Age of Onset   • Arthritis Mother    • Glaucoma Mother    • Heart disease Father    • Emphysema Father    • Tremor Father    • Malig Hyperthermia Neg Hx          Social History     Socioeconomic History   • Marital status:      Spouse name: Not on file   • Number of children: 4   • Years of education: 5 college degrees   • Highest education level: Not on file   Occupational History   • Occupation: Retired   Tobacco Use   • Smoking status: Never Smoker   • Smokeless tobacco: Never Used   Substance and Sexual Activity   • Alcohol use: No     Frequency: Never     Drinks per session: 5 or 6     Binge frequency: Less than monthly     Comment: last drink about 1 year ago    • Drug use: No   • Sexual activity: Defer         No Known Allergies      Pain Scale: 0/10        ROS:  Review of Systems   Constitutional: Positive for fatigue. Negative for  "activity change and appetite change.   Eyes: Negative for pain, redness and itching.   Respiratory: Positive for shortness of breath. Negative for cough and choking.    Allergic/Immunologic: Negative for environmental allergies and food allergies.   Neurological: Positive for dizziness, tremors, syncope, weakness, light-headedness and headaches. Negative for seizures, facial asymmetry, speech difficulty and numbness.   Psychiatric/Behavioral: Positive for agitation, behavioral problems, confusion and decreased concentration. Negative for dysphoric mood, hallucinations, self-injury, sleep disturbance and suicidal ideas. The patient is nervous/anxious. The patient is not hyperactive.          I have reviewed and agree with the above ROS completed by the medical assistant.      Physical Exam:  Vitals:    03/03/20 1604   BP: 128/88   Pulse: 77   SpO2: 98%   Weight: 88.5 kg (195 lb 3.2 oz)   Height: 182.9 cm (72\")     Orthostatic BP: Standing blood pressure was 130/70    Body mass index is 26.47 kg/m².    Physical Exam  General: Overweight white male no acute distress  HEENT: Normocephalic no evidence of trauma  Neck: Supple  Heart: Regular rate and rhythm  Extremities: No pedal edema      Neurological Exam:   Mental Status: Awake, alert, oriented to person, place and time.  Conversant without evidence of an affective disorder, thought disorder, delusions or hallucinations.  Attention span and concentration are normal.  HCF: No aphasia, apraxia or dysarthria.  Mild memory issues t.  Knowledge  of recent events intact.  CN: I:   II: Visual fields full without left inattention   III, IV, VI: Eye movements intact without nystagmus or ptosis.  Pupils equal  round and reactive to light.   V,VII: Light touch and pinprick intact all 3 divisions of V.  Facial muscles symmetrical.   VIII: Hearing  to finger rub    IX,X: Soft palate elevates symmetrically   XI: Sternomastoid and trapezius are strong.   XII: Tongue midline without " atrophy or fasciculations  Motor: Normal tone and bulk in the upper and lower extremities   Power testing: Full power in all muscles tested arms and legs  Reflexes: Upper extremities: Symmetric        Lower extremities: Symmetric        Toe signs:  Sensory: Light touch: Diffusely intact        Pinprick:        Vibration:        Position:    Cerebellar: Finger-to-nose: Mixed tremor which is basically mild each type           Rapid movement: Intact           Heel-to-shin: Intact  Gait and Station: Comes to stand unimpeded.  He is mildly broad-based.  Initiates gait without difficulty.  Arms do swing while ambulating and he is fairly erect.  I do not see any tremor while ambulating    Results:      Lab Results   Component Value Date    GLUCOSE 133 (H) 10/09/2019    BUN 25 (H) 01/14/2020    CREATININE 1.0 01/14/2020    EGFRIFNONA 62 12/03/2019    EGFRIFAFRI 72 12/03/2019    BCR 25.5 01/14/2020    CO2 26 01/14/2020    CALCIUM 9.5 01/14/2020    PROTENTOTREF 6.7 01/16/2020    ALBUMIN 3.6 01/16/2020    LABIL2 1.2 01/16/2020    AST 59 (H) 01/14/2020    ALT 21 01/14/2020       Lab Results   Component Value Date    WBC 5.51 02/28/2020    HGB 14.3 02/28/2020    HCT 43.5 02/28/2020    MCV 86.5 02/28/2020     02/28/2020         .  Lab Results   Component Value Date    RPR Non-Reactive 04/11/2019         Lab Results   Component Value Date    TSH 2.350 01/14/2020         Lab Results   Component Value Date    NYSTQYUJ83 564 01/16/2020         No results found for: FOLATE      No results found for: HGBA1C      Lab Results   Component Value Date    GLUCOSE 133 (H) 10/09/2019    BUN 25 (H) 01/14/2020    CREATININE 1.0 01/14/2020    EGFRIFNONA 62 12/03/2019    EGFRIFAFRI 72 12/03/2019    BCR 25.5 01/14/2020    K 4.8 01/14/2020    CO2 26 01/14/2020    CALCIUM 9.5 01/14/2020    PROTENTOTREF 6.7 01/16/2020    ALBUMIN 3.6 01/16/2020    LABIL2 1.2 01/16/2020    AST 59 (H) 01/14/2020    ALT 21 01/14/2020         Lab Results   Component  Value Date    WBC 5.51 02/28/2020    HGB 14.3 02/28/2020    HCT 43.5 02/28/2020    MCV 86.5 02/28/2020     02/28/2020             Assessment:   1.  Mixed tremor-carbidopa/levodopa seems to have been helpful early on for the parkinsonian tremor but seems to have lost effectiveness.  He denies side effects to carbidopa/levodopa 25/100, 1 tablet 3 times daily.  2.  Syncope/presyncope/orthostasis-standing blood pressure was normal today.  He is not on Midodrine.  He has a cardiology appointment soon.  3.  Cognitive symptoms and confusion-recently he was placed on amitriptyline which has some anticholinergic side effects.  The dosage was low at 10 mg daily however.  Since he is looking to help with headache and I am trying to reduce likelihood of confusion and orthostasis I think switching him to Cymbalta at a low dose like 20 mg daily is reasonable.  Please note that he is on Prozac in addition and I discussed at length serotonin syndrome          Plan:  1.  Stop amitriptyline and start Cymbalta 20 mg daily.  I discussed serotonin syndrome at length since he is also on Prozac.  2.  Escalate carbidopa/levodopa to 1.5 tablets 3 times daily for about 2 weeks and then onward to 2 tablets 3 times daily  3.  Follow-up in about 6 months with Christie Pacheco NP                >50% of this 40-minute follow-up was spent counseling the patient and his wife regarding his medication changes and potential side effects to watch for.  I also explained to them that at least today his orthostasis does not seem to be the problem although I understand he does have it intermittently.  Other causes of syncope such as cardiac rhythm disturbances should be discussed with his cardiologist when he is seen very soon.  He/she may wish to obtain further rhythm testing.            Dictated utilizing Dragon dictation.

## 2020-03-03 NOTE — PROGRESS NOTES
Chief Complaint   Patient presents with   • Liver Follow-up       Sukhdev Zayas is a  79 y.o. male here for a follow up visit for elevated liver enzymes.    HPI     Patient 79-year-old male with history of hypertension, hyperlipidemia and coronary artery disease status post acute rise in LFTs consistent with drug-induced liver injury.  Patient currently with normalization of his liver enzymes at least the ALT levels with minimal elevation in alk phos and AST.  Patient clinically doing well with no complaints.    Past Medical History:   Diagnosis Date   • Anxiety    • Back pain    • Bladder cancer (CMS/HCC) 2018    bladder cancer has been removed.   • Coronary artery disease    • Depression    • Dizziness    • Fatigue    • GERD (gastroesophageal reflux disease)    • History of fractured rib     RIGHT SIDE   • Hyperlipidemia    • Hypertension    • Tremors of nervous system    • Urinary incontinence    • Vertigo          Current Outpatient Medications:   •  amitriptyline (ELAVIL) 10 MG tablet, Take 1 tablet by mouth Every Night., Disp: 90 tablet, Rfl: 1  •  aspirin 81 MG chewable tablet, Chew 1 tablet Daily., Disp: 30 tablet, Rfl: 0  •  carbidopa-levodopa (SINEMET)  MG per tablet, Take 1 tablet by mouth 3 (Three) Times a Day., Disp: 90 tablet, Rfl: 3  •  clopidogrel (PLAVIX) 75 MG tablet, Take 75 mg by mouth Daily., Disp: , Rfl:   •  ferrous sulfate 325 (65 FE) MG tablet, Take 1 tablet by mouth Daily With Breakfast., Disp: 90 tablet, Rfl: 1  •  FLUoxetine (PROzac) 20 MG capsule, Take 60 mg by mouth Every Night. Pt takes 40 mg + 20 mg capsules = 60 mg daily, Disp: , Rfl:   •  FLUoxetine (PROzac) 40 MG capsule, Take 1 capsule by mouth Daily., Disp: 90 capsule, Rfl: 2  •  fluticasone (FLONASE) 50 MCG/ACT nasal spray, 2 sprays into the nostril(s) as directed by provider Daily., Disp: 3 bottle, Rfl: 3  •  loratadine (CLARITIN) 10 MG tablet, Take 1 tablet by mouth Daily., Disp: 90 tablet, Rfl: 3  •  omeprazole  (priLOSEC) 40 MG capsule, Take 1 capsule by mouth Daily., Disp: 90 capsule, Rfl: 4    No Known Allergies    Social History     Socioeconomic History   • Marital status:      Spouse name: Not on file   • Number of children: 4   • Years of education: 5 college degrees   • Highest education level: Not on file   Occupational History   • Occupation: Retired   Tobacco Use   • Smoking status: Never Smoker   • Smokeless tobacco: Never Used   Substance and Sexual Activity   • Alcohol use: No     Frequency: Never     Drinks per session: 5 or 6     Binge frequency: Less than monthly     Comment: last drink about 1 year ago    • Drug use: No   • Sexual activity: Defer       Family History   Problem Relation Age of Onset   • Arthritis Mother    • Glaucoma Mother    • Heart disease Father    • Emphysema Father    • Tremor Father    • Malig Hyperthermia Neg Hx        Review of Systems   Constitutional: Negative.    Respiratory: Negative.    Cardiovascular: Negative.    Gastrointestinal: Negative.    Skin: Negative.    Hematological: Negative.        Vitals:    03/03/20 1001   BP: 130/82   Temp: 97.9 °F (36.6 °C)       Physical Exam   Constitutional: He is oriented to person, place, and time. He appears well-developed and well-nourished.   HENT:   Head: Normocephalic and atraumatic.   Eyes: Pupils are equal, round, and reactive to light. No scleral icterus.   Cardiovascular: Normal rate, regular rhythm and normal heart sounds.   Pulmonary/Chest: Effort normal and breath sounds normal.   Abdominal: Soft. Bowel sounds are normal. He exhibits no distension and no mass. There is no tenderness. No hernia.   Neurological: He is alert and oriented to person, place, and time.   Skin: Skin is warm and dry. No rash noted.   Psychiatric: He has a normal mood and affect. His behavior is normal.   Vitals reviewed.      Office Visit on 01/30/2020   Component Date Value Ref Range Status   • WBC 01/30/2020 6.48  3.40 - 10.80 10*3/mm3 Final    • RBC 01/30/2020 4.45  4.14 - 5.80 10*6/mm3 Final   • Hemoglobin 01/30/2020 11.4* 13.0 - 17.7 g/dL Final   • Hematocrit 01/30/2020 37.0* 37.5 - 51.0 % Final   • MCV 01/30/2020 83.1  79.0 - 97.0 fL Final   • MCH 01/30/2020 25.6* 26.6 - 33.0 pg Final   • MCHC 01/30/2020 30.8* 31.5 - 35.7 g/dL Final   • RDW 01/30/2020 14.7  12.3 - 15.4 % Final   • Platelets 01/30/2020 263  140 - 450 10*3/mm3 Final   • Neutrophil Rel % 01/30/2020 67.5  42.7 - 76.0 % Final   • Lymphocyte Rel % 01/30/2020 18.1* 19.6 - 45.3 % Final   • Monocyte Rel % 01/30/2020 10.8  5.0 - 12.0 % Final   • Eosinophil Rel % 01/30/2020 2.8  0.3 - 6.2 % Final   • Basophil Rel % 01/30/2020 0.5  0.0 - 1.5 % Final   • Neutrophils Absolute 01/30/2020 4.38  1.70 - 7.00 10*3/mm3 Final   • Lymphocytes Absolute 01/30/2020 1.17  0.70 - 3.10 10*3/mm3 Final   • Monocytes Absolute 01/30/2020 0.70  0.10 - 0.90 10*3/mm3 Final   • Eosinophils Absolute 01/30/2020 0.18  0.00 - 0.40 10*3/mm3 Final   • Basophils Absolute 01/30/2020 0.03  0.00 - 0.20 10*3/mm3 Final   • Immature Granulocyte Rel % 01/30/2020 0.3  0.0 - 0.5 % Final   • Immature Grans Absolute 01/30/2020 0.02  0.00 - 0.05 10*3/mm3 Final   • nRBC 01/30/2020 0.0  0.0 - 0.2 /100 WBC Final   Office Visit on 01/16/2020   Component Date Value Ref Range Status   • WBC 01/16/2020 6.21  3.40 - 10.80 10*3/mm3 Final   • RBC 01/16/2020 4.29  4.14 - 5.80 10*6/mm3 Final   • Hemoglobin 01/16/2020 10.9* 13.0 - 17.7 g/dL Final   • Hematocrit 01/16/2020 34.9* 37.5 - 51.0 % Final   • MCV 01/16/2020 81.4  79.0 - 97.0 fL Final   • MCH 01/16/2020 25.4* 26.6 - 33.0 pg Final   • MCHC 01/16/2020 31.2* 31.5 - 35.7 g/dL Final   • RDW 01/16/2020 14.3  12.3 - 15.4 % Final   • Platelets 01/16/2020 305  140 - 450 10*3/mm3 Final   • Neutrophil Rel % 01/16/2020 66.6  42.7 - 76.0 % Final   • Lymphocyte Rel % 01/16/2020 18.4* 19.6 - 45.3 % Final   • Monocyte Rel % 01/16/2020 10.5  5.0 - 12.0 % Final   • Eosinophil Rel % 01/16/2020 3.2  0.3 - 6.2 %  Final   • Basophil Rel % 01/16/2020 1.0  0.0 - 1.5 % Final   • Neutrophils Absolute 01/16/2020 4.14  1.70 - 7.00 10*3/mm3 Final   • Lymphocytes Absolute 01/16/2020 1.14  0.70 - 3.10 10*3/mm3 Final   • Monocytes Absolute 01/16/2020 0.65  0.10 - 0.90 10*3/mm3 Final   • Eosinophils Absolute 01/16/2020 0.20  0.00 - 0.40 10*3/mm3 Final   • Basophils Absolute 01/16/2020 0.06  0.00 - 0.20 10*3/mm3 Final   • Immature Granulocyte Rel % 01/16/2020 0.3  0.0 - 0.5 % Final   • Immature Grans Absolute 01/16/2020 0.02  0.00 - 0.05 10*3/mm3 Final   • nRBC 01/16/2020 0.0  0.0 - 0.2 /100 WBC Final   • Ferritin 01/16/2020 17.40* 30.00 - 400.00 ng/mL Final   • TIBC 01/16/2020 507  mcg/dL Final   • UIBC 01/16/2020 472* 112 - 346 mcg/dL Final   • Iron 01/16/2020 35* 59 - 158 mcg/dL Final   • Iron Saturation 01/16/2020 7* 20 - 50 % Final   • Total Protein 01/16/2020 6.7  6.0 - 8.5 g/dL Final   • Albumin 01/16/2020 3.6  2.9 - 4.4 g/dL Final   • Alpha-1-Globulin 01/16/2020 0.3  0.0 - 0.4 g/dL Final   • Alpha-2-Globulin 01/16/2020 0.8  0.4 - 1.0 g/dL Final   • Beta Globulin 01/16/2020 1.2  0.7 - 1.3 g/dL Final   • Gamma Globulin 01/16/2020 0.9  0.4 - 1.8 g/dL Final   • M-Tre 01/16/2020 Not Observed  Not Observed g/dL Final   • Globulin 01/16/2020 3.1  2.2 - 3.9 g/dL Final   • A/G Ratio 01/16/2020 1.2  0.7 - 1.7 Final   • Please note 01/16/2020 Comment   Final   • SPE Interpretation 01/16/2020 Comment   Final   • Vitamin B-12 01/16/2020 564  211 - 946 pg/mL Final       Sukhdev was seen today for liver follow-up.    Diagnoses and all orders for this visit:    Elevated LFTs      Patient 79-year-old male with a history of GERD hyperlipidemia as well as hypertension and coronary artery disease here in follow-up for chronically elevated liver enzymes.  Patient with acute rise in abnormality consistent with drug-induced liver injury.  Patient's liver enzymes have returned and basically normal.  Mildly elevated AST persists with minimal alkaline  phosphatase but patient reports has always had those numbers mildly elevated, told they were related to early tetracycline use.  Patient denies any abdominal pain no fever or chills doing well.  Would recommend follow-up in 1 time in 3 months and if continues to stay within normal limits area will just follow clinically.

## 2020-03-04 ENCOUNTER — TELEPHONE (OUTPATIENT)
Dept: NEUROLOGY | Facility: CLINIC | Age: 80
End: 2020-03-04

## 2020-03-04 NOTE — TELEPHONE ENCOUNTER
PHARMACIST ALLYSSA FROM [facility] CALLED STATING THAT DR LEMUS PUT ANGIE ON DULOXETINE 20 MG. ALLYSSA IS SAYING AFTER LOOKING AT [facility] MEDICATION HISTORY SHE SEES THAT THE PATIENT IS ALSO ON FLUOXETINE 20MG. SHE JUST WANTS TO MAKE SURE IF THE PATIENT IS GOING TO TAKE BOTH MEDICATIONS AT THE SAME TIME.    ALLYSSA: [phone]  REF #:[id number]

## 2020-03-04 NOTE — TELEPHONE ENCOUNTER
Spoke with Lorie the pharmacist with [facility] and told her that Dr. Chaudhary is aware that patient is taking the fluoxetine in addition with starting the Duloxetine 20mg. I made her aware that Dr. Chaudhary spoke with pt in length about serotonin syndrome during pt's visit in the office yesterday.

## 2020-03-05 NOTE — TELEPHONE ENCOUNTER
That is correct.  The patient actually has been on amitriptyline 10 mg for the past month without difficulty.  Amitriptyline is being stopped because it has some potentially unwanted side effects particularly complicating his orthostatic hypotension problem which duloxetine is not as prone to cause.  I am aware he is on Prozac and serotonin syndrome is a potential issue which we discussed as you mentioned    GNS

## 2020-03-10 ENCOUNTER — HOSPITAL ENCOUNTER (INPATIENT)
Facility: HOSPITAL | Age: 80
LOS: 4 days | Discharge: HOME | End: 2020-03-15
Attending: EMERGENCY MEDICINE | Admitting: UROLOGY

## 2020-03-10 ENCOUNTER — LAB REQUISITION (OUTPATIENT)
Dept: LAB | Facility: HOSPITAL | Age: 80
End: 2020-03-10

## 2020-03-10 DIAGNOSIS — R33.8 ACUTE URINARY RETENTION: Primary | ICD-10-CM

## 2020-03-10 DIAGNOSIS — Z85.51 PERSONAL HISTORY OF MALIGNANT NEOPLASM OF BLADDER: ICD-10-CM

## 2020-03-10 DIAGNOSIS — C67.9 MALIGNANT NEOPLASM OF BLADDER, UNSPECIFIED (HCC): ICD-10-CM

## 2020-03-10 DIAGNOSIS — R31.0 GROSS HEMATURIA: ICD-10-CM

## 2020-03-10 LAB
ABO GROUP BLD: NORMAL
BASOPHILS # BLD AUTO: 0.01 10*3/MM3 (ref 0–0.2)
BASOPHILS NFR BLD AUTO: 0.1 % (ref 0–1.5)
BLD GP AB SCN SERPL QL: NEGATIVE
DEPRECATED RDW RBC AUTO: 60.5 FL (ref 37–54)
EOSINOPHIL # BLD AUTO: 0 10*3/MM3 (ref 0–0.4)
EOSINOPHIL NFR BLD AUTO: 0 % (ref 0.3–6.2)
ERYTHROCYTE [DISTWIDTH] IN BLOOD BY AUTOMATED COUNT: 19.4 % (ref 12.3–15.4)
HCT VFR BLD AUTO: 38.9 % (ref 37.5–51)
HGB BLD-MCNC: 12.6 G/DL (ref 13–17.7)
IMM GRANULOCYTES # BLD AUTO: 0.08 10*3/MM3 (ref 0–0.05)
IMM GRANULOCYTES NFR BLD AUTO: 0.7 % (ref 0–0.5)
LYMPHOCYTES # BLD AUTO: 0.5 10*3/MM3 (ref 0.7–3.1)
LYMPHOCYTES NFR BLD AUTO: 4.3 % (ref 19.6–45.3)
MCH RBC QN AUTO: 28.5 PG (ref 26.6–33)
MCHC RBC AUTO-ENTMCNC: 32.4 G/DL (ref 31.5–35.7)
MCV RBC AUTO: 88 FL (ref 79–97)
MONOCYTES # BLD AUTO: 0.63 10*3/MM3 (ref 0.1–0.9)
MONOCYTES NFR BLD AUTO: 5.4 % (ref 5–12)
NEUTROPHILS # BLD AUTO: 10.47 10*3/MM3 (ref 1.7–7)
NEUTROPHILS NFR BLD AUTO: 89.5 % (ref 42.7–76)
NRBC BLD AUTO-RTO: 0 /100 WBC (ref 0–0.2)
PLATELET # BLD AUTO: 238 10*3/MM3 (ref 140–450)
PMV BLD AUTO: 10.9 FL (ref 6–12)
RBC # BLD AUTO: 4.42 10*6/MM3 (ref 4.14–5.8)
RH BLD: NEGATIVE
T&S EXPIRATION DATE: NORMAL
WBC NRBC COR # BLD: 11.69 10*3/MM3 (ref 3.4–10.8)

## 2020-03-10 PROCEDURE — 83735 ASSAY OF MAGNESIUM: CPT | Performed by: EMERGENCY MEDICINE

## 2020-03-10 PROCEDURE — 99285 EMERGENCY DEPT VISIT HI MDM: CPT

## 2020-03-10 PROCEDURE — 85025 COMPLETE CBC W/AUTO DIFF WBC: CPT | Performed by: EMERGENCY MEDICINE

## 2020-03-10 PROCEDURE — 80053 COMPREHEN METABOLIC PANEL: CPT | Performed by: EMERGENCY MEDICINE

## 2020-03-10 PROCEDURE — 88307 TISSUE EXAM BY PATHOLOGIST: CPT | Performed by: UROLOGY

## 2020-03-10 PROCEDURE — 86850 RBC ANTIBODY SCREEN: CPT | Performed by: EMERGENCY MEDICINE

## 2020-03-10 PROCEDURE — 93010 ELECTROCARDIOGRAM REPORT: CPT | Performed by: INTERNAL MEDICINE

## 2020-03-10 PROCEDURE — 93005 ELECTROCARDIOGRAM TRACING: CPT | Performed by: EMERGENCY MEDICINE

## 2020-03-10 PROCEDURE — 93005 ELECTROCARDIOGRAM TRACING: CPT

## 2020-03-10 PROCEDURE — 84484 ASSAY OF TROPONIN QUANT: CPT | Performed by: EMERGENCY MEDICINE

## 2020-03-10 PROCEDURE — 86900 BLOOD TYPING SEROLOGIC ABO: CPT | Performed by: EMERGENCY MEDICINE

## 2020-03-10 PROCEDURE — 51702 INSERT TEMP BLADDER CATH: CPT

## 2020-03-10 PROCEDURE — 86901 BLOOD TYPING SEROLOGIC RH(D): CPT | Performed by: EMERGENCY MEDICINE

## 2020-03-10 RX ORDER — SODIUM CHLORIDE 0.9 % (FLUSH) 0.9 %
10 SYRINGE (ML) INJECTION AS NEEDED
Status: DISCONTINUED | OUTPATIENT
Start: 2020-03-10 | End: 2020-03-15 | Stop reason: HOSPADM

## 2020-03-11 PROBLEM — R33.8 ACUTE URINARY RETENTION: Status: ACTIVE | Noted: 2020-03-11

## 2020-03-11 LAB
ALBUMIN SERPL-MCNC: 4.1 G/DL (ref 3.5–5.2)
ALBUMIN/GLOB SERPL: 2 G/DL
ALP SERPL-CCNC: 152 U/L (ref 39–117)
ALT SERPL W P-5'-P-CCNC: 5 U/L (ref 1–41)
ANION GAP SERPL CALCULATED.3IONS-SCNC: 12.5 MMOL/L (ref 5–15)
ANION GAP SERPL CALCULATED.3IONS-SCNC: 13 MMOL/L (ref 5–15)
AST SERPL-CCNC: 45 U/L (ref 1–40)
BACTERIA UR QL AUTO: ABNORMAL /HPF
BASOPHILS # BLD AUTO: 0.01 10*3/MM3 (ref 0–0.2)
BASOPHILS NFR BLD AUTO: 0.1 % (ref 0–1.5)
BILIRUB SERPL-MCNC: 0.6 MG/DL (ref 0.2–1.2)
BILIRUB UR QL STRIP: NEGATIVE
BUN BLD-MCNC: 16 MG/DL (ref 8–23)
BUN BLD-MCNC: 18 MG/DL (ref 8–23)
BUN/CREAT SERPL: 14.6 (ref 7–25)
BUN/CREAT SERPL: 16.2 (ref 7–25)
CALCIUM SPEC-SCNC: 8.4 MG/DL (ref 8.6–10.5)
CALCIUM SPEC-SCNC: 9 MG/DL (ref 8.6–10.5)
CHLORIDE SERPL-SCNC: 100 MMOL/L (ref 98–107)
CHLORIDE SERPL-SCNC: 101 MMOL/L (ref 98–107)
CLARITY UR: ABNORMAL
CO2 SERPL-SCNC: 19.5 MMOL/L (ref 22–29)
CO2 SERPL-SCNC: 22 MMOL/L (ref 22–29)
COLOR UR: ABNORMAL
CREAT BLD-MCNC: 0.99 MG/DL (ref 0.76–1.27)
CREAT BLD-MCNC: 1.23 MG/DL (ref 0.76–1.27)
DEPRECATED RDW RBC AUTO: 60.4 FL (ref 37–54)
EOSINOPHIL # BLD AUTO: 0 10*3/MM3 (ref 0–0.4)
EOSINOPHIL NFR BLD AUTO: 0 % (ref 0.3–6.2)
ERYTHROCYTE [DISTWIDTH] IN BLOOD BY AUTOMATED COUNT: 19.4 % (ref 12.3–15.4)
GFR SERPL CREATININE-BSD FRML MDRD: 57 ML/MIN/1.73
GFR SERPL CREATININE-BSD FRML MDRD: 73 ML/MIN/1.73
GLOBULIN UR ELPH-MCNC: 2.1 GM/DL
GLUCOSE BLD-MCNC: 126 MG/DL (ref 65–99)
GLUCOSE BLD-MCNC: 127 MG/DL (ref 65–99)
GLUCOSE UR STRIP-MCNC: ABNORMAL MG/DL
HCT VFR BLD AUTO: 33.1 % (ref 37.5–51)
HGB BLD-MCNC: 10.9 G/DL (ref 13–17.7)
HGB UR QL STRIP.AUTO: ABNORMAL
HOLD SPECIMEN: NORMAL
HOLD SPECIMEN: NORMAL
HYALINE CASTS UR QL AUTO: ABNORMAL /LPF
IMM GRANULOCYTES # BLD AUTO: 0.06 10*3/MM3 (ref 0–0.05)
IMM GRANULOCYTES NFR BLD AUTO: 0.5 % (ref 0–0.5)
KETONES UR QL STRIP: ABNORMAL
LAB AP CASE REPORT: NORMAL
LAB AP SYNOPTIC CHECKLIST: NORMAL
LEUKOCYTE ESTERASE UR QL STRIP.AUTO: ABNORMAL
LYMPHOCYTES # BLD AUTO: 0.65 10*3/MM3 (ref 0.7–3.1)
LYMPHOCYTES NFR BLD AUTO: 5.1 % (ref 19.6–45.3)
MAGNESIUM SERPL-MCNC: 2 MG/DL (ref 1.6–2.4)
MCH RBC QN AUTO: 28.5 PG (ref 26.6–33)
MCHC RBC AUTO-ENTMCNC: 32.9 G/DL (ref 31.5–35.7)
MCV RBC AUTO: 86.6 FL (ref 79–97)
MONOCYTES # BLD AUTO: 0.96 10*3/MM3 (ref 0.1–0.9)
MONOCYTES NFR BLD AUTO: 7.5 % (ref 5–12)
NEUTROPHILS # BLD AUTO: 11.13 10*3/MM3 (ref 1.7–7)
NEUTROPHILS NFR BLD AUTO: 86.8 % (ref 42.7–76)
NITRITE UR QL STRIP: POSITIVE
NRBC BLD AUTO-RTO: 0 /100 WBC (ref 0–0.2)
PATH REPORT.FINAL DX SPEC: NORMAL
PATH REPORT.GROSS SPEC: NORMAL
PH UR STRIP.AUTO: 7.5 [PH] (ref 5–8)
PLATELET # BLD AUTO: 189 10*3/MM3 (ref 140–450)
PMV BLD AUTO: 10.8 FL (ref 6–12)
POTASSIUM BLD-SCNC: 4.5 MMOL/L (ref 3.5–5.2)
POTASSIUM BLD-SCNC: 5.1 MMOL/L (ref 3.5–5.2)
PROT SERPL-MCNC: 6.2 G/DL (ref 6–8.5)
PROT UR QL STRIP: ABNORMAL
RBC # BLD AUTO: 3.82 10*6/MM3 (ref 4.14–5.8)
RBC # UR: ABNORMAL /HPF
REF LAB TEST METHOD: ABNORMAL
SODIUM BLD-SCNC: 133 MMOL/L (ref 136–145)
SODIUM BLD-SCNC: 135 MMOL/L (ref 136–145)
SP GR UR STRIP: 1.01 (ref 1–1.03)
SQUAMOUS #/AREA URNS HPF: ABNORMAL /HPF
TROPONIN T SERPL-MCNC: <0.01 NG/ML (ref 0–0.03)
UROBILINOGEN UR QL STRIP: ABNORMAL
WBC NRBC COR # BLD: 12.81 10*3/MM3 (ref 3.4–10.8)
WBC UR QL AUTO: ABNORMAL /HPF
WHOLE BLOOD HOLD SPECIMEN: NORMAL
WHOLE BLOOD HOLD SPECIMEN: NORMAL

## 2020-03-11 PROCEDURE — 80048 BASIC METABOLIC PNL TOTAL CA: CPT | Performed by: UROLOGY

## 2020-03-11 PROCEDURE — 25010000002 ONDANSETRON PER 1 MG: Performed by: UROLOGY

## 2020-03-11 PROCEDURE — 25010000002 MORPHINE PER 10 MG: Performed by: EMERGENCY MEDICINE

## 2020-03-11 PROCEDURE — 0T9B70Z DRAINAGE OF BLADDER WITH DRAINAGE DEVICE, VIA NATURAL OR ARTIFICIAL OPENING: ICD-10-PCS | Performed by: UROLOGY

## 2020-03-11 PROCEDURE — 25010000002 LEVOFLOXACIN PER 250 MG: Performed by: UROLOGY

## 2020-03-11 PROCEDURE — 81001 URINALYSIS AUTO W/SCOPE: CPT | Performed by: EMERGENCY MEDICINE

## 2020-03-11 PROCEDURE — 25010000002 HYDROMORPHONE PER 4 MG: Performed by: UROLOGY

## 2020-03-11 PROCEDURE — 36415 COLL VENOUS BLD VENIPUNCTURE: CPT | Performed by: UROLOGY

## 2020-03-11 PROCEDURE — 85025 COMPLETE CBC W/AUTO DIFF WBC: CPT | Performed by: UROLOGY

## 2020-03-11 RX ORDER — SODIUM CHLORIDE 0.9 % (FLUSH) 0.9 %
10 SYRINGE (ML) INJECTION EVERY 12 HOURS SCHEDULED
Status: DISCONTINUED | OUTPATIENT
Start: 2020-03-11 | End: 2020-03-15 | Stop reason: HOSPADM

## 2020-03-11 RX ORDER — HYDROMORPHONE HYDROCHLORIDE 1 MG/ML
0.5 INJECTION, SOLUTION INTRAMUSCULAR; INTRAVENOUS; SUBCUTANEOUS
Status: DISCONTINUED | OUTPATIENT
Start: 2020-03-11 | End: 2020-03-15 | Stop reason: HOSPADM

## 2020-03-11 RX ORDER — LEVOFLOXACIN 5 MG/ML
500 INJECTION, SOLUTION INTRAVENOUS EVERY 24 HOURS
Status: COMPLETED | OUTPATIENT
Start: 2020-03-11 | End: 2020-03-13

## 2020-03-11 RX ORDER — ACETAMINOPHEN 650 MG/1
650 SUPPOSITORY RECTAL EVERY 4 HOURS PRN
Status: DISCONTINUED | OUTPATIENT
Start: 2020-03-11 | End: 2020-03-15 | Stop reason: HOSPADM

## 2020-03-11 RX ORDER — ASPIRIN 81 MG/1
81 TABLET, CHEWABLE ORAL DAILY
Status: DISCONTINUED | OUTPATIENT
Start: 2020-03-11 | End: 2020-03-15 | Stop reason: HOSPADM

## 2020-03-11 RX ORDER — LIDOCAINE HYDROCHLORIDE 20 MG/ML
JELLY TOPICAL AS NEEDED
Status: DISCONTINUED | OUTPATIENT
Start: 2020-03-11 | End: 2020-03-15 | Stop reason: HOSPADM

## 2020-03-11 RX ORDER — ONDANSETRON 4 MG/1
4 TABLET, FILM COATED ORAL EVERY 6 HOURS PRN
Status: DISCONTINUED | OUTPATIENT
Start: 2020-03-11 | End: 2020-03-15 | Stop reason: HOSPADM

## 2020-03-11 RX ORDER — SENNA AND DOCUSATE SODIUM 50; 8.6 MG/1; MG/1
2 TABLET, FILM COATED ORAL NIGHTLY
Status: DISCONTINUED | OUTPATIENT
Start: 2020-03-11 | End: 2020-03-15 | Stop reason: HOSPADM

## 2020-03-11 RX ORDER — ACETAMINOPHEN 160 MG/5ML
650 SOLUTION ORAL EVERY 4 HOURS PRN
Status: DISCONTINUED | OUTPATIENT
Start: 2020-03-11 | End: 2020-03-15 | Stop reason: HOSPADM

## 2020-03-11 RX ORDER — ACETAMINOPHEN 325 MG/1
650 TABLET ORAL EVERY 4 HOURS PRN
Status: DISCONTINUED | OUTPATIENT
Start: 2020-03-11 | End: 2020-03-15 | Stop reason: HOSPADM

## 2020-03-11 RX ORDER — SODIUM CHLORIDE 0.9 % (FLUSH) 0.9 %
10 SYRINGE (ML) INJECTION AS NEEDED
Status: DISCONTINUED | OUTPATIENT
Start: 2020-03-11 | End: 2020-03-15 | Stop reason: HOSPADM

## 2020-03-11 RX ORDER — NALOXONE HCL 0.4 MG/ML
0.4 VIAL (ML) INJECTION
Status: DISCONTINUED | OUTPATIENT
Start: 2020-03-11 | End: 2020-03-15 | Stop reason: HOSPADM

## 2020-03-11 RX ORDER — ONDANSETRON 2 MG/ML
4 INJECTION INTRAMUSCULAR; INTRAVENOUS EVERY 6 HOURS PRN
Status: DISCONTINUED | OUTPATIENT
Start: 2020-03-11 | End: 2020-03-15 | Stop reason: HOSPADM

## 2020-03-11 RX ORDER — SODIUM CHLORIDE 9 MG/ML
75 INJECTION, SOLUTION INTRAVENOUS CONTINUOUS
Status: DISCONTINUED | OUTPATIENT
Start: 2020-03-11 | End: 2020-03-13

## 2020-03-11 RX ORDER — MORPHINE SULFATE 2 MG/ML
4 INJECTION, SOLUTION INTRAMUSCULAR; INTRAVENOUS ONCE
Status: COMPLETED | OUTPATIENT
Start: 2020-03-11 | End: 2020-03-11

## 2020-03-11 RX ORDER — LORAZEPAM 0.5 MG/1
0.5 TABLET ORAL EVERY 8 HOURS PRN
Status: DISCONTINUED | OUTPATIENT
Start: 2020-03-11 | End: 2020-03-15 | Stop reason: HOSPADM

## 2020-03-11 RX ORDER — OXYCODONE AND ACETAMINOPHEN 7.5; 325 MG/1; MG/1
1 TABLET ORAL EVERY 4 HOURS PRN
Status: DISCONTINUED | OUTPATIENT
Start: 2020-03-11 | End: 2020-03-15 | Stop reason: HOSPADM

## 2020-03-11 RX ADMIN — SODIUM CHLORIDE 75 ML/HR: 9 INJECTION, SOLUTION INTRAVENOUS at 03:01

## 2020-03-11 RX ADMIN — ASPIRIN 81 MG: 81 TABLET, CHEWABLE ORAL at 10:35

## 2020-03-11 RX ADMIN — LIDOCAINE HYDROCHLORIDE: 20 JELLY TOPICAL at 00:14

## 2020-03-11 RX ADMIN — SODIUM CHLORIDE, PRESERVATIVE FREE 10 ML: 5 INJECTION INTRAVENOUS at 21:35

## 2020-03-11 RX ADMIN — HYDROMORPHONE HYDROCHLORIDE 0.5 MG: 1 INJECTION, SOLUTION INTRAMUSCULAR; INTRAVENOUS; SUBCUTANEOUS at 01:55

## 2020-03-11 RX ADMIN — SODIUM CHLORIDE, PRESERVATIVE FREE 10 ML: 5 INJECTION INTRAVENOUS at 08:59

## 2020-03-11 RX ADMIN — LEVOFLOXACIN 500 MG: 5 INJECTION, SOLUTION INTRAVENOUS at 01:55

## 2020-03-11 RX ADMIN — ONDANSETRON 4 MG: 2 INJECTION INTRAMUSCULAR; INTRAVENOUS at 03:00

## 2020-03-11 RX ADMIN — SODIUM CHLORIDE, PRESERVATIVE FREE 10 ML: 5 INJECTION INTRAVENOUS at 04:29

## 2020-03-11 RX ADMIN — SODIUM CHLORIDE 1000 ML: 9 INJECTION, SOLUTION INTRAVENOUS at 00:09

## 2020-03-11 RX ADMIN — MORPHINE SULFATE 4 MG: 2 INJECTION, SOLUTION INTRAMUSCULAR; INTRAVENOUS at 00:08

## 2020-03-11 NOTE — ED NOTES
Attempted to place urinary catheter x 2, pt irrigated with large amount of blood and clots with both catheters, unable to inflate balloon on 22 fr., urologist called in to place catheter with stricture cart.     Josefa Torres RN  03/11/20 0149

## 2020-03-11 NOTE — PROGRESS NOTES
"   LOS: 0 days   Patient Care Team:  Rachelle Patton PA-C as PCP - General (Physician Assistant)  Christie Pacheco APRN as PCP - Claims Attributed      Subjective   Interval History: Patient resting comfortably. Urine now clear after manual irrigation overnight.    Objective     ROS   12 POINT NEG ROS PERTINENT IN HPI      Vital Signs  Temp:  [96.8 °F (36 °C)-97.1 °F (36.2 °C)] 96.8 °F (36 °C)  Heart Rate:  [90] 90  Resp:  [16] 16  BP: (132-168)/(80-88) 168/88      Intake/Output Summary (Last 24 hours) at 3/11/2020 0722  Last data filed at 3/11/2020 0431  Gross per 24 hour   Intake 5100 ml   Output 6800 ml   Net -1700 ml       Flowsheet Rows      First Filed Value   Admission Height  182.9 cm (72\") Documented at 03/10/2020 2232   Admission Weight  92.1 kg (203 lb) Documented at 03/10/2020 2248          Physical Exam:     General stevie: alert, cooperative, oriented  Genitalia:  Moses intact, urine crystal clear with CBI at medium rate  Skin:  Skin color, texture, turgor, normal no rashes        Results Review:     I reviewed the patient's new clinical results.  Lab Results (all)     Procedure Component Value Units Date/Time    Basic Metabolic Panel [[id number]]  (Abnormal) Collected:  03/11/20 0615    Specimen:  Blood from Arm, Right Updated:  03/11/20 0657     Glucose 127 mg/dL      BUN 16 mg/dL      Creatinine 0.99 mg/dL      Sodium 133 mmol/L      Potassium 5.1 mmol/L      Chloride 101 mmol/L      CO2 19.5 mmol/L      Calcium 8.4 mg/dL      eGFR Non African Amer 73 mL/min/1.73      BUN/Creatinine Ratio 16.2     Anion Gap 12.5 mmol/L     Narrative:       GFR Normal >60  Chronic Kidney Disease <60  Kidney Failure <15      CBC & Differential [[id number]] Collected:  03/11/20 0615    Specimen:  Blood from Arm, Right Updated:  03/11/20 0632    Narrative:       The following orders were created for panel order CBC & Differential.  Procedure                               Abnormality         Status                     "   ---------                               -----------         ------                     CBC Auto Differential[[id number]]        Abnormal            Final result                 Please view results for these tests on the individual orders.    CBC Auto Differential [[id number]]  (Abnormal) Collected:  03/11/20 0615    Specimen:  Blood from Arm, Right Updated:  03/11/20 0632     WBC 12.81 10*3/mm3      RBC 3.82 10*6/mm3      Hemoglobin 10.9 g/dL      Hematocrit 33.1 %      MCV 86.6 fL      MCH 28.5 pg      MCHC 32.9 g/dL      RDW 19.4 %      RDW-SD 60.4 fl      MPV 10.8 fL      Platelets 189 10*3/mm3      Neutrophil % 86.8 %      Lymphocyte % 5.1 %      Monocyte % 7.5 %      Eosinophil % 0.0 %      Basophil % 0.1 %      Immature Grans % 0.5 %      Neutrophils, Absolute 11.13 10*3/mm3      Lymphocytes, Absolute 0.65 10*3/mm3      Monocytes, Absolute 0.96 10*3/mm3      Eosinophils, Absolute 0.00 10*3/mm3      Basophils, Absolute 0.01 10*3/mm3      Immature Grans, Absolute 0.06 10*3/mm3      nRBC 0.0 /100 WBC     Urinalysis With Microscopic If Indicated (No Culture) - Urine, Clean Catch [[id number]]  (Abnormal) Collected:  03/11/20 0006    Specimen:  Urine, Clean Catch Updated:  03/11/20 0038     Color, UA Red     Comment: Any Substance that causes an abnormal urine color can alter the accuracy of the chemical reactions.        Appearance, UA Cloudy     pH, UA 7.5     Specific Gravity, UA 1.010     Glucose,  mg/dL (Trace)     Ketones, UA 15 mg/dL (1+)     Bilirubin, UA Negative     Blood, UA Large (3+)     Protein, UA >=300 mg/dL (3+)     Leuk Esterase, UA Moderate (2+)     Nitrite, UA Positive     Urobilinogen, UA 4.0 E.U./dL    Urinalysis, Microscopic Only - Urine, Clean Catch [[id number]]  (Abnormal) Collected:  03/11/20 0006    Specimen:  Urine, Clean Catch Updated:  03/11/20 0038     RBC, UA Too Numerous to Count /HPF      WBC, UA       Unable to determine due to loaded field     /HPF     Bacteria, UA        Unable to determine due to loaded field     /HPF     Squamous Epithelial Cells, UA       Unable to determine due to loaded field     /HPF     Hyaline Casts, UA       Unable to determine due to loaded field     /LPF     Methodology Automated Microscopy    Comprehensive Metabolic Panel [[id number]]  (Abnormal) Collected:  03/10/20 2252    Specimen:  Blood Updated:  03/11/20 0019     Glucose 126 mg/dL      BUN 18 mg/dL      Creatinine 1.23 mg/dL      Sodium 135 mmol/L      Potassium 4.5 mmol/L      Chloride 100 mmol/L      CO2 22.0 mmol/L      Calcium 9.0 mg/dL      Total Protein 6.2 g/dL      Albumin 4.10 g/dL      ALT (SGPT) 5 U/L      AST (SGOT) 45 U/L      Alkaline Phosphatase 152 U/L      Total Bilirubin 0.6 mg/dL      eGFR Non African Amer 57 mL/min/1.73      Globulin 2.1 gm/dL      A/G Ratio 2.0 g/dL      BUN/Creatinine Ratio 14.6     Anion Gap 13.0 mmol/L     Narrative:       GFR Normal >60  Chronic Kidney Disease <60  Kidney Failure <15      Magnesium [[id number]]  (Normal) Collected:  03/10/20 2252    Specimen:  Blood Updated:  03/11/20 0019     Magnesium 2.0 mg/dL     Troponin [[id number]]  (Normal) Collected:  03/10/20 2252    Specimen:  Blood Updated:  03/11/20 0019     Troponin T <0.010 ng/mL     Narrative:       Troponin T Reference Range:  <= 0.03 ng/mL-   Negative for AMI  >0.03 ng/mL-     Abnormal for myocardial necrosis.  Clinicians would have to utilize clinical acumen, EKG, Troponin and serial changes to determine if it is an Acute Myocardial Infarction or myocardial injury due to an underlying chronic condition.       Results may be falsely decreased if patient taking Biotin.      [facility] Draw [[id number]] Collected:  03/10/20 2252    Specimen:  Blood Updated:  03/11/20 0000    Narrative:       The following orders were created for panel order [facility] Draw.  Procedure                               Abnormality         Status                     ---------                               -----------          ------                     Light Blue Top[[id number]]                                   Final result               Green Top (Gel)[[id number]]                                  Final result               Lavender Top[[id number]]                                     Final result               Gold Top - SST[[id number]]                                   Final result                 Please view results for these tests on the individual orders.    Light Blue Top [[id number]] Collected:  03/10/20 2252    Specimen:  Blood Updated:  03/11/20 0000     Extra Tube hold for add-on     Comment: Auto resulted       Green Top (Gel) [[id number]] Collected:  03/10/20 2252    Specimen:  Blood Updated:  03/11/20 0000     Extra Tube Hold for add-ons.     Comment: Auto resulted.       Lavender Top [[id number]] Collected:  03/10/20 2252    Specimen:  Blood Updated:  03/11/20 0000     Extra Tube hold for add-on     Comment: Auto resulted       Gold Top - SST [[id number]] Collected:  03/10/20 2252    Specimen:  Blood Updated:  03/11/20 0000     Extra Tube Hold for add-ons.     Comment: Auto resulted.       CBC & Differential [[id number]] Collected:  03/10/20 2252    Specimen:  Blood Updated:  03/10/20 2335    Narrative:       The following orders were created for panel order CBC & Differential.  Procedure                               Abnormality         Status                     ---------                               -----------         ------                     CBC Auto Differential[[id number]]        Abnormal            Final result                 Please view results for these tests on the individual orders.    CBC Auto Differential [[id number]]  (Abnormal) Collected:  03/10/20 2252    Specimen:  Blood Updated:  03/10/20 2335     WBC 11.69 10*3/mm3      RBC 4.42 10*6/mm3      Hemoglobin 12.6 g/dL      Hematocrit 38.9 %      MCV 88.0 fL      MCH 28.5 pg      MCHC 32.4 g/dL      RDW 19.4 %      RDW-SD 60.5 fl      MPV 10.9 fL      Platelets 238  10*3/mm3      Neutrophil % 89.5 %      Lymphocyte % 4.3 %      Monocyte % 5.4 %      Eosinophil % 0.0 %      Basophil % 0.1 %      Immature Grans % 0.7 %      Neutrophils, Absolute 10.47 10*3/mm3      Lymphocytes, Absolute 0.50 10*3/mm3      Monocytes, Absolute 0.63 10*3/mm3      Eosinophils, Absolute 0.00 10*3/mm3      Basophils, Absolute 0.01 10*3/mm3      Immature Grans, Absolute 0.08 10*3/mm3      nRBC 0.0 /100 WBC           Imaging Results (All)     None          Medication Review:   Current Facility-Administered Medications   Medication Dose Route Frequency Provider Last Rate Last Dose   • acetaminophen (TYLENOL) tablet 650 mg  650 mg Oral Q4H PRN Armen Chaudhary MD        Or   • acetaminophen (TYLENOL) 160 MG/5ML solution 650 mg  650 mg Oral Q4H PRN Armen Chaudhary MD        Or   • acetaminophen (TYLENOL) suppository 650 mg  650 mg Rectal Q4H PRN Armen Chaudhary MD       • HYDROmorphone (DILAUDID) injection 0.5 mg  0.5 mg Intravenous Q2H PRN Armen Chaudhary MD   0.5 mg at 03/11/20 0155    And   • naloxone (NARCAN) injection 0.4 mg  0.4 mg Intravenous Q5 Min PRN Armen Chaudhary MD       • levoFLOXacin (LEVAQUIN) 500 mg/100 mL D5W (premix) (LEVAQUIN) 500 mg  500 mg Intravenous Q24H Armen Chaudhary MD   Stopped at 03/11/20 0300   • Lidocaine HCl Urethral/Mucosal/Topical 2% (XYLOCAINE) gel   Topical PRN Yg Burgess MD       • LORazepam (ATIVAN) tablet 0.5 mg  0.5 mg Oral Q8H PRN Armen Chaudhary MD       • ondansetron (ZOFRAN) tablet 4 mg  4 mg Oral Q6H PRN Armen Chaudhary MD        Or   • ondansetron (ZOFRAN) injection 4 mg  4 mg Intravenous Q6H PRN Armen Chaudhary MD   4 mg at 03/11/20 0300   • oxyCODONE-acetaminophen (PERCOCET) 7.5-325 MG per tablet 1 tablet  1 tablet Oral Q4H PRN Armen Chaudhary, MD       • senna-docusate sodium (SENOKOT-S) 8.6-50 MG tablet 2 tablet  2 tablet Oral Nightly Armen Chaudhary MD       • sodium chloride 0.9 %  flush 10 mL  10 mL Intravenous PRN Yg Burgess MD       • sodium chloride 0.9 % flush 10 mL  10 mL Intravenous Q12H Armen Chaudhary MD   10 mL at 03/11/20 0429   • sodium chloride 0.9 % flush 10 mL  10 mL Intravenous PRN Aremn Chaudhary MD       • sodium chloride 0.9 % infusion  75 mL/hr Intravenous Continuous Armen Chaudhary MD 75 mL/hr at 03/11/20 0615 75 mL/hr at 03/11/20 0615       Current Facility-Administered Medications:   •  acetaminophen (TYLENOL) tablet 650 mg, 650 mg, Oral, Q4H PRN **OR** acetaminophen (TYLENOL) 160 MG/5ML solution 650 mg, 650 mg, Oral, Q4H PRN **OR** acetaminophen (TYLENOL) suppository 650 mg, 650 mg, Rectal, Q4H PRN, Armen Chaudhary MD  •  HYDROmorphone (DILAUDID) injection 0.5 mg, 0.5 mg, Intravenous, Q2H PRN, 0.5 mg at 03/11/20 0155 **AND** naloxone (NARCAN) injection 0.4 mg, 0.4 mg, Intravenous, Q5 Min PRN, Armen Chaudhary MD  •  levoFLOXacin (LEVAQUIN) 500 mg/100 mL D5W (premix) (LEVAQUIN) 500 mg, 500 mg, Intravenous, Q24H, Armen Chaudhary MD, Stopped at 03/11/20 0300  •  Lidocaine HCl Urethral/Mucosal/Topical 2% (XYLOCAINE) gel, , Topical, PRN, Yg Burgess MD  •  LORazepam (ATIVAN) tablet 0.5 mg, 0.5 mg, Oral, Q8H PRN, Armen Chaudhary MD  •  ondansetron (ZOFRAN) tablet 4 mg, 4 mg, Oral, Q6H PRN **OR** ondansetron (ZOFRAN) injection 4 mg, 4 mg, Intravenous, Q6H PRN, Armen Chaudhary MD, 4 mg at 03/11/20 0300  •  oxyCODONE-acetaminophen (PERCOCET) 7.5-325 MG per tablet 1 tablet, 1 tablet, Oral, Q4H PRN, Armen Chaudhary MD  •  senna-docusate sodium (SENOKOT-S) 8.6-50 MG tablet 2 tablet, 2 tablet, Oral, Nightly, Armen Chaudhary MD  •  sodium chloride 0.9 % flush 10 mL, 10 mL, Intravenous, PRN, Yg Burgess MD  •  sodium chloride 0.9 % flush 10 mL, 10 mL, Intravenous, Q12H, Armen Chaudhary MD, 10 mL at 03/11/20 0429  •  sodium chloride 0.9 % flush 10 mL, 10 mL, Intravenous, PRN, Armen Chaudhary,  MD  •  sodium chloride 0.9 % infusion, 75 mL/hr, Intravenous, Continuous, Armen Chaudhary MD, Last Rate: 75 mL/hr at 03/11/20 0615, 75 mL/hr at 03/11/20 0615  There are no discontinued medications.    Assessment/Plan   Bladder cancer      Acute urinary retention        Plan   - no intervention planned at this time  - continue ASA  - wean CBI  - advance diet    Wang Soares Jr., MD  03/11/20  07:22

## 2020-03-11 NOTE — ED PROVIDER NOTES
"EMERGENCY DEPARTMENT ENCOUNTER    Room Number:  20/20  PCP: Rachelle Patton PA-C  Historian: Patient  History Limited By: None      HPI  Chief Complaint: Difficulty urinating  Context: Sukhdev Zayas is a 79 y.o. male who presents to the ED c/o constant difficulty urinating that began earlier today s/p bladder tumor removal that was done earlier this afternoon by Dr. Soares. Patient reports he went and had a catheter placed shortly after the surgery to relive the urinary retention. Although the catheter provided relief, the patient and wife noticed the leg bag was filling with \"stringy,\" dark red blood. Wife reports the catheter became clogged earlier this evening, and the blood began leaking from the insertion site. Wife reports she called her daughter-in-law, who is a physician, and was told to remove the bag and have the patient put on Depends. Patient then began complaining of difficulty urinating and penile swelling, so they came to the ED for further treatment. Wife reports the bleeding has been constant. Patient is not currently on Plavix, but he is medicated with Aspirin.       Location:   Radiation: none  Character: bloody output  Duration: this afternoon  Severity: moderate  Progression: unchanged  Aggravating Factors: none  Alleviating Factors: none        MEDICAL RECORD REVIEW    Patient was last admitted in October 2019 for bacteremia due to klebsiella pneumoniae.           PAST MEDICAL HISTORY  Active Ambulatory Problems     Diagnosis Date Noted   • Hyperlipidemia 05/09/2016   • Coronary artery disease involving native coronary artery of native heart without angina pectoris 05/09/2016   • Cognitive disorder 05/09/2016   • Mood disorder (CMS/[facility]) 10/20/2016   • Closed wedge compression fracture of third thoracic vertebra with routine healing 02/06/2018   • GERD (gastroesophageal reflux disease) 07/31/2018   • S/P drug eluting coronary stent placement 07/31/2018   • Chest pain 08/16/2018   • " Diastolic dysfunction 08/16/2018   • Exertional angina (CMS/[facility]) 08/20/2018   • Unstable angina (CMS/[facility]) 08/23/2018   • Bladder mass 09/18/2018   • Mixed action and resting tremor 11/19/2018   • Bladder cancer (CMS/[facility]) 01/08/2019   • Generalized weakness 05/25/2019   • Dyspnea on exertion 05/25/2019   • BPH (benign prostatic hyperplasia) 07/11/2019   • Calculus of gallbladder without cholecystitis 08/31/2019   • Elevated LFTs 08/31/2019   • Calculus of bile duct with acute cholecystitis without obstruction 08/30/2019   • Atrial fibrillation (CMS/[facility]) 09/03/2019   • Essential hypertension 09/12/2019   • Elevated bilirubin 10/04/2019   • Dizziness 10/04/2019   • Beta-blocker intolerance 12/03/2019   • Orthostatic hypotension 12/16/2019     Resolved Ambulatory Problems     Diagnosis Date Noted   • Acute cystitis without hematuria 05/24/2019   • Right upper quadrant pain 08/31/2019   • Dehydration 09/11/2019   • Abdominal pain 09/12/2019   • Nausea without vomiting 09/12/2019   • Bacteremia due to Klebsiella pneumoniae 10/09/2019     Past Medical History:   Diagnosis Date   • Anxiety    • Back pain    • Coronary artery disease    • Depression    • Fatigue    • History of fractured rib    • Hypertension    • Tremors of nervous system    • Urinary incontinence    • Vertigo          PAST SURGICAL HISTORY  Past Surgical History:   Procedure Laterality Date   • CARDIAC CATHETERIZATION      7x, 5 stents   • CATARACT EXTRACTION, BILATERAL     • CHOLECYSTECTOMY N/A 9/2/2019    Procedure: CHOLECYSTECTOMY LAPAROSCOPIC WITH INTRAOPERATIVE CHOLANGIOGRAM;  Surgeon: Bg Rodriguez Jr., MD;  Location: [facility] OR;  Service: General   • COLONOSCOPY     • CORONARY ANGIOPLASTY WITH STENT PLACEMENT      X5    • CYSTOSCOPY BLADDER BIOPSY N/A 1/8/2019    Procedure: CYSTOSCOPY BLADDER BIOPSY;  Surgeon: Wang Soares Jr., MD;  Location: [facility] OR;  Service: Urology   • CYSTOSCOPY BLADDER BIOPSY N/A 6/13/2019    Procedure:  CYSTOSCOPY BLADDER BIOPSY;  Surgeon: Wang Soares Jr., MD;  Location: [facility] MAIN OR;  Service: Urology   • CYSTOSCOPY TRANSURETHRAL RESECTION OF PROSTATE N/A 7/11/2019    Procedure: CYSTOSCOPY TRANSURETHRAL RESECTION OF PROSTATE;  Surgeon: Wang Soares Jr., MD;  Location: [facility] MAIN OR;  Service: Urology   • CYSTOSCOPY URETEROSCOPY LASER LITHOTRIPSY N/A 6/13/2019    Procedure: CYSTO LITHOPAXY;  Surgeon: Wang Soares Jr., MD;  Location: [facility] MAIN OR;  Service: Urology   • ERCP N/A 9/3/2019    Procedure: ENDOSCOPIC RETROGRADE CHOLANGIOPANCREATOGRAPHY with sphincterotomy and balloon sweep;  Surgeon: Ashok Amaya MD;  Location: [facility] ENDOSCOPY;  Service: Gastroenterology   • EYE SURGERY      Lens implants   • LUMBAR DISCECTOMY FUSION INSTRUMENTATION     • SKIN CANCER EXCISION Left     chest wall   • TRANSURETHRAL RESECTION OF BLADDER TUMOR N/A 11/29/2018    Procedure: TUR BLADDER TUMOR  LARGE;  Surgeon: Wang Soares Jr., MD;  Location: [facility] MAIN OR;  Service: Urology         FAMILY HISTORY  Family History   Problem Relation Age of Onset   • Arthritis Mother    • Glaucoma Mother    • Heart disease Father    • Emphysema Father    • Tremor Father    • Malig Hyperthermia Neg Hx          SOCIAL HISTORY  Social History     Socioeconomic History   • Marital status:      Spouse name: Not on file   • Number of children: 4   • Years of education: 5 college degrees   • Highest education level: Not on file   Occupational History   • Occupation: Retired   Tobacco Use   • Smoking status: Never Smoker   • Smokeless tobacco: Never Used   Substance and Sexual Activity   • Alcohol use: No     Frequency: Never     Drinks per session: 5 or 6     Binge frequency: Less than monthly     Comment: last drink about 1 year ago    • Drug use: No   • Sexual activity: Defer         ALLERGIES  Patient has no known allergies.        REVIEW OF SYSTEMS  Review of Systems   Constitutional: Negative  for activity change, appetite change and fever.   HENT: Negative for congestion and sore throat.    Eyes: Negative.    Respiratory: Negative for cough and shortness of breath.    Cardiovascular: Negative for chest pain and leg swelling.   Gastrointestinal: Negative for abdominal pain, diarrhea and vomiting.   Endocrine: Negative.    Genitourinary: Positive for difficulty urinating and hematuria. Negative for decreased urine volume and dysuria.   Musculoskeletal: Negative for neck pain.   Skin: Negative for rash and wound.   Allergic/Immunologic: Negative.    Neurological: Negative for weakness, numbness and headaches.   Hematological: Negative.    Psychiatric/Behavioral: Negative.    All other systems reviewed and are negative.           PHYSICAL EXAM  ED Triage Vitals [03/10/20 2232]   Temp Heart Rate Resp BP SpO2   97.1 °F (36.2 °C) 90 16 140/80 100 %      Temp src Heart Rate Source Patient Position BP Location FiO2 (%)   Tympanic Monitor -- -- --       Physical Exam   Constitutional: He is oriented to person, place, and time. No distress.   HENT:   Head: Normocephalic and atraumatic.   Eyes: Pupils are equal, round, and reactive to light. EOM are normal.   Neck: Normal range of motion. Neck supple.   Cardiovascular: Normal rate, regular rhythm and normal heart sounds. Exam reveals no gallop and no friction rub.   No murmur heard.  Pulmonary/Chest: Effort normal and breath sounds normal. No respiratory distress. He has no wheezes. He has no rhonchi. He has no rales.   Abdominal: Soft. There is no tenderness. There is no rebound and no guarding.   Genitourinary:   Genitourinary Comments: Catheter is in place with tonny blood   Musculoskeletal: Normal range of motion. He exhibits no edema.   Neurological: He is alert and oriented to person, place, and time. He has normal sensation and normal strength.   Skin: Skin is warm and dry.   Psychiatric: Mood and affect normal.   Nursing note and vitals  reviewed.          LAB RESULTS  Recent Results (from the past 24 hour(s))   Comprehensive Metabolic Panel    Collection Time: 03/10/20 10:52 PM   Result Value Ref Range    Glucose 126 (H) 65 - 99 mg/dL    BUN 18 8 - 23 mg/dL    Creatinine 1.23 0.76 - 1.27 mg/dL    Sodium 135 (L) 136 - 145 mmol/L    Potassium 4.5 3.5 - 5.2 mmol/L    Chloride 100 98 - 107 mmol/L    CO2 22.0 22.0 - 29.0 mmol/L    Calcium 9.0 8.6 - 10.5 mg/dL    Total Protein 6.2 6.0 - 8.5 g/dL    Albumin 4.10 3.50 - 5.20 g/dL    ALT (SGPT) 5 1 - 41 U/L    AST (SGOT) 45 (H) 1 - 40 U/L    Alkaline Phosphatase 152 (H) 39 - 117 U/L    Total Bilirubin 0.6 0.2 - 1.2 mg/dL    eGFR Non African Amer 57 (L) >60 mL/min/1.73    Globulin 2.1 gm/dL    A/G Ratio 2.0 g/dL    BUN/Creatinine Ratio 14.6 7.0 - 25.0    Anion Gap 13.0 5.0 - 15.0 mmol/L   Magnesium    Collection Time: 03/10/20 10:52 PM   Result Value Ref Range    Magnesium 2.0 1.6 - 2.4 mg/dL   Troponin    Collection Time: 03/10/20 10:52 PM   Result Value Ref Range    Troponin T <0.010 0.000 - 0.030 ng/mL   Type & Screen    Collection Time: 03/10/20 10:52 PM   Result Value Ref Range    ABO Type O     RH type Negative     Antibody Screen Negative     T&S Expiration Date 3/13/2020 11:59:59 PM    Light Blue Top    Collection Time: 03/10/20 10:52 PM   Result Value Ref Range    Extra Tube hold for add-on    Green Top (Gel)    Collection Time: 03/10/20 10:52 PM   Result Value Ref Range    Extra Tube Hold for add-ons.    Lavender Top    Collection Time: 03/10/20 10:52 PM   Result Value Ref Range    Extra Tube hold for add-on    Gold Top - SST    Collection Time: 03/10/20 10:52 PM   Result Value Ref Range    Extra Tube Hold for add-ons.    CBC Auto Differential    Collection Time: 03/10/20 10:52 PM   Result Value Ref Range    WBC 11.69 (H) 3.40 - 10.80 10*3/mm3    RBC 4.42 4.14 - 5.80 10*6/mm3    Hemoglobin 12.6 (L) 13.0 - 17.7 g/dL    Hematocrit 38.9 37.5 - 51.0 %    MCV 88.0 79.0 - 97.0 fL    MCH 28.5 26.6 - 33.0  pg    MCHC 32.4 31.5 - 35.7 g/dL    RDW 19.4 (H) 12.3 - 15.4 %    RDW-SD 60.5 (H) 37.0 - 54.0 fl    MPV 10.9 6.0 - 12.0 fL    Platelets 238 140 - 450 10*3/mm3    Neutrophil % 89.5 (H) 42.7 - 76.0 %    Lymphocyte % 4.3 (L) 19.6 - 45.3 %    Monocyte % 5.4 5.0 - 12.0 %    Eosinophil % 0.0 (L) 0.3 - 6.2 %    Basophil % 0.1 0.0 - 1.5 %    Immature Grans % 0.7 (H) 0.0 - 0.5 %    Neutrophils, Absolute 10.47 (H) 1.70 - 7.00 10*3/mm3    Lymphocytes, Absolute 0.50 (L) 0.70 - 3.10 10*3/mm3    Monocytes, Absolute 0.63 0.10 - 0.90 10*3/mm3    Eosinophils, Absolute 0.00 0.00 - 0.40 10*3/mm3    Basophils, Absolute 0.01 0.00 - 0.20 10*3/mm3    Immature Grans, Absolute 0.08 (H) 0.00 - 0.05 10*3/mm3    nRBC 0.0 0.0 - 0.2 /100 WBC   Urinalysis With Microscopic If Indicated (No Culture) - Urine, Clean Catch    Collection Time: 03/11/20 12:06 AM   Result Value Ref Range    Color, UA Red (A) Yellow, Straw    Appearance, UA Cloudy (A) Clear    pH, UA 7.5 5.0 - 8.0    Specific Gravity, UA 1.010 1.005 - 1.030    Glucose,  mg/dL (Trace) (A) Negative    Ketones, UA 15 mg/dL (1+) (A) Negative    Bilirubin, UA Negative Negative    Blood, UA Large (3+) (A) Negative    Protein, UA >=300 mg/dL (3+) (A) Negative    Leuk Esterase, UA Moderate (2+) (A) Negative    Nitrite, UA Positive (A) Negative    Urobilinogen, UA 4.0 E.U./dL (A) 0.2 - 1.0 E.U./dL   Urinalysis, Microscopic Only - Urine, Clean Catch    Collection Time: 03/11/20 12:06 AM   Result Value Ref Range    RBC, UA Too Numerous to Count (A) None Seen, 0-2 /HPF    WBC, UA Unable to determine due to loaded field (A) None Seen, 0-2 /HPF    Bacteria, UA Unable to determine due to loaded field (A) None Seen /HPF    Squamous Epithelial Cells, UA Unable to determine due to loaded field (A) None Seen, 0-2 /HPF    Hyaline Casts, UA Unable to determine due to loaded field None Seen /LPF    Methodology Automated Microscopy        Ordered the above labs and reviewed the  results.      PROCEDURES  Procedures      EKG:          EKG time: 2253  Rhythm/Rate: NSR, 90  P waves and NJ: normal  QRS, axis: normal   ST and T waves: normal     Interpreted Contemporaneously by me, independently viewed  Unchanged compared to prior 10/7/19      MEDICATIONS GIVEN IN ER  Medications   sodium chloride 0.9 % flush 10 mL (has no administration in time range)   Lidocaine HCl Urethral/Mucosal/Topical 2% (XYLOCAINE) gel ( Topical Given 3/11/20 0014)   sodium chloride 0.9 % flush 10 mL (has no administration in time range)   sodium chloride 0.9 % flush 10 mL (has no administration in time range)   sodium chloride 0.9 % infusion (has no administration in time range)   HYDROmorphone (DILAUDID) injection 0.5 mg (has no administration in time range)     And   naloxone (NARCAN) injection 0.4 mg (has no administration in time range)   oxyCODONE-acetaminophen (PERCOCET) 7.5-325 MG per tablet 1 tablet (has no administration in time range)   acetaminophen (TYLENOL) tablet 650 mg (has no administration in time range)     Or   acetaminophen (TYLENOL) 160 MG/5ML solution 650 mg (has no administration in time range)     Or   acetaminophen (TYLENOL) suppository 650 mg (has no administration in time range)   LORazepam (ATIVAN) tablet 0.5 mg (has no administration in time range)   senna-docusate sodium (SENOKOT-S) 8.6-50 MG tablet 2 tablet (has no administration in time range)   ondansetron (ZOFRAN) tablet 4 mg (has no administration in time range)     Or   ondansetron (ZOFRAN) injection 4 mg (has no administration in time range)   levoFLOXacin (LEVAQUIN) 500 mg/100 mL D5W (premix) (LEVAQUIN) 500 mg (has no administration in time range)   sodium chloride 0.9 % bolus 1,000 mL (1,000 mL Intravenous New Bag 3/11/20 0009)   morphine injection 4 mg (4 mg Intravenous Given 3/11/20 0008)       PROGRESS AND CONSULTS  ED Course as of Mar 11 0150   Wed Mar 11, 2020   0136 01:36  Patient here for urinary retention and gross  hematuria.  Large amount of bleeding.  Had small three way placed that clotted off.  Dr. Chaudhary here to place catheter and will admit.    [SL]      ED Course User Index  [SL] Yg Burgess MD     2243 Labs ordered for evaluation. Will irrigate patient's bladder.    0005 Morphine 4 mg ordered for treatment.     0020 Rechecked the patient. Discussed the plan for admission.    0032 Reviewed the patient's case with Dr. Chaudhary who will consult.    0115 Dr. Salvatore Chaudhary, urology, is at the bedside and is trying to place a catheter large enough for irrigation to remove the clot.     0132 Catheter placement and irrigation successful. Dr. Chaudhary will admit the patient.     MEDICAL DECISION MAKING      MDM  Number of Diagnoses or Management Options  Acute urinary retention:   Gross hematuria:      Amount and/or Complexity of Data Reviewed  Clinical lab tests: ordered and reviewed  Decide to obtain previous medical records or to obtain history from someone other than the patient: yes  Review and summarize past medical records: yes  Discuss the patient with other providers: yes (Dr. Salvatore Chaudhary, urology)    Patient Progress  Patient progress: stable         DIAGNOSIS  Final diagnoses:   Acute urinary retention   Gross hematuria           DISPOSITION  ADMISSION    Discussed treatment plan and reason for admission with pt/family and admitting physician.  Pt/family voiced understanding of the plan for admission for further testing/treatment as needed.           Latest Documented Vital Signs:  As of 01:50  BP- 132/83 HR- 90 Temp- 97.1 °F (36.2 °C) (Tympanic) O2 sat- 95%        --  Documentation assistance provided by martha Wagner for Dr. Jenifer MD.  Information recorded by the sc[facility]osta was done at my direction and has been verified and validated by me.                              Linda Wagner  03/11/20 0151       Yg Burgess MD  03/11/20 0206

## 2020-03-11 NOTE — ED TRIAGE NOTES
Ems reports that pt had a near-syncopal episode tonight. Pt lowered himself to the floor. Pt did have two tumors removed from his bladder today. Ems reports patient was orthostatic on scene.

## 2020-03-11 NOTE — PLAN OF CARE
Problem: Patient Care Overview  Goal: Plan of Care Review  Outcome: Ongoing (interventions implemented as appropriate)  Flowsheets (Taken 3/11/2020 6080)  Plan of Care Reviewed With: patient  Outcome Summary: pt continues on CBI, clear red urine, pink clear at times. aspirin given today. IVF continued. diet was advanced, pt tolerated well. new IV today. Will continue to monitor.

## 2020-03-11 NOTE — H&P
[facility]    Patient Care Team:  Rachelle Patton PA-C as PCP - General (Physician Assistant)  Christie Pacheco APRN as PCP - Claims Attributed    Chief complaint gross hematuria    Subjective     Patient is a 79 y.o. male presents with gross hematuria and clot retention. Has TURBt at [facility] today and then went into retention and had shay placed in office this afternoon with return of bloody urine. Now returns this am with clotted catheter and RN unable to place shay due to prior TURP. Shay placed and irrigated copious amounts of blood clots.Irrigated started. Hgb dropped 2 gm..       Review of Systems   Pertinent items are noted in HPI    Past Medical History:   Diagnosis Date   • Anxiety    • Back pain    • Bladder cancer (CMS/HCC) 2018    bladder cancer has been removed.   • Coronary artery disease    • Depression    • Dizziness    • Fatigue    • GERD (gastroesophageal reflux disease)    • History of fractured rib     RIGHT SIDE   • Hyperlipidemia    • Hypertension    • Tremors of nervous system    • Urinary incontinence    • Vertigo      Past Surgical History:   Procedure Laterality Date   • CARDIAC CATHETERIZATION      7x, 5 stents   • CATARACT EXTRACTION, BILATERAL     • CHOLECYSTECTOMY N/A 9/2/2019    Procedure: CHOLECYSTECTOMY LAPAROSCOPIC WITH INTRAOPERATIVE CHOLANGIOGRAM;  Surgeon: Bg Rodriguez Jr., MD;  Location: [facility];  Service: General   • COLONOSCOPY     • CORONARY ANGIOPLASTY WITH STENT PLACEMENT      X5    • CYSTOSCOPY BLADDER BIOPSY N/A 1/8/2019    Procedure: CYSTOSCOPY BLADDER BIOPSY;  Surgeon: Wang Soares Jr., MD;  Location: [facility] OR;  Service: Urology   • CYSTOSCOPY BLADDER BIOPSY N/A 6/13/2019    Procedure: CYSTOSCOPY BLADDER BIOPSY;  Surgeon: Wang Soares Jr., MD;  Location: [facility] OR;  Service: Urology   • CYSTOSCOPY TRANSURETHRAL RESECTION OF PROSTATE N/A 7/11/2019    Procedure: CYSTOSCOPY TRANSURETHRAL RESECTION OF  "PROSTATE;  Surgeon: Wang Soares Jr., MD;  Location: [facility] MAIN OR;  Service: Urology   • CYSTOSCOPY URETEROSCOPY LASER LITHOTRIPSY N/A 6/13/2019    Procedure: CYSTO LITHOPAXY;  Surgeon: Wang Soares Jr., MD;  Location: [facility] MAIN OR;  Service: Urology   • ERCP N/A 9/3/2019    Procedure: ENDOSCOPIC RETROGRADE CHOLANGIOPANCREATOGRAPHY with sphincterotomy and balloon sweep;  Surgeon: Ashok Amaya MD;  Location: [facility] ENDOSCOPY;  Service: Gastroenterology   • EYE SURGERY      Lens implants   • LUMBAR DISCECTOMY FUSION INSTRUMENTATION     • SKIN CANCER EXCISION Left     chest wall   • TRANSURETHRAL RESECTION OF BLADDER TUMOR N/A 11/29/2018    Procedure: TUR BLADDER TUMOR  LARGE;  Surgeon: Wang Soares Jr., MD;  Location: [facility] MAIN OR;  Service: Urology     Family History   Problem Relation Age of Onset   • Arthritis Mother    • Glaucoma Mother    • Heart disease Father    • Emphysema Father    • Tremor Father    • Malig Hyperthermia Neg Hx      Social History     Tobacco Use   • Smoking status: Never Smoker   • Smokeless tobacco: Never Used   Substance Use Topics   • Alcohol use: No     Frequency: Never     Drinks per session: 5 or 6     Binge frequency: Less than monthly     Comment: last drink about 1 year ago    • Drug use: No       Meds:    (Not in a hospital admission)    Allergies:  Patient has no known allergies.    Debilities:  none    Objective     Vital Signs  Temp:  [97.1 °F (36.2 °C)] 97.1 °F (36.2 °C)  Heart Rate:  [90] 90  Resp:  [16] 16  BP: (132-140)/(80-83) 132/83    Intake/Output Summary (Last 24 hours) at 3/11/2020 0142  Last data filed at 3/11/2020 0141  Gross per 24 hour   Intake --   Output 1000 ml   Net -1000 ml     Flowsheet Rows      First Filed Value   Admission Height  182.9 cm (72\") Documented at 03/10/2020 2232   Admission Weight  92.1 kg (203 lb) Documented at 03/10/2020 2248           Physical Exam:    abdomen tender but no peritoneal signs  Results " Review:    I reviewed the patient's new clinical results.  Results for orders placed or performed during the hospital encounter of 03/10/20   Comprehensive Metabolic Panel   Result Value Ref Range    Glucose 126 (H) 65 - 99 mg/dL    BUN 18 8 - 23 mg/dL    Creatinine 1.23 0.76 - 1.27 mg/dL    Sodium 135 (L) 136 - 145 mmol/L    Potassium 4.5 3.5 - 5.2 mmol/L    Chloride 100 98 - 107 mmol/L    CO2 22.0 22.0 - 29.0 mmol/L    Calcium 9.0 8.6 - 10.5 mg/dL    Total Protein 6.2 6.0 - 8.5 g/dL    Albumin 4.10 3.50 - 5.20 g/dL    ALT (SGPT) 5 1 - 41 U/L    AST (SGOT) 45 (H) 1 - 40 U/L    Alkaline Phosphatase 152 (H) 39 - 117 U/L    Total Bilirubin 0.6 0.2 - 1.2 mg/dL    eGFR Non African Amer 57 (L) >60 mL/min/1.73    Globulin 2.1 gm/dL    A/G Ratio 2.0 g/dL    BUN/Creatinine Ratio 14.6 7.0 - 25.0    Anion Gap 13.0 5.0 - 15.0 mmol/L   Magnesium   Result Value Ref Range    Magnesium 2.0 1.6 - 2.4 mg/dL   Troponin   Result Value Ref Range    Troponin T <0.010 0.000 - 0.030 ng/mL   CBC Auto Differential   Result Value Ref Range    WBC 11.69 (H) 3.40 - 10.80 10*3/mm3    RBC 4.42 4.14 - 5.80 10*6/mm3    Hemoglobin 12.6 (L) 13.0 - 17.7 g/dL    Hematocrit 38.9 37.5 - 51.0 %    MCV 88.0 79.0 - 97.0 fL    MCH 28.5 26.6 - 33.0 pg    MCHC 32.4 31.5 - 35.7 g/dL    RDW 19.4 (H) 12.3 - 15.4 %    RDW-SD 60.5 (H) 37.0 - 54.0 fl    MPV 10.9 6.0 - 12.0 fL    Platelets 238 140 - 450 10*3/mm3    Neutrophil % 89.5 (H) 42.7 - 76.0 %    Lymphocyte % 4.3 (L) 19.6 - 45.3 %    Monocyte % 5.4 5.0 - 12.0 %    Eosinophil % 0.0 (L) 0.3 - 6.2 %    Basophil % 0.1 0.0 - 1.5 %    Immature Grans % 0.7 (H) 0.0 - 0.5 %    Neutrophils, Absolute 10.47 (H) 1.70 - 7.00 10*3/mm3    Lymphocytes, Absolute 0.50 (L) 0.70 - 3.10 10*3/mm3    Monocytes, Absolute 0.63 0.10 - 0.90 10*3/mm3    Eosinophils, Absolute 0.00 0.00 - 0.40 10*3/mm3    Basophils, Absolute 0.01 0.00 - 0.20 10*3/mm3    Immature Grans, Absolute 0.08 (H) 0.00 - 0.05 10*3/mm3    nRBC 0.0 0.0 - 0.2 /100 WBC      Urinalysis With Microscopic If Indicated (No Culture) - Urine, Clean Catch   Result Value Ref Range    Color, UA Red (A) Yellow, Straw    Appearance, UA Cloudy (A) Clear    pH, UA 7.5 5.0 - 8.0    Specific Gravity, UA 1.010 1.005 - 1.030    Glucose,  mg/dL (Trace) (A) Negative    Ketones, UA 15 mg/dL (1+) (A) Negative    Bilirubin, UA Negative Negative    Blood, UA Large (3+) (A) Negative    Protein, UA >=300 mg/dL (3+) (A) Negative    Leuk Esterase, UA Moderate (2+) (A) Negative    Nitrite, UA Positive (A) Negative    Urobilinogen, UA 4.0 E.U./dL (A) 0.2 - 1.0 E.U./dL   Urinalysis, Microscopic Only - Urine, Clean Catch   Result Value Ref Range    RBC, UA Too Numerous to Count (A) None Seen, 0-2 /HPF    WBC, UA Unable to determine due to loaded field (A) None Seen, 0-2 /HPF    Bacteria, UA Unable to determine due to loaded field (A) None Seen /HPF    Squamous Epithelial Cells, UA Unable to determine due to loaded field (A) None Seen, 0-2 /HPF    Hyaline Casts, UA Unable to determine due to loaded field None Seen /LPF    Methodology Automated Microscopy    Type & Screen   Result Value Ref Range    ABO Type O     RH type Negative     Antibody Screen Negative     T&S Expiration Date 3/13/2020 11:59:59 PM    Light Blue Top   Result Value Ref Range    Extra Tube hold for add-on    Green Top (Gel)   Result Value Ref Range    Extra Tube Hold for add-ons.    Lavender Top   Result Value Ref Range    Extra Tube hold for add-on    Gold Top - SST   Result Value Ref Range    Extra Tube Hold for add-ons.         Assessment:  Clot Urinary Retention s/p TURBt on pt taking asa.    Plan:    CBI started and admitting pt.    I discussed the patients findings and my recommendations with patient and family.     Armen Chaudhary MD  03/11/20  01:42

## 2020-03-11 NOTE — PLAN OF CARE
Problem: Patient Care Overview  Goal: Plan of Care Review  Outcome: Ongoing (interventions implemented as appropriate)  Flowsheets  Taken 3/11/2020 0458  Progress: no change  Outcome Summary: Pt from ER with CBI. Op clear red. No c/o pain just nausea. Dry heaving. Wife at bedside stating they are exhausted, have been up for 24 hours, resting now. Hypertensive. Continuing to monitor.

## 2020-03-11 NOTE — PROGRESS NOTES
Discharge Planning Assessment  [facility]     Patient Name: Sukhdev Zayas  MRN: [id number]  Today's Date: 3/11/2020    Admit Date: 3/10/2020    Discharge Needs Assessment     Row Name 03/11/20 163       Living Environment    Lives With  spouse    Name(s) of Who Lives With Patient  Taty Zayas/spouse [phone]    Current Living Arrangements  home/apartment/condo    Primary Care Provided by  self;spouse/significant other    Provides Primary Care For  no one    Family Caregiver if Needed  spouse    Family Caregiver Names  Taty Zayas/spouse [phone]    Quality of Family Relationships  helpful;involved;supportive    Able to Return to Prior Arrangements  yes    Living Arrangement Comments  Lives with wife in a single level house with basement. Eight steps to enter/handrail on both sides.        Resource/Environmental Concerns    Resource/Environmental Concerns  home accessibility    Home Accessibility Concerns  stairs to enter home       Transition Planning    Patient/Family Anticipates Transition to  home with family    Patient/Family Anticipated Services at Transition  none    Transportation Anticipated  family or friend will provide       Discharge Needs Assessment    Readmission Within the Last 30 Days  no previous admission in last 30 days    Concerns to be Addressed  no discharge needs identified;denies needs/concerns at this time    Equipment Currently Used at Home  none    Equipment Needed After Discharge  none    Provided Post Acute Provider List?  N/A    N/A Provider List Comment  No skilled needs identified at this time.    Patient's Choice of Community Agency(s)  Has used HH in the past but doesn't remember name of agency. Denies using SNF in the past.    Discharge Coordination/Progress  Spoke with patient at bedside. Confirmed information on facesheet. IMM 3/11/2020.        Discharge Plan     Row Name 03/11/20 2247       Plan    Plan  Plans dc home with assist of spouse. No needs  identified at this time. Continue to follow.    Patient/Family in Agreement with Plan  yes        Destination      Coordination has not been started for this encounter.      Durable Medical Equipment      Coordination has not been started for this encounter.      Dialysis/Infusion      Coordination has not been started for this encounter.      Home Medical Care      Coordination has not been started for this encounter.      Therapy      Coordination has not been started for this encounter.      Community Resources      Coordination has not been started for this encounter.          Demographic Summary     Row Name 03/11/20 1628       General Information    Admission Type  inpatient    Required Notices Provided  Important Message from Medicare    Referral Source  admission list    Reason for Consult  discharge planning        Functional Status     Row Name 03/11/20 1629       Functional Status    Usual Activity Tolerance  good    Current Activity Tolerance  moderate       Functional Status, IADL    Medications  independent    Meal Preparation  independent    Housekeeping  independent    Laundry  independent    Shopping  independent        Psychosocial    No documentation.       Abuse/Neglect    No documentation.       Legal    No documentation.       Substance Abuse    No documentation.       Patient Forms    No documentation.           Cindy Lam RN

## 2020-03-12 LAB
ANION GAP SERPL CALCULATED.3IONS-SCNC: 9.2 MMOL/L (ref 5–15)
ANISOCYTOSIS BLD QL: NORMAL
BASOPHILS # BLD AUTO: 0.03 10*3/MM3 (ref 0–0.2)
BASOPHILS NFR BLD AUTO: 0.5 % (ref 0–1.5)
BUN BLD-MCNC: 15 MG/DL (ref 8–23)
BUN/CREAT SERPL: 16.9 (ref 7–25)
CALCIUM SPEC-SCNC: 8.2 MG/DL (ref 8.6–10.5)
CHLORIDE SERPL-SCNC: 107 MMOL/L (ref 98–107)
CO2 SERPL-SCNC: 21.8 MMOL/L (ref 22–29)
CREAT BLD-MCNC: 0.89 MG/DL (ref 0.76–1.27)
DEPRECATED RDW RBC AUTO: 61.8 FL (ref 37–54)
EOSINOPHIL # BLD AUTO: 0.36 10*3/MM3 (ref 0–0.4)
EOSINOPHIL NFR BLD AUTO: 5.5 % (ref 0.3–6.2)
ERYTHROCYTE [DISTWIDTH] IN BLOOD BY AUTOMATED COUNT: 19.7 % (ref 12.3–15.4)
GFR SERPL CREATININE-BSD FRML MDRD: 82 ML/MIN/1.73
GLUCOSE BLD-MCNC: 84 MG/DL (ref 65–99)
HCT VFR BLD AUTO: 29.8 % (ref 37.5–51)
HGB BLD-MCNC: 9.8 G/DL (ref 13–17.7)
HYPOCHROMIA BLD QL: NORMAL
IMM GRANULOCYTES # BLD AUTO: 0.02 10*3/MM3 (ref 0–0.05)
IMM GRANULOCYTES NFR BLD AUTO: 0.3 % (ref 0–0.5)
LARGE PLATELETS: NORMAL
LYMPHOCYTES # BLD AUTO: 1.44 10*3/MM3 (ref 0.7–3.1)
LYMPHOCYTES NFR BLD AUTO: 22.1 % (ref 19.6–45.3)
MCH RBC QN AUTO: 28.8 PG (ref 26.6–33)
MCHC RBC AUTO-ENTMCNC: 32.9 G/DL (ref 31.5–35.7)
MCV RBC AUTO: 87.6 FL (ref 79–97)
MONOCYTES # BLD AUTO: 0.67 10*3/MM3 (ref 0.1–0.9)
MONOCYTES NFR BLD AUTO: 10.3 % (ref 5–12)
NEUTROPHILS # BLD AUTO: 3.99 10*3/MM3 (ref 1.7–7)
NEUTROPHILS NFR BLD AUTO: 61.3 % (ref 42.7–76)
NRBC BLD AUTO-RTO: 0 /100 WBC (ref 0–0.2)
PLATELET # BLD AUTO: 137 10*3/MM3 (ref 140–450)
PMV BLD AUTO: 10.1 FL (ref 6–12)
POTASSIUM BLD-SCNC: 4.2 MMOL/L (ref 3.5–5.2)
RBC # BLD AUTO: 3.4 10*6/MM3 (ref 4.14–5.8)
SODIUM BLD-SCNC: 138 MMOL/L (ref 136–145)
WBC MORPH BLD: NORMAL
WBC NRBC COR # BLD: 6.51 10*3/MM3 (ref 3.4–10.8)

## 2020-03-12 PROCEDURE — 97116 GAIT TRAINING THERAPY: CPT

## 2020-03-12 PROCEDURE — 85025 COMPLETE CBC W/AUTO DIFF WBC: CPT | Performed by: UROLOGY

## 2020-03-12 PROCEDURE — 97161 PT EVAL LOW COMPLEX 20 MIN: CPT

## 2020-03-12 PROCEDURE — 25010000002 LEVOFLOXACIN PER 250 MG: Performed by: UROLOGY

## 2020-03-12 PROCEDURE — 36415 COLL VENOUS BLD VENIPUNCTURE: CPT | Performed by: UROLOGY

## 2020-03-12 PROCEDURE — 80048 BASIC METABOLIC PNL TOTAL CA: CPT | Performed by: UROLOGY

## 2020-03-12 PROCEDURE — 85007 BL SMEAR W/DIFF WBC COUNT: CPT | Performed by: UROLOGY

## 2020-03-12 RX ORDER — BISACODYL 10 MG
10 SUPPOSITORY, RECTAL RECTAL DAILY
Status: DISCONTINUED | OUTPATIENT
Start: 2020-03-12 | End: 2020-03-15 | Stop reason: HOSPADM

## 2020-03-12 RX ORDER — ATROPA BELLADONNA AND OPIUM 16.2; 3 MG/1; MG/1
30 SUPPOSITORY RECTAL EVERY 8 HOURS PRN
Status: DISCONTINUED | OUTPATIENT
Start: 2020-03-12 | End: 2020-03-15 | Stop reason: HOSPADM

## 2020-03-12 RX ORDER — ATROPA BELLADONNA AND OPIUM 16.2; 3 MG/1; MG/1
30 SUPPOSITORY RECTAL EVERY 8 HOURS PRN
Status: CANCELLED | OUTPATIENT
Start: 2020-03-12 | End: 2020-03-22

## 2020-03-12 RX ADMIN — ASPIRIN 81 MG: 81 TABLET, CHEWABLE ORAL at 08:13

## 2020-03-12 RX ADMIN — SODIUM CHLORIDE, PRESERVATIVE FREE 10 ML: 5 INJECTION INTRAVENOUS at 09:21

## 2020-03-12 RX ADMIN — DOCUSATE SODIUM 50MG AND SENNOSIDES 8.6MG 2 TABLET: 8.6; 5 TABLET, FILM COATED ORAL at 20:41

## 2020-03-12 RX ADMIN — ATROPA BELLADONNA AND OPIUM 30 MG: 16.2; 3 SUPPOSITORY RECTAL at 16:16

## 2020-03-12 RX ADMIN — BISACODYL 10 MG: 10 SUPPOSITORY RECTAL at 12:55

## 2020-03-12 RX ADMIN — LEVOFLOXACIN 500 MG: 5 INJECTION, SOLUTION INTRAVENOUS at 03:00

## 2020-03-12 RX ADMIN — SODIUM CHLORIDE, PRESERVATIVE FREE 10 ML: 5 INJECTION INTRAVENOUS at 20:41

## 2020-03-12 NOTE — PLAN OF CARE
Problem: Patient Care Overview  Goal: Plan of Care Review  Outcome: Outcome(s) achieved  Flowsheets (Taken 3/12/2020 8245)  Plan of Care Reviewed With: patient;spouse  Note:   Pt is a 78 yo M admitted for management of Clot Urinary Retention s/p TURBt with PMHX of bladder cancer. Pt lives with spouse in [facility] with 8 steps B rails to enter. Pt is independent at baseline for all mobility, owns FWW from previous hospitalizations and does not have any DME needs upon DC. Pt is independent for bed mobility, supervision/SBA for transfers and gait 20 ft with FWW (only limited due to fatigue after stair training), and CGA to ascend/descend 8 steps with R ascending rail. No acute PT needs at this time, educated patient to continue maintenance walking program with RN staff. DC recs include home with optional therapy to follow pending medical status.

## 2020-03-12 NOTE — THERAPY EVALUATION
Patient Name: Sukhdev Zayas  : 1940    MRN: [id number]                              Today's Date: 3/12/2020       Admit Date: 3/10/2020    Visit Dx:     ICD-10-CM ICD-9-CM   1. Acute urinary retention R33.8 788.29   2. Gross hematuria R31.0 599.71     Patient Active Problem List   Diagnosis   • Hyperlipidemia   • Coronary artery disease involving native coronary artery of native heart without angina pectoris   • Cognitive disorder   • Mood disorder (CMS/HCC)   • Closed wedge compression fracture of third thoracic vertebra with routine healing   • GERD (gastroesophageal reflux disease)   • S/P drug eluting coronary stent placement   • Chest pain   • Diastolic dysfunction   • Exertional angina (CMS/HCC)   • Unstable angina (CMS/HCC)   • Bladder mass   • Mixed action and resting tremor   • Bladder cancer (CMS/HCC)   • Generalized weakness   • Dyspnea on exertion   • BPH (benign prostatic hyperplasia)   • Calculus of gallbladder without cholecystitis   • Elevated LFTs   • Calculus of bile duct with acute cholecystitis without obstruction   • Atrial fibrillation (CMS/HCC)   • Essential hypertension   • Elevated bilirubin   • Dizziness   • Beta-blocker intolerance   • Orthostatic hypotension   • Acute urinary retention     Past Medical History:   Diagnosis Date   • Anxiety    • Back pain    • Bladder cancer (CMS/HCC) 2018    bladder cancer has been removed.   • Coronary artery disease    • Depression    • Dizziness    • Fatigue    • GERD (gastroesophageal reflux disease)    • History of fractured rib     RIGHT SIDE   • Hyperlipidemia    • Hypertension    • Tremors of nervous system    • Urinary incontinence    • Vertigo      Past Surgical History:   Procedure Laterality Date   • CARDIAC CATHETERIZATION      7x, 5 stents   • CATARACT EXTRACTION, BILATERAL     • CHOLECYSTECTOMY N/A 2019    Procedure: CHOLECYSTECTOMY LAPAROSCOPIC WITH INTRAOPERATIVE CHOLANGIOGRAM;  Surgeon: Bg Rodriguez Jr., MD;   Location: [facility] MAIN OR;  Service: General   • COLONOSCOPY     • CORONARY ANGIOPLASTY WITH STENT PLACEMENT      X5    • CYSTOSCOPY BLADDER BIOPSY N/A 1/8/2019    Procedure: CYSTOSCOPY BLADDER BIOPSY;  Surgeon: Wang Soares Jr., MD;  Location: [facility] MAIN OR;  Service: Urology   • CYSTOSCOPY BLADDER BIOPSY N/A 6/13/2019    Procedure: CYSTOSCOPY BLADDER BIOPSY;  Surgeon: Wang Soares Jr., MD;  Location: [facility] MAIN OR;  Service: Urology   • CYSTOSCOPY TRANSURETHRAL RESECTION OF PROSTATE N/A 7/11/2019    Procedure: CYSTOSCOPY TRANSURETHRAL RESECTION OF PROSTATE;  Surgeon: Wang Soares Jr., MD;  Location: [facility] MAIN OR;  Service: Urology   • CYSTOSCOPY URETEROSCOPY LASER LITHOTRIPSY N/A 6/13/2019    Procedure: CYSTO LITHOPAXY;  Surgeon: Wang Soares Jr., MD;  Location: [facility] MAIN OR;  Service: Urology   • ERCP N/A 9/3/2019    Procedure: ENDOSCOPIC RETROGRADE CHOLANGIOPANCREATOGRAPHY with sphincterotomy and balloon sweep;  Surgeon: Ashok Amaya MD;  Location: [facility] ENDOSCOPY;  Service: Gastroenterology   • EYE SURGERY      Lens implants   • LUMBAR DISCECTOMY FUSION INSTRUMENTATION     • SKIN CANCER EXCISION Left     chest wall   • TRANSURETHRAL RESECTION OF BLADDER TUMOR N/A 11/29/2018    Procedure: TUR BLADDER TUMOR  LARGE;  Surgeon: Wang Soares Jr., MD;  Location: [facility] MAIN OR;  Service: Urology     General Information     Row Name 03/12/20 1453          PT Evaluation Time/Intention    Document Type  evaluation  -AE     Mode of Treatment  physical therapy  -AE     Row Name 03/12/20 1453          General Information    Patient Profile Reviewed?  yes  -AE     Prior Level of Function  independent:  -AE     Row Name 03/12/20 1453          Relationship/Environment    Lives With  spouse  -AE     Row Name 03/12/20 1453          Resource/Environmental Concerns    Current Living Arrangements  home/apartment/condo  -AE     Row Name 03/12/20 1453          Home Main Entrance     Number of Stairs, Main Entrance  none  -AE     Row Name 03/12/20 1453          Stairs Within Home, Primary    Number of Stairs, Within Home, Primary  eight  -AE     Stair Railings, Within Home, Primary  railings on both sides of stairs  -AE     Row Name 03/12/20 1453          Cognitive Assessment/Intervention- PT/OT    Orientation Status (Cognition)  oriented x 4  -AE     Row Name 03/12/20 1453          Safety Issues, Functional Mobility    Impairments Affecting Function (Mobility)  endurance/activity tolerance  -AE       User Key  (r) = Recorded By, (t) = Taken By, (c) = Cosigned By    Initials Name Provider Type    AE Rosanna Li, PT Physical Therapist        Mobility     Row Name 03/12/20 1454          Bed Mobility Assessment/Treatment    Bed Mobility Assessment/Treatment  bed mobility (all) activities  -AE     [employer] Level (Bed Mobility)  conditional independence  -AE     Row Name 03/12/20 1454          Transfer Assessment/Treatment    Comment (Transfers)  supervision/SBA for all transfers with FWW  -AE     Row Name 03/12/20 1454          Bed-Chair Transfer    Bed-Chair [employer] (Transfers)  supervision  -AE     Assistive Device (Bed-Chair Transfers)  walker, front-wheeled  -AE     Row Name 03/12/20 1454          Sit-Stand Transfer    Sit-Stand [employer] (Transfers)  supervision  -AE     Assistive Device (Sit-Stand Transfers)  walker, front-wheeled  -AE     Row Name 03/12/20 1454          Gait/Stairs Assessment/Training    Gait/Stairs Assessment/Training  gait/ambulation independence;gait/ambulation assistive device  -AE     [employer] Level (Gait)  stand by assist  -AE     Assistive Device (Gait)  walker, front-wheeled  -AE     Distance in Feet (Gait)  20 ft  -AE     Pattern (Gait)  step-through  -AE     Deviations/Abnormal Patterns (Gait)  мария decreased;stride length decreased  -AE     Bilateral Gait Deviations  forward flexed posture  -AE       User Key  (r) = Recorded By, (t) =  Taken By, (c) = Cosigned By    Initials Name Provider Type    AE Rosanna Li, PT Physical Therapist        Obj/Interventions     Row Name 03/12/20 1455          General ROM    GENERAL ROM COMMENTS  BLE WFL  -AE     Row Name 03/12/20 1455          MMT (Manual Muscle Testing)    General MMT Comments  BLE WFL  -AE       User Key  (r) = Recorded By, (t) = Taken By, (c) = Cosigned By    Initials Name Provider Type    AE Rosanna Li, PT Physical Therapist        Goals/Plan    No documentation.       Clinical Impression     Row Name 03/12/20 1455          Pain Assessment    Additional Documentation  Pain Scale: Numbers Pre/Post-Treatment (Group)  -AE     Row Name 03/12/20 1455          Pain Scale: Numbers Pre/Post-Treatment    Pain Scale: Numbers, Pretreatment  2/10  -AE     Pain Scale: Numbers, Post-Treatment  2/10  -AE     Pain Location  groin  -AE     Pre/Post Treatment Pain Comment  pain at catheter site  -AE     Pain Intervention(s)  Repositioned;Ambulation/increased activity;Rest  -AE     Row Name 03/12/20 1455          Plan of Care Review    Plan of Care Reviewed With  patient;spouse  -AE     Row Name 03/12/20 1455          Physical Therapy Clinical Impression    Patient/Family Goals Statement (PT Clinical Impression)  Plans to DC home  -AE     Criteria for Skilled Interventions Met (PT Clinical Impression)  no;no significant expected improvement in functional status;current level of function same as previous level of function  -AE     Row Name 03/12/20 1455          Positioning and Restraints    Pre-Treatment Position  in bed  -AE     Post Treatment Position  chair  -AE     In Chair  reclined;call light within reach;encouraged to call for assist;exit alarm on  -AE       User Key  (r) = Recorded By, (t) = Taken By, (c) = Cosigned By    Initials Name Provider Type    AE Rosanna Li, PT Physical Therapist        Outcome Measures     Row Name 03/12/20 1456          How much help from another person do  you currently need...    Turning from your back to your side while in flat bed without using bedrails?  4  -AE     Moving from lying on back to sitting on the side of a flat bed without bedrails?  4  -AE     Moving to and from a bed to a chair (including a wheelchair)?  4  -AE     Standing up from a chair using your arms (e.g., wheelchair, bedside chair)?  4  -AE     Climbing 3-5 steps with a railing?  4  -AE     To walk in hospital room?  4  -AE     AM-PAC 6 Clicks Score (PT)  24  -AE     Row Name 03/12/20 1456          Functional Assessment    Outcome Measure Options  AM-PAC 6 Clicks Basic Mobility (PT)  -AE       User Key  (r) = Recorded By, (t) = Taken By, (c) = Cosigned By    Initials Name Provider Type    Rosanna Mendoza PT Physical Therapist          PT Recommendation and Plan     Outcome Summary/Treatment Plan (PT)  Anticipated Discharge Disposition (PT): home, home with assist  Plan of Care Reviewed With: patient, spouse     Time Calculation:   PT Charges     Row Name 03/12/20 1502             Time Calculation    Start Time  1430  -AE      Stop Time  1500  -AE      Time Calculation (min)  30 min  -AE      PT Received On  03/12/20  -AE         Time Calculation- PT    Total Timed Code Minutes- PT  20 minute(s)  -AE        User Key  (r) = Recorded By, (t) = Taken By, (c) = Cosigned By    Initials Name Provider Type    Rosanna Mendoza PT Physical Therapist        Therapy Charges for Today     Code Description Service Date Service Provider Modifiers Qty    90114005792 HC PT EVAL LOW COMPLEXITY 2 3/12/2020 Rosanna Li, PT GP 1    91118628623 HC GAIT TRAINING EA 15 MIN 3/12/2020 Rosanna Li, PT GP 1    24063757405 HC PT THER SUPP EA 15 MIN 3/12/2020 Rosanna Li, PT GP 2          PT G-Codes  Outcome Measure Options: AM-PAC 6 Clicks Basic Mobility (PT)  AM-PAC 6 Clicks Score (PT): 24    Rosanna Li PT  3/12/2020

## 2020-03-12 NOTE — PROGRESS NOTES
"   LOS: 1 day   Patient Care Team:  Rachelle Patton PA-C as PCP - General (Physician Assistant)  Christie Pacheco APRN as PCP - Claims Attributed      Subjective   Interval History: Patient reports severe bladder spasms. Unable to have a bowel movement. He has not ambulated.    Objective     ROS   12 POINT NEG ROS PERTINENT IN HPI      Vital Signs  Temp:  [96.4 °F (35.8 °C)-98.9 °F (37.2 °C)] 96.4 °F (35.8 °C)  Heart Rate:  [75-82] 76  Resp:  [16-18] 18  BP: (118-152)/(56-82) 118/56      Intake/Output Summary (Last 24 hours) at 3/12/2020 1246  Last data filed at 3/12/2020 1134  Gross per 24 hour   Intake 83081 ml   Output 06723 ml   Net -19749 ml       Flowsheet Rows      First Filed Value   Admission Height  182.9 cm (72\") Documented at 03/10/2020 2232   Admission Weight  92.1 kg (203 lb) Documented at 03/10/2020 2248          Physical Exam:     General stevie: alert, cooperative, oriented  Genitalia:  Penis normal. Moses intact, urine clear on slow CBI        Results Review:     I reviewed the patient's new clinical results.  Lab Results (all)     Procedure Component Value Units Date/Time    Scan Slide [[id number]] Collected:  03/12/20 0623    Specimen:  Blood Updated:  03/12/20 0921     Anisocytosis Mod/2+     Hypochromia Mod/2+     WBC Morphology Normal     Large Platelets Slight/1+    CBC & Differential [[id number]] Collected:  03/12/20 0623    Specimen:  Blood Updated:  03/12/20 0920    Narrative:       The following orders were created for panel order CBC & Differential.  Procedure                               Abnormality         Status                     ---------                               -----------         ------                     CBC Auto Differential[[id number]]        Abnormal            Final result                 Please view results for these tests on the individual orders.    CBC Auto Differential [[id number]]  (Abnormal) Collected:  03/12/20 0623    Specimen:  Blood Updated:  03/12/20 0920    "  WBC 6.51 10*3/mm3      RBC 3.40 10*6/mm3      Hemoglobin 9.8 g/dL      Hematocrit 29.8 %      MCV 87.6 fL      MCH 28.8 pg      MCHC 32.9 g/dL      RDW 19.7 %      RDW-SD 61.8 fl      MPV 10.1 fL      Platelets 137 10*3/mm3      Neutrophil % 61.3 %      Lymphocyte % 22.1 %      Monocyte % 10.3 %      Eosinophil % 5.5 %      Basophil % 0.5 %      Immature Grans % 0.3 %      Neutrophils, Absolute 3.99 10*3/mm3      Lymphocytes, Absolute 1.44 10*3/mm3      Monocytes, Absolute 0.67 10*3/mm3      Eosinophils, Absolute 0.36 10*3/mm3      Basophils, Absolute 0.03 10*3/mm3      Immature Grans, Absolute 0.02 10*3/mm3      nRBC 0.0 /100 WBC     Basic Metabolic Panel [[id number]]  (Abnormal) Collected:  03/12/20 0623    Specimen:  Blood Updated:  03/12/20 0704     Glucose 84 mg/dL      BUN 15 mg/dL      Creatinine 0.89 mg/dL      Sodium 138 mmol/L      Potassium 4.2 mmol/L      Chloride 107 mmol/L      CO2 21.8 mmol/L      Calcium 8.2 mg/dL      eGFR Non African Amer 82 mL/min/1.73      BUN/Creatinine Ratio 16.9     Anion Gap 9.2 mmol/L     Narrative:       GFR Normal >60  Chronic Kidney Disease <60  Kidney Failure <15      Basic Metabolic Panel [[id number]]  (Abnormal) Collected:  03/11/20 0615    Specimen:  Blood from Arm, Right Updated:  03/11/20 0657     Glucose 127 mg/dL      BUN 16 mg/dL      Creatinine 0.99 mg/dL      Sodium 133 mmol/L      Potassium 5.1 mmol/L      Chloride 101 mmol/L      CO2 19.5 mmol/L      Calcium 8.4 mg/dL      eGFR Non African Amer 73 mL/min/1.73      BUN/Creatinine Ratio 16.2     Anion Gap 12.5 mmol/L     Narrative:       GFR Normal >60  Chronic Kidney Disease <60  Kidney Failure <15      CBC & Differential [[id number]] Collected:  03/11/20 0615    Specimen:  Blood from Arm, Right Updated:  03/11/20 0632    Narrative:       The following orders were created for panel order CBC & Differential.  Procedure                               Abnormality         Status                     ---------                                -----------         ------                     CBC Auto Differential[[id number]]        Abnormal            Final result                 Please view results for these tests on the individual orders.    CBC Auto Differential [[id number]]  (Abnormal) Collected:  03/11/20 0615    Specimen:  Blood from Arm, Right Updated:  03/11/20 0632     WBC 12.81 10*3/mm3      RBC 3.82 10*6/mm3      Hemoglobin 10.9 g/dL      Hematocrit 33.1 %      MCV 86.6 fL      MCH 28.5 pg      MCHC 32.9 g/dL      RDW 19.4 %      RDW-SD 60.4 fl      MPV 10.8 fL      Platelets 189 10*3/mm3      Neutrophil % 86.8 %      Lymphocyte % 5.1 %      Monocyte % 7.5 %      Eosinophil % 0.0 %      Basophil % 0.1 %      Immature Grans % 0.5 %      Neutrophils, Absolute 11.13 10*3/mm3      Lymphocytes, Absolute 0.65 10*3/mm3      Monocytes, Absolute 0.96 10*3/mm3      Eosinophils, Absolute 0.00 10*3/mm3      Basophils, Absolute 0.01 10*3/mm3      Immature Grans, Absolute 0.06 10*3/mm3      nRBC 0.0 /100 WBC     Urinalysis With Microscopic If Indicated (No Culture) - Urine, Clean Catch [[id number]]  (Abnormal) Collected:  03/11/20 0006    Specimen:  Urine, Clean Catch Updated:  03/11/20 0038     Color, UA Red     Comment: Any Substance that causes an abnormal urine color can alter the accuracy of the chemical reactions.        Appearance, UA Cloudy     pH, UA 7.5     Specific Gravity, UA 1.010     Glucose,  mg/dL (Trace)     Ketones, UA 15 mg/dL (1+)     Bilirubin, UA Negative     Blood, UA Large (3+)     Protein, UA >=300 mg/dL (3+)     Leuk Esterase, UA Moderate (2+)     Nitrite, UA Positive     Urobilinogen, UA 4.0 E.U./dL    Urinalysis, Microscopic Only - Urine, Clean Catch [[id number]]  (Abnormal) Collected:  03/11/20 0006    Specimen:  Urine, Clean Catch Updated:  03/11/20 0038     RBC, UA Too Numerous to Count /HPF      WBC, UA       Unable to determine due to loaded field     /HPF     Bacteria, UA       Unable to determine due to  loaded field     /HPF     Squamous Epithelial Cells, UA       Unable to determine due to loaded field     /HPF     Hyaline Casts, UA       Unable to determine due to loaded field     /LPF     Methodology Automated Microscopy    Comprehensive Metabolic Panel [[id number]]  (Abnormal) Collected:  03/10/20 2252    Specimen:  Blood Updated:  03/11/20 0019     Glucose 126 mg/dL      BUN 18 mg/dL      Creatinine 1.23 mg/dL      Sodium 135 mmol/L      Potassium 4.5 mmol/L      Chloride 100 mmol/L      CO2 22.0 mmol/L      Calcium 9.0 mg/dL      Total Protein 6.2 g/dL      Albumin 4.10 g/dL      ALT (SGPT) 5 U/L      AST (SGOT) 45 U/L      Alkaline Phosphatase 152 U/L      Total Bilirubin 0.6 mg/dL      eGFR Non African Amer 57 mL/min/1.73      Globulin 2.1 gm/dL      A/G Ratio 2.0 g/dL      BUN/Creatinine Ratio 14.6     Anion Gap 13.0 mmol/L     Narrative:       GFR Normal >60  Chronic Kidney Disease <60  Kidney Failure <15      Magnesium [[id number]]  (Normal) Collected:  03/10/20 2252    Specimen:  Blood Updated:  03/11/20 0019     Magnesium 2.0 mg/dL     Troponin [[id number]]  (Normal) Collected:  03/10/20 2252    Specimen:  Blood Updated:  03/11/20 0019     Troponin T <0.010 ng/mL     Narrative:       Troponin T Reference Range:  <= 0.03 ng/mL-   Negative for AMI  >0.03 ng/mL-     Abnormal for myocardial necrosis.  Clinicians would have to utilize clinical acumen, EKG, Troponin and serial changes to determine if it is an Acute Myocardial Infarction or myocardial injury due to an underlying chronic condition.       Results may be falsely decreased if patient taking Biotin.      [place] Draw [[id number]] Collected:  03/10/20 2252    Specimen:  Blood Updated:  03/11/20 0000    Narrative:       The following orders were created for panel order [place] Draw.  Procedure                               Abnormality         Status                     ---------                               -----------         ------                       Light Blue Top[[id number]]                                   Final result               Green Top (Gel)[[id number]]                                  Final result               Lavender Top[[id number]]                                     Final result               Gold Top - SST[[id number]]                                   Final result                 Please view results for these tests on the individual orders.    Light Blue Top [[id number]] Collected:  03/10/20 2252    Specimen:  Blood Updated:  03/11/20 0000     Extra Tube hold for add-on     Comment: Auto resulted       Green Top (Gel) [[id number]] Collected:  03/10/20 2252    Specimen:  Blood Updated:  03/11/20 0000     Extra Tube Hold for add-ons.     Comment: Auto resulted.       Lavender Top [[id number]] Collected:  03/10/20 2252    Specimen:  Blood Updated:  03/11/20 0000     Extra Tube hold for add-on     Comment: Auto resulted       Gold Top - SST [[id number]] Collected:  03/10/20 2252    Specimen:  Blood Updated:  03/11/20 0000     Extra Tube Hold for add-ons.     Comment: Auto resulted.       CBC & Differential [[id number]] Collected:  03/10/20 2252    Specimen:  Blood Updated:  03/10/20 2335    Narrative:       The following orders were created for panel order CBC & Differential.  Procedure                               Abnormality         Status                     ---------                               -----------         ------                     CBC Auto Differential[[id number]]        Abnormal            Final result                 Please view results for these tests on the individual orders.    CBC Auto Differential [[id number]]  (Abnormal) Collected:  03/10/20 2252    Specimen:  Blood Updated:  03/10/20 2335     WBC 11.69 10*3/mm3      RBC 4.42 10*6/mm3      Hemoglobin 12.6 g/dL      Hematocrit 38.9 %      MCV 88.0 fL      MCH 28.5 pg      MCHC 32.4 g/dL      RDW 19.4 %      RDW-SD 60.5 fl      MPV 10.9 fL      Platelets 238 10*3/mm3      Neutrophil %  89.5 %      Lymphocyte % 4.3 %      Monocyte % 5.4 %      Eosinophil % 0.0 %      Basophil % 0.1 %      Immature Grans % 0.7 %      Neutrophils, Absolute 10.47 10*3/mm3      Lymphocytes, Absolute 0.50 10*3/mm3      Monocytes, Absolute 0.63 10*3/mm3      Eosinophils, Absolute 0.00 10*3/mm3      Basophils, Absolute 0.01 10*3/mm3      Immature Grans, Absolute 0.08 10*3/mm3      nRBC 0.0 /100 WBC           Imaging Results (All)     None          Medication Review:   Current Facility-Administered Medications   Medication Dose Route Frequency Provider Last Rate Last Dose   • acetaminophen (TYLENOL) tablet 650 mg  650 mg Oral Q4H PRN Armen Chaudhary MD        Or   • acetaminophen (TYLENOL) 160 MG/5ML solution 650 mg  650 mg Oral Q4H PRN Armen Chaudhary MD        Or   • acetaminophen (TYLENOL) suppository 650 mg  650 mg Rectal Q4H PRN Armen Chaudhary MD       • aspirin chewable tablet 81 mg  81 mg Oral Daily Wang Soares Jr., MD   81 mg at 03/12/20 0813   • bisacodyl (DULCOLAX) suppository 10 mg  10 mg Rectal Daily Wang Soares Jr., MD       • HYDROmorphone (DILAUDID) injection 0.5 mg  0.5 mg Intravenous Q2H PRN Armen Chaudhary MD   0.5 mg at 03/11/20 0155    And   • naloxone (NARCAN) injection 0.4 mg  0.4 mg Intravenous Q5 Min PRN Armen Chaudhary MD       • levoFLOXacin (LEVAQUIN) 500 mg/100 mL D5W (premix) (LEVAQUIN) 500 mg  500 mg Intravenous Q24H Armen Chaudhary MD 0 mL/hr at 03/11/20 0300 500 mg at 03/12/20 0300   • Lidocaine HCl Urethral/Mucosal/Topical 2% (XYLOCAINE) gel   Topical PRN Yg Burgess MD       • LORazepam (ATIVAN) tablet 0.5 mg  0.5 mg Oral Q8H PRN Armen Chaudhary MD       • ondansetron (ZOFRAN) tablet 4 mg  4 mg Oral Q6H PRN Armen Chaudhary MD        Or   • ondansetron (ZOFRAN) injection 4 mg  4 mg Intravenous Q6H PRN Armen Chaudhary MD   4 mg at 03/11/20 0300   • opium-belladonna (B&O SUPPRETTES) 16.2-30 MG suppository 30 mg   30 mg Rectal Q8H PRN Wang Soares Jr., MD       • oxyCODONE-acetaminophen (PERCOCET) 7.5-325 MG per tablet 1 tablet  1 tablet Oral Q4H PRN Armen Chaudhary MD       • senna-docusate sodium (SENOKOT-S) 8.6-50 MG tablet 2 tablet  2 tablet Oral Nightly Armen Chaudhary MD       • sodium chloride 0.9 % flush 10 mL  10 mL Intravenous PRN Yg Burgess MD       • sodium chloride 0.9 % flush 10 mL  10 mL Intravenous Q12H Armen Chaudhary MD   10 mL at 03/12/20 0921   • sodium chloride 0.9 % flush 10 mL  10 mL Intravenous PRN Armen Chaudhary MD       • sodium chloride 0.9 % infusion  75 mL/hr Intravenous Continuous Armen Chaudhary MD 75 mL/hr at 03/11/20 0615 75 mL/hr at 03/11/20 0615       Current Facility-Administered Medications:   •  acetaminophen (TYLENOL) tablet 650 mg, 650 mg, Oral, Q4H PRN **OR** acetaminophen (TYLENOL) 160 MG/5ML solution 650 mg, 650 mg, Oral, Q4H PRN **OR** acetaminophen (TYLENOL) suppository 650 mg, 650 mg, Rectal, Q4H PRN, Armen Chaudhary MD  •  aspirin chewable tablet 81 mg, 81 mg, Oral, Daily, Wang Soares Jr., MD, 81 mg at 03/12/20 0813  •  bisacodyl (DULCOLAX) suppository 10 mg, 10 mg, Rectal, Daily, Wang Soares Jr., MD  •  HYDROmorphone (DILAUDID) injection 0.5 mg, 0.5 mg, Intravenous, Q2H PRN, 0.5 mg at 03/11/20 0155 **AND** naloxone (NARCAN) injection 0.4 mg, 0.4 mg, Intravenous, Q5 Min PRN, Armen Chaudhary MD  •  levoFLOXacin (LEVAQUIN) 500 mg/100 mL D5W (premix) (LEVAQUIN) 500 mg, 500 mg, Intravenous, Q24H, Armen Chaudhary MD, Last Rate: 0 mL/hr at 03/11/20 0300, 500 mg at 03/12/20 0300  •  Lidocaine HCl Urethral/Mucosal/Topical 2% (XYLOCAINE) gel, , Topical, PRN, Yg Burgess MD  •  LORazepam (ATIVAN) tablet 0.5 mg, 0.5 mg, Oral, Q8H PRN, Armen Chaudhary MD  •  ondansetron (ZOFRAN) tablet 4 mg, 4 mg, Oral, Q6H PRN **OR** ondansetron (ZOFRAN) injection 4 mg, 4 mg, Intravenous, Q6H PRN, Smith,  Armen SIDDIQUI MD, 4 mg at 03/11/20 0300  •  opium-belladonna (B&O SUPPRETTES) 16.2-30 MG suppository 30 mg, 30 mg, Rectal, Q8H PRN, Wang Soares Jr., MD  •  oxyCODONE-acetaminophen (PERCOCET) 7.5-325 MG per tablet 1 tablet, 1 tablet, Oral, Q4H PRN, Armen Chaudhary MD  •  senna-docusate sodium (SENOKOT-S) 8.6-50 MG tablet 2 tablet, 2 tablet, Oral, Nightly, Armen Chaudhary MD  •  sodium chloride 0.9 % flush 10 mL, 10 mL, Intravenous, PRN, Yg Burgess MD  •  sodium chloride 0.9 % flush 10 mL, 10 mL, Intravenous, Q12H, Armen Chaudhary MD, 10 mL at 03/12/20 0921  •  sodium chloride 0.9 % flush 10 mL, 10 mL, Intravenous, PRN, Armen Chaudhary MD  •  sodium chloride 0.9 % infusion, 75 mL/hr, Intravenous, Continuous, Armen Chaudhary MD, Last Rate: 75 mL/hr at 03/11/20 0615, 75 mL/hr at 03/11/20 0615  There are no discontinued medications.    Assessment/Plan   Bladder cancer      Acute urinary retention        Plan   - physical therapy  - B&O suppository  - wean CBI  - dulcolax suppository    Wang Soares Jr., MD  03/12/20  12:46

## 2020-03-12 NOTE — PLAN OF CARE
Problem: Patient Care Overview  Goal: Plan of Care Review  Outcome: Ongoing (interventions implemented as appropriate)  Flowsheets  Taken 3/12/2020 6870  Progress: no change  Outcome Summary: CBI, becoming lighter in color. No clots seen. Pt has o c/o pain/nausea. Resting comfortably.

## 2020-03-13 LAB
ANION GAP SERPL CALCULATED.3IONS-SCNC: 10.3 MMOL/L (ref 5–15)
BASOPHILS # BLD AUTO: 0.03 10*3/MM3 (ref 0–0.2)
BASOPHILS NFR BLD AUTO: 0.5 % (ref 0–1.5)
BUN BLD-MCNC: 15 MG/DL (ref 8–23)
BUN/CREAT SERPL: 14.2 (ref 7–25)
CALCIUM SPEC-SCNC: 8.1 MG/DL (ref 8.6–10.5)
CHLORIDE SERPL-SCNC: 107 MMOL/L (ref 98–107)
CO2 SERPL-SCNC: 21.7 MMOL/L (ref 22–29)
CREAT BLD-MCNC: 1.06 MG/DL (ref 0.76–1.27)
DEPRECATED RDW RBC AUTO: 60.1 FL (ref 37–54)
EOSINOPHIL # BLD AUTO: 0.55 10*3/MM3 (ref 0–0.4)
EOSINOPHIL NFR BLD AUTO: 8.5 % (ref 0.3–6.2)
ERYTHROCYTE [DISTWIDTH] IN BLOOD BY AUTOMATED COUNT: 19.2 % (ref 12.3–15.4)
GFR SERPL CREATININE-BSD FRML MDRD: 67 ML/MIN/1.73
GLUCOSE BLD-MCNC: 86 MG/DL (ref 65–99)
HCT VFR BLD AUTO: 31.1 % (ref 37.5–51)
HGB BLD-MCNC: 10.4 G/DL (ref 13–17.7)
IMM GRANULOCYTES # BLD AUTO: 0.04 10*3/MM3 (ref 0–0.05)
IMM GRANULOCYTES NFR BLD AUTO: 0.6 % (ref 0–0.5)
LYMPHOCYTES # BLD AUTO: 1.26 10*3/MM3 (ref 0.7–3.1)
LYMPHOCYTES NFR BLD AUTO: 19.4 % (ref 19.6–45.3)
MCH RBC QN AUTO: 29.1 PG (ref 26.6–33)
MCHC RBC AUTO-ENTMCNC: 33.4 G/DL (ref 31.5–35.7)
MCV RBC AUTO: 86.9 FL (ref 79–97)
MONOCYTES # BLD AUTO: 0.66 10*3/MM3 (ref 0.1–0.9)
MONOCYTES NFR BLD AUTO: 10.2 % (ref 5–12)
NEUTROPHILS # BLD AUTO: 3.96 10*3/MM3 (ref 1.7–7)
NEUTROPHILS NFR BLD AUTO: 60.8 % (ref 42.7–76)
NRBC BLD AUTO-RTO: 0 /100 WBC (ref 0–0.2)
PLATELET # BLD AUTO: 160 10*3/MM3 (ref 140–450)
PMV BLD AUTO: 10.6 FL (ref 6–12)
POTASSIUM BLD-SCNC: 3.9 MMOL/L (ref 3.5–5.2)
RBC # BLD AUTO: 3.58 10*6/MM3 (ref 4.14–5.8)
SODIUM BLD-SCNC: 139 MMOL/L (ref 136–145)
WBC NRBC COR # BLD: 6.5 10*3/MM3 (ref 3.4–10.8)

## 2020-03-13 PROCEDURE — 25010000002 LEVOFLOXACIN PER 250 MG: Performed by: UROLOGY

## 2020-03-13 PROCEDURE — 85025 COMPLETE CBC W/AUTO DIFF WBC: CPT | Performed by: UROLOGY

## 2020-03-13 PROCEDURE — 80048 BASIC METABOLIC PNL TOTAL CA: CPT | Performed by: UROLOGY

## 2020-03-13 RX ADMIN — ASPIRIN 81 MG: 81 TABLET, CHEWABLE ORAL at 08:35

## 2020-03-13 RX ADMIN — SODIUM CHLORIDE, PRESERVATIVE FREE 10 ML: 5 INJECTION INTRAVENOUS at 08:38

## 2020-03-13 RX ADMIN — ATROPA BELLADONNA AND OPIUM 30 MG: 16.2; 3 SUPPOSITORY RECTAL at 06:23

## 2020-03-13 RX ADMIN — OXYCODONE HYDROCHLORIDE AND ACETAMINOPHEN 1 TABLET: 7.5; 325 TABLET ORAL at 06:23

## 2020-03-13 RX ADMIN — LEVOFLOXACIN 500 MG: 5 INJECTION, SOLUTION INTRAVENOUS at 03:26

## 2020-03-13 RX ADMIN — DOCUSATE SODIUM 50MG AND SENNOSIDES 8.6MG 2 TABLET: 8.6; 5 TABLET, FILM COATED ORAL at 19:55

## 2020-03-13 RX ADMIN — SODIUM CHLORIDE 75 ML/HR: 9 INJECTION, SOLUTION INTRAVENOUS at 10:52

## 2020-03-13 NOTE — PLAN OF CARE
Problem: Patient Care Overview  Goal: Individualization and Mutuality  Outcome: Ongoing (interventions implemented as appropriate)  Note:   pt continues on CBI, clear red urine, pink clear at times. aspirin given today. IVF continued. Regular diet, pt tolerated well. Had B&O suppository, dulcolax suppository given as well. Will continue to monitor.

## 2020-03-13 NOTE — PROGRESS NOTES
"   LOS: 2 days   Patient Care Team:  Rachelle Patton PA-C as PCP - General (Physician Assistant)  Christie Pacheco APRN as PCP - Claims Attributed      Subjective   Interval History: Required manual irrigation overnight but currently urine clear.     Objective     ROS   12 POINT NEG ROS PERTINENT IN HPI      Vital Signs  Temp:  [95.4 °F (35.2 °C)-97.5 °F (36.4 °C)] 96.9 °F (36.1 °C)  Heart Rate:  [65-81] 65  Resp:  [16-18] 18  BP: (138-164)/(78-83) 164/83      Intake/Output Summary (Last 24 hours) at 3/13/2020 1238  Last data filed at 3/13/2020 1052  Gross per 24 hour   Intake 6032 ml   Output 65620 ml   Net -60357 ml       Flowsheet Rows      First Filed Value   Admission Height  182.9 cm (72\") Documented at 03/10/2020 2232   Admission Weight  92.1 kg (203 lb) Documented at 03/10/2020 2248          Physical Exam:     General stevie: alert, cooperative, oriented  Genitalia:  Moses intact, urine clear.  Skin:  Skin color, texture, turgor, normal no rashes        Results Review:     I reviewed the patient's new clinical results.  Lab Results (all)     Procedure Component Value Units Date/Time    Basic Metabolic Panel [[id number]]  (Abnormal) Collected:  03/13/20 0457    Specimen:  Blood Updated:  03/13/20 0634     Glucose 86 mg/dL      BUN 15 mg/dL      Creatinine 1.06 mg/dL      Sodium 139 mmol/L      Potassium 3.9 mmol/L      Chloride 107 mmol/L      CO2 21.7 mmol/L      Calcium 8.1 mg/dL      eGFR Non African Amer 67 mL/min/1.73      BUN/Creatinine Ratio 14.2     Anion Gap 10.3 mmol/L     Narrative:       GFR Normal >60  Chronic Kidney Disease <60  Kidney Failure <15      CBC & Differential [[id number]] Collected:  03/13/20 0457    Specimen:  Blood Updated:  03/13/20 0610    Narrative:       The following orders were created for panel order CBC & Differential.  Procedure                               Abnormality         Status                     ---------                               -----------         ------     "                 CBC Auto Differential[[id number]]        Abnormal            Final result                 Please view results for these tests on the individual orders.    CBC Auto Differential [[id number]]  (Abnormal) Collected:  03/13/20 0457    Specimen:  Blood Updated:  03/13/20 0610     WBC 6.50 10*3/mm3      RBC 3.58 10*6/mm3      Hemoglobin 10.4 g/dL      Hematocrit 31.1 %      MCV 86.9 fL      MCH 29.1 pg      MCHC 33.4 g/dL      RDW 19.2 %      RDW-SD 60.1 fl      MPV 10.6 fL      Platelets 160 10*3/mm3      Neutrophil % 60.8 %      Lymphocyte % 19.4 %      Monocyte % 10.2 %      Eosinophil % 8.5 %      Basophil % 0.5 %      Immature Grans % 0.6 %      Neutrophils, Absolute 3.96 10*3/mm3      Lymphocytes, Absolute 1.26 10*3/mm3      Monocytes, Absolute 0.66 10*3/mm3      Eosinophils, Absolute 0.55 10*3/mm3      Basophils, Absolute 0.03 10*3/mm3      Immature Grans, Absolute 0.04 10*3/mm3      nRBC 0.0 /100 WBC     Scan Slide [[id number]] Collected:  03/12/20 0623    Specimen:  Blood Updated:  03/12/20 0921     Anisocytosis Mod/2+     Hypochromia Mod/2+     WBC Morphology Normal     Large Platelets Slight/1+    CBC & Differential [[id number]] Collected:  03/12/20 0623    Specimen:  Blood Updated:  03/12/20 0920    Narrative:       The following orders were created for panel order CBC & Differential.  Procedure                               Abnormality         Status                     ---------                               -----------         ------                     CBC Auto Differential[[id number]]        Abnormal            Final result                 Please view results for these tests on the individual orders.    CBC Auto Differential [[id number]]  (Abnormal) Collected:  03/12/20 0623    Specimen:  Blood Updated:  03/12/20 0920     WBC 6.51 10*3/mm3      RBC 3.40 10*6/mm3      Hemoglobin 9.8 g/dL      Hematocrit 29.8 %      MCV 87.6 fL      MCH 28.8 pg      MCHC 32.9 g/dL      RDW 19.7 %      RDW-SD 61.8  fl      MPV 10.1 fL      Platelets 137 10*3/mm3      Neutrophil % 61.3 %      Lymphocyte % 22.1 %      Monocyte % 10.3 %      Eosinophil % 5.5 %      Basophil % 0.5 %      Immature Grans % 0.3 %      Neutrophils, Absolute 3.99 10*3/mm3      Lymphocytes, Absolute 1.44 10*3/mm3      Monocytes, Absolute 0.67 10*3/mm3      Eosinophils, Absolute 0.36 10*3/mm3      Basophils, Absolute 0.03 10*3/mm3      Immature Grans, Absolute 0.02 10*3/mm3      nRBC 0.0 /100 WBC     Basic Metabolic Panel [[id number]]  (Abnormal) Collected:  03/12/20 0623    Specimen:  Blood Updated:  03/12/20 0704     Glucose 84 mg/dL      BUN 15 mg/dL      Creatinine 0.89 mg/dL      Sodium 138 mmol/L      Potassium 4.2 mmol/L      Chloride 107 mmol/L      CO2 21.8 mmol/L      Calcium 8.2 mg/dL      eGFR Non African Amer 82 mL/min/1.73      BUN/Creatinine Ratio 16.9     Anion Gap 9.2 mmol/L     Narrative:       GFR Normal >60  Chronic Kidney Disease <60  Kidney Failure <15      Basic Metabolic Panel [[id number]]  (Abnormal) Collected:  03/11/20 0615    Specimen:  Blood from Arm, Right Updated:  03/11/20 0657     Glucose 127 mg/dL      BUN 16 mg/dL      Creatinine 0.99 mg/dL      Sodium 133 mmol/L      Potassium 5.1 mmol/L      Chloride 101 mmol/L      CO2 19.5 mmol/L      Calcium 8.4 mg/dL      eGFR Non African Amer 73 mL/min/1.73      BUN/Creatinine Ratio 16.2     Anion Gap 12.5 mmol/L     Narrative:       GFR Normal >60  Chronic Kidney Disease <60  Kidney Failure <15      CBC & Differential [[id number]] Collected:  03/11/20 0615    Specimen:  Blood from Arm, Right Updated:  03/11/20 0632    Narrative:       The following orders were created for panel order CBC & Differential.  Procedure                               Abnormality         Status                     ---------                               -----------         ------                     CBC Auto Differential[[id number]]        Abnormal            Final result                 Please view results  for these tests on the individual orders.    CBC Auto Differential [[id number]]  (Abnormal) Collected:  03/11/20 0615    Specimen:  Blood from Arm, Right Updated:  03/11/20 0632     WBC 12.81 10*3/mm3      RBC 3.82 10*6/mm3      Hemoglobin 10.9 g/dL      Hematocrit 33.1 %      MCV 86.6 fL      MCH 28.5 pg      MCHC 32.9 g/dL      RDW 19.4 %      RDW-SD 60.4 fl      MPV 10.8 fL      Platelets 189 10*3/mm3      Neutrophil % 86.8 %      Lymphocyte % 5.1 %      Monocyte % 7.5 %      Eosinophil % 0.0 %      Basophil % 0.1 %      Immature Grans % 0.5 %      Neutrophils, Absolute 11.13 10*3/mm3      Lymphocytes, Absolute 0.65 10*3/mm3      Monocytes, Absolute 0.96 10*3/mm3      Eosinophils, Absolute 0.00 10*3/mm3      Basophils, Absolute 0.01 10*3/mm3      Immature Grans, Absolute 0.06 10*3/mm3      nRBC 0.0 /100 WBC     Urinalysis With Microscopic If Indicated (No Culture) - Urine, Clean Catch [[id number]]  (Abnormal) Collected:  03/11/20 0006    Specimen:  Urine, Clean Catch Updated:  03/11/20 0038     Color, UA Red     Comment: Any Substance that causes an abnormal urine color can alter the accuracy of the chemical reactions.        Appearance, UA Cloudy     pH, UA 7.5     Specific Gravity, UA 1.010     Glucose,  mg/dL (Trace)     Ketones, UA 15 mg/dL (1+)     Bilirubin, UA Negative     Blood, UA Large (3+)     Protein, UA >=300 mg/dL (3+)     Leuk Esterase, UA Moderate (2+)     Nitrite, UA Positive     Urobilinogen, UA 4.0 E.U./dL    Urinalysis, Microscopic Only - Urine, Clean Catch [[id number]]  (Abnormal) Collected:  03/11/20 0006    Specimen:  Urine, Clean Catch Updated:  03/11/20 0038     RBC, UA Too Numerous to Count /HPF      WBC, UA       Unable to determine due to loaded field     /HPF     Bacteria, UA       Unable to determine due to loaded field     /HPF     Squamous Epithelial Cells, UA       Unable to determine due to loaded field     /HPF     Hyaline Casts, UA       Unable to determine due to loaded  field     /LPF     Methodology Automated Microscopy    Comprehensive Metabolic Panel [[id number]]  (Abnormal) Collected:  03/10/20 2252    Specimen:  Blood Updated:  03/11/20 0019     Glucose 126 mg/dL      BUN 18 mg/dL      Creatinine 1.23 mg/dL      Sodium 135 mmol/L      Potassium 4.5 mmol/L      Chloride 100 mmol/L      CO2 22.0 mmol/L      Calcium 9.0 mg/dL      Total Protein 6.2 g/dL      Albumin 4.10 g/dL      ALT (SGPT) 5 U/L      AST (SGOT) 45 U/L      Alkaline Phosphatase 152 U/L      Total Bilirubin 0.6 mg/dL      eGFR Non African Amer 57 mL/min/1.73      Globulin 2.1 gm/dL      A/G Ratio 2.0 g/dL      BUN/Creatinine Ratio 14.6     Anion Gap 13.0 mmol/L     Narrative:       GFR Normal >60  Chronic Kidney Disease <60  Kidney Failure <15      Magnesium [[id number]]  (Normal) Collected:  03/10/20 2252    Specimen:  Blood Updated:  03/11/20 0019     Magnesium 2.0 mg/dL     Troponin [[id number]]  (Normal) Collected:  03/10/20 2252    Specimen:  Blood Updated:  03/11/20 0019     Troponin T <0.010 ng/mL     Narrative:       Troponin T Reference Range:  <= 0.03 ng/mL-   Negative for AMI  >0.03 ng/mL-     Abnormal for myocardial necrosis.  Clinicians would have to utilize clinical acumen, EKG, Troponin and serial changes to determine if it is an Acute Myocardial Infarction or myocardial injury due to an underlying chronic condition.       Results may be falsely decreased if patient taking Biotin.      [place] Draw [[id number]] Collected:  03/10/20 2252    Specimen:  Blood Updated:  03/11/20 0000    Narrative:       The following orders were created for panel order [place] Draw.  Procedure                               Abnormality         Status                     ---------                               -----------         ------                     Light Blue Top[[id number]]                                   Final result               Green Top (Gel)[[id number]]                                  Final result                Lavender Top[[id number]]                                     Final result               Gold Top - SST[[id number]]                                   Final result                 Please view results for these tests on the individual orders.    Light Blue Top [[id number]] Collected:  03/10/20 2252    Specimen:  Blood Updated:  03/11/20 0000     Extra Tube hold for add-on     Comment: Auto resulted       Green Top (Gel) [[id number]] Collected:  03/10/20 2252    Specimen:  Blood Updated:  03/11/20 0000     Extra Tube Hold for add-ons.     Comment: Auto resulted.       Lavender Top [[id number]] Collected:  03/10/20 2252    Specimen:  Blood Updated:  03/11/20 0000     Extra Tube hold for add-on     Comment: Auto resulted       Gold Top - SST [[id number]] Collected:  03/10/20 2252    Specimen:  Blood Updated:  03/11/20 0000     Extra Tube Hold for add-ons.     Comment: Auto resulted.       CBC & Differential [[id number]] Collected:  03/10/20 2252    Specimen:  Blood Updated:  03/10/20 2335    Narrative:       The following orders were created for panel order CBC & Differential.  Procedure                               Abnormality         Status                     ---------                               -----------         ------                     CBC Auto Differential[[id number]]        Abnormal            Final result                 Please view results for these tests on the individual orders.    CBC Auto Differential [[id number]]  (Abnormal) Collected:  03/10/20 2252    Specimen:  Blood Updated:  03/10/20 2335     WBC 11.69 10*3/mm3      RBC 4.42 10*6/mm3      Hemoglobin 12.6 g/dL      Hematocrit 38.9 %      MCV 88.0 fL      MCH 28.5 pg      MCHC 32.4 g/dL      RDW 19.4 %      RDW-SD 60.5 fl      MPV 10.9 fL      Platelets 238 10*3/mm3      Neutrophil % 89.5 %      Lymphocyte % 4.3 %      Monocyte % 5.4 %      Eosinophil % 0.0 %      Basophil % 0.1 %      Immature Grans % 0.7 %      Neutrophils, Absolute 10.47 10*3/mm3       Lymphocytes, Absolute 0.50 10*3/mm3      Monocytes, Absolute 0.63 10*3/mm3      Eosinophils, Absolute 0.00 10*3/mm3      Basophils, Absolute 0.01 10*3/mm3      Immature Grans, Absolute 0.08 10*3/mm3      nRBC 0.0 /100 WBC           Imaging Results (All)     None          Medication Review:   Current Facility-Administered Medications   Medication Dose Route Frequency Provider Last Rate Last Dose   • acetaminophen (TYLENOL) tablet 650 mg  650 mg Oral Q4H PRN Armen Chaudhary MD        Or   • acetaminophen (TYLENOL) 160 MG/5ML solution 650 mg  650 mg Oral Q4H PRN Armen Chaudhary MD        Or   • acetaminophen (TYLENOL) suppository 650 mg  650 mg Rectal Q4H PRN Armen Chaudhary MD       • aspirin chewable tablet 81 mg  81 mg Oral Daily Wang Soares Jr., MD   81 mg at 03/13/20 0835   • bisacodyl (DULCOLAX) suppository 10 mg  10 mg Rectal Daily Wang Soares Jr., MD   10 mg at 03/12/20 1255   • HYDROmorphone (DILAUDID) injection 0.5 mg  0.5 mg Intravenous Q2H PRN Armen Chaudhary MD   0.5 mg at 03/11/20 0155    And   • naloxone (NARCAN) injection 0.4 mg  0.4 mg Intravenous Q5 Min PRN Armen Chaudhary MD       • Lidocaine HCl Urethral/Mucosal/Topical 2% (XYLOCAINE) gel   Topical PRN Yg Burgess MD       • LORazepam (ATIVAN) tablet 0.5 mg  0.5 mg Oral Q8H PRN Armen Chaudhary MD       • ondansetron (ZOFRAN) tablet 4 mg  4 mg Oral Q6H PRN Armen Chaudhary MD        Or   • ondansetron (ZOFRAN) injection 4 mg  4 mg Intravenous Q6H PRN Armen Chaudhary MD   4 mg at 03/11/20 0300   • opium-belladonna (B&O SUPPRETTES) 16.2-30 MG suppository 30 mg  30 mg Rectal Q8H PRN Wang Soares Jr., MD   30 mg at 03/13/20 0623   • oxyCODONE-acetaminophen (PERCOCET) 7.5-325 MG per tablet 1 tablet  1 tablet Oral Q4H PRN Armen Chaudhary MD   1 tablet at 03/13/20 0623   • senna-docusate sodium (SENOKOT-S) 8.6-50 MG tablet 2 tablet  2 tablet Oral Nightly Armen Chaudhary  MD CHEN   2 tablet at 03/12/20 2041   • sodium chloride 0.9 % flush 10 mL  10 mL Intravenous PRN Yg Burgess MD       • sodium chloride 0.9 % flush 10 mL  10 mL Intravenous Q12H Armen Chaudhary MD   10 mL at 03/13/20 0838   • sodium chloride 0.9 % flush 10 mL  10 mL Intravenous PRN Armen Chaudhary MD       • sodium chloride 0.9 % infusion  75 mL/hr Intravenous Continuous Armen Chaudhary MD 75 mL/hr at 03/13/20 1052 75 mL/hr at 03/13/20 1052       Current Facility-Administered Medications:   •  acetaminophen (TYLENOL) tablet 650 mg, 650 mg, Oral, Q4H PRN **OR** acetaminophen (TYLENOL) 160 MG/5ML solution 650 mg, 650 mg, Oral, Q4H PRN **OR** acetaminophen (TYLENOL) suppository 650 mg, 650 mg, Rectal, Q4H PRN, Armen Chaudhary MD  •  aspirin chewable tablet 81 mg, 81 mg, Oral, Daily, Wang Soares Jr., MD, 81 mg at 03/13/20 0835  •  bisacodyl (DULCOLAX) suppository 10 mg, 10 mg, Rectal, Daily, Wang Soares Jr., MD, 10 mg at 03/12/20 1255  •  HYDROmorphone (DILAUDID) injection 0.5 mg, 0.5 mg, Intravenous, Q2H PRN, 0.5 mg at 03/11/20 0155 **AND** naloxone (NARCAN) injection 0.4 mg, 0.4 mg, Intravenous, Q5 Min PRN, Armen Chaudhary MD  •  Lidocaine HCl Urethral/Mucosal/Topical 2% (XYLOCAINE) gel, , Topical, PRN, Yg Burgess MD  •  LORazepam (ATIVAN) tablet 0.5 mg, 0.5 mg, Oral, Q8H PRN, Armen Chaudhary MD  •  ondansetron (ZOFRAN) tablet 4 mg, 4 mg, Oral, Q6H PRN **OR** ondansetron (ZOFRAN) injection 4 mg, 4 mg, Intravenous, Q6H PRN, Armen Chaudhary MD, 4 mg at 03/11/20 0300  •  opium-belladonna (B&O SUPPRETTES) 16.2-30 MG suppository 30 mg, 30 mg, Rectal, Q8H PRN, Wang Soares Jr., MD, 30 mg at 03/13/20 0623  •  oxyCODONE-acetaminophen (PERCOCET) 7.5-325 MG per tablet 1 tablet, 1 tablet, Oral, Q4H PRN, Armen Chaudhary MD, 1 tablet at 03/13/20 0623  •  senna-docusate sodium (SENOKOT-S) 8.6-50 MG tablet 2 tablet, 2 tablet, Oral, Nightly, Smith,  Armen SIDDIQUI MD, 2 tablet at 03/12/20 2041  •  sodium chloride 0.9 % flush 10 mL, 10 mL, Intravenous, PRN, Yg Burgess MD  •  sodium chloride 0.9 % flush 10 mL, 10 mL, Intravenous, Q12H, Armen Chaudhary MD, 10 mL at 03/13/20 0838  •  sodium chloride 0.9 % flush 10 mL, 10 mL, Intravenous, PRN, Armen Chaudhary MD  •  sodium chloride 0.9 % infusion, 75 mL/hr, Intravenous, Continuous, Armen Chaudhary MD, Last Rate: 75 mL/hr at 03/13/20 1052, 75 mL/hr at 03/13/20 1052  There are no discontinued medications.    Assessment/Plan   Bladder cancer  Gross hematuria  Coronary artery disease      Acute urinary retention        Plan   - manually irrigated, some dark red blood drained per catheter. Bladder irrigated to clear  - continue CBI, hematuria gradually improving  - continue ASA for now  - dulcolax suppository    Wang Soares Jr., MD  03/13/20  12:38

## 2020-03-13 NOTE — PLAN OF CARE
Pt has had no c/o pain. Ambulated in halls with NA. CBI running clear, slowed rate. Plans to potentially stop CBI and remove shay tomorrow.

## 2020-03-13 NOTE — PLAN OF CARE
Problem: Patient Care Overview  Goal: Individualization and Mutuality  Outcome: Ongoing (interventions implemented as appropriate)    Percocet x1 for penile pain. Leaking at cath insertions site, irrigated numerous clots. OP light red. Gave AVE suppository for spasms. Pt had no sleep. VSS

## 2020-03-14 LAB
ANION GAP SERPL CALCULATED.3IONS-SCNC: 11.2 MMOL/L (ref 5–15)
BASOPHILS # BLD AUTO: 0.03 10*3/MM3 (ref 0–0.2)
BASOPHILS NFR BLD AUTO: 0.5 % (ref 0–1.5)
BUN BLD-MCNC: 16 MG/DL (ref 8–23)
BUN/CREAT SERPL: 16 (ref 7–25)
CALCIUM SPEC-SCNC: 8.2 MG/DL (ref 8.6–10.5)
CHLORIDE SERPL-SCNC: 103 MMOL/L (ref 98–107)
CO2 SERPL-SCNC: 22.8 MMOL/L (ref 22–29)
CREAT BLD-MCNC: 1 MG/DL (ref 0.76–1.27)
DEPRECATED RDW RBC AUTO: 62.7 FL (ref 37–54)
EOSINOPHIL # BLD AUTO: 0.52 10*3/MM3 (ref 0–0.4)
EOSINOPHIL NFR BLD AUTO: 8.2 % (ref 0.3–6.2)
ERYTHROCYTE [DISTWIDTH] IN BLOOD BY AUTOMATED COUNT: 19.5 % (ref 12.3–15.4)
GFR SERPL CREATININE-BSD FRML MDRD: 72 ML/MIN/1.73
GLUCOSE BLD-MCNC: 97 MG/DL (ref 65–99)
HCT VFR BLD AUTO: 32.8 % (ref 37.5–51)
HGB BLD-MCNC: 10.9 G/DL (ref 13–17.7)
IMM GRANULOCYTES # BLD AUTO: 0.03 10*3/MM3 (ref 0–0.05)
IMM GRANULOCYTES NFR BLD AUTO: 0.5 % (ref 0–0.5)
LYMPHOCYTES # BLD AUTO: 1.25 10*3/MM3 (ref 0.7–3.1)
LYMPHOCYTES NFR BLD AUTO: 19.7 % (ref 19.6–45.3)
MCH RBC QN AUTO: 29.5 PG (ref 26.6–33)
MCHC RBC AUTO-ENTMCNC: 33.2 G/DL (ref 31.5–35.7)
MCV RBC AUTO: 88.9 FL (ref 79–97)
MONOCYTES # BLD AUTO: 0.65 10*3/MM3 (ref 0.1–0.9)
MONOCYTES NFR BLD AUTO: 10.3 % (ref 5–12)
NEUTROPHILS # BLD AUTO: 3.86 10*3/MM3 (ref 1.7–7)
NEUTROPHILS NFR BLD AUTO: 60.8 % (ref 42.7–76)
NRBC BLD AUTO-RTO: 0 /100 WBC (ref 0–0.2)
PLATELET # BLD AUTO: 172 10*3/MM3 (ref 140–450)
PMV BLD AUTO: 10.9 FL (ref 6–12)
POTASSIUM BLD-SCNC: 4 MMOL/L (ref 3.5–5.2)
RBC # BLD AUTO: 3.69 10*6/MM3 (ref 4.14–5.8)
SODIUM BLD-SCNC: 137 MMOL/L (ref 136–145)
WBC NRBC COR # BLD: 6.34 10*3/MM3 (ref 3.4–10.8)

## 2020-03-14 PROCEDURE — 80048 BASIC METABOLIC PNL TOTAL CA: CPT | Performed by: UROLOGY

## 2020-03-14 PROCEDURE — 36415 COLL VENOUS BLD VENIPUNCTURE: CPT | Performed by: UROLOGY

## 2020-03-14 PROCEDURE — 85025 COMPLETE CBC W/AUTO DIFF WBC: CPT | Performed by: UROLOGY

## 2020-03-14 RX ORDER — DULOXETIN HYDROCHLORIDE 20 MG/1
20 CAPSULE, DELAYED RELEASE ORAL DAILY
Status: DISCONTINUED | OUTPATIENT
Start: 2020-03-14 | End: 2020-03-15 | Stop reason: HOSPADM

## 2020-03-14 RX ORDER — FLUOXETINE HYDROCHLORIDE 20 MG/1
60 CAPSULE ORAL NIGHTLY
Status: DISCONTINUED | OUTPATIENT
Start: 2020-03-14 | End: 2020-03-15 | Stop reason: HOSPADM

## 2020-03-14 RX ORDER — FLUOXETINE HYDROCHLORIDE 20 MG/1
40 CAPSULE ORAL DAILY
Status: DISCONTINUED | OUTPATIENT
Start: 2020-03-14 | End: 2020-03-14

## 2020-03-14 RX ADMIN — ASPIRIN 81 MG: 81 TABLET, CHEWABLE ORAL at 08:41

## 2020-03-14 RX ADMIN — DOCUSATE SODIUM 50MG AND SENNOSIDES 8.6MG 2 TABLET: 8.6; 5 TABLET, FILM COATED ORAL at 21:27

## 2020-03-14 RX ADMIN — FLUOXETINE HYDROCHLORIDE 60 MG: 20 CAPSULE ORAL at 21:27

## 2020-03-14 RX ADMIN — CARBIDOPA AND LEVODOPA 2 TABLET: 25; 100 TABLET ORAL at 21:26

## 2020-03-14 RX ADMIN — CARBIDOPA AND LEVODOPA 2 TABLET: 25; 100 TABLET ORAL at 14:46

## 2020-03-14 RX ADMIN — DULOXETINE HYDROCHLORIDE 20 MG: 20 CAPSULE, DELAYED RELEASE ORAL at 14:46

## 2020-03-14 NOTE — PLAN OF CARE
Problem: Fall Risk (Adult)  Goal: Absence of Fall  Outcome: Ongoing (interventions implemented as appropriate)  Flowsheets (Taken 3/14/2020 3157)  Absence of Fall: making progress toward outcome  Note:   Pt did well today. Ambulated in hallway. CBI has been clamped since around 1030. Flushed twice, first time with no visible clots. The second time there was one very small clot in the catheter. Urine is very clear and yellow. Will continue to monitor pt.

## 2020-03-14 NOTE — PROGRESS NOTES
CC: My urine has been clear    GH after TURBT    VSSA  CBI to slow drip, phallus wnl   Urine clear  Abdomen soft No BM  Cr 1.0  Hgb 10.9    Plan:  Hand irrigate and if no clots clamp CBI  Ambulate

## 2020-03-14 NOTE — PLAN OF CARE
Problem: Patient Care Overview  Goal: Individualization and Mutuality  Outcome: Ongoing (interventions implemented as appropriate)  Note:   Pts urine remained clear and yellow. CBI running slow. No pain. VSS.

## 2020-03-14 NOTE — NURSING NOTE
Irrigated shay by hand per verbal with NP. No clots visible. Clamped Irrigation for now. Will continue to monitor pt.

## 2020-03-15 VITALS
HEART RATE: 67 BPM | OXYGEN SATURATION: 95 % | BODY MASS INDEX: 26.91 KG/M2 | WEIGHT: 198.7 LBS | SYSTOLIC BLOOD PRESSURE: 149 MMHG | HEIGHT: 72 IN | DIASTOLIC BLOOD PRESSURE: 90 MMHG | RESPIRATION RATE: 16 BRPM | TEMPERATURE: 96.7 F

## 2020-03-15 LAB
ANION GAP SERPL CALCULATED.3IONS-SCNC: 14.2 MMOL/L (ref 5–15)
BASOPHILS # BLD AUTO: 0.04 10*3/MM3 (ref 0–0.2)
BASOPHILS NFR BLD AUTO: 0.6 % (ref 0–1.5)
BUN BLD-MCNC: 15 MG/DL (ref 8–23)
BUN/CREAT SERPL: 17 (ref 7–25)
CALCIUM SPEC-SCNC: 8.3 MG/DL (ref 8.6–10.5)
CHLORIDE SERPL-SCNC: 105 MMOL/L (ref 98–107)
CO2 SERPL-SCNC: 20.8 MMOL/L (ref 22–29)
CREAT BLD-MCNC: 0.88 MG/DL (ref 0.76–1.27)
DEPRECATED RDW RBC AUTO: 60.7 FL (ref 37–54)
EOSINOPHIL # BLD AUTO: 0.42 10*3/MM3 (ref 0–0.4)
EOSINOPHIL NFR BLD AUTO: 6.3 % (ref 0.3–6.2)
ERYTHROCYTE [DISTWIDTH] IN BLOOD BY AUTOMATED COUNT: 19.2 % (ref 12.3–15.4)
GFR SERPL CREATININE-BSD FRML MDRD: 84 ML/MIN/1.73
GLUCOSE BLD-MCNC: 115 MG/DL (ref 65–99)
HCT VFR BLD AUTO: 33.2 % (ref 37.5–51)
HGB BLD-MCNC: 10.8 G/DL (ref 13–17.7)
IMM GRANULOCYTES # BLD AUTO: 0.03 10*3/MM3 (ref 0–0.05)
IMM GRANULOCYTES NFR BLD AUTO: 0.5 % (ref 0–0.5)
LYMPHOCYTES # BLD AUTO: 1.25 10*3/MM3 (ref 0.7–3.1)
LYMPHOCYTES NFR BLD AUTO: 18.9 % (ref 19.6–45.3)
MCH RBC QN AUTO: 28.5 PG (ref 26.6–33)
MCHC RBC AUTO-ENTMCNC: 32.5 G/DL (ref 31.5–35.7)
MCV RBC AUTO: 87.6 FL (ref 79–97)
MONOCYTES # BLD AUTO: 0.57 10*3/MM3 (ref 0.1–0.9)
MONOCYTES NFR BLD AUTO: 8.6 % (ref 5–12)
NEUTROPHILS # BLD AUTO: 4.31 10*3/MM3 (ref 1.7–7)
NEUTROPHILS NFR BLD AUTO: 65.1 % (ref 42.7–76)
NRBC BLD AUTO-RTO: 0 /100 WBC (ref 0–0.2)
PLATELET # BLD AUTO: 194 10*3/MM3 (ref 140–450)
PMV BLD AUTO: 10.5 FL (ref 6–12)
POTASSIUM BLD-SCNC: 3.6 MMOL/L (ref 3.5–5.2)
RBC # BLD AUTO: 3.79 10*6/MM3 (ref 4.14–5.8)
SODIUM BLD-SCNC: 140 MMOL/L (ref 136–145)
WBC NRBC COR # BLD: 6.62 10*3/MM3 (ref 3.4–10.8)

## 2020-03-15 PROCEDURE — 85025 COMPLETE CBC W/AUTO DIFF WBC: CPT | Performed by: UROLOGY

## 2020-03-15 PROCEDURE — 36415 COLL VENOUS BLD VENIPUNCTURE: CPT | Performed by: UROLOGY

## 2020-03-15 PROCEDURE — 80048 BASIC METABOLIC PNL TOTAL CA: CPT | Performed by: UROLOGY

## 2020-03-15 RX ORDER — POLYETHYLENE GLYCOL 3350 17 G/17G
17 POWDER, FOR SOLUTION ORAL DAILY PRN
Status: DISCONTINUED | OUTPATIENT
Start: 2020-03-15 | End: 2020-03-15 | Stop reason: HOSPADM

## 2020-03-15 RX ORDER — CLOPIDOGREL BISULFATE 75 MG/1
75 TABLET ORAL DAILY
Start: 2020-03-15

## 2020-03-15 RX ADMIN — DULOXETINE HYDROCHLORIDE 20 MG: 20 CAPSULE, DELAYED RELEASE ORAL at 09:08

## 2020-03-15 RX ADMIN — POLYETHYLENE GLYCOL 3350 17 G: 17 POWDER, FOR SOLUTION ORAL at 09:08

## 2020-03-15 RX ADMIN — ASPIRIN 81 MG: 81 TABLET, CHEWABLE ORAL at 09:08

## 2020-03-15 RX ADMIN — CARBIDOPA AND LEVODOPA 2 TABLET: 25; 100 TABLET ORAL at 09:08

## 2020-03-15 NOTE — PROGRESS NOTES
CC: I feel good    NAD   A&O x 3  Urine is clear, CBI clamped overnight  VSSA  Soft abdomen  Denies pain  Cr 0.88  WBC 6.6  Hgb 10.8    Plan  VT and discharge today  If unable to void or PVR >250cc reanchor 16 fr coude shay prior to discharge    Schedule outpatient follow up with Dr. Soares in 1 week, [facility] [phone]

## 2020-03-15 NOTE — NURSING NOTE
Due to void by 1600. Per NP if PVR is >300, he must go home with an indwelling catheter. Will continue to monitor.

## 2020-03-15 NOTE — PLAN OF CARE
Problem: Patient Care Overview  Goal: Individualization and Mutuality  Outcome: Ongoing (interventions implemented as appropriate)  Note:   Pts shay remains clamped and urine remains yellow and frree of clots.pt had bm yesterday. Otherwise no complaints.

## 2020-03-15 NOTE — NURSING NOTE
Pt urinated on his own into toilet. Urine was clear and yellow. PVR was 25ML. Pt and wife feel more comfortable to wait unit he has urinated a couple of times with the same result r/t history of retention.

## 2020-03-15 NOTE — DISCHARGE SUMMARY
Urology Discharge Note    Name:  Sukhdev Zayas  Age:  79 y.o.  Sex:  male  :  1940  MRN:  [id number]    Date of Admission: 3/10/2020   Date of Discharge:  3/15/2020    Admitting Diagnosis: Urinary Retention   Discharge Diagnosis: BPH with LUTS     Presenting Problem  Active Hospital Problems    Diagnosis  POA   • Acute urinary retention [R33.8]  Yes      Resolved Hospital Problems   No resolved problems to display.         Hospital Course  Patient is a 79 y.o. male presented with gross hematuria and clot retention s/p TURBT.   Moses catheter anchored and clots irrigated, CBI clear. Patient to be discharged home today on regular diet. Will have outpatient follow up with Dr. Soares with [facility].     Procedures Performed         Consults:   Consults     Date and Time Order Name Status Description    3/11/2020 0019 Urology (on-call MD unless specified) Completed           Pertinent Test Results:   Lab Results (last 24 hours)     Procedure Component Value Units Date/Time    Basic Metabolic Panel [[id number]]  (Abnormal) Collected:  03/15/20 0527    Specimen:  Blood from Arm, Right Updated:  03/15/20 0706     Glucose 115 mg/dL      BUN 15 mg/dL      Creatinine 0.88 mg/dL      Sodium 140 mmol/L      Potassium 3.6 mmol/L      Chloride 105 mmol/L      CO2 20.8 mmol/L      Calcium 8.3 mg/dL      eGFR Non African Amer 84 mL/min/1.73      BUN/Creatinine Ratio 17.0     Anion Gap 14.2 mmol/L     Narrative:       GFR Normal >60  Chronic Kidney Disease <60  Kidney Failure <15      CBC & Differential [[id number]] Collected:  03/15/20 0527    Specimen:  Blood from Arm, Right Updated:  03/15/20 0640    Narrative:       The following orders were created for panel order CBC & Differential.  Procedure                               Abnormality         Status                     ---------                               -----------         ------                     CBC Auto Differential[[id number]]        Abnormal             "Final result                 Please view results for these tests on the individual orders.    CBC Auto Differential [[id number]]  (Abnormal) Collected:  03/15/20 0527    Specimen:  Blood from Arm, Right Updated:  03/15/20 0640     WBC 6.62 10*3/mm3      RBC 3.79 10*6/mm3      Hemoglobin 10.8 g/dL      Hematocrit 33.2 %      MCV 87.6 fL      MCH 28.5 pg      MCHC 32.5 g/dL      RDW 19.2 %      RDW-SD 60.7 fl      MPV 10.5 fL      Platelets 194 10*3/mm3      Neutrophil % 65.1 %      Lymphocyte % 18.9 %      Monocyte % 8.6 %      Eosinophil % 6.3 %      Basophil % 0.6 %      Immature Grans % 0.5 %      Neutrophils, Absolute 4.31 10*3/mm3      Lymphocytes, Absolute 1.25 10*3/mm3      Monocytes, Absolute 0.57 10*3/mm3      Eosinophils, Absolute 0.42 10*3/mm3      Basophils, Absolute 0.04 10*3/mm3      Immature Grans, Absolute 0.03 10*3/mm3      nRBC 0.0 /100 WBC         Imaging Results (Last 72 Hours)     ** No results found for the last 72 hours. **          Condition on Discharge:  Stable    Vital Signs     Vitals:    03/14/20 1630 03/14/20 2000 03/15/20 0500 03/15/20 0809   BP: 121/66 116/70 130/80 149/90   BP Location: Left arm Left arm Left arm Left arm   Patient Position: Lying Lying Lying Lying   Pulse: 78 79 67 67   Resp: 16 16 16 16   Temp: 97.6 °F (36.4 °C) 98.4 °F (36.9 °C) 96.6 °F (35.9 °C) 96.7 °F (35.9 °C)   TempSrc: Oral Oral Oral Oral   SpO2: 96% 97% 96% 95%   Weight:       Height:           Physical Exam:   Vital signs: /90 (BP Location: Left arm, Patient Position: Lying)   Pulse 67   Temp 96.7 °F (35.9 °C) (Oral)   Resp 16   Ht 182.9 cm (72\")   Wt 90.1 kg (198 lb 11.2 oz)   SpO2 95%   BMI 26.95 kg/m²   95%     General: Well developed, well nourished, alert and oriented x 3    Appears stated age. No apparent distress.    HEENT: Moist mucous membranes of the oral mucosa & nasal mucosa.    Lips are not cyanotic.    PERRL. No sclera icterus. Extraocular movements intact    Neck:  Trachea " position is mid line.     Respiratory: Respiratory rhythm & depth: Normal.    Respiratory effort:  Normal.    no dullness to percussion across bases.    GI:  Nondistended.     Skin:  Inspection: No rash.    Palpation:  Warm, dry. No induration.     Extremities: No clubbing & no cyanosis.    No edema    :  Normal external genitalia    Shay intact at time of PE.       Discharge Disposition  Home or Self Care    Discharge Medications     Discharge Medications      Changes to Medications      Instructions Start Date   clopidogrel 75 MG tablet  Commonly known as:  PLAVIX  What changed:  additional instructions   75 mg, Oral, Daily, Start on Monday         Continue These Medications      Instructions Start Date   aspirin 81 MG chewable tablet   81 mg, Oral, Daily      carbidopa-levodopa  MG per tablet  Commonly known as:  SINEMET   2 tablets, Oral, 3 Times Daily      DULoxetine 20 MG capsule  Commonly known as:  CYMBALTA   20 mg, Oral, Daily      ferrous sulfate 325 (65 FE) MG tablet   325 mg, Oral, Daily With Breakfast      FLUoxetine 20 MG capsule  Commonly known as:  PROzac   60 mg, Oral, Nightly, Pt takes 40 mg + 20 mg capsules = 60 mg daily       FLUoxetine 40 MG capsule  Commonly known as:  PROzac   40 mg, Oral, Daily      fluticasone 50 MCG/ACT nasal spray  Commonly known as:  FLONASE   2 sprays, Nasal, Daily      loratadine 10 MG tablet  Commonly known as:  CLARITIN   10 mg, Oral, Daily      naproxen sodium 220 MG tablet  Commonly known as:  ALEVE   220 mg, Oral      omeprazole 40 MG capsule  Commonly known as:  priLOSEC   40 mg, Oral, Daily             Discharge Diet: Regular     Activity at Discharge: Regular   Shay catheter to be removed. If PVR >300cc to reanchor shay.     Follow-up Appointments  Future Appointments   Date Time Provider Department Center   6/4/2020 10:00 AM Ashok Amaya MD [facility] None   6/15/2020 10:45 AM Rachelle Patton PA-C [facility]   9/15/2020  3:00 PM  Christie Pacheco APRN [facility]     Call [facility] at [phone] to schedule outpatient follow up with Dr. Soares in 1 week.        Christie Cooper, ANIL  03/15/20  09:00

## 2020-03-16 ENCOUNTER — READMISSION MANAGEMENT (OUTPATIENT)
Dept: CALL CENTER | Facility: HOSPITAL | Age: 80
End: 2020-03-16

## 2020-03-16 NOTE — OUTREACH NOTE
Prep Survey      Responses   [facility] patient discharged from?  [place]   Is LACE score < 7 ?  No   Eligibility  Readm Mgmt   Discharge diagnosis  gross hematuria and clot retention s/p TURBT.    Does the patient have one of the following disease processes/diagnoses(primary or secondary)?  Other   Does the patient have Home health ordered?  No   Is there a DME ordered?  No   Prep survey completed?  Yes          Dorinda Carter RN

## 2020-03-16 NOTE — PROGRESS NOTES
Case Management Discharge Note      Final Note: Home with family assist--no needs identified    Provided Post Acute Provider List?: N/A  N/A Provider List Comment: No skilled needs identified at this time.    Destination      No service has been selected for the patient.      Durable Medical Equipment      No service has been selected for the patient.      Dialysis/Infusion      No service has been selected for the patient.      Home Medical Care      No service has been selected for the patient.      Therapy      No service has been selected for the patient.      Community Resources      No service has been selected for the patient.        Transportation Services  Private: Car    Final Discharge Disposition Code: 01 - home or self-care

## 2020-03-20 ENCOUNTER — READMISSION MANAGEMENT (OUTPATIENT)
Dept: CALL CENTER | Facility: HOSPITAL | Age: 80
End: 2020-03-20

## 2020-03-20 NOTE — OUTREACH NOTE
Medical Week 1 Survey      Responses   [facility] patient discharged from?  [place]   Does the patient have one of the following disease processes/diagnoses(primary or secondary)?  Other   Is there a successful TCM telephone encounter documented?  No   Week 1 attempt successful?  Yes   Call start time  1438   Call end time  1443   Discharge diagnosis  gross hematuria and clot retention s/p TURBT.    Is patient permission given to speak with other caregiver?  Yes   List who call center can speak with  Spouse-[place]   Person spoke with today (if not patient) and relationship  Spouse-[place]    Meds reviewed with patient/caregiver?  Yes   Is the patient having any side effects they believe may be caused by any medication additions or changes?  No   Does the patient have all medications ordered at discharge?  N/A   Is the patient taking all medications as directed (includes completed medication regime)?  Yes   Does the patient have a primary care provider?   Yes   Has the patient kept scheduled appointments due by today?  N/A   Comments  Patient has a video appt with Urologist on Monday   Did the patient receive a copy of their discharge instructions?  Yes   Nursing interventions  Reviewed instructions with patient   What is the patient's perception of their health status since discharge?  New symptoms unrelated to diagnosis [Headache]   Is the patient/caregiver able to teach back signs and symptoms related to disease process for when to call PCP?  Yes   Is the patient/caregiver able to teach back signs and symptoms related to disease process for when to call 911?  Yes   Is the patient/caregiver able to teach back the hierarchy of who to call/visit for symptoms/problems? PCP, Specialist, Home health nurse, Urgent Care, ED, 911  Yes   Additional teach back comments  Wife stated he has had a headache since he started back on the cymbalta and their daughter in law is a doctor and told them it was fine for him  to stop and monitor.     Week 1 call completed?  Yes   Wrap up additional comments  No questions or needs at this time          Nany Ibarra LPN

## 2020-03-31 ENCOUNTER — READMISSION MANAGEMENT (OUTPATIENT)
Dept: CALL CENTER | Facility: HOSPITAL | Age: 80
End: 2020-03-31

## 2020-03-31 NOTE — OUTREACH NOTE
Medical Week 2 Survey      Responses   [facility] patient discharged from?  [place]   Does the patient have one of the following disease processes/diagnoses(primary or secondary)?  Other   Week 2 attempt successful?  No   Unsuccessful attempts  Attempt 1          Felicia Lomeli RN

## 2020-04-02 ENCOUNTER — READMISSION MANAGEMENT (OUTPATIENT)
Dept: CALL CENTER | Facility: HOSPITAL | Age: 80
End: 2020-04-02

## 2020-04-02 NOTE — OUTREACH NOTE
Medical Week 2 Survey      Responses   [facility] patient discharged from?  [place]   Does the patient have one of the following disease processes/diagnoses(primary or secondary)?  Other   Week 2 attempt successful?  No   Unsuccessful attempts  Attempt 2          Jose Quintanilla RN

## 2020-04-09 ENCOUNTER — READMISSION MANAGEMENT (OUTPATIENT)
Dept: CALL CENTER | Facility: HOSPITAL | Age: 80
End: 2020-04-09

## 2020-04-09 NOTE — OUTREACH NOTE
Medical Week 3 Survey      Responses   [facility] patient discharged from?  [place]   COVID-19 Test Status  Not tested   Does the patient have one of the following disease processes/diagnoses(primary or secondary)?  Other   Week 3 attempt successful?  Yes   Call start time  1455   Call end time  1458   Person spoke with today (if not patient) and relationship  Spouse-[place]    Meds reviewed with patient/caregiver?  Yes   Is the patient taking all medications as directed (includes completed medication regime)?  Yes   Has the patient kept scheduled appointments due by today?  Yes   Comments  telehealth appt   What is the patient's perception of their health status since discharge?  Improving   Additional teach back comments  Still has headache.   Week 3 Call Completed?  Yes   Graduated  Yes   Did the patient feel the follow up calls were helpful during their recovery period?  Yes   Was the number of calls appropriate?  Yes   Graduated/Revoked comments  Wife states that he is doing well.  Will call if any problems or questions.          Trini Bustamante RN

## 2020-07-21 ENCOUNTER — LAB REQUISITION (OUTPATIENT)
Dept: LAB | Facility: HOSPITAL | Age: 80
End: 2020-07-21

## 2020-07-21 DIAGNOSIS — Z00.00 ENCOUNTER FOR GENERAL ADULT MEDICAL EXAMINATION WITHOUT ABNORMAL FINDINGS: ICD-10-CM

## 2020-07-21 PROCEDURE — 88305 TISSUE EXAM BY PATHOLOGIST: CPT | Performed by: UROLOGY

## 2020-07-22 LAB
LAB AP CASE REPORT: NORMAL
PATH REPORT.FINAL DX SPEC: NORMAL
PATH REPORT.GROSS SPEC: NORMAL

## 2020-08-18 DIAGNOSIS — K21.9 GASTROESOPHAGEAL REFLUX DISEASE, ESOPHAGITIS PRESENCE NOT SPECIFIED: Primary | ICD-10-CM

## 2020-08-18 RX ORDER — OMEPRAZOLE 40 MG/1
40 CAPSULE, DELAYED RELEASE ORAL DAILY
Qty: 90 CAPSULE | Refills: 4 | Status: SHIPPED | OUTPATIENT
Start: 2020-08-18

## 2020-08-18 NOTE — TELEPHONE ENCOUNTER
Caller: Taty Zayas    Relationship: Emergency Contact    Best call back number: [phone]    Medication needed:   Requested Prescriptions     Pending Prescriptions Disp Refills   • omeprazole (priLOSEC) 40 MG capsule 90 capsule 4     Sig: Take 1 capsule by mouth Daily.       When do you need the refill by: 8/18/2020    What details did the patient provide when requesting the medication: Has no pills left, would like a 10 day supply sent to [facility].    Does the patient have less than a 3 day supply:  [x] Yes  [] No    What is the patient's preferred pharmacy: [facility] - [facility], [place] [address] [phone] [facility] [phone] [facility]

## 2020-11-09 ENCOUNTER — FLU SHOT (OUTPATIENT)
Dept: INTERNAL MEDICINE | Facility: CLINIC | Age: 80
End: 2020-11-09

## 2020-11-09 ENCOUNTER — TRANSCRIBE ORDERS (OUTPATIENT)
Dept: CARDIOLOGY | Facility: HOSPITAL | Age: 80
End: 2020-11-09

## 2020-11-09 ENCOUNTER — TRANSCRIBE ORDERS (OUTPATIENT)
Dept: ADMINISTRATIVE | Facility: HOSPITAL | Age: 80
End: 2020-11-09

## 2020-11-09 ENCOUNTER — LAB (OUTPATIENT)
Dept: LAB | Facility: HOSPITAL | Age: 80
End: 2020-11-09

## 2020-11-09 ENCOUNTER — HOSPITAL ENCOUNTER (OUTPATIENT)
Dept: CARDIOLOGY | Facility: HOSPITAL | Age: 80
Discharge: HOME | End: 2020-11-09

## 2020-11-09 DIAGNOSIS — Z01.811 PRE-OP CHEST EXAM: Primary | ICD-10-CM

## 2020-11-09 DIAGNOSIS — C67.8 MALIGNANT NEOPLASM OF OVERLAPPING SITES OF BLADDER (HCC): ICD-10-CM

## 2020-11-09 DIAGNOSIS — C67.4 MALIGNANT NEOPLASM OF POSTERIOR WALL OF URINARY BLADDER (HCC): ICD-10-CM

## 2020-11-09 DIAGNOSIS — Z23 NEED FOR INFLUENZA VACCINATION: ICD-10-CM

## 2020-11-09 DIAGNOSIS — C67.4 MALIGNANT NEOPLASM OF POSTERIOR WALL OF URINARY BLADDER (HCC): Primary | ICD-10-CM

## 2020-11-09 LAB
ANION GAP SERPL CALCULATED.3IONS-SCNC: 8.4 MMOL/L (ref 5–15)
BASOPHILS # BLD AUTO: 0.05 10*3/MM3 (ref 0–0.2)
BASOPHILS NFR BLD AUTO: 0.6 % (ref 0–1.5)
BUN SERPL-MCNC: 17 MG/DL (ref 8–23)
BUN/CREAT SERPL: 14.7 (ref 7–25)
CALCIUM SPEC-SCNC: 8.9 MG/DL (ref 8.6–10.5)
CHLORIDE SERPL-SCNC: 103 MMOL/L (ref 98–107)
CO2 SERPL-SCNC: 25.6 MMOL/L (ref 22–29)
CREAT SERPL-MCNC: 1.16 MG/DL (ref 0.76–1.27)
DEPRECATED RDW RBC AUTO: 45.3 FL (ref 37–54)
EOSINOPHIL # BLD AUTO: 0.42 10*3/MM3 (ref 0–0.4)
EOSINOPHIL NFR BLD AUTO: 5.4 % (ref 0.3–6.2)
ERYTHROCYTE [DISTWIDTH] IN BLOOD BY AUTOMATED COUNT: 14.1 % (ref 12.3–15.4)
GFR SERPL CREATININE-BSD FRML MDRD: 61 ML/MIN/1.73
GLUCOSE SERPL-MCNC: 83 MG/DL (ref 65–99)
HCT VFR BLD AUTO: 41.1 % (ref 37.5–51)
HGB BLD-MCNC: 13.5 G/DL (ref 13–17.7)
IMM GRANULOCYTES # BLD AUTO: 0.04 10*3/MM3 (ref 0–0.05)
IMM GRANULOCYTES NFR BLD AUTO: 0.5 % (ref 0–0.5)
LYMPHOCYTES # BLD AUTO: 1.19 10*3/MM3 (ref 0.7–3.1)
LYMPHOCYTES NFR BLD AUTO: 15.2 % (ref 19.6–45.3)
MCH RBC QN AUTO: 29.2 PG (ref 26.6–33)
MCHC RBC AUTO-ENTMCNC: 32.8 G/DL (ref 31.5–35.7)
MCV RBC AUTO: 89 FL (ref 79–97)
MONOCYTES # BLD AUTO: 0.72 10*3/MM3 (ref 0.1–0.9)
MONOCYTES NFR BLD AUTO: 9.2 % (ref 5–12)
NEUTROPHILS NFR BLD AUTO: 5.43 10*3/MM3 (ref 1.7–7)
NEUTROPHILS NFR BLD AUTO: 69.1 % (ref 42.7–76)
NRBC BLD AUTO-RTO: 0.1 /100 WBC (ref 0–0.2)
PLATELET # BLD AUTO: 244 10*3/MM3 (ref 140–450)
PMV BLD AUTO: 10.1 FL (ref 6–12)
POTASSIUM SERPL-SCNC: 4.4 MMOL/L (ref 3.5–5.2)
QT INTERVAL: 412 MS
RBC # BLD AUTO: 4.62 10*6/MM3 (ref 4.14–5.8)
SODIUM SERPL-SCNC: 137 MMOL/L (ref 136–145)
WBC # BLD AUTO: 7.85 10*3/MM3 (ref 3.4–10.8)

## 2020-11-09 PROCEDURE — 90694 VACC AIIV4 NO PRSRV 0.5ML IM: CPT | Performed by: PHYSICIAN ASSISTANT

## 2020-11-09 PROCEDURE — 85025 COMPLETE CBC W/AUTO DIFF WBC: CPT

## 2020-11-09 PROCEDURE — G0008 ADMIN INFLUENZA VIRUS VAC: HCPCS | Performed by: PHYSICIAN ASSISTANT

## 2020-11-09 PROCEDURE — 36415 COLL VENOUS BLD VENIPUNCTURE: CPT

## 2020-11-09 PROCEDURE — 80048 BASIC METABOLIC PNL TOTAL CA: CPT

## 2020-11-09 PROCEDURE — 93005 ELECTROCARDIOGRAM TRACING: CPT

## 2020-11-09 PROCEDURE — 93010 ELECTROCARDIOGRAM REPORT: CPT | Performed by: INTERNAL MEDICINE

## 2020-11-17 ENCOUNTER — LAB REQUISITION (OUTPATIENT)
Dept: LAB | Facility: HOSPITAL | Age: 80
End: 2020-11-17

## 2020-11-17 DIAGNOSIS — C67.4 MALIGNANT NEOPLASM OF POSTERIOR WALL OF BLADDER (HCC): ICD-10-CM

## 2020-11-17 PROCEDURE — 88307 TISSUE EXAM BY PATHOLOGIST: CPT | Performed by: UROLOGY

## 2020-11-18 LAB
LAB AP CASE REPORT: NORMAL
PATH REPORT.FINAL DX SPEC: NORMAL
PATH REPORT.GROSS SPEC: NORMAL

## 2020-12-03 ENCOUNTER — OFFICE VISIT (OUTPATIENT)
Dept: NEUROLOGY | Facility: CLINIC | Age: 80
End: 2020-12-03

## 2020-12-03 VITALS
DIASTOLIC BLOOD PRESSURE: 78 MMHG | OXYGEN SATURATION: 98 % | SYSTOLIC BLOOD PRESSURE: 120 MMHG | HEIGHT: 72 IN | BODY MASS INDEX: 26.01 KG/M2 | WEIGHT: 192 LBS | HEART RATE: 73 BPM

## 2020-12-03 DIAGNOSIS — R25.9 MIXED ACTION AND RESTING TREMOR: Primary | ICD-10-CM

## 2020-12-03 DIAGNOSIS — G31.84 MCI (MILD COGNITIVE IMPAIRMENT): ICD-10-CM

## 2020-12-03 DIAGNOSIS — G20 PARKINSON'S DISEASE (HCC): ICD-10-CM

## 2020-12-03 PROBLEM — G20.A1 PARKINSON'S DISEASE: Status: ACTIVE | Noted: 2020-12-03

## 2020-12-03 PROCEDURE — 99215 OFFICE O/P EST HI 40 MIN: CPT | Performed by: NURSE PRACTITIONER

## 2020-12-03 RX ORDER — TIMOLOL MALEATE 5 MG/ML
SOLUTION/ DROPS OPHTHALMIC
COMMUNITY
Start: 2020-09-24 | End: 2021-09-17

## 2020-12-03 NOTE — PATIENT INSTRUCTIONS
Restart Carbidopa/Levodopa, tale 1/2 tab three times daily for 1 week. Best taken without meals.  If doing ok after 1 week increase to 1 pill three times daily

## 2020-12-29 NOTE — ANESTHESIA PREPROCEDURE EVALUATION
Anesthesia Evaluation     Patient summary reviewed and Nursing notes reviewed                Airway   Mallampati: II  TM distance: >3 FB  Neck ROM: full  No difficulty expected  Dental - normal exam     Pulmonary - normal exam   (+) sleep apnea,   Cardiovascular - normal exam    ECG reviewed  Rhythm: regular  Rate: normal    (+) hypertension, CAD, cardiac stents more than 12 months ago angina, hyperlipidemia,       Neuro/Psych  (+) dizziness/light headedness, tremors, psychiatric history Anxiety and Depression,     GI/Hepatic/Renal/Endo    (+)  GERD,      Musculoskeletal     (+) back pain,   Abdominal  - normal exam   Substance History      OB/GYN          Other      history of cancer      Other Comment: Hx of bladder CA                Anesthesia Plan    ASA 3     general     intravenous induction   Anesthetic plan, all risks, benefits, and alternatives have been provided, discussed and informed consent has been obtained with: patient.  Use of blood products discussed with patient .      see above

## 2021-01-13 NOTE — PROGRESS NOTES
CC: Follow-up [facility], tremor  You have chosen to receive care through a telephone visit. Do you consent to use a telephone visit for your medical care today? YES       HPI:  Sukhdev Zayas is a  80 y.o.  right-handed male with HTN, HLD, depression, bladder cancer status post resection intravesicular chemo, CAD status post stents August 2018, and previous back surgery who is being seen in follow-up for tremor, memory loss, and falls.  The patient was most recently seen by me December 3, 2020 and has also been seen by Dr. Chaudhary recently.    Following history was reviewed for accuracy:  He has previously been tried on beta-blocker, primidone, Topamax, amantadine, and gabapentin for his tremor and one of them did seem to help however he had sedation to most of the medications with no particular effect.  Over the past year and a half he has had his tremor worsened in his left hand and develop in his right hand and he is also had some jaw tremor.  He has had significant orthostatic hypotension at times to the point of near syncope.  MRI of the brain with and without contrast completed in April 2019 showed mild small vessel disease but no acute findings.  In April 2019 RPR was nonreactive, TSH was slightly elevated at 5.3, and B12 level was 519.     In December 2019 Dr. Chaudhary started the patient on a trial of carbidopa/levodopa 25/100 mg 3 times a day.  The wife felt that initially the tremor improved however it then returned to its baseline after some time so Dr. Chaudhary increase the carbidopa/levodopa to 2 tablets 3 times a day.  The patient stopped taking this as it caused drowsiness and dizziness/lightheadedness.    When I last saw him little over a month ago he was complaining of some increased falls without dizziness, increased difficulty with turning and shuffling along with worsening short-term memory and worsening tremor.  We tried another trial of carbidopa/levodopa 25/100 mg.  I suggested he started half a pill 3  times daily and increase to 1 pill 3 times daily.  The tremors did get better on the higher dose but he noted such lightheadedness and foggy thinking related to taking the medication that he did not feel that it was worth it. He felt like his tremor was worse on the lower dose. They did not check his BP when he was dizzy. He stopped taking it altogether.    He continues to complain of fatigue and decline in memory as well as headaches associated with weather changes and ongoing back pain.  He has previously not been interested in physical therapy for his back pain.    Patient's father had a tremor.  The patient does not drink or smoke.  He is retired from the  and from [employer].    Past Medical History:   Diagnosis Date   • Anxiety    • Back pain    • Bladder cancer (CMS/HCC) 2018    bladder cancer has been removed.   • Coronary artery disease    • Depression    • Dizziness    • Fatigue    • GERD (gastroesophageal reflux disease)    • History of fractured rib     RIGHT SIDE   • Hyperlipidemia    • Hypertension    • Multiple falls    • Tremors of nervous system    • Urinary incontinence    • Vertigo          Past Surgical History:   Procedure Laterality Date   • CARDIAC CATHETERIZATION      7x, 5 stents   • CATARACT EXTRACTION, BILATERAL     • CHOLECYSTECTOMY N/A 9/2/2019    Procedure: CHOLECYSTECTOMY LAPAROSCOPIC WITH INTRAOPERATIVE CHOLANGIOGRAM;  Surgeon: Bg Rodriguez Jr., MD;  Location: [facility];  Service: General   • COLONOSCOPY     • CORONARY ANGIOPLASTY WITH STENT PLACEMENT      X5    • CYSTOSCOPY BLADDER BIOPSY N/A 1/8/2019    Procedure: CYSTOSCOPY BLADDER BIOPSY;  Surgeon: Wang Soares Jr., MD;  Location: [facility] OR;  Service: Urology   • CYSTOSCOPY BLADDER BIOPSY N/A 6/13/2019    Procedure: CYSTOSCOPY BLADDER BIOPSY;  Surgeon: Wang Soares Jr., MD;  Location: [facility] OR;  Service: Urology   • CYSTOSCOPY TRANSURETHRAL RESECTION OF PROSTATE N/A 7/11/2019     Procedure: CYSTOSCOPY TRANSURETHRAL RESECTION OF PROSTATE;  Surgeon: Wang Soares Jr., MD;  Location: [facility] MAIN OR;  Service: Urology   • CYSTOSCOPY URETEROSCOPY LASER LITHOTRIPSY N/A 6/13/2019    Procedure: CYSTO LITHOPAXY;  Surgeon: Wang Soares Jr., MD;  Location: [facility] MAIN OR;  Service: Urology   • ERCP N/A 9/3/2019    Procedure: ENDOSCOPIC RETROGRADE CHOLANGIOPANCREATOGRAPHY with sphincterotomy and balloon sweep;  Surgeon: Ashok Amaya MD;  Location: [facility] ENDOSCOPY;  Service: Gastroenterology   • EYE SURGERY      Lens implants   • LUMBAR DISCECTOMY FUSION INSTRUMENTATION     • SKIN CANCER EXCISION Left     chest wall   • SKIN CANCER EXCISION  01/21/2021    facial   • TRANSURETHRAL RESECTION OF BLADDER TUMOR N/A 11/29/2018    Procedure: TUR BLADDER TUMOR  LARGE;  Surgeon: Wang Soares Jr., MD;  Location: [facility] MAIN OR;  Service: Urology           Current Outpatient Medications:   •  aspirin 81 MG chewable tablet, Chew 1 tablet Daily., Disp: 30 tablet, Rfl: 0  •  clopidogrel (PLAVIX) 75 MG tablet, Take 1 tablet by mouth Daily. Start on Monday, Disp: , Rfl:   •  FLUoxetine (PROzac) 20 MG capsule, Take 60 mg by mouth Every Night. Pt takes 40 mg + 20 mg capsules = 60 mg daily, Disp: , Rfl:   •  FLUoxetine (PROzac) 40 MG capsule, Take 1 capsule by mouth Daily., Disp: 90 capsule, Rfl: 2  •  fluticasone (FLONASE) 50 MCG/ACT nasal spray, 2 sprays into the nostril(s) as directed by provider Daily., Disp: 3 bottle, Rfl: 3  •  naproxen sodium (ALEVE) 220 MG tablet, Take 220 mg by mouth., Disp: , Rfl:   •  omeprazole (priLOSEC) 40 MG capsule, Take 1 capsule by mouth Daily., Disp: 90 capsule, Rfl: 4  •  timolol (TIMOPTIC) 0.5 % ophthalmic solution, , Disp: , Rfl:   •  VITAMIN D PO, Take  by mouth. 400 mg daily, Disp: , Rfl:   •  carbidopa-levodopa (SINEMET)  MG per tablet, Take 1 tablet by mouth 3 (Three) Times a Day. (Patient taking differently: Take 1 tablet by mouth 3 (Three)  Times a Day. .5 tab tid), Disp: 270 tablet, Rfl: 2  •  Multiple Vitamins-Minerals (ZINC PO), Take  by mouth., Disp: , Rfl:       Family History   Problem Relation Age of Onset   • Arthritis Mother    • Glaucoma Mother    • Heart disease Father    • Emphysema Father    • Tremor Father    • Malig Hyperthermia Neg Hx          Social History     Socioeconomic History   • Marital status:      Spouse name: Not on file   • Number of children: 4   • Years of education: 5 college degrees   • Highest education level: Not on file   Occupational History   • Occupation: Retired   Tobacco Use   • Smoking status: Never Smoker   • Smokeless tobacco: Never Used   Substance and Sexual Activity   • Alcohol use: No     Frequency: Never     Drinks per session: 5 or 6     Binge frequency: Less than monthly     Comment: last drink about 1 year ago    • Drug use: No   • Sexual activity: Defer         No Known Allergies        ROS:  Review of Systems   Constitutional: Positive for fatigue. Negative for activity change, appetite change and unexpected weight change.   HENT: Negative for facial swelling, trouble swallowing and voice change.    Eyes: Negative for photophobia, pain and visual disturbance.   Respiratory: Negative for chest tightness, shortness of breath and wheezing.    Cardiovascular: Negative for chest pain, palpitations and leg swelling.   Gastrointestinal: Negative for abdominal pain, nausea and vomiting.   Endocrine: Positive for cold intolerance. Negative for heat intolerance.   Musculoskeletal: Positive for gait problem (worsening). Negative for arthralgias, back pain, joint swelling, myalgias, neck pain and neck stiffness.   Neurological: Positive for dizziness, tremors (worsening), weakness (general ) and light-headedness. Negative for seizures, syncope, facial asymmetry, speech difficulty, numbness and headaches.   Hematological: Does not bruise/bleed easily.   Psychiatric/Behavioral: Positive for decreased  concentration. Negative for agitation, behavioral problems, confusion, dysphoric mood, hallucinations, self-injury, sleep disturbance and suicidal ideas. The patient is nervous/anxious (nervousness). The patient is not hyperactive.     ROS completed by MA reviewed by me and I agree.     Physical Exam:  There were no vitals filed for this visit.  Orthostatic BP:    There is no height or weight on file to calculate BMI.          Neurological Exam:   Mental Status: Awake, alert, oriented to person, place and time.  Conversant without evidence of an affective disorder, thought disorder, delusions or hallucinations.    HCF: No aphasia or dysarthria.      Lab Results   Component Value Date    GLUCOSE 83 11/09/2020    BUN 17 11/09/2020    CREATININE 1.16 11/09/2020    EGFRIFNONA 61 11/09/2020    EGFRIFAFRI 73 03/03/2020    BCR 14.7 11/09/2020    CO2 25.6 11/09/2020    CALCIUM 8.9 11/09/2020    PROTENTOTREF 6.7 01/16/2020    ALBUMIN 4.10 03/10/2020    LABIL2 1.2 01/16/2020    AST 45 (H) 03/10/2020    ALT 5 03/10/2020       Lab Results   Component Value Date    WBC 7.85 11/09/2020    HGB 13.5 11/09/2020    HCT 41.1 11/09/2020    MCV 89.0 11/09/2020     11/09/2020         .  Lab Results   Component Value Date    RPR Non-Reactive 04/11/2019         Lab Results   Component Value Date    TSH 2.350 01/14/2020         Lab Results   Component Value Date    [id number] 564 01/16/2020         No results found for: FOLATE      No results found for: HGBA1C      Lab Results   Component Value Date    GLUCOSE 83 11/09/2020    BUN 17 11/09/2020    CREATININE 1.16 11/09/2020    EGFRIFNONA 61 11/09/2020    EGFRIFAFRI 73 03/03/2020    BCR 14.7 11/09/2020    K 4.4 11/09/2020    CO2 25.6 11/09/2020    CALCIUM 8.9 11/09/2020    PROTENTOTREF 6.7 01/16/2020    ALBUMIN 4.10 03/10/2020    LABIL2 1.2 01/16/2020    AST 45 (H) 03/10/2020    ALT 5 03/10/2020         Lab Results   Component Value Date    WBC 7.85 11/09/2020    HGB 13.5 11/09/2020     HCT 41.1 11/09/2020    MCV 89.0 11/09/2020     11/09/2020             Assessment:   Mixed tremor  MCI          Plan:  Referral for second opinion, Dr Polo Jensen.   Will go ahead and schedule him for 6 mo f/u here.     This visit has been rescheduled as a phone visit to comply with patient safety concerns in accordance with CDC recommendations. Total time of discussion was 12 minutes.                            Dictated utilizing Dragon dictation.

## 2021-01-21 ENCOUNTER — OFFICE VISIT (OUTPATIENT)
Dept: NEUROLOGY | Facility: CLINIC | Age: 81
End: 2021-01-21

## 2021-01-21 DIAGNOSIS — R25.9 MIXED ACTION AND RESTING TREMOR: Primary | ICD-10-CM

## 2021-01-21 DIAGNOSIS — G20 PARKINSONISM, UNSPECIFIED PARKINSONISM TYPE (HCC): ICD-10-CM

## 2021-01-21 PROCEDURE — 99442 PR PHYS/QHP TELEPHONE EVALUATION 11-20 MIN: CPT | Performed by: NURSE PRACTITIONER

## 2021-01-22 NOTE — PATIENT INSTRUCTIONS

## 2021-03-04 DIAGNOSIS — Z23 IMMUNIZATION DUE: ICD-10-CM

## 2021-03-09 ENCOUNTER — LAB REQUISITION (OUTPATIENT)
Dept: LAB | Facility: HOSPITAL | Age: 81
End: 2021-03-09

## 2021-03-09 DIAGNOSIS — C67.4 MALIGNANT NEOPLASM OF POSTERIOR WALL OF BLADDER (HCC): ICD-10-CM

## 2021-03-09 PROCEDURE — 88307 TISSUE EXAM BY PATHOLOGIST: CPT | Performed by: UROLOGY

## 2021-03-10 LAB
LAB AP CASE REPORT: NORMAL
LAB AP SYNOPTIC CHECKLIST: NORMAL
PATH REPORT.FINAL DX SPEC: NORMAL
PATH REPORT.GROSS SPEC: NORMAL

## 2021-08-23 ENCOUNTER — TELEPHONE (OUTPATIENT)
Dept: INTERNAL MEDICINE | Facility: CLINIC | Age: 81
End: 2021-08-23

## 2021-08-23 NOTE — TELEPHONE ENCOUNTER
Caller: Taty Zayas    Relationship: Emergency Contact    Best call back number: [phone]     What is the best time to reach you: ANY TIME     Who are you requesting to speak with (clinical staff, provider,  specific staff member): CLINICAL STAFF     What was the call regarding: PATIENT'S WIFE CALLED WANTING TO GET PATIENT SET UP FOR THE COVID-19 BOOSTER SHOT. SHE IS CURIOUS WHAT REQUIREMENTS THERE ARE AND WHAT MAKE A PERSON ELIGABLE FOR ONE. PATIENT DOES HAVE REOCCURRING BLADDER CANCER, THAT POPS UP EVERY 3 MONTHS OR SO, AND HE TAKES CHEMO TREATMENTS 1-2 DOSES EVERY 3 MONTHS.    DUE TO HIS EXCESSIVE EXPOSURE, THEY ARE WANTING TO SEE ABOUT GETTING THE BOOSTER VACCINE.       Do you require a callback: YES PLEASE

## 2021-09-15 ENCOUNTER — OFFICE VISIT (OUTPATIENT)
Dept: INTERNAL MEDICINE | Facility: CLINIC | Age: 81
End: 2021-09-15

## 2021-09-15 VITALS
WEIGHT: 187 LBS | SYSTOLIC BLOOD PRESSURE: 122 MMHG | BODY MASS INDEX: 25.33 KG/M2 | TEMPERATURE: 96.9 F | HEIGHT: 72 IN | DIASTOLIC BLOOD PRESSURE: 80 MMHG

## 2021-09-15 DIAGNOSIS — R53.83 FATIGUE, UNSPECIFIED TYPE: Primary | ICD-10-CM

## 2021-09-15 DIAGNOSIS — F39 MOOD DISORDER (HCC): ICD-10-CM

## 2021-09-15 PROCEDURE — 99214 OFFICE O/P EST MOD 30 MIN: CPT | Performed by: PHYSICIAN ASSISTANT

## 2021-09-15 RX ORDER — BUPROPION HYDROCHLORIDE 150 MG/1
150 TABLET ORAL DAILY
Qty: 90 TABLET | Refills: 0 | Status: SHIPPED | OUTPATIENT
Start: 2021-09-15 | End: 2021-09-27

## 2021-09-15 RX ORDER — B-COMPLEX WITH VITAMIN C
TABLET ORAL
COMMUNITY
End: 2022-03-21

## 2021-09-16 LAB
BASOPHILS # BLD AUTO: 0.05 10*3/MM3 (ref 0–0.2)
BASOPHILS NFR BLD AUTO: 0.6 % (ref 0–1.5)
EOSINOPHIL # BLD AUTO: 0.54 10*3/MM3 (ref 0–0.4)
EOSINOPHIL NFR BLD AUTO: 6.8 % (ref 0.3–6.2)
ERYTHROCYTE [DISTWIDTH] IN BLOOD BY AUTOMATED COUNT: 12.6 % (ref 12.3–15.4)
HCT VFR BLD AUTO: 46.3 % (ref 37.5–51)
HGB BLD-MCNC: 15.6 G/DL (ref 13–17.7)
IMM GRANULOCYTES # BLD AUTO: 0.03 10*3/MM3 (ref 0–0.05)
IMM GRANULOCYTES NFR BLD AUTO: 0.4 % (ref 0–0.5)
LYMPHOCYTES # BLD AUTO: 1.21 10*3/MM3 (ref 0.7–3.1)
LYMPHOCYTES NFR BLD AUTO: 15.1 % (ref 19.6–45.3)
MCH RBC QN AUTO: 32.6 PG (ref 26.6–33)
MCHC RBC AUTO-ENTMCNC: 33.7 G/DL (ref 31.5–35.7)
MCV RBC AUTO: 96.7 FL (ref 79–97)
MONOCYTES # BLD AUTO: 0.61 10*3/MM3 (ref 0.1–0.9)
MONOCYTES NFR BLD AUTO: 7.6 % (ref 5–12)
NEUTROPHILS # BLD AUTO: 5.56 10*3/MM3 (ref 1.7–7)
NEUTROPHILS NFR BLD AUTO: 69.5 % (ref 42.7–76)
NRBC BLD AUTO-RTO: 0 /100 WBC (ref 0–0.2)
PLATELET # BLD AUTO: 254 10*3/MM3 (ref 140–450)
RBC # BLD AUTO: 4.79 10*6/MM3 (ref 4.14–5.8)
TSH SERPL DL<=0.005 MIU/L-ACNC: 7.6 UIU/ML (ref 0.27–4.2)
VIT B12 SERPL-MCNC: 1736 PG/ML (ref 211–946)
WBC # BLD AUTO: 8 10*3/MM3 (ref 3.4–10.8)

## 2021-09-17 ENCOUNTER — TELEPHONE (OUTPATIENT)
Dept: INTERNAL MEDICINE | Facility: CLINIC | Age: 81
End: 2021-09-17

## 2021-09-17 NOTE — TELEPHONE ENCOUNTER
Caller: Tayt Zayas    Relationship: Emergency Contact    Best call back number: [phone] (H)    Caller requesting test results: PATIENTS WIFE    What test was performed: LABS    When was the test performed: 9/15/21    Where was the test performed: IN OFFICE    Additional notes: PLEASE CALL WITH LAB RESULTS

## 2021-09-17 NOTE — PROGRESS NOTES
Subjective   Chief Complaint   Patient presents with   • Dizziness   • Depression       History of Present Illness     Pt is here today with his wife with cc of worsening mood. He had tried increasing his Prozac to 60 mg daily for the last 6 months with no real improvement in his symptoms. He states his tremors are worse and his ability to do is decreasing which has been increasingly difficult for him. He notes his energy level have been decreased and been more fatigued lately. It could be from the depression he states. He has never seen a therapist. His dizziness is unchanged, and being followed by neurology.      Patient Active Problem List   Diagnosis   • Hyperlipidemia   • Coronary artery disease involving native coronary artery of native heart without angina pectoris   • MCI (mild cognitive impairment)   • Mood disorder (CMS/HCC)   • Closed wedge compression fracture of third thoracic vertebra with routine healing   • GERD (gastroesophageal reflux disease)   • S/P drug eluting coronary stent placement   • Chest pain   • Diastolic dysfunction   • Exertional angina (CMS/HCC)   • Unstable angina (CMS/HCC)   • Bladder mass   • Mixed action and resting tremor   • Bladder cancer (CMS/HCC)   • Generalized weakness   • Dyspnea on exertion   • BPH (benign prostatic hyperplasia)   • Calculus of gallbladder without cholecystitis   • Elevated LFTs   • Calculus of bile duct with acute cholecystitis without obstruction   • Atrial fibrillation (CMS/HCC)   • Essential hypertension   • Elevated bilirubin   • Dizziness   • Beta-blocker intolerance   • Orthostatic hypotension   • Acute urinary retention   • Parkinson's disease (CMS/HCC)       No Known Allergies    Current Outpatient Medications on File Prior to Visit   Medication Sig Dispense Refill   • aspirin 81 MG chewable tablet Chew 1 tablet Daily. 30 tablet 0   • clopidogrel (PLAVIX) 75 MG tablet Take 1 tablet by mouth Daily. Start on Monday     • FLUoxetine (PROzac) 40 MG  capsule Take 1 capsule by mouth Daily. 90 capsule 2   • fluticasone (FLONASE) 50 MCG/ACT nasal spray 2 sprays into the nostril(s) as directed by provider Daily. 3 bottle 3   • naproxen sodium (ALEVE) 220 MG tablet Take 220 mg by mouth.     • omeprazole (priLOSEC) 40 MG capsule Take 1 capsule by mouth Daily. 90 capsule 4   • VITAMIN D PO Take  by mouth. 400 mg daily     • Zinc 100 MG tablet Take  by mouth.     • timolol (TIMOPTIC) 0.5 % ophthalmic solution        No current facility-administered medications on file prior to visit.       Past Medical History:   Diagnosis Date   • Anxiety    • Back pain    • Bladder cancer (CMS/[facility]) 2018    bladder cancer has been removed.   • Coronary artery disease    • Depression    • Dizziness    • Fatigue    • GERD (gastroesophageal reflux disease)    • History of fractured rib     RIGHT SIDE   • Hyperlipidemia    • Hypertension    • Multiple falls    • Tremors of nervous system    • Urinary incontinence    • Vertigo        Family History   Problem Relation Age of Onset   • Arthritis Mother    • Glaucoma Mother    • Heart disease Father    • Emphysema Father    • Tremor Father    • Malig Hyperthermia Neg Hx        Social History     Socioeconomic History   • Marital status:      Spouse name: Not on file   • Number of children: 4   • Years of education: 5 college degrees   • Highest education level: Not on file   Tobacco Use   • Smoking status: Never Smoker   • Smokeless tobacco: Never Used   Substance and Sexual Activity   • Alcohol use: No     Comment: last drink about 1 year ago    • Drug use: No   • Sexual activity: Defer       Past Surgical History:   Procedure Laterality Date   • CARDIAC CATHETERIZATION      7x, 5 stents   • CATARACT EXTRACTION, BILATERAL     • CHOLECYSTECTOMY N/A 9/2/2019    Procedure: CHOLECYSTECTOMY LAPAROSCOPIC WITH INTRAOPERATIVE CHOLANGIOGRAM;  Surgeon: Bg Rodriguez Jr., MD;  Location: [facility] OR;  Service: General   • COLONOSCOPY     •  CORONARY ANGIOPLASTY WITH STENT PLACEMENT      X5    • CYSTOSCOPY BLADDER BIOPSY N/A 1/8/2019    Procedure: CYSTOSCOPY BLADDER BIOPSY;  Surgeon: Wang Soares Jr., MD;  Location: [facility] OR;  Service: Urology   • CYSTOSCOPY BLADDER BIOPSY N/A 6/13/2019    Procedure: CYSTOSCOPY BLADDER BIOPSY;  Surgeon: Wang Soares Jr., MD;  Location: [facility] OR;  Service: Urology   • CYSTOSCOPY TRANSURETHRAL RESECTION OF PROSTATE N/A 7/11/2019    Procedure: CYSTOSCOPY TRANSURETHRAL RESECTION OF PROSTATE;  Surgeon: Wang Soares Jr., MD;  Location: [facility] OR;  Service: Urology   • CYSTOSCOPY URETEROSCOPY LASER LITHOTRIPSY N/A 6/13/2019    Procedure: CYSTO LITHOPAXY;  Surgeon: Wang Soares Jr., MD;  Location: [facility] OR;  Service: Urology   • ERCP N/A 9/3/2019    Procedure: ENDOSCOPIC RETROGRADE CHOLANGIOPANCREATOGRAPHY with sphincterotomy and balloon sweep;  Surgeon: Ashok Amaya MD;  Location: [facility] ENDOSCOPY;  Service: Gastroenterology   • EYE SURGERY      Lens implants   • LUMBAR DISCECTOMY FUSION INSTRUMENTATION     • SKIN CANCER EXCISION Left     chest wall   • SKIN CANCER EXCISION  01/21/2021    facial   • TRANSURETHRAL RESECTION OF BLADDER TUMOR N/A 11/29/2018    Procedure: TUR BLADDER TUMOR  LARGE;  Surgeon: Wang Soares Jr., MD;  Location: [facility];  Service: Urology         The following portions of the patient's history were reviewed and updated as appropriate: problem list, allergies, current medications, past medical history, past family history, past social history and past surgical history.    Review of Systems   Constitutional: Positive for malaise/fatigue.   Neurological: Positive for dizziness and tremors (chronic).   Psychiatric/Behavioral: Positive for depression. The patient is not nervous/anxious.        Immunization History   Administered Date(s) Administered   • COVID-19 (PFIZER) 01/26/2021, 02/20/2021, 08/23/2021   • Fluad Quad 65+  "11/09/2020   • Fluzone High Dose =>65 Years ([employer] ONLY) 10/12/2016   • Td, Not Adsorbed 03/25/2017   • flucelvax quad pfs =>4 YRS 11/30/2018       Objective   Vitals:    09/15/21 1101 09/15/21 1124   BP:  122/80   Temp: 96.9 °F (36.1 °C)    Weight: 84.8 kg (187 lb)    Height: 182.9 cm (72\")      Body mass index is 25.36 kg/m².  Physical Exam  Vitals reviewed.   Constitutional:       Appearance: He is well-developed.   HENT:      Head: Normocephalic and atraumatic.   Cardiovascular:      Rate and Rhythm: Normal rate and regular rhythm.      Heart sounds: Normal heart sounds, S1 normal and S2 normal.   Pulmonary:      Effort: Pulmonary effort is normal.      Breath sounds: Normal breath sounds.   Skin:     General: Skin is warm.   Neurological:      Mental Status: He is alert.   Psychiatric:         Mood and Affect: Mood normal.         Behavior: Behavior normal.         Thought Content: Thought content normal.         Judgment: Judgment normal.       Assessment/Plan   Diagnoses and all orders for this visit:    1. Fatigue, unspecified type (Primary)  Comments:  will get labs today, likely coming from uncontrolled depression sx  Orders:  -     CBC & Differential  -     TSH  -     Vitamin B12    2. Mood disorder (CMS/HCC)  Comments:  I am going to add low dose wellbutrin and see if this helps. i have also recommended Parvez Julian for therapy as well    Other orders  -     buPROPion XL (Wellbutrin XL) 150 MG 24 hr tablet; Take 1 tablet by mouth Daily.  Dispense: 90 tablet; Refill: 0      Return in about 4 weeks (around 10/13/2021) for Lab Today.           "

## 2021-09-18 LAB
Lab: NORMAL
T4 FREE SERPL-MCNC: 0.94 NG/DL (ref 0.93–1.7)
WRITTEN AUTHORIZATION: NORMAL

## 2021-09-20 RX ORDER — LEVOTHYROXINE SODIUM 0.03 MG/1
25 TABLET ORAL DAILY
Qty: 90 TABLET | Refills: 1 | Status: SHIPPED | OUTPATIENT
Start: 2021-09-20 | End: 2022-07-01

## 2021-09-20 NOTE — TELEPHONE ENCOUNTER
Caller: Taty Zayas    Relationship: Emergency Contact    Best call back number: [phone]     Caller requesting test results:  [place]     What test was performed: labs    When was the test performed: 9/15/2021     Where was the test performed: office     Additional notes: HUB ATTEMPTED TO WARM TRANSFER NOT SUCCESSFUL

## 2021-09-23 ENCOUNTER — HOSPITAL ENCOUNTER (OUTPATIENT)
Dept: CARDIOLOGY | Facility: HOSPITAL | Age: 81
Discharge: HOME | End: 2021-09-23

## 2021-09-23 ENCOUNTER — TRANSCRIBE ORDERS (OUTPATIENT)
Dept: ADMINISTRATIVE | Facility: HOSPITAL | Age: 81
End: 2021-09-23

## 2021-09-23 ENCOUNTER — LAB (OUTPATIENT)
Dept: LAB | Facility: HOSPITAL | Age: 81
End: 2021-09-23

## 2021-09-23 ENCOUNTER — TRANSCRIBE ORDERS (OUTPATIENT)
Dept: CARDIOLOGY | Facility: HOSPITAL | Age: 81
End: 2021-09-23

## 2021-09-23 DIAGNOSIS — C67.9 MALIGNANT NEOPLASM OF URINARY BLADDER, UNSPECIFIED SITE (HCC): ICD-10-CM

## 2021-09-23 DIAGNOSIS — Z01.811 PRE-OP CHEST EXAM: Primary | ICD-10-CM

## 2021-09-23 DIAGNOSIS — C67.9 MALIGNANT NEOPLASM OF URINARY BLADDER, UNSPECIFIED SITE (HCC): Primary | ICD-10-CM

## 2021-09-23 LAB
ANION GAP SERPL CALCULATED.3IONS-SCNC: 12.1 MMOL/L (ref 5–15)
BASOPHILS # BLD AUTO: 0.04 10*3/MM3 (ref 0–0.2)
BASOPHILS NFR BLD AUTO: 0.5 % (ref 0–1.5)
BUN SERPL-MCNC: 15 MG/DL (ref 8–23)
BUN/CREAT SERPL: 13.2 (ref 7–25)
CALCIUM SPEC-SCNC: 9.6 MG/DL (ref 8.6–10.5)
CHLORIDE SERPL-SCNC: 101 MMOL/L (ref 98–107)
CO2 SERPL-SCNC: 23.9 MMOL/L (ref 22–29)
CREAT SERPL-MCNC: 1.14 MG/DL (ref 0.76–1.27)
DEPRECATED RDW RBC AUTO: 44.3 FL (ref 37–54)
EOSINOPHIL # BLD AUTO: 0.22 10*3/MM3 (ref 0–0.4)
EOSINOPHIL NFR BLD AUTO: 2.7 % (ref 0.3–6.2)
ERYTHROCYTE [DISTWIDTH] IN BLOOD BY AUTOMATED COUNT: 12.4 % (ref 12.3–15.4)
GFR SERPL CREATININE-BSD FRML MDRD: 62 ML/MIN/1.73
GLUCOSE SERPL-MCNC: 135 MG/DL (ref 65–99)
HCT VFR BLD AUTO: 45.6 % (ref 37.5–51)
HGB BLD-MCNC: 15.5 G/DL (ref 13–17.7)
IMM GRANULOCYTES # BLD AUTO: 0.04 10*3/MM3 (ref 0–0.05)
IMM GRANULOCYTES NFR BLD AUTO: 0.5 % (ref 0–0.5)
LYMPHOCYTES # BLD AUTO: 1.17 10*3/MM3 (ref 0.7–3.1)
LYMPHOCYTES NFR BLD AUTO: 14.5 % (ref 19.6–45.3)
MCH RBC QN AUTO: 33 PG (ref 26.6–33)
MCHC RBC AUTO-ENTMCNC: 34 G/DL (ref 31.5–35.7)
MCV RBC AUTO: 97 FL (ref 79–97)
MONOCYTES # BLD AUTO: 0.52 10*3/MM3 (ref 0.1–0.9)
MONOCYTES NFR BLD AUTO: 6.5 % (ref 5–12)
NEUTROPHILS NFR BLD AUTO: 6.06 10*3/MM3 (ref 1.7–7)
NEUTROPHILS NFR BLD AUTO: 75.3 % (ref 42.7–76)
NRBC BLD AUTO-RTO: 0 /100 WBC (ref 0–0.2)
PLATELET # BLD AUTO: 269 10*3/MM3 (ref 140–450)
PMV BLD AUTO: 9.7 FL (ref 6–12)
POTASSIUM SERPL-SCNC: 4.3 MMOL/L (ref 3.5–5.2)
QT INTERVAL: 435 MS
RBC # BLD AUTO: 4.7 10*6/MM3 (ref 4.14–5.8)
SODIUM SERPL-SCNC: 137 MMOL/L (ref 136–145)
WBC # BLD AUTO: 8.05 10*3/MM3 (ref 3.4–10.8)

## 2021-09-23 PROCEDURE — 93010 ELECTROCARDIOGRAM REPORT: CPT | Performed by: INTERNAL MEDICINE

## 2021-09-23 PROCEDURE — 93005 ELECTROCARDIOGRAM TRACING: CPT

## 2021-09-23 PROCEDURE — 36415 COLL VENOUS BLD VENIPUNCTURE: CPT

## 2021-09-23 PROCEDURE — 85025 COMPLETE CBC W/AUTO DIFF WBC: CPT

## 2021-09-23 PROCEDURE — 80048 BASIC METABOLIC PNL TOTAL CA: CPT

## 2021-09-27 ENCOUNTER — OFFICE VISIT (OUTPATIENT)
Dept: INTERNAL MEDICINE | Facility: CLINIC | Age: 81
End: 2021-09-27

## 2021-09-27 ENCOUNTER — TELEPHONE (OUTPATIENT)
Dept: INTERNAL MEDICINE | Facility: CLINIC | Age: 81
End: 2021-09-27

## 2021-09-27 DIAGNOSIS — T50.905A ADVERSE EFFECT OF DRUG, INITIAL ENCOUNTER: ICD-10-CM

## 2021-09-27 DIAGNOSIS — G89.29 CHRONIC MIDLINE LOW BACK PAIN WITH SCIATICA, SCIATICA LATERALITY UNSPECIFIED: Primary | ICD-10-CM

## 2021-09-27 DIAGNOSIS — M54.40 CHRONIC MIDLINE LOW BACK PAIN WITH SCIATICA, SCIATICA LATERALITY UNSPECIFIED: Primary | ICD-10-CM

## 2021-09-27 PROCEDURE — 99213 OFFICE O/P EST LOW 20 MIN: CPT | Performed by: PHYSICIAN ASSISTANT

## 2021-09-27 NOTE — PROGRESS NOTES
Subjective   Chief Complaint   Patient presents with   • Dizziness   • Medication Problem       History of Present Illness     Started pt on low dose wellbutrin 10 days ago. Since then dizziness has been worse as has mental cognition. He has not taken the medication today. He did see Parvez Julian and is going to give therapy a try. He has a biopsy tomorrow.      Patient Active Problem List   Diagnosis   • Hyperlipidemia   • Coronary artery disease involving native coronary artery of native heart without angina pectoris   • MCI (mild cognitive impairment)   • Mood disorder (CMS/HCC)   • Closed wedge compression fracture of third thoracic vertebra with routine healing   • GERD (gastroesophageal reflux disease)   • S/P drug eluting coronary stent placement   • Chest pain   • Diastolic dysfunction   • Exertional angina (CMS/HCC)   • Unstable angina (CMS/HCC)   • Bladder mass   • Mixed action and resting tremor   • Bladder cancer (CMS/HCC)   • Generalized weakness   • Dyspnea on exertion   • BPH (benign prostatic hyperplasia)   • Calculus of gallbladder without cholecystitis   • Elevated LFTs   • Calculus of bile duct with acute cholecystitis without obstruction   • Atrial fibrillation (CMS/HCC)   • Essential hypertension   • Elevated bilirubin   • Dizziness   • Beta-blocker intolerance   • Orthostatic hypotension   • Acute urinary retention   • Parkinson's disease (CMS/HCC)       Allergies   Allergen Reactions   • Wellbutrin [Bupropion] Dizziness and Delirium       Current Outpatient Medications on File Prior to Visit   Medication Sig Dispense Refill   • aspirin 81 MG chewable tablet Chew 1 tablet Daily. 30 tablet 0   • clopidogrel (PLAVIX) 75 MG tablet Take 1 tablet by mouth Daily. Start on Monday     • FLUoxetine (PROzac) 40 MG capsule Take 1 capsule by mouth Daily. 90 capsule 2   • fluticasone (FLONASE) 50 MCG/ACT nasal spray 2 sprays into the nostril(s) as directed by provider Daily. 3 bottle 3   • levothyroxine  (SYNTHROID, LEVOTHROID) 25 MCG tablet Take 1 tablet by mouth Daily. 90 tablet 1   • naproxen sodium (ALEVE) 220 MG tablet Take 220 mg by mouth.     • omeprazole (priLOSEC) 40 MG capsule Take 1 capsule by mouth Daily. 90 capsule 4   • VITAMIN D PO Take  by mouth. 400 mg daily     • Zinc 100 MG tablet Take  by mouth.       No current facility-administered medications on file prior to visit.       Past Medical History:   Diagnosis Date   • Anxiety    • Back pain    • Bladder cancer (CMS/HCC) 2018    bladder cancer has been removed.   • Coronary artery disease    • Depression    • Dizziness    • Fatigue    • GERD (gastroesophageal reflux disease)    • History of fractured rib     RIGHT SIDE   • Hyperlipidemia    • Hypertension    • Multiple falls    • Tremors of nervous system    • Urinary incontinence    • Vertigo        Family History   Problem Relation Age of Onset   • Arthritis Mother    • Glaucoma Mother    • Heart disease Father    • Emphysema Father    • Tremor Father    • Malig Hyperthermia Neg Hx        Social History     Socioeconomic History   • Marital status:      Spouse name: Not on file   • Number of children: 4   • Years of education: 5 college degrees   • Highest education level: Not on file   Tobacco Use   • Smoking status: Never Smoker   • Smokeless tobacco: Never Used   Substance and Sexual Activity   • Alcohol use: No     Comment: last drink about 1 year ago    • Drug use: No   • Sexual activity: Defer       Past Surgical History:   Procedure Laterality Date   • CARDIAC CATHETERIZATION      7x, 5 stents   • CATARACT EXTRACTION, BILATERAL     • CHOLECYSTECTOMY N/A 9/2/2019    Procedure: CHOLECYSTECTOMY LAPAROSCOPIC WITH INTRAOPERATIVE CHOLANGIOGRAM;  Surgeon: Bg Rodriguez Jr., MD;  Location: [facility];  Service: General   • COLONOSCOPY     • CORONARY ANGIOPLASTY WITH STENT PLACEMENT      X5    • CYSTOSCOPY BLADDER BIOPSY N/A 1/8/2019    Procedure: CYSTOSCOPY BLADDER BIOPSY;  Surgeon:  Wang Soares Jr., MD;  Location: [facility] OR;  Service: Urology   • CYSTOSCOPY BLADDER BIOPSY N/A 6/13/2019    Procedure: CYSTOSCOPY BLADDER BIOPSY;  Surgeon: Wang Soares Jr., MD;  Location: [facility] MAIN OR;  Service: Urology   • CYSTOSCOPY TRANSURETHRAL RESECTION OF PROSTATE N/A 7/11/2019    Procedure: CYSTOSCOPY TRANSURETHRAL RESECTION OF PROSTATE;  Surgeon: Wang Soares Jr., MD;  Location: [facility] MAIN OR;  Service: Urology   • CYSTOSCOPY URETEROSCOPY LASER LITHOTRIPSY N/A 6/13/2019    Procedure: CYSTO LITHOPAXY;  Surgeon: Wang Soares Jr., MD;  Location: [facility] MAIN OR;  Service: Urology   • ERCP N/A 9/3/2019    Procedure: ENDOSCOPIC RETROGRADE CHOLANGIOPANCREATOGRAPHY with sphincterotomy and balloon sweep;  Surgeon: Ashok Amaya MD;  Location: [facility] ENDOSCOPY;  Service: Gastroenterology   • EYE SURGERY      Lens implants   • LUMBAR DISCECTOMY FUSION INSTRUMENTATION     • SKIN CANCER EXCISION Left     chest wall   • SKIN CANCER EXCISION  01/21/2021    facial   • TRANSURETHRAL RESECTION OF BLADDER TUMOR N/A 11/29/2018    Procedure: TUR BLADDER TUMOR  LARGE;  Surgeon: Wang Soares Jr., MD;  Location: [facility] OR;  Service: Urology         The following portions of the patient's history were reviewed and updated as appropriate: problem list, allergies, current medications, past medical history, past family history, past social history and past surgical history.    Review of Systems   Neurological: Positive for dizziness.   Psychiatric/Behavioral: Positive for altered mental status.       Immunization History   Administered Date(s) Administered   • COVID-19 (PFIZER) 01/26/2021, 02/20/2021, 08/23/2021   • Fluad Quad 65+ 11/09/2020   • Fluzone High Dose =>65 Years ([employer] ONLY) 10/12/2016   • Td, Not Adsorbed 03/25/2017   • flucelvax quad pfs =>4 YRS 11/30/2018       Objective   Vitals:    09/27/21 1445 09/28/21 0914   BP:  123/68   Temp: 97.8 °F (36.6 °C)   "  Weight: 84.8 kg (187 lb)    Height: 182.9 cm (72\")      Body mass index is 25.36 kg/m².  Physical Exam  Vitals reviewed.   Constitutional:       Appearance: Normal appearance.   HENT:      Head: Normocephalic and atraumatic.   Cardiovascular:      Rate and Rhythm: Normal rate and regular rhythm.      Heart sounds: Normal heart sounds.   Neurological:      Mental Status: He is alert.           Assessment/Plan   Diagnoses and all orders for this visit:    1. Chronic midline low back pain with sciatica, sciatica laterality unspecified (Primary)  -     MRI Lumbar Spine Without Contrast; Future    2. Adverse effect of drug, initial encounter    worsened sx are from the wellbutrin, he is a little better today. Will stop completely, sx should resolve in a few days. His back pain has been worsening, has not had a MRI in 3 years will get updated imaging today as would consider epidurals for txt.              "

## 2021-09-28 ENCOUNTER — LAB REQUISITION (OUTPATIENT)
Dept: LAB | Facility: HOSPITAL | Age: 81
End: 2021-09-28

## 2021-09-28 VITALS
HEIGHT: 72 IN | TEMPERATURE: 97.8 F | DIASTOLIC BLOOD PRESSURE: 68 MMHG | SYSTOLIC BLOOD PRESSURE: 123 MMHG | WEIGHT: 187 LBS | BODY MASS INDEX: 25.33 KG/M2

## 2021-09-28 DIAGNOSIS — C67.4 MALIGNANT NEOPLASM OF POSTERIOR WALL OF BLADDER (HCC): ICD-10-CM

## 2021-09-28 PROCEDURE — 88305 TISSUE EXAM BY PATHOLOGIST: CPT | Performed by: UROLOGY

## 2021-09-29 LAB
LAB AP CASE REPORT: NORMAL
LAB AP DIAGNOSIS COMMENT: NORMAL
PATH REPORT.FINAL DX SPEC: NORMAL
PATH REPORT.GROSS SPEC: NORMAL

## 2021-10-20 ENCOUNTER — OFFICE VISIT (OUTPATIENT)
Dept: INTERNAL MEDICINE | Facility: CLINIC | Age: 81
End: 2021-10-20

## 2021-10-20 VITALS
SYSTOLIC BLOOD PRESSURE: 120 MMHG | DIASTOLIC BLOOD PRESSURE: 68 MMHG | HEIGHT: 72 IN | WEIGHT: 191.2 LBS | BODY MASS INDEX: 25.9 KG/M2

## 2021-10-20 DIAGNOSIS — E03.9 HYPOTHYROIDISM, UNSPECIFIED TYPE: ICD-10-CM

## 2021-10-20 DIAGNOSIS — Z23 FLU VACCINE NEED: ICD-10-CM

## 2021-10-20 DIAGNOSIS — G43.901 MIGRAINE WITH STATUS MIGRAINOSUS, NOT INTRACTABLE, UNSPECIFIED MIGRAINE TYPE: Primary | ICD-10-CM

## 2021-10-20 PROCEDURE — G0008 ADMIN INFLUENZA VIRUS VAC: HCPCS | Performed by: PHYSICIAN ASSISTANT

## 2021-10-20 PROCEDURE — 99213 OFFICE O/P EST LOW 20 MIN: CPT | Performed by: PHYSICIAN ASSISTANT

## 2021-10-20 PROCEDURE — 90662 IIV NO PRSV INCREASED AG IM: CPT | Performed by: PHYSICIAN ASSISTANT

## 2021-10-20 NOTE — PROGRESS NOTES
Subjective   Chief Complaint   Patient presents with   • Hypothyroidism       History of Present Illness     Pt is here today for fup on his hypothyroidism. Has had more energy and felt better since taking. Depression sx are doing ok without medication at this time. Is about to start treatment again for his recurrent bladder cancer. Needs flu shot today.      Patient Active Problem List   Diagnosis   • Hyperlipidemia   • Coronary artery disease involving native coronary artery of native heart without angina pectoris   • MCI (mild cognitive impairment)   • Mood disorder ([facility])   • Closed wedge compression fracture of third thoracic vertebra with routine healing   • GERD (gastroesophageal reflux disease)   • S/P drug eluting coronary stent placement   • Chest pain   • Diastolic dysfunction   • Exertional angina ([facility])   • Unstable angina ([facility])   • Bladder mass   • Mixed action and resting tremor   • Bladder cancer ([facility])   • Generalized weakness   • Dyspnea on exertion   • BPH (benign prostatic hyperplasia)   • Calculus of gallbladder without cholecystitis   • Elevated LFTs   • Calculus of bile duct with acute cholecystitis without obstruction   • Atrial fibrillation ([facility])   • Essential hypertension   • Elevated bilirubin   • Dizziness   • Beta-blocker intolerance   • Orthostatic hypotension   • Acute urinary retention   • Parkinson's disease ([facility])       Allergies   Allergen Reactions   • Wellbutrin [Bupropion] Dizziness and Delirium       Current Outpatient Medications on File Prior to Visit   Medication Sig Dispense Refill   • aspirin 81 MG chewable tablet Chew 1 tablet Daily. 30 tablet 0   • clopidogrel (PLAVIX) 75 MG tablet Take 1 tablet by mouth Daily. Start on Monday     • FLUoxetine (PROzac) 40 MG capsule Take 1 capsule by mouth Daily. 90 capsule 2   • fluticasone (FLONASE) 50 MCG/ACT nasal spray 2 sprays into the nostril(s) as directed by provider Daily. 3 bottle 3   • levothyroxine (SYNTHROID, LEVOTHROID) 25 MCG  tablet Take 1 tablet by mouth Daily. 90 tablet 1   • naproxen sodium (ALEVE) 220 MG tablet Take 220 mg by mouth.     • omeprazole (priLOSEC) 40 MG capsule Take 1 capsule by mouth Daily. 90 capsule 4   • VITAMIN D PO Take  by mouth. 400 mg daily     • Zinc 100 MG tablet Take  by mouth.       No current facility-administered medications on file prior to visit.       Past Medical History:   Diagnosis Date   • Anxiety    • Back pain    • Bladder cancer (HCC) 2018    bladder cancer has been removed.   • Coronary artery disease    • Depression    • Dizziness    • Fatigue    • GERD (gastroesophageal reflux disease)    • History of fractured rib     RIGHT SIDE   • Hyperlipidemia    • Hypertension    • Multiple falls    • Tremors of nervous system    • Urinary incontinence    • Vertigo        Family History   Problem Relation Age of Onset   • Arthritis Mother    • Glaucoma Mother    • Heart disease Father    • Emphysema Father    • Tremor Father    • Malig Hyperthermia Neg Hx        Social History     Socioeconomic History   • Marital status:    • Number of children: 4   • Years of education: 5 college degrees   Tobacco Use   • Smoking status: Never Smoker   • Smokeless tobacco: Never Used   Substance and Sexual Activity   • Alcohol use: No     Comment: last drink about 1 year ago    • Drug use: No   • Sexual activity: Defer       Past Surgical History:   Procedure Laterality Date   • CARDIAC CATHETERIZATION      7x, 5 stents   • CATARACT EXTRACTION, BILATERAL     • CHOLECYSTECTOMY N/A 9/2/2019    Procedure: CHOLECYSTECTOMY LAPAROSCOPIC WITH INTRAOPERATIVE CHOLANGIOGRAM;  Surgeon: Bg Rodriguez Jr., MD;  Location: [facility];  Service: General   • COLONOSCOPY     • CORONARY ANGIOPLASTY WITH STENT PLACEMENT      X5    • CYSTOSCOPY BLADDER BIOPSY N/A 1/8/2019    Procedure: CYSTOSCOPY BLADDER BIOPSY;  Surgeon: Wang Soares Jr., MD;  Location: [facility];  Service: Urology   • CYSTOSCOPY BLADDER BIOPSY  "N/A 6/13/2019    Procedure: CYSTOSCOPY BLADDER BIOPSY;  Surgeon: Wang Soares Jr., MD;  Location: [facility] OR;  Service: Urology   • CYSTOSCOPY TRANSURETHRAL RESECTION OF PROSTATE N/A 7/11/2019    Procedure: CYSTOSCOPY TRANSURETHRAL RESECTION OF PROSTATE;  Surgeon: Wang Soares Jr., MD;  Location: [facility] MAIN OR;  Service: Urology   • CYSTOSCOPY URETEROSCOPY LASER LITHOTRIPSY N/A 6/13/2019    Procedure: CYSTO LITHOPAXY;  Surgeon: Wagn Soares Jr., MD;  Location: [facility] MAIN OR;  Service: Urology   • ERCP N/A 9/3/2019    Procedure: ENDOSCOPIC RETROGRADE CHOLANGIOPANCREATOGRAPHY with sphincterotomy and balloon sweep;  Surgeon: Ashok Amaya MD;  Location: [facility] ENDOSCOPY;  Service: Gastroenterology   • EYE SURGERY      Lens implants   • LUMBAR DISCECTOMY FUSION INSTRUMENTATION     • SKIN CANCER EXCISION Left     chest wall   • SKIN CANCER EXCISION  01/21/2021    facial   • TRANSURETHRAL RESECTION OF BLADDER TUMOR N/A 11/29/2018    Procedure: TUR BLADDER TUMOR  LARGE;  Surgeon: Wang Soares Jr., MD;  Location: [facility] OR;  Service: Urology         The following portions of the patient's history were reviewed and updated as appropriate: problem list, allergies, current medications, past medical history, past family history, past social history and past surgical history.    Review of Systems   Constitutional: Positive for malaise/fatigue.   Psychiatric/Behavioral: Positive for depression.       Immunization History   Administered Date(s) Administered   • COVID-19 (PFIZER) 01/26/2021, 02/20/2021, 08/23/2021   • Fluad Quad 65+ 11/09/2020   • Fluzone High Dose =>65 Years ([employer] ONLY) 10/12/2016   • Fluzone High-Dose 65+yrs 10/20/2021   • Td, Not Adsorbed 03/25/2017   • flucelvax quad pfs =>4 YRS 11/30/2018       Objective   Vitals:    10/20/21 1423 10/20/21 1434   BP:  120/68   Weight: 86.7 kg (191 lb 3.2 oz)    Height: 182.9 cm (72\")      Body mass index is 25.93 " kg/m².  Physical Exam  Vitals reviewed.   Constitutional:       Appearance: He is well-developed.   HENT:      Head: Normocephalic and atraumatic.   Cardiovascular:      Rate and Rhythm: Normal rate and regular rhythm.      Heart sounds: Normal heart sounds, S1 normal and S2 normal.   Pulmonary:      Effort: Pulmonary effort is normal.      Breath sounds: Normal breath sounds.   Skin:     General: Skin is warm.   Neurological:      Mental Status: He is alert.   Psychiatric:         Behavior: Behavior normal.           Assessment/Plan   Diagnoses and all orders for this visit:    1. Migraine with status migrainosus, not intractable, unspecified migraine type (Primary)    2. Hypothyroidism, unspecified type  -     TSH    3. Flu vaccine need  -     Fluzone High-Dose 65+yrs ([id number])    TSH today, flu shot given today. Depression sx are okay without medication at this time.     Return in about 5 months (around 3/20/2022) for Lab Today, Lab Appt Before [facility], Medicare Wellness.

## 2021-10-21 LAB — TSH SERPL DL<=0.005 MIU/L-ACNC: 3.15 UIU/ML (ref 0.45–4.5)

## 2021-11-04 ENCOUNTER — HOSPITAL ENCOUNTER (OUTPATIENT)
Dept: MRI IMAGING | Facility: HOSPITAL | Age: 81
Discharge: HOME | End: 2021-11-04
Admitting: PHYSICIAN ASSISTANT

## 2021-11-04 DIAGNOSIS — G89.29 CHRONIC MIDLINE LOW BACK PAIN WITH SCIATICA, SCIATICA LATERALITY UNSPECIFIED: ICD-10-CM

## 2021-11-04 DIAGNOSIS — M54.40 CHRONIC MIDLINE LOW BACK PAIN WITH SCIATICA, SCIATICA LATERALITY UNSPECIFIED: ICD-10-CM

## 2021-11-04 PROCEDURE — 0 GADOBENATE DIMEGLUMINE 529 MG/ML SOLUTION: Performed by: PHYSICIAN ASSISTANT

## 2021-11-04 PROCEDURE — 72158 MRI LUMBAR SPINE W/O & W/DYE: CPT

## 2021-11-04 PROCEDURE — A9577 INJ MULTIHANCE: HCPCS | Performed by: PHYSICIAN ASSISTANT

## 2021-11-04 PROCEDURE — 82565 ASSAY OF CREATININE: CPT

## 2021-11-04 RX ADMIN — GADOBENATE DIMEGLUMINE 18 ML: 529 INJECTION, SOLUTION INTRAVENOUS at 16:09

## 2021-11-05 ENCOUNTER — OFFICE VISIT (OUTPATIENT)
Dept: INTERNAL MEDICINE | Facility: CLINIC | Age: 81
End: 2021-11-05

## 2021-11-05 VITALS — BODY MASS INDEX: 26.01 KG/M2 | HEIGHT: 72 IN | TEMPERATURE: 97.8 F | WEIGHT: 192 LBS

## 2021-11-05 DIAGNOSIS — M54.50 BILATERAL LOW BACK PAIN WITHOUT SCIATICA, UNSPECIFIED CHRONICITY: Primary | ICD-10-CM

## 2021-11-05 DIAGNOSIS — M71.30 SYNOVIAL CYST: ICD-10-CM

## 2021-11-05 PROCEDURE — 99213 OFFICE O/P EST LOW 20 MIN: CPT | Performed by: INTERNAL MEDICINE

## 2021-11-05 NOTE — PROGRESS NOTES
Subjective        Chief Complaint   Patient presents with   • Results           Sukhdev Zayas is a 80 y.o. male who presents for    Patient Active Problem List   Diagnosis   • Hyperlipidemia   • Coronary artery disease involving native coronary artery of native heart without angina pectoris   • MCI (mild cognitive impairment)   • Mood disorder (Formerly [facility])   • Closed wedge compression fracture of third thoracic vertebra with routine healing   • GERD (gastroesophageal reflux disease)   • S/P drug eluting coronary stent placement   • Chest pain   • Diastolic dysfunction   • Exertional angina (Formerly [facility])   • Unstable angina (Formerly [facility])   • Bladder mass   • Mixed action and resting tremor   • Bladder cancer (Formerly [facility])   • Generalized weakness   • Dyspnea on exertion   • BPH (benign prostatic hyperplasia)   • Calculus of gallbladder without cholecystitis   • Elevated LFTs   • Calculus of bile duct with acute cholecystitis without obstruction   • Atrial fibrillation (Formerly [facility])   • Essential hypertension   • Elevated bilirubin   • Dizziness   • Beta-blocker intolerance   • Orthostatic hypotension   • Acute urinary retention   • Parkinson's disease (Formerly [facility])       History of Present Illness     He has had daily progressive back pain for 2 months. He has not injured himself. He denies incontinence. Both legs are weak but he is not sure how much is from his tremor. APAP does help the pain some. Standing makes the pain worse. He has more pain in his right hip more than the left. He is doing BCG treatment with Dr. Soares. His back pain is better with sitting.  Allergies   Allergen Reactions   • Wellbutrin [Bupropion] Dizziness and Delirium       Current Outpatient Medications on File Prior to Visit   Medication Sig Dispense Refill   • aspirin 81 MG chewable tablet Chew 1 tablet Daily. 30 tablet 0   • clopidogrel (PLAVIX) 75 MG tablet Take 1 tablet by mouth Daily. Start on Monday     • FLUoxetine (PROzac) 40 MG capsule Take 1 capsule by mouth Daily. 90  capsule 2   • fluticasone (FLONASE) 50 MCG/ACT nasal spray 2 sprays into the nostril(s) as directed by provider Daily. 3 bottle 3   • levothyroxine (SYNTHROID, LEVOTHROID) 25 MCG tablet Take 1 tablet by mouth Daily. 90 tablet 1   • naproxen sodium (ALEVE) 220 MG tablet Take 220 mg by mouth.     • omeprazole (priLOSEC) 40 MG capsule Take 1 capsule by mouth Daily. 90 capsule 4   • VITAMIN D PO Take  by mouth. 400 mg daily     • Zinc 100 MG tablet Take  by mouth.       Current Facility-Administered Medications on File Prior to Visit   Medication Dose Route Frequency Provider Last Rate Last Admin   • [COMPLETED] gadobenate dimeglumine (MULTIHANCE) injection 18 mL  18 mL Intravenous Once in imaging Rachelle Patton PA-C   18 mL at 11/04/21 1609       Past Medical History:   Diagnosis Date   • Anxiety    • Back pain    • Bladder cancer (HCC) 2018    bladder cancer has been removed.   • Coronary artery disease    • Depression    • Dizziness    • Fatigue    • GERD (gastroesophageal reflux disease)    • History of fractured rib     RIGHT SIDE   • Hyperlipidemia    • Hypertension    • Multiple falls    • Tremors of nervous system    • Urinary incontinence    • Vertigo        Past Surgical History:   Procedure Laterality Date   • CARDIAC CATHETERIZATION      7x, 5 stents   • CATARACT EXTRACTION, BILATERAL     • CHOLECYSTECTOMY N/A 9/2/2019    Procedure: CHOLECYSTECTOMY LAPAROSCOPIC WITH INTRAOPERATIVE CHOLANGIOGRAM;  Surgeon: Bg Rodriguez Jr., MD;  Location: [facility];  Service: General   • COLONOSCOPY     • CORONARY ANGIOPLASTY WITH STENT PLACEMENT      X5    • CYSTOSCOPY BLADDER BIOPSY N/A 1/8/2019    Procedure: CYSTOSCOPY BLADDER BIOPSY;  Surgeon: Wang Soares Jr., MD;  Location: [facility] OR;  Service: Urology   • CYSTOSCOPY BLADDER BIOPSY N/A 6/13/2019    Procedure: CYSTOSCOPY BLADDER BIOPSY;  Surgeon: Wang Soares Jr., MD;  Location: [facility] OR;  Service: Urology   • CYSTOSCOPY TRANSURETHRAL  RESECTION OF PROSTATE N/A 7/11/2019    Procedure: CYSTOSCOPY TRANSURETHRAL RESECTION OF PROSTATE;  Surgeon: Wang Soares Jr., MD;  Location: [facility] OR;  Service: Urology   • CYSTOSCOPY URETEROSCOPY LASER LITHOTRIPSY N/A 6/13/2019    Procedure: CYSTO LITHOPAXY;  Surgeon: Wang Soares Jr., MD;  Location: [facility] MAIN OR;  Service: Urology   • ERCP N/A 9/3/2019    Procedure: ENDOSCOPIC RETROGRADE CHOLANGIOPANCREATOGRAPHY with sphincterotomy and balloon sweep;  Surgeon: Ashok Amaya MD;  Location: [facility] ENDOSCOPY;  Service: Gastroenterology   • EYE SURGERY      Lens implants   • LUMBAR DISCECTOMY FUSION INSTRUMENTATION     • SKIN CANCER EXCISION Left     chest wall   • SKIN CANCER EXCISION  01/21/2021    facial   • TRANSURETHRAL RESECTION OF BLADDER TUMOR N/A 11/29/2018    Procedure: TUR BLADDER TUMOR  LARGE;  Surgeon: Wang Soares Jr., MD;  Location: [facility] MAIN OR;  Service: Urology       Family History   Problem Relation Age of Onset   • Arthritis Mother    • Glaucoma Mother    • Heart disease Father    • Emphysema Father    • Tremor Father    • Malig Hyperthermia Neg Hx        Social History     Socioeconomic History   • Marital status:    • Number of children: 4   • Years of education: 5 college degrees   Tobacco Use   • Smoking status: Never Smoker   • Smokeless tobacco: Never Used   Substance and Sexual Activity   • Alcohol use: No     Comment: last drink about 1 year ago    • Drug use: No   • Sexual activity: Defer           The following portions of the patient's history were reviewed and updated as appropriate: problem list, allergies, current medications, past medical history, past family history, past social history and past surgical history.    Review of Systems    Immunization History   Administered Date(s) Administered   • COVID-19 (PFIZER) 01/26/2021, 02/20/2021, 08/23/2021   • Fluad Quad 65+ 11/09/2020   • Fluzone High Dose =>65 Years ([employer] ONLY)  "10/12/2016, 10/08/2021   • Fluzone High-Dose 65+yrs 10/20/2021   • Td, Not Adsorbed 03/25/2017   • flucelvax quad pfs =>4 YRS 11/30/2018       Objective   Vitals:    11/05/21 1533   Temp: 97.8 °F (36.6 °C)   Weight: 87.1 kg (192 lb)   Height: 182.9 cm (72\")     Body mass index is 26.04 kg/m².  Physical Exam  Neurological:      Mental Status: He is alert.      Deep Tendon Reflexes:      Reflex Scores:       Patellar reflexes are 1+ on the right side and 1+ on the left side.       Achilles reflexes are 1+ on the right side and 1+ on the left side.     Comments: Back non tender    5/5 strength in his legs   Psychiatric:         Mood and Affect: Mood normal.         Procedures    Assessment/Plan   Diagnoses and all orders for this visit:    1. Bilateral low back pain without sciatica, unspecified chronicity (Primary)  -     Ambulatory Referral to Pain Management    2. Synovial cyst  -     Ambulatory Referral to Pain Management             Reviewed MRI. Get into pain mgmt to see if he would benefit from injections. Try lidocaine patches.    No follow-ups on file.  "

## 2021-11-10 LAB — CREAT BLDA-MCNC: 1.3 MG/DL (ref 0.6–1.3)

## 2021-12-14 NOTE — PROGRESS NOTES
CC: Follow-up for tremor    HPI:  Sukhdev Zayas is a  81 y.o.  right-handed male with past medical history of HTN, HLD, MCI, depression, bladder cancer status post resection and intravesicular chemo, CAD status post stents, and previous back surgery who is being seen in follow-up today for tremor.  The patient has previously been seen by myself and Dr. Chaudhary.  The patient presents unaccompanied today.    He has previously tried multiple medications for his tremor with significant side effects including hypotension.  Please see my most recent note from review previous therapies including Propanolol, Topamax, primidone, gabapentin, amantadine, and Sinemet.    I referred him to Dr. Jensen with [facility].  He was seen in April 2021.  Possible surgical treatment of treatment was discussed which patient was not interested in..  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  Patient meets criteria for essential tremor plus.  Dr. Jensen discussed possible revisitation of Sinemet trial patient has follow-up scheduled in July 2021    Today the patient reports his tremor is worse.  It is in both upper extremities and now also involves his legs, mostly in the right leg but sometimes in the left.   He feels his memory is worse.  Last fall was several months ago.  He reports ongoing back pain occasional sharp pains radiating into the legs bilaterally.  His PCP has referred him to pain management for evaluation for epidurals.  He does note some occasional chest pain chest tightness but has not reported this to his cardiologist.    Past Medical History:   Diagnosis Date   • Anxiety    • Back pain    • Bladder cancer (HCC) 2018    bladder cancer has been removed.   • Coronary artery disease    • Depression    • Dizziness    • Fatigue    • GERD (gastroesophageal reflux disease)    • History of fractured rib     RIGHT SIDE   • Hyperlipidemia    • Hypertension    • Multiple falls    • Tremors  of nervous system    • Urinary incontinence    • Vertigo          Past Surgical History:   Procedure Laterality Date   • CARDIAC CATHETERIZATION      7x, 5 stents   • CATARACT EXTRACTION, BILATERAL     • CHOLECYSTECTOMY N/A 9/2/2019    Procedure: CHOLECYSTECTOMY LAPAROSCOPIC WITH INTRAOPERATIVE CHOLANGIOGRAM;  Surgeon: Bg Rodriguez Jr., MD;  Location: [facility] OR;  Service: General   • COLONOSCOPY     • CORONARY ANGIOPLASTY WITH STENT PLACEMENT      X5    • CYSTOSCOPY BLADDER BIOPSY N/A 1/8/2019    Procedure: CYSTOSCOPY BLADDER BIOPSY;  Surgeon: Wang Soares Jr., MD;  Location: [facility] MAIN OR;  Service: Urology   • CYSTOSCOPY BLADDER BIOPSY N/A 6/13/2019    Procedure: CYSTOSCOPY BLADDER BIOPSY;  Surgeon: Wang Soares Jr., MD;  Location: [facility] MAIN OR;  Service: Urology   • CYSTOSCOPY TRANSURETHRAL RESECTION OF PROSTATE N/A 7/11/2019    Procedure: CYSTOSCOPY TRANSURETHRAL RESECTION OF PROSTATE;  Surgeon: Wang Soares Jr., MD;  Location: [facility] MAIN OR;  Service: Urology   • CYSTOSCOPY URETEROSCOPY LASER LITHOTRIPSY N/A 6/13/2019    Procedure: CYSTO LITHOPAXY;  Surgeon: Wang Soares Jr., MD;  Location: [facility] MAIN OR;  Service: Urology   • ERCP N/A 9/3/2019    Procedure: ENDOSCOPIC RETROGRADE CHOLANGIOPANCREATOGRAPHY with sphincterotomy and balloon sweep;  Surgeon: Ashok Amaya MD;  Location: [facility] ENDOSCOPY;  Service: Gastroenterology   • EYE SURGERY      Lens implants   • LUMBAR DISCECTOMY FUSION INSTRUMENTATION     • SKIN CANCER EXCISION Left     chest wall   • SKIN CANCER EXCISION  01/21/2021    facial   • TRANSURETHRAL RESECTION OF BLADDER TUMOR N/A 11/29/2018    Procedure: TUR BLADDER TUMOR  LARGE;  Surgeon: Wang Soares Jr., MD;  Location: [facility] MAIN OR;  Service: Urology           Current Outpatient Medications:   •  aspirin 81 MG chewable tablet, Chew 1 tablet Daily., Disp: 30 tablet, Rfl: 0  •  clopidogrel (PLAVIX) 75 MG tablet, Take 1 tablet by mouth  Daily. Start on Monday, Disp: , Rfl:   •  FLUoxetine (PROzac) 20 MG capsule, Take 20 mg by mouth Daily., Disp: , Rfl:   •  fluticasone (FLONASE) 50 MCG/ACT nasal spray, 2 sprays into the nostril(s) as directed by provider Daily., Disp: 3 bottle, Rfl: 3  •  levothyroxine (SYNTHROID, LEVOTHROID) 25 MCG tablet, Take 1 tablet by mouth Daily., Disp: 90 tablet, Rfl: 1  •  omeprazole (priLOSEC) 40 MG capsule, Take 1 capsule by mouth Daily., Disp: 90 capsule, Rfl: 4  •  VITAMIN D PO, Take  by mouth. 400 mg daily, Disp: , Rfl:   •  Zinc 100 MG tablet, Take  by mouth., Disp: , Rfl:   •  carbidopa-levodopa (SINEMET)  MG per tablet, Take 1 tablet by mouth 3 (Three) Times a Day., Disp: 90 tablet, Rfl: 2      Family History   Problem Relation Age of Onset   • Arthritis Mother    • Glaucoma Mother    • Heart disease Father    • Emphysema Father    • Tremor Father    • Malig Hyperthermia Neg Hx          Social History     Socioeconomic History   • Marital status:    • Number of children: 4   • Years of education: 5 college degrees   Tobacco Use   • Smoking status: Never Smoker   • Smokeless tobacco: Never Used   Substance and Sexual Activity   • Alcohol use: No     Comment: last drink about 1 year ago    • Drug use: No   • Sexual activity: Defer         Allergies   Allergen Reactions   • Wellbutrin [Bupropion] Dizziness and Delirium         Pain Scale:        ROS:  Review of Systems   Constitutional: Positive for appetite change and fatigue. Negative for activity change and unexpected weight change.   HENT: Negative for ear discharge, ear pain, facial swelling, hearing loss, postnasal drip, sinus pressure, sneezing, tinnitus, trouble swallowing and voice change.    Eyes: Positive for photophobia. Negative for visual disturbance.   Respiratory: Positive for chest tightness. Negative for cough, shortness of breath, wheezing and stridor.    Cardiovascular: Negative for chest pain.   Gastrointestinal: Negative for  "abdominal pain, blood in stool, diarrhea and rectal pain.   Endocrine: Negative for cold intolerance and heat intolerance.   Genitourinary: Negative for decreased urine volume, difficulty urinating, frequency, penile pain, penile swelling and testicular pain.   Musculoskeletal: Positive for back pain and gait problem. Negative for neck pain and neck stiffness.   Allergic/Immunologic: Positive for food allergies.   Neurological: Positive for dizziness, speech difficulty, light-headedness and headaches.   Hematological: Does not bruise/bleed easily.   Psychiatric/Behavioral: Positive for confusion and decreased concentration. Negative for sleep disturbance. The patient is not nervous/anxious.    ROS complete by the MA was reviewed by me and I agree.        Physical Exam:  Vitals:    12/22/21 0927   BP: 136/68   Pulse: 68   Resp: 18   Weight: 89.1 kg (196 lb 6.9 oz)   Height: 182.9 cm (72.01\")     Body mass index is 26.63 kg/m².    General appearance: Well developed, well nourished, well groomed, alert and cooperative.   HEENT: Normocephalic.   Neck and spine: Normal range of motion. Normal alignment. No mass or tenderness.    Cardiac: Regular rate and rhythm. No murmurs.   Peripheral Vasculature: Peripheral pulses are equal and symmetric.  Chest Exam: Clear to auscultation bilaterally, no wheezes, no rhonchi.  Extremities: Normal, no edema.   Skin: No rashes or birthmarks.     Higher integrative function: Oriented to time, place, person. Spontaneous speech, fund of vocabulary are normal. No neglect. No dysarthria.  CN II: Normal visual fields.   CN III IV VI: Extraocular movements are full without nystagmus. Pupils are equal, round, and reactive to light.   CN V: Normal facial sensation and strength of muscles of mastication.   CN VII: Facial movements are symmetric, no weakness.   CN VIII: Auditory acuity is normal.   CN IX & X: Symmetric palatal movement.   CN XI: Sternocleidomastoid and trapezius are normal. No " weakness.   CN XII: The tongue is midline.   Motor: Normal muscle strength, bulk, and tone in upper and lower extremities.  (In light resting tremor in upper extremities.  Action tremor also noted.  No significant rigidity or bradykinesia.  Sensation: Intact to light touch and pinprick in hands and ankles.  Station and gait: He comes to stand easily.  Normal based gait and stride length.  Bilateral upper extremity tremor noted with ambulation.  Muscle stretch reflexes:   Coordination: Finger to nose test showed no dysmetria. Heel to shin normal.             Results:      Lab Results   Component Value Date    GLUCOSE 135 (H) 09/23/2021    BUN 15 09/23/2021    CREATININE 1.30 11/04/2021    EGFRIFNONA 62 09/23/2021    EGFRIFAFRI 73 03/03/2020    BCR 13.2 09/23/2021    CO2 23.9 09/23/2021    CALCIUM 9.6 09/23/2021    PROTENTOTREF 6.7 01/16/2020    ALBUMIN 4.10 03/10/2020    LABIL2 1.2 01/16/2020    AST 45 (H) 03/10/2020    ALT 5 03/10/2020       Lab Results   Component Value Date    WBC 8.05 09/23/2021    HGB 15.5 09/23/2021    HCT 45.6 09/23/2021    MCV 97.0 09/23/2021     09/23/2021         .  Lab Results   Component Value Date    RPR Non-Reactive 04/11/2019         Lab Results   Component Value Date    TSH 3.150 10/20/2021         Lab Results   Component Value Date    ENRLGCWQ45 1,736 (H) 09/15/2021         No results found for: FOLATE      No results found for: HGBA1C            Assessment:   Mixed tremor  MCI          Plan:  Will retry Sinemet, 25/100 mg half tablet 3 times a day for the first week then increase to 1 tablet 3 times a day.  Patient recommended to follow-up with cardiologist regarding chest pain/tightness.  Follow-up in 3 months with repeat cognitive screening for history of MCI.  He also has follow-up scheduled with Polo Jensen July 2021.    Greater than 30 minutes spent in review of the chart, discussion with and examination of the patient and documentation.                                Dictated utilizing Dragon dictation.    standing/walking

## 2021-12-22 ENCOUNTER — OFFICE VISIT (OUTPATIENT)
Dept: NEUROLOGY | Facility: CLINIC | Age: 81
End: 2021-12-22

## 2021-12-22 VITALS
BODY MASS INDEX: 26.61 KG/M2 | HEART RATE: 68 BPM | RESPIRATION RATE: 18 BRPM | WEIGHT: 196.43 LBS | HEIGHT: 72 IN | DIASTOLIC BLOOD PRESSURE: 68 MMHG | SYSTOLIC BLOOD PRESSURE: 136 MMHG

## 2021-12-22 DIAGNOSIS — R25.9 MIXED ACTION AND RESTING TREMOR: Primary | ICD-10-CM

## 2021-12-22 DIAGNOSIS — G31.84 MCI (MILD COGNITIVE IMPAIRMENT): ICD-10-CM

## 2021-12-22 PROBLEM — G20.C PARKINSONISM: Status: ACTIVE | Noted: 2020-12-03

## 2021-12-22 PROCEDURE — 99214 OFFICE O/P EST MOD 30 MIN: CPT | Performed by: NURSE PRACTITIONER

## 2021-12-22 RX ORDER — FLUOXETINE HYDROCHLORIDE 20 MG/1
20 CAPSULE ORAL DAILY
COMMUNITY
Start: 2021-12-14

## 2021-12-22 NOTE — PATIENT INSTRUCTIONS
Retry carbidopa/levodopa. Start with 1/2 tab 3 times daily. Most effective taken on an empty stomach but if nausea or stomach upset can take with food.   Take 1/2 tab three times daily for 1 week then 1 tab three times daily after that.   Side effects include stomach upset, light headedness/dizziness. Take notes to help remember if helpful.     Follow up with cardiology for c/o chestpain

## 2021-12-23 NOTE — PROGRESS NOTES
The patient has a pain history of the following:  Bilateral low back pain    Previous interventions that the patient has received include:   None    Pain medications include:  Acetaminophen   Ibuprofen     Other conservative modalities which the patient reports using include:  Physical Therapy: no  Chiropractor: no  Massage Therapy: no  TENS: no  Neck or back surgery: yes  Past pain management: no    Past Significant Surgical History:  L4-5 left laminectomy     HPI:       CHIEF COMPLAINT: Back Pain    Sukhdev Zayas is a 81 y.o. male referred here by Garret Smith MD. Sukhdev Zayas presents to the office for evaluation and treatment of Back Pain      Onset:  8 years  Inciting Event:  Nothing in particular, but did have a fall 2 years ago that may have worsened the pain  Location:  Low back  Pain: Pain described as ache, sharp, shooting, stabbing and burning. Located in the low back and does radiate into the bilateral lower extremities into the hips bilaterally and the anterior thigh on the left.  Severity:  Pain rated as a 6 /10.  Apportions pain as 80%  back pain and 20% extremity pain.  Symptoms have been constant.  Exacerbation:  Standing or walking for prolonged periods of time.   Alleviation:  Sitting, lying down for bed.  Associated Symptoms:   He denies any new onset of bowel or bladder weakness, significant leg weakness or saddle anesthesia.  He does have occasional bladder leakage.   Ambulates: Without assistive device     His wife is present with him at his visit today to help provide history.         PEG Assessment   What number best describes your pain on average in the past week?8  What number best describes how, during the past week, pain has interfered with your enjoyment of life?9  What number best describes how, during the past week, pain has interfered with your general activity?  7        Current Outpatient Medications:   •  aspirin 81 MG chewable tablet, Chew 1 tablet Daily., Disp: 30  tablet, Rfl: 0  •  carbidopa-levodopa (SINEMET)  MG per tablet, Take 1 tablet by mouth 3 (Three) Times a Day., Disp: 90 tablet, Rfl: 2  •  clopidogrel (PLAVIX) 75 MG tablet, Take 1 tablet by mouth Daily. Start on Monday, Disp: , Rfl:   •  FLUoxetine (PROzac) 20 MG capsule, Take 20 mg by mouth Daily., Disp: , Rfl:   •  VITAMIN D PO, Take  by mouth. 400 mg daily, Disp: , Rfl:   •  fluticasone (FLONASE) 50 MCG/ACT nasal spray, 2 sprays into the nostril(s) as directed by provider Daily., Disp: 3 bottle, Rfl: 3  •  levothyroxine (SYNTHROID, LEVOTHROID) 25 MCG tablet, Take 1 tablet by mouth Daily., Disp: 90 tablet, Rfl: 1  •  omeprazole (priLOSEC) 40 MG capsule, Take 1 capsule by mouth Daily., Disp: 90 capsule, Rfl: 4  •  Zinc 100 MG tablet, Take  by mouth., Disp: , Rfl:     The following portions of the patient's history were reviewed and updated as appropriate: allergies, current medications, past family history, past medical history, past social history, past surgical history and problem list.      REVIEW OF PERTINENT MEDICAL DATA    11/4/21 MRI LUMBAR SPINE WITH AND WITHOUT CONTRAST     CLINICAL HISTORY: Chronic back pain.     TECHNIQUE: MRI of the lumbar spine was obtained with sagittal T1,  proton-density, and T2-weighted images. Additionally, there are axial  pre and postgadolinium T1, axial T2, and sagittal postgadolinium  T1-weighted images through the lumbar spine.     COMPARISON: Comparison is made to previous MRI of the lumbar spine dated  08/20/2019.     FINDINGS:     The conus medullaris terminates at the level of the mid body of L1 and  has normal signal intensity.     At L1-L2, bulging disc material minimally indents the ventral  subarachnoid space and results in minimal foraminal compromise. No  significant interval change is seen since prior exam.     At L2-L3, there is a minimal disc bulge. There is mild-to-moderate facet  hypertrophic change. There is a minimal degree of canal and  foraminal  narrowing. Again, no significant interval change is seen since the prior  study.     At L3-L4, there is a disc osteophyte complex which along with facet  hypertrophic change results in a minimal degree of canal narrowing.  There is moderate to severe left L3-L4 and mild right L3-L4 foraminal  narrowing secondary to a disc osteophyte complex. Prominent degenerative  disc changes along with adjacent degenerative endplate changes are seen  along the left side of the L3-L4 disc space. There is some progression  of the left L3-4 foraminal stenosis. Otherwise, no other significant  interval change is seen at the L3-4 level.     At L4-L5, there are advanced degenerative disc changes seen throughout  the disc space with marked loss of disc space height. There is moderate  bilateral foraminal narrowing secondary to disc osteophyte complex and  facet hypertrophic change. No significant interval change is noted when  compared to the prior exam. Again noted are postsurgical changes from a  prior left laminectomy procedure.     At L5-S1, there is a new synovial cyst arising from the right L5-S1  facet joint which measures up to 14 x 5 mm in greatest axial dimensions  and approximately 1 cm in greatest craniocaudad dimensions. This  synovial cyst abuts the posterior margins of the exiting right L5 nerve  root. The combination of bulging disc material and severe facet  hypertrophy as well as a synovial cyst results in severe right foraminal  stenosis which demonstrates marked interval progression when compared to  the prior examination. The facet hypertrophy results in a mild degree of  stable left foraminal narrowing at L5-S1. Degenerative disc changes are  most prominently seen along the right aspect of the L5-S1 disc space.  Adjacent degenerative endplate marrow edema is noted.     IMPRESSION:     At the L5-S1 level, there is a new synovial cyst arising from the right  L5-S1 facet joint measuring up to 14 x 5 x 10 mm  in greatest dimensions.  This synovial cyst abuts the posterior margins of the exiting right L5  nerve root and the combination of the synovial cyst as well as bulging  disc material and facet hypertrophic change results in severe right  L5-S1 foraminal stenosis which demonstrates prominent interval  progression compared to the prior study.     There is moderate to severe left L3-4 foraminal stenosis secondary to a  disc osteophyte complex and facet hypertrophic change. There is some  interval progression of this foraminal narrowing when compared to the  prior exam.     The remaining multilevel degenerative phenomena which have been  discussed in detail above remain stable when compared to the prior study  dated 08/20/2019.     Again noted are postsurgical changes from a prior left laminectomy  procedure at L4-L5.     This report was finalized on 11/5/2021 7:46 AM by Dr. Oscar Ferrari M.D.    11/4/21 Creatinine 1.3    9/23/21 Platelets 269 (10*3)    Review of Systems   Constitutional: Positive for activity change (decreased) and fatigue. Negative for chills and fever.   HENT: Negative for congestion.    Eyes: Negative for visual disturbance.   Respiratory: Negative for chest tightness and shortness of breath.    Cardiovascular: Negative for chest pain.   Gastrointestinal: Negative for abdominal pain and constipation.   Genitourinary: Negative for difficulty urinating and dysuria.   Musculoskeletal: Positive for back pain.   Neurological: Positive for dizziness, weakness and light-headedness. Negative for numbness and headaches.   Psychiatric/Behavioral: Negative for agitation, self-injury, sleep disturbance and suicidal ideas. The patient is not nervous/anxious.      I have reviewed and confirmed the accuracy of the ROS as documented by the MA/LPN/ANNABEL Manzo MD      Vitals:    12/28/21 1049   BP: 168/80   Pulse: 64   Temp: 96.9 °F (36.1 °C)   SpO2: 95%   Weight: 88.2 kg (194 lb 6.4 oz)   Height: 182.9 cm  "(72\")   PainSc:   6   PainLoc: Back         Objective   Physical Exam  Vitals reviewed.   Constitutional:       General: He is not in acute distress.  Pulmonary:      Effort: Pulmonary effort is normal. No respiratory distress.   Musculoskeletal:      Comments: Ambulation: Without assistive device  Lumbar Exam:  Appearance: Scoliotic curve absent and scarring absent  Palpated over lumbosacral paravertebral regions and transverse processes with positive tenderness appreciated, Bilateral.   Sacroiliac joints are tender, Bilateral.  Trochanteric bursa are not tender, Bilateral.  Straight leg raise is negative radiculopathy, Bilateral.  Slump test is negative  radiculopathy, Bilateral.  Facet loading is positive for pain, Bilateral.  Paraspinal/adjacent lumbar musculature are not tender to palpation, Bilateral.  Amarjit Leesa's test is negative sacroiliac pain (positive for lateral hip pain), Bilateral.   Skin:     General: Skin is warm and dry.   Neurological:      General: No focal deficit present.      Mental Status: He is alert.      Deep Tendon Reflexes:      Reflex Scores:       Patellar reflexes are 2+ on the right side and 2+ on the left side.     Comments: Negative clonus bilaterally.   Tremor present bilateral hands.    Psychiatric:         Mood and Affect: Mood normal.         Thought Content: Thought content normal.         Assessment/Plan   Diagnoses and all orders for this visit:    1. Lumbar facet arthropathy (Primary)  -     Ambulatory Referral to Physical Therapy Evaluate and treat (Chronic low back pain with facet arthropathy and foraminal stenosis)  -     Case Request    2. Spondylosis of lumbar region without myelopathy or radiculopathy  -     Ambulatory Referral to Physical Therapy Evaluate and treat (Chronic low back pain with facet arthropathy and foraminal stenosis)  -     Case Request    3. Lumbar foraminal stenosis  -     Ambulatory Referral to Physical Therapy Evaluate and treat (Chronic low " back pain with facet arthropathy and foraminal stenosis)  -     Case Request    4. Chronic bilateral low back pain without sciatica  -     Ambulatory Referral to Physical Therapy Evaluate and treat (Chronic low back pain with facet arthropathy and foraminal stenosis)  -     Case Request        - Pertinent labs reviewed.   - Pertinent imaging reviewed.   - Referral placed to physical therapy.   - Will schedule for bilateral L3-S1 Medial branch blocks diagnostic x2 (2 weeks apart) due to his extensive facet changes on imaging. Risks discussed including but not limited to bleeding, bruising, infection, damage to surrounding structures, headache, and rare things such as being paralyzed, seizure, stroke, heart attack and death.   - He will continue his aspirin and plavix for this procedure.    - Sukhdev Zayas reports a pain score of 6.  Given his pain assessment as noted, treatment options were discussed and the following options were decided upon as a follow-up plan to address the patient's pain: referral to Physical Therapy and injections.    --- Follow-up  bilateral L3-S1 Medial branch blocks diagnostic x2 (2 weeks apart) and office visit 1-2 weeks after.          While examining this patient, I wore protective equipment including a mask, eye shield and gloves.  I washed my hands before and after this patient encounter.  The patient wore a mask throughout the visit as well.     Charlene Manzo MD  Pain Management

## 2021-12-28 ENCOUNTER — TRANSCRIBE ORDERS (OUTPATIENT)
Dept: SURGERY | Facility: SURGERY CENTER | Age: 81
End: 2021-12-28

## 2021-12-28 ENCOUNTER — OFFICE VISIT (OUTPATIENT)
Dept: PAIN MEDICINE | Facility: CLINIC | Age: 81
End: 2021-12-28

## 2021-12-28 ENCOUNTER — PREP FOR SURGERY (OUTPATIENT)
Dept: SURGERY | Facility: SURGERY CENTER | Age: 81
End: 2021-12-28

## 2021-12-28 VITALS
HEART RATE: 64 BPM | SYSTOLIC BLOOD PRESSURE: 168 MMHG | OXYGEN SATURATION: 95 % | HEIGHT: 72 IN | BODY MASS INDEX: 26.33 KG/M2 | DIASTOLIC BLOOD PRESSURE: 80 MMHG | WEIGHT: 194.4 LBS | TEMPERATURE: 96.9 F

## 2021-12-28 DIAGNOSIS — Z41.9 SURGERY, ELECTIVE: Primary | ICD-10-CM

## 2021-12-28 DIAGNOSIS — M47.816 LUMBAR FACET ARTHROPATHY: Primary | ICD-10-CM

## 2021-12-28 DIAGNOSIS — M47.816 SPONDYLOSIS OF LUMBAR REGION WITHOUT MYELOPATHY OR RADICULOPATHY: ICD-10-CM

## 2021-12-28 DIAGNOSIS — G89.29 CHRONIC BILATERAL LOW BACK PAIN WITHOUT SCIATICA: ICD-10-CM

## 2021-12-28 DIAGNOSIS — M48.061 LUMBAR FORAMINAL STENOSIS: ICD-10-CM

## 2021-12-28 DIAGNOSIS — M54.50 CHRONIC BILATERAL LOW BACK PAIN WITHOUT SCIATICA: ICD-10-CM

## 2021-12-28 PROCEDURE — 99214 OFFICE O/P EST MOD 30 MIN: CPT | Performed by: ANESTHESIOLOGY

## 2021-12-28 RX ORDER — SODIUM CHLORIDE 0.9 % (FLUSH) 0.9 %
10 SYRINGE (ML) INJECTION EVERY 12 HOURS SCHEDULED
Status: CANCELLED | OUTPATIENT
Start: 2021-12-28

## 2021-12-28 RX ORDER — SODIUM CHLORIDE 0.9 % (FLUSH) 0.9 %
10 SYRINGE (ML) INJECTION AS NEEDED
Status: CANCELLED | OUTPATIENT
Start: 2021-12-28

## 2021-12-29 ENCOUNTER — HOSPITAL ENCOUNTER (OUTPATIENT)
Dept: GENERAL RADIOLOGY | Facility: SURGERY CENTER | Age: 81
Setting detail: HOSPITAL OUTPATIENT SURGERY
End: 2021-12-29

## 2021-12-29 ENCOUNTER — HOSPITAL ENCOUNTER (OUTPATIENT)
Facility: SURGERY CENTER | Age: 81
Setting detail: HOSPITAL OUTPATIENT SURGERY
Discharge: HOME | End: 2021-12-29
Attending: ANESTHESIOLOGY | Admitting: ANESTHESIOLOGY

## 2021-12-29 VITALS
HEIGHT: 72 IN | OXYGEN SATURATION: 98 % | TEMPERATURE: 97 F | HEART RATE: 65 BPM | WEIGHT: 194 LBS | RESPIRATION RATE: 16 BRPM | BODY MASS INDEX: 26.28 KG/M2 | SYSTOLIC BLOOD PRESSURE: 186 MMHG | DIASTOLIC BLOOD PRESSURE: 106 MMHG

## 2021-12-29 DIAGNOSIS — Z41.9 SURGERY, ELECTIVE: ICD-10-CM

## 2021-12-29 PROCEDURE — 77002 NEEDLE LOCALIZATION BY XRAY: CPT

## 2021-12-29 PROCEDURE — 64495 INJ PARAVERT F JNT L/S 3 LEV: CPT | Performed by: ANESTHESIOLOGY

## 2021-12-29 PROCEDURE — 3E0T3BZ INTRODUCTION OF ANESTHETIC AGENT INTO PERIPHERAL NERVES AND PLEXI, PERCUTANEOUS APPROACH: ICD-10-PCS | Performed by: ANESTHESIOLOGY

## 2021-12-29 PROCEDURE — 64494 INJ PARAVERT F JNT L/S 2 LEV: CPT | Performed by: ANESTHESIOLOGY

## 2021-12-29 PROCEDURE — 64493 INJ PARAVERT F JNT L/S 1 LEV: CPT | Performed by: ANESTHESIOLOGY

## 2021-12-29 PROCEDURE — 76000 FLUOROSCOPY <1 HR PHYS/QHP: CPT

## 2022-01-03 ENCOUNTER — OFFICE VISIT (OUTPATIENT)
Dept: INTERNAL MEDICINE | Facility: CLINIC | Age: 82
End: 2022-01-03

## 2022-01-03 VITALS
WEIGHT: 195 LBS | SYSTOLIC BLOOD PRESSURE: 120 MMHG | HEIGHT: 72 IN | DIASTOLIC BLOOD PRESSURE: 76 MMHG | BODY MASS INDEX: 26.41 KG/M2 | TEMPERATURE: 97.3 F | HEART RATE: 80 BPM

## 2022-01-03 DIAGNOSIS — R03.0 ELEVATED BP WITHOUT DIAGNOSIS OF HYPERTENSION: Primary | ICD-10-CM

## 2022-01-03 PROCEDURE — 99213 OFFICE O/P EST LOW 20 MIN: CPT | Performed by: INTERNAL MEDICINE

## 2022-01-03 NOTE — PROGRESS NOTES
"Chief Complaint  Hypertension    Subjective          Sukhdev Zayas presents to [facility]  History of Present Illness  Here to f/u BP - states he has never had an elevated readings- noted recently with procedure for epidurals to lumbar spine.   He has not checked BP since lat week.  He denies headache, chest pain.  Has long history of heart issues, no BP issues.   Neurology does not think he has Parkinsons- has been unable to tolerate meds for tremors.   Eifler doing cysto for f/u of bladder cancer.   Does take occ nsaids.  Denies decongestants.      Objective   Vital Signs:   /76   Pulse 80   Temp 97.3 °F (36.3 °C)   Ht 182.9 cm (72\")   Wt 88.5 kg (195 lb)   BMI 26.45 kg/m²     Physical Exam  Cardiovascular:      Rate and Rhythm: Normal rate and regular rhythm.   Musculoskeletal:      Right lower leg: No edema.      Left lower leg: No edema.        Result Review :                 Assessment and Plan    Diagnoses and all orders for this visit:    1. Elevated BP without diagnosis of hypertension (Primary)  Comments:  perfect today- will ask to have taken manually only.  Get BP cuff for home check.  Call if elevated         Follow Up   No follow-ups on file.  Patient was given instructions and counseling regarding his condition or for health maintenance advice. Please see specific information pulled into the AVS if appropriate.       "

## 2022-01-11 NOTE — DISCHARGE INSTRUCTIONS
[facility] - Post-procedure Instructions          --  While there are no absolute restrictions, it is recommended that you do not perform strenuous activity today. In the morning, you may resume your level of activity as before your block.    --  If you have a band-aid at your injection site, please remove it later today. Observe the area for any redness, swelling, pus-like drainage, or a temperature over 101°. If any of these symptoms occur, please call your doctor at [phone]. If after office hours, leave a message and the on-call provider will return your call.    --  Ice may be applied to your injection site. It is recommended you avoid direct heat (heating pad; hot tub) for 1-2 days.    --  Call [facility] at [phone] if you experience persistent headache, persistent bleeding from the injection site, or severe pain not relieved by heat or oral medication.    --  Do not make important decisions today.    --  Due to the effects of the block and/or the I.V. Sedation, DO NOT drive or operate hazardous machinery for 12 hours.  Local anesthetics may cause numbness after procedure and precautions must be taken with regards to operating equipment as well as with walking, even if ambulating with assistance of another person or with an assistive device.    --  Do not drink alcohol for 12 hours.    -- You may return to work tomorrow, or as directed by your referring doctor.    --  Occasionally you may notice a slight increase in your pain after the procedure. This should start to improve within the next 24-48 hours. Radiofrequency ablation procedure pain may last 3-4 weeks.    --  It may take as long as 3-4 days before you notice a gradual improvement in your pain and/or other symptoms.    -- You may continue to take your prescribed pain medication as needed.    --  Some normal possible side effects of steroid use could include fluid retention, increased blood sugar, dull headache,  increased sweating, increased appetite, mood swings and flushing.    --  Diabetics are recommended to watch their blood glucose level closely for 24-48 hours after the injection.    --  Must stay in PACU for 20 min upon arrival and prove no leg weakness before being discharged.    --  IN THE EVENT OF A LIFE THREATENING EMERGENCY, (CHEST PAIN, BREATHING DIFFICULTIES, PARALYSIS…) YOU SHOULD GO TO YOUR NEAREST EMERGENCY ROOM.    --  You should be contacted by our office within 2-3 days to schedule follow up or next appointment date.  If not contacted within 7 days, please call the office at [phone]    If you have even 1 hour of relief, these injections are considered successful.

## 2022-01-12 ENCOUNTER — HOSPITAL ENCOUNTER (OUTPATIENT)
Facility: SURGERY CENTER | Age: 82
Setting detail: HOSPITAL OUTPATIENT SURGERY
Discharge: HOME | End: 2022-01-12
Attending: ANESTHESIOLOGY | Admitting: ANESTHESIOLOGY

## 2022-01-12 ENCOUNTER — HOSPITAL ENCOUNTER (OUTPATIENT)
Dept: GENERAL RADIOLOGY | Facility: SURGERY CENTER | Age: 82
Setting detail: HOSPITAL OUTPATIENT SURGERY
End: 2022-01-12

## 2022-01-12 VITALS
SYSTOLIC BLOOD PRESSURE: 85 MMHG | HEIGHT: 73 IN | HEART RATE: 67 BPM | TEMPERATURE: 97 F | DIASTOLIC BLOOD PRESSURE: 45 MMHG | WEIGHT: 194 LBS | RESPIRATION RATE: 17 BRPM | OXYGEN SATURATION: 97 % | BODY MASS INDEX: 25.71 KG/M2

## 2022-01-12 DIAGNOSIS — Z41.9 SURGERY, ELECTIVE: ICD-10-CM

## 2022-01-12 DIAGNOSIS — M47.816 SPONDYLOSIS OF LUMBAR REGION WITHOUT MYELOPATHY OR RADICULOPATHY: ICD-10-CM

## 2022-01-12 DIAGNOSIS — M47.816 LUMBAR FACET ARTHROPATHY: ICD-10-CM

## 2022-01-12 PROCEDURE — 77002 NEEDLE LOCALIZATION BY XRAY: CPT

## 2022-01-12 PROCEDURE — 64493 INJ PARAVERT F JNT L/S 1 LEV: CPT | Performed by: ANESTHESIOLOGY

## 2022-01-12 PROCEDURE — 76000 FLUOROSCOPY <1 HR PHYS/QHP: CPT

## 2022-01-12 PROCEDURE — 64494 INJ PARAVERT F JNT L/S 2 LEV: CPT | Performed by: ANESTHESIOLOGY

## 2022-01-12 PROCEDURE — 64495 INJ PARAVERT F JNT L/S 3 LEV: CPT | Performed by: ANESTHESIOLOGY

## 2022-01-12 PROCEDURE — 3E0T3BZ INTRODUCTION OF ANESTHETIC AGENT INTO PERIPHERAL NERVES AND PLEXI, PERCUTANEOUS APPROACH: ICD-10-PCS | Performed by: ANESTHESIOLOGY

## 2022-01-12 RX ORDER — SODIUM CHLORIDE 0.9 % (FLUSH) 0.9 %
10 SYRINGE (ML) INJECTION AS NEEDED
Status: DISCONTINUED | OUTPATIENT
Start: 2022-01-12 | End: 2022-01-12 | Stop reason: HOSPADM

## 2022-01-12 RX ORDER — SODIUM CHLORIDE 0.9 % (FLUSH) 0.9 %
10 SYRINGE (ML) INJECTION EVERY 12 HOURS SCHEDULED
Status: DISCONTINUED | OUTPATIENT
Start: 2022-01-12 | End: 2022-01-12 | Stop reason: HOSPADM

## 2022-01-12 NOTE — OP NOTE
Bilateral L3-S1 Lumbar Medial Branch Blockade  [facility]      PREOPERATIVE DIAGNOSIS:  Lumbar spondylosis without myelopathy    POSTOPERATIVE DIAGNOSIS:  Lumbar spondylosis without myelopathy    PROCEDURE:   Diagnostic Lumbar Medial Branch Nerve Blockades, with fluoroscopy:  at the L3, L4, L5 transverse processes and the sacral alar groove)   1. 36369-23 -- Lumbar Facet blocks, 1st Level  2. 29055-67 -- Lumbar Facet blocks, 2nd  Level  3. 24418-99 -- Lumbar Facet blocks, 3rd Level     PRE-PROCEDURE DISCUSSION WITH PATIENT:    Risks and complications were discussed with the patient prior to starting the procedure and informed consent was obtained.      SURGEON:  Charlene Manzo MD    REASON FOR PROCEDURE:    The patient complains of pain that seems to have a significant axial component    SEDATION:  Patient declined administration of moderate sedation    ANESTHETIC:  0.25% bupivacaine (0.5% bupivacaine diluted with normal saline)  STEROID:  None  TOTAL VOLUME OF SOLUTION:  8ml    DESCRIPTON OF PROCEDURE:  After obtaining informed consent, IV access was not obtained in the preoperative area.   The patient was taken to the operating room.  The patient was placed in the prone position with a pillow under the abdomen. All pressure points were well padded.  EKG, blood pressure, and pulse oximeter were monitored.  The patient was monitored and sedated by the RN under my direction. The lumbosacral area was prepped with Chloraprep and draped in a sterile fashion.     AP fluoroscopic image was used to visualize the junction of the right S1 superior articular process with the sacral ala.  The skin and subcutaneous tissue over the area was anesthetized with 1% lidocaine.  A 22-gauge spinal needle was then advanced percutaneously through the anesthetized skin tract under fluoroscopic guidance in a coaxial view to contact periosteum.  After negative aspiration, a volume of 1 mL of the local anesthetic was  injected without resistance.  The image was then optimized to maximize visualization of the junctions of the L3, L4, L5 superior articular processes with the transverse processes.  The skin and subcutaneous tissue over the areas was anesthetized with 1% lidocaine.  A 22-gauge spinal needle was then advanced percutaneously through the anesthetized skin tracts under fluoroscopic guidance in a coaxial view to contact periosteum at the target sites.  After negative aspiration, a volume of 1 mL of the local anesthetic  was injected without resistance at each of the target sites.      The same procedure was then performed on the contralateral side in the exact same fashion.        Onset of analgesia was noted.  Vital signs remained stable throughout.      ESTIMATED BLOOD LOSS:  <5 mL  SPECIMENS:  none    COMPLICATIONS:   No complications were noted.    TOLERANCE & DISCHARGE CONDITION:    The patient tolerated the procedure well.  The patient was transported to the recovery area without difficulties.  The patient was discharged to home under the care of family in stable and satisfactory condition.    Pre-procedure pain score: 5/10  Post-procedure pain score: 0/10    PLAN OF CARE:  1. The patient was given our standard instruction sheet.  2. We discussed that Lumbar Medial Branch Blockade is a diagnostic procedure in consideration for radiofrequency ablation if two diagnostic procedures prove to be positive for significant benefit.  An alternative plan could be therapeutic lumbar branch blockades.  3. The patient is asked to keep an account of pain relief after the procedure today.  4. The patient will  Return to clinic 2 wks.  5. The patient will resume all medications as per the medication reconciliation sheet.

## 2022-01-26 ENCOUNTER — PREP FOR SURGERY (OUTPATIENT)
Dept: SURGERY | Facility: SURGERY CENTER | Age: 82
End: 2022-01-26

## 2022-01-26 ENCOUNTER — OFFICE VISIT (OUTPATIENT)
Dept: PAIN MEDICINE | Facility: CLINIC | Age: 82
End: 2022-01-26

## 2022-01-26 ENCOUNTER — TRANSCRIBE ORDERS (OUTPATIENT)
Dept: SURGERY | Facility: SURGERY CENTER | Age: 82
End: 2022-01-26

## 2022-01-26 VITALS
OXYGEN SATURATION: 97 % | DIASTOLIC BLOOD PRESSURE: 87 MMHG | HEIGHT: 73 IN | WEIGHT: 193.2 LBS | RESPIRATION RATE: 18 BRPM | TEMPERATURE: 96.1 F | SYSTOLIC BLOOD PRESSURE: 135 MMHG | BODY MASS INDEX: 25.6 KG/M2 | HEART RATE: 63 BPM

## 2022-01-26 DIAGNOSIS — G89.29 CHRONIC BILATERAL LOW BACK PAIN WITHOUT SCIATICA: Primary | ICD-10-CM

## 2022-01-26 DIAGNOSIS — M47.816 LUMBAR FACET ARTHROPATHY: Primary | ICD-10-CM

## 2022-01-26 DIAGNOSIS — M47.816 LUMBAR FACET ARTHROPATHY: ICD-10-CM

## 2022-01-26 DIAGNOSIS — M54.50 CHRONIC BILATERAL LOW BACK PAIN WITHOUT SCIATICA: Primary | ICD-10-CM

## 2022-01-26 DIAGNOSIS — Z41.9 SURGERY, ELECTIVE: Primary | ICD-10-CM

## 2022-01-26 PROCEDURE — 99214 OFFICE O/P EST MOD 30 MIN: CPT | Performed by: NURSE PRACTITIONER

## 2022-01-26 RX ORDER — SODIUM CHLORIDE 0.9 % (FLUSH) 0.9 %
10 SYRINGE (ML) INJECTION EVERY 12 HOURS SCHEDULED
Status: CANCELLED | OUTPATIENT
Start: 2022-01-26

## 2022-01-26 RX ORDER — SODIUM CHLORIDE 0.9 % (FLUSH) 0.9 %
10 SYRINGE (ML) INJECTION AS NEEDED
Status: CANCELLED | OUTPATIENT
Start: 2022-01-26

## 2022-01-26 NOTE — PROGRESS NOTES
CHIEF COMPLAINT  Follow-up for back pain.    Subjective   Sukhdev Zayas is a 81 y.o. male  who presents to the office for follow-up of procedure.  He completed a Bilateral L3-S1 Lumbar Medial Branch Blockade   on  12/29/2021 and 1/12/2022 performed by Dr. Manzo for management of back pain. Patient reports significant immediate diagnostic relief from the procedure.     Patient remained masked during entire encounter. No cough present. I donned a mask and eye protection throughout entire visit. Prior to donning mask and eye protection, hand hygiene was performed, as well as when it was doffed.  I was closer than 6 feet, but not for an extended period of time. No obvious exposure to any bodily fluids.    Back Pain  This is a chronic problem. The problem occurs daily. The problem has been waxing and waning since onset. The pain is present in the lumbar spine. The quality of the pain is described as burning and aching. The pain does not radiate. The pain is at a severity of 7/10. The pain is severe. The symptoms are aggravated by sitting, standing and position. Associated symptoms include chest pain, numbness (great toe left foot) and weakness. He has tried analgesics, heat, ice and home exercises for the symptoms. The treatment provided mild relief.     PEG Assessment   What number best describes your pain on average in the past week?6  What number best describes how, during the past week, pain has interfered with your enjoyment of life?10  What number best describes how, during the past week, pain has interfered with your general activity?  0    The following portions of the patient's history were reviewed and updated as appropriate: allergies, current medications, past family history, past medical history, past social history, past surgical history and problem list.    Review of Systems   Constitutional: Positive for fatigue.   HENT: Negative for congestion.    Eyes: Positive for visual disturbance (blurry  "vision).   Respiratory: Positive for shortness of breath. Negative for cough and wheezing.    Cardiovascular: Positive for chest pain. Negative for palpitations and leg swelling.   Gastrointestinal: Negative for constipation and diarrhea.   Genitourinary: Negative for difficulty urinating.   Musculoskeletal: Positive for back pain.   Neurological: Positive for weakness and numbness (great toe left foot).   Psychiatric/Behavioral: Negative for sleep disturbance. The patient is not nervous/anxious.      I have reviewed and confirmed the accuracy of the ROS as documented by the MA/LPN/RN ANIL Cardenas     Vitals:    01/26/22 1325   BP: 135/87   Pulse: 63   Resp: 18   Temp: 96.1 °F (35.6 °C)   SpO2: 97%   Weight: 87.6 kg (193 lb 3.2 oz)   Height: 185.4 cm (73\")   PainSc:   7   PainLoc: Back     Objective   Physical Exam  Vitals and nursing note reviewed.   Constitutional:       General: He is not in acute distress.     Appearance: Normal appearance. He is not ill-appearing.   Pulmonary:      Effort: Pulmonary effort is normal. No respiratory distress.   Musculoskeletal:      Lumbar back: Tenderness and bony tenderness present.      Comments: +lumbar facet tenderness/loading    Skin:     General: Skin is warm and dry.   Neurological:      Mental Status: He is alert and oriented to person, place, and time.      Motor: Motor function is intact.      Gait: Gait abnormal (slowed).   Psychiatric:         Mood and Affect: Mood normal.         Behavior: Behavior normal.         Assessment/Plan   Diagnoses and all orders for this visit:    1. Chronic bilateral low back pain without sciatica (Primary)    2. Lumbar facet arthropathy      --- Bilateral L3-S1 RFA  --------  Education about Radiofrequency Lesioning of Medial Branches:    The medial branch blockade was intended for diagnostic purposes, with the intent of offering the patient Radiofrequency thermal rhizotomy if the MBB is diagnostically effective.  The " diagnostic blockade is necessary to determine the likelihood that RF therapy could be efficacious in providing long term relief to the patient.  As indicated above, diagnostic efficacy was established.      In the RF procedure, needles are placed to the joint lines in the same fashion, and after testing, the needle tips are heated to thermally treat the nerves, blocking the nerves by in essence damaging the nerves with the heat treatment.      Medically, a successful RF procedure should provide a patient with 50% pain relief or more for at least 6 months.  We estimate a likelihood of about an 80% chance that medical success will be realized.  We discussed & educated once again that not all patients have a medically successful result, and the patient voices understanding.  However, our clinical experience suggests that successful patients receive relief more in the range of 12 months on average.  (We also discussed that a fortunate minority of patients receive therapeutic success from the MBB, and may not require RF ablation.  If a patient receives more than 8 weeks of relief from MBB, then occasional repeat MBB for therapeutic purposes is a very reasonable alternative therapy.  This course of therapy is consistent with our LCDs according to our CMS  in the area, and therefore other insurance providers should follow accordingly.  We will monitor our patients to screen for these therapeutic responders and will offer RF therapy only when necessary.  However, in this clinical scenario, this therapeutic result was not realized, and therefore Radiofrequency Lesioning is medically necessary.)      We discussed that MBB & RF are not without risks.  Guidelines regarding anticoagulant use & neuraxial procedures will be respected.  Patients that are ill or otherwise may be at risk for sepsis will not have their spines accessed by neuraxial injections of any type.  This patient will not be offered these therapies  if there is an increased risk.   We discussed that there is a risk of postprocedural pain and also a risk of worsening of clinical picture with these procedures as with any neuraxial procedure.  All invasive procedures have risks including but not limited to bleeding, infection, injury, nerve injury, paralysis, coma, death, lack of pain relief, and worsening of clinical picture.      In this education session, all of these topics were covered and the patient voiced understanding.    ---------    --- Follow-up for procedure          As the clinician, I personally reviewed the ASTRID from 1/26/2022 while the patient was in the office today.    This document is intended for medical expert use only. Reading of this document by patients and/or patient's family without participating medical staff guidance may result in misinterpretation and unintended morbidity.   Any interpretation of such data is the responsibility of the patient and/or family member responsible for the patient in concert with their primary or specialist providers, not to be left for sources of online searches such as [facility], [facility] or similar queries. Relying on these approaches to knowledge may result in misinterpretation, misguided goals of care and even death should patients or family members try recommendations outside of the realm of professional medical care in a supervised way.

## 2022-02-02 ENCOUNTER — LAB (OUTPATIENT)
Dept: LAB | Facility: HOSPITAL | Age: 82
End: 2022-02-02

## 2022-02-02 ENCOUNTER — TRANSCRIBE ORDERS (OUTPATIENT)
Dept: ADMINISTRATIVE | Facility: HOSPITAL | Age: 82
End: 2022-02-02

## 2022-02-02 DIAGNOSIS — I25.10 DISEASE OF CARDIOVASCULAR SYSTEM: Primary | ICD-10-CM

## 2022-02-02 DIAGNOSIS — I25.10 DISEASE OF CARDIOVASCULAR SYSTEM: ICD-10-CM

## 2022-02-02 DIAGNOSIS — E78.5 HYPERLIPIDEMIA, UNSPECIFIED HYPERLIPIDEMIA TYPE: ICD-10-CM

## 2022-02-02 LAB
ALBUMIN SERPL-MCNC: 4.2 G/DL (ref 3.5–5.2)
ALBUMIN/GLOB SERPL: 1.4 G/DL
ALP SERPL-CCNC: 158 U/L (ref 39–117)
ALT SERPL W P-5'-P-CCNC: 47 U/L (ref 1–41)
ANION GAP SERPL CALCULATED.3IONS-SCNC: 9 MMOL/L (ref 5–15)
AST SERPL-CCNC: 46 U/L (ref 1–40)
BILIRUB SERPL-MCNC: 0.9 MG/DL (ref 0–1.2)
BUN SERPL-MCNC: 19 MG/DL (ref 8–23)
BUN/CREAT SERPL: 16.4 (ref 7–25)
CALCIUM SPEC-SCNC: 9.7 MG/DL (ref 8.6–10.5)
CHLORIDE SERPL-SCNC: 104 MMOL/L (ref 98–107)
CHOLEST SERPL-MCNC: 171 MG/DL (ref 0–200)
CO2 SERPL-SCNC: 25 MMOL/L (ref 22–29)
CREAT SERPL-MCNC: 1.16 MG/DL (ref 0.76–1.27)
GFR SERPL CREATININE-BSD FRML MDRD: 60 ML/MIN/1.73
GLOBULIN UR ELPH-MCNC: 3.1 GM/DL
GLUCOSE SERPL-MCNC: 88 MG/DL (ref 65–99)
HDLC SERPL-MCNC: 57 MG/DL (ref 40–60)
LDLC SERPL CALC-MCNC: 93 MG/DL (ref 0–100)
LDLC/HDLC SERPL: 1.59 {RATIO}
POTASSIUM SERPL-SCNC: 4.1 MMOL/L (ref 3.5–5.2)
PROT SERPL-MCNC: 7.3 G/DL (ref 6–8.5)
SODIUM SERPL-SCNC: 138 MMOL/L (ref 136–145)
TRIGL SERPL-MCNC: 118 MG/DL (ref 0–150)
VLDLC SERPL-MCNC: 21 MG/DL (ref 5–40)

## 2022-02-02 PROCEDURE — 36415 COLL VENOUS BLD VENIPUNCTURE: CPT

## 2022-02-02 PROCEDURE — 80053 COMPREHEN METABOLIC PANEL: CPT

## 2022-02-02 PROCEDURE — 80061 LIPID PANEL: CPT

## 2022-02-02 NOTE — DISCHARGE INSTRUCTIONS
[facility] - Post-procedure Instructions          --  While there are no absolute restrictions, it is recommended that you do not perform strenuous activity today. In the morning, you may resume your level of activity as before your block.    --  If you have a band-aid at your injection site, please remove it later today. Observe the area for any redness, swelling, pus-like drainage, or a temperature over 101°. If any of these symptoms occur, please call your doctor at [phone]. If after office hours, leave a message and the on-call provider will return your call.    --  Ice may be applied to your injection site. It is recommended you avoid direct heat (heating pad; hot tub) for 1-2 days.    --  Call [facility] at [phone] if you experience persistent headache, persistent bleeding from the injection site, or severe pain not relieved by heat or oral medication.    --  Do not make important decisions today.    --  Due to the effects of the block and/or the I.V. Sedation, DO NOT drive or operate hazardous machinery for 12 hours.  Local anesthetics may cause numbness after procedure and precautions must be taken with regards to operating equipment as well as with walking, even if ambulating with assistance of another person or with an assistive device.    --  Do not drink alcohol for 12 hours.    -- You may return to work tomorrow, or as directed by your referring doctor.    --  Occasionally you may notice a slight increase in your pain after the procedure. This should start to improve within the next 24-48 hours. Radiofrequency ablation procedure pain may last 3-4 weeks.    --  It may take as long as 3-4 days before you notice a gradual improvement in your pain and/or other symptoms.    -- You may continue to take your prescribed pain medication as needed.    --  Some normal possible side effects of steroid use could include fluid retention, increased blood sugar, dull headache,  "increased sweating, increased appetite, mood swings and flushing.    --  Diabetics are recommended to watch their blood glucose level closely for 24-48 hours after the injection.    --  Must stay in PACU for 20 min upon arrival and prove no leg weakness before being discharged.    --  IN THE EVENT OF A LIFE THREATENING EMERGENCY, (CHEST PAIN, BREATHING DIFFICULTIES, PARALYSIS…) YOU SHOULD GO TO YOUR NEAREST EMERGENCY ROOM.    --  You should be contacted by our office within 2-3 days to schedule follow up or next appointment date.  If not contacted within 7 days, please call the office at [phone]    Radiofrequency ablation (RFA):     - Radiofrequency ablation is a term used to describe cauterization or \"burning.\"   - In pain management, we can use this technique with a special needle to target and destroy areas that are causing your pain.   - In most cases, you must have TWO successful \"test injections\" before you are a candidate for RFA.    After your RFA:   - Because heat is used in this technique, it is common to have soreness after the procedure.  Sometimes \"neuritis\" occurs, which feels like tingling, prickly, or sunburn under the skin sensations.   - Ice packs are helpful in decreasing this soreness and preventing post-procedure \"neuritis\" pain.  Use an ice pack for 20 minutes at a time at least 3 times the day of and the day after your procedure.   - It is common to have arm/leg numbness or weakness the day of your procedure, but this should wear off by the following day.   - It may take up to 6 weeks to gain full benefit from this procedure.    "

## 2022-02-07 ENCOUNTER — HOSPITAL ENCOUNTER (OUTPATIENT)
Dept: GENERAL RADIOLOGY | Facility: SURGERY CENTER | Age: 82
Setting detail: HOSPITAL OUTPATIENT SURGERY
End: 2022-02-07

## 2022-02-07 ENCOUNTER — HOSPITAL ENCOUNTER (OUTPATIENT)
Facility: SURGERY CENTER | Age: 82
Setting detail: HOSPITAL OUTPATIENT SURGERY
Discharge: HOME | End: 2022-02-07
Attending: ANESTHESIOLOGY | Admitting: ANESTHESIOLOGY

## 2022-02-07 VITALS
BODY MASS INDEX: 26.01 KG/M2 | HEART RATE: 73 BPM | RESPIRATION RATE: 20 BRPM | DIASTOLIC BLOOD PRESSURE: 100 MMHG | OXYGEN SATURATION: 99 % | HEIGHT: 72 IN | WEIGHT: 192 LBS | TEMPERATURE: 97.8 F | SYSTOLIC BLOOD PRESSURE: 159 MMHG

## 2022-02-07 DIAGNOSIS — Z41.9 SURGERY, ELECTIVE: ICD-10-CM

## 2022-02-07 DIAGNOSIS — M47.816 LUMBAR FACET ARTHROPATHY: ICD-10-CM

## 2022-02-07 PROCEDURE — 64635 DESTROY LUMB/SAC FACET JNT: CPT | Performed by: ANESTHESIOLOGY

## 2022-02-07 PROCEDURE — 76000 FLUOROSCOPY <1 HR PHYS/QHP: CPT

## 2022-02-07 PROCEDURE — 64636 DESTROY L/S FACET JNT ADDL: CPT | Performed by: ANESTHESIOLOGY

## 2022-02-07 PROCEDURE — 3E0T3TZ INTRODUCTION OF DESTRUCTIVE AGENT INTO PERIPHERAL NERVES AND PLEXI, PERCUTANEOUS APPROACH: ICD-10-PCS | Performed by: ANESTHESIOLOGY

## 2022-02-07 RX ORDER — SODIUM CHLORIDE 0.9 % (FLUSH) 0.9 %
10 SYRINGE (ML) INJECTION AS NEEDED
Status: DISCONTINUED | OUTPATIENT
Start: 2022-02-07 | End: 2022-02-07 | Stop reason: HOSPADM

## 2022-02-07 RX ORDER — SODIUM CHLORIDE 0.9 % (FLUSH) 0.9 %
10 SYRINGE (ML) INJECTION EVERY 12 HOURS SCHEDULED
Status: DISCONTINUED | OUTPATIENT
Start: 2022-02-07 | End: 2022-02-07 | Stop reason: HOSPADM

## 2022-02-07 NOTE — OP NOTE
Radiofrequency ablation of bilateral L3-S1  [facility]    PREOPERATIVE DIAGNOSIS:  Lumbar spondylosis without myelopathy   POSTOPERATIVE DIAGNOSIS:  Lumbar spondylosis without myelopathy     PROCEDURES PERFORMED:   Bilateral  lumbar radiofrequency ablation of the medial branches at the transverse processes of L3, L4, L5, and the sacral alar groove to thermally treat these facet joints.  1.  71745 -50 Lumbar radiofrequency ablation 1st level.  2.  48401 -50 Lumbar radiofrequency ablation 2nd level.  3.  11617 -50 Lumbar radiofrequency ablation 3rd level.     INFORMED CONSENT:  In preprocedure discussion with the patient, the risks and complications were discussed prior to starting the procedure and informed consent was obtained.     SURGEON:   Charlene Manzo MD    INDICATIONS:  The patient presents with chronic lower back pain. The patient underwent 2 lumbar medial branch blocks with diagnostically positive relief. Given the patient’s significant pain relief, it is diagnostic that we have likely found the source of the patient’s pain; therefore, lumbar radiofrequency ablation has been indicated.     SEDATION:  Patient declined administration of moderate sedation  .    TIME OF PROCEDURE:  The Interoperative procedure time, after administration of the IV sedative, was N/A minutes.    ANESTHETIC:  Lidocaine 1% for skin infiltration, 2% lidocaine and 0.25% bupivacaine for injection.    STEROID:  None.    DESCRIPTION OF PROCEDURE:  After obtaining informed consent an IV was not  started in the preoperative area. The patient was taken to the operating room. The patient was placed in prone position with a pillow under the abdomen. All pressure points were padded. EKG, blood pressure, and pulse oximetry were monitored. The patient was not  sedated. The lumbosacral area was prepped with ChloraPrep and draped in a sterile fashion.     The junction of the right S1 superior articular process and sacral ala  was identified in a AP fluoroscopic view. The skin and subcutaneous tissue inferior to the junction was anesthetized with 1% lidocaine. A 20-gauge 100mm RF Garcia needle was then advanced percutaneously through the anesthetized skin tract under fluoroscopic guidance until the non-insulated portion of the needle lie at the junction.  The image was then obliqued towards the right side to maximize visualization of the junctions of the L3, L4, L5 superior articular processes with the transverse processes.  Needle placement was performed as described above until the non-insulated portion of the needles lie at the targeted junctions.  All needle tips were confirmed to be posterior to the neural foramen in the lateral fluoroscopic view. Sensory stimulation was then performed with good stimulation of the back at 0.5V or less at each level. Motor stimulation was performed up to 1.5V at each level producing stimulation of the multifidus muscles of the back and no stimulation of the lower extremity at any level. Each level was then anesthetized with 2% lidocaine prior to treatment with pulsed radiofrequency thermocoagulation at 42 degrees Celsius. Each level was then treated with thermal radiofrequency thermocoagulation at 80 degrees Celsius for 60 seconds in two separate cycles.  Prior to the removal of each needle, a volume of 1 mL of injectate was administered at each site.  The total volume consisted of 1mL of 2% lidocaine and 1mL of 0.25% bupivacaine.    The same procedure was then performed on the contralateral side in the exact same fashion.      ESTIMATED BLOOD LOSS:  Minimal.    SPECIMENS:  None.    COMPLICATIONS:  None.    TOLERANCE AND DISCHARGE:  The patient tolerated the procedure well. The patient was transported to the recovery area without difficulties. The patient was discharged home under the care of family in stable and satisfactory condition.    PLAN:  1.  The patient was given our standard instruction  sheet.  2.  The patient will resume all medications per the medication reconciliation sheet.  3.  The patient will return to the [facility] for [facility] for reevaluation in approximately 6 weeks.  l

## 2022-03-11 DIAGNOSIS — E03.9 HYPOTHYROIDISM, UNSPECIFIED TYPE: ICD-10-CM

## 2022-03-11 DIAGNOSIS — I48.91 ATRIAL FIBRILLATION, UNSPECIFIED TYPE: ICD-10-CM

## 2022-03-11 DIAGNOSIS — E78.2 MIXED HYPERLIPIDEMIA: Primary | ICD-10-CM

## 2022-03-12 LAB
ALBUMIN SERPL-MCNC: 4 G/DL (ref 3.6–4.6)
ALBUMIN/GLOB SERPL: 1.5 {RATIO} (ref 1.2–2.2)
ALP SERPL-CCNC: 156 IU/L (ref 44–121)
ALT SERPL-CCNC: 36 IU/L (ref 0–44)
AST SERPL-CCNC: 40 IU/L (ref 0–40)
BASOPHILS # BLD AUTO: 0.1 X10E3/UL (ref 0–0.2)
BASOPHILS NFR BLD AUTO: 1 %
BILIRUB SERPL-MCNC: 0.7 MG/DL (ref 0–1.2)
BUN SERPL-MCNC: 16 MG/DL (ref 8–27)
BUN/CREAT SERPL: 14 (ref 10–24)
CALCIUM SERPL-MCNC: 9.1 MG/DL (ref 8.6–10.2)
CHLORIDE SERPL-SCNC: 105 MMOL/L (ref 96–106)
CHOLEST SERPL-MCNC: 167 MG/DL (ref 100–199)
CHOLEST/HDLC SERPL: 3.2 RATIO (ref 0–5)
CO2 SERPL-SCNC: 21 MMOL/L (ref 20–29)
CREAT SERPL-MCNC: 1.12 MG/DL (ref 0.76–1.27)
EGFR GENE MUT ANL BLD/T: 66 ML/MIN/1.73
EOSINOPHIL # BLD AUTO: 0.3 X10E3/UL (ref 0–0.4)
EOSINOPHIL NFR BLD AUTO: 4 %
ERYTHROCYTE [DISTWIDTH] IN BLOOD BY AUTOMATED COUNT: 12.4 % (ref 11.6–15.4)
GLOBULIN SER CALC-MCNC: 2.6 G/DL (ref 1.5–4.5)
GLUCOSE SERPL-MCNC: 88 MG/DL (ref 65–99)
HCT VFR BLD AUTO: 45.2 % (ref 37.5–51)
HDLC SERPL-MCNC: 52 MG/DL
HGB BLD-MCNC: 15.4 G/DL (ref 13–17.7)
IMM GRANULOCYTES # BLD AUTO: 0 X10E3/UL (ref 0–0.1)
IMM GRANULOCYTES NFR BLD AUTO: 0 %
LDLC SERPL CALC-MCNC: 94 MG/DL (ref 0–99)
LYMPHOCYTES # BLD AUTO: 1.2 X10E3/UL (ref 0.7–3.1)
LYMPHOCYTES NFR BLD AUTO: 18 %
MCH RBC QN AUTO: 32.9 PG (ref 26.6–33)
MCHC RBC AUTO-ENTMCNC: 34.1 G/DL (ref 31.5–35.7)
MCV RBC AUTO: 97 FL (ref 79–97)
MONOCYTES # BLD AUTO: 0.5 X10E3/UL (ref 0.1–0.9)
MONOCYTES NFR BLD AUTO: 8 %
NEUTROPHILS # BLD AUTO: 4.3 X10E3/UL (ref 1.4–7)
NEUTROPHILS NFR BLD AUTO: 69 %
PLATELET # BLD AUTO: 251 X10E3/UL (ref 150–450)
POTASSIUM SERPL-SCNC: 4.5 MMOL/L (ref 3.5–5.2)
PROT SERPL-MCNC: 6.6 G/DL (ref 6–8.5)
RBC # BLD AUTO: 4.68 X10E6/UL (ref 4.14–5.8)
SODIUM SERPL-SCNC: 141 MMOL/L (ref 134–144)
TRIGL SERPL-MCNC: 116 MG/DL (ref 0–149)
TSH SERPL DL<=0.005 MIU/L-ACNC: 5.17 UIU/ML (ref 0.45–4.5)
VLDLC SERPL CALC-MCNC: 21 MG/DL (ref 5–40)
WBC # BLD AUTO: 6.3 X10E3/UL (ref 3.4–10.8)

## 2022-03-21 ENCOUNTER — OFFICE VISIT (OUTPATIENT)
Dept: INTERNAL MEDICINE | Facility: CLINIC | Age: 82
End: 2022-03-21

## 2022-03-21 VITALS
SYSTOLIC BLOOD PRESSURE: 108 MMHG | TEMPERATURE: 97.1 F | WEIGHT: 196.6 LBS | DIASTOLIC BLOOD PRESSURE: 60 MMHG | HEIGHT: 72 IN | BODY MASS INDEX: 26.63 KG/M2

## 2022-03-21 DIAGNOSIS — Z00.00 MEDICARE ANNUAL WELLNESS VISIT, SUBSEQUENT: ICD-10-CM

## 2022-03-21 DIAGNOSIS — I10 ESSENTIAL HYPERTENSION: ICD-10-CM

## 2022-03-21 DIAGNOSIS — E03.9 ACQUIRED HYPOTHYROIDISM: ICD-10-CM

## 2022-03-21 DIAGNOSIS — F39 MOOD DISORDER: Primary | ICD-10-CM

## 2022-03-21 PROCEDURE — 1159F MED LIST DOCD IN RCRD: CPT | Performed by: PHYSICIAN ASSISTANT

## 2022-03-21 PROCEDURE — G0439 PPPS, SUBSEQ VISIT: HCPCS | Performed by: PHYSICIAN ASSISTANT

## 2022-03-21 NOTE — PROGRESS NOTES
Subjective   Chief Complaint   Patient presents with   • Medicare Wellness-subsequent       History of Present Illness     Saw Dr. Proctor no changes in his meds. Stopped his thyroid med, didn't feel like it was making him feel any better. Taking CBD now and helping with his anxiety. Having radiofrequency ablation in his back now, helping some. Continues to see Dr. Soares, has had 2 treatments for recurrent bladder cancer. He has no chest pain or SOA. Overall feels good.      Patient Active Problem List   Diagnosis   • Hyperlipidemia   • Coronary artery disease involving native coronary artery of native heart without angina pectoris   • MCI (mild cognitive impairment)   • Mood disorder ([facility])   • Closed wedge compression fracture of third thoracic vertebra with routine healing   • GERD (gastroesophageal reflux disease)   • S/P drug eluting coronary stent placement   • Chest pain   • Diastolic dysfunction   • Exertional angina ([facility])   • Unstable angina ([facility])   • Bladder mass   • Mixed action and resting tremor   • Bladder cancer ([facility])   • Generalized weakness   • Dyspnea on exertion   • BPH (benign prostatic hyperplasia)   • Calculus of gallbladder without cholecystitis   • Elevated LFTs   • Calculus of bile duct with acute cholecystitis without obstruction   • Atrial fibrillation ([facility])   • Essential hypertension   • Elevated bilirubin   • Dizziness   • Beta-blocker intolerance   • Orthostatic hypotension   • Acute urinary retention   • Parkinsonism ([facility])   • Lumbar facet arthropathy   • Spondylosis of lumbar region without myelopathy or radiculopathy   • Lumbar foraminal stenosis   • Chronic bilateral low back pain without sciatica       Allergies   Allergen Reactions   • Wellbutrin [Bupropion] Dizziness and Delirium       Current Outpatient Medications on File Prior to Visit   Medication Sig Dispense Refill   • aspirin 81 MG chewable tablet Chew 1 tablet Daily. 30 tablet 0   • clopidogrel (PLAVIX) 75 MG tablet Take  1 tablet by mouth Daily. Start on Monday     • FLUoxetine (PROzac) 20 MG capsule Take 20 mg by mouth Daily.     • omeprazole (priLOSEC) 40 MG capsule Take 1 capsule by mouth Daily. 90 capsule 4   • VITAMIN D PO Take  by mouth. 400 mg daily     • carbidopa-levodopa (SINEMET)  MG per tablet Take 1 tablet by mouth 3 (Three) Times a Day. 90 tablet 2   • levothyroxine (SYNTHROID, LEVOTHROID) 25 MCG tablet Take 1 tablet by mouth Daily. 90 tablet 1   • Zinc 100 MG tablet Take  by mouth.       No current facility-administered medications on file prior to visit.       Past Medical History:   Diagnosis Date   • Anxiety    • Back pain    • Bladder cancer (HCC) 2018    bladder cancer has been removed.   • Coronary artery disease    • Depression    • Dizziness    • Fatigue    • GERD (gastroesophageal reflux disease)    • History of fractured rib     RIGHT SIDE   • Hyperlipidemia    • Hypertension    • Multiple falls    • Tremors of nervous system    • Urinary incontinence    • Vertigo        Family History   Problem Relation Age of Onset   • Arthritis Mother    • Glaucoma Mother    • Heart disease Father    • Emphysema Father    • Tremor Father    • Malig Hyperthermia Neg Hx        Social History     Socioeconomic History   • Marital status:    • Number of children: 4   • Years of education: 5 college degrees   Tobacco Use   • Smoking status: Never Smoker   • Smokeless tobacco: Never Used   Substance and Sexual Activity   • Alcohol use: No     Comment: last drink about 1 year ago    • Drug use: No   • Sexual activity: Defer       Past Surgical History:   Procedure Laterality Date   • CARDIAC CATHETERIZATION      7x, 5 stents   • CATARACT EXTRACTION, BILATERAL     • CHOLECYSTECTOMY N/A 9/2/2019    Procedure: CHOLECYSTECTOMY LAPAROSCOPIC WITH INTRAOPERATIVE CHOLANGIOGRAM;  Surgeon: Bg Rodriguez Jr., MD;  Location: [facility];  Service: General   • COLONOSCOPY     • CORONARY ANGIOPLASTY WITH STENT PLACEMENT       X5    • CYSTOSCOPY BLADDER BIOPSY N/A 1/8/2019    Procedure: CYSTOSCOPY BLADDER BIOPSY;  Surgeon: Wang Soares Jr., MD;  Location: [facility] MAIN OR;  Service: Urology   • CYSTOSCOPY BLADDER BIOPSY N/A 6/13/2019    Procedure: CYSTOSCOPY BLADDER BIOPSY;  Surgeon: Wang Soares Jr., MD;  Location: [facility] MAIN OR;  Service: Urology   • CYSTOSCOPY TRANSURETHRAL RESECTION OF PROSTATE N/A 7/11/2019    Procedure: CYSTOSCOPY TRANSURETHRAL RESECTION OF PROSTATE;  Surgeon: Wang Soares Jr., MD;  Location: [facility] MAIN OR;  Service: Urology   • CYSTOSCOPY URETEROSCOPY LASER LITHOTRIPSY N/A 6/13/2019    Procedure: CYSTO LITHOPAXY;  Surgeon: Wang Soares Jr., MD;  Location: [facility] MAIN OR;  Service: Urology   • ERCP N/A 9/3/2019    Procedure: ENDOSCOPIC RETROGRADE CHOLANGIOPANCREATOGRAPHY with sphincterotomy and balloon sweep;  Surgeon: Ashok Amaya MD;  Location: [facility] ENDOSCOPY;  Service: Gastroenterology   • EYE SURGERY      Lens implants   • LUMBAR DISCECTOMY FUSION INSTRUMENTATION     • MEDIAL BRANCH BLOCK Bilateral 12/29/2021    Procedure: LUMBAR MEDIAL BRANCH BLOCK Bilateral L3-S1 x2 (2 weeks apart);  Surgeon: Charlene Manzo MD;  Location: [facility];  Service: Pain Management;  Laterality: Bilateral;   • MEDIAL BRANCH BLOCK Bilateral 1/12/2022    Procedure: LUMBAR MEDIAL BRANCH BLOCK Bilateral L3-S1 x2 (2 weeks apart);  Surgeon: Charlene Manzo MD;  Location: [facility];  Service: Pain Management;  Laterality: Bilateral;   • RADIOFREQUENCY ABLATION Bilateral 2/7/2022    Procedure: RADIOFREQUENCY ABLATION LUMBAR bilateral L3-S1;  Surgeon: Charlene Manzo MD;  Location: [facility];  Service: Pain Management;  Laterality: Bilateral;   • SKIN CANCER EXCISION Left     chest wall   • SKIN CANCER EXCISION  01/21/2021    facial   • TRANSURETHRAL RESECTION OF BLADDER TUMOR N/A 11/29/2018    Procedure: TUR BLADDER TUMOR  LARGE;  Surgeon: Wang Soares Jr., MD;  Location:   [facility];  Service: Urology         The following portions of the patient's history were reviewed and updated as appropriate: problem list, allergies, current medications, past medical history, past family history, past social history and past surgical history.    ROS    Immunization History   Administered Date(s) Administered   • COVID-19 (PFIZER) PURPLE CAP 01/26/2021, 02/20/2021, 08/23/2021   • Fluad Quad 65+ 11/09/2020   • Fluzone High Dose =>65 Years ([employer] ONLY) 10/12/2016   • Fluzone High-Dose 65+yrs 10/20/2021   • Td, Not Adsorbed 03/25/2017   • flucelvax quad pfs =>4 YRS 11/30/2018       Objective   Vitals:    03/21/22 1256   Temp: 97.1 °F (36.2 °C)   Weight: 89.2 kg (196 lb 9.6 oz)   Height: 182.9 cm (72.01\")     Body mass index is 26.66 kg/m².  Physical Exam      Assessment/Plan   There are no diagnoses linked to this encounter.    No follow-ups on file.           "

## 2022-03-22 NOTE — PROGRESS NOTES
The ABCs of the Annual Wellness Visit  Subsequent Medicare Wellness Visit    Chief Complaint   Patient presents with   • Medicare Wellness-subsequent      Subjective    History of Present Illness:  Sukhdev Zayas is a 81 y.o. male who presents for a Subsequent Medicare Wellness Visit. Saw Dr. Proctor no changes in his meds. Stopped his thyroid med, didn't feel like it was making him feel any better. Taking CBD now and helping with his anxiety. Having radiofrequency ablation in his back now, helping some. Continues to see Dr. Soares, has had 2 treatments for recurrent bladder cancer. He has no chest pain or SOA. Overall feels good.     The following portions of the patient's history were reviewed and   updated as appropriate: allergies, current medications, past family history, past medical history, past social history, past surgical history and problem list.    Compared to one year ago, the patient feels his physical   health is the same.    Compared to one year ago, the patient feels his mental   health is the same.    Recent Hospitalizations:  He was not admitted to the hospital during the last year.       Current Medical Providers:  Patient Care Team:  Rachelle Patton PA-C as PCP - General (Physician Assistant)    Outpatient Medications Prior to Visit   Medication Sig Dispense Refill   • aspirin 81 MG chewable tablet Chew 1 tablet Daily. 30 tablet 0   • clopidogrel (PLAVIX) 75 MG tablet Take 1 tablet by mouth Daily. Start on Monday     • FLUoxetine (PROzac) 20 MG capsule Take 20 mg by mouth Daily.     • levothyroxine (SYNTHROID, LEVOTHROID) 25 MCG tablet Take 1 tablet by mouth Daily. 90 tablet 1   • omeprazole (priLOSEC) 40 MG capsule Take 1 capsule by mouth Daily. 90 capsule 4   • VITAMIN D PO Take  by mouth. 400 mg daily     • carbidopa-levodopa (SINEMET)  MG per tablet Take 1 tablet by mouth 3 (Three) Times a Day. 90 tablet 2   • Zinc 100 MG tablet Take  by mouth.       No facility-administered  "medications prior to visit.       No opioid medication identified on active medication list. I have reviewed chart for other potential  high risk medication/s and harmful drug interactions in the elderly.          Aspirin is on active medication list. Aspirin use is indicated based on review of current medical condition/s. Pros and cons of this therapy have been discussed today. Benefits of this medication outweigh potential harm.  Patient has been encouraged to continue taking this medication.  .      Patient Active Problem List   Diagnosis   • Hyperlipidemia   • Coronary artery disease involving native coronary artery of native heart without angina pectoris   • MCI (mild cognitive impairment)   • Mood disorder ([facility])   • Closed wedge compression fracture of third thoracic vertebra with routine healing   • GERD (gastroesophageal reflux disease)   • S/P drug eluting coronary stent placement   • Chest pain   • Diastolic dysfunction   • Mixed action and resting tremor   • Bladder cancer ([facility])   • BPH (benign prostatic hyperplasia)   • Atrial fibrillation ([facility])   • Essential hypertension   • Dizziness   • Beta-blocker intolerance   • Orthostatic hypotension   • Parkinsonism ([facility])   • Lumbar facet arthropathy   • Spondylosis of lumbar region without myelopathy or radiculopathy   • Lumbar foraminal stenosis   • Chronic bilateral low back pain without sciatica   • Hypothyroidism     Advance Care Planning  Advance Directive is not on file.  ACP discussion was held with the patient during this visit. Patient has an advance directive (not in EMR), copy requested.          Objective    Vitals:    03/21/22 1256 03/21/22 1339   BP:  108/60   Temp: 97.1 °F (36.2 °C)    Weight: 89.2 kg (196 lb 9.6 oz)    Height: 182.9 cm (72.01\")      BMI Readings from Last 1 Encounters:   03/21/22 26.66 kg/m²   BMI is above normal parameters. Recommendations include: none (medical contraindication)    Does the patient have evidence of cognitive " impairment? No    Physical Exam  Vitals reviewed.   Constitutional:       Appearance: He is well-developed.   HENT:      Head: Normocephalic and atraumatic.   Cardiovascular:      Rate and Rhythm: Normal rate and regular rhythm.      Heart sounds: Normal heart sounds, S1 normal and S2 normal.   Pulmonary:      Effort: Pulmonary effort is normal.      Breath sounds: Normal breath sounds.   Skin:     General: Skin is warm.   Neurological:      Mental Status: He is alert.   Psychiatric:         Behavior: Behavior normal.       Lab Results   Component Value Date    CHLPL 167 03/11/2022    TRIG 116 03/11/2022    TRIG 118 02/02/2022    HDL 52 03/11/2022    HDL 57 02/02/2022    LDL 94 03/11/2022    VLDL 21 03/11/2022            HEALTH RISK ASSESSMENT    Smoking Status:  Social History     Tobacco Use   Smoking Status Never Smoker   Smokeless Tobacco Never Used     Alcohol Consumption:  Social History     Substance and Sexual Activity   Alcohol Use No    Comment: last drink about 1 year ago      Fall Risk Screen:    STEADI Fall Risk Assessment was completed, and patient is at MODERATE risk for falls. Assessment completed on:3/21/2022    Depression Screening:  PHQ-2/PHQ-9 Depression Screening 3/21/2022   Retired PHQ-9 Total Score -   Retired Total Score -   Little Interest or Pleasure in Doing Things 0-->not at all   Feeling Down, Depressed or Hopeless 0-->not at all   PHQ-9: Brief Depression Severity Measure Score 0       Health Habits and Functional and Cognitive Screening:  Functional & Cognitive Status 3/21/2022   Do you have difficulty preparing food and eating? No   Do you have difficulty bathing yourself, getting dressed or grooming yourself? No   Do you have difficulty using the toilet? No   Do you have difficulty moving around from place to place? No   Do you have trouble with steps or getting out of a bed or a chair? Yes   Current Diet Well Balanced Diet   Dental Exam Up to date   Eye Exam Up to date   Exercise  (times per week) 7 times per week   Current Exercises Include Walking   Do you need help using the phone?  No   Are you deaf or do you have serious difficulty hearing?  No   Do you need help with transportation? Yes   Do you need help shopping? No   Do you need help preparing meals?  No   Do you need help with housework?  No   Do you need help with laundry? No   Do you need help taking your medications? No   Do you need help managing money? No   Do you ever drive or ride in a car without wearing a seat belt? No   Have you felt unusual stress, anger or loneliness in the last month? No   Who do you live with? Spouse   If you need help, do you have trouble finding someone available to you? No   Have you been bothered in the last four weeks by sexual problems? No       Age-appropriate Screening Schedule:  Refer to the list below for future screening recommendations based on patient's age, sex and/or medical conditions. Orders for these recommended tests are listed in the plan section. The patient has been provided with a written plan.    Health Maintenance   Topic Date Due   • ZOSTER VACCINE (1 of 2) Never done   • TDAP/TD VACCINES (1 - Tdap) 03/26/2017   • LIPID PANEL  03/11/2023   • INFLUENZA VACCINE  Completed              Assessment/Plan   CMS Preventative Services Quick Reference  Risk Factors Identified During Encounter  None Identified  The above risks/problems have been discussed with the patient.  Follow up actions/plans if indicated are seen below in the Assessment/Plan Section.  Pertinent information has been shared with the patient in the After Visit Summary.    Diagnoses and all orders for this visit:    1. Mood disorder (HCC) (Primary)    2. Essential hypertension    3. Medicare annual wellness visit, subsequent    4. Acquired hypothyroidism    Bp is well controlled. Restart Levothyroxine. Continue seeing pain management for back pain. Overall doing very well.     Follow Up:   Return in about 6 months  (around 9/21/2022) for Lab Appt Before FUP.     An After Visit Summary and PPPS were made available to the patient.

## 2022-03-24 PROBLEM — I20.89 EXERTIONAL ANGINA: Status: RESOLVED | Noted: 2018-08-20 | Resolved: 2022-03-24

## 2022-03-24 PROBLEM — N32.89 BLADDER MASS: Status: RESOLVED | Noted: 2018-09-18 | Resolved: 2022-03-24

## 2022-03-24 PROBLEM — R53.1 GENERALIZED WEAKNESS: Status: RESOLVED | Noted: 2019-05-25 | Resolved: 2022-03-24

## 2022-03-24 PROBLEM — I20.8 EXERTIONAL ANGINA (HCC): Status: RESOLVED | Noted: 2018-08-20 | Resolved: 2022-03-24

## 2022-03-24 PROBLEM — I20.0 UNSTABLE ANGINA (HCC): Status: RESOLVED | Noted: 2018-08-23 | Resolved: 2022-03-24

## 2022-03-24 PROBLEM — R17 ELEVATED BILIRUBIN: Status: RESOLVED | Noted: 2019-10-04 | Resolved: 2022-03-24

## 2022-03-24 PROBLEM — R79.89 ELEVATED LFTS: Status: RESOLVED | Noted: 2019-08-31 | Resolved: 2022-03-24

## 2022-03-24 PROBLEM — R33.8 ACUTE URINARY RETENTION: Status: RESOLVED | Noted: 2020-03-11 | Resolved: 2022-03-24

## 2022-03-24 PROBLEM — E03.9 HYPOTHYROIDISM: Status: ACTIVE | Noted: 2022-03-24

## 2022-03-24 PROBLEM — R06.09 DYSPNEA ON EXERTION: Status: RESOLVED | Noted: 2019-05-25 | Resolved: 2022-03-24

## 2022-03-24 PROBLEM — K80.20 CALCULUS OF GALLBLADDER WITHOUT CHOLECYSTITIS: Status: RESOLVED | Noted: 2019-08-31 | Resolved: 2022-03-24

## 2022-03-24 PROBLEM — K80.42 CALCULUS OF BILE DUCT WITH ACUTE CHOLECYSTITIS WITHOUT OBSTRUCTION: Status: RESOLVED | Noted: 2019-08-30 | Resolved: 2022-03-24

## 2022-03-30 ENCOUNTER — OFFICE VISIT (OUTPATIENT)
Dept: NEUROLOGY | Facility: CLINIC | Age: 82
End: 2022-03-30

## 2022-03-30 VITALS
HEART RATE: 68 BPM | SYSTOLIC BLOOD PRESSURE: 110 MMHG | HEIGHT: 72 IN | OXYGEN SATURATION: 97 % | WEIGHT: 193.1 LBS | BODY MASS INDEX: 26.15 KG/M2 | DIASTOLIC BLOOD PRESSURE: 62 MMHG

## 2022-03-30 DIAGNOSIS — R25.9 MIXED ACTION AND RESTING TREMOR: Primary | ICD-10-CM

## 2022-03-30 DIAGNOSIS — G31.84 MCI (MILD COGNITIVE IMPAIRMENT): ICD-10-CM

## 2022-03-30 DIAGNOSIS — G20 PRIMARY PARKINSONISM: ICD-10-CM

## 2022-03-30 PROCEDURE — 99214 OFFICE O/P EST MOD 30 MIN: CPT | Performed by: NURSE PRACTITIONER

## 2022-03-30 NOTE — PROGRESS NOTES
"CC: Follow-up for tremor    HPI:  Sukhdev Zayas is a  81 y.o.  rught-handed male with HTN, HLD, MCI, depression, bladder cancer status post resection and intravesicular chemo, CAD status post stents, and previous back surgery who is being seen in follow-up for tremor.  Today he is accompanied by his wife.    The following history was reviewed and updated as appropriate:  He has previously tried multiple medications for his tremor with significant side effects including hypotension.  Please see my most recent note from review previous therapies including Propanolol, Topamax, primidone, gabapentin, amantadine, and Sinemet.     I referred him to Dr. Jensen with [facility].  He was seen in April 2021.  Possible surgical treatment of treatment was discussed which patient was not interested in..  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  Patient meets criteria for essential tremor plus.  Dr. Jensen discussed possible revisitation of Sinemet trial patient has follow-up scheduled in July 2021    I last saw the patient in December 2021 at which time he was reporting worsening of his tremor involving both of his upper extremities and also his legs.  He was also complaining of worsening memory (MoCA was 23 out of 30 in December 2020).  We did another trial of carbidopa/levodopa, 25/100, with instructions to start with half tablet 3 times daily for the first week then increase to 1 tablet 3 times daily.  He states he did take it and that it did not have any effect on his tremor though he is unsure how long he took it.  He states it caused \"foggy thinking\" so he stopped it.  He continues to feel that his tremor is worsening.  He is aware that the options are limited as he has tried all of the medications more than once and either did not work or he had side effects.  He continues to endorse no interest in surgical treatment of his tremor.    Past Medical History:   Diagnosis Date "   • Anxiety    • Back pain    • Bladder cancer (HCC) 2018    bladder cancer has been removed.   • Depression    • Dizziness    • GERD (gastroesophageal reflux disease)    • History of fractured rib     RIGHT SIDE   • Hyperlipidemia    • Multiple falls    • Tremors of nervous system    • Urinary incontinence    • Vertigo          Past Surgical History:   Procedure Laterality Date   • CARDIAC CATHETERIZATION      7x, 5 stents   • CATARACT EXTRACTION, BILATERAL     • CHOLECYSTECTOMY N/A 9/2/2019    Procedure: CHOLECYSTECTOMY LAPAROSCOPIC WITH INTRAOPERATIVE CHOLANGIOGRAM;  Surgeon: Bg Rodriguez Jr., MD;  Location: [facility] OR;  Service: General   • COLONOSCOPY     • CORONARY ANGIOPLASTY WITH STENT PLACEMENT      X5    • CYSTOSCOPY BLADDER BIOPSY N/A 1/8/2019    Procedure: CYSTOSCOPY BLADDER BIOPSY;  Surgeon: Wang Soares Jr., MD;  Location: [facility] OR;  Service: Urology   • CYSTOSCOPY BLADDER BIOPSY N/A 6/13/2019    Procedure: CYSTOSCOPY BLADDER BIOPSY;  Surgeon: Wang Soares Jr., MD;  Location: [facility] OR;  Service: Urology   • CYSTOSCOPY TRANSURETHRAL RESECTION OF PROSTATE N/A 7/11/2019    Procedure: CYSTOSCOPY TRANSURETHRAL RESECTION OF PROSTATE;  Surgeon: Wang Soares Jr., MD;  Location: [facility] OR;  Service: Urology   • CYSTOSCOPY URETEROSCOPY LASER LITHOTRIPSY N/A 6/13/2019    Procedure: CYSTO LITHOPAXY;  Surgeon: Wang Soares Jr., MD;  Location: [facility] OR;  Service: Urology   • ERCP N/A 9/3/2019    Procedure: ENDOSCOPIC RETROGRADE CHOLANGIOPANCREATOGRAPHY with sphincterotomy and balloon sweep;  Surgeon: Ashok Amaya MD;  Location: [facility] ENDOSCOPY;  Service: Gastroenterology   • EYE SURGERY      Lens implants   • LUMBAR DISCECTOMY FUSION INSTRUMENTATION     • MEDIAL BRANCH BLOCK Bilateral 12/29/2021    Procedure: LUMBAR MEDIAL BRANCH BLOCK Bilateral L3-S1 x2 (2 weeks apart);  Surgeon: Charlene Manzo MD;  Location: [facility] MAIN OR;  Service: Pain  Management;  Laterality: Bilateral;   • MEDIAL BRANCH BLOCK Bilateral 1/12/2022    Procedure: LUMBAR MEDIAL BRANCH BLOCK Bilateral L3-S1 x2 (2 weeks apart);  Surgeon: Charlene Manzo MD;  Location: [facility] MAIN OR;  Service: Pain Management;  Laterality: Bilateral;   • RADIOFREQUENCY ABLATION Bilateral 2/7/2022    Procedure: RADIOFREQUENCY ABLATION LUMBAR bilateral L3-S1;  Surgeon: Charlene Manzo MD;  Location: [facility] MAIN OR;  Service: Pain Management;  Laterality: Bilateral;   • SKIN CANCER EXCISION Left     chest wall   • SKIN CANCER EXCISION  01/21/2021    facial   • TRANSURETHRAL RESECTION OF BLADDER TUMOR N/A 11/29/2018    Procedure: TUR BLADDER TUMOR  LARGE;  Surgeon: Wang Soares Jr., MD;  Location: [facility] MAIN OR;  Service: Urology           Current Outpatient Medications:   •  aspirin 81 MG chewable tablet, Chew 1 tablet Daily., Disp: 30 tablet, Rfl: 0  •  clopidogrel (PLAVIX) 75 MG tablet, Take 1 tablet by mouth Daily. Start on Monday, Disp: , Rfl:   •  FLUoxetine (PROzac) 20 MG capsule, Take 20 mg by mouth Daily., Disp: , Rfl:   •  levothyroxine (SYNTHROID, LEVOTHROID) 25 MCG tablet, Take 1 tablet by mouth Daily., Disp: 90 tablet, Rfl: 1  •  omeprazole (priLOSEC) 40 MG capsule, Take 1 capsule by mouth Daily., Disp: 90 capsule, Rfl: 4  •  VITAMIN D PO, Take  by mouth. 400 mg daily, Disp: , Rfl:       Family History   Problem Relation Age of Onset   • Arthritis Mother    • Glaucoma Mother    • Heart disease Father    • Emphysema Father    • Tremor Father    • Malig Hyperthermia Neg Hx          Social History     Socioeconomic History   • Marital status:    • Number of children: 4   • Years of education: 5 college degrees   Tobacco Use   • Smoking status: Never Smoker   • Smokeless tobacco: Never Used   Substance and Sexual Activity   • Alcohol use: No     Comment: last drink about 1 year ago    • Drug use: No   • Sexual activity: Defer         Allergies   Allergen Reactions   • Bupropion  "Dizziness, Delirium and Other (See Comments)             ROS:  Review of Systems   Constitutional: Negative for activity change, appetite change, fatigue and unexpected weight change.   HENT: Negative for dental problem, drooling, ear pain, facial swelling, hearing loss, nosebleeds, tinnitus and trouble swallowing.    Eyes: Negative for photophobia, pain and visual disturbance.   Respiratory: Negative for choking, chest tightness, shortness of breath, wheezing and stridor.    Cardiovascular: Negative for chest pain, palpitations and leg swelling.   Gastrointestinal: Negative for abdominal pain, constipation, diarrhea, nausea and vomiting.   Endocrine: Negative for cold intolerance and heat intolerance.   Genitourinary: Negative for decreased urine volume, difficulty urinating, dysuria, frequency and urgency.   Musculoskeletal: Positive for back pain (better) and gait problem. Negative for neck pain and neck stiffness.   Allergic/Immunologic: Negative for food allergies.   Neurological: Positive for tremors. Negative for dizziness, speech difficulty, light-headedness, numbness and headaches.   Psychiatric/Behavioral: Negative for confusion, decreased concentration and sleep disturbance. The patient is not nervous/anxious.      ROS completed by the MA was reviewed by me and I agree.    Physical Exam:  Vitals:    03/30/22 1005   BP: 110/62   Pulse: 68   SpO2: 97%   Weight: 87.6 kg (193 lb 1.6 oz)   Height: 182.9 cm (72\")     Orthostatic BP:    Body mass index is 26.19 kg/m².    General appearance: Well developed, well nourished, well groomed, alert and cooperative.   HEENT: Normocephalic.   Neck: Supple  Cardiac: Regular rate and rhythm. No murmurs.   Peripheral Vasculature: Radial pulses are equal and symmetric.  Chest Exam: Clear to auscultation bilaterally, no wheezes, no rhonchi.  Extremities: Normal, no edema.   Skin: No rashes or birthmarks.     Higher integrative function: Oriented 6 out of 6 on the [place].  He " scored 29 out of 30 missing 1 point for trail making.  In conversation mild impairment of recent memory noted.  Spontaneous speech, fund of vocabulary are normal.   CN II: Visual fields intact.  CN III IV VI: Extraocular movements are full without nystagmus. Pupils are equal, round, and reactive to light.   CN V: Normal facial sensation.  CN VII: Facial movements are symmetric, no weakness.   CN VIII: Auditory acuity is normal.   CN IX & X: Symmetric palatal movement.   CN XI: Sternocleidomastoid and trapezius are normal. No weakness.   CN XII: The tongue is midline.   Motor: Normal muscle strength, bulk, and tone in upper and lower extremities.  Resting and action tremor noted in upper extremities.  No significant rigidity or bradykinesia.    Sensation: Intact to light touch in all 4 extremities.   Station and gait: Comes to stand easily.  Normal stride length.  Bilateral upper extremity tremor noted with ambulation.  Muscle stretch reflexes:   Coordination: Finger to nose test showed no dysmetria.      Results:      Lab Results   Component Value Date    GLUCOSE 88 03/11/2022    BUN 16 03/11/2022    CREATININE 1.12 03/11/2022    EGFRIFNONA 60 (L) 02/02/2022    EGFRIFAFRI 73 03/03/2020    BCR 14 03/11/2022    CO2 21 03/11/2022    CALCIUM 9.1 03/11/2022    PROTENTOTREF 6.6 03/11/2022    ALBUMIN 4.0 03/11/2022    LABIL2 1.5 03/11/2022    AST 40 03/11/2022    ALT 36 03/11/2022       Lab Results   Component Value Date    WBC 6.3 03/11/2022    HGB 15.4 03/11/2022    HCT 45.2 03/11/2022    MCV 97 03/11/2022     03/11/2022         .  Lab Results   Component Value Date    RPR Non-Reactive 04/11/2019         Lab Results   Component Value Date    TSH 5.170 (H) 03/11/2022         Lab Results   Component Value Date    RBWWROQV09 1,736 (H) 09/15/2021         No results found for: FOLATE      No results found for: HGBA1C      Lab Results   Component Value Date    GLUCOSE 88 03/11/2022    BUN 16 03/11/2022    CREATININE  1.12 03/11/2022    EGFRIFNONA 60 (L) 02/02/2022    EGFRIFAFRI 73 03/03/2020    BCR 14 03/11/2022    K 4.5 03/11/2022    CO2 21 03/11/2022    CALCIUM 9.1 03/11/2022    PROTENTOTREF 6.6 03/11/2022    ALBUMIN 4.0 03/11/2022    LABIL2 1.5 03/11/2022    AST 40 03/11/2022    ALT 36 03/11/2022         Lab Results   Component Value Date    WBC 6.3 03/11/2022    HGB 15.4 03/11/2022    HCT 45.2 03/11/2022    MCV 97 03/11/2022     03/11/2022             Assessment:   Mixed tremor  MCI        Plan:   No further recommendations at this time as repeat trial of Sinemet was not helpful.   MCI is stable, [place] improved today   Patient has follow-up scheduled with Dr. Jensen in July   Follow-up as needed  Diagnoses and all orders for this visit:    1. Mixed action and resting tremor (Primary)    2. Primary parkinsonism (HCC)    3. MCI (mild cognitive impairment)              Greater than 30 minutes spent in review of the chart, examination of patient, discussion with patient and his wife, and and documentation.          Dictated utilizing Dragon dictation.

## 2022-03-30 NOTE — PATIENT INSTRUCTIONS

## 2022-04-15 ENCOUNTER — PREP FOR SURGERY (OUTPATIENT)
Dept: SURGERY | Facility: SURGERY CENTER | Age: 82
End: 2022-04-15

## 2022-04-15 ENCOUNTER — OFFICE VISIT (OUTPATIENT)
Dept: PAIN MEDICINE | Facility: CLINIC | Age: 82
End: 2022-04-15

## 2022-04-15 VITALS
OXYGEN SATURATION: 97 % | HEIGHT: 72 IN | SYSTOLIC BLOOD PRESSURE: 146 MMHG | HEART RATE: 71 BPM | BODY MASS INDEX: 26.47 KG/M2 | TEMPERATURE: 97.1 F | DIASTOLIC BLOOD PRESSURE: 86 MMHG | WEIGHT: 195.4 LBS

## 2022-04-15 DIAGNOSIS — M51.36 DDD (DEGENERATIVE DISC DISEASE), LUMBAR: Primary | ICD-10-CM

## 2022-04-15 DIAGNOSIS — M47.816 LUMBAR FACET ARTHROPATHY: ICD-10-CM

## 2022-04-15 DIAGNOSIS — M54.16 LUMBAR RADICULOPATHY: ICD-10-CM

## 2022-04-15 DIAGNOSIS — M48.061 LUMBAR FORAMINAL STENOSIS: ICD-10-CM

## 2022-04-15 DIAGNOSIS — G89.29 CHRONIC BILATERAL LOW BACK PAIN WITHOUT SCIATICA: Primary | ICD-10-CM

## 2022-04-15 DIAGNOSIS — M54.50 CHRONIC BILATERAL LOW BACK PAIN WITHOUT SCIATICA: Primary | ICD-10-CM

## 2022-04-15 PROCEDURE — 99214 OFFICE O/P EST MOD 30 MIN: CPT | Performed by: NURSE PRACTITIONER

## 2022-04-15 RX ORDER — SODIUM CHLORIDE 0.9 % (FLUSH) 0.9 %
10 SYRINGE (ML) INJECTION EVERY 12 HOURS SCHEDULED
Status: CANCELLED | OUTPATIENT
Start: 2022-04-15

## 2022-04-15 RX ORDER — SODIUM CHLORIDE 0.9 % (FLUSH) 0.9 %
10 SYRINGE (ML) INJECTION AS NEEDED
Status: CANCELLED | OUTPATIENT
Start: 2022-04-15

## 2022-04-15 NOTE — PROGRESS NOTES
CHIEF COMPLAINT  F/U back pain    Subjective   Sukhdev Zayas is a 81 y.o. male  who presents to the office for follow-up of procedure.  He completed a bilateral L3-S1 RFA   on  2/7/2022 performed by Dr. Manzo for management of back pain. Patient reports no relief from the procedure.     Pain today 7/10 VAS.  Location across the low back, bilateral posterior/lateral upper legs.  Aggravated by prolonged standing/walking. Improves with occasional use of ibuprofen, sitting.     Plavix --- stents placed about 8 years ago     He still has not had PT. Declines.      Patient remained masked during entire encounter. No cough present. I donned a mask and eye protection throughout entire visit. Prior to donning mask and eye protection, hand hygiene was performed, as well as when it was doffed.  I was closer than 6 feet, but not for an extended period of time. No obvious exposure to any bodily fluids.    Back Pain  This is a chronic problem. The problem occurs daily. The problem has been waxing and waning since onset. The pain is present in the lumbar spine. The quality of the pain is described as burning and aching. The pain radiates to the right thigh, left thigh, left knee and right knee. The pain is at a severity of 7/10. The pain is severe. The symptoms are aggravated by sitting, standing and position. Associated symptoms include abdominal pain and weakness. Pertinent negatives include no chest pain, dysuria, fever, headaches or numbness. He has tried analgesics, heat, ice and home exercises for the symptoms. The treatment provided mild relief.        PEG Assessment   What number best describes your pain on average in the past week?7  What number best describes how, during the past week, pain has interfered with your enjoyment of life?9  What number best describes how, during the past week, pain has interfered with your general activity?  9    Review of Pertinent Medical Data ---      The following portions of the  "patient's history were reviewed and updated as appropriate: allergies, current medications, past family history, past medical history, past social history, past surgical history and problem list.    Review of Systems   Constitutional: Positive for activity change (decreased) and fatigue. Negative for chills and fever.   HENT: Negative for congestion.    Eyes: Negative for visual disturbance.   Respiratory: Positive for shortness of breath. Negative for chest tightness.    Cardiovascular: Negative for chest pain.   Gastrointestinal: Positive for abdominal pain and constipation. Negative for diarrhea.   Genitourinary: Positive for difficulty urinating (stress incontinence). Negative for dysuria.   Musculoskeletal: Positive for back pain.   Neurological: Positive for dizziness, weakness and light-headedness. Negative for numbness and headaches.   Psychiatric/Behavioral: Positive for agitation. Negative for sleep disturbance. The patient is nervous/anxious.      I have reviewed and confirmed the accuracy of the ROS as documented by the MA/LPN/RN ANIL Cardenas     Vitals:    04/15/22 1303   BP: 146/86   Pulse: 71   Temp: 97.1 °F (36.2 °C)   SpO2: 97%   Weight: 88.6 kg (195 lb 6.4 oz)   Height: 182.9 cm (72\")   PainSc:   7   PainLoc: Back       Objective   Physical Exam  Vitals and nursing note reviewed.   Constitutional:       General: He is not in acute distress.     Appearance: Normal appearance. He is not ill-appearing.   Pulmonary:      Effort: Pulmonary effort is normal. No respiratory distress.   Musculoskeletal:      Lumbar back: Tenderness and bony tenderness present. Positive right straight leg raise test and positive left straight leg raise test.   Skin:     General: Skin is warm and dry.   Neurological:      Mental Status: He is alert and oriented to person, place, and time.      Motor: Motor function is intact.      Gait: Gait abnormal (slowed).   Psychiatric:         Mood and Affect: Mood normal.   "       Behavior: Behavior normal.       Assessment/Plan   Diagnoses and all orders for this visit:    1. Chronic bilateral low back pain without sciatica (Primary)    2. Lumbar facet arthropathy    3. Lumbar foraminal stenosis        --- L5/S1 LESI       ---  Indications for epidural injection:  Plan is to proceed with epidural at the appropriate level.  If the patient receives significant pain reduction and improvement in function and the plan will be to repeat the epidural when the pain worsens.  If a second epidural provides at least 6 weeks of sustained improvement that includes both pain reduction and improvement in function then an epidural injection could be repeated once again at the same level.  This is a mutual decision between the clinician and the patient that includes discussions including risks and benefits in detail as well as alternative therapies.  Patient's questions were answered to their satisfaction and to their understanding.  ---    --- Hold Plavix for 7 days prior to procedure. Will reach out to Dr. Proctor for clearance.     --------    Anticoagulation risks for procedures....   - there are risks to discontinuation of anticoagulants for neuraxial procedures and surgery.  Risks include but are not limited to deep vein thromboses, pulmonary emboli, myocardial infarction, and stroke    - Neuraxial access with a needle is relatively contraindicated while anticoagulated because of the risk of hemorrhage and/or epidural hematoma, which can be a neurosurgical emergency to evacuate bleeding.  Left unchecked, bleeding can cause nerve damage leading to paralysis and/or death.  --------      --- Follow-up for injection            As the clinician, I personally reviewed the ASTRID from 4/15/2022 while the patient was in the office today.    This document is intended for medical expert use only. Reading of this document by patients and/or patient's family without participating medical staff guidance may  result in misinterpretation and unintended morbidity.   Any interpretation of such data is the responsibility of the patient and/or family member responsible for the patient in concert with their primary or specialist providers, not to be left for sources of online searches such as [employer], [employer] or similar queries. Relying on these approaches to knowledge may result in misinterpretation, misguided goals of care and even death should patients or family members try recommendations outside of the realm of professional medical care in a supervised way.

## 2022-04-18 ENCOUNTER — TRANSCRIBE ORDERS (OUTPATIENT)
Dept: SURGERY | Facility: SURGERY CENTER | Age: 82
End: 2022-04-18

## 2022-04-18 DIAGNOSIS — Z41.9 SURGERY, ELECTIVE: Primary | ICD-10-CM

## 2022-04-18 PROBLEM — M54.16 LUMBAR RADICULOPATHY: Status: ACTIVE | Noted: 2022-04-18

## 2022-04-18 PROBLEM — M51.369 DDD (DEGENERATIVE DISC DISEASE), LUMBAR: Status: ACTIVE | Noted: 2022-04-18

## 2022-04-18 PROBLEM — M51.36 DDD (DEGENERATIVE DISC DISEASE), LUMBAR: Status: ACTIVE | Noted: 2022-04-18

## 2022-04-19 ENCOUNTER — TELEPHONE (OUTPATIENT)
Dept: PAIN MEDICINE | Facility: CLINIC | Age: 82
End: 2022-04-19

## 2022-04-21 NOTE — TELEPHONE ENCOUNTER
Thank you. We need to know by the beginning of next week. Procedure scheduled 5/4/22 and Plavix is a 7 day hold. If we do not hear back in time we will have to re-schedule.

## 2022-04-21 NOTE — TELEPHONE ENCOUNTER
Called Dr. Proctor's office today to check status of cardiac clearance request. They said they received it, however waiting Dr. Proctor's response. He was at the hospital today and will be there again tomorrow. The next time he will be in clinic will be next Monday, he will try and send next week.

## 2022-04-22 NOTE — TELEPHONE ENCOUNTER
I just received the cardiac clearance from Dr. Proctor. He is ok holding plavix x 7 days. Given to Shirley to scan in for your review.

## 2022-04-22 NOTE — TELEPHONE ENCOUNTER
Pt wife returned my call, on BH verbal. Informed her of 7 day hold on plavix prior to upcoming procedure. She verbalized understanding.

## 2022-05-03 NOTE — DISCHARGE INSTRUCTIONS
[facility] - Post-procedure Instructions          --  While there are no absolute restrictions, it is recommended that you do not perform strenuous activity today. In the morning, you may resume your level of activity as before your block.    --  If you have a band-aid at your injection site, please remove it later today. Observe the area for any redness, swelling, pus-like drainage, or a temperature over 101°. If any of these symptoms occur, please call your doctor at [phone]. If after office hours, leave a message and the on-call provider will return your call.    --  Ice may be applied to your injection site. It is recommended you avoid direct heat (heating pad; hot tub) for 1-2 days.    --  Call [facility] at [phone] if you experience persistent headache, persistent bleeding from the injection site, or severe pain not relieved by heat or oral medication.    --  Do not make important decisions today.    --  Due to the effects of the block and/or the I.V. Sedation, DO NOT drive or operate hazardous machinery for 12 hours.  Local anesthetics may cause numbness after procedure and precautions must be taken with regards to operating equipment as well as with walking, even if ambulating with assistance of another person or with an assistive device.    --  Do not drink alcohol for 12 hours.    -- You may return to work tomorrow, or as directed by your referring doctor.    --  Occasionally you may notice a slight increase in your pain after the procedure. This should start to improve within the next 24-48 hours. Radiofrequency ablation procedure pain may last 3-4 weeks.    --  It may take as long as 3-4 days before you notice a gradual improvement in your pain and/or other symptoms.    -- You may continue to take your prescribed pain medication as needed.    --  Some normal possible side effects of steroid use could include fluid retention, increased blood sugar, dull headache,  increased sweating, increased appetite, mood swings and flushing.    --  Diabetics are recommended to watch their blood glucose level closely for 24-48 hours after the injection.    --  Must stay in PACU for 20 min upon arrival and prove no leg weakness before being discharged.    --  IN THE EVENT OF A LIFE THREATENING EMERGENCY, (CHEST PAIN, BREATHING DIFFICULTIES, PARALYSIS…) YOU SHOULD GO TO YOUR NEAREST EMERGENCY ROOM.    --  You should be contacted by our office within 2-3 days to schedule follow up or next appointment date.  If not contacted within 7 days, please call the office at [phone]    Resume your Plavix in 24 hours.

## 2022-05-04 ENCOUNTER — HOSPITAL ENCOUNTER (OUTPATIENT)
Dept: GENERAL RADIOLOGY | Facility: SURGERY CENTER | Age: 82
Setting detail: HOSPITAL OUTPATIENT SURGERY
End: 2022-05-04

## 2022-05-04 ENCOUNTER — HOSPITAL ENCOUNTER (OUTPATIENT)
Facility: SURGERY CENTER | Age: 82
Setting detail: HOSPITAL OUTPATIENT SURGERY
Discharge: HOME | End: 2022-05-04
Attending: ANESTHESIOLOGY | Admitting: ANESTHESIOLOGY

## 2022-05-04 VITALS
BODY MASS INDEX: 26.28 KG/M2 | RESPIRATION RATE: 18 BRPM | HEIGHT: 72 IN | SYSTOLIC BLOOD PRESSURE: 180 MMHG | DIASTOLIC BLOOD PRESSURE: 89 MMHG | WEIGHT: 194 LBS | HEART RATE: 68 BPM | TEMPERATURE: 98.2 F | OXYGEN SATURATION: 98 %

## 2022-05-04 DIAGNOSIS — Z41.9 SURGERY, ELECTIVE: ICD-10-CM

## 2022-05-04 DIAGNOSIS — M51.36 DDD (DEGENERATIVE DISC DISEASE), LUMBAR: ICD-10-CM

## 2022-05-04 DIAGNOSIS — M54.16 LUMBAR RADICULOPATHY: ICD-10-CM

## 2022-05-04 PROCEDURE — 62323 NJX INTERLAMINAR LMBR/SAC: CPT | Performed by: ANESTHESIOLOGY

## 2022-05-04 PROCEDURE — 76000 FLUOROSCOPY <1 HR PHYS/QHP: CPT

## 2022-05-04 PROCEDURE — 77002 NEEDLE LOCALIZATION BY XRAY: CPT

## 2022-05-04 PROCEDURE — 0 IOHEXOL 300 MG/ML SOLUTION 10 ML VIAL: Performed by: ANESTHESIOLOGY

## 2022-05-04 PROCEDURE — 25010000002 DEXAMETHASONE SODIUM PHOSPHATE 100 MG/10ML SOLUTION 10 ML VIAL: Performed by: ANESTHESIOLOGY

## 2022-05-04 RX ORDER — SODIUM CHLORIDE 0.9 % (FLUSH) 0.9 %
10 SYRINGE (ML) INJECTION EVERY 12 HOURS SCHEDULED
Status: DISCONTINUED | OUTPATIENT
Start: 2022-05-04 | End: 2022-05-04 | Stop reason: HOSPADM

## 2022-05-04 RX ORDER — SODIUM CHLORIDE 0.9 % (FLUSH) 0.9 %
10 SYRINGE (ML) INJECTION AS NEEDED
Status: DISCONTINUED | OUTPATIENT
Start: 2022-05-04 | End: 2022-05-04 | Stop reason: HOSPADM

## 2022-05-04 NOTE — OP NOTE
L5/S1 Interlaminar Lumbar Epidural Steroid Injection   [facility]    PREOPERATIVE DIAGNOSIS:   Lumbar foraminal stenosis  POSTOPERATIVE DIAGNOSIS:  Same as preop diagnosis    PROCEDURE:   Lumbar Epidural Steroid Injection, Therapeutic Interlaminar Injection, with epidurogram, at  L5/S1 level    PRE-PROCEDURE DISCUSSION WITH PATIENT:    Risks and complications were discussed with the patient prior to starting the procedure and informed consent was obtained.  We discussed various topics including but not limited to bleeding, infection, injury, paralysis, nerve injury, dural puncture, coma, death, worsening of clinical picture, lack of pain relief, and postprocedural soreness.    SURGEON:  Charlene Manzo MD    REASON FOR PROCEDURE:    Diagnostic injection at this level is needed    SEDATION:  Patient declined administration of moderate sedation    ANESTHETIC:  NONE  STEROID:   15mg dexamethasone    DESCRIPTON OF PROCEDURE:    After obtaining informed consent, I.V. was not started in the preop area.   The patient was taken to the operating room and placed in the prone position.  EKG, blood pressure, and pulse oximeter were monitored throughout, and sedation was provided as needed by the RN under my guidance. All pressure points were well padded.  The lumbar spine area was prepped with Chloraprep and draped in a sterile fashion.      AP fluoroscopic image was used to visualize the L5/S1 interspace.  The skin and subcutaneous tissue over the area was anesthetized with 1% Lidocaine.  An 18-Gauge Tuohy needle was then advanced through the anesthetized skin tract under fluoroscopic guidance in a coaxial view using a loss of resistance technique.  Lateral fluoroscopy was used to verify appropriate needle depth.  Once the needle tip was felt to be in the posterior epidural space, aspiration was noted to be negative for blood or CSF.  A volume of 0.5mL of Omnipaque 180 was then injected under live  fluoroscopy in an AP view which produced good epidural spread with no evidence of loculation, vascular run-off or intrathecal spread.  Subsequently, a total volume of 5mL consisting of 15mg of dexamethasone and normal saline was injected without resistance.  The needle was removed intact.     ESTIMATED BLOOD LOSS:  <5 mL  SPECIMENS:  None    COMPLICATIONS:     No complications were noted., There was no indication of vascular uptake on live injection of contrast dye. and There was no indication of intrathecal uptake on live injection of contrast dye.    TOLERANCE & DISCHARGE CONDITION:    The patient tolerated the procedure well.  The patient was transported to the recovery area without difficulties.  The patient was discharged to home under the care of family in stable and satisfactory condition.    PLAN OF CARE:  1. The patient was given our standard instruction sheet.  2. The patient will Return to clinic 3-4 wks  3. The patient will resume all medications as per the medication reconciliation sheet.

## 2022-05-25 ENCOUNTER — OFFICE VISIT (OUTPATIENT)
Dept: PAIN MEDICINE | Facility: CLINIC | Age: 82
End: 2022-05-25

## 2022-05-25 VITALS
WEIGHT: 193.8 LBS | HEART RATE: 69 BPM | RESPIRATION RATE: 16 BRPM | HEIGHT: 72 IN | BODY MASS INDEX: 26.25 KG/M2 | DIASTOLIC BLOOD PRESSURE: 85 MMHG | OXYGEN SATURATION: 97 % | TEMPERATURE: 96.8 F | SYSTOLIC BLOOD PRESSURE: 149 MMHG

## 2022-05-25 DIAGNOSIS — M48.061 LUMBAR FORAMINAL STENOSIS: ICD-10-CM

## 2022-05-25 DIAGNOSIS — G89.29 CHRONIC BILATERAL LOW BACK PAIN WITHOUT SCIATICA: Primary | ICD-10-CM

## 2022-05-25 DIAGNOSIS — M47.816 LUMBAR FACET ARTHROPATHY: ICD-10-CM

## 2022-05-25 DIAGNOSIS — M54.16 LUMBAR RADICULOPATHY: ICD-10-CM

## 2022-05-25 DIAGNOSIS — M54.50 CHRONIC BILATERAL LOW BACK PAIN WITHOUT SCIATICA: Primary | ICD-10-CM

## 2022-05-25 PROCEDURE — 99214 OFFICE O/P EST MOD 30 MIN: CPT | Performed by: NURSE PRACTITIONER

## 2022-05-25 NOTE — PROGRESS NOTES
CHIEF COMPLAINT  PROCEDURE FOLLOW UP lumbar epidural steroid injection 05/04/2022.   Patient in office reports 50 % relief from epidural states it has continued to remain in effect .     Subjective   Sukhdev Zayas is a 81 y.o. male  who presents to the office for follow-up of procedure.  He completed a LESI L5/S1   on  5/4/2022 performed by Dr. Silva for management of back pain. Patient reports 50% relief from the procedure. He continues reporting low back pain, but his most severe pain is pain from the right lumbosacral area and radiates down the posterior/lateral leg.      bilateral L3-S1 RFA   on  2/7/2022 performed by Dr. Manzo for management of back pain. Patient reports no relief from the procedure.     Plavix --- stents placed about 8 years ago      He still has not had PT. Declines.      Patient remained masked during entire encounter. No cough present. I donned a mask and eye protection throughout entire visit. Prior to donning mask and eye protection, hand hygiene was performed, as well as when it was doffed.  I was closer than 6 feet, but not for an extended period of time. No obvious exposure to any bodily fluids.    Back Pain  This is a chronic problem. The problem occurs daily. The problem has been waxing and waning since onset. The pain is present in the lumbar spine. The quality of the pain is described as burning and aching. The pain radiates to the right thigh, left thigh, left knee and right knee. The pain is at a severity of 6/10. The pain is severe. The symptoms are aggravated by sitting, standing and position. Associated symptoms include chest pain (stress) and weakness. Pertinent negatives include no abdominal pain, dysuria, fever, headaches or numbness. He has tried analgesics, heat, ice and home exercises (occ tylenol, ibuprofen) for the symptoms. The treatment provided mild relief.      PEG Assessment   What number best describes your pain on average in the past week?10  What number  "best describes how, during the past week, pain has interfered with your enjoyment of life?10  What number best describes how, during the past week, pain has interfered with your general activity?  10    Review of Pertinent Medical Data ---  MRI LUMBAR      The following portions of the patient's history were reviewed and updated as appropriate: allergies, current medications, past family history, past medical history, past social history, past surgical history and problem list.    Review of Systems   Constitutional: Negative for activity change, fatigue and fever.   HENT: Negative for congestion.    Eyes: Negative for visual disturbance.   Respiratory: Negative for cough and chest tightness.    Cardiovascular: Positive for chest pain (stress).   Gastrointestinal: Negative for abdominal pain, constipation and diarrhea.   Genitourinary: Negative for difficulty urinating and dysuria.   Musculoskeletal: Positive for back pain.   Neurological: Positive for dizziness and weakness. Negative for light-headedness, numbness and headaches.   Psychiatric/Behavioral: Positive for agitation. Negative for sleep disturbance and suicidal ideas. The patient is nervous/anxious.      I have reviewed and confirmed the accuracy of the ROS as documented by the MA/LPN/RN ANIL Cardenas     Vitals:    05/25/22 1345   BP: 149/85   BP Location: Left arm   Patient Position: Sitting   Pulse: 69   Resp: 16   Temp: 96.8 °F (36 °C)   SpO2: 97%   Weight: 87.9 kg (193 lb 12.8 oz)   Height: 182.9 cm (72\")   PainSc:   6   PainLoc: Back     Objective   Physical Exam  Vitals and nursing note reviewed.   Constitutional:       General: He is not in acute distress.     Appearance: Normal appearance. He is not ill-appearing.   Pulmonary:      Effort: Pulmonary effort is normal. No respiratory distress.   Musculoskeletal:      Lumbar back: Tenderness and bony tenderness present. Positive right straight leg raise test. Negative left straight leg " raise test.   Skin:     General: Skin is warm and dry.   Neurological:      Mental Status: He is alert and oriented to person, place, and time.      Motor: Motor function is intact.      Gait: Gait abnormal (slowed).   Psychiatric:         Mood and Affect: Mood normal.         Behavior: Behavior normal.       Assessment & Plan   Diagnoses and all orders for this visit:    1. Chronic bilateral low back pain without sciatica (Primary)    2. Lumbar facet arthropathy    3. Lumbar foraminal stenosis    4. Lumbar radiculopathy  -     Ambulatory Referral to Neurosurgery      --- Referring to neurosurgery for evaluation - no improvement so far with interventional pain management (epidural injections, RFA)  --- Discussed considering right L5 LTFESI, he declines this today  --- Not currently interested in medication   --- Follow-up after neurosurgical eval           This document is intended for medical expert use only. Reading of this document by patients and/or patient's family without participating medical staff guidance may result in misinterpretation and unintended morbidity.   Any interpretation of such data is the responsibility of the patient and/or family member responsible for the patient in concert with their primary or specialist providers, not to be left for sources of online searches such as [employer] or similar queries. Relying on these approaches to knowledge may result in misinterpretation, misguided goals of care and even death should patients or family members try recommendations outside of the realm of professional medical care in a supervised way.

## 2022-06-20 ENCOUNTER — TELEPHONE (OUTPATIENT)
Dept: INTERNAL MEDICINE | Facility: CLINIC | Age: 82
End: 2022-06-20

## 2022-06-20 NOTE — TELEPHONE ENCOUNTER
Caller: Taty Zayas    Relationship: Emergency Contact    Best call back number: [phone]     What orders are you requesting (i.e. lab or imaging): MRI     In what timeframe would the patient need to come in: AS SOON AS POSSIBLE     Where will you receive your lab/imaging services: [facility]     Additional notes: PATIENT'S WIFE IS CALLING IN, SHE STATES THAT SHE BELIEVES THE PATIENT HAS A TIA OR MINI STROKE. HER DAUGHTER IN LAW IS A PHYSICIAN AND ADVISED HER TO REACH OUT TO BEN AND IF IT HAPPENED AGAIN TO TAKE HIM TO THE ER.     PATIENTS WIFE WOULD LIKE TO KNOW IF BEN WOULD PREFER TO SEE HIM FIRST OR GO AHEAD AND SEND ORDERS.

## 2022-06-21 ENCOUNTER — HOSPITAL ENCOUNTER (OUTPATIENT)
Dept: MRI IMAGING | Facility: HOSPITAL | Age: 82
Discharge: HOME | End: 2022-06-21
Admitting: PHYSICIAN ASSISTANT

## 2022-06-21 ENCOUNTER — OFFICE VISIT (OUTPATIENT)
Dept: INTERNAL MEDICINE | Facility: CLINIC | Age: 82
End: 2022-06-21

## 2022-06-21 VITALS
SYSTOLIC BLOOD PRESSURE: 120 MMHG | WEIGHT: 194 LBS | DIASTOLIC BLOOD PRESSURE: 70 MMHG | HEART RATE: 73 BPM | BODY MASS INDEX: 26.28 KG/M2 | TEMPERATURE: 97.4 F | OXYGEN SATURATION: 99 % | HEIGHT: 72 IN

## 2022-06-21 DIAGNOSIS — R47.01 APHASIA: Primary | ICD-10-CM

## 2022-06-21 PROCEDURE — A9577 INJ MULTIHANCE: HCPCS | Performed by: PHYSICIAN ASSISTANT

## 2022-06-21 PROCEDURE — 70544 MR ANGIOGRAPHY HEAD W/O DYE: CPT

## 2022-06-21 PROCEDURE — 99214 OFFICE O/P EST MOD 30 MIN: CPT | Performed by: PHYSICIAN ASSISTANT

## 2022-06-21 PROCEDURE — 70553 MRI BRAIN STEM W/O & W/DYE: CPT

## 2022-06-21 PROCEDURE — 0 GADOBENATE DIMEGLUMINE 529 MG/ML SOLUTION: Performed by: PHYSICIAN ASSISTANT

## 2022-06-21 PROCEDURE — 82565 ASSAY OF CREATININE: CPT

## 2022-06-21 RX ADMIN — GADOBENATE DIMEGLUMINE 18 ML: 529 INJECTION, SOLUTION INTRAVENOUS at 17:19

## 2022-06-21 NOTE — PROGRESS NOTES
Subjective   Chief Complaint   Patient presents with   • Dizziness   • Concussion     Poss tia    • Speech Delay       History of Present Illness     They were at their daughters house for dinner on Sunday ( 2 days ago) and all of sudden he closed his eyes rapidly he was staring into space and they were asking him questions. Daughter checked his  strength and he had no weakness in his hands or arms. Was unable to answer questions initially and then after 2-3 minutes he was able to make more sense. The entire event lasted 5 minutes. He did have observed aphasia. No facial drooping. He was sitting when this occurred. Felt very dizzy during the episode. Had severe dizziness the day prior.  He has not missed any  doses of his Plavix or his Aspirin. Has had no palpitations recently. No syncope or presyncope. He felt back to his baseline after a few hours that evening.      Patient Active Problem List   Diagnosis   • Hyperlipidemia   • Coronary artery disease involving native coronary artery of native heart without angina pectoris   • MCI (mild cognitive impairment)   • Mood disorder ([facility])   • Closed wedge compression fracture of third thoracic vertebra with routine healing   • GERD (gastroesophageal reflux disease)   • S/P drug eluting coronary stent placement   • Chest pain   • Diastolic dysfunction   • Mixed action and resting tremor   • Bladder cancer ([facility])   • BPH (benign prostatic hyperplasia)   • Atrial fibrillation ([facility])   • Essential hypertension   • Dizziness   • Beta-blocker intolerance   • Orthostatic hypotension   • Parkinsonism ([facility])   • Lumbar facet arthropathy   • Spondylosis of lumbar region without myelopathy or radiculopathy   • Lumbar foraminal stenosis   • Chronic bilateral low back pain without sciatica   • Hypothyroidism   • DDD (degenerative disc disease), lumbar   • Lumbar radiculopathy       Allergies   Allergen Reactions   • Bupropion Dizziness, Delirium and Other (See Comments)        Current Outpatient Medications on File Prior to Visit   Medication Sig Dispense Refill   • aspirin 81 MG chewable tablet Chew 1 tablet Daily. 30 tablet 0   • clopidogrel (PLAVIX) 75 MG tablet Take 1 tablet by mouth Daily. Start on Monday     • FLUoxetine (PROzac) 20 MG capsule Take 20 mg by mouth Daily.     • levothyroxine (SYNTHROID, LEVOTHROID) 25 MCG tablet Take 1 tablet by mouth Daily. 90 tablet 1   • omeprazole (priLOSEC) 40 MG capsule Take 1 capsule by mouth Daily. 90 capsule 4   • VITAMIN D PO Take  by mouth. 400 mg daily       No current facility-administered medications on file prior to visit.       Past Medical History:   Diagnosis Date   • Anxiety    • Back pain    • Bladder cancer (HCC) 2018    bladder cancer has been removed.   • Depression    • Dizziness    • GERD (gastroesophageal reflux disease)    • History of fractured rib     RIGHT SIDE   • Hyperlipidemia    • Multiple falls    • Tremors of nervous system    • Urinary incontinence    • Vertigo        Family History   Problem Relation Age of Onset   • Arthritis Mother    • Glaucoma Mother    • Heart disease Father    • Emphysema Father    • Tremor Father    • Malig Hyperthermia Neg Hx        Social History     Socioeconomic History   • Marital status:    • Number of children: 4   • Years of education: 5 college degrees   Tobacco Use   • Smoking status: Never Smoker   • Smokeless tobacco: Never Used   Vaping Use   • Vaping Use: Never used   Substance and Sexual Activity   • Alcohol use: No     Comment: last drink about 1 year ago    • Drug use: No   • Sexual activity: Defer       Past Surgical History:   Procedure Laterality Date   • CARDIAC CATHETERIZATION      7x, 5 stents   • CATARACT EXTRACTION, BILATERAL     • CHOLECYSTECTOMY N/A 9/2/2019    Procedure: CHOLECYSTECTOMY LAPAROSCOPIC WITH INTRAOPERATIVE CHOLANGIOGRAM;  Surgeon: Bg Rodriguez Jr., MD;  Location: [facility];  Service: General   • COLONOSCOPY     • CORONARY  ANGIOPLASTY WITH STENT PLACEMENT      X5    • CYSTOSCOPY BLADDER BIOPSY N/A 1/8/2019    Procedure: CYSTOSCOPY BLADDER BIOPSY;  Surgeon: Wang Soares Jr., MD;  Location: [facility] MAIN OR;  Service: Urology   • CYSTOSCOPY BLADDER BIOPSY N/A 6/13/2019    Procedure: CYSTOSCOPY BLADDER BIOPSY;  Surgeon: Wang Soares Jr., MD;  [facility];  Service: Urology   • CYSTOSCOPY TRANSURETHRAL RESECTION OF PROSTATE N/A 7/11/2019    Procedure: CYSTOSCOPY TRANSURETHRAL RESECTION OF PROSTATE;  Surgeon: Wang Soares Jr., MD;  Location: [facility] MAIN OR;  Service: Urology   • CYSTOSCOPY URETEROSCOPY LASER LITHOTRIPSY N/A 6/13/2019    Procedure: CYSTO LITHOPAXY;  Surgeon: Wang Soares Jr., MD;  Location: [facility] MAIN OR;  Service: Urology   • ERCP N/A 9/3/2019    Procedure: ENDOSCOPIC RETROGRADE CHOLANGIOPANCREATOGRAPHY with sphincterotomy and balloon sweep;  Surgeon: Ashok Amaya MD;  Location: [facility] ENDOSCOPY;  Service: Gastroenterology   • EYE SURGERY      Lens implants   • LUMBAR DISCECTOMY FUSION INSTRUMENTATION     • LUMBAR EPIDURAL INJECTION N/A 5/4/2022    Procedure: lumbar epidural steroid injection;  Surgeon: Charlene Manzo MD;  Location[facility];  Service: Pain Management;  Laterality: N/A;   • MEDIAL BRANCH BLOCK Bilateral 12/29/2021    Procedure: LUMBAR MEDIAL BRANCH BLOCK Bilateral L3-S1 x2 (2 weeks apart);  Surgeon: Charlene Manzo MD;  Location: [facility];  Service: Pain Management;  Laterality: Bilateral;   • MEDIAL BRANCH BLOCK Bilateral 1/12/2022    Procedure: LUMBAR MEDIAL BRANCH BLOCK Bilateral L3-S1 x2 (2 weeks apart);  Surgeon: Charlene Manzo MD;  Location: [facility];  Service: Pain Management;  Laterality: Bilateral;   • RADIOFREQUENCY ABLATION Bilateral 2/7/2022    Procedure: RADIOFREQUENCY ABLATION LUMBAR bilateral L3-S1;  Surgeon: Charlene Manzo MD;  Location: [facility];  Service: Pain Management;  Laterality: Bilateral;   • SKIN CANCER  "EXCISION Left     chest wall   • SKIN CANCER EXCISION  01/21/2021    facial   • TRANSURETHRAL RESECTION OF BLADDER TUMOR N/A 11/29/2018    Procedure: TUR BLADDER TUMOR  LARGE;  Surgeon: Wang Soares Jr., MD;  Location: [facility];  Service: Urology         The following portions of the patient's history were reviewed and updated as appropriate: problem list, allergies, current medications, past medical history, past family history, past social history and past surgical history.    ROS     See HPI    Immunization History   Administered Date(s) Administered   • COVID-19 (PFIZER) PURPLE CAP 01/26/2021, 02/20/2021, 08/23/2021   • Fluad Quad 65+ 11/09/2020   • Fluzone High Dose =>65 Years ([employer] ONLY) 10/12/2016   • Fluzone High-Dose 65+yrs 10/20/2021   • Td, Not Adsorbed 03/25/2017   • flucelvax quad pfs =>4 YRS 11/30/2018       Objective   Vitals:    06/21/22 1123 06/21/22 1141   BP:  120/70   Pulse: 73    Temp: 97.4 °F (36.3 °C)    SpO2: 99%    Weight: 88 kg (194 lb)    Height: 182.9 cm (72.01\")      Body mass index is 26.31 kg/m².  Physical Exam  Vitals reviewed.   Constitutional:       Appearance: Normal appearance.   HENT:      Head: Normocephalic and atraumatic.   Eyes:      Extraocular Movements: Extraocular movements intact.      Conjunctiva/sclera: Conjunctivae normal.      Pupils: Pupils are equal, round, and reactive to light.   Cardiovascular:      Rate and Rhythm: Normal rate and regular rhythm.      Heart sounds: Normal heart sounds.   Pulmonary:      Effort: Pulmonary effort is normal.      Breath sounds: Normal breath sounds.   Neurological:      General: No focal deficit present.      Mental Status: He is alert and oriented to person, place, and time.           Assessment & Plan   Diagnoses and all orders for this visit:    1. Aphasia (Primary)  -     MRI Brain With & Without Contrast  -     MRI Angiogram Head Without Contrast    Suspect possible TIA, though he has not missed any of his " Plavix or ASA. He is in sinus rhythm today. Will get urgent MRI/MRA. Consider carotid US and Holter.     No follow-ups on file.

## 2022-06-22 LAB — CREAT BLDA-MCNC: 1 MG/DL (ref 0.6–1.3)

## 2022-06-23 ENCOUNTER — TELEPHONE (OUTPATIENT)
Dept: INTERNAL MEDICINE | Facility: CLINIC | Age: 82
End: 2022-06-23

## 2022-06-23 NOTE — TELEPHONE ENCOUNTER
Caller: Taty Zayas    Relationship: Emergency Contact    Best call back number: [phone]    What is the best time to reach you: ANY    Who are you requesting to speak with (clinical staff, provider,  specific staff member): CLINICAL STAFF    Do you know the name of the person who called: PATIENT    What was the call regarding:WOULD LIKE THE RESULTS OF HIS MRI    Do you require a callback: YES

## 2022-06-24 DIAGNOSIS — G45.9 TIA (TRANSIENT ISCHEMIC ATTACK): Primary | ICD-10-CM

## 2022-06-24 DIAGNOSIS — I63.89 OTHER CEREBRAL INFARCTION: ICD-10-CM

## 2022-07-01 RX ORDER — LEVOTHYROXINE SODIUM 0.03 MG/1
25 TABLET ORAL DAILY
Qty: 90 TABLET | Refills: 1 | Status: SHIPPED | OUTPATIENT
Start: 2022-07-01 | End: 2022-08-10 | Stop reason: SDUPTHER

## 2022-07-13 ENCOUNTER — HOSPITAL ENCOUNTER (OUTPATIENT)
Dept: CARDIOLOGY | Facility: HOSPITAL | Age: 82
Discharge: HOME | End: 2022-07-13
Admitting: PHYSICIAN ASSISTANT

## 2022-07-13 LAB
BH CV XLRA MEAS LEFT DIST CCA EDV: -17 CM/SEC
BH CV XLRA MEAS LEFT DIST CCA PSV: -80.9 CM/SEC
BH CV XLRA MEAS LEFT DIST ICA EDV: -7.8 CM/SEC
BH CV XLRA MEAS LEFT DIST ICA PSV: -25.2 CM/SEC
BH CV XLRA MEAS LEFT ICA/CCA RATIO: 0.65
BH CV XLRA MEAS LEFT MID ICA EDV: -15.4 CM/SEC
BH CV XLRA MEAS LEFT MID ICA PSV: -44.8 CM/SEC
BH CV XLRA MEAS LEFT PROX CCA EDV: -23.6 CM/SEC
BH CV XLRA MEAS LEFT PROX CCA PSV: -245 CM/SEC
BH CV XLRA MEAS LEFT PROX ECA EDV: -9.4 CM/SEC
BH CV XLRA MEAS LEFT PROX ECA PSV: -73.3 CM/SEC
BH CV XLRA MEAS LEFT PROX ICA EDV: -21.2 CM/SEC
BH CV XLRA MEAS LEFT PROX ICA PSV: -52.2 CM/SEC
BH CV XLRA MEAS LEFT PROX SCLA PSV: 108 CM/SEC
BH CV XLRA MEAS LEFT VERTEBRAL A EDV: -7.7 CM/SEC
BH CV XLRA MEAS LEFT VERTEBRAL A PSV: -33.8 CM/SEC
BH CV XLRA MEAS RIGHT DIST CCA EDV: -22.9 CM/SEC
BH CV XLRA MEAS RIGHT DIST CCA PSV: -83.8 CM/SEC
BH CV XLRA MEAS RIGHT DIST ICA EDV: -19.5 CM/SEC
BH CV XLRA MEAS RIGHT DIST ICA PSV: -62.8 CM/SEC
BH CV XLRA MEAS RIGHT ICA/CCA RATIO: 0.98
BH CV XLRA MEAS RIGHT MID ICA EDV: -26.7 CM/SEC
BH CV XLRA MEAS RIGHT MID ICA PSV: -82.5 CM/SEC
BH CV XLRA MEAS RIGHT PROX CCA EDV: 13.5 CM/SEC
BH CV XLRA MEAS RIGHT PROX CCA PSV: 75 CM/SEC
BH CV XLRA MEAS RIGHT PROX ECA EDV: -9 CM/SEC
BH CV XLRA MEAS RIGHT PROX ECA PSV: -74.3 CM/SEC
BH CV XLRA MEAS RIGHT PROX ICA EDV: -13.3 CM/SEC
BH CV XLRA MEAS RIGHT PROX ICA PSV: -39 CM/SEC
BH CV XLRA MEAS RIGHT PROX SCLA PSV: 84.4 CM/SEC
BH CV XLRA MEAS RIGHT VERTEBRAL A EDV: -8.2 CM/SEC
BH CV XLRA MEAS RIGHT VERTEBRAL A PSV: -36.9 CM/SEC
LEFT ARM BP: NORMAL MMHG
MAXIMAL PREDICTED HEART RATE: 139 BPM
RIGHT ARM BP: NORMAL MMHG
STRESS TARGET HR: 118 BPM

## 2022-07-13 PROCEDURE — 93880 EXTRACRANIAL BILAT STUDY: CPT

## 2022-07-18 ENCOUNTER — TELEPHONE (OUTPATIENT)
Dept: INTERNAL MEDICINE | Facility: CLINIC | Age: 82
End: 2022-07-18

## 2022-07-18 DIAGNOSIS — G45.9 TIA (TRANSIENT ISCHEMIC ATTACK): Primary | ICD-10-CM

## 2022-07-18 NOTE — TELEPHONE ENCOUNTER
Caller: Taty Zayas    Relationship: Emergency Contact    Best call back number: [phone]    Caller requesting test results: PATIENT'S WIFE ON  VERBAL     What test was performed: DOPPLER     When was the test performed: 7/11/22    Where was the test performed:      Additional notes:

## 2022-08-08 NOTE — PROGRESS NOTES
"Subjective   Patient ID: Sukhdev Zayas is a 81 y.o. male is being seen for consultation today at the request of ANIL Cardenas for lumbar radiculopathy.Patient had a MRI L-spine on 11.04.2021 at [facility]    Treatment: RFA    Today patient states that he has low back pain that radiates B/L leg. Patient also has issues with his gait/balance.     Patient, Provider and MA are all wearing a mask in our office today.     History of Present Illness     This patient has been having pain in his lower back.  This been going on for quite a while.  He did have pain into his right buttock and shooting down his right leg which occurs every once in a while but his main problem is the pain in the back.  This occurs when he walks any distance or stands for a while.  The left side is generally okay.  He has no difficulty with bowel bladder control or other associated symptoms.  He has been treated with an epidural block and 2 radiofrequency ablations which did not help.    The following portions of the patient's history were reviewed and updated as appropriate: allergies, current medications, past family history, past medical history, past social history, past surgical history and problem list.    Review of Systems   Constitutional: Negative for chills and fever.   HENT: Negative for congestion.    Genitourinary: Negative for difficulty urinating and dysuria.   Musculoskeletal: Positive for back pain, gait problem and myalgias.        B/L leg pain   Neurological: Positive for weakness and numbness.       I have reviewed the review of systems as documented by my MA.      Objective     Vitals:    08/09/22 1209   BP: 130/84   Cuff Size: Adult   Pulse: 97   Temp: 98 °F (36.7 °C)   SpO2: 97%   Weight: 88 kg (194 lb)   Height: 182.9 cm (72.01\")     Body mass index is 26.3 kg/m².      Physical Exam  Constitutional:       Appearance: He is well-developed.   HENT:      Head: Normocephalic and atraumatic.   Eyes:      Extraocular " Movements: EOM normal.      Conjunctiva/sclera: Conjunctivae normal.      Pupils: Pupils are equal, round, and reactive to light.   Neck:      Vascular: No carotid bruit.   Neurological:      Mental Status: He is oriented to person, place, and time.      Coordination: Finger-Nose-Finger Test and Heel to Shin Test normal.      Gait: Gait is intact.      Deep Tendon Reflexes:      Reflex Scores:       Tricep reflexes are 2+ on the right side and 2+ on the left side.       Bicep reflexes are 2+ on the right side and 2+ on the left side.       Brachioradialis reflexes are 2+ on the right side and 2+ on the left side.       Patellar reflexes are 2+ on the right side and 2+ on the left side.       Achilles reflexes are 2+ on the right side and 2+ on the left side.  Psychiatric:         Speech: Speech normal.       Neurologic Exam     Mental Status   Oriented to person, place, and time.   Registration of memory: Good recent and remote memory.   Attention: normal. Concentration: normal.   Speech: speech is normal   Level of consciousness: alert  Knowledge: consistent with education.     Cranial Nerves     CN II   Visual fields full to confrontation.   Visual acuity: normal    CN III, IV, VI   Pupils are equal, round, and reactive to light.  Extraocular motions are normal.     CN V   Facial sensation intact.   Right corneal reflex: normal  Left corneal reflex: normal    CN VII   Facial expression full, symmetric.   Right facial weakness: none  Left facial weakness: none    CN VIII   Hearing: intact    CN IX, X   Palate: symmetric    CN XI   Right sternocleidomastoid strength: normal  Left sternocleidomastoid strength: normal    CN XII   Tongue: not atrophic  Tongue deviation: none    Motor Exam   Muscle bulk: normal  Right arm tone: normal  Left arm tone: normal  Right leg tone: normal  Left leg tone: normal    Strength   Strength 5/5 except as noted.     Sensory Exam   Light touch normal.     Gait, Coordination, and  Reflexes     Gait  Gait: normal    Coordination   Finger to nose coordination: normal  Heel to shin coordination: normal    Reflexes   Right brachioradialis: 2+  Left brachioradialis: 2+  Right biceps: 2+  Left biceps: 2+  Right triceps: 2+  Left triceps: 2+  Right patellar: 2+  Left patellar: 2+  Right achilles: 2+  Left achilles: 2+  Right : 2+  Left : 2+          Assessment & Plan   Independent Review of Radiographic Studies:      I personally reviewed the images from the following studies.    I reviewed an MRI of the lumbar spine done in November of last year.  This looks actually pretty good on the sagittal images.  There is some lumbar scoliosis.  On the axial images L1 to is pretty open.  L2-3 is also pretty open.  L3-4 does show some left-sided foraminal stenosis that is fairly severe.  L4-5 shows some right-sided lateral recess stenosis due to some facet hypertrophy.  L5-S1 shows a synovial cyst extending into the right neuroforamen.    Medical Decision Making:      I told the patient and his wife about the imaging.  I told him that the only way to correct this problem is with surgery.  He did have a left L4-5 laminectomy with metrx by me in February 2011.  He did quite well with that until this problem started within the last year or so.  I told him I really did not recommend doing any type of surgery.  He is already done injections and so the only other thing I can think of would be to try some physical therapy.  I told her if that is not helping or the problem becomes intolerable he should let me know and we could certainly consider surgery but I would really rather not do it unless we absolutely have to.    Diagnoses and all orders for this visit:    1. Lumbar radiculopathy (Primary)  -     Ambulatory Referral to Physical Therapy    2. Lumbar foraminal stenosis      Return if symptoms worsen or fail to improve.

## 2022-08-09 ENCOUNTER — OFFICE VISIT (OUTPATIENT)
Dept: NEUROSURGERY | Facility: CLINIC | Age: 82
End: 2022-08-09

## 2022-08-09 VITALS
WEIGHT: 194 LBS | TEMPERATURE: 98 F | HEIGHT: 72 IN | DIASTOLIC BLOOD PRESSURE: 84 MMHG | BODY MASS INDEX: 26.28 KG/M2 | SYSTOLIC BLOOD PRESSURE: 130 MMHG | HEART RATE: 97 BPM | OXYGEN SATURATION: 97 %

## 2022-08-09 DIAGNOSIS — M54.16 LUMBAR RADICULOPATHY: Primary | ICD-10-CM

## 2022-08-09 DIAGNOSIS — M48.061 LUMBAR FORAMINAL STENOSIS: ICD-10-CM

## 2022-08-09 PROCEDURE — 99203 OFFICE O/P NEW LOW 30 MIN: CPT | Performed by: NEUROLOGICAL SURGERY

## 2022-08-10 ENCOUNTER — HOSPITAL ENCOUNTER (OUTPATIENT)
Dept: CARDIOLOGY | Facility: HOSPITAL | Age: 82
Discharge: HOME | End: 2022-08-10
Admitting: PHYSICIAN ASSISTANT

## 2022-08-10 DIAGNOSIS — G45.9 TIA (TRANSIENT ISCHEMIC ATTACK): ICD-10-CM

## 2022-08-10 PROCEDURE — 93226 XTRNL ECG REC<48 HR SCAN A/R: CPT

## 2022-08-10 PROCEDURE — 93225 XTRNL ECG REC<48 HRS REC: CPT

## 2022-08-10 RX ORDER — LEVOTHYROXINE SODIUM 0.03 MG/1
25 TABLET ORAL DAILY
Qty: 90 TABLET | Refills: 0 | Status: SHIPPED | OUTPATIENT
Start: 2022-08-10 | End: 2023-01-23

## 2022-08-14 LAB
MAXIMAL PREDICTED HEART RATE: 139 BPM
STRESS TARGET HR: 118 BPM

## 2022-08-14 PROCEDURE — 93227 XTRNL ECG REC<48 HR R&I: CPT | Performed by: INTERNAL MEDICINE

## 2022-09-01 ENCOUNTER — HOSPITAL ENCOUNTER (OUTPATIENT)
Dept: PHYSICAL THERAPY | Facility: HOSPITAL | Age: 82
Setting detail: THERAPIES SERIES
Discharge: HOME | End: 2022-09-01

## 2022-09-01 DIAGNOSIS — M51.36 DDD (DEGENERATIVE DISC DISEASE), LUMBAR: ICD-10-CM

## 2022-09-01 DIAGNOSIS — M54.16 LUMBAR RADICULOPATHY: Primary | ICD-10-CM

## 2022-09-01 PROCEDURE — 97162 PT EVAL MOD COMPLEX 30 MIN: CPT | Performed by: PHYSICAL THERAPIST

## 2022-09-01 PROCEDURE — 97110 THERAPEUTIC EXERCISES: CPT | Performed by: PHYSICAL THERAPIST

## 2022-09-01 NOTE — THERAPY EVALUATION
Outpatient Physical Therapy Ortho Initial Evaluation  [facility]     Patient Name: Sukhdev Zayas  : 1940  MRN: [id number]  Today's Date: 2022      Visit Date: 2022    Patient Active Problem List   Diagnosis   • Hyperlipidemia   • Coronary artery disease involving native coronary artery of native heart without angina pectoris   • MCI (mild cognitive impairment)   • Mood disorder (HCC)   • Closed wedge compression fracture of third thoracic vertebra with routine healing   • GERD (gastroesophageal reflux disease)   • S/P drug eluting coronary stent placement   • Chest pain   • Diastolic dysfunction   • Mixed action and resting tremor   • Bladder cancer ([facility])   • BPH (benign prostatic hyperplasia)   • Atrial fibrillation ([facility])   • Essential hypertension   • Dizziness   • Beta-blocker intolerance   • Orthostatic hypotension   • Parkinsonism ([facility])   • Lumbar facet arthropathy   • Spondylosis 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   • Depression    • Dizziness    • GERD (gastroesophageal reflux disease)    • History of fractured rib     RIGHT SIDE   • Hyperlipidemia    • Multiple falls    • Tremors of nervous system    • Urinary incontinence    • Vertigo         Past Surgical History:   Procedure Laterality Date   • CARDIAC CATHETERIZATION      7x, 5 stents   • CATARACT EXTRACTION, BILATERAL     • CHOLECYSTECTOMY N/A 2019    Procedure: CHOLECYSTECTOMY LAPAROSCOPIC WITH INTRAOPERATIVE CHOLANGIOGRAM;  Surgeon: Bg Rodriguez Jr., MD;  Location: [facility];  Service: General   • COLONOSCOPY     • CORONARY ANGIOPLASTY WITH STENT PLACEMENT      X5    • CYSTOSCOPY BLADDER  BIOPSY N/A 1/8/2019    Procedure: CYSTOSCOPY BLADDER BIOPSY;  Surgeon: Wang Soares Jr., MD;  Location:  [facility];  Service: Urology   • CYSTOSCOPY BLADDER BIOPSY N/A 6/13/2019    Procedure: CYSTOSCOPY BLADDER BIOPSY;  Surgeon: Wang Soares Jr., MD;  Location:  [facility];  Service: Urology   • CYSTOSCOPY TRANSURETHRAL RESECTION OF PROSTATE N/A 7/11/2019    Procedure: CYSTOSCOPY TRANSURETHRAL RESECTION OF PROSTATE;  Surgeon: Wang Soares Jr., MD;  Location:  [facility];  Service: Urology   • CYSTOSCOPY URETEROSCOPY LASER LITHOTRIPSY N/A 6/13/2019    Procedure: CYSTO LITHOPAXY;  Surgeon: Wang Soares Jr., MD;  [facility];  Service: Urology   • ERCP N/A 9/3/2019    Procedure: ENDOSCOPIC RETROGRADE CHOLANGIOPANCREATOGRAPHY with sphincterotomy and balloon sweep;  Surgeon: Ashok Amaya MD;  Location: [facility] ENDOSCOPY;  Service: Gastroenterology   • EYE SURGERY      Lens implants   • LUMBAR DISCECTOMY FUSION INSTRUMENTATION     • LUMBAR EPIDURAL INJECTION N/A 5/4/2022    Procedure: lumbar epidural steroid injection;  Surgeon: Charlene Manzo MD;  Location: [facility];  Service: Pain Management;  Laterality: N/A;   • MEDIAL BRANCH BLOCK Bilateral 12/29/2021    Procedure: LUMBAR MEDIAL BRANCH BLOCK Bilateral L3-S1 x2 (2 weeks apart);  Surgeon: Charlene Manzo MD;  Location: [facility];  Service: Pain Management;  Laterality: Bilateral;   • MEDIAL BRANCH BLOCK Bilateral 1/12/2022    Procedure: LUMBAR MEDIAL BRANCH BLOCK Bilateral L3-S1 x2 (2 weeks apart);  Surgeon: Charlene Manzo MD;  Location: [facility];  Service: Pain Management;  Laterality: Bilateral;   • RADIOFREQUENCY ABLATION Bilateral 2/7/2022    Procedure: RADIOFREQUENCY ABLATION LUMBAR bilateral L3-S1;  Surgeon: Charlene Manzo MD;  Location: [facility];  Service: Pain Management;  Laterality: Bilateral;   • SKIN CANCER EXCISION Left     chest wall   • SKIN CANCER EXCISION   01/21/2021    facial   • TRANSURETHRAL RESECTION OF BLADDER TUMOR N/A 11/29/2018    Procedure: TUR BLADDER TUMOR  LARGE;  Surgeon: Wang Soares Jr., MD;  Location: [facility] OR;  Service: Urology       Visit Dx:     ICD-10-CM ICD-9-CM   1. Lumbar radiculopathy  M54.16 724.4   2. DDD (degenerative disc disease), lumbar  M51.36 722.52          Patient History     Row Name 09/01/22 1100             History    Chief Complaint Pain  -GR      Type of Pain Back pain  -GR      Date Current Problem(s) Began 09/01/21  -GR      Brief Description of Current Complaint C/o chronic LBP progressively worsening over the last year. He is limited in prolonged standing or walking. Occasionally he gets sharp pains into his legs; he also has resting tremors; has trialed parkinson's medications but had to stop due to side effects. He does wear a back brace intermittently, at his wifes advice.  Sitting is his most comfortable position. He has been to pain management without much relief; gets some relief from tylenol. Reports very active adolesence and adulthood, now struggling to keep up with MD appointments to uncertain about going thru a formal PT program.  -GR      Patient/Caregiver Goals Relieve pain;Know what to do to help the symptoms  -GR      Occupation/sports/leisure activities sedentary  -GR              Pain     Pain Location Back  -GR      Pain at Present 4  -GR      Pain at Best 4  -GR      Pain at Worst 8  -GR      Is your sleep disturbed? No  -GR              Fall Risk Assessment    Any falls in the past year: Yes  -GR      Number of falls reported in the last 12 months 5  -GR      Factors that contributed to the fall: Lost balance  he has been able to stop himself with his hands  -GR              Services    Do you plan to receive Home Health services in the near future No  -GR              Daily Activities    Primary Language English  -GR      How does patient learn best? Listening;Reading;Demonstration  -GR       Pt Participated in POC and Goals Yes  -GR              Safety    Are you being hurt, hit, or frightened by anyone at home or in your life? No  -GR      Are you being neglected by a caregiver No  -GR            User Key  (r) = Recorded By, (t) = Taken By, (c) = Cosigned By    Initials Name Provider Type    Vijay Sutherland, PT Physical Therapist                 PT Ortho     Row Name 09/01/22 1100       Quarter Clearing    Quarter Clearing Lower Quarter Clearing  -GR       Neural Tension Signs- Lower Quarter Clearing    SLR Bilateral:;Positive  -GR       Myotomal Screen- Lower Quarter Clearing    Hip flexion (L2) Bilateral:;4- (Good -)  -GR    Knee extension (L3) Bilateral:;4+ (Good +)  -GR    Ankle DF (L4) Bilateral:;4+ (Good +)  -GR    Great toe extension (L5) Bilateral:;4+ (Good +)  -GR    Ankle PF (S1) Bilateral:;4 (Good)  -GR    Knee flexion (S2) Bilateral:;4- (Good -)  -GR       Lumbar ROM Screen- Lower Quarter Clearing    Lumbar Flexion Impaired  to proximal tibia  -GR    Lumbar Extension Impaired  50% limited with pain  -GR    Lumbar Lateral Flexion Impaired  WNL to R, 50% limited to L with ipsilateral pain  -GR    Lumbar Rotation Impaired  50% limited with pain B  -GR       Flexibility    Flexibility Tested? Lower Extremity  -GR       Lower Extremity Flexibility    Overall LE Flexibility Moderately limited  -GR    Hamstrings Bilateral:;Severely limited  -GR    Hip Flexors Bilateral:;Moderately limited  -GR    Hip External Rotators Bilateral:;Moderately limited  -GR    Hip Internal Rotators Bilateral:;Moderately limited  -GR          User Key  (r) = Recorded By, (t) = Taken By, (c) = Cosigned By    Initials Name Provider Type    Vijay Sutherland, PT Physical Therapist                            Therapy Education  Education Details: medbridge HEP [id number], expectations, POC      PT OP Goals     Row Name 09/01/22 1500          PT Short Term Goals    STG Date to Achieve 10/01/22  -GR     STG 1 Patient  will be independent with initial HEP.  -GR     STG 1 Progress New  -GR     STG 2 Patient will utilize optimal positional relief movement for pain control.  -GR     STG 2 Progress New  -GR            Long Term Goals    LTG Date to Achieve 10/31/22  -GR     LTG 1 Patient will be independent with progressive HEP for long term condition management.  -GR     LTG 1 Progress New  -GR     LTG 2 Patient will score </=44% disability on the modified ELIZABETH to indicate improved perceived ADL performance.  -GR     LTG 2 Progress New  -GR     LTG 3 Walking tolerance increased by 15 minutes or more.  -GR     LTG 3 Progress New  -GR            Time Calculation    PT Goal Re-Cert Due Date 11/30/22  -GR           User Key  (r) = Recorded By, (t) = Taken By, (c) = Cosigned By    Initials Name Provider Type    Vijay Sutherland, PT Physical Therapist                 PT Assessment/Plan     Row Name 09/01/22 1500          PT Assessment    Functional Limitations Impaired gait;Limitation in home management;Limitations in community activities;Limitations in functional capacity and performance;Performance in leisure activities;Performance in self-care ADL;Performance in sport activities  -GR     Impairments Balance;Pain;Gait;Muscle strength;Range of motion;Poor body mechanics;Joint mobility  -GR     Assessment Comments 82 y/o M referred to outpatient PT for chronic LBP insidious onset, progressively worsening x 1 year.  He presents with impaired trunk AROM, pain worse with L sidebend and B rotation. Strength of the lower quarter is fair, core is weak. He has significant flexibility restrictions of B hips and crepitus of the R knee.  PMH pertinent for arthritis, tremors, orthostatic hypotension, vertigo, lumbar fusion.  Introduced to HEP on this date; patient requests to trial this independently and will return in 1 month for any progressions/modification. Encouraged to return sooner if having difficulty completing. Thank you for the referral  of this patient with an evolving condition that could benefit from PT intervention.  -GR     Please refer to paper survey for additional self-reported information No  -GR     Rehab Potential Fair  -GR     Patient/caregiver participated in establishment of treatment plan and goals Yes  -GR     Patient would benefit from skilled therapy intervention Yes  -GR            PT Plan    PT Frequency 1x/week  1 follow up at patient request; declined 2x/week  -GR     Predicted Duration of Therapy Intervention (PT) 4 visits  -GR     Planned CPT's? PT EVAL MOD COMPLELITY: 49339;PT RE-EVAL: 35547;PT THER PROC EA 15 MIN: 07601;PT THER ACT EA 15 MIN: 04558;PT MANUAL THERAPY EA 15 MIN: 39484;PT NEUROMUSC RE-EDUCATION EA 15 MIN: 87866;PT GAIT TRAINING EA 15 MIN: 57967;PT SELF CARE/HOME MGMT/TRAIN EA 15: 82035  -GR     Physical Therapy Interventions (Optional Details) balance training;gait training;home exercise program;modalities;neuromuscular re-education;patient/family education;strengthening;stretching  -GR     PT Plan Comments Return in 1 month to review and progress HEP.  -GR           User Key  (r) = Recorded By, (t) = Taken By, (c) = Cosigned By    Initials Name Provider Type    Vijay Sutherland, PT Physical Therapist                   OP Exercises     Row Name 09/01/22 1525             Total Minutes    01666 - PT Therapeutic Exercise Minutes 10  -GR              Exercise 1    Exercise Name 1 thorough seated review of medbridge HEP  -GR            User Key  (r) = Recorded By, (t) = Taken By, (c) = Cosigned By    Initials Name Provider Type    Vijay Sutherland, PT Physical Therapist                              Outcome Measure Options: Modified Oswestry  Modified Oswestry  Modified Oswestry Score/Comments: 54% disability      Time Calculation:     Start Time: 1135  Stop Time: 1215  Time Calculation (min): 40 min  Total Timed Code Minutes- PT: 10 minute(s)  Timed Charges  43792 - PT Therapeutic Exercise Minutes:  10  Untimed Charges  PT Eval/Re-eval Minutes: 30  Total Minutes  Timed Charges Total Minutes: 10  Untimed Charges Total Minutes: 30   Total Minutes: 40     Therapy Charges for Today     Code Description Service Date Service Provider Modifiers Qty    77477149555 HC PT THER PROC EA 15 MIN 9/1/2022 Vijay Iraheta, PT GP 1    70913268026 HC PT EVAL MOD COMPLEXITY 2 9/1/2022 Vijay Iraheta, PT GP 1          PT G-Codes  Outcome Measure Options: Modified Oswestry  Modified Oswestry Score/Comments: 54% disability         Vijay Iraheta, PT  9/1/2022

## 2022-09-14 DIAGNOSIS — E03.9 ACQUIRED HYPOTHYROIDISM: Primary | ICD-10-CM

## 2022-09-14 DIAGNOSIS — E78.2 MIXED HYPERLIPIDEMIA: ICD-10-CM

## 2022-09-17 LAB
ALBUMIN SERPL-MCNC: 4.1 G/DL (ref 3.5–5.2)
ALBUMIN/GLOB SERPL: 2 G/DL
ALP SERPL-CCNC: 124 U/L (ref 39–117)
ALT SERPL-CCNC: 34 U/L (ref 1–41)
AST SERPL-CCNC: 31 U/L (ref 1–40)
BILIRUB SERPL-MCNC: 0.6 MG/DL (ref 0–1.2)
BUN SERPL-MCNC: 27 MG/DL (ref 8–23)
BUN/CREAT SERPL: 22.3 (ref 7–25)
CALCIUM SERPL-MCNC: 9 MG/DL (ref 8.6–10.5)
CHLORIDE SERPL-SCNC: 102 MMOL/L (ref 98–107)
CHOLEST SERPL-MCNC: 163 MG/DL (ref 0–200)
CHOLEST/HDLC SERPL: 3.08 {RATIO}
CO2 SERPL-SCNC: 26.3 MMOL/L (ref 22–29)
CREAT SERPL-MCNC: 1.21 MG/DL (ref 0.76–1.27)
EGFRCR SERPLBLD CKD-EPI 2021: 60.2 ML/MIN/1.73
GLOBULIN SER CALC-MCNC: 2.1 GM/DL
GLUCOSE SERPL-MCNC: 80 MG/DL (ref 65–99)
HDLC SERPL-MCNC: 53 MG/DL (ref 40–60)
LDLC SERPL CALC-MCNC: 86 MG/DL (ref 0–100)
POTASSIUM SERPL-SCNC: 4 MMOL/L (ref 3.5–5.2)
PROT SERPL-MCNC: 6.2 G/DL (ref 6–8.5)
SODIUM SERPL-SCNC: 138 MMOL/L (ref 136–145)
T4 FREE SERPL-MCNC: 1.06 NG/DL (ref 0.93–1.7)
TRIGL SERPL-MCNC: 137 MG/DL (ref 0–150)
TSH SERPL DL<=0.005 MIU/L-ACNC: 3.77 UIU/ML (ref 0.27–4.2)
VLDLC SERPL CALC-MCNC: 24 MG/DL (ref 5–40)

## 2022-09-23 ENCOUNTER — HOSPITAL ENCOUNTER (OUTPATIENT)
Dept: CARDIOLOGY | Facility: HOSPITAL | Age: 82
Discharge: HOME | End: 2022-09-23
Admitting: PHYSICIAN ASSISTANT

## 2022-09-23 VITALS
BODY MASS INDEX: 26.28 KG/M2 | HEART RATE: 60 BPM | WEIGHT: 194 LBS | DIASTOLIC BLOOD PRESSURE: 65 MMHG | HEIGHT: 72 IN | SYSTOLIC BLOOD PRESSURE: 130 MMHG

## 2022-09-23 DIAGNOSIS — G45.9 TIA (TRANSIENT ISCHEMIC ATTACK): ICD-10-CM

## 2022-09-23 LAB
AORTIC ARCH: 2.5 CM
ASCENDING AORTA: 3.5 CM
BH CV ECHO MEAS - ACS: 2.09 CM
BH CV ECHO MEAS - AO MAX PG: 5.7 MMHG
BH CV ECHO MEAS - AO MEAN PG: 3 MMHG
BH CV ECHO MEAS - AO ROOT DIAM: 3.9 CM
BH CV ECHO MEAS - AO V2 MAX: 119.3 CM/SEC
BH CV ECHO MEAS - AO V2 VTI: 23.7 CM
BH CV ECHO MEAS - AVA(I,D): 2.9 CM2
BH CV ECHO MEAS - EDV(CUBED): 85.2 ML
BH CV ECHO MEAS - EDV(MOD-SP2): 95 ML
BH CV ECHO MEAS - EDV(MOD-SP4): 110 ML
BH CV ECHO MEAS - EF(MOD-BP): 55.9 %
BH CV ECHO MEAS - EF(MOD-SP2): 57.9 %
BH CV ECHO MEAS - EF(MOD-SP4): 55.5 %
BH CV ECHO MEAS - ESV(CUBED): 26.6 ML
BH CV ECHO MEAS - ESV(MOD-SP2): 40 ML
BH CV ECHO MEAS - ESV(MOD-SP4): 49 ML
BH CV ECHO MEAS - FS: 32.2 %
BH CV ECHO MEAS - IVS/LVPW: 1.01 CM
BH CV ECHO MEAS - IVSD: 1.18 CM
BH CV ECHO MEAS - LAT PEAK E' VEL: 4 CM/SEC
BH CV ECHO MEAS - LV DIASTOLIC VOL/BSA (35-75): 52.3 CM2
BH CV ECHO MEAS - LV MASS(C)D: 184.4 GRAMS
BH CV ECHO MEAS - LV MAX PG: 3 MMHG
BH CV ECHO MEAS - LV MEAN PG: 1.7 MMHG
BH CV ECHO MEAS - LV SYSTOLIC VOL/BSA (12-30): 23.3 CM2
BH CV ECHO MEAS - LV V1 MAX: 86.1 CM/SEC
BH CV ECHO MEAS - LV V1 VTI: 18.9 CM
BH CV ECHO MEAS - LVIDD: 4.4 CM
BH CV ECHO MEAS - LVIDS: 3 CM
BH CV ECHO MEAS - LVOT AREA: 3.6 CM2
BH CV ECHO MEAS - LVOT DIAM: 2.14 CM
BH CV ECHO MEAS - LVPWD: 1.16 CM
BH CV ECHO MEAS - MED PEAK E' VEL: 5.9 CM/SEC
BH CV ECHO MEAS - MV A DUR: 0.18 SEC
BH CV ECHO MEAS - MV A MAX VEL: 79.7 CM/SEC
BH CV ECHO MEAS - MV DEC SLOPE: 150.3 CM/SEC2
BH CV ECHO MEAS - MV DEC TIME: 0.26 MSEC
BH CV ECHO MEAS - MV E MAX VEL: 29.1 CM/SEC
BH CV ECHO MEAS - MV E/A: 0.37
BH CV ECHO MEAS - MV MAX PG: 3.5 MMHG
BH CV ECHO MEAS - MV MEAN PG: 0.94 MMHG
BH CV ECHO MEAS - MV P1/2T: 95.2 MSEC
BH CV ECHO MEAS - MV V2 VTI: 25 CM
BH CV ECHO MEAS - MVA(P1/2T): 2.31 CM2
BH CV ECHO MEAS - MVA(VTI): 2.7 CM2
BH CV ECHO MEAS - PA ACC TIME: 0.08 SEC
BH CV ECHO MEAS - PA PR(ACCEL): 44.1 MMHG
BH CV ECHO MEAS - PA V2 MAX: 75.2 CM/SEC
BH CV ECHO MEAS - PULM A REVS DUR: 0.12 SEC
BH CV ECHO MEAS - PULM A REVS VEL: 40.8 CM/SEC
BH CV ECHO MEAS - PULM DIAS VEL: 37.7 CM/SEC
BH CV ECHO MEAS - PULM S/D: 1.2
BH CV ECHO MEAS - PULM SYS VEL: 45.2 CM/SEC
BH CV ECHO MEAS - QP/QS: 0.81
BH CV ECHO MEAS - RAP SYSTOLE: 8 MMHG
BH CV ECHO MEAS - RV MAX PG: 1.15 MMHG
BH CV ECHO MEAS - RV V1 MAX: 53.6 CM/SEC
BH CV ECHO MEAS - RV V1 VTI: 10.9 CM
BH CV ECHO MEAS - RVOT DIAM: 2.5 CM
BH CV ECHO MEAS - RVSP: 21.3 MMHG
BH CV ECHO MEAS - SI(MOD-SP2): 26.1 ML/M2
BH CV ECHO MEAS - SI(MOD-SP4): 29 ML/M2
BH CV ECHO MEAS - SUP REN AO DIAM: 2.1 CM
BH CV ECHO MEAS - SV(LVOT): 67.7 ML
BH CV ECHO MEAS - SV(MOD-SP2): 55 ML
BH CV ECHO MEAS - SV(MOD-SP4): 61 ML
BH CV ECHO MEAS - SV(RVOT): 55.1 ML
BH CV ECHO MEAS - TAPSE (>1.6): 1.83 CM
BH CV ECHO MEAS - TR MAX PG: 13.3 MMHG
BH CV ECHO MEAS - TR MAX VEL: 182.3 CM/SEC
BH CV ECHO MEASUREMENTS AVERAGE E/E' RATIO: 5.88
BH CV XLRA - RV BASE: 3.2 CM
BH CV XLRA - RV LENGTH: 7 CM
BH CV XLRA - RV MID: 3 CM
BH CV XLRA - TDI S': 8.7 CM/SEC
MAXIMAL PREDICTED HEART RATE: 139 BPM
SINUS: 3.4 CM
STJ: 2.5 CM
STRESS TARGET HR: 118 BPM

## 2022-09-23 PROCEDURE — 93306 TTE W/DOPPLER COMPLETE: CPT

## 2022-09-23 PROCEDURE — 25010000002 PERFLUTREN (DEFINITY) 8.476 MG IN SODIUM CHLORIDE (PF) 0.9 % 10 ML INJECTION: Performed by: PHYSICIAN ASSISTANT

## 2022-09-23 PROCEDURE — 93306 TTE W/DOPPLER COMPLETE: CPT | Performed by: INTERNAL MEDICINE

## 2022-09-23 RX ADMIN — PERFLUTREN 2 ML: 6.52 INJECTION, SUSPENSION INTRAVENOUS at 11:45

## 2022-10-10 ENCOUNTER — OFFICE VISIT (OUTPATIENT)
Dept: INTERNAL MEDICINE | Facility: CLINIC | Age: 82
End: 2022-10-10

## 2022-10-10 VITALS
SYSTOLIC BLOOD PRESSURE: 120 MMHG | TEMPERATURE: 97.8 F | BODY MASS INDEX: 26.28 KG/M2 | WEIGHT: 194 LBS | HEIGHT: 72 IN | DIASTOLIC BLOOD PRESSURE: 80 MMHG

## 2022-10-10 DIAGNOSIS — E03.9 ACQUIRED HYPOTHYROIDISM: ICD-10-CM

## 2022-10-10 DIAGNOSIS — E78.2 MIXED HYPERLIPIDEMIA: ICD-10-CM

## 2022-10-10 DIAGNOSIS — G89.29 CHRONIC BILATERAL LOW BACK PAIN WITHOUT SCIATICA: ICD-10-CM

## 2022-10-10 DIAGNOSIS — M48.061 LUMBAR FORAMINAL STENOSIS: Primary | ICD-10-CM

## 2022-10-10 DIAGNOSIS — M54.50 CHRONIC BILATERAL LOW BACK PAIN WITHOUT SCIATICA: ICD-10-CM

## 2022-10-10 PROCEDURE — 99213 OFFICE O/P EST LOW 20 MIN: CPT | Performed by: PHYSICIAN ASSISTANT

## 2022-10-10 RX ORDER — ACETAMINOPHEN AND CODEINE PHOSPHATE 300; 30 MG/1; MG/1
1 TABLET ORAL EVERY 6 HOURS PRN
Qty: 60 TABLET | Refills: 0 | Status: SHIPPED | OUTPATIENT
Start: 2022-10-10 | End: 2022-11-09

## 2022-10-10 NOTE — PROGRESS NOTES
Subjective   Chief Complaint   Patient presents with   • Hypothyroidism       History of Present Illness     Pt is here today for fup. He has had one more episode of aphasia that occurred while he was in the heat all day a few months ago. He was dehydrated, symptoms lasted only minutes. His tremors are getting worse. No CP or SOA. Back pain is worse. He tried PT per Dr. Terry's suggestion was unable to due tot he pain. He is not a surgical candidate. Some days are worse than others.      Patient Active Problem List   Diagnosis   • Hyperlipidemia   • Coronary artery disease involving native coronary artery of native heart without angina pectoris   • MCI (mild cognitive impairment)   • Mood disorder ([facility])   • Closed wedge compression fracture of third thoracic vertebra with routine healing   • GERD (gastroesophageal reflux disease)   • S/P drug eluting coronary stent placement   • Chest pain   • Diastolic dysfunction   • Mixed action and resting tremor   • Bladder cancer ([facility])   • BPH (benign prostatic hyperplasia)   • Atrial fibrillation ([facility])   • Essential hypertension   • Dizziness   • Beta-blocker intolerance   • Orthostatic hypotension   • Parkinsonism ([facility])   • Lumbar facet arthropathy   • Spondylosis of lumbar region without myelopathy or radiculopathy   • Lumbar foraminal stenosis   • Chronic bilateral low back pain without sciatica   • Hypothyroidism   • DDD (degenerative disc disease), lumbar   • Lumbar radiculopathy       Allergies   Allergen Reactions   • Bupropion Dizziness, Delirium and Other (See Comments)       Current Outpatient Medications on File Prior to Visit   Medication Sig Dispense Refill   • aspirin 81 MG chewable tablet Chew 1 tablet Daily. 30 tablet 0   • clopidogrel (PLAVIX) 75 MG tablet Take 1 tablet by mouth Daily. Start on Monday     • FLUoxetine (PROzac) 20 MG capsule Take 20 mg by mouth Daily.     • levothyroxine (SYNTHROID, LEVOTHROID) 25 MCG tablet Take 1 tablet by mouth Daily. 90  tablet 0   • omeprazole (priLOSEC) 40 MG capsule Take 1 capsule by mouth Daily. 90 capsule 4   • VITAMIN D PO Take  by mouth. 400 mg daily       No current facility-administered medications on file prior to visit.       Past Medical History:   Diagnosis Date   • Anxiety    • Back pain    • Bladder cancer (HCC) 2018    bladder cancer has been removed.   • Depression    • Dizziness    • GERD (gastroesophageal reflux disease)    • History of fractured rib     RIGHT SIDE   • Hyperlipidemia    • Multiple falls    • Tremors of nervous system    • Urinary incontinence    • Vertigo        Family History   Problem Relation Age of Onset   • Arthritis Mother    • Glaucoma Mother    • Heart disease Father    • Emphysema Father    • Tremor Father    • Malig Hyperthermia Neg Hx        Social History     Socioeconomic History   • Marital status:    • Number of children: 4   • Years of education: 5 college degrees   Tobacco Use   • Smoking status: Never   • Smokeless tobacco: Never   Vaping Use   • Vaping Use: Never used   Substance and Sexual Activity   • Alcohol use: No     Comment: last drink about 1 year ago    • Drug use: No   • Sexual activity: Defer       Past Surgical History:   Procedure Laterality Date   • CARDIAC CATHETERIZATION      7x, 5 stents   • CATARACT EXTRACTION, BILATERAL     • CHOLECYSTECTOMY N/A 9/2/2019    Procedure: CHOLECYSTECTOMY LAPAROSCOPIC WITH INTRAOPERATIVE CHOLANGIOGRAM;  Surgeon: Bg Rodriguez Jr., MD;  Location: [facility];  Service: General   • COLONOSCOPY     • CORONARY ANGIOPLASTY WITH STENT PLACEMENT      X5    • CYSTOSCOPY BLADDER BIOPSY N/A 1/8/2019    Procedure: CYSTOSCOPY BLADDER BIOPSY;  Surgeon: Wang Soares Jr., MD;  Location: [facility];  Service: Urology   • CYSTOSCOPY BLADDER BIOPSY N/A 6/13/2019    Procedure: CYSTOSCOPY BLADDER BIOPSY;  Surgeon: Wang Soares Jr., MD;  Location: [facility] OR;  Service: Urology   • CYSTOSCOPY TRANSURETHRAL RESECTION  OF PROSTATE N/A 7/11/2019    Procedure: CYSTOSCOPY TRANSURETHRAL RESECTION OF PROSTATE;  Surgeon: Wang Soares Jr., MD;  Location: [facility] MAIN OR;  Service: Urology   • CYSTOSCOPY URETEROSCOPY LASER LITHOTRIPSY N/A 6/13/2019    Procedure: CYSTO LITHOPAXY;  Surgeon: Wang Soares Jr., MD;  Location: [facility] MAIN OR;  Service: Urology   • ERCP N/A 9/3/2019    Procedure: ENDOSCOPIC RETROGRADE CHOLANGIOPANCREATOGRAPHY with sphincterotomy and balloon sweep;  Surgeon: Ashok Amaya MD;  Location: [facility] ENDOSCOPY;  Service: Gastroenterology   • EYE SURGERY      Lens implants   • LUMBAR DISCECTOMY FUSION INSTRUMENTATION     • LUMBAR EPIDURAL INJECTION N/A 5/4/2022    Procedure: lumbar epidural steroid injection;  Surgeon: Charlene Manzo MD;  Location: [facility] OR;  Service: Pain Management;  Laterality: N/A;   • MEDIAL BRANCH BLOCK Bilateral 12/29/2021    Procedure: LUMBAR MEDIAL BRANCH BLOCK Bilateral L3-S1 x2 (2 weeks apart);  Surgeon: Charlene Manzo MD;  [facility];  Service: Pain Management;  Laterality: Bilateral;   • MEDIAL BRANCH BLOCK Bilateral 1/12/2022    Procedure: LUMBAR MEDIAL BRANCH BLOCK Bilateral L3-S1 x2 (2 weeks apart);  Surgeon: Charlene Manzo MD;  [facility]: [facility];  Service: Pain Management;  Laterality: Bilateral;   • RADIOFREQUENCY ABLATION Bilateral 2/7/2022    Procedure: RADIOFREQUENCY ABLATION LUMBAR bilateral L3-S1;  Surgeon: Charlene Manzo MD;  Location: [facility];  Service: Pain Management;  Laterality: Bilateral;   • SKIN CANCER EXCISION Left     chest wall   • SKIN CANCER EXCISION  01/21/2021    facial   • TRANSURETHRAL RESECTION OF BLADDER TUMOR N/A 11/29/2018    Procedure: TUR BLADDER TUMOR  LARGE;  Surgeon: Wang Soares Jr., MD;  Location: [facility] MAIN OR;  Service: Urology     The following portions of the patient's history were reviewed and updated as appropriate: problem list, allergies, current medications, past medical  "history, past family history, past social history and past surgical history.    ROS     See hpi    Immunization History   Administered Date(s) Administered   • COVID-19 (PFIZER) PURPLE CAP 01/26/2021, 02/20/2021, 08/23/2021   • Fluad Quad 65+ 11/09/2020   • Fluzone High Dose =>65 Years ([employer] ONLY) 10/12/2016   • Fluzone High-Dose 65+yrs 10/20/2021   • Td, Not Adsorbed 03/25/2017   • flucelvax quad pfs =>4 YRS 11/30/2018       Objective   Vitals:    10/10/22 1528 10/10/22 1606   BP:  120/80   Temp: 97.8 °F (36.6 °C)    Weight: 88 kg (194 lb)    Height: 183 cm (72.05\")      Body mass index is 26.28 kg/m².  Physical Exam  Vitals reviewed.   Constitutional:       Appearance: He is well-developed.   HENT:      Head: Normocephalic and atraumatic.   Cardiovascular:      Rate and Rhythm: Normal rate and regular rhythm.      Heart sounds: Normal heart sounds, S1 normal and S2 normal.   Pulmonary:      Effort: Pulmonary effort is normal.      Breath sounds: Normal breath sounds.   Skin:     General: Skin is warm.   Neurological:      Mental Status: He is alert.      Comments: Bilateral tremors of upper extremities.    Psychiatric:         Behavior: Behavior normal.           Assessment & Plan   Diagnoses and all orders for this visit:    1. Lumbar foraminal stenosis (Primary)  -     acetaminophen-codeine (TYLENOL #3) 300-30 MG per tablet; Take 1 tablet by mouth Every 6 (Six) Hours As Needed for Moderate Pain for up to 30 days.  Dispense: 60 tablet; Refill: 0    2. Chronic bilateral low back pain without sciatica    3. Mixed hyperlipidemia    4. Acquired hypothyroidism    TSH is therapeutic. He was unable to tolerate PT for his back due to the pain. I am going to start him on Tylenol #3 prn and follow up in 6 weeks with me.     Return in about 6 weeks (around 11/21/2022).           "

## 2022-11-21 ENCOUNTER — OFFICE VISIT (OUTPATIENT)
Dept: INTERNAL MEDICINE | Facility: CLINIC | Age: 82
End: 2022-11-21

## 2022-11-21 VITALS — BODY MASS INDEX: 26.41 KG/M2 | HEIGHT: 72 IN | TEMPERATURE: 97.2 F | WEIGHT: 195 LBS

## 2022-11-21 DIAGNOSIS — M48.061 LUMBAR FORAMINAL STENOSIS: ICD-10-CM

## 2022-11-21 DIAGNOSIS — M47.816 SPONDYLOSIS OF LUMBAR REGION WITHOUT MYELOPATHY OR RADICULOPATHY: Primary | ICD-10-CM

## 2022-11-21 PROCEDURE — 99213 OFFICE O/P EST LOW 20 MIN: CPT | Performed by: PHYSICIAN ASSISTANT

## 2022-11-21 RX ORDER — ACETAMINOPHEN AND CODEINE PHOSPHATE 300; 30 MG/1; MG/1
1 TABLET ORAL EVERY 6 HOURS PRN
Qty: 60 TABLET | Refills: 1 | Status: SHIPPED | OUTPATIENT
Start: 2022-11-21

## 2022-11-21 NOTE — PROGRESS NOTES
Subjective   Chief Complaint   Patient presents with   • Back Pain     F/U       History of Present Illness     I started him on Tylenol #3 for moderate back pain every 6 hours. It is helping take the edge off. Still has pain, but is now much more manageable. Tolerating it well. No side effects. Does not cause him to be groggy or tired.      Patient Active Problem List   Diagnosis   • Hyperlipidemia   • Coronary artery disease involving native coronary artery of native heart without angina pectoris   • MCI (mild cognitive impairment)   • Mood disorder ([facility])   • Closed wedge compression fracture of third thoracic vertebra with routine healing   • GERD (gastroesophageal reflux disease)   • S/P drug eluting coronary stent placement   • Chest pain   • Diastolic dysfunction   • Mixed action and resting tremor   • Bladder cancer ([facility])   • BPH (benign prostatic hyperplasia)   • Atrial fibrillation ([facility])   • Essential hypertension   • Dizziness   • Beta-blocker intolerance   • Orthostatic hypotension   • Parkinsonism ([facility])   • Lumbar facet arthropathy   • Spondylosis of lumbar region without myelopathy or radiculopathy   • Lumbar foraminal stenosis   • Chronic bilateral low back pain without sciatica   • Hypothyroidism   • DDD (degenerative disc disease), lumbar   • Lumbar radiculopathy       Allergies   Allergen Reactions   • Bupropion Dizziness, Delirium and Other (See Comments)       Current Outpatient Medications on File Prior to Visit   Medication Sig Dispense Refill   • aspirin 81 MG chewable tablet Chew 1 tablet Daily. 30 tablet 0   • clopidogrel (PLAVIX) 75 MG tablet Take 1 tablet by mouth Daily. Start on Monday     • FLUoxetine (PROzac) 20 MG capsule Take 20 mg by mouth Daily.     • levothyroxine (SYNTHROID, LEVOTHROID) 25 MCG tablet Take 1 tablet by mouth Daily. 90 tablet 0   • omeprazole (priLOSEC) 40 MG capsule Take 1 capsule by mouth Daily. 90 capsule 4   • VITAMIN D PO Take  by mouth. 400 mg daily       No  current facility-administered medications on file prior to visit.       Past Medical History:   Diagnosis Date   • Anxiety    • Back pain    • Bladder cancer (HCC) 2018    bladder cancer has been removed.   • Depression    • Dizziness    • GERD (gastroesophageal reflux disease)    • History of fractured rib     RIGHT SIDE   • Hyperlipidemia    • Multiple falls    • Tremors of nervous system    • Urinary incontinence    • Vertigo        Family History   Problem Relation Age of Onset   • Arthritis Mother    • Glaucoma Mother    • Heart disease Father    • Emphysema Father    • Tremor Father    • Malig Hyperthermia Neg Hx        Social History     Socioeconomic History   • Marital status:    • Number of children: 4   • Years of education: 5 college degrees   Tobacco Use   • Smoking status: Never   • Smokeless tobacco: Never   Vaping Use   • Vaping Use: Never used   Substance and Sexual Activity   • Alcohol use: No     Comment: last drink about 1 year ago    • Drug use: No   • Sexual activity: Defer       Past Surgical History:   Procedure Laterality Date   • CARDIAC CATHETERIZATION      7x, 5 stents   • CATARACT EXTRACTION, BILATERAL     • CHOLECYSTECTOMY N/A 9/2/2019    Procedure: CHOLECYSTECTOMY LAPAROSCOPIC WITH INTRAOPERATIVE CHOLANGIOGRAM;  Surgeon: Bg Rodriguez Jr., MD;  Location: [facility];  Service: General   • COLONOSCOPY     • CORONARY ANGIOPLASTY WITH STENT PLACEMENT      X5    • CYSTOSCOPY BLADDER BIOPSY N/A 1/8/2019    Procedure: CYSTOSCOPY BLADDER BIOPSY;  Surgeon: Wang Soares Jr., MD;  Location: [facility] OR;  Service: Urology   • CYSTOSCOPY BLADDER BIOPSY N/A 6/13/2019    Procedure: CYSTOSCOPY BLADDER BIOPSY;  Surgeon: Wang Soares Jr., MD;  Location: [facility] OR;  Service: Urology   • CYSTOSCOPY TRANSURETHRAL RESECTION OF PROSTATE N/A 7/11/2019    Procedure: CYSTOSCOPY TRANSURETHRAL RESECTION OF PROSTATE;  Surgeon: Wang Soares Jr., MD;  Location: [facility]  MAIN OR;  Service: Urology   • CYSTOSCOPY URETEROSCOPY LASER LITHOTRIPSY N/A 6/13/2019    Procedure: CYSTO LITHOPAXY;  Surgeon: Wang Soares Jr., MD;  Location: [facility] MAIN OR;  Service: Urology   • ERCP N/A 9/3/2019    Procedure: ENDOSCOPIC RETROGRADE CHOLANGIOPANCREATOGRAPHY with sphincterotomy and balloon sweep;  Surgeon: Ashok Amaya MD;  Location: [facility] ENDOSCOPY;  Service: Gastroenterology   • EYE SURGERY      Lens implants   • LUMBAR DISCECTOMY FUSION INSTRUMENTATION     • LUMBAR EPIDURAL INJECTION N/A 5/4/2022    Procedure: lumbar epidural steroid injection;  Surgeon: Charlene Manzo MD;  Location: [facility];  Service: Pain Management;  Laterality: N/A;   • MEDIAL BRANCH BLOCK Bilateral 12/29/2021    Procedure: LUMBAR MEDIAL BRANCH BLOCK Bilateral L3-S1 x2 (2 weeks apart);  Surgeon: Charlene Manzo MD;  Location: [facility];  Service: Pain Management;  Laterality: Bilateral;   • MEDIAL BRANCH BLOCK Bilateral 1/12/2022    Procedure: LUMBAR MEDIAL BRANCH BLOCK Bilateral L3-S1 x2 (2 weeks apart);  Surgeon: Charlene Manzo MD;  Location: [facility];  Service: Pain Management;  Laterality: Bilateral;   • RADIOFREQUENCY ABLATION Bilateral 2/7/2022    Procedure: RADIOFREQUENCY ABLATION LUMBAR bilateral L3-S1;  Surgeon: Charlene Manzo MD;  Location: [facility];  Service: Pain Management;  Laterality: Bilateral;   • SKIN CANCER EXCISION Left     chest wall   • SKIN CANCER EXCISION  01/21/2021    facial   • TRANSURETHRAL RESECTION OF BLADDER TUMOR N/A 11/29/2018    Procedure: TUR BLADDER TUMOR  LARGE;  Surgeon: Wang Soares Jr., MD;  Location: [facility] MAIN OR;  Service: Urology         The following portions of the patient's history were reviewed and updated as appropriate: problem list, allergies, current medications, past medical history, past family history, past social history and past surgical history.    ROS     See HPI    Immunization History   Administered Date(s)  "Administered   • COVID-19 (PFIZER) BIVALENT BOOSTER 12+YRS 10/27/2022   • COVID-19 (PFIZER) PURPLE CAP 01/26/2021, 02/20/2021, 08/23/2021   • Fluad Quad 65+ 11/09/2020   • Fluzone High Dose =>65 Years ([employer] ONLY) 10/12/2016   • Fluzone High-Dose 65+yrs 10/20/2021   • Td, Not Adsorbed 03/25/2017   • flucelvax quad pfs =>4 YRS 11/30/2018       Objective   Vitals:    11/21/22 1325   Temp: 97.2 °F (36.2 °C)   Weight: 88.5 kg (195 lb)   Height: 183 cm (72.05\")     Body mass index is 26.41 kg/m².  Physical Exam  Vitals reviewed.   Constitutional:       Appearance: He is well-developed.   HENT:      Head: Normocephalic and atraumatic.   Cardiovascular:      Rate and Rhythm: Normal rate and regular rhythm.      Heart sounds: Normal heart sounds, S1 normal and S2 normal.   Pulmonary:      Effort: Pulmonary effort is normal.      Breath sounds: Normal breath sounds.   Skin:     General: Skin is warm.   Neurological:      Mental Status: He is alert.   Psychiatric:         Behavior: Behavior normal.           Assessment & Plan   Diagnoses and all orders for this visit:    1. Spondylosis of lumbar region without myelopathy or radiculopathy (Primary)  -     acetaminophen-codeine (TYLENOL #3) 300-30 MG per tablet; Take 1 tablet by mouth Every 6 (Six) Hours As Needed for Moderate Pain.  Dispense: 60 tablet; Refill: 1    2. Lumbar foraminal stenosis  -     acetaminophen-codeine (TYLENOL #3) 300-30 MG per tablet; Take 1 tablet by mouth Every 6 (Six) Hours As Needed for Moderate Pain.  Dispense: 60 tablet; Refill: 1    Has been doing well with the medication, taking appropriately will continue.     Return in about 6 months (around 5/21/2023) for Lab Appt Before FUP.           "

## 2023-01-23 RX ORDER — LEVOTHYROXINE SODIUM 0.03 MG/1
25 TABLET ORAL DAILY
Qty: 90 TABLET | Refills: 0 | Status: SHIPPED | OUTPATIENT
Start: 2023-01-23 | End: 2023-03-28 | Stop reason: SDUPTHER

## 2023-02-17 ENCOUNTER — TELEPHONE (OUTPATIENT)
Dept: INTERNAL MEDICINE | Facility: CLINIC | Age: 83
End: 2023-02-17

## 2023-02-17 NOTE — TELEPHONE ENCOUNTER
Patient advised to go to the ER, he has bladder cancer and is not drinking or eating a lot. Needs fluids, other testing to check

## 2023-02-17 NOTE — TELEPHONE ENCOUNTER
Caller: Taty Zayas     Relationship: SPOUSE    Best call back number:[phone]     What is your medical concern?     PATIENT HAS CHRONIC CONSTIPATION AND HAS NOT HAD A GOOD BOWL MOVEMENT IN TWO WEEKS.  HE IS ALSO VERY FATIGUED AND DIZZY AT TIMES.    SPOUSE IS WANTING TO KNOW WHAT SHOULD SHE DO.  CAN YOU PLEASE ADVISE HER.      IF YOU CAN SEE HIM TODAY HE WILL WEAR HIS TUCK TAYLORS.

## 2023-02-22 ENCOUNTER — OFFICE (OUTPATIENT)
Dept: URBAN - METROPOLITAN AREA CLINIC 76 | Facility: CLINIC | Age: 83
End: 2023-02-22

## 2023-02-22 VITALS
OXYGEN SATURATION: 94 % | DIASTOLIC BLOOD PRESSURE: 85 MMHG | SYSTOLIC BLOOD PRESSURE: 112 MMHG | WEIGHT: 193 LBS | HEART RATE: 74 BPM | HEIGHT: 72 IN

## 2023-02-22 DIAGNOSIS — K59.00 CONSTIPATION, UNSPECIFIED: ICD-10-CM

## 2023-02-22 DIAGNOSIS — N39.490 OVERFLOW INCONTINENCE: ICD-10-CM

## 2023-02-22 PROCEDURE — 99203 OFFICE O/P NEW LOW 30 MIN: CPT | Performed by: INTERNAL MEDICINE

## 2023-02-22 RX ORDER — ALUMINUM ZIRCONIUM OCTACHLOROHYDREX GLY 16 G/100G
6.8 GEL TOPICAL
Qty: 420 | Refills: 11 | Status: ACTIVE
Start: 2023-02-22

## 2023-03-28 RX ORDER — LEVOTHYROXINE SODIUM 0.03 MG/1
25 TABLET ORAL DAILY
Qty: 90 TABLET | Refills: 0 | Status: SHIPPED | OUTPATIENT
Start: 2023-03-28

## 2023-03-28 NOTE — TELEPHONE ENCOUNTER
Caller: Taty Zayas    Relationship: Emergency Contact    Best call back number: [phone]     Requested Prescriptions:   Requested Prescriptions     Pending Prescriptions Disp Refills   • levothyroxine (SYNTHROID, LEVOTHROID) 25 MCG tablet 90 tablet 0     Sig: Take 1 tablet by mouth Daily.        Pharmacy where request should be sent: [id number] [address] AT [facility] [address]  [phone] [facility] [phone]      Last office visit with prescribing clinician: 11/21/2022   Last telemedicine visit with prescribing clinician: 5/15/2023   Next office visit with prescribing clinician: 5/22/2023     Additional details provided by patient: PATIENTS SPOUSE CALLING STATING THAT THEY WILL BE [place]AV[place] Friday MORNING WHEN HIS REFILL IS DUE SHE WOULD LIKE TO KNOW IF A 7 DAY SUPPLY COULD BE CALLED INTO THE PHARMACY SO THEY CAN PICK IT UP BEFORE THEY LEAVE     Does the patient have less than a 3 day supply:  [x] Yes  [] No    Would you like a call back once the refill request has been completed: [] Yes [x] No    If the office needs to give you a call back, can they leave a voicemail: [] Yes [x] No    Herminio Aguirre Rep   03/28/23 13:09 EDT

## 2023-05-22 ENCOUNTER — OFFICE VISIT (OUTPATIENT)
Dept: INTERNAL MEDICINE | Facility: CLINIC | Age: 83
End: 2023-05-22
Payer: MEDICARE

## 2023-05-22 VITALS
HEIGHT: 72 IN | BODY MASS INDEX: 26.01 KG/M2 | TEMPERATURE: 98 F | DIASTOLIC BLOOD PRESSURE: 70 MMHG | WEIGHT: 192 LBS | SYSTOLIC BLOOD PRESSURE: 122 MMHG

## 2023-05-22 DIAGNOSIS — M47.816 SPONDYLOSIS OF LUMBAR REGION WITHOUT MYELOPATHY OR RADICULOPATHY: ICD-10-CM

## 2023-05-22 DIAGNOSIS — Z79.899 HIGH RISK MEDICATION USE: ICD-10-CM

## 2023-05-22 DIAGNOSIS — M48.061 LUMBAR FORAMINAL STENOSIS: ICD-10-CM

## 2023-05-22 DIAGNOSIS — E03.9 ACQUIRED HYPOTHYROIDISM: Primary | ICD-10-CM

## 2023-05-22 DIAGNOSIS — E78.2 MIXED HYPERLIPIDEMIA: ICD-10-CM

## 2023-05-22 PROCEDURE — 3074F SYST BP LT 130 MM HG: CPT | Performed by: PHYSICIAN ASSISTANT

## 2023-05-22 PROCEDURE — 99213 OFFICE O/P EST LOW 20 MIN: CPT | Performed by: PHYSICIAN ASSISTANT

## 2023-05-22 PROCEDURE — 1159F MED LIST DOCD IN RCRD: CPT | Performed by: PHYSICIAN ASSISTANT

## 2023-05-22 PROCEDURE — 1160F RVW MEDS BY RX/DR IN RCRD: CPT | Performed by: PHYSICIAN ASSISTANT

## 2023-05-22 PROCEDURE — 3078F DIAST BP <80 MM HG: CPT | Performed by: PHYSICIAN ASSISTANT

## 2023-05-22 NOTE — PROGRESS NOTES
Subjective   Chief Complaint   Patient presents with   • Hypothyroidism       History of Present Illness   Pt is here today for fup. Tremors are worsening, did not tolerate the Carbidopa-Levodopa well. Only had minimal benefit in sx. Still has fatigue. Not driving long distances any longer. Only driving locally, does not feel comfortable driving further. No Cp or SOA. Back pain is manageable with Tylenol #3 prn. He does not take it very often maybe 1-2 times per week. If he is sitting he has no pain, only with standing and walking. It has limited his activity significantly.      Patient Active Problem List   Diagnosis   • Hyperlipidemia   • Coronary artery disease involving native coronary artery of native heart without angina pectoris   • MCI (mild cognitive impairment)   • Mood disorder   • Closed wedge compression fracture of third thoracic vertebra with routine healing   • GERD (gastroesophageal reflux disease)   • S/P drug eluting coronary stent placement   • Chest pain   • Diastolic dysfunction   • Mixed action and resting tremor   • Bladder cancer   • BPH (benign prostatic hyperplasia)   • Atrial fibrillation   • Essential hypertension   • Dizziness   • Beta-blocker intolerance   • Orthostatic hypotension   • Parkinsonism (HCC)   • Lumbar facet arthropathy   • Spondylosis of lumbar region without myelopathy or radiculopathy   • Lumbar foraminal stenosis   • Chronic bilateral low back pain without sciatica   • Hypothyroidism   • DDD (degenerative disc disease), lumbar   • Lumbar radiculopathy       Allergies   Allergen Reactions   • Bupropion Dizziness, Delirium and Other (See Comments)       Current Outpatient Medications on File Prior to Visit   Medication Sig Dispense Refill   • acetaminophen-codeine (TYLENOL #3) 300-30 MG per tablet Take 1 tablet by mouth Every 6 (Six) Hours As Needed for Moderate Pain. 60 tablet 1   • aspirin 81 MG chewable tablet Chew 1 tablet Daily. 30 tablet 0   • clopidogrel (PLAVIX)  75 MG tablet Take 1 tablet by mouth Daily. Start on Monday     • FLUoxetine (PROzac) 20 MG capsule Take 1 capsule by mouth Daily.     • levothyroxine (SYNTHROID, LEVOTHROID) 25 MCG tablet Take 1 tablet by mouth Daily. 90 tablet 0   • omeprazole (priLOSEC) 40 MG capsule Take 1 capsule by mouth Daily. 90 capsule 4   • VITAMIN D PO Take  by mouth. 400 mg daily       No current facility-administered medications on file prior to visit.       Past Medical History:   Diagnosis Date   • Anxiety    • Back pain    • Bladder cancer 2018    bladder cancer has been removed.   • Depression    • Dizziness    • GERD (gastroesophageal reflux disease)    • History of fractured rib     RIGHT SIDE   • Hyperlipidemia    • Multiple falls    • Tremors of nervous system    • Urinary incontinence    • Vertigo        Family History   Problem Relation Age of Onset   • Arthritis Mother    • Glaucoma Mother    • Heart disease Father    • Emphysema Father    • Tremor Father    • Malig Hyperthermia Neg Hx        Social History     Socioeconomic History   • Marital status:    • Number of children: 4   • Years of education: 5 college degrees   Tobacco Use   • Smoking status: Never   • Smokeless tobacco: Never   Vaping Use   • Vaping Use: Never used   Substance and Sexual Activity   • Alcohol use: No     Comment: last drink about 1 year ago    • Drug use: No   • Sexual activity: Defer       Past Surgical History:   Procedure Laterality Date   • CARDIAC CATHETERIZATION      7x, 5 stents   • CATARACT EXTRACTION, BILATERAL     • CHOLECYSTECTOMY N/A 9/2/2019    Procedure: CHOLECYSTECTOMY LAPAROSCOPIC WITH INTRAOPERATIVE CHOLANGIOGRAM;  Surgeon: Bg Rodriguez Jr., MD;  Location: [facility];  Service: General   • COLONOSCOPY     • CORONARY ANGIOPLASTY WITH STENT PLACEMENT      X5    • CYSTOSCOPY BLADDER BIOPSY N/A 1/8/2019    Procedure: CYSTOSCOPY BLADDER BIOPSY;  Surgeon: Wang Soares Jr., MD;  Location: [facility];  Service:  Urology   • CYSTOSCOPY BLADDER BIOPSY N/A 6/13/2019    Procedure: CYSTOSCOPY BLADDER BIOPSY;  Surgeon: Wang Soares Jr., MD;  Location:  [facility];  Service: Urology   • CYSTOSCOPY TRANSURETHRAL RESECTION OF PROSTATE N/A 7/11/2019    Procedure: CYSTOSCOPY TRANSURETHRAL RESECTION OF PROSTATE;  Surgeon: Wang Soares Jr., MD;  Location:  [facility];  Service: Urology   • CYSTOSCOPY URETEROSCOPY LASER LITHOTRIPSY N/A 6/13/2019    Procedure: CYSTO LITHOPAXY;  Surgeon: Wang Soares Jr., MD;  [facility];  Service: Urology   • ERCP N/A 9/3/2019    Procedure: ENDOSCOPIC RETROGRADE CHOLANGIOPANCREATOGRAPHY with sphincterotomy and balloon sweep;  Surgeon: Ashok Amaya MD;  Location: [facility] ENDOSCOPY;  Service: Gastroenterology   • EYE SURGERY      Lens implants   • LUMBAR DISCECTOMY FUSION INSTRUMENTATION     • LUMBAR EPIDURAL INJECTION N/A 5/4/2022    Procedure: lumbar epidural steroid injection;  Surgeon: Charlene Manzo MD;  Location: [facility];  Service: Pain Management;  Laterality: N/A;   • MEDIAL BRANCH BLOCK Bilateral 12/29/2021    Procedure: LUMBAR MEDIAL BRANCH BLOCK Bilateral L3-S1 x2 (2 weeks apart);  Surgeon: Charlene Manzo MD;  [facility]: [facility];  Service: Pain Management;  Laterality: Bilateral;   • MEDIAL BRANCH BLOCK Bilateral 1/12/2022    Procedure: LUMBAR MEDIAL BRANCH BLOCK Bilateral L3-S1 x2 (2 weeks apart);  Surgeon: Charlene Manzo MD;  Location[facility];  Service: Pain Management;  Laterality: Bilateral;   • RADIOFREQUENCY ABLATION Bilateral 2/7/2022    Procedure: RADIOFREQUENCY ABLATION LUMBAR bilateral L3-S1;  Surgeon: Charlene Manzo MD;  Location: [facility];  Service: Pain Management;  Laterality: Bilateral;   • SKIN CANCER EXCISION Left     chest wall   • SKIN CANCER EXCISION  01/21/2021    facial   • TRANSURETHRAL RESECTION OF BLADDER TUMOR N/A 11/29/2018    Procedure: TUR BLADDER TUMOR  LARGE;  Surgeon: Wang Soares  "Gatito Mayfield MD;  Location: [facility] OR;  Service: Urology         The following portions of the patient's history were reviewed and updated as appropriate: problem list, allergies, current medications, past medical history, past family history, past social history and past surgical history.    ROS     See HPI    Immunization History   Administered Date(s) Administered   • COVID-19 (PFIZER) BIVALENT 12+YRS 10/27/2022   • COVID-19 (PFIZER) Purple Cap Monovalent 01/26/2021, 02/20/2021, 08/23/2021   • Fluad Quad 65+ 11/09/2020   • Fluzone High Dose =>65 Years ([employer] ONLY) 10/12/2016   • Fluzone High-Dose 65+yrs 10/20/2021   • Td, Not Adsorbed 03/25/2017   • flucelvax quad pfs =>4 YRS 11/30/2018       Objective   Vitals:    05/22/23 1256   BP: 122/70   Temp: 98 °F (36.7 °C)   Weight: 87.1 kg (192 lb)   Height: 183 cm (72.05\")     Body mass index is 26.01 kg/m².  Physical Exam  Vitals reviewed.   Constitutional:       Appearance: Normal appearance.   Cardiovascular:      Rate and Rhythm: Normal rate and regular rhythm.      Heart sounds: Normal heart sounds.   Musculoskeletal:      Comments: Significant tremors upper extremities   Neurological:      Mental Status: He is alert.           Assessment & Plan   Diagnoses and all orders for this visit:    1. Acquired hypothyroidism (Primary)  -     TSH    2. Mixed hyperlipidemia  -     Comprehensive Metabolic Panel    3. High risk medication use  -     Compliance Drug Analysis, Ur - Urine, Clean Catch    Check labs today. Appropriate usage of Tylenol #3. Reviewed Gray. FUP in 6 mo    Return in about 6 months (around 11/22/2023) for Medicare Wellness, Lab Today.           "

## 2023-05-28 LAB
ALBUMIN SERPL-MCNC: 4.4 G/DL (ref 3.6–4.6)
ALBUMIN/GLOB SERPL: 1.8 {RATIO} (ref 1.2–2.2)
ALP SERPL-CCNC: 135 IU/L (ref 44–121)
ALT SERPL-CCNC: 48 IU/L (ref 0–44)
AST SERPL-CCNC: 48 IU/L (ref 0–40)
BILIRUB SERPL-MCNC: 0.8 MG/DL (ref 0–1.2)
BUN SERPL-MCNC: 14 MG/DL (ref 8–27)
BUN/CREAT SERPL: 13 (ref 10–24)
CALCIUM SERPL-MCNC: 9.7 MG/DL (ref 8.6–10.2)
CHLORIDE SERPL-SCNC: 101 MMOL/L (ref 96–106)
CO2 SERPL-SCNC: 23 MMOL/L (ref 20–29)
CREAT SERPL-MCNC: 1.12 MG/DL (ref 0.76–1.27)
DRUGS UR: NORMAL
EGFRCR SERPLBLD CKD-EPI 2021: 66 ML/MIN/1.73
GLOBULIN SER CALC-MCNC: 2.5 G/DL (ref 1.5–4.5)
GLUCOSE SERPL-MCNC: 73 MG/DL (ref 70–99)
POTASSIUM SERPL-SCNC: 4.6 MMOL/L (ref 3.5–5.2)
PROT SERPL-MCNC: 6.9 G/DL (ref 6–8.5)
SODIUM SERPL-SCNC: 139 MMOL/L (ref 134–144)
TSH SERPL DL<=0.005 MIU/L-ACNC: 3.07 UIU/ML (ref 0.45–4.5)

## 2023-07-26 ENCOUNTER — TELEPHONE (OUTPATIENT)
Dept: INTERNAL MEDICINE | Facility: CLINIC | Age: 83
End: 2023-07-26

## 2023-07-26 NOTE — TELEPHONE ENCOUNTER
Caller: Taty Zayas    Relationship: Emergency Contact    Best call back number: [phone]    What is the best time to reach you: ANYTIME     Who are you requesting to speak with (clinical staff, provider,  specific staff member): CLINICAL STAFF     Do you know the name of the person who called: SPOUSE     What was the call regarding: PLEASE CALL BARRERA ZAYAS FOR RAY BACK PAIN( [facility]LL [facility]OWS)[phone]    Is it okay if the provider responds through MyChart: NO

## 2023-10-17 ENCOUNTER — OFFICE VISIT (OUTPATIENT)
Dept: NEUROSURGERY | Facility: CLINIC | Age: 83
End: 2023-10-17
Payer: MEDICARE

## 2023-10-17 DIAGNOSIS — G89.29 CHRONIC BILATERAL LOW BACK PAIN WITHOUT SCIATICA: Primary | ICD-10-CM

## 2023-10-17 DIAGNOSIS — M54.50 CHRONIC BILATERAL LOW BACK PAIN WITHOUT SCIATICA: Primary | ICD-10-CM

## 2023-10-17 PROCEDURE — 99213 OFFICE O/P EST LOW 20 MIN: CPT | Performed by: NEUROLOGICAL SURGERY

## 2023-10-17 PROCEDURE — 1159F MED LIST DOCD IN RCRD: CPT | Performed by: NEUROLOGICAL SURGERY

## 2023-10-17 PROCEDURE — 1160F RVW MEDS BY RX/DR IN RCRD: CPT | Performed by: NEUROLOGICAL SURGERY

## 2023-10-17 NOTE — PROGRESS NOTES
Subjective   Patient ID: Sukhdev Zayas is a 82 y.o. male is being seen for consultation today at the request of Sage Hewitt DO for lumbar radiculopathy. Patient had a MRI L-spine on 08/17/2023 @ [facility]    Treatment: SERENA x2, RFA    Today patient states that he has weakness in B/L legs    History of Present Illness    This patient was having pretty severe pain in his back with radiation primarily into his left leg.  This has been going on for years but he had several epidural blocks and radiofrequency ablation which did not help.  He had another epidural block last week at [facility] spine and that has helped quite a bit.  At this point he has no pain at all.  He has no difficulty with bowel bladder control or other associated symptoms.  At its worst the pain is located in his lower back and radiates into his left leg.    The following portions of the patient's history were reviewed and updated as appropriate: allergies, current medications, past family history, past medical history, past social history, past surgical history, and problem list.    Review of Systems   Constitutional:  Negative for chills and fever.   HENT:  Negative for congestion.    Musculoskeletal:  Positive for gait problem and myalgias. Negative for back pain.   Neurological:  Positive for weakness and numbness.       I reviewed the review of systems listed by the patient and discussed by my MA    Objective     There were no vitals filed for this visit.  There is no height or weight on file to calculate BMI.    Tobacco Use: Low Risk  (10/17/2023)    Patient History     Smoking Tobacco Use: Never     Smokeless Tobacco Use: Never     Passive Exposure: Not on file          Physical Exam  Constitutional:       Appearance: He is well-developed.   HENT:      Head: Normocephalic and atraumatic.   Eyes:      Extraocular Movements: EOM normal.      Conjunctiva/sclera: Conjunctivae normal.      Pupils: Pupils are equal, round,  and reactive to light.   Neck:      Vascular: No carotid bruit.   Neurological:      Mental Status: He is oriented to person, place, and time.      Coordination: Finger-Nose-Finger Test and Heel to Shin Test normal.      Gait: Gait is intact.      Deep Tendon Reflexes:      Reflex Scores:       Tricep reflexes are 2+ on the right side and 2+ on the left side.       Bicep reflexes are 2+ on the right side and 2+ on the left side.       Brachioradialis reflexes are 2+ on the right side and 2+ on the left side.       Patellar reflexes are 2+ on the right side and 2+ on the left side.       Achilles reflexes are 2+ on the right side and 2+ on the left side.  Psychiatric:         Speech: Speech normal.       Neurologic Exam     Mental Status   Oriented to person, place, and time.   Registration of memory: Good recent and remote memory.   Attention: normal. Concentration: normal.   Speech: speech is normal   Level of consciousness: alert  Knowledge: consistent with education.     Cranial Nerves     CN II   Visual fields full to confrontation.   Visual acuity: normal    CN III, IV, VI   Pupils are equal, round, and reactive to light.  Extraocular motions are normal.     CN V   Facial sensation intact.   Right corneal reflex: normal  Left corneal reflex: normal    CN VII   Facial expression full, symmetric.   Right facial weakness: none  Left facial weakness: none    CN VIII   Hearing: intact    CN IX, X   Palate: symmetric    CN XI   Right sternocleidomastoid strength: normal  Left sternocleidomastoid strength: normal    CN XII   Tongue: not atrophic  Tongue deviation: none    Motor Exam   Muscle bulk: normal  Right arm tone: normal  Left arm tone: normal  Right leg tone: normal  Left leg tone: normal    Strength   Strength 5/5 except as noted.     Sensory Exam   Light touch normal.     Gait, Coordination, and Reflexes     Gait  Gait: normal    Coordination   Finger to nose coordination: normal  Heel to shin coordination:  normal    Reflexes   Right brachioradialis: 2+  Left brachioradialis: 2+  Right biceps: 2+  Left biceps: 2+  Right triceps: 2+  Left triceps: 2+  Right patellar: 2+  Left patellar: 2+  Right achilles: 2+  Left achilles: 2+  Right : 2+  Left : 2+          Assessment & Plan   Independent Review of Radiographic Studies:      I personally reviewed the images from the following studies.    I reviewed an MRI of the lumbar spine done on 17 August of this year.  This shows a fairly patent canal and neuroforamina at L5-S1.  At L4-5 there appears to be previous surgery on the left side.  There is left-sided foraminal stenosis or possibly scar tissue at L4-5 in the foramen.  There is some lateral recess stenosis causing moderate central stenosis and some disc bulging at L3-4 and also at L2-3.  L1-2 is fairly open.    Medical Decision Making:      I told the patient and his wife that from my point of view I do not see anything here I would recommend surgery for even if he was still having pain.  I do think see course of some physical therapy to try and strengthen his gait would be a good idea.  I will see him back on an as-needed basis.    Diagnoses and all orders for this visit:    1. Chronic bilateral low back pain without sciatica (Primary)  -     Ambulatory Referral to Physical Therapy      Return if symptoms worsen or fail to improve.

## 2023-10-27 NOTE — TELEPHONE ENCOUNTER
Caller: Taty Zayas    Relationship: Emergency Contact    Best call back number: [phone]    Requested Prescriptions:   Requested Prescriptions     Pending Prescriptions Disp Refills    levothyroxine (SYNTHROID, LEVOTHROID) 25 MCG tablet 90 tablet 0     Sig: Take 1 tablet by mouth Daily.        Pharmacy where request should be sent: [facility] #[id number] [facility] [address] AT [facility] [address]  [phone] [facility] [phone]      Last office visit with prescribing clinician: 5/22/2023   Last telemedicine visit with prescribing clinician: Visit date not found   Next office visit with prescribing clinician: 11/22/2023     Additional details provided by patient: WILL NEED CALLED IN    Does the patient have less than a 3 day supply:  [x] Yes  [] No    Would you like a call back once the refill request has been completed: [] Yes [x] No    If the office needs to give you a call back, can they leave a voicemail: [] Yes [x] No    Loraine Hernandez   10/27/23 12:28 EDT

## 2023-10-30 RX ORDER — LEVOTHYROXINE SODIUM 0.03 MG/1
25 TABLET ORAL DAILY
Qty: 90 TABLET | Refills: 0 | Status: SHIPPED | OUTPATIENT
Start: 2023-10-30

## 2023-11-02 NOTE — PLAN OF CARE
COMPLETE PHYSICAL EXAMINATION  [facility] [id number],  2005 (18 year old)    SUBJECTIVE     Acute concerns:    None       Chronic problems reviewed:    #exercise induced asthma   - no issues this year  - she has an abluterol rescue inhaler, takes symptoms  - not playing sports now     #Idopathic Juvenile Epilepsy  - first diagnosed 2 years ago at school, episodes of being unconscious   - Keppra 500 mg BID managed by Dr Hector, no changed.   - Recent breakthrough seziure 1.5 months ago, was uncounscious, although dad witnessed and was not sure it was a typical seizure, no clear trigger, possibly sleep deprivation or school stress       - EEG was unchanged at [facility]         #Back Pain       - has resolved       - believes it was posture related, and was worse before, improved with felxeril      Review of systems:    In addition to the above...  Some unintended weight change, no night sweats  No concerning moles.  No concerning  headaches  No difficulty reading.  no difficulty hearing.  Has no issues/pain  No  nodular swelling.  No intermenstrual vaginal bleeding,   About 30 days apart, sometimes heavy bleeding, 1- 2 nd day heavist, 3 pads/tampons per day.   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, is now a freshman in the pre-law track,      Diet: No diet    Physical activity: She goes to the gym does cardio    Smoking & vaping: None     Alcohol: None    Recreational drugs: None     Sexual health: Not sexually active, interested in Men only, not using contraception    Social History     Social History Narrative   • Not on file       Social History     Tobacco Use   • Smoking status: Never   • Smokeless tobacco: Never   Vaping Use   • Vaping Use: never used   Substance Use Topics   • Alcohol use: Never   • Drug use: Never       Medical Histories:  Problem List:  2022: Death of family member (mom at patient age 16)  2022:  Problem: Pain, Acute (Adult)  Goal: Acceptable Pain Control/Comfort Level  Outcome: Ongoing (interventions implemented as appropriate)      Problem: Patient Care Overview  Goal: Plan of Care Review  Outcome: Ongoing (interventions implemented as appropriate)   09/02/19 0439   Coping/Psychosocial   Plan of Care Reviewed With patient   Plan of Care Review   Progress no change   OTHER   Outcome Summary NPO since MN for lab chauncey + liver BX, contact iso for ESBL E coli in urine, no c/o pain, IVF cont       Problem: Fall Risk (Adult)  Goal: Identify Related Risk Factors and Signs and Symptoms  Outcome: Ongoing (interventions implemented as appropriate)    Goal: Absence of Fall  Outcome: Ongoing (interventions implemented as appropriate)         Hordeolum externum of left upper eyelid  2022-01: Bereavement  2022-01: Acne vulgaris  2021-03: 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)  2021-01: Jerking movements of extremities  2021-01: Family history of epilepsy  2019-10: Fracture, Colles, left, closed  2019-10: Ganglion of left wrist  Exercise-induced asthma    Past Medical History:  No date: Exercise-induced asthma  10/31/2019: Fracture, Colles, left, closed  10/31/2019: Ganglion of left wrist  Past Surgical History:   Procedure Laterality Date   • NO PAST SURGERIES       Family History   Problem Relation Age of Onset   • Seizure Disorder Mother    • Hypertension Mother    • Migraine Mother    • Hypertension Maternal Grandmother    • Hyperlipidemia Maternal Grandmother    • Diabetes Maternal Grandmother    • Thyroid Maternal Grandmother    • Diabetes Maternal Grandfather    • Hypertension Maternal Grandfather    • Heart disease Maternal Grandfather    • Stroke Maternal Great-Grandfather    • Myocardial Infarction Maternal Great-Grandmother    • Cancer Neg Hx    • Kidney disease Neg Hx    • Lung Disease Neg Hx    • Asthma Neg Hx    • Liver Disease Neg Hx      Patient Care Team:  Radha Banks DO as PCP - General (Student)  Pcp, Liz Su MD (Pediatric Neurology)    Current Outpatient Medications   Medication Sig Dispense Refill   • levETIRAcetam (KepPRA) 500 MG tablet Take 1 tablet by mouth in the morning and 1 tablet in the evening.     • cyclobenzaprine (FLEXERIL) 10 MG tablet Take 1 tablet by mouth 3 times daily as needed for Muscle spasms. 15 tablet 0   • albuterol 108 (90 Base) MCG/ACT inhaler Inhale 2 puffs into the lungs 2 times daily as needed (Use as needed 30 minutes before physical activities.). Inhale 3 puffs 20-30 minutes prior to exercise 1 each 3   • tobramycin-dexamethasone (TOBRADEX) ophthalmic suspension Place 1 drop into left eye every 4 hours (while awake). 5 mL 0   • Midazolam  (Nayzilam) 5 MG/0.1ML nasal spray 1 spray by Right Nare route as needed for Seizures. 2 each 3   • Ciclopirox 8 % Kit Apply 1 drop topically daily. 1 kit 0   • pyridoxine (VITAMIN B6) 50 MG tablet 1 tablet by mouth twice daily. DISREGARD THE EARLIER RX THAT SAID ONCE DAILY 60 tablet 3   • levETIRAcetam (KepPRA) 500 MG tablet TAKE 1 TABLET BY MOUTH TWICE DAILY 60 tablet 3   • Peak Flow Meter Device Use prior to exertion, after exertion, and after using inhaler. 1 Device 0     No current facility-administered medications for this visit.     No Known Allergies    Preferred pharmacy:   [facility] #[id number] [facility] [address] [address]  Phone: [phone] Fax: [phone]    [id number] - [place] [facility] [address] AT [facility]  [address]  [place] [facility] [address]  Phone: [phone] Fax: [phone]      Medications, allergies, past medical, surgical, social and family histories were reviewed and updated as appropriate.      HEALTH MAINTENANCE     Health Maintenance Due   Topic Date Due   • Annual Physical (ages 3-18)  09/03/2022   • Chlamydia and Gonorrhea Screening (if sexually active)  Never done   • Influenza Vaccine (1) 09/01/2023   • COVID-19 Vaccine (3 - 2023-24 season) 09/01/2023       Hearing: no hearing aid.    Vision: sees eye doc    Dental: She has a dental home.      Screening:    Food Insecurity: Not on file       HTN: BP: 97/61    Obesity: Body mass index is 21.43 kg/m².    Domestic & intimate partner violence: Screen all patients annually.  - She seems to be safe at home.    MDD: Screen all adults annually.  PHQ 2 Score Adult PHQ 2 Score Adult PHQ 2 Interpretation Little interest or pleasure in activity?   11/2/2023   3:15 PM 0 No further screening needed 0      PHQ 9 Score Adult PHQ 9 Score Adult PHQ 9 Interpretation   11/2/2023   3:15 PM 1 Minimal Depression        AUD: Screen all adults annually.  - She drinks 0 drinks/week.    STI: Offer screening for GC, CT, Syphilis, HIV and HBV to all sexually active patients annually.  - She does not meet criteria for annual screening    HLD: Screen annually if age >35 or if risk factors for ASCVD (HTN, DM, smoking, BMI >30, FHx +, abnormal exam) are present.  No results found for: \"LDLCHLSTL\"    DM: Screen adults >34 y/o if BMI >25.  Screen earlier if risk factors for DM (HTN, HLD, BMI >30, FHx +, hx GDM, abnormal exam) are present.  Lab Results   Component Value Date    GLUCOSE 66 01/20/2021    GLUCOSE 77 03/11/2020    No results found for: \"GLYCO\"    HIV: Screen all adults for HIV at least once in their lifetime.  No results found for: \"HIV\"    Hepatitis C: Screen all adults at least once in their lifetime.  No results found for: \"HCV\", \"HCVA\"    TB: Screen at least once in adults who have immigrated from endemic countries, have been homeless, have used IV drugs, and/or have been incarcerated.  Screen annually if they are at continued risk.  - She has not been immunized by BCG.    Cervical ca: Screen for cervical cancer with Pap smear + HPV co-test at least every 5 years starting at age 25 (ACS 2022 guideline) and ending at age 65 unless screening is abnormal near age 65.  - Previous cyto/cotest: never    Colon ca: Screen normal-risk adults at least every 10 years starting at age of 45.  Screen high-risk adults starting 10 years earlier than their first-degree relative was diagnosed.  - FHx: no  - Previous colonoscopy: no    Breast ca: Screen annually in average-risk women starting at age 50.  Screen women with increased risk starting at age 40 or 10 years earlier than a first-degree relative was diagnosed.  Women who desire screening may be screened at age 40 based on a personal decision between patient and physician.  - FHx: none  - Previous screening: no    Lung ca: Screen annually in adults who smoked 20 pack-years starting  at age 50.  Stop screening if/when the patient has gone 15 years without smoking or turns 79 y/o.  - Previous CT:  n/a    Osteoporosis: Screen every 2 years in women 64 y/o.  Screening may be done earlier in post-menopausal women at higher risk (FRAX osteoporotic risk >20% or FRAX hip fracture risk >5%).  - Previous DEXA: n/a    AAA: Screen at least once in women 76 y/o who've smoked >100 cigarettes in their lifetime.  - Previous U/S:  n/a      OBJECTIVE     Visit Vitals  BP 97/61 (BP Location: LUE - Left upper extremity, Patient Position: Sitting)   Pulse 67   Temp 97.9 °F (36.6 °C) (Temporal)   Ht 5' 3\" (1.6 m)   Wt 54.9 kg (121 lb)   LMP 10/20/2023 (Approximate)   SpO2 100%   BMI 21.43 kg/m²       Physical examination:    Constitutional:  Awake, alert,   H/E/N/T:  AT/NC, no discharge from nares, oropharynx clear, bilateral TM visualized  Eyes:  Open, no scleral icterus, no conjunctival injection, EOMI, PERRL  Neck:  Supple, no muscle spasm, no JVD, no tracheal deviation or tug  Lymphatic:  No palpable cervical nor supraclavicular nodes.  Cardiovascular:  RRR, no audible M/R/G  Capillary refill <2 seconds  Respiratory:  No distress, well aerated throughout, CTAB  Skin & Nails:  Warm, dry, no pallor, no jaundice  Nails appear normal    Extremities:  No pedal edema, no deformities  Abdomen:  Soft, NT, ND, no masses.  Uterus normal in size.  Neurologic:  Alert, speech clear & sensible, face symmetric, moves all extremities.  No obvious memory impairment.  Psychologic:  Pleasant and cooperative.      ASSESSMENT  &  PLAN     #Epilepsy  - stable  - monitoring with neurology    I encourage all patients to see an optometrist and dentist annually.    I provide known and available resources to all patients who are food-insecure.      Screenings:    Obesity: Body mass index is 21.43 kg/m².... considered normal  Violence: safe at home  Alcohol: denies  Drugs: denies  MDD: PHQ was controlled  HTN: BP: 97/61.   HLD: no current  indication to screen  DM: no current indication to screen  STI's: n/a  HIV: n/a  Hep.C: n/a  TB: n/a  Cervical CA: start at age 21  Breast CA:  Start discussion at age 40  Colon CA: Start at age 45  Lung CA: n/a  Osteoporosis: n/a  AAA: n/a      Immunizations:  Meningitis today dose 2, declines flu, can complete covid later      Follow-up: 12 months              Signed,  Gomez Barrientos,   Family Medicine

## 2023-12-04 ENCOUNTER — OFFICE VISIT (OUTPATIENT)
Dept: INTERNAL MEDICINE | Facility: CLINIC | Age: 83
End: 2023-12-04
Payer: MEDICARE

## 2023-12-04 VITALS
BODY MASS INDEX: 24.52 KG/M2 | WEIGHT: 181 LBS | SYSTOLIC BLOOD PRESSURE: 126 MMHG | HEIGHT: 72 IN | DIASTOLIC BLOOD PRESSURE: 80 MMHG | TEMPERATURE: 96.4 F

## 2023-12-04 DIAGNOSIS — Z23 NEED FOR INFLUENZA VACCINATION: ICD-10-CM

## 2023-12-04 DIAGNOSIS — Z00.00 MEDICARE ANNUAL WELLNESS VISIT, SUBSEQUENT: ICD-10-CM

## 2023-12-04 DIAGNOSIS — E03.9 ACQUIRED HYPOTHYROIDISM: ICD-10-CM

## 2023-12-04 DIAGNOSIS — E78.2 MIXED HYPERLIPIDEMIA: Primary | ICD-10-CM

## 2023-12-04 NOTE — PROGRESS NOTES
The ABCs of the Annual Wellness Visit  Subsequent Medicare Wellness Visit    Subjective    Sukhdev Zayas is a 83 y.o. male who presents for a Subsequent Medicare Wellness Visit.    The following portions of the patient's history were reviewed and   updated as appropriate: allergies, current medications, past family history, past medical history, past social history, past surgical history, and problem list.    Compared to one year ago, the patient feels his physical   health is the same.    Compared to one year ago, the patient feels his mental   health is the same.    Recent Hospitalizations:  He was not admitted to the hospital during the last year.       Current Medical Providers:  Patient Care Team:  Rachelle Patton PA-C as PCP - General (Physician Assistant)    Outpatient Medications Prior to Visit   Medication Sig Dispense Refill    acetaminophen-codeine (TYLENOL #3) 300-30 MG per tablet Take 1 tablet by mouth Every 6 (Six) Hours As Needed for Moderate Pain. 60 tablet 1    aspirin 81 MG chewable tablet Chew 1 tablet Daily. 30 tablet 0    clopidogrel (PLAVIX) 75 MG tablet Take 1 tablet by mouth Daily. Start on Monday      FLUoxetine (PROzac) 20 MG capsule Take 1 capsule by mouth Daily.      levothyroxine (SYNTHROID, LEVOTHROID) 25 MCG tablet Take 1 tablet by mouth Daily. 90 tablet 0    omeprazole (priLOSEC) 40 MG capsule Take 1 capsule by mouth Daily. 90 capsule 4    VITAMIN D PO Take  by mouth. 400 mg daily       No facility-administered medications prior to visit.       Opioid medication/s are on active medication list.  and I have evaluated his active treatment plan and pain score trends (see table).  There were no vitals filed for this visit.  I have reviewed the chart for potential of high risk medication and harmful drug interactions in the elderly.          Aspirin is on active medication list. Aspirin use is indicated based on review of current medical condition/s. Pros and cons of this therapy have  "been discussed today. Benefits of this medication outweigh potential harm.  Patient has been encouraged to continue taking this medication.  .      Patient Active Problem List   Diagnosis    Hyperlipidemia    Coronary artery disease involving native coronary artery of native heart without angina pectoris    MCI (mild cognitive impairment)    Mood disorder    Closed wedge compression fracture of third thoracic vertebra with routine healing    GERD (gastroesophageal reflux disease)    S/P drug eluting coronary stent placement    Chest pain    Diastolic dysfunction    Mixed action and resting tremor    Bladder cancer    BPH (benign prostatic hyperplasia)    Atrial fibrillation    Essential hypertension    Dizziness    Beta-blocker intolerance    Orthostatic hypotension    Parkinsonism    Lumbar facet arthropathy    Spondylosis of lumbar region without myelopathy or radiculopathy    Lumbar foraminal stenosis    Chronic bilateral low back pain without sciatica    Hypothyroidism    DDD (degenerative disc disease), lumbar    Lumbar radiculopathy     Advance Care Planning   Advance Care Planning     Advance Directive is not on file.  ACP discussion was held with the patient during this visit. Patient has an advance directive (not in EMR), copy requested.     Objective    Vitals:    12/04/23 1251   BP: 126/80   Temp: 96.4 °F (35.8 °C)   Weight: 82.1 kg (181 lb)   Height: 183 cm (72.05\")     Estimated body mass index is 24.52 kg/m² as calculated from the following:    Height as of this encounter: 183 cm (72.05\").    Weight as of this encounter: 82.1 kg (181 lb).    BMI is within normal parameters. No other follow-up for BMI required.      Does the patient have evidence of cognitive impairment? No          HEALTH RISK ASSESSMENT    Smoking Status:  Social History     Tobacco Use   Smoking Status Never   Smokeless Tobacco Never     Alcohol Consumption:  Social History     Substance and Sexual Activity   Alcohol Use No    " Comment: last drink about 1 year ago      Fall Risk Screen:    MANDA Fall Risk Assessment was completed, and patient is at MODERATE risk for falls. Assessment completed on:2023    Depression Screenin/4/2023    12:55 PM   PHQ-2/PHQ-9 Depression Screening   Little Interest or Pleasure in Doing Things 0-->not at all   Feeling Down, Depressed or Hopeless 0-->not at all   PHQ-9: Brief Depression Severity Measure Score 0       Health Habits and Functional and Cognitive Screenin/4/2023    12:54 PM   Functional & Cognitive Status   Do you have difficulty preparing food and eating? No   Do you have difficulty bathing yourself, getting dressed or grooming yourself? No   Do you have difficulty using the toilet? No   Do you have difficulty moving around from place to place? No   Do you have trouble with steps or getting out of a bed or a chair? No   Current Diet Well Balanced Diet   Dental Exam Not up to date   Eye Exam Up to date   Exercise (times per week) 4 times per week   Current Exercises Include Walking;Other   Do you need help using the phone?  No   Are you deaf or do you have serious difficulty hearing?  No   Do you need help to go to places out of walking distance? No   Do you need help shopping? No   Do you need help preparing meals?  No   Do you need help with housework?  No   Do you need help with laundry? No   Do you need help taking your medications? No   Do you need help managing money? No   Do you ever drive or ride in a car without wearing a seat belt? No       Age-appropriate Screening Schedule:  Refer to the list below for future screening recommendations based on patient's age, sex and/or medical conditions. Orders for these recommended tests are listed in the plan section. The patient has been provided with a written plan.    Health Maintenance   Topic Date Due    ZOSTER VACCINE (1 of 2) Never done    Pneumococcal Vaccine 65+ (1 - PCV) Never done    TDAP/TD VACCINES (1 - Tdap)  "03/26/2017    ANNUAL WELLNESS VISIT  03/21/2023    COVID-19 Vaccine (6 - 2023-24 season) 09/01/2023    LIPID PANEL  09/16/2023    INFLUENZA VACCINE  Completed    COLORECTAL CANCER SCREENING  Discontinued                  CMS Preventative Services Quick Reference  Risk Factors Identified During Encounter  None Identified  The above risks/problems have been discussed with the patient.  Pertinent information has been shared with the patient in the After Visit Summary.  An After Visit Summary and PPPS were made available to the patient.    Follow Up:   Next Medicare Wellness visit to be scheduled in 1 year.       Additional E&M Note during same encounter follows:  Patient has multiple medical problems which are significant and separately identifiable that require additional work above and beyond the Medicare Wellness Visit.      Chief Complaint  Medicare Wellness-subsequent    Subjective        HPI  Sukhdev Zayas is also being seen today for fup on his hypothyroidism. He has had 3 procedures with pain management. His most recent injection is starting to wear off and he is having more pain again. He has had several falls since May, his legs give out on occasion. He does have a walker that he has been using in the house. He is unsteady with stairs. Pain doctor thinks coming from his back. Most recent fall he landed on his knees, he has been doing physical therapy. States too early to tell if it is helping.          Objective   Vital Signs:  /80   Temp 96.4 °F (35.8 °C)   Ht 183 cm (72.05\")   Wt 82.1 kg (181 lb)   BMI 24.52 kg/m²     Physical Exam  Vitals reviewed.   Constitutional:       Appearance: Normal appearance.   HENT:      Head: Normocephalic and atraumatic.   Neck:      Vascular: No carotid bruit.   Cardiovascular:      Rate and Rhythm: Normal rate and regular rhythm.      Heart sounds: Normal heart sounds.   Musculoskeletal:      Right lower leg: No edema.      Left lower leg: No edema. "   Neurological:      Mental Status: He is alert.      Comments: Tremor bilateral upper extremities                         Assessment and Plan   Diagnoses and all orders for this visit:    1. Mixed hyperlipidemia (Primary)  -     Lipid Panel With / Chol / HDL Ratio  -     Comprehensive Metabolic Panel    2. Acquired hypothyroidism  -     TSH    3. Need for influenza vaccination  -     Fluzone High-Dose 65+yrs    4. Medicare annual wellness visit, subsequent    Flu shot today. Will also get labs. BP is controlled. Reviewed pain management notes as well as cardiology.          Follow Up   Return in about 6 months (around 6/4/2024) for Lab Today, Lab Appt Before FUP.  Patient was given instructions and counseling regarding his condition or for health maintenance advice. Please see specific information pulled into the AVS if appropriate.

## 2023-12-05 LAB
ALBUMIN SERPL-MCNC: 4.6 G/DL (ref 3.5–5.2)
ALBUMIN/GLOB SERPL: 2.2 G/DL
ALP SERPL-CCNC: 119 U/L (ref 39–117)
ALT SERPL-CCNC: 37 U/L (ref 1–41)
AST SERPL-CCNC: 29 U/L (ref 1–40)
BILIRUB SERPL-MCNC: 0.9 MG/DL (ref 0–1.2)
BUN SERPL-MCNC: 17 MG/DL (ref 8–23)
BUN/CREAT SERPL: 13.5 (ref 7–25)
CALCIUM SERPL-MCNC: 9.9 MG/DL (ref 8.6–10.5)
CHLORIDE SERPL-SCNC: 107 MMOL/L (ref 98–107)
CHOLEST SERPL-MCNC: 184 MG/DL (ref 0–200)
CHOLEST/HDLC SERPL: 3.12 {RATIO}
CO2 SERPL-SCNC: 26.8 MMOL/L (ref 22–29)
CREAT SERPL-MCNC: 1.26 MG/DL (ref 0.76–1.27)
EGFRCR SERPLBLD CKD-EPI 2021: 56.6 ML/MIN/1.73
GLOBULIN SER CALC-MCNC: 2.1 GM/DL
GLUCOSE SERPL-MCNC: 100 MG/DL (ref 65–99)
HDLC SERPL-MCNC: 59 MG/DL (ref 40–60)
LDLC SERPL CALC-MCNC: 101 MG/DL (ref 0–100)
POTASSIUM SERPL-SCNC: 4.7 MMOL/L (ref 3.5–5.2)
PROT SERPL-MCNC: 6.7 G/DL (ref 6–8.5)
SODIUM SERPL-SCNC: 143 MMOL/L (ref 136–145)
TRIGL SERPL-MCNC: 137 MG/DL (ref 0–150)
TSH SERPL DL<=0.005 MIU/L-ACNC: 3.45 UIU/ML (ref 0.27–4.2)
VLDLC SERPL CALC-MCNC: 24 MG/DL (ref 5–40)

## 2024-02-02 RX ORDER — LEVOTHYROXINE SODIUM 0.03 MG/1
25 TABLET ORAL DAILY
Qty: 90 TABLET | Refills: 3 | Status: SHIPPED | OUTPATIENT
Start: 2024-02-02

## 2024-03-11 RX ORDER — LEVOTHYROXINE SODIUM 0.03 MG/1
25 TABLET ORAL DAILY
Qty: 90 TABLET | Refills: 3 | Status: SHIPPED | OUTPATIENT
Start: 2024-03-11

## 2024-03-11 NOTE — TELEPHONE ENCOUNTER
Caller: Sukhdev Zayas ANSLEY    Relationship: Self    Best call back number: [phone]    Requested Prescriptions:   Requested Prescriptions     Pending Prescriptions Disp Refills    levothyroxine (SYNTHROID, LEVOTHROID) 25 MCG tablet 90 tablet 3     Sig: Take 1 tablet by mouth Daily.        [id number] [address] AT [facility] [address] - [phone] [facility] [phone]      Last office visit with prescribing clinician: 12/4/2023   Last telemedicine visit with prescribing clinician: Visit date not found   Next office visit with prescribing clinician: 6/4/2024       Does the patient have less than a 3 day supply:  [] Yes  [x] No    Would you like a call back once the refill request has been completed: [] Yes [x] No    If the office needs to give you a call back, can they leave a voicemail: [] Yes [x] No    Herminio Guaman Rep   03/11/24 15:08 EDT

## 2024-04-11 ENCOUNTER — OFFICE VISIT (OUTPATIENT)
Dept: INTERNAL MEDICINE | Facility: CLINIC | Age: 84
End: 2024-04-11
Payer: MEDICARE

## 2024-04-11 VITALS
WEIGHT: 193 LBS | BODY MASS INDEX: 26.14 KG/M2 | TEMPERATURE: 98.1 F | SYSTOLIC BLOOD PRESSURE: 124 MMHG | DIASTOLIC BLOOD PRESSURE: 84 MMHG | HEIGHT: 72 IN

## 2024-04-11 DIAGNOSIS — I10 ESSENTIAL HYPERTENSION: Primary | ICD-10-CM

## 2024-04-11 DIAGNOSIS — E03.9 ACQUIRED HYPOTHYROIDISM: ICD-10-CM

## 2024-04-11 DIAGNOSIS — I95.1 ORTHOSTATIC HYPOTENSION: ICD-10-CM

## 2024-04-11 PROCEDURE — 3074F SYST BP LT 130 MM HG: CPT | Performed by: STUDENT IN AN ORGANIZED HEALTH CARE EDUCATION/TRAINING PROGRAM

## 2024-04-11 PROCEDURE — 3079F DIAST BP 80-89 MM HG: CPT | Performed by: STUDENT IN AN ORGANIZED HEALTH CARE EDUCATION/TRAINING PROGRAM

## 2024-04-11 PROCEDURE — 99213 OFFICE O/P EST LOW 20 MIN: CPT | Performed by: STUDENT IN AN ORGANIZED HEALTH CARE EDUCATION/TRAINING PROGRAM

## 2024-04-11 RX ORDER — MIDODRINE HYDROCHLORIDE 5 MG/1
2.5 TABLET ORAL DAILY PRN
Qty: 30 TABLET | Refills: 1 | Status: SHIPPED | OUTPATIENT
Start: 2024-04-11

## 2024-04-11 NOTE — PROGRESS NOTES
"Chief Complaint  Hypotension (Low BP at PT// Dizziness// )    Subjective        Sukhdev Zayas presents to [facility]  History of Present Illness  #hypotension  Went to physical therapy today, was riding the bike and began to feel strange. Bp checked and was hypotensive with a blood pressure of 100s/60s. Does have a history of hypotension, was previously taking midodrine, but discontinued as issue resolved with increase fluid, salt intake.   Objective   Vital Signs:  /84   Temp 98.1 °F (36.7 °C)   Ht 183 cm (72.05\")   Wt 87.5 kg (193 lb)   BMI 26.14 kg/m²   Estimated body mass index is 26.14 kg/m² as calculated from the following:    Height as of this encounter: 183 cm (72.05\").    Weight as of this encounter: 87.5 kg (193 lb).               Physical Exam  Vitals reviewed.   Constitutional:       General: He is not in acute distress.     Appearance: Normal appearance.   HENT:      Head: Normocephalic and atraumatic.   Eyes:      General: No scleral icterus.     Extraocular Movements: Extraocular movements intact.      Pupils: Pupils are equal, round, and reactive to light.   Cardiovascular:      Rate and Rhythm: Normal rate and regular rhythm.      Heart sounds: No murmur heard.     No friction rub. No gallop.   Pulmonary:      Effort: Pulmonary effort is normal.      Breath sounds: Normal breath sounds. No wheezing, rhonchi or rales.   Abdominal:      General: Abdomen is flat. Bowel sounds are normal. There is no distension.      Palpations: Abdomen is soft.      Tenderness: There is no abdominal tenderness. There is no guarding.   Musculoskeletal:         General: Normal range of motion.      Right lower leg: No edema.      Left lower leg: No edema.   Skin:     General: Skin is warm and dry.      Capillary Refill: Capillary refill takes less than 2 seconds.      Coloration: Skin is not jaundiced.      Findings: No rash.   Neurological:      General: No focal deficit " present.      Mental Status: He is alert and oriented to person, place, and time.   Psychiatric:         Mood and Affect: Mood normal.         Behavior: Behavior normal.        Result Review :                     Assessment and Plan     Diagnoses and all orders for this visit:    1. Essential hypertension (Primary)  -     Comprehensive metabolic panel    2. Acquired hypothyroidism  -     TSH    3. Orthostatic hypotension  -     midodrine (PROAMATINE) 5 MG tablet; Take 0.5 tablets by mouth Daily As Needed (hypotension).  Dispense: 30 tablet; Refill: 1  -     Comprehensive metabolic panel  -     Blood Pressure Monitoring (Comfort Touch BP Cuff/Large) misc; Use 1 each Daily.  Dispense: 1 each; Refill: 0    Other orders  -     Discontinue: Blood Pressure Monitoring (Comfort Touch BP Cuff/Large) misc; Use 1 each Daily.  Dispense: 1 each; Refill: 0    BP returned to goal after mieshanet had some water, adonis to visit here. In setting of his history of orthostatic hypotension, prior midodrine use, would favor continued orthostatsis as etiology for brief episode of hypotension. Will refill patient midodrine, to use as prn when bp below 100 systolic, 60 diastolic and to call us to inform. Will check cmp, tsh today as patient hypothyroid in past, could have increased synthroid need. Notably however patient much improved in office today, felt back to baseline. Will have schedule two week follow up with PCP, Rachelle Patton.          Follow Up     No follow-ups on file.  Patient was given instructions and counseling regarding his condition or for health maintenance advice. Please see specific information pulled into the AVS if appropriate.

## 2024-04-12 LAB
ALBUMIN SERPL-MCNC: 4.4 G/DL (ref 3.5–5.2)
ALBUMIN/GLOB SERPL: 1.9 G/DL
ALP SERPL-CCNC: 129 U/L (ref 39–117)
ALT SERPL-CCNC: 36 U/L (ref 1–41)
AST SERPL-CCNC: 41 U/L (ref 1–40)
BILIRUB SERPL-MCNC: 0.8 MG/DL (ref 0–1.2)
BUN SERPL-MCNC: 19 MG/DL (ref 8–23)
BUN/CREAT SERPL: 14.8 (ref 7–25)
CALCIUM SERPL-MCNC: 9.8 MG/DL (ref 8.6–10.5)
CHLORIDE SERPL-SCNC: 103 MMOL/L (ref 98–107)
CO2 SERPL-SCNC: 27.1 MMOL/L (ref 22–29)
CREAT SERPL-MCNC: 1.28 MG/DL (ref 0.76–1.27)
EGFRCR SERPLBLD CKD-EPI 2021: 55.5 ML/MIN/1.73
GLOBULIN SER CALC-MCNC: 2.3 GM/DL
GLUCOSE SERPL-MCNC: 104 MG/DL (ref 65–99)
POTASSIUM SERPL-SCNC: 4.6 MMOL/L (ref 3.5–5.2)
PROT SERPL-MCNC: 6.7 G/DL (ref 6–8.5)
SODIUM SERPL-SCNC: 140 MMOL/L (ref 136–145)
TSH SERPL DL<=0.005 MIU/L-ACNC: 4.6 UIU/ML (ref 0.27–4.2)

## 2024-04-12 RX ORDER — LEVOTHYROXINE SODIUM 0.05 MG/1
50 TABLET ORAL DAILY
Qty: 30 TABLET | Refills: 1 | Status: SHIPPED | OUTPATIENT
Start: 2024-04-12 | End: 2024-04-12

## 2024-04-12 RX ORDER — LEVOTHYROXINE SODIUM 0.05 MG/1
50 TABLET ORAL DAILY
Qty: 90 TABLET | Refills: 0 | Status: SHIPPED | OUTPATIENT
Start: 2024-04-12

## 2024-04-16 ENCOUNTER — TELEPHONE (OUTPATIENT)
Dept: INTERNAL MEDICINE | Facility: CLINIC | Age: 84
End: 2024-04-16
Payer: MEDICARE

## 2024-04-16 NOTE — TELEPHONE ENCOUNTER
"Relay     \"Results reviewed, kidney function relatively stable, slight increase in BUN likely representative of mild dehydration. Notably however, elevation in TSH, concerning for potential progression of hypothyroidism, especially in setting of intermittent orthostatic hypotension. Will increase dose of synthroid from 25mcg daily to 50 mcg daily, prescription sent to [facility] on [address]. Please ensure patient has 2 week follow up with PCP to check thyroid levels. \"        "

## 2024-04-24 ENCOUNTER — OFFICE VISIT (OUTPATIENT)
Dept: INTERNAL MEDICINE | Facility: CLINIC | Age: 84
End: 2024-04-24
Payer: MEDICARE

## 2024-04-24 VITALS
TEMPERATURE: 98.4 F | WEIGHT: 191 LBS | DIASTOLIC BLOOD PRESSURE: 76 MMHG | BODY MASS INDEX: 25.87 KG/M2 | HEIGHT: 72 IN | SYSTOLIC BLOOD PRESSURE: 124 MMHG

## 2024-04-24 DIAGNOSIS — E03.9 ACQUIRED HYPOTHYROIDISM: Primary | ICD-10-CM

## 2024-04-24 DIAGNOSIS — I95.1 ORTHOSTATIC HYPOTENSION: ICD-10-CM

## 2024-04-24 LAB — TSH SERPL DL<=0.005 MIU/L-ACNC: 3.95 UIU/ML (ref 0.27–4.2)

## 2024-04-24 PROCEDURE — 99213 OFFICE O/P EST LOW 20 MIN: CPT | Performed by: PHYSICIAN ASSISTANT

## 2024-04-24 PROCEDURE — 3074F SYST BP LT 130 MM HG: CPT | Performed by: PHYSICIAN ASSISTANT

## 2024-04-24 PROCEDURE — 3078F DIAST BP <80 MM HG: CPT | Performed by: PHYSICIAN ASSISTANT

## 2024-04-24 PROCEDURE — 1160F RVW MEDS BY RX/DR IN RCRD: CPT | Performed by: PHYSICIAN ASSISTANT

## 2024-04-24 PROCEDURE — 1159F MED LIST DOCD IN RCRD: CPT | Performed by: PHYSICIAN ASSISTANT

## 2024-04-24 NOTE — PROGRESS NOTES
Subjective   Chief Complaint   Patient presents with    Hypotension       History of Present Illness     PT is here today for fup on his hypotension. He has been doing much better. His TSH was elevated and his Levo was increased to 50 mcg. He is feeling much better.      Patient Active Problem List   Diagnosis    Hyperlipidemia    Coronary artery disease involving native coronary artery of native heart without angina pectoris    MCI (mild cognitive impairment)    Mood disorder    Closed wedge compression fracture of third thoracic vertebra with routine healing    GERD (gastroesophageal reflux disease)    S/P drug eluting coronary stent placement    Chest pain    Diastolic dysfunction    Mixed action and resting tremor    Bladder cancer    BPH (benign prostatic hyperplasia)    Atrial fibrillation    Essential hypertension    Dizziness    Beta-blocker intolerance    Orthostatic hypotension    Parkinsonism    Lumbar facet arthropathy    Spondylosis of lumbar region without myelopathy or radiculopathy    Lumbar foraminal stenosis    Chronic bilateral low back pain without sciatica    Hypothyroidism    DDD (degenerative disc disease), lumbar    Lumbar radiculopathy       Allergies   Allergen Reactions    Bupropion Dizziness, Delirium and Other (See Comments)       Current Outpatient Medications on File Prior to Visit   Medication Sig Dispense Refill    acetaminophen-codeine (TYLENOL #3) 300-30 MG per tablet Take 1 tablet by mouth Every 6 (Six) Hours As Needed for Moderate Pain. 60 tablet 1    aspirin 81 MG chewable tablet Chew 1 tablet Daily. 30 tablet 0    clopidogrel (PLAVIX) 75 MG tablet Take 1 tablet by mouth Daily. Start on Monday      FLUoxetine (PROzac) 20 MG capsule Take 1 capsule by mouth Daily.      levothyroxine (SYNTHROID, LEVOTHROID) 50 MCG tablet TAKE 1 TABLET BY MOUTH DAILY 90 tablet 0    omeprazole (priLOSEC) 40 MG capsule Take 1 capsule by mouth Daily. 90 capsule 4    VITAMIN D PO Take  by mouth. 400 mg  daily      Blood Pressure Monitoring (Comfort Touch BP Cuff/Large) misc Use 1 each Daily. (Patient not taking: Reported on 4/24/2024) 1 each 0    midodrine (PROAMATINE) 5 MG tablet Take 0.5 tablets by mouth Daily As Needed (hypotension). 30 tablet 1     No current facility-administered medications on file prior to visit.       Past Medical History:   Diagnosis Date    Anxiety     Back pain     Bladder cancer 2018    bladder cancer has been removed.    Depression     Dizziness     GERD (gastroesophageal reflux disease)     History of fractured rib     RIGHT SIDE    Hyperlipidemia     Multiple falls     Tremors of nervous system     Urinary incontinence     Vertigo        Family History   Problem Relation Age of Onset    Arthritis Mother     Glaucoma Mother     Heart disease Father     Emphysema Father     Tremor Father     Malig Hyperthermia Neg Hx        Social History     Socioeconomic History    Marital status:     Number of children: 4    Years of education: 5 college degrees   Tobacco Use    Smoking status: Never    Smokeless tobacco: Never   Vaping Use    Vaping status: Never Used   Substance and Sexual Activity    Alcohol use: No     Comment: last drink about 1 year ago     Drug use: No    Sexual activity: Defer       Past Surgical History:   Procedure Laterality Date    CARDIAC CATHETERIZATION      7x, 5 stents    CATARACT EXTRACTION, BILATERAL      CHOLECYSTECTOMY N/A 9/2/2019    Procedure: CHOLECYSTECTOMY LAPAROSCOPIC WITH INTRAOPERATIVE CHOLANGIOGRAM;  Surgeon: Bg Rodriguez Jr., MD;  Location: [facility] OR;  Service: General    COLONOSCOPY      CORONARY ANGIOPLASTY WITH STENT PLACEMENT      X5     CYSTOSCOPY BLADDER BIOPSY N/A 1/8/2019    Procedure: CYSTOSCOPY BLADDER BIOPSY;  Surgeon: Wang Soares Jr., MD;  Location: [facility] OR;  Service: Urology    CYSTOSCOPY BLADDER BIOPSY N/A 6/13/2019    Procedure: CYSTOSCOPY BLADDER BIOPSY;  Surgeon: Wang Soares Jr., MD;  Location:   [facility];  Service: Urology    CYSTOSCOPY TRANSURETHRAL RESECTION OF PROSTATE N/A 7/11/2019    Procedure: CYSTOSCOPY TRANSURETHRAL RESECTION OF PROSTATE;  Surgeon: Wang Soares Jr., MD;  Location:  [facility];  Service: Urology    CYSTOSCOPY URETEROSCOPY LASER LITHOTRIPSY N/A 6/13/2019    Procedure: CYSTO LITHOPAXY;  Surgeon: Wang Soares Jr., MD;  [facility];  Service: Urology    ERCP N/A 9/3/2019    Procedure: ENDOSCOPIC RETROGRADE CHOLANGIOPANCREATOGRAPHY with sphincterotomy and balloon sweep;  Surgeon: Ashok Amaya MD;  Location: [facility] ENDOSCOPY;  Service: Gastroenterology    EYE SURGERY      Lens implants    LUMBAR DISCECTOMY FUSION INSTRUMENTATION      LUMBAR EPIDURAL INJECTION N/A 5/4/2022    Procedure: lumbar epidural steroid injection;  Surgeon: Charlene Manzo MD;  Location: [facility];  Service: Pain Management;  Laterality: N/A;    MEDIAL BRANCH BLOCK Bilateral 12/29/2021    Procedure: LUMBAR MEDIAL BRANCH BLOCK Bilateral L3-S1 x2 (2 weeks apart);  Surgeon: Charlene Manzo MD;  Location: [facility];  Service: Pain Management;  Laterality: Bilateral;    MEDIAL BRANCH BLOCK Bilateral 1/12/2022    Procedure: LUMBAR MEDIAL BRANCH BLOCK Bilateral L3-S1 x2 (2 weeks apart);  Surgeon: Charlene Manzo MD;  Location: [facility];  Service: Pain Management;  Laterality: Bilateral;    RADIOFREQUENCY ABLATION Bilateral 2/7/2022    Procedure: RADIOFREQUENCY ABLATION LUMBAR bilateral L3-S1;  Surgeon: Charlene Manzo MD;  Location: [facility];  Service: Pain Management;  Laterality: Bilateral;    SKIN CANCER EXCISION Left     chest wall    SKIN CANCER EXCISION  01/21/2021    facial    TRANSURETHRAL RESECTION OF BLADDER TUMOR N/A 11/29/2018    Procedure: TUR BLADDER TUMOR  LARGE;  Surgeon: Wang Soares Jr., MD;  Location:  [facility];  Service: Urology         The following portions of the patient's history were reviewed and updated as appropriate:  "problem list, allergies, current medications, past medical history, past family history, past social history, and past surgical history.    ROS    See HPI    Immunization History   Administered Date(s) Administered    COVID-19 (PFIZER) BIVALENT 12+YRS 10/27/2022    COVID-19 (PFIZER) Purple Cap Monovalent 01/26/2021, 02/20/2021, 08/23/2021    Fluad Quad 65+ 11/09/2020    Fluzone High Dose =>65 Years ([employer]ax[employer] ONLY) 10/12/2016    Fluzone High-Dose 65+yrs 10/20/2021, 12/04/2023    Td, Not Adsorbed 03/25/2017    flucelvax quad pfs =>4 YRS 11/30/2018       Objective   Vitals:    04/24/24 1056   BP: 124/76   Temp: 98.4 °F (36.9 °C)   Weight: 86.6 kg (191 lb)   Height: 183 cm (72.05\")     Body mass index is 25.87 kg/m².  Physical Exam  Vitals reviewed.   Constitutional:       Appearance: He is well-developed.   HENT:      Head: Normocephalic and atraumatic.   Cardiovascular:      Rate and Rhythm: Normal rate and regular rhythm.      Heart sounds: Normal heart sounds, S1 normal and S2 normal.   Pulmonary:      Effort: Pulmonary effort is normal.      Breath sounds: Normal breath sounds.   Skin:     General: Skin is warm.   Neurological:      Mental Status: He is alert.   Psychiatric:         Behavior: Behavior normal.           Assessment & Plan   Diagnoses and all orders for this visit:    1. Acquired hypothyroidism (Primary)  -     TSH    2. Orthostatic hypotension     Improved today. Recheck short term TSH. Has fup in June keep appt with me then.     Return for Lab Today.           "

## 2024-05-22 ENCOUNTER — TELEPHONE (OUTPATIENT)
Dept: INTERNAL MEDICINE | Facility: CLINIC | Age: 84
End: 2024-05-22
Payer: MEDICARE

## 2024-05-22 NOTE — TELEPHONE ENCOUNTER
[place] stated that he is not getting better, wants some extra labs drawn to check on him. He is coming in on 6/4 to see you, his balance has been off so he would like to discuss PT with you then and also wants to go to the facility downstairs

## 2024-05-22 NOTE — TELEPHONE ENCOUNTER
Caller: Taty Zayas    Relationship: Emergency Contact    Best call back number: [phone]     What is the best time to reach you: ASAP    Who are you requesting to speak with (clinical staff, provider,  specific staff member): BEN IRWIN    What was the call regarding: PATIENT'S WIFE IS REQUESTING A CALL BACK TO DISCUSS THE NEED FOR AN ORDER FOR PHYSICAL THERAPY. ALSO, NEEDING TO DISCUSS UPCOMING LAB APPOINTMENT.     Is it okay if the provider responds through MyChart: NO

## 2024-05-29 LAB — TSH SERPL DL<=0.005 MIU/L-ACNC: 3.01 UIU/ML (ref 0.27–4.2)

## 2024-06-04 ENCOUNTER — OFFICE VISIT (OUTPATIENT)
Dept: INTERNAL MEDICINE | Facility: CLINIC | Age: 84
End: 2024-06-04
Payer: MEDICARE

## 2024-06-04 VITALS
WEIGHT: 187 LBS | TEMPERATURE: 98.2 F | HEIGHT: 72 IN | BODY MASS INDEX: 25.33 KG/M2 | SYSTOLIC BLOOD PRESSURE: 118 MMHG | DIASTOLIC BLOOD PRESSURE: 76 MMHG

## 2024-06-04 DIAGNOSIS — G20.C PRIMARY PARKINSONISM: Primary | ICD-10-CM

## 2024-06-04 DIAGNOSIS — M48.061 LUMBAR FORAMINAL STENOSIS: ICD-10-CM

## 2024-06-04 PROCEDURE — 1160F RVW MEDS BY RX/DR IN RCRD: CPT | Performed by: PHYSICIAN ASSISTANT

## 2024-06-04 PROCEDURE — 1159F MED LIST DOCD IN RCRD: CPT | Performed by: PHYSICIAN ASSISTANT

## 2024-06-04 PROCEDURE — 1125F AMNT PAIN NOTED PAIN PRSNT: CPT | Performed by: PHYSICIAN ASSISTANT

## 2024-06-04 PROCEDURE — 3078F DIAST BP <80 MM HG: CPT | Performed by: PHYSICIAN ASSISTANT

## 2024-06-04 PROCEDURE — 99213 OFFICE O/P EST LOW 20 MIN: CPT | Performed by: PHYSICIAN ASSISTANT

## 2024-06-04 PROCEDURE — 3074F SYST BP LT 130 MM HG: CPT | Performed by: PHYSICIAN ASSISTANT

## 2024-06-04 RX ORDER — ROPINIROLE 0.25 MG/1
0.25 TABLET, FILM COATED ORAL 3 TIMES DAILY
Qty: 90 TABLET | Refills: 0 | Status: SHIPPED | OUTPATIENT
Start: 2024-06-04 | End: 2024-06-04

## 2024-06-04 RX ORDER — ROPINIROLE 0.25 MG/1
0.25 TABLET, FILM COATED ORAL 3 TIMES DAILY
Qty: 270 TABLET | Refills: 0 | Status: SHIPPED | OUTPATIENT
Start: 2024-06-04

## 2024-06-04 NOTE — PROGRESS NOTES
Subjective   Chief Complaint   Patient presents with    Hyperlipidemia    Hypothyroidism       History of Present Illness      His balance and walking is getting much worse again. He is stumbling quite a bit. His parkinsonism is getting much worse also. No falls recently. Had side effects from the Carbidopa-Levodopa and also from taking Primidone in the past.     Patient Active Problem List   Diagnosis    Hyperlipidemia    Coronary artery disease involving native coronary artery of native heart without angina pectoris    MCI (mild cognitive impairment)    Mood disorder    Closed wedge compression fracture of third thoracic vertebra with routine healing    GERD (gastroesophageal reflux disease)    S/P drug eluting coronary stent placement    Chest pain    Diastolic dysfunction    Mixed action and resting tremor    Bladder cancer    BPH (benign prostatic hyperplasia)    Atrial fibrillation    Essential hypertension    Dizziness    Beta-blocker intolerance    Orthostatic hypotension    Parkinsonism    Lumbar facet arthropathy    Spondylosis of lumbar region without myelopathy or radiculopathy    Lumbar foraminal stenosis    Chronic bilateral low back pain without sciatica    Hypothyroidism    DDD (degenerative disc disease), lumbar    Lumbar radiculopathy       Allergies   Allergen Reactions    Bupropion Dizziness, Delirium and Other (See Comments)       Current Outpatient Medications on File Prior to Visit   Medication Sig Dispense Refill    acetaminophen-codeine (TYLENOL #3) 300-30 MG per tablet Take 1 tablet by mouth Every 6 (Six) Hours As Needed for Moderate Pain. 60 tablet 1    aspirin 81 MG chewable tablet Chew 1 tablet Daily. 30 tablet 0    Blood Pressure Monitoring (Comfort Touch BP Cuff/Large) misc Use 1 each Daily. 1 each 0    clopidogrel (PLAVIX) 75 MG tablet Take 1 tablet by mouth Daily. Start on Monday      FLUoxetine (PROzac) 20 MG capsule Take 1 capsule by mouth Daily.      levothyroxine (SYNTHROID,  LEVOTHROID) 50 MCG tablet TAKE 1 TABLET BY MOUTH DAILY 90 tablet 0    midodrine (PROAMATINE) 5 MG tablet Take 0.5 tablets by mouth Daily As Needed (hypotension). 30 tablet 1    omeprazole (priLOSEC) 40 MG capsule Take 1 capsule by mouth Daily. 90 capsule 4    VITAMIN D PO Take  by mouth. 400 mg daily       No current facility-administered medications on file prior to visit.       Past Medical History:   Diagnosis Date    Anxiety     Back pain     Bladder cancer 2018    bladder cancer has been removed.    Depression     Dizziness     GERD (gastroesophageal reflux disease)     History of fractured rib     RIGHT SIDE    Hyperlipidemia     Multiple falls     Tremors of nervous system     Urinary incontinence     Vertigo        Family History   Problem Relation Age of Onset    Arthritis Mother     Glaucoma Mother     Heart disease Father     Emphysema Father     Tremor Father     Malig Hyperthermia Neg Hx        Social History     Socioeconomic History    Marital status:     Number of children: 4    Years of education: 5 college degrees   Tobacco Use    Smoking status: Never    Smokeless tobacco: Never   Vaping Use    Vaping status: Never Used   Substance and Sexual Activity    Alcohol use: No     Comment: last drink about 1 year ago     Drug use: No    Sexual activity: Defer       Past Surgical History:   Procedure Laterality Date    CARDIAC CATHETERIZATION      7x, 5 stents    CATARACT EXTRACTION, BILATERAL      CHOLECYSTECTOMY N/A 9/2/2019    Procedure: CHOLECYSTECTOMY LAPAROSCOPIC WITH INTRAOPERATIVE CHOLANGIOGRAM;  Surgeon: Bg Rodriguez Jr., MD;  Location: [facility] OR;  Service: General    COLONOSCOPY      CORONARY ANGIOPLASTY WITH STENT PLACEMENT      X5     CYSTOSCOPY BLADDER BIOPSY N/A 1/8/2019    Procedure: CYSTOSCOPY BLADDER BIOPSY;  Surgeon: Wang Soares Jr., MD;  Location: [facility] OR;  Service: Urology    CYSTOSCOPY BLADDER BIOPSY N/A 6/13/2019    Procedure: CYSTOSCOPY BLADDER BIOPSY;   Surgeon: Wang Soares Jr., MD;  Location: [facility] MAIN OR;  Service: Urology    CYSTOSCOPY TRANSURETHRAL RESECTION OF PROSTATE N/A 7/11/2019    Procedure: CYSTOSCOPY TRANSURETHRAL RESECTION OF PROSTATE;  Surgeon: Wang Soares Jr., MD;  Location:  [facility];  Service: Urology    CYSTOSCOPY URETEROSCOPY LASER LITHOTRIPSY N/A 6/13/2019    Procedure: CYSTO LITHOPAXY;  Surgeon: Wang Soares Jr., MD;  [facility];  Service: Urology    ERCP N/A 9/3/2019    Procedure: ENDOSCOPIC RETROGRADE CHOLANGIOPANCREATOGRAPHY with sphincterotomy and balloon sweep;  Surgeon: Ashok Amaya MD;  Location: [facility] ENDOSCOPY;  Service: Gastroenterology    EYE SURGERY      Lens implants    LUMBAR DISCECTOMY FUSION INSTRUMENTATION      LUMBAR EPIDURAL INJECTION N/A 5/4/2022    Procedure: lumbar epidural steroid injection;  Surgeon: Charlene Manzo MD;  Location: [facility];  Service: Pain Management;  Laterality: N/A;    MEDIAL BRANCH BLOCK Bilateral 12/29/2021    Procedure: LUMBAR MEDIAL BRANCH BLOCK Bilateral L3-S1 x2 (2 weeks apart);  Surgeon: Charlene Manzo MD;  Location: [facility];  Service: Pain Management;  Laterality: Bilateral;    MEDIAL BRANCH BLOCK Bilateral 1/12/2022    Procedure: LUMBAR MEDIAL BRANCH BLOCK Bilateral L3-S1 x2 (2 weeks apart);  Surgeon: Charlene Manzo MD;  Location: [facility];  Service: Pain Management;  Laterality: Bilateral;    RADIOFREQUENCY ABLATION Bilateral 2/7/2022    Procedure: RADIOFREQUENCY ABLATION LUMBAR bilateral L3-S1;  Surgeon: Charlene Manzo MD;  Location: [facility];  Service: Pain Management;  Laterality: Bilateral;    SKIN CANCER EXCISION Left     chest wall    SKIN CANCER EXCISION  01/21/2021    facial    TRANSURETHRAL RESECTION OF BLADDER TUMOR N/A 11/29/2018    Procedure: TUR BLADDER TUMOR  LARGE;  Surgeon: Wang Soares Jr., MD;  Location:  [facility];  Service: Urology       The following portions of the patient's  "history were reviewed and updated as appropriate: problem list, allergies, current medications, past medical history, past family history, past social history, and past surgical history.    ROS    See HPI    Immunization History   Administered Date(s) Administered    COVID-19 (PFIZER) BIVALENT 12+YRS 10/27/2022    COVID-19 (PFIZER) Purple Cap Monovalent 01/26/2021, 02/20/2021, 08/23/2021    Fluad Quad 65+ 11/09/2020    Fluzone High Dose =>65 Years ([employer] ONLY) 10/12/2016    Fluzone High-Dose 65+yrs 10/20/2021, 12/04/2023    Td, Not Adsorbed 03/25/2017    flucelvax quad pfs =>4 YRS 11/30/2018       Objective   Vitals:    06/04/24 1128   BP: 118/76   Temp: 98.2 °F (36.8 °C)   Weight: 84.8 kg (187 lb)   Height: 183 cm (72.05\")     Body mass index is 25.33 kg/m².  Physical Exam  Vitals reviewed.   Constitutional:       Appearance: He is well-developed.   HENT:      Head: Normocephalic and atraumatic.   Cardiovascular:      Rate and Rhythm: Normal rate and regular rhythm.      Heart sounds: Normal heart sounds, S1 normal and S2 normal.   Pulmonary:      Effort: Pulmonary effort is normal.      Breath sounds: Normal breath sounds.   Skin:     General: Skin is warm.   Neurological:      Mental Status: He is alert.   Psychiatric:         Behavior: Behavior normal.           Assessment & Plan   Diagnoses and all orders for this visit:    1. Primary parkinsonism (Primary)  -     Cancel: Ambulatory Referral to Physical Therapy for Evaluation & Treatment  -     Ambulatory Referral to Physical Therapy for Evaluation & Treatment    2. Lumbar foraminal stenosis  -     Ambulatory Referral to Physical Therapy for Evaluation & Treatment    Other orders  -     rOPINIRole (REQUIP) 0.25 MG tablet; Take 1 tablet by mouth 3 (Three) Times a Day.  Dispense: 90 tablet; Refill: 0    TSH is therapeutic. Will have him start PT again. Will try Ropinirole in low dose and up titrate over the next few weeks to see if subdues his tremors at all.  "     Return in about 1 month (around 7/4/2024).

## 2024-08-02 RX ORDER — ROPINIROLE 0.25 MG/1
0.25 TABLET, FILM COATED ORAL 3 TIMES DAILY
Qty: 270 TABLET | Refills: 0 | Status: SHIPPED | OUTPATIENT
Start: 2024-08-02

## 2024-08-12 ENCOUNTER — OFFICE VISIT (OUTPATIENT)
Dept: INTERNAL MEDICINE | Facility: CLINIC | Age: 84
End: 2024-08-12
Payer: MEDICARE

## 2024-08-12 ENCOUNTER — TELEPHONE (OUTPATIENT)
Dept: INTERNAL MEDICINE | Facility: CLINIC | Age: 84
End: 2024-08-12

## 2024-08-12 ENCOUNTER — HOSPITAL ENCOUNTER (OUTPATIENT)
Facility: HOSPITAL | Age: 84
Discharge: HOME | End: 2024-08-12
Payer: MEDICARE

## 2024-08-12 VITALS
SYSTOLIC BLOOD PRESSURE: 116 MMHG | DIASTOLIC BLOOD PRESSURE: 72 MMHG | TEMPERATURE: 97.7 F | BODY MASS INDEX: 25.33 KG/M2 | HEIGHT: 72 IN | WEIGHT: 187 LBS

## 2024-08-12 DIAGNOSIS — R07.81 RIB PAIN ON RIGHT SIDE: ICD-10-CM

## 2024-08-12 DIAGNOSIS — M25.511 ACUTE PAIN OF RIGHT SHOULDER: Primary | ICD-10-CM

## 2024-08-12 PROCEDURE — 3074F SYST BP LT 130 MM HG: CPT | Performed by: PHYSICIAN ASSISTANT

## 2024-08-12 PROCEDURE — 1160F RVW MEDS BY RX/DR IN RCRD: CPT | Performed by: PHYSICIAN ASSISTANT

## 2024-08-12 PROCEDURE — 73030 X-RAY EXAM OF SHOULDER: CPT

## 2024-08-12 PROCEDURE — 1125F AMNT PAIN NOTED PAIN PRSNT: CPT | Performed by: PHYSICIAN ASSISTANT

## 2024-08-12 PROCEDURE — 3078F DIAST BP <80 MM HG: CPT | Performed by: PHYSICIAN ASSISTANT

## 2024-08-12 PROCEDURE — 99213 OFFICE O/P EST LOW 20 MIN: CPT | Performed by: PHYSICIAN ASSISTANT

## 2024-08-12 PROCEDURE — 1159F MED LIST DOCD IN RCRD: CPT | Performed by: PHYSICIAN ASSISTANT

## 2024-08-12 PROCEDURE — 71101 X-RAY EXAM UNILAT RIBS/CHEST: CPT

## 2024-08-12 NOTE — PROGRESS NOTES
Subjective   Chief Complaint   Patient presents with    Rib Pain     Right side, fell saturday       History of Present Illness     Pt fell 3 days ago and landed on his right sided of his chest and right shoulder. He has pain over his right lateral ribs and also over the right shoulder. Has decreased ROM of his right shoulder. No bruising anywhere.      Patient Active Problem List   Diagnosis    Hyperlipidemia    Coronary artery disease involving native coronary artery of native heart without angina pectoris    MCI (mild cognitive impairment)    Mood disorder    Closed wedge compression fracture of third thoracic vertebra with routine healing    GERD (gastroesophageal reflux disease)    S/P drug eluting coronary stent placement    Chest pain    Diastolic dysfunction    Mixed action and resting tremor    Bladder cancer    BPH (benign prostatic hyperplasia)    Atrial fibrillation    Essential hypertension    Dizziness    Beta-blocker intolerance    Orthostatic hypotension    Parkinsonism    Lumbar facet arthropathy    Spondylosis of lumbar region without myelopathy or radiculopathy    Lumbar foraminal stenosis    Chronic bilateral low back pain without sciatica    Hypothyroidism    DDD (degenerative disc disease), lumbar    Lumbar radiculopathy       Allergies   Allergen Reactions    Bupropion Dizziness, Delirium and Other (See Comments)       Current Outpatient Medications on File Prior to Visit   Medication Sig Dispense Refill    acetaminophen-codeine (TYLENOL #3) 300-30 MG per tablet Take 1 tablet by mouth Every 6 (Six) Hours As Needed for Moderate Pain. 60 tablet 1    aspirin 81 MG chewable tablet Chew 1 tablet Daily. 30 tablet 0    Blood Pressure Monitoring (Comfort Touch BP Cuff/Large) misc Use 1 each Daily. 1 each 0    clopidogrel (PLAVIX) 75 MG tablet Take 1 tablet by mouth Daily. Start on Monday      FLUoxetine (PROzac) 20 MG capsule Take 1 capsule by mouth Daily.      levothyroxine (SYNTHROID, LEVOTHROID) 50  MCG tablet TAKE 1 TABLET BY MOUTH DAILY 90 tablet 0    midodrine (PROAMATINE) 5 MG tablet Take 0.5 tablets by mouth Daily As Needed (hypotension). 30 tablet 1    omeprazole (priLOSEC) 40 MG capsule Take 1 capsule by mouth Daily. 90 capsule 4    VITAMIN D PO Take  by mouth. 400 mg daily      [DISCONTINUED] rOPINIRole (REQUIP) 0.25 MG tablet TAKE 1 TABLET BY MOUTH THREE TIMES DAILY (Patient not taking: Reported on 8/12/2024) 270 tablet 0     No current facility-administered medications on file prior to visit.       Past Medical History:   Diagnosis Date    Anxiety     Back pain     Bladder cancer 2018    bladder cancer has been removed.    Depression     Dizziness     GERD (gastroesophageal reflux disease)     History of fractured rib     RIGHT SIDE    Hyperlipidemia     Multiple falls     Tremors of nervous system     Urinary incontinence     Vertigo        Family History   Problem Relation Age of Onset    Arthritis Mother     Glaucoma Mother     Heart disease Father     Emphysema Father     Tremor Father     Malig Hyperthermia Neg Hx        Social History     Socioeconomic History    Marital status:     Number of children: 4    Years of education: 5 college degrees   Tobacco Use    Smoking status: Never    Smokeless tobacco: Never   Vaping Use    Vaping status: Never Used   Substance and Sexual Activity    Alcohol use: No     Comment: last drink about 1 year ago     Drug use: No    Sexual activity: Defer       Past Surgical History:   Procedure Laterality Date    CARDIAC CATHETERIZATION      7x, 5 stents    CATARACT EXTRACTION, BILATERAL      CHOLECYSTECTOMY N/A 9/2/2019    Procedure: CHOLECYSTECTOMY LAPAROSCOPIC WITH INTRAOPERATIVE CHOLANGIOGRAM;  Surgeon: Bg Rodriguez Jr., MD;  [facility];  Service: General    COLONOSCOPY      CORONARY ANGIOPLASTY WITH STENT PLACEMENT      X5     CYSTOSCOPY BLADDER BIOPSY N/A 1/8/2019    Procedure: CYSTOSCOPY BLADDER BIOPSY;  Surgeon: Wang Soares  MD Chaparro;  [facility];  Service: Urology    CYSTOSCOPY BLADDER BIOPSY N/A 6/13/2019    Procedure: CYSTOSCOPY BLADDER BIOPSY;  Surgeon: Wang Soares Jr., MD;  Location:  [facility];  Service: Urology    CYSTOSCOPY TRANSURETHRAL RESECTION OF PROSTATE N/A 7/11/2019    Procedure: CYSTOSCOPY TRANSURETHRAL RESECTION OF PROSTATE;  Surgeon: Wang Soares Jr., MD;  Location:  [facility];  Service: Urology    CYSTOSCOPY URETEROSCOPY LASER LITHOTRIPSY N/A 6/13/2019    Procedure: CYSTO LITHOPAXY;  Surgeon: Wang Soares Jr., MD;  [facility];  Service: Urology    ERCP N/A 9/3/2019    Procedure: ENDOSCOPIC RETROGRADE CHOLANGIOPANCREATOGRAPHY with sphincterotomy and balloon sweep;  Surgeon: Ashok Amaya MD;  Location: [facility] ENDOSCOPY;  Service: Gastroenterology    EYE SURGERY      Lens implants    LUMBAR DISCECTOMY FUSION INSTRUMENTATION      LUMBAR EPIDURAL INJECTION N/A 5/4/2022    Procedure: lumbar epidural steroid injection;  Surgeon: Charlene Manzo MD;  Location: [facility];  Service: Pain Management;  Laterality: N/A;    MEDIAL BRANCH BLOCK Bilateral 12/29/2021    Procedure: LUMBAR MEDIAL BRANCH BLOCK Bilateral L3-S1 x2 (2 weeks apart);  Surgeon: Charlene Manzo MD;  Location: [facility];  Service: Pain Management;  Laterality: Bilateral;    MEDIAL BRANCH BLOCK Bilateral 1/12/2022    Procedure: LUMBAR MEDIAL BRANCH BLOCK Bilateral L3-S1 x2 (2 weeks apart);  Surgeon: Charlene Manzo MD;  Location: [facility];  Service: Pain Management;  Laterality: Bilateral;    RADIOFREQUENCY ABLATION Bilateral 2/7/2022    Procedure: RADIOFREQUENCY ABLATION LUMBAR bilateral L3-S1;  Surgeon: Charlene Manzo MD;  Location: [facility];  Service: Pain Management;  Laterality: Bilateral;    SKIN CANCER EXCISION Left     chest wall    SKIN CANCER EXCISION  01/21/2021    facial    TRANSURETHRAL RESECTION OF BLADDER TUMOR N/A 11/29/2018    Procedure: TUR BLADDER TUMOR   "LARGE;  Surgeon: Wang Soares Jr., MD;  Location: [facility];  Service: Urology         The following portions of the patient's history were reviewed and updated as appropriate: problem list, allergies, current medications, past medical history, past family history, past social history, and past surgical history.    ROS    See HPI    Immunization History   Administered Date(s) Administered    COVID-19 (PFIZER) BIVALENT 12+YRS 10/27/2022    COVID-19 (PFIZER) Purple Cap Monovalent 01/26/2021, 02/20/2021, 08/23/2021    Fluad Quad 65+ 11/09/2020    Fluzone High-Dose 65+YRS 10/12/2016    Fluzone High-Dose 65+yrs 10/20/2021, 12/04/2023    Td, Not Adsorbed 03/25/2017    flucelvax quad pfs =>4 YRS 11/30/2018       Objective   Vitals:    08/12/24 1507   BP: 116/72   Temp: 97.7 °F (36.5 °C)   Weight: 84.8 kg (187 lb)   Height: 183 cm (72.05\")     Body mass index is 25.33 kg/m².  Physical Exam  Vitals reviewed.   Constitutional:       Appearance: Normal appearance.   HENT:      Head: Normocephalic and atraumatic.   Cardiovascular:      Rate and Rhythm: Normal rate and regular rhythm.      Heart sounds: Normal heart sounds.   Pulmonary:      Effort: Pulmonary effort is normal.      Breath sounds: Normal breath sounds.   Chest:      Chest wall: Tenderness (over right lateral ribs, no visible bruising) present.   Musculoskeletal:      Comments: Decreased forward flexion of right shoulder. Has some ttp over AC joint   Neurological:      Mental Status: He is alert.           Assessment & Plan   Diagnoses and all orders for this visit:    1. Acute pain of right shoulder (Primary)  -     XR Shoulder 2+ View Right    2. Rib pain on right side  -     XR Ribs Right With PA Chest     Will get xrays today. If not fx of shoulder will have him start PT with his limited ROM.              "

## 2024-08-13 ENCOUNTER — TELEPHONE (OUTPATIENT)
Dept: INTERNAL MEDICINE | Facility: CLINIC | Age: 84
End: 2024-08-13
Payer: MEDICARE

## 2024-08-13 DIAGNOSIS — M25.511 ACUTE PAIN OF RIGHT SHOULDER: Primary | ICD-10-CM

## 2024-08-13 NOTE — TELEPHONE ENCOUNTER
Caller: Taty Zayas    Relationship: Emergency Contact    Best call back number: [phone]    What is the best time to reach you: ASAP    Who are you requesting to speak with (clinical staff, provider,  specific staff member): GINI IRWIN    Do you know the name of the person who called:N/A    What was the call regarding: PATIENT'S SPOUSE IS REQUESTING AN UPDATE ON XRAYS THAT WERE DONE ON PATIENT 8/12/24. PATIENT'S SPOUSE REQUESTS CALL BACK FOR RESULTS.    Is it okay if the provider responds through [facility]: PREFERS CALL BACK

## 2024-09-09 ENCOUNTER — HOSPITAL ENCOUNTER (OUTPATIENT)
Dept: PHYSICAL THERAPY | Facility: HOSPITAL | Age: 84
Setting detail: THERAPIES SERIES
Discharge: HOME | End: 2024-09-09
Payer: MEDICARE

## 2024-09-09 DIAGNOSIS — Z74.09 IMPAIRED MOBILITY: ICD-10-CM

## 2024-09-09 DIAGNOSIS — R26.89 BALANCE DISORDER: Primary | ICD-10-CM

## 2024-09-09 DIAGNOSIS — R29.6 FREQUENT FALLS: ICD-10-CM

## 2024-09-09 DIAGNOSIS — R29.898 LEG WEAKNESS, BILATERAL: ICD-10-CM

## 2024-09-09 DIAGNOSIS — M54.50 CHRONIC RIGHT-SIDED LOW BACK PAIN WITHOUT SCIATICA: ICD-10-CM

## 2024-09-09 DIAGNOSIS — G89.29 CHRONIC RIGHT-SIDED LOW BACK PAIN WITHOUT SCIATICA: ICD-10-CM

## 2024-09-09 PROCEDURE — 97161 PT EVAL LOW COMPLEX 20 MIN: CPT

## 2024-09-09 NOTE — THERAPY EVALUATION
Outpatient Physical Therapy Ortho Initial Evaluation  Psychiatric     Patient Name: Sukhdev Zayas  : 1940  MRN: [id number]  Today's Date: 2024      Visit Date: 2024    Patient Active Problem List   Diagnosis    Hyperlipidemia    Coronary artery disease involving native coronary artery of native heart without angina pectoris    MCI (mild cognitive impairment)    Mood disorder    Closed wedge compression fracture of third thoracic vertebra with routine healing    GERD (gastroesophageal reflux disease)    S/P drug eluting coronary stent placement    Chest pain    Diastolic dysfunction    Mixed action and resting tremor    Bladder cancer    BPH (benign prostatic hyperplasia)    Atrial fibrillation    Essential hypertension    Dizziness    Beta-blocker intolerance    Orthostatic hypotension    Parkinsonism    Lumbar facet arthropathy    Spondylosis of lumbar region without myelopathy or radiculopathy    Lumbar foraminal stenosis    Chronic bilateral low back pain without sciatica    Hypothyroidism    DDD (degenerative disc disease), lumbar    Lumbar radiculopathy        Past Medical History:   Diagnosis Date    Anxiety     Back pain     Bladder cancer     bladder cancer has been removed.    Depression     Dizziness     GERD (gastroesophageal reflux disease)     History of fractured rib     RIGHT SIDE    Hyperlipidemia     Multiple falls     Tremors of nervous system     Urinary incontinence     Vertigo         Past Surgical History:   Procedure Laterality Date    CARDIAC CATHETERIZATION      7x, 5 stents    CATARACT EXTRACTION, BILATERAL      CHOLECYSTECTOMY N/A 2019    Procedure: CHOLECYSTECTOMY LAPAROSCOPIC WITH INTRAOPERATIVE CHOLANGIOGRAM;  Surgeon: Bg Rodriguez Jr., MD;  Location: [facility];  Service: General    COLONOSCOPY      CORONARY ANGIOPLASTY WITH STENT PLACEMENT      X5     CYSTOSCOPY BLADDER BIOPSY N/A 2019    Procedure: CYSTOSCOPY BLADDER BIOPSY;  Surgeon:  Wang Soares Jr., MD;  Location:  [facility];  Service: Urology    CYSTOSCOPY BLADDER BIOPSY N/A 6/13/2019    Procedure: CYSTOSCOPY BLADDER BIOPSY;  Surgeon: Wang Soares Jr., MD;  Location:  [facility];  Service: Urology    CYSTOSCOPY TRANSURETHRAL RESECTION OF PROSTATE N/A 7/11/2019    Procedure: CYSTOSCOPY TRANSURETHRAL RESECTION OF PROSTATE;  Surgeon: Wang Soares Jr., MD;  Location:  [facility];  Service: Urology    CYSTOSCOPY URETEROSCOPY LASER LITHOTRIPSY N/A 6/13/2019    Procedure: CYSTO LITHOPAXY;  Surgeon: Wang Soares Jr., MD;  Location:  [facility];  Service: Urology    ERCP N/A 9/3/2019    Procedure: ENDOSCOPIC RETROGRADE CHOLANGIOPANCREATOGRAPHY with sphincterotomy and balloon sweep;  Surgeon: Ashok Amaya MD;  Location: [facility] ENDOSCOPY;  Service: Gastroenterology    EYE SURGERY      Lens implants    LUMBAR DISCECTOMY FUSION INSTRUMENTATION      LUMBAR EPIDURAL INJECTION N/A 5/4/2022    Procedure: lumbar epidural steroid injection;  Surgeon: Charlene Manzo MD;  Location: [facility];  Service: Pain Management;  Laterality: N/A;    MEDIAL BRANCH BLOCK Bilateral 12/29/2021    Procedure: LUMBAR MEDIAL BRANCH BLOCK Bilateral L3-S1 x2 (2 weeks apart);  Surgeon: Charlene Manzo MD;  Location: [facility];  Service: Pain Management;  Laterality: Bilateral;    MEDIAL BRANCH BLOCK Bilateral 1/12/2022    Procedure: LUMBAR MEDIAL BRANCH BLOCK Bilateral L3-S1 x2 (2 weeks apart);  Surgeon: Charlene Manzo MD;  Location: [facility];  Service: Pain Management;  Laterality: Bilateral;    RADIOFREQUENCY ABLATION Bilateral 2/7/2022    Procedure: RADIOFREQUENCY ABLATION LUMBAR bilateral L3-S1;  Surgeon: Charlene Manzo MD;  Location: [facility];  Service: Pain Management;  Laterality: Bilateral;    SKIN CANCER EXCISION Left     chest wall    SKIN CANCER EXCISION  01/21/2021    facial    TRANSURETHRAL RESECTION OF BLADDER TUMOR N/A 11/29/2018     Procedure: TUR BLADDER TUMOR  LARGE;  Surgeon: Wang Soares Jr., MD;  Location: [facility];  Service: Urology       Visit Dx:     ICD-10-CM ICD-9-CM   1. Balance disorder  R26.89 781.99   2. Frequent falls  R29.6 V15.88   3. Impaired mobility  Z74.09 799.89   4. Leg weakness, bilateral  R29.898 729.89   5. Chronic right-sided low back pain without sciatica  M54.50 724.2    G89.29 338.29          Patient History       Row Name 09/09/24 1400             History    Chief Complaint Pain;Balance Problems;Difficulty with daily activities;Falls/history of falls  -MO      Type of Pain Back pain  -MO      Date Current Problem(s) Began --  chronic however exacerbated from fall 1 month ago  -MO      Brief Description of Current Complaint 82 y/o male pt reports to PT w/ c/o ongoing R shoulder and R rib pain following  a fall on 8/9, a about 1 month ago as well as overall worsening balance impairment. All imagining negative for acute fractures. At baseline, pt has been dealing with back pain for several years, has chronic midback pain. Has had several epidurals, x3 ablations with most recent one pt reports being 6 weeks ago however has had little relief. He fell at home 1 month ago and has had worsening R side pain following that after landing on his right side. He is limited to standing for only several minutes before back pain is too bad or until legs feel like they will give out. Currently he has increased R shoulder/ side pain when pushing through his arms to standing up. In regards to his balance, he reports ~4-6 falls in the last 6 months with several before that. He has a rollator but does not use it. He reports his legs have felt like they are getting progressively weaker, buckling on him often to where he has to quickly look for a seat to sit in or he feels he will go to the ground. Biggest issue with door thresholds and turns. Per wife present, they have ruled out PD and have classified this as  parkinsonism. He has tried PD medications however had severe cog side effects so he just takes them as needed when tremors are so bad. Current activity is very limited, per pt and wife he is sedentary most of the day. Biggest goals for therapy would be to regain strength in his legs and increase his activity.  -MO         Pain     Pain Location Back  -MO      Pain at Present 4  -MO      Pain at Best 3  -MO      Pain at Worst 7  -MO      Tolerance Time- Standing 5-10 minutes  -MO         Fall Risk Assessment    Any falls in the past year: No  -MO      Number of falls reported in the last 12 months 8-10  -MO         Services    Prior Rehab/Home Health Experiences No  -MO         Daily Activities    Primary Language English  -MO      Are you able to read Yes  -MO      Are you able to write Yes  -MO      How does patient learn best? Listening;Reading;Demonstration  -MO      Does patient have problems with the following? None  -MO      Barriers to learning None  -MO      Pt Participated in POC and Goals Yes  -MO         Safety    Are you being hurt, hit, or frightened by anyone at home or in your life? No  -MO      Are you being neglected by a caregiver No  -MO      Have you had any of the following issues with N/A  -MO                User Key  (r) = Recorded By, (t) = Taken By, (c) = Cosigned By      Initials Name Provider Type    Jennifer Cartagena, PT Physical Therapist                     PT Ortho       Row Name 09/09/24 1400       Quarter Clearing    Quarter Clearing Lower Quarter Clearing  -MO       Myotomal Screen- Lower Quarter Clearing    Hip flexion (L2) Bilateral:;4- (Good -)  -MO    Knee extension (L3) Bilateral:;4- (Good -)  -MO    Ankle DF (L4) Bilateral:;4 (Good)  -MO    Ankle PF (S1) Bilateral:;4- (Good -)  -MO       Lumbar ROM Screen- Lower Quarter Clearing    Lumbar Flexion Impaired  To distal thigh  -MO    Lumbar Extension Impaired  50% w/ pain  -MO    Lumbar Lateral Flexion Impaired  pain BL, to midthigh   -MO    Lumbar Rotation Impaired  75% impaired BL, with pain  -MO       Special Tests/Palpation    Special Tests/Palpation Lumbar/SI  -MO       Lumbosacral Accessory Motions    Lumbosacral Accessory Motions Tested? Yes  -MO       Lumbosacral Palpation    Lumbosacral Palpation? Yes  -MO    Quadratus Lumborum Bilateral:;Tender;Guarded/taut  -MO    Erector Spinae (Paraspinals) Bilateral:;Tender;Guarded/taut;Elicits spasm  pain more severe to touch on R  -MO       MMT (Manual Muscle Testing)    Rt Lower Ext Rt Hip ABduction;Rt Hip Extension  -MO    Lt Lower Ext Lt Hip ABduction;Lt Hip Extension  -MO       MMT Right Lower Ext    Rt Hip Extension MMT, Gross Movement (4-/5) good minus  -MO    Rt Hip ABduction MMT, Gross Movement (4-/5) good minus  -MO       MMT Left Lower Ext    Lt Hip Extension MMT, Gross Movement (4-/5) good minus  -MO    Lt Hip ABduction MMT, Gross Movement (4-/5) good minus  -MO       Flexibility    Flexibility Tested? Lower Extremity  -MO       Lower Extremity Flexibility    Hamstrings Bilateral:;Severely limited  -MO    Hip Flexors Bilateral:;Moderately limited  -MO    Hip External Rotators Bilateral:;Moderately limited  -MO    Hip Internal Rotators Bilateral:;Moderately limited  -MO       Gait/Stairs (Locomotion)    Comment, (Gait/Stairs) pt amb with reduced stride length BL, has intermittent small shuffled steps. No instance of freezing or catching. Does amb with more forward flexed posture, hugs the wall on turns, reduced BL arm swing  -MO              User Key  (r) = Recorded By, (t) = Taken By, (c) = Cosigned By      Initials Name Provider Type    Jennifer Cartagena, PT Physical Therapist                                       PT OP Goals       Row Name 09/09/24 1500          PT Short Term Goals    STG Date to Achieve 10/09/24  -MO     STG 1 Patient will be independent with education for symptom management and initial HEP to decrease LBP pain.  -MO     STG 1 Progress New  -MO     STG 2 Pt will  report >/=25% improvement in R side back pain for overall improved QOL.  -MO     STG 2 Progress New  -MO     STG 3 Pt performs 30 seconds sit to stand having complete at least 2 more repetitions that was performed upon initial evaluation for progression of ease with functional transfers, LE strength, and safety in the home.  -MO     STG 3 Progress New  -MO        Long Term Goals    LTG Date to Achieve 11/08/24  -MO     LTG 1 Patient will be independent with education for symptom management and advanced HEP to decrease LBP pain.  -MO     LTG 1 Progress New  -MO     LTG 2 Pt will be independent with advance HEP to improve strength and static/dynamic balance.  -MO     LTG 2 Progress New  -MO     LTG 3 Patient will improve standing tolerance from </10 min to >25 min with LBP </= 3/10 to improve participation in community activities.  -MO     LTG 3 Progress New  -MO     LTG 4 Pt will demo improved gross BL LE strength to >/= 4/5 for improved functional strength.  -MO     LTG 4 Progress New  -MO     LTG 5 Pt will improve FGA score by 7 in order to demo a reduced risk of falls.  -MO     LTG 5 Progress New  -MO     LTG 6 Pt will complete 10x STS in 30sec for improved LE functional strength.  -MO     LTG 6 Progress New  -MO        Time Calculation    PT Goal Re-Cert Due Date 12/08/24  -MO               User Key  (r) = Recorded By, (t) = Taken By, (c) = Cosigned By      Initials Name Provider Type    Jennifer Cartagena, PT Physical Therapist                     PT Assessment/Plan       Row Name 09/09/24 1500          PT Assessment    Functional Limitations Impaired gait;Limitation in home management;Limitations in community activities;Limitations in functional capacity and performance;Performance in leisure activities;Performance in self-care ADL;Performance in sport activities  -MO     Impairments Balance;Pain;Gait;Muscle strength;Range of motion;Poor body mechanics;Joint mobility;Coordination  -MO     Assessment Comments  Sukhdev Zayas is a 83 y.o. male referred to physical therapy for R sided shoulder and LBP following a fall 1 month ago, as well as for overall worsening balance deficits. He presents with an evolving clinical presentation, along with  comorbidities of BL UE resting tremor, benign tremulous parkinsonian disorder without medication, hx of orthostatic hypotension, lumbar foraminal stenosis with several nerve ablation trials and personal factors of overall baseline chronicity of low back pain symptoms and general immobility that may impact his progress in the plan of care. Pt presents today with noted gait impairments, BL LE weakness and impaired flexibility, limited trunk ROM in all directions with onset of pain with TTP along BL paraspinals, however R >L, and balance deficits. He completes 5 STS in 30s, well below the 12 STS cutoff for falls risk, as well as scores 12/30 on DGI, with </=22 indicating higher risk for falls. His signs and symptoms are consistent with referring diagnosis. The previous impairments limit his ability to safely complete ADLs as well as without pain and participate in any recreational activities.  Pt will benefit from skilled PT to address the previous impairments and return to PLOF.  -MO     Please refer to paper survey for additional self-reported information No  -MO     Rehab Potential Good  -MO     Patient/caregiver participated in establishment of treatment plan and goals Yes  -MO     Patient would benefit from skilled therapy intervention Yes  -MO        PT Plan    PT Frequency 2x/week;1x/week  -MO     Predicted Duration of Therapy Intervention (PT) 12-15 visits  -MO     Planned CPT's? PT EVAL LOW COMPLEXITY: 83671;PT RE-EVAL: 19525;PT THER PROC EA 15 MIN: 25804;PT THER ACT EA 15 MIN: 65657;PT MANUAL THERAPY EA 15 MIN: 39511;PT NEUROMUSC RE-EDUCATION EA 15 MIN: 69609;PT GAIT TRAINING EA 15 MIN: 54818;PT EVAL AQUA: 41905;PT AQUATIC THERAPY EA 15 MIN: 45608;PT SELF CARE/HOME  MGMT/TRAIN  15: 61080  -MO     PT Plan Comments complete 2 min walk test and TUG, document in outcome measures. Warmup on nustep: initially begin to work on low back pain. Seated swiss ball roll out (fwd and laterally to work on opening trunk), LTR, attempt supine piriformis stretch, s/l open book, beginner bridge, supine march, s/l vs HL resisted clamshells, STS with education on appropriate set up and sequencing. Initiate HEP and provide handout as they may have gap in therapy sessions. Print schedule  -MO               User Key  (r) = Recorded By, (t) = Taken By, (c) = Cosigned By      Initials Name Provider Type    Jennifer Cartagena, PT Physical Therapist                                        Outcome Measure Options: 30 Second Chair Stand Test, Dynamic Gait Index  30 Second Chair Stand Test  30 Second Chair Stand Test: 5 (2 with hands on knees, 3 with BUE on clinic chair arm)  Dynamic Gait Index (DGI)  Gait Level Surface: Mild impairment: walks 20’, uses assistive devices, slower speed, mild gait deviations  Change in Gait Speed: Moderate impairment: Makes only minor adjustments to walking speed, or accomplishes a change in speed with significant gait deviations, or changes speed but loses balance but is able to recover and continue walking. (very minor adjustments made in gait speeds)  Gait with Horizontal Head Turns: Moderate impairment: Performs head turns with moderate change in gait velocity, slows down, staggers, but recovers, can continue to walk.  Gait with Vertical Head Turns: Moderate impairment: Performs head turns with moderate change in gait velocity, slows down,staggers, but recovers, can continue to walk.  Gait and Pivot Turn: Moderate impairment: Turns slowly, requires verbal cueing, requires several small steps to catch balance following turn and stop.  Step Over Obstacle: Mild impairment: Is able to step over shoe box, but must slow down and adjust steps to clear box safely.  Step Around  Obstacles: Mild impairment: Is able to step around both cones, but must slow down and adjust steps to clear cones.  Steps: Mild impairment: Alternating feet, must use rail.  Dynamic Gait Index Score: 12      Time Calculation:     Start Time: 1400  Stop Time: 1450  Time Calculation (min): 50 min  Untimed Charges  PT Eval/Re-eval Minutes: 50  Total Minutes  Untimed Charges Total Minutes: 50   Total Minutes: 50     Therapy Charges for Today       Code Description Service Date Service Provider Modifiers Qty    [id number] HC PT EVAL LOW COMPLEXITY 3 9/9/2024 Jennifer Sanchez, PT GP 1            PT G-Codes  Outcome Measure Options: 30 Second Chair Stand Test, Dynamic Gait Index         Jennifer Sanchez, MATT  9/9/2024

## 2024-09-11 ENCOUNTER — HOSPITAL ENCOUNTER (OUTPATIENT)
Dept: PHYSICAL THERAPY | Facility: HOSPITAL | Age: 84
Setting detail: THERAPIES SERIES
Discharge: HOME | End: 2024-09-11
Payer: MEDICARE

## 2024-09-11 DIAGNOSIS — Z74.09 IMPAIRED MOBILITY: ICD-10-CM

## 2024-09-11 DIAGNOSIS — M54.50 CHRONIC RIGHT-SIDED LOW BACK PAIN WITHOUT SCIATICA: ICD-10-CM

## 2024-09-11 DIAGNOSIS — R26.89 BALANCE DISORDER: Primary | ICD-10-CM

## 2024-09-11 DIAGNOSIS — R29.6 FREQUENT FALLS: ICD-10-CM

## 2024-09-11 DIAGNOSIS — R29.898 LEG WEAKNESS, BILATERAL: ICD-10-CM

## 2024-09-11 DIAGNOSIS — G89.29 CHRONIC RIGHT-SIDED LOW BACK PAIN WITHOUT SCIATICA: ICD-10-CM

## 2024-09-11 PROCEDURE — 97530 THERAPEUTIC ACTIVITIES: CPT

## 2024-09-11 PROCEDURE — 97110 THERAPEUTIC EXERCISES: CPT

## 2024-09-11 NOTE — THERAPY TREATMENT NOTE
Outpatient Physical Therapy Ortho Treatment Note  [facility]     Patient Name: Sukhdev Zayas  : 1940  MRN: [id number]  Today's Date: 2024      Visit Date: 2024    Visit Dx:    ICD-10-CM ICD-9-CM   1. Balance disorder  R26.89 781.99   2. Frequent falls  R29.6 V15.88   3. Impaired mobility  Z74.09 799.89   4. Leg weakness, bilateral  R29.898 729.89   5. Chronic right-sided low back pain without sciatica  M54.50 724.2    G89.29 338.29       Patient Active Problem List   Diagnosis    Hyperlipidemia    Coronary artery disease involving native coronary artery of native heart without angina pectoris    MCI (mild cognitive impairment)    Mood disorder    Closed wedge compression fracture of third thoracic vertebra with routine healing    GERD (gastroesophageal reflux disease)    S/P drug eluting coronary stent placement    Chest pain    Diastolic dysfunction    Mixed action and resting tremor    Bladder cancer    BPH (benign prostatic hyperplasia)    Atrial fibrillation    Essential hypertension    Dizziness    Beta-blocker intolerance    Orthostatic hypotension    Parkinsonism    Lumbar facet arthropathy    Spondylosis of 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    Depression     Dizziness     GERD (gastroesophageal reflux disease)     History of fractured rib     RIGHT SIDE    Hyperlipidemia     Multiple falls     Tremors of nervous system     Urinary incontinence     Vertigo         Past Surgical History:   Procedure Laterality Date    CARDIAC CATHETERIZATION      7x, 5 stents    CATARACT EXTRACTION, BILATERAL      CHOLECYSTECTOMY N/A 2019    Procedure: CHOLECYSTECTOMY LAPAROSCOPIC WITH  INTRAOPERATIVE CHOLANGIOGRAM;  Surgeon: Bg Rodriguez Jr., MD;  Location:  [facility];  Service: General    COLONOSCOPY      CORONARY ANGIOPLASTY WITH STENT PLACEMENT      X5     CYSTOSCOPY BLADDER BIOPSY N/A 1/8/2019    Procedure: CYSTOSCOPY BLADDER BIOPSY;  Surgeon: Wang Soares Jr., MD;  Location:  [facility];  Service: Urology    CYSTOSCOPY BLADDER BIOPSY N/A 6/13/2019    Procedure: CYSTOSCOPY BLADDER BIOPSY;  Surgeon: Wang Soares Jr., MD;  Location:  [facility];  Service: Urology    CYSTOSCOPY TRANSURETHRAL RESECTION OF PROSTATE N/A 7/11/2019    Procedure: CYSTOSCOPY TRANSURETHRAL RESECTION OF PROSTATE;  Surgeon: Wang Soares Jr., MD;  [facility];  Service: Urology    CYSTOSCOPY URETEROSCOPY LASER LITHOTRIPSY N/A 6/13/2019    Procedure: CYSTO LITHOPAXY;  Surgeon: Wang Soares Jr., MD;  [facility];  Service: Urology    ERCP N/A 9/3/2019    Procedure: ENDOSCOPIC RETROGRADE CHOLANGIOPANCREATOGRAPHY with sphincterotomy and balloon sweep;  Surgeon: Ashok Amaya MD;  Location: [facility] ENDOSCOPY;  Service: Gastroenterology    EYE SURGERY      Lens implants    LUMBAR DISCECTOMY FUSION INSTRUMENTATION      LUMBAR EPIDURAL INJECTION N/A 5/4/2022    Procedure: lumbar epidural steroid injection;  Surgeon: Charlene Manzo MD;  [facility]: [facility];  Service: Pain Management;  Laterality: N/A;    MEDIAL BRANCH BLOCK Bilateral 12/29/2021    Procedure: LUMBAR MEDIAL BRANCH BLOCK Bilateral L3-S1 x2 (2 weeks apart);  Surgeon: Charlene Manzo MD;  [facility]: [facility];  Service: Pain Management;  Laterality: Bilateral;    MEDIAL BRANCH BLOCK Bilateral 1/12/2022    Procedure: LUMBAR MEDIAL BRANCH BLOCK Bilateral L3-S1 x2 (2 weeks apart);  Surgeon: Charlene Manzo MD;  [facility]: [facility];  Service: Pain Management;  Laterality: Bilateral;    RADIOFREQUENCY ABLATION Bilateral 2/7/2022    Procedure: RADIOFREQUENCY ABLATION LUMBAR bilateral  L3-S1;  Surgeon: Charlene Manzo MD;  Location: [facility] MAIN OR;  Service: Pain Management;  Laterality: Bilateral;    SKIN CANCER EXCISION Left     chest wall    SKIN CANCER EXCISION  01/21/2021    facial    TRANSURETHRAL RESECTION OF BLADDER TUMOR N/A 11/29/2018    Procedure: TUR BLADDER TUMOR  LARGE;  Surgeon: Wang Soares Jr., MD;  Location: [facility] MAIN OR;  Service: Urology                        PT Assessment/Plan       Row Name 09/11/24 0993          PT Assessment    Assessment Comments Mr. Zayas returns for his first follow up visit since evaluation.  He reports no new concerns or complaints.  He rates pain around 2-3, and then 3 after session.  Most pain is around ribs and back possibly still from his fall last week. It was most  noted with STS from mat table.  Pt was able to perform a good quad contraction with normal patellar motion and only mild discomfort of knee.  No LOB noted today with gait, and he used fair step length and is not shuffling feet.  -LP     Please refer to paper survey for additional self-reported information No  -LP     Rehab Potential Good  -LP     Patient/caregiver participated in establishment of treatment plan and goals Yes  -LP     Patient would benefit from skilled therapy intervention Yes  -LP        PT Plan    PT Frequency 2x/week  -LP     PT Plan Comments Continue to work toward therapy goals; Quad/hip strengthening, gait and balance activities.  -LP               User Key  (r) = Recorded By, (t) = Taken By, (c) = Cosigned By      Initials Name Provider Type    LP Rachle Alvarez, PT Physical Therapist                       OP Exercises       Row Name 09/11/24 1300             Subjective Pain    Able to rate subjective pain? yes  -LP      Pre-Treatment Pain Level 2  -LP      Subjective Pain Comment Off and on pain at ribs, and R knee, and lower back  -LP         Total Minutes    86640 - PT Therapeutic Exercise Minutes 30  -LP      74460 - PT Therapeutic Activity  Minutes 15  -LP         Exercise 1    Exercise Name 1 Nustep  -LP      Cueing 1 Verbal;Tactile;Demo  -LP      Time 1 5min  -LP      Additional Comments L5 seat @ 10  -LP         Exercise 2    Exercise Name 2 Did 2 min test  -LP      Additional Comments 235.5 ft  -LP         Exercise 3    Exercise Name 3 QS-supine  -LP      Cueing 3 Verbal;Tactile;Demo  -LP      Sets 3 1  -LP      Reps 3 10  -LP      Time 3 3s  -LP      Additional Comments B  -LP         Exercise 4    Exercise Name 4 HL hip add  -LP      Cueing 4 Verbal;Tactile;Demo  -LP      Sets 4 1  -LP      Reps 4 10  -LP      Time 4 3s  -LP      Additional Comments with  ball  -LP         Exercise 5    Exercise Name 5 HL hip abd with RTB  -LP      Cueing 5 Verbal;Tactile;Demo  -LP      Sets 5 1  -LP      Reps 5 10  -LP         Exercise 6    Exercise Name 6 seated LAQ  -LP      Cueing 6 Verbal;Tactile;Demo  -LP      Sets 6 2  -LP      Reps 6 10  -LP         Exercise 7    Exercise Name 7 Seated marches  -LP      Cueing 7 Verbal;Tactile;Demo  -LP      Sets 7 1  -LP      Reps 7 10  -LP         Exercise 8    Exercise Name 8 STS from mat table  -LP      Cueing 8 Verbal;Tactile;Demo  -LP      Reps 8 5  -LP      Additional Comments pt c/o pain at Post R rib/back  -LP         Exercise 9    Exercise Name 9 attempted SLR  -LP      Cueing 9 Verbal;Tactile;Demo  -LP      Additional Comments increased pain at lower back, so we stopped after 2  -LP                User Key  (r) = Recorded By, (t) = Taken By, (c) = Cosigned By      Initials Name Provider Type    LP Rachel Alvarez, PT Physical Therapist                                     Therapy Education  Education Details: [employer] [id number]  Doing exercises thru out his day;ie sitting in his chair, when he lay down for a rest.  Given: HEP  Program: New  How Provided: Verbal, Demonstration, Written  Provided to: Patient  Level of Understanding: Teach back education performed    Outcome Measure Options: 2 Minute Walk Test  2  Minute Walk Test  Gait, Assistive Device:  (no AD)  Distance Ambulated in 2 Minutes: 235.5         Time Calculation:   Start Time: 1340  Stop Time: 1420  Time Calculation (min): 40 min  Timed Charges  96211 - PT Therapeutic Exercise Minutes: 30  42267 - PT Therapeutic Activity Minutes: 15  Total Minutes  Timed Charges Total Minutes: 45   Total Minutes: 45  Therapy Charges for Today       Code Description Service Date Service Provider Modifiers Qty    22185109196 HC PT THER PROC EA 15 MIN 9/11/2024 Rachel Alvarez, PT GP 2    97529234387 HC PT THERAPEUTIC ACT EA 15 MIN 9/11/2024 Rachel Alvarez, PT GP 1            PT G-Codes  Outcome Measure Options: 2 Minute Walk Test         Rachel Alvarez PT  9/11/2024

## 2024-09-18 ENCOUNTER — HOSPITAL ENCOUNTER (OUTPATIENT)
Dept: PHYSICAL THERAPY | Facility: HOSPITAL | Age: 84
Setting detail: THERAPIES SERIES
Discharge: HOME | End: 2024-09-18
Payer: MEDICARE

## 2024-09-18 DIAGNOSIS — M54.50 CHRONIC RIGHT-SIDED LOW BACK PAIN WITHOUT SCIATICA: ICD-10-CM

## 2024-09-18 DIAGNOSIS — R26.89 BALANCE DISORDER: Primary | ICD-10-CM

## 2024-09-18 DIAGNOSIS — G89.29 CHRONIC RIGHT-SIDED LOW BACK PAIN WITHOUT SCIATICA: ICD-10-CM

## 2024-09-18 DIAGNOSIS — R29.6 FREQUENT FALLS: ICD-10-CM

## 2024-09-18 DIAGNOSIS — Z74.09 IMPAIRED MOBILITY: ICD-10-CM

## 2024-09-18 DIAGNOSIS — R29.898 LEG WEAKNESS, BILATERAL: ICD-10-CM

## 2024-09-18 PROCEDURE — 97530 THERAPEUTIC ACTIVITIES: CPT

## 2024-09-18 PROCEDURE — 97110 THERAPEUTIC EXERCISES: CPT

## 2024-09-23 ENCOUNTER — HOSPITAL ENCOUNTER (OUTPATIENT)
Dept: PHYSICAL THERAPY | Facility: HOSPITAL | Age: 84
Setting detail: THERAPIES SERIES
Discharge: HOME | End: 2024-09-23
Payer: MEDICARE

## 2024-09-23 DIAGNOSIS — R26.89 BALANCE DISORDER: Primary | ICD-10-CM

## 2024-09-23 DIAGNOSIS — Z74.09 IMPAIRED MOBILITY: ICD-10-CM

## 2024-09-23 DIAGNOSIS — R29.898 LEG WEAKNESS, BILATERAL: ICD-10-CM

## 2024-09-23 DIAGNOSIS — R29.6 FREQUENT FALLS: ICD-10-CM

## 2024-09-23 PROCEDURE — 97110 THERAPEUTIC EXERCISES: CPT

## 2024-09-23 PROCEDURE — 97530 THERAPEUTIC ACTIVITIES: CPT

## 2024-09-25 ENCOUNTER — TELEPHONE (OUTPATIENT)
Dept: INTERNAL MEDICINE | Facility: CLINIC | Age: 84
End: 2024-09-25
Payer: MEDICARE

## 2024-09-25 NOTE — TELEPHONE ENCOUNTER
Caller: Taty Zayas    Relationship: Emergency Contact    Best call back number: [phone]     What was the call regarding: PATIENTS SPOUSE CALLING WANTING TO KNOW IF HE WOULD BE ELIGIBLE FOR A HANDICAP STICKER DUE TO HAVING A HARD TIME WALKING LONG DISTANCE

## 2024-09-27 ENCOUNTER — APPOINTMENT (OUTPATIENT)
Dept: PHYSICAL THERAPY | Facility: HOSPITAL | Age: 84
End: 2024-09-27
Payer: MEDICARE

## 2024-09-27 ENCOUNTER — TELEPHONE (OUTPATIENT)
Dept: PHYSICAL THERAPY | Facility: HOSPITAL | Age: 84
End: 2024-09-27

## 2024-09-30 ENCOUNTER — HOSPITAL ENCOUNTER (OUTPATIENT)
Dept: PHYSICAL THERAPY | Facility: HOSPITAL | Age: 84
Setting detail: THERAPIES SERIES
Discharge: HOME | End: 2024-09-30
Payer: MEDICARE

## 2024-09-30 DIAGNOSIS — R26.89 BALANCE DISORDER: Primary | ICD-10-CM

## 2024-09-30 DIAGNOSIS — R29.898 LEG WEAKNESS, BILATERAL: ICD-10-CM

## 2024-09-30 DIAGNOSIS — G89.29 CHRONIC RIGHT-SIDED LOW BACK PAIN WITHOUT SCIATICA: ICD-10-CM

## 2024-09-30 DIAGNOSIS — Z74.09 IMPAIRED MOBILITY: ICD-10-CM

## 2024-09-30 DIAGNOSIS — M54.50 CHRONIC RIGHT-SIDED LOW BACK PAIN WITHOUT SCIATICA: ICD-10-CM

## 2024-09-30 DIAGNOSIS — R29.6 FREQUENT FALLS: ICD-10-CM

## 2024-09-30 PROCEDURE — 97110 THERAPEUTIC EXERCISES: CPT

## 2024-09-30 NOTE — THERAPY TREATMENT NOTE
Outpatient Physical Therapy Ortho Treatment Note  [facility]     Patient Name: Sukhdev Zayas  : 1940  MRN: [id number]  Today's Date: 2024      Visit Date: 2024    Visit Dx:    ICD-10-CM ICD-9-CM   1. Balance disorder  R26.89 781.99   2. Frequent falls  R29.6 V15.88   3. Impaired mobility  Z74.09 799.89   4. Leg weakness, bilateral  R29.898 729.89   5. Chronic right-sided low back pain without sciatica  M54.50 724.2    G89.29 338.29       Patient Active Problem List   Diagnosis    Hyperlipidemia    Coronary artery disease involving native coronary artery of native heart without angina pectoris    MCI (mild cognitive impairment)    Mood disorder    Closed wedge compression fracture of third thoracic vertebra with routine healing    GERD (gastroesophageal reflux disease)    S/P drug eluting coronary stent placement    Chest pain    Diastolic dysfunction    Mixed action and resting tremor    Bladder cancer    BPH (benign prostatic hyperplasia)    Atrial fibrillation    Essential hypertension    Dizziness    Beta-blocker intolerance    Orthostatic hypotension    Parkinsonism    Lumbar facet arthropathy    Spondylosis of 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    Depression     Dizziness     GERD (gastroesophageal reflux disease)     History of fractured rib     RIGHT SIDE    Hyperlipidemia     Multiple falls     Tremors of nervous system     Urinary incontinence     Vertigo         Past Surgical History:   Procedure Laterality Date    CARDIAC CATHETERIZATION      7x, 5 stents    CATARACT EXTRACTION, BILATERAL      CHOLECYSTECTOMY N/A 2019    Procedure: CHOLECYSTECTOMY LAPAROSCOPIC WITH  INTRAOPERATIVE CHOLANGIOGRAM;  Surgeon: Bg Rodriguez Jr., MD;  Location:  [facility];  Service: General    COLONOSCOPY      CORONARY ANGIOPLASTY WITH STENT PLACEMENT      X5     CYSTOSCOPY BLADDER BIOPSY N/A 1/8/2019    Procedure: CYSTOSCOPY BLADDER BIOPSY;  Surgeon: Wang Soares Jr., MD;  [facility]:  [facility];  Service: Urology    CYSTOSCOPY BLADDER BIOPSY N/A 6/13/2019    Procedure: CYSTOSCOPY BLADDER BIOPSY;  Surgeon: Wang Soares Jr., MD;  [facility]:  [facility];  Service: Urology    CYSTOSCOPY TRANSURETHRAL RESECTION OF PROSTATE N/A 7/11/2019    Procedure: CYSTOSCOPY TRANSURETHRAL RESECTION OF PROSTATE;  Surgeon: Wang Soares Jr., MD;  [facility];  Service: Urology    CYSTOSCOPY URETEROSCOPY LASER LITHOTRIPSY N/A 6/13/2019    Procedure: CYSTO LITHOPAXY;  Surgeon: Wang Soares Jr., MD;  [facility];  Service: Urology    ERCP N/A 9/3/2019    Procedure: ENDOSCOPIC RETROGRADE CHOLANGIOPANCREATOGRAPHY with sphincterotomy and balloon sweep;  Surgeon: Ashok Amaya MD;  Location: [facility] ENDOSCOPY;  Service: Gastroenterology    EYE SURGERY      Lens implants    LUMBAR DISCECTOMY FUSION INSTRUMENTATION      LUMBAR EPIDURAL INJECTION N/A 5/4/2022    Procedure: lumbar epidural steroid injection;  Surgeon: Charlene Manzo MD;  [facility];  Service: Pain Management;  Laterality: N/A;    MEDIAL BRANCH BLOCK Bilateral 12/29/2021    Procedure: LUMBAR MEDIAL BRANCH BLOCK Bilateral L3-S1 x2 (2 weeks apart);  Surgeon: Charlene Manzo MD;  [facility];  Service: Pain Management;  Laterality: Bilateral;    MEDIAL BRANCH BLOCK Bilateral 1/12/2022    Procedure: LUMBAR MEDIAL BRANCH BLOCK Bilateral L3-S1 x2 (2 weeks apart);  Surgeon: Charlene Manzo MD;  [facility];  Service: Pain Management;  Laterality: Bilateral;    RADIOFREQUENCY ABLATION Bilateral 2/7/2022    Procedure: RADIOFREQUENCY ABLATION LUMBAR bilateral  L3-S1;  Surgeon: Charlene Manzo MD;  Location: [facility] MAIN OR;  Service: Pain Management;  Laterality: Bilateral;    SKIN CANCER EXCISION Left     chest wall    SKIN CANCER EXCISION  01/21/2021    facial    TRANSURETHRAL RESECTION OF BLADDER TUMOR N/A 11/29/2018    Procedure: TUR BLADDER TUMOR  LARGE;  Surgeon: Wang Soares Jr., MD;  Location: [facility] MAIN OR;  Service: Urology                        PT Assessment/Plan       Row Name 09/30/24 1500          PT Assessment    Assessment Comments Mr. Zayas returns to PT this date continuing to report high pain throughout mid back and R shoulder. He did reporting having some relief with moist heat additional last time. Session modified this date secondary to time constraints, continued with lumbar heating pad as well as heat to R shoulder this date throughout supine exercises. Maintained current program as it remains challenging, he continues to have initial onset of pain with most exercises however does report following several reps that it alleviates some. Spent additional time discussing importance of HEP compliance to ensure that he is achieving some gentle movement and spinal mobility throughout the day to combat his significant tightness and prevent worsening stiffness as well as encouraged heating pad use at home, as he reports having relief during PT session. His legs are fatigued at termination, unable to complete full second rep of STS d/t R knee buckling. He continues to have progressed deconditioning, will continue to benefit from skilled PT services to address.  -MO        PT Plan    PT Plan Comments did you try heat at home? How was HEP? Discuss with wife about weighted gloves for tremor. Continue moist heat if beneficial. Small step up, lateral walking. Could potentially also resume manual HS stretch  -MO               User Key  (r) = Recorded By, (t) = Taken By, (c) = Cosigned By      Initials Name Provider Type    Jennifer Cartagena, PT Physical  Therapist                       OP Exercises       Row Name 09/30/24 1300             Subjective    Subjective Comments We thought apt was at 1:30. Had to cancel last week because of the dizziness. I just can't even stand for more than 5 minutes  -MO         Subjective Pain    Able to rate subjective pain? yes  -MO      Pre-Treatment Pain Level 8  -MO      Post-Treatment Pain Level 8  -MO         Total Minutes    48983 - PT Therapeutic Exercise Minutes 25  -MO         Exercise 2    Exercise Name 2 seated swiss ball roll out  -MO      Cueing 2 Verbal  -MO      Time 2 10  -MO         Exercise 3    Exercise Name 3 supine LTR  -MO      Cueing 3 Verbal  -MO      Sets 3 1B  -MO      Reps 3 15  -MO      Time 3 over MH  -MO         Exercise 5    Exercise Name 5 HL clamshell  -MO      Cueing 5 Verbal  -MO      Sets 5 1  -MO      Reps 5 20  -MO      Time 5 GTB  -MO      Additional Comments over MH  -MO         Exercise 7    Exercise Name 7 beginner bridge  -MO      Cueing 7 Verbal  -MO      Sets 7 2  -MO      Reps 7 10  -MO      Time 7 GTB at kneess  -MO      Additional Comments over MH  -MO         Exercise 8    Exercise Name 8 STS elevated mat table  -MO      Cueing 8 Verbal  -MO      Sets 8 2  -MO      Reps 8 10  -MO                User Key  (r) = Recorded By, (t) = Taken By, (c) = Cosigned By      Initials Name Provider Type    Jennifer Cartagena, PT Physical Therapist                                  PT OP Goals       Row Name 09/30/24 1300          PT Short Term Goals    STG Date to Achieve 10/09/24  -MO     STG 1 Patient will be independent with education for symptom management and initial HEP to decrease LBP pain.  -MO     STG 1 Progress Ongoing  -MO     STG 2 Pt will report >/=25% improvement in R side back pain for overall improved QOL.  -MO     STG 2 Progress Ongoing  -MO     STG 3 Pt performs 30 seconds sit to stand having complete at least 2 more repetitions that was performed upon initial evaluation for progression  of ease with functional transfers, LE strength, and safety in the home.  -MO     STG 3 Progress Ongoing  -MO     STG 4 Pt will improve 2 min walk test by 50' for improved LE strength, endurance, and funcitonal mobility.  -MO     STG 4 Progress Ongoing  -MO        Long Term Goals    LTG Date to Achieve 11/08/24  -MO     LTG 1 Patient will be independent with education for symptom management and advanced HEP to decrease LBP pain.  -MO     LTG 1 Progress Ongoing  -MO     LTG 2 Pt will be independent with advance HEP to improve strength and static/dynamic balance.  -MO     LTG 2 Progress Ongoing  -MO     LTG 3 Patient will improve standing tolerance from </10 min to >25 min with LBP </= 3/10 to improve participation in community activities.  -MO     LTG 3 Progress Ongoing  -MO     LTG 4 Pt will demo improved gross BL LE strength to >/= 4/5 for improved functional strength.  -MO     LTG 4 Progress Ongoing  -MO     LTG 5 Pt will improve FGA score by 7 in order to demo a reduced risk of falls.  -MO     LTG 5 Progress Ongoing  -MO     LTG 6 Pt will complete 10x STS in 30sec for improved LE functional strength.  -MO     LTG 6 Progress Ongoing  -MO     LTG 7 Pt will perform 2 min walk test ambulating over 370ft with/without assistive device with SBA/Supervision without LOB with directional changes for improvements in safety with ambulatory tasks in house or community.  -MO     LTG 7 Progress Ongoing  -MO               User Key  (r) = Recorded By, (t) = Taken By, (c) = Cosigned By      Initials Name Provider Type    Jennifer Cartagena, PT Physical Therapist                                   Time Calculation:   Start Time: 1335  Stop Time: 1400  Time Calculation (min): 25 min  Timed Charges  49179 - PT Therapeutic Exercise Minutes: 25  Total Minutes  Timed Charges Total Minutes: 25   Total Minutes: 25  Therapy Charges for Today       Code Description Service Date Service Provider Modifiers Qty    14266706035 HC PT THER PROC EA 15  MIN 9/30/2024 Jennifer Sanchez, PT GP 2                      Jennifer Sanchez, PT  9/30/2024

## 2024-10-03 ENCOUNTER — HOSPITAL ENCOUNTER (OUTPATIENT)
Dept: PHYSICAL THERAPY | Facility: HOSPITAL | Age: 84
Setting detail: THERAPIES SERIES
Discharge: HOME | End: 2024-10-03
Payer: MEDICARE

## 2024-10-03 DIAGNOSIS — R29.898 LEG WEAKNESS, BILATERAL: ICD-10-CM

## 2024-10-03 DIAGNOSIS — M54.50 CHRONIC RIGHT-SIDED LOW BACK PAIN WITHOUT SCIATICA: ICD-10-CM

## 2024-10-03 DIAGNOSIS — R26.89 BALANCE DISORDER: Primary | ICD-10-CM

## 2024-10-03 DIAGNOSIS — R29.6 FREQUENT FALLS: ICD-10-CM

## 2024-10-03 DIAGNOSIS — Z74.09 IMPAIRED MOBILITY: ICD-10-CM

## 2024-10-03 DIAGNOSIS — G89.29 CHRONIC RIGHT-SIDED LOW BACK PAIN WITHOUT SCIATICA: ICD-10-CM

## 2024-10-03 PROCEDURE — 97110 THERAPEUTIC EXERCISES: CPT

## 2024-10-03 NOTE — THERAPY TREATMENT NOTE
Outpatient Physical Therapy Ortho Treatment Note  [facility]     Patient Name: Sukhdev Zayas  : 1940  MRN: [id number]  Today's Date: 10/3/2024      Visit Date: 10/03/2024    Visit Dx:    ICD-10-CM ICD-9-CM   1. Balance disorder  R26.89 781.99   2. Frequent falls  R29.6 V15.88   3. Impaired mobility  Z74.09 799.89   4. Leg weakness, bilateral  R29.898 729.89   5. Chronic right-sided low back pain without sciatica  M54.50 724.2    G89.29 338.29       Patient Active Problem List   Diagnosis    Hyperlipidemia    Coronary artery disease involving native coronary artery of native heart without angina pectoris    MCI (mild cognitive impairment)    Mood disorder    Closed wedge compression fracture of third thoracic vertebra with routine healing    GERD (gastroesophageal reflux disease)    S/P drug eluting coronary stent placement    Chest pain    Diastolic dysfunction    Mixed action and resting tremor    Bladder cancer    BPH (benign prostatic hyperplasia)    Atrial fibrillation    Essential hypertension    Dizziness    Beta-blocker intolerance    Orthostatic hypotension    Parkinsonism    Lumbar facet arthropathy    Spondylosis of 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    Depression     Dizziness     GERD (gastroesophageal reflux disease)     History of fractured rib     RIGHT SIDE    Hyperlipidemia     Multiple falls     Tremors of nervous system     Urinary incontinence     Vertigo         Past Surgical History:   Procedure Laterality Date    CARDIAC CATHETERIZATION      7x, 5 stents    CATARACT EXTRACTION, BILATERAL      CHOLECYSTECTOMY N/A 2019    Procedure: CHOLECYSTECTOMY LAPAROSCOPIC WITH  INTRAOPERATIVE CHOLANGIOGRAM;  Surgeon: Bg Rodriguez Jr., MD;  Location:  [facility];  Service: General    COLONOSCOPY      CORONARY ANGIOPLASTY WITH STENT PLACEMENT      X5     CYSTOSCOPY BLADDER BIOPSY N/A 1/8/2019    Procedure: CYSTOSCOPY BLADDER BIOPSY;  Surgeon: Wang Soares Jr., MD;  Location:  [facility];  Service: Urology    CYSTOSCOPY BLADDER BIOPSY N/A 6/13/2019    Procedure: CYSTOSCOPY BLADDER BIOPSY;  Surgeon: Wang Soares Jr., MD;  Location:  [facility];  Service: Urology    CYSTOSCOPY TRANSURETHRAL RESECTION OF PROSTATE N/A 7/11/2019    Procedure: CYSTOSCOPY TRANSURETHRAL RESECTION OF PROSTATE;  Surgeon: Wang Soares Jr., MD;  [facility];  Service: Urology    CYSTOSCOPY URETEROSCOPY LASER LITHOTRIPSY N/A 6/13/2019    Procedure: CYSTO LITHOPAXY;  Surgeon: Wang Soares Jr., MD;  [facility];  Service: Urology    ERCP N/A 9/3/2019    Procedure: ENDOSCOPIC RETROGRADE CHOLANGIOPANCREATOGRAPHY with sphincterotomy and balloon sweep;  Surgeon: Ashok Amaya MD;  Location: [facility] ENDOSCOPY;  Service: Gastroenterology    EYE SURGERY      Lens implants    LUMBAR DISCECTOMY FUSION INSTRUMENTATION      LUMBAR EPIDURAL INJECTION N/A 5/4/2022    Procedure: lumbar epidural steroid injection;  Surgeon: Charlene Manzo MD;  [facility];  Service: Pain Management;  Laterality: N/A;    MEDIAL BRANCH BLOCK Bilateral 12/29/2021    Procedure: LUMBAR MEDIAL BRANCH BLOCK Bilateral L3-S1 x2 (2 weeks apart);  Surgeon: Charlene Manzo MD;  [facility];  Service: Pain Management;  Laterality: Bilateral;    MEDIAL BRANCH BLOCK Bilateral 1/12/2022    Procedure: LUMBAR MEDIAL BRANCH BLOCK Bilateral L3-S1 x2 (2 weeks apart);  Surgeon: Charlene Manzo MD;  [facility];  Service: Pain Management;  Laterality: Bilateral;    RADIOFREQUENCY ABLATION Bilateral 2/7/2022    Procedure: RADIOFREQUENCY ABLATION LUMBAR bilateral  L3-S1;  Surgeon: Charlene Manzo MD;  Location: [facility] [place] [place] MAIN OR;  Service: Pain Management;  Laterality: Bilateral;    SKIN CANCER EXCISION Left     chest wall    SKIN CANCER EXCISION  01/21/2021    facial    TRANSURETHRAL RESECTION OF BLADDER TUMOR N/A 11/29/2018    Procedure: TUR BLADDER TUMOR  LARGE;  Surgeon: Wang Soares Jr., MD;  Location: [facility] MAIN OR;  Service: Urology                        PT Assessment/Plan       Row Name 10/03/24 1300          PT Assessment    Assessment Comments Continues with moderate pain levels, primary complaint of R knee and LBP as well as general feelings of imbalance even in static standing. Due to increased knee pain limited time spent in standing, able to complete STS from lower surface but decreased reps due to pain. Pt. remains appropriate for skilled PT.  -MP        PT Plan    PT Plan Comments rhomberg stance, step taps?  -MP               User Key  (r) = Recorded By, (t) = Taken By, (c) = Cosigned By      Initials Name Provider Type    Blanca Kate, PT Physical Therapist                       OP Exercises       Row Name 10/03/24 1300             Subjective    Subjective Comments I went out and got a heating pad and that helps a little bit.  -MP         Subjective Pain    Able to rate subjective pain? yes  -MP      Pre-Treatment Pain Level 8  -MP      Post-Treatment Pain Level 8  -MP         Total Minutes    71814 - PT Therapeutic Exercise Minutes 40  -MP         Exercise 1    Exercise Name 1 Nustep  -MP      Cueing 1 Verbal  -MP      Time 1 5min  -MP         Exercise 3    Exercise Name 3 supine LTR  -MP      Cueing 3 Verbal  -MP      Sets 3 1B  -MP      Reps 3 15  -MP      Time 3 over MH  -MP         Exercise 4    Exercise Name 4 HL hip add  -MP      Cueing 4 Verbal  -MP      Sets 4 1  -MP      Reps 4 15  -MP      Time 4 5s  -MP         Exercise 5    Exercise Name 5 HL clamshell  -MP      Cueing 5 Verbal  -MP      Sets 5 1  -MP      Reps 5 20  -MP      Time  5 GTB  -MP      Additional Comments over MH  -MP         Exercise 6    Exercise Name 6 SKTC  -MP      Cueing 6 Verbal;Demo  -MP      Reps 6 3e  -MP      Time 6 20s  -MP      Additional Comments over MH  -MP         Exercise 7    Exercise Name 7 beginner bridge  -MP      Cueing 7 Verbal  -MP      Sets 7 2  -MP      Reps 7 10  -MP      Time 7 GTB at knees  -MP      Additional Comments over MH; smaller lift 2' pain  -MP         Exercise 8    Exercise Name 8 STS brown mat  -MP      Cueing 8 Verbal  -MP      Reps 8 5  -MP                User Key  (r) = Recorded By, (t) = Taken By, (c) = Cosigned By      Initials Name Provider Type    Blanca Kate, PT Physical Therapist                                  PT OP Goals       Row Name 10/03/24 1400          PT Short Term Goals    STG Date to Achieve 10/09/24  -MP     STG 1 Patient will be independent with education for symptom management and initial HEP to decrease LBP pain.  -MP     STG 1 Progress Ongoing  -MP     STG 2 Pt will report >/=25% improvement in R side back pain for overall improved QOL.  -MP     STG 2 Progress Ongoing  -MP     STG 3 Pt performs 30 seconds sit to stand having complete at least 2 more repetitions that was performed upon initial evaluation for progression of ease with functional transfers, LE strength, and safety in the home.  -MP     STG 3 Progress Ongoing  -MP     STG 4 Pt will improve 2 min walk test by 50' for improved LE strength, endurance, and funcitonal mobility.  -MP     STG 4 Progress Ongoing  -MP        Long Term Goals    LTG Date to Achieve 11/08/24  -MP     LTG 1 Patient will be independent with education for symptom management and advanced HEP to decrease LBP pain.  -MP     LTG 1 Progress Ongoing  -MP     LTG 2 Pt will be independent with advance HEP to improve strength and static/dynamic balance.  -MP     LTG 2 Progress Ongoing  -MP     LTG 3 Patient will improve standing tolerance from </10 min to >25 min with LBP </= 3/10 to  improve participation in community activities.  -MP     LTG 3 Progress Ongoing  -MP     LTG 4 Pt will demo improved gross BL LE strength to >/= 4/5 for improved functional strength.  -MP     LTG 4 Progress Ongoing  -MP     LTG 5 Pt will improve FGA score by 7 in order to demo a reduced risk of falls.  -MP     LTG 5 Progress Ongoing  -MP     LTG 6 Pt will complete 10x STS in 30sec for improved LE functional strength.  -MP     LTG 6 Progress Ongoing  -MP     LTG 7 Pt will perform 2 min walk test ambulating over 370ft with/without assistive device with SBA/Supervision without LOB with directional changes for improvements in safety with ambulatory tasks in house or community.  -MP     LTG 7 Progress Ongoing  -MP               User Key  (r) = Recorded By, (t) = Taken By, (c) = Cosigned By      Initials Name Provider Type    Blanca Kate, PT Physical Therapist                    Therapy Education  Given: HEP, Symptoms/condition management  Program: Reinforced, Progressed  How Provided: Verbal, Demonstration  Provided to: Patient  Level of Understanding: Verbalized, Demonstrated              Time Calculation:   Start Time: 1330  Stop Time: 1410  Time Calculation (min): 40 min  Total Timed Code Minutes- PT: 40 minute(s)  Timed Charges  46702 - PT Therapeutic Exercise Minutes: 40  Total Minutes  Timed Charges Total Minutes: 40   Total Minutes: 40  Therapy Charges for Today       Code Description Service Date Service Provider Modifiers Qty    90928602982 HC PT THER PROC EA 15 MIN 10/3/2024 Blanca Alfredo, PT GP 3                      Blanca Alfredo PT  10/3/2024

## 2024-10-08 ENCOUNTER — HOSPITAL ENCOUNTER (OUTPATIENT)
Dept: PHYSICAL THERAPY | Facility: HOSPITAL | Age: 84
Setting detail: THERAPIES SERIES
Discharge: HOME | End: 2024-10-08
Payer: MEDICARE

## 2024-10-08 DIAGNOSIS — R29.898 LEG WEAKNESS, BILATERAL: ICD-10-CM

## 2024-10-08 DIAGNOSIS — M54.50 CHRONIC RIGHT-SIDED LOW BACK PAIN WITHOUT SCIATICA: ICD-10-CM

## 2024-10-08 DIAGNOSIS — R26.89 BALANCE DISORDER: Primary | ICD-10-CM

## 2024-10-08 DIAGNOSIS — R29.6 FREQUENT FALLS: ICD-10-CM

## 2024-10-08 DIAGNOSIS — Z74.09 IMPAIRED MOBILITY: ICD-10-CM

## 2024-10-08 DIAGNOSIS — G89.29 CHRONIC RIGHT-SIDED LOW BACK PAIN WITHOUT SCIATICA: ICD-10-CM

## 2024-10-08 PROCEDURE — 97530 THERAPEUTIC ACTIVITIES: CPT

## 2024-10-08 PROCEDURE — 97110 THERAPEUTIC EXERCISES: CPT

## 2024-10-08 NOTE — THERAPY TREATMENT NOTE
Outpatient Physical Therapy Ortho Treatment Note  [facility]     Patient Name: Sukhdev Zayas  : 1940  MRN: [id number]  Today's Date: 10/8/2024      Visit Date: 10/08/2024    Visit Dx:    ICD-10-CM ICD-9-CM   1. Balance disorder  R26.89 781.99   2. Frequent falls  R29.6 V15.88   3. Impaired mobility  Z74.09 799.89   4. Leg weakness, bilateral  R29.898 729.89   5. Chronic right-sided low back pain without sciatica  M54.50 724.2    G89.29 338.29       Patient Active Problem List   Diagnosis    Hyperlipidemia    Coronary artery disease involving native coronary artery of native heart without angina pectoris    MCI (mild cognitive impairment)    Mood disorder    Closed wedge compression fracture of third thoracic vertebra with routine healing    GERD (gastroesophageal reflux disease)    S/P drug eluting coronary stent placement    Chest pain    Diastolic dysfunction    Mixed action and resting tremor    Bladder cancer    BPH (benign prostatic hyperplasia)    Atrial fibrillation    Essential hypertension    Dizziness    Beta-blocker intolerance    Orthostatic hypotension    Parkinsonism    Lumbar facet arthropathy    Spondylosis of 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    Depression     Dizziness     GERD (gastroesophageal reflux disease)     History of fractured rib     RIGHT SIDE    Hyperlipidemia     Multiple falls     Tremors of nervous system     Urinary incontinence     Vertigo         Past Surgical History:   Procedure Laterality Date    CARDIAC CATHETERIZATION      7x, 5 stents    CATARACT EXTRACTION, BILATERAL      CHOLECYSTECTOMY N/A 2019    Procedure: CHOLECYSTECTOMY LAPAROSCOPIC WITH  INTRAOPERATIVE CHOLANGIOGRAM;  Surgeon: Bg Rodriguez Jr., MD;  Location:  [facility];  Service: General    COLONOSCOPY      CORONARY ANGIOPLASTY WITH STENT PLACEMENT      X5     CYSTOSCOPY BLADDER BIOPSY N/A 1/8/2019    Procedure: CYSTOSCOPY BLADDER BIOPSY;  Surgeon: Wang Soares Jr., MD;  Location:  [facility];  Service: Urology    CYSTOSCOPY BLADDER BIOPSY N/A 6/13/2019    Procedure: CYSTOSCOPY BLADDER BIOPSY;  Surgeon: Wang Soares Jr., MD;  Location:  [facility];  Service: Urology    CYSTOSCOPY TRANSURETHRAL RESECTION OF PROSTATE N/A 7/11/2019    Procedure: CYSTOSCOPY TRANSURETHRAL RESECTION OF PROSTATE;  Surgeon: Wang Soares Jr., MD;  [facility];  Service: Urology    CYSTOSCOPY URETEROSCOPY LASER LITHOTRIPSY N/A 6/13/2019    Procedure: CYSTO LITHOPAXY;  Surgeon: Wang Soares Jr., MD;  [facility];  Service: Urology    ERCP N/A 9/3/2019    Procedure: ENDOSCOPIC RETROGRADE CHOLANGIOPANCREATOGRAPHY with sphincterotomy and balloon sweep;  Surgeon: Ashok Amaya MD;  Location: [facility] ENDOSCOPY;  Service: Gastroenterology    EYE SURGERY      Lens implants    LUMBAR DISCECTOMY FUSION INSTRUMENTATION      LUMBAR EPIDURAL INJECTION N/A 5/4/2022    Procedure: lumbar epidural steroid injection;  Surgeon: Charlene Manzo MD;  [facility];  Service: Pain Management;  Laterality: N/A;    MEDIAL BRANCH BLOCK Bilateral 12/29/2021    Procedure: LUMBAR MEDIAL BRANCH BLOCK Bilateral L3-S1 x2 (2 weeks apart);  Surgeon: Charlene Manzo MD;  [facility];  Service: Pain Management;  Laterality: Bilateral;    MEDIAL BRANCH BLOCK Bilateral 1/12/2022    Procedure: LUMBAR MEDIAL BRANCH BLOCK Bilateral L3-S1 x2 (2 weeks apart);  Surgeon: Charlene Manzo MD;  [facility];  Service: Pain Management;  Laterality: Bilateral;    RADIOFREQUENCY ABLATION Bilateral 2/7/2022    Procedure: RADIOFREQUENCY ABLATION LUMBAR bilateral  L3-S1;  Surgeon: Charlene Manzo MD;  Location: [facility] [place] [facility] OR;  Service: Pain Management;  Laterality: Bilateral;    SKIN CANCER EXCISION Left     chest wall    SKIN CANCER EXCISION  01/21/2021    facial    TRANSURETHRAL RESECTION OF BLADDER TUMOR N/A 11/29/2018    Procedure: TUR BLADDER TUMOR  LARGE;  Surgeon: Wang Soares Jr., MD;  Location: [facility] MAIN OR;  Service: Urology                        PT Assessment/Plan       Row Name 10/08/24 1300          PT Assessment    Assessment Comments Pt. reports near fall on steps since last visit, discussed potential benefit from use of walker for safety/balance as pt. has history of several fallss as well as verbalizes concern/fear of falling. Pt. reports he has walker and has previously used it but does not like it. Aded rhomberg stance with EoOand EC as well as shoulder extension with cueing for TrA. Provided CGA during both standing exercises for safety. Cueing for BIG with concentric phase of STS. Pt. remains appropriate for skilled PT  -MP        PT Plan    PT Plan Comments udpate HEP, tandemwalk?  -MP               User Key  (r) = Recorded By, (t) = Taken By, (c) = Cosigned By      Initials Name Provider Type    Blanca Kate, PT Physical Therapist                       OP Exercises       Row Name 10/08/24 1300             Subjective    Subjective Comments I had a close call going down some steps the other day, my daughter caught me  -MP         Subjective Pain    Able to rate subjective pain? yes  -MP      Pre-Treatment Pain Level 7  -MP      Post-Treatment Pain Level 7  -MP         Total Minutes    37132 - PT Therapeutic Exercise Minutes 30  -MP      59258 - PT Therapeutic Activity Minutes 10  -MP         Exercise 1    Exercise Name 1 Nustep  -MP      Cueing 1 Verbal  -MP      Time 1 5min  -MP         Exercise 3    Exercise Name 3 supine LTR  -MP      Cueing 3 Verbal  -MP      Sets 3 1B  -MP      Reps 3 15  -MP      Time 3 over MH  -MP          Exercise 4    Exercise Name 4 HL hip add  -MP      Cueing 4 Verbal  -MP      Sets 4 1  -MP      Reps 4 15  -MP      Time 4 5s  -MP         Exercise 5    Exercise Name 5 HL clamshell  -MP      Cueing 5 Verbal  -MP      Sets 5 1  -MP      Reps 5 20  -MP      Time 5 GTB  -MP      Additional Comments over MH; unilateral  -MP         Exercise 7    Exercise Name 7 beginner bridge  -MP      Cueing 7 Verbal  -MP      Sets 7 2  -MP      Reps 7 10  -MP      Additional Comments small range, over zeeshan heat  -MP         Exercise 8    Exercise Name 8 STS blue mat  -MP      Cueing 8 Verbal;Demo  -MP      Reps 8 5  -MP      Time 8 cueing for BIG during concentric phase, hands on thighs  -MP         Exercise 9    Exercise Name 9 rhomberg stance  -MP      Cueing 9 Verbal;Demo  -MP      Reps 9 2e  -MP      Time 9 30sec  -MP      Additional Comments EC/EO  -MP         Exercise 10    Exercise Name 10 shoulder ext  -MP      Cueing 10 Verbal;Demo  -MP      Reps 10 10  -MP      Time 10 YTB  -MP      Additional Comments CGA  -MP                User Key  (r) = Recorded By, (t) = Taken By, (c) = Cosigned By      Initials Name Provider Type    Blanca Kate, PT Physical Therapist                                  PT OP Goals       Row Name 10/08/24 1300          PT Short Term Goals    STG Date to Achieve 10/09/24  -MP     STG 1 Patient will be independent with education for symptom management and initial HEP to decrease LBP pain.  -MP     STG 1 Progress Ongoing  -MP     STG 2 Pt will report >/=25% improvement in R side back pain for overall improved QOL.  -MP     STG 2 Progress Ongoing  -MP     STG 3 Pt performs 30 seconds sit to stand having complete at least 2 more repetitions that was performed upon initial evaluation for progression of ease with functional transfers, LE strength, and safety in the home.  -MP     STG 3 Progress Ongoing  -MP     STG 4 Pt will improve 2 min walk test by 50' for improved LE strength, endurance, and  funcitonal mobility.  -MP     STG 4 Progress Ongoing  -MP        Long Term Goals    LTG Date to Achieve 11/08/24  -MP     LTG 1 Patient will be independent with education for symptom management and advanced HEP to decrease LBP pain.  -MP     LTG 1 Progress Ongoing  -MP     LTG 2 Pt will be independent with advance HEP to improve strength and static/dynamic balance.  -MP     LTG 2 Progress Ongoing  -MP     LTG 3 Patient will improve standing tolerance from </10 min to >25 min with LBP </= 3/10 to improve participation in community activities.  -MP     LTG 3 Progress Ongoing  -MP     LTG 4 Pt will demo improved gross BL LE strength to >/= 4/5 for improved functional strength.  -MP     LTG 4 Progress Ongoing  -MP     LTG 5 Pt will improve FGA score by 7 in order to demo a reduced risk of falls.  -MP     LTG 5 Progress Ongoing  -MP     LTG 6 Pt will complete 10x STS in 30sec for improved LE functional strength.  -MP     LTG 6 Progress Ongoing  -MP     LTG 7 Pt will perform 2 min walk test ambulating over 370ft with/without assistive device with SBA/Supervision without LOB with directional changes for improvements in safety with ambulatory tasks in house or community.  -MP     LTG 7 Progress Ongoing  -MP               User Key  (r) = Recorded By, (t) = Taken By, (c) = Cosigned By      Initials Name Provider Type    Blanca Kate PT Physical Therapist                    Therapy Education  Given: Symptoms/condition management  Program: Reinforced  How Provided: Verbal, Demonstration  Provided to: Patient  Level of Understanding: Verbalized, Demonstrated              Time Calculation:   Start Time: 1332  Stop Time: 1412  Time Calculation (min): 40 min  Total Timed Code Minutes- PT: 40 minute(s)  Timed Charges  88148 - PT Therapeutic Exercise Minutes: 30  77985 - PT Therapeutic Activity Minutes: 10  Total Minutes  Timed Charges Total Minutes: 40   Total Minutes: 40  Therapy Charges for Today       Code Description  Service Date Service Provider Modifiers Qty    [id number]  PT THERAPEUTIC ACT EA 15 MIN 10/8/2024 Blanca Alfredo, PT GP 1    [id number]  PT THER PROC EA 15 MIN 10/8/2024 Blanca Alfredo, PT GP 2                      Blanca Alfredo, PT  10/8/2024

## 2024-10-10 ENCOUNTER — HOSPITAL ENCOUNTER (OUTPATIENT)
Dept: PHYSICAL THERAPY | Facility: HOSPITAL | Age: 84
Setting detail: THERAPIES SERIES
Discharge: HOME | End: 2024-10-10
Payer: MEDICARE

## 2024-10-10 DIAGNOSIS — Z74.09 IMPAIRED MOBILITY: ICD-10-CM

## 2024-10-10 DIAGNOSIS — G89.29 CHRONIC RIGHT-SIDED LOW BACK PAIN WITHOUT SCIATICA: ICD-10-CM

## 2024-10-10 DIAGNOSIS — M54.50 CHRONIC RIGHT-SIDED LOW BACK PAIN WITHOUT SCIATICA: ICD-10-CM

## 2024-10-10 DIAGNOSIS — R29.6 FREQUENT FALLS: ICD-10-CM

## 2024-10-10 DIAGNOSIS — R29.898 LEG WEAKNESS, BILATERAL: ICD-10-CM

## 2024-10-10 DIAGNOSIS — R26.89 BALANCE DISORDER: Primary | ICD-10-CM

## 2024-10-10 PROCEDURE — 97530 THERAPEUTIC ACTIVITIES: CPT

## 2024-10-10 PROCEDURE — 97110 THERAPEUTIC EXERCISES: CPT

## 2024-10-10 NOTE — THERAPY PROGRESS REPORT/RE-CERT
Outpatient Physical Therapy Ortho Progress Note  [facility]     Patient Name: Sukhdev Zayas  : 1940  MRN: [id number]  Today's Date: 10/10/2024      Visit Date: 10/10/2024    Visit Dx:    ICD-10-CM ICD-9-CM   1. Balance disorder  R26.89 781.99   2. Frequent falls  R29.6 V15.88   3. Impaired mobility  Z74.09 799.89   4. Leg weakness, bilateral  R29.898 729.89   5. Chronic right-sided low back pain without sciatica  M54.50 724.2    G89.29 338.29       Patient Active Problem List   Diagnosis    Hyperlipidemia    Coronary artery disease involving native coronary artery of native heart without angina pectoris    MCI (mild cognitive impairment)    Mood disorder    Closed wedge compression fracture of third thoracic vertebra with routine healing    GERD (gastroesophageal reflux disease)    S/P drug eluting coronary stent placement    Chest pain    Diastolic dysfunction    Mixed action and resting tremor    Bladder cancer    BPH (benign prostatic hyperplasia)    Atrial fibrillation    Essential hypertension    Dizziness    Beta-blocker intolerance    Orthostatic hypotension    Parkinsonism    Lumbar facet arthropathy    Spondylosis of 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    Depression     Dizziness     GERD (gastroesophageal reflux disease)     History of fractured rib     RIGHT SIDE    Hyperlipidemia     Multiple falls     Tremors of nervous system     Urinary incontinence     Vertigo         Past Surgical History:   Procedure Laterality Date    CARDIAC CATHETERIZATION      7x, 5 stents    CATARACT EXTRACTION, BILATERAL      CHOLECYSTECTOMY N/A 2019    Procedure: CHOLECYSTECTOMY LAPAROSCOPIC WITH  INTRAOPERATIVE CHOLANGIOGRAM;  Surgeon: Bg Rodriguez Jr., MD;  Location:  [facility];  Service: General    COLONOSCOPY      CORONARY ANGIOPLASTY WITH STENT PLACEMENT      X5     CYSTOSCOPY BLADDER BIOPSY N/A 1/8/2019    Procedure: CYSTOSCOPY BLADDER BIOPSY;  Surgeon: Wang Soares Jr., MD;  Location:  [facility];  Service: Urology    CYSTOSCOPY BLADDER BIOPSY N/A 6/13/2019    Procedure: CYSTOSCOPY BLADDER BIOPSY;  Surgeon: Wang Soares Jr., MD;  Location:  [facility];  Service: Urology    CYSTOSCOPY TRANSURETHRAL RESECTION OF PROSTATE N/A 7/11/2019    Procedure: CYSTOSCOPY TRANSURETHRAL RESECTION OF PROSTATE;  Surgeon: Wang Soares Jr., MD;  [facility];  Service: Urology    CYSTOSCOPY URETEROSCOPY LASER LITHOTRIPSY N/A 6/13/2019    Procedure: CYSTO LITHOPAXY;  Surgeon: Wang Soares Jr., MD;  [facility];  Service: Urology    ERCP N/A 9/3/2019    Procedure: ENDOSCOPIC RETROGRADE CHOLANGIOPANCREATOGRAPHY with sphincterotomy and balloon sweep;  Surgeon: Ashok Amaya MD;  Location: [facility] ENDOSCOPY;  Service: Gastroenterology    EYE SURGERY      Lens implants    LUMBAR DISCECTOMY FUSION INSTRUMENTATION      LUMBAR EPIDURAL INJECTION N/A 5/4/2022    Procedure: lumbar epidural steroid injection;  Surgeon: Charlene Manzo MD;  [facility];  Service: Pain Management;  Laterality: N/A;    MEDIAL BRANCH BLOCK Bilateral 12/29/2021    Procedure: LUMBAR MEDIAL BRANCH BLOCK Bilateral L3-S1 x2 (2 weeks apart);  Surgeon: Charlene Manzo MD;  [facility]: [facility];  Service: Pain Management;  Laterality: Bilateral;    MEDIAL BRANCH BLOCK Bilateral 1/12/2022    Procedure: LUMBAR MEDIAL BRANCH BLOCK Bilateral L3-S1 x2 (2 weeks apart);  Surgeon: Charlene Manzo MD;  [facility]: [facility];  Service: Pain Management;  Laterality: Bilateral;    RADIOFREQUENCY ABLATION Bilateral 2/7/2022    Procedure: RADIOFREQUENCY ABLATION LUMBAR bilateral  L3-S1;  Surgeon: Charlene Manzo MD;  Location: [facility];  Service: Pain Management;  Laterality: Bilateral;    SKIN CANCER EXCISION Left     chest wall    SKIN CANCER EXCISION  01/21/2021    facial    TRANSURETHRAL RESECTION OF BLADDER TUMOR N/A 11/29/2018    Procedure: TUR BLADDER TUMOR  LARGE;  Surgeon: Wang Soares Jr., MD;  Location: [facility] MAIN OR;  Service: Urology                        PT Assessment/Plan       Row Name 10/10/24 1400          PT Assessment    Functional Limitations Impaired gait;Limitation in home management;Limitations in community activities;Limitations in functional capacity and performance;Performance in leisure activities;Performance in self-care ADL;Performance in sport activities  -MP     Impairments Balance;Pain;Gait;Muscle strength;Range of motion;Poor body mechanics;Joint mobility;Coordination  -MP     Assessment Comments Sukhdev Zayas has been seen for 8 physical therapy sessions for R sided shoulder pain and LBP s/p fall as well as progressive baseline balance deficits. Treatment has included therapeutic exercise, therapeutic activity, and patient education with home exercise program . Progress to physical therapy goals is fair as pt. Has met 1/4 STGs, and 0/7 LTGs. Encouraged pt. To utilize rwx for ambulation due to pt.  Fear of falling and history of several falls/stumbles. Pain levels have continued to fluctuate but overall subjective improvement has been minimal until verbalizing reduction in pain this date. He demonstrates improvement on 30 sec STS to 6 repetitions (5 reps at evaluation) and partly limited by knee pain as well as increased distance to 260.10 feet on 2MWT (235.5 feet at evaluation). He continues with difficulty with balance particularly with reduced visual input. He will benefit from continued skilled physical therapy to address remaining impairments and functional limitations.  -MP     Please refer to paper survey for additional  self-reported information No  -MP     Rehab Potential Good  -MP     Patient/caregiver participated in establishment of treatment plan and goals Yes  -MP     Patient would benefit from skilled therapy intervention Yes  -MP        PT Plan    PT Frequency 1x/week;2x/week  -MP     Predicted Duration of Therapy Intervention (PT) 10 visits  -MP     Planned CPT's? PT RE-EVAL: 74501;PT THER PROC EA 15 MIN: 17586;PT THER ACT EA 15 MIN: 59473;PT MANUAL THERAPY EA 15 MIN: 78231;PT NEUROMUSC RE-EDUCATION EA 15 MIN: 22172;PT GAIT TRAINING EA 15 MIN: 03177;PT SELF CARE/HOME MGMT/TRAIN EA 15: 09867;PT HOT OR COLD PACK TREAT MCARE;PT ELECTRICAL STIM UNATTEND: ;PT ULTRASOUND EA 15 MIN: 82326;PT TRACTION CERVICAL: 81483;PT HOT/COLD PACK WC NONMCARE: 54907  -MP     PT Plan Comments tandem walk?  -MP               User Key  (r) = Recorded By, (t) = Taken By, (c) = Cosigned By      Initials Name Provider Type    Blanca Kate, PT Physical Therapist                       OP Exercises       Row Name 10/10/24 1400             Subjective    Subjective Comments I am having a good day, so much so that I drove myself here  -MP         Subjective Pain    Able to rate subjective pain? yes  -MP      Pre-Treatment Pain Level 4  -MP      Post-Treatment Pain Level 4  -MP         Total Minutes    75774 - PT Therapeutic Exercise Minutes 25  -MP      90432 - PT Therapeutic Activity Minutes 15  -MP         Exercise 1    Exercise Name 1 Nustep  -MP      Cueing 1 Verbal  -MP      Time 1 5min  -MP         Exercise 2    Exercise Name 2 progress note, goals update, 2MWT, 30sec STS  -MP         Exercise 3    Exercise Name 3 supine LTR  -MP      Cueing 3 Verbal  -MP      Sets 3 1B  -MP      Reps 3 15  -MP      Time 3 over MH  -MP         Exercise 4    Exercise Name 4 HL hip add  -MP      Cueing 4 Verbal  -MP      Sets 4 1  -MP      Reps 4 15  -MP      Time 4 5s  -MP         Exercise 6    Exercise Name 6 heel raises  -MP      Cueing 6 Verbal;Demo   -MP      Reps 6 10  -MP         Exercise 7    Exercise Name 7 beginner bridge  -MP      Cueing 7 Verbal  -MP      Sets 7 2  -MP      Reps 7 10  -MP      Additional Comments small range, over moist heat  -MP         Exercise 9    Exercise Name 9 rhomberg stance  -MP      Cueing 9 Verbal;Demo  -MP      Reps 9 2e  -MP      Time 9 30sec  -MP      Additional Comments EC/EO  -MP         Exercise 11    Exercise Name 11 seated shoulder flexion w/ small pink ball  -MP      Cueing 11 Verbal;Demo  -MP      Reps 11 10  -MP                User Key  (r) = Recorded By, (t) = Taken By, (c) = Cosigned By      Initials Name Provider Type    Blanca Kate, PT Physical Therapist                                  PT OP Goals       Row Name 10/10/24 1400          PT Short Term Goals    STG Date to Achieve 10/09/24  -MP     STG 1 Patient will be independent with education for symptom management and initial HEP to decrease LBP pain.  -MP     STG 1 Progress Met  -MP     STG 1 Progress Comments reports compliance  -MP     STG 2 Pt will report >/=25% improvement in R side back pain for overall improved QOL.  -MP     STG 2 Progress Ongoing  -MP     STG 2 Progress Comments minimal change  -MP     STG 3 Pt performs 30 seconds sit to stand having complete at least 2 more repetitions that was performed upon initial evaluation for progression of ease with functional transfers, LE strength, and safety in the home.  -MP     STG 3 Progress Ongoing  -MP     STG 3 Progress Comments 1 more  -MP     STG 4 Pt will improve 2 min walk test by 50' for improved LE strength, endurance, and funcitonal mobility.  -MP     STG 4 Progress Ongoing  -MP        Long Term Goals    LTG Date to Achieve 11/08/24  -MP     LTG 1 Patient will be independent with education for symptom management and advanced HEP to decrease LBP pain.  -MP     LTG 1 Progress Ongoing  -MP     LTG 2 Pt will be independent with advance HEP to improve strength and static/dynamic balance.  -MP      LTG 2 Progress Ongoing  -MP     LTG 3 Patient will improve standing tolerance from </10 min to >25 min with LBP </= 3/10 to improve participation in community activities.  -MP     LTG 3 Progress Ongoing  -MP     LTG 4 Pt will demo improved gross BL LE strength to >/= 4/5 for improved functional strength.  -MP     LTG 4 Progress Ongoing  -MP     LTG 5 Pt will improve FGA score by 7 in order to demo a reduced risk of falls.  -MP     LTG 5 Progress Ongoing  -MP     LTG 6 Pt will complete 10x STS in 30sec for improved LE functional strength.  -MP     LTG 6 Progress Ongoing  -MP     LTG 6 Progress Comments 6  -MP     LTG 7 Pt will perform 2 min walk test ambulating over 370ft with/without assistive device with SBA/Supervision without LOB with directional changes for improvements in safety with ambulatory tasks in house or community.  -MP     LTG 7 Progress Ongoing  -MP     LTG 7 Progress Comments 260.10  -MP               User Key  (r) = Recorded By, (t) = Taken By, (c) = Cosigned By      Initials Name Provider Type    Blanca Kate, PT Physical Therapist                    Therapy Education  Education Details: Updated HEP [id number]  Given: HEP, Symptoms/condition management  Program: Reinforced, Progressed  How Provided: Demonstration, Verbal, Written  Provided to: Patient  Level of Understanding: Verbalized, Demonstrated    2 Minute Walk Test  Gait, Assistive Device:  (no AD, provided CGA w/ gait belt)  Distance Ambulated in 2 Minutes: 260.1  30 Second Chair Stand Test  30 Second Chair Stand Test: 6 (B UE on armrest)         Time Calculation:   Start Time: 1415  Stop Time: 1455  Time Calculation (min): 40 min  Total Timed Code Minutes- PT: 40 minute(s)  Timed Charges  32140 - PT Therapeutic Exercise Minutes: 25  78886 - PT Therapeutic Activity Minutes: 15  Total Minutes  Timed Charges Total Minutes: 40   Total Minutes: 40  Therapy Charges for Today       Code Description Service Date Service Provider Modifiers  Qty    95340968778  PT THERAPEUTIC ACT EA 15 MIN 10/10/2024 Blanca Alfredo, PT GP 1    [id number]  PT THER PROC EA 15 MIN 10/10/2024 Blanca Alfredo, PT GP 2                      Blanca Alfredo, PT  10/10/2024

## 2024-10-15 ENCOUNTER — TELEPHONE (OUTPATIENT)
Dept: INTERNAL MEDICINE | Facility: CLINIC | Age: 84
End: 2024-10-15
Payer: MEDICARE

## 2024-10-15 ENCOUNTER — HOSPITAL ENCOUNTER (OUTPATIENT)
Dept: PHYSICAL THERAPY | Facility: HOSPITAL | Age: 84
Setting detail: THERAPIES SERIES
Discharge: HOME | End: 2024-10-15
Payer: MEDICARE

## 2024-10-15 DIAGNOSIS — R26.89 BALANCE DISORDER: Primary | ICD-10-CM

## 2024-10-15 DIAGNOSIS — R29.898 LEG WEAKNESS, BILATERAL: ICD-10-CM

## 2024-10-15 DIAGNOSIS — M54.50 CHRONIC RIGHT-SIDED LOW BACK PAIN WITHOUT SCIATICA: ICD-10-CM

## 2024-10-15 DIAGNOSIS — G89.29 CHRONIC RIGHT-SIDED LOW BACK PAIN WITHOUT SCIATICA: ICD-10-CM

## 2024-10-15 DIAGNOSIS — G20.C PARKINSONISM, UNSPECIFIED PARKINSONISM TYPE: Primary | ICD-10-CM

## 2024-10-15 DIAGNOSIS — R29.6 FREQUENT FALLS: ICD-10-CM

## 2024-10-15 DIAGNOSIS — Z74.09 IMPAIRED MOBILITY: ICD-10-CM

## 2024-10-15 PROCEDURE — 97530 THERAPEUTIC ACTIVITIES: CPT

## 2024-10-15 PROCEDURE — 97110 THERAPEUTIC EXERCISES: CPT

## 2024-10-15 NOTE — TELEPHONE ENCOUNTER
Caller: Taty Zayas    Relationship: Emergency Contact    Best call back number: [phone]     Who are you requesting to speak with (clinical staff, provider,  specific staff member): LUIS FERNANDO IRWIN     Do you know the name of the person who called: PATIENTS WIFE LISTED ON  VERBAL     What was the call regarding: PATIENTS WIFE STATES SHE WAS ADVISED TODAY THAT SHE WOULD RECEIVE A CALLBACK FROM BEN IRWIN REGARDING A REFERRAL FOR CONSULTATION FOR NEUROLOGIST FOR PATIENTS TREMORS AND SHE HAS NOT RECEIVED A CALL     PATIENT WOULD LIKE A CALLBACK

## 2024-10-15 NOTE — THERAPY TREATMENT NOTE
Outpatient Physical Therapy Ortho Treatment Note  [facility]     Patient Name: Sukhdev Zayas  : 1940  MRN: [id number]  Today's Date: 10/15/2024      Visit Date: 10/15/2024    Visit Dx:    ICD-10-CM ICD-9-CM   1. Balance disorder  R26.89 781.99   2. Frequent falls  R29.6 V15.88   3. Impaired mobility  Z74.09 799.89   4. Leg weakness, bilateral  R29.898 729.89   5. Chronic right-sided low back pain without sciatica  M54.50 724.2    G89.29 338.29       Patient Active Problem List   Diagnosis    Hyperlipidemia    Coronary artery disease involving native coronary artery of native heart without angina pectoris    MCI (mild cognitive impairment)    Mood disorder    Closed wedge compression fracture of third thoracic vertebra with routine healing    GERD (gastroesophageal reflux disease)    S/P drug eluting coronary stent placement    Chest pain    Diastolic dysfunction    Mixed action and resting tremor    Bladder cancer    BPH (benign prostatic hyperplasia)    Atrial fibrillation    Essential hypertension    Dizziness    Beta-blocker intolerance    Orthostatic hypotension    Parkinsonism    Lumbar facet arthropathy    Spondylosis of 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    Depression     Dizziness     GERD (gastroesophageal reflux disease)     History of fractured rib     RIGHT SIDE    Hyperlipidemia     Multiple falls     Tremors of nervous system     Urinary incontinence     Vertigo         Past Surgical History:   Procedure Laterality Date    CARDIAC CATHETERIZATION      7x, 5 stents    CATARACT EXTRACTION, BILATERAL      CHOLECYSTECTOMY N/A 2019    Procedure: CHOLECYSTECTOMY LAPAROSCOPIC WITH  INTRAOPERATIVE CHOLANGIOGRAM;  Surgeon: Bg Rodriguez Jr., MD;  [facility]:  [facility];  Service: General    COLONOSCOPY      CORONARY ANGIOPLASTY WITH STENT PLACEMENT      X5     CYSTOSCOPY BLADDER BIOPSY N/A 1/8/2019    Procedure: CYSTOSCOPY BLADDER BIOPSY;  Surgeon: Wang Soares Jr., MD;  [facility];  Service: Urology    CYSTOSCOPY BLADDER BIOPSY N/A 6/13/2019    Procedure: CYSTOSCOPY BLADDER BIOPSY;  Surgeon: Wang Soares Jr., MD;  [facility];  Service: Urology    CYSTOSCOPY TRANSURETHRAL RESECTION OF PROSTATE N/A 7/11/2019    Procedure: CYSTOSCOPY TRANSURETHRAL RESECTION OF PROSTATE;  Surgeon: Wang Saores Jr., MD;  [facility];  Service: Urology    CYSTOSCOPY URETEROSCOPY LASER LITHOTRIPSY N/A 6/13/2019    Procedure: CYSTO LITHOPAXY;  Surgeon: Wang Soares Jr., MD;  [facility];  Service: Urology    ERCP N/A 9/3/2019    Procedure: ENDOSCOPIC RETROGRADE CHOLANGIOPANCREATOGRAPHY with sphincterotomy and balloon sweep;  Surgeon: Ashok Amaya MD;  Location: [facility] ENDOSCOPY;  Service: Gastroenterology    EYE SURGERY      Lens implants    LUMBAR DISCECTOMY FUSION INSTRUMENTATION      LUMBAR EPIDURAL INJECTION N/A 5/4/2022    Procedure: lumbar epidural steroid injection;  Surgeon: Charlene Manzo MD;  [facility];  Service: Pain Management;  Laterality: N/A;    MEDIAL BRANCH BLOCK Bilateral 12/29/2021    Procedure: LUMBAR MEDIAL BRANCH BLOCK Bilateral L3-S1 x2 (2 weeks apart);  Surgeon: Charlene Manzo MD;  [facility];  Service: Pain Management;  Laterality: Bilateral;    MEDIAL BRANCH BLOCK Bilateral 1/12/2022    Procedure: LUMBAR MEDIAL BRANCH BLOCK Bilateral L3-S1 x2 (2 weeks apart);  Surgeon: Charlene Manzo MD;  [facility];  Service: Pain Management;  Laterality: Bilateral;    RADIOFREQUENCY ABLATION Bilateral 2/7/2022    Procedure: RADIOFREQUENCY ABLATION LUMBAR bilateral  L3-S1;  Surgeon: Charlene Manzo MD;  Location: [facility] MAIN OR;  Service: Pain Management;  Laterality: Bilateral;    SKIN CANCER EXCISION Left     chest wall    SKIN CANCER EXCISION  01/21/2021    facial    TRANSURETHRAL RESECTION OF BLADDER TUMOR N/A 11/29/2018    Procedure: TUR BLADDER TUMOR  LARGE;  Surgeon: Wang Soares Jr., MD;  Location: [facility] MAIN OR;  Service: Urology                        PT Assessment/Plan       Row Name 10/15/24 1400          PT Assessment    Assessment Comments Mr. Zayas returns to PT reporting pain continues to come and go but does state he feels some mild improvement in frequncy of pain symptoms. He demos improved tolerance to exercises this date with less verabal pain responsee throughout. Reinforced need to improve HEP compliance. Did add tandem walk this date with BUE use. Will continue to progress as appropriate and tolerable.  -MO        PT Plan    PT Plan Comments small step up, step taps  -MO               User Key  (r) = Recorded By, (t) = Taken By, (c) = Cosigned By      Initials Name Provider Type    Jennifer Cartagena, PT Physical Therapist                       OP Exercises       Row Name 10/15/24 1300             Subjective    Subjective Comments The pain really just  -MO         Subjective Pain    Able to rate subjective pain? yes  -MO      Pre-Treatment Pain Level 2  -MO      Post-Treatment Pain Level 5  -MO         Total Minutes    28366 - PT Therapeutic Exercise Minutes 25  -MO      99070 - PT Therapeutic Activity Minutes 15  -MO         Exercise 1    Exercise Name 1 Nustep  -MO      Cueing 1 Verbal  -MO      Time 1 5min  -MO         Exercise 2    Exercise Name 2 tandem walk  -MO      Cueing 2 Verbal;Demo  -MO      Reps 2 2 laps  -MO      Time 2 // bars  -MO         Exercise 3    Exercise Name 3 supine LTR  -MO      Cueing 3 Verbal  -MO      Sets 3 1B  -MO      Reps 3 15  -MO      Time 3 over MH  -MO         Exercise 4    Exercise Name 4 HL hip add  -MO       Cueing 4 Verbal  -MO      Sets 4 1  -MO      Reps 4 10  -MO      Time 4 5s  -MO         Exercise 5    Exercise Name 5 HL clamshell  -MO      Cueing 5 Verbal  -MO      Sets 5 1  -MO      Reps 5 10  -MO      Time 5 GTB  -MO      Additional Comments over MH; unilateral  -MO         Exercise 6    Exercise Name 6 heel raises  -MO      Cueing 6 Verbal  -MO      Sets 6 1  -MO      Reps 6 15  -MO      Time 6 cueing to reduce weight through BUE  -MO         Exercise 7    Exercise Name 7 beginner bridge  -MO      Cueing 7 Verbal  -MO      Sets 7 2  -MO      Reps 7 10  -MO      Additional Comments small range, over moist heat  -MO         Exercise 8    Exercise Name 8 STS blue mat  -MO      Cueing 8 Verbal  -MO      Sets 8 1  -MO      Reps 8 10  -MO         Exercise 9    Exercise Name 9 rhomberg stance  -MO      Cueing 9 Verbal  -MO      Reps 9 2B  -MO      Time 9 30s  -MO      Additional Comments EO  -MO                User Key  (r) = Recorded By, (t) = Taken By, (c) = Cosigned By      Initials Name Provider Type    Jennifer Cartagena, PT Physical Therapist                                  PT OP Goals       Row Name 10/15/24 1300          PT Short Term Goals    STG Date to Achieve 10/09/24  -MO     STG 1 Patient will be independent with education for symptom management and initial HEP to decrease LBP pain.  -MO     STG 1 Progress Met  -MO     STG 2 Pt will report >/=25% improvement in R side back pain for overall improved QOL.  -MO     STG 2 Progress Ongoing  -MO     STG 3 Pt performs 30 seconds sit to stand having complete at least 2 more repetitions that was performed upon initial evaluation for progression of ease with functional transfers, LE strength, and safety in the home.  -MO     STG 3 Progress Ongoing  -MO     STG 4 Pt will improve 2 min walk test by 50' for improved LE strength, endurance, and funcitonal mobility.  -MO     STG 4 Progress Ongoing  -MO        Long Term Goals    LTG Date to Achieve 11/08/24  -MO     LTG  1 Patient will be independent with education for symptom management and advanced HEP to decrease LBP pain.  -MO     LTG 1 Progress Ongoing  -MO     LTG 2 Pt will be independent with advance HEP to improve strength and static/dynamic balance.  -MO     LTG 2 Progress Ongoing  -MO     LTG 3 Patient will improve standing tolerance from </10 min to >25 min with LBP </= 3/10 to improve participation in community activities.  -MO     LTG 3 Progress Ongoing  -MO     LTG 4 Pt will demo improved gross BL LE strength to >/= 4/5 for improved functional strength.  -MO     LTG 4 Progress Ongoing  -MO     LTG 5 Pt will improve FGA score by 7 in order to demo a reduced risk of falls.  -MO     LTG 5 Progress Ongoing  -MO     LTG 6 Pt will complete 10x STS in 30sec for improved LE functional strength.  -MO     LTG 6 Progress Ongoing  -MO     LTG 7 Pt will perform 2 min walk test ambulating over 370ft with/without assistive device with SBA/Supervision without LOB with directional changes for improvements in safety with ambulatory tasks in house or community.  -MO     LTG 7 Progress Ongoing  -MO               User Key  (r) = Recorded By, (t) = Taken By, (c) = Cosigned By      Initials Name Provider Type    Jennifer Cartagena PT Physical Therapist                                   Time Calculation:   Start Time: 1315  Stop Time: 1355  Time Calculation (min): 40 min  Timed Charges  67511 - PT Therapeutic Exercise Minutes: 25  70874 - PT Therapeutic Activity Minutes: 15  Total Minutes  Timed Charges Total Minutes: 40   Total Minutes: 40  Therapy Charges for Today       Code Description Service Date Service Provider Modifiers Qty    22219524256 HC PT THER PROC EA 15 MIN 10/15/2024 Jennifer Sanchez PT GP 2    34976909259 HC PT THERAPEUTIC ACT EA 15 MIN 10/15/2024 Jennifer Sanchez PT GP 1                      Jennifer Sanchez PT  10/15/2024

## 2024-10-16 DIAGNOSIS — M25.511 ACUTE PAIN OF RIGHT SHOULDER: Primary | ICD-10-CM

## 2024-10-18 ENCOUNTER — HOSPITAL ENCOUNTER (OUTPATIENT)
Dept: PHYSICAL THERAPY | Facility: HOSPITAL | Age: 84
Setting detail: THERAPIES SERIES
Discharge: HOME | End: 2024-10-18
Payer: MEDICARE

## 2024-10-18 DIAGNOSIS — R29.898 LEG WEAKNESS, BILATERAL: ICD-10-CM

## 2024-10-18 DIAGNOSIS — G89.29 CHRONIC RIGHT-SIDED LOW BACK PAIN WITHOUT SCIATICA: ICD-10-CM

## 2024-10-18 DIAGNOSIS — Z74.09 IMPAIRED MOBILITY: ICD-10-CM

## 2024-10-18 DIAGNOSIS — R26.89 BALANCE DISORDER: Primary | ICD-10-CM

## 2024-10-18 DIAGNOSIS — M54.50 CHRONIC RIGHT-SIDED LOW BACK PAIN WITHOUT SCIATICA: ICD-10-CM

## 2024-10-18 DIAGNOSIS — R29.6 FREQUENT FALLS: ICD-10-CM

## 2024-10-18 PROCEDURE — 97110 THERAPEUTIC EXERCISES: CPT

## 2024-10-18 PROCEDURE — 97530 THERAPEUTIC ACTIVITIES: CPT

## 2024-10-18 PROCEDURE — 97140 MANUAL THERAPY 1/> REGIONS: CPT

## 2024-10-18 NOTE — THERAPY TREATMENT NOTE
Outpatient Physical Therapy Ortho Treatment Note  [facility]     Patient Name: Sukhdev Zayas  : 1940  MRN: [id number]  Today's Date: 10/18/2024      Visit Date: 10/18/2024    Visit Dx:    ICD-10-CM ICD-9-CM   1. Balance disorder  R26.89 781.99   2. Frequent falls  R29.6 V15.88   3. Impaired mobility  Z74.09 799.89   4. Leg weakness, bilateral  R29.898 729.89   5. Chronic right-sided low back pain without sciatica  M54.50 724.2    G89.29 338.29       Patient Active Problem List   Diagnosis    Hyperlipidemia    Coronary artery disease involving native coronary artery of native heart without angina pectoris    MCI (mild cognitive impairment)    Mood disorder    Closed wedge compression fracture of third thoracic vertebra with routine healing    GERD (gastroesophageal reflux disease)    S/P drug eluting coronary stent placement    Chest pain    Diastolic dysfunction    Mixed action and resting tremor    Bladder cancer    BPH (benign prostatic hyperplasia)    Atrial fibrillation    Essential hypertension    Dizziness    Beta-blocker intolerance    Orthostatic hypotension    Parkinsonism    Lumbar facet arthropathy    Spondylosis of 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    Depression     Dizziness     GERD (gastroesophageal reflux disease)     History of fractured rib     RIGHT SIDE    Hyperlipidemia     Multiple falls     Tremors of nervous system     Urinary incontinence     Vertigo         Past Surgical History:   Procedure Laterality Date    CARDIAC CATHETERIZATION      7x, 5 stents    CATARACT EXTRACTION, BILATERAL      CHOLECYSTECTOMY N/A 2019    Procedure: CHOLECYSTECTOMY LAPAROSCOPIC WITH  INTRAOPERATIVE CHOLANGIOGRAM;  Surgeon: Bg Rodriguez Jr., MD;  Location:  [facility];  Service: General    COLONOSCOPY      CORONARY ANGIOPLASTY WITH STENT PLACEMENT      X5     CYSTOSCOPY BLADDER BIOPSY N/A 1/8/2019    Procedure: CYSTOSCOPY BLADDER BIOPSY;  Surgeon: Wang Soares Jr., MD;  Location:  [facility];  Service: Urology    CYSTOSCOPY BLADDER BIOPSY N/A 6/13/2019    Procedure: CYSTOSCOPY BLADDER BIOPSY;  Surgeon: Wang Soares Jr., MD;  Location:  [facility];  Service: Urology    CYSTOSCOPY TRANSURETHRAL RESECTION OF PROSTATE N/A 7/11/2019    Procedure: CYSTOSCOPY TRANSURETHRAL RESECTION OF PROSTATE;  Surgeon: Wang Soares Jr., MD;  [facility];  Service: Urology    CYSTOSCOPY URETEROSCOPY LASER LITHOTRIPSY N/A 6/13/2019    Procedure: CYSTO LITHOPAXY;  Surgeon: Wang Soares Jr., MD;  [facility];  Service: Urology    ERCP N/A 9/3/2019    Procedure: ENDOSCOPIC RETROGRADE CHOLANGIOPANCREATOGRAPHY with sphincterotomy and balloon sweep;  Surgeon: Ashok Amaya MD;  Location: [facility] ENDOSCOPY;  Service: Gastroenterology    EYE SURGERY      Lens implants    LUMBAR DISCECTOMY FUSION INSTRUMENTATION      LUMBAR EPIDURAL INJECTION N/A 5/4/2022    Procedure: lumbar epidural steroid injection;  Surgeon: Charlene Manzo MD;  [facility];  Service: Pain Management;  Laterality: N/A;    MEDIAL BRANCH BLOCK Bilateral 12/29/2021    Procedure: LUMBAR MEDIAL BRANCH BLOCK Bilateral L3-S1 x2 (2 weeks apart);  Surgeon: Charlene Manzo MD;  [facility];  Service: Pain Management;  Laterality: Bilateral;    MEDIAL BRANCH BLOCK Bilateral 1/12/2022    Procedure: LUMBAR MEDIAL BRANCH BLOCK Bilateral L3-S1 x2 (2 weeks apart);  Surgeon: Charlene Manzo MD;  [facility];  Service: Pain Management;  Laterality: Bilateral;    RADIOFREQUENCY ABLATION Bilateral 2/7/2022    Procedure: RADIOFREQUENCY ABLATION LUMBAR bilateral  L3-S1;  Surgeon: Charlene Manzo MD;  Location: [facility] MAIN OR;  Service: Pain Management;  Laterality: Bilateral;    SKIN CANCER EXCISION Left     chest wall    SKIN CANCER EXCISION  01/21/2021    facial    TRANSURETHRAL RESECTION OF BLADDER TUMOR N/A 11/29/2018    Procedure: TUR BLADDER TUMOR  LARGE;  Surgeon: Wang Soares Jr., MD;  Location: [facility] MAIN OR;  Service: Urology                        PT Assessment/Plan       Row Name 10/18/24 1500          PT Assessment    Assessment Comments Mr. Zayas returns to PT this date continuing to have high pain irritability throughout R paraspinals, into R shoulder, R hip, and R knee. Continued with moist heat under for supine exercises as well as spent time performing gentle STM to R lateral thigh with several taught palpable tissue bands and completed gentle AP tibial distraction for knee joint distraction with overall good tolerance. He has significant tightness throughout BL paraspinals with noted tenderness to light palpation, may likely benefit from trial of e-stem at next session for pain relief.  -MO        PT Plan    PT Plan Comments trial estem at end of session. d/c clamshell and add lateral walking, step taps  -MO               User Key  (r) = Recorded By, (t) = Taken By, (c) = Cosigned By      Initials Name Provider Type    MO Jennifer Sanchez, PT Physical Therapist                       OP Exercises       Row Name 10/18/24 1400             Subjective    Subjective Comments I had to move a lot of trash bags last night so walked up and down my driveway 3-4 times,a  little sore today  -MO         Subjective Pain    Able to rate subjective pain? yes  -MO      Pre-Treatment Pain Level 5  -MO      Post-Treatment Pain Level 5  -MO         Total Minutes    02254 - PT Therapeutic Exercise Minutes 20  -MO      56891 - PT Therapeutic Activity Minutes 15  -MO      05026 - PT Manual Therapy Minutes 8  -MO         Exercise 1    Exercise Name 1 Nustep  -MO      Cueing  1 Verbal  -MO      Time 1 5min  -MO         Exercise 2    Exercise Name 2 swiss ball roll out  -MO      Cueing 2 Verbal  -MO      Sets 2 1  -MO      Reps 2 10  -MO         Exercise 3    Exercise Name 3 supine LTR  -MO      Cueing 3 Verbal  -MO      Sets 3 1B  -MO      Reps 3 15  -MO      Time 3 over MH  -MO         Exercise 4    Exercise Name 4 HL hip add  -MO      Cueing 4 Verbal  -MO      Sets 4 1  -MO      Reps 4 10  -MO      Time 4 5s  -MO      Additional Comments over MH  -MO         Exercise 5    Exercise Name 5 HL clamshell  -MO      Cueing 5 Verbal  -MO      Sets 5 1  -MO      Reps 5 15  -MO      Time 5 GTB  -MO      Additional Comments over MH  -MO         Exercise 7    Exercise Name 7 beginner bridge w/ abd  -MO      Cueing 7 Verbal  -MO      Sets 7 2  -MO      Reps 7 10  -MO      Additional Comments small range, over moist heat  -MO         Exercise 8    Exercise Name 8 STS blue mat  -MO      Cueing 8 Verbal  -MO      Sets 8 1  -MO      Reps 8 10  -MO         Exercise 10    Exercise Name 10 step up  -MO      Cueing 10 Verbal;Demo  -MO      Sets 10 1B  -MO      Reps 10 10  -MO      Time 10 4in step, 1 UE  -MO                User Key  (r) = Recorded By, (t) = Taken By, (c) = Cosigned By      Initials Name Provider Type    Jennifer Cartagena, PT Physical Therapist                             Manual Rx (Last 36 Hours)       Manual Treatments       Row Name 10/18/24 1500 10/18/24 1400          Total Minutes    59240 - PT Manual Therapy Minutes -- 8  -MO        Manual Rx 1    Manual Rx 1 Location R ITB- STM  -MO --        Manual Rx 2    Manual Rx 2 Location R knee- tib/fib AP distraction  -MO --               User Key  (r) = Recorded By, (t) = Taken By, (c) = Cosigned By      Initials Name Provider Type    Jennifer Cartagena, PT Physical Therapist                                       Time Calculation:   Start Time: 1445  Stop Time: 1528  Time Calculation (min): 43 min  Timed Charges  16719 - PT Therapeutic Exercise  Minutes: 20  44676 - PT Manual Therapy Minutes: 8  86423 - PT Therapeutic Activity Minutes: 15  Total Minutes  Timed Charges Total Minutes: 43   Total Minutes: 43  Therapy Charges for Today       Code Description Service Date Service Provider Modifiers Qty    15138675039 HC PT THER PROC EA 15 MIN 10/18/2024 Jennifer Sanchez, PT GP 1    [id number]  PT THERAPEUTIC ACT EA 15 MIN 10/18/2024 Jennifer Sanchez PT GP 1    [id number]  PT MANUAL THERAPY EA 15 MIN 10/18/2024 Jennifer Sanchez PT GP 1                      Jennifer Sanchez, PT  10/18/2024

## 2024-10-21 ENCOUNTER — HOSPITAL ENCOUNTER (OUTPATIENT)
Dept: PHYSICAL THERAPY | Facility: HOSPITAL | Age: 84
Setting detail: THERAPIES SERIES
Discharge: HOME | End: 2024-10-21
Payer: MEDICARE

## 2024-10-21 DIAGNOSIS — M54.50 CHRONIC RIGHT-SIDED LOW BACK PAIN WITHOUT SCIATICA: ICD-10-CM

## 2024-10-21 DIAGNOSIS — Z74.09 IMPAIRED MOBILITY: ICD-10-CM

## 2024-10-21 DIAGNOSIS — R29.898 LEG WEAKNESS, BILATERAL: ICD-10-CM

## 2024-10-21 DIAGNOSIS — R29.6 FREQUENT FALLS: ICD-10-CM

## 2024-10-21 DIAGNOSIS — G89.29 CHRONIC RIGHT-SIDED LOW BACK PAIN WITHOUT SCIATICA: ICD-10-CM

## 2024-10-21 DIAGNOSIS — R26.89 BALANCE DISORDER: Primary | ICD-10-CM

## 2024-10-21 PROCEDURE — 97032 APPL MODALITY 1+ESTIM EA 15: CPT

## 2024-10-21 PROCEDURE — 97530 THERAPEUTIC ACTIVITIES: CPT

## 2024-10-21 PROCEDURE — 97110 THERAPEUTIC EXERCISES: CPT

## 2024-10-23 ENCOUNTER — APPOINTMENT (OUTPATIENT)
Dept: PHYSICAL THERAPY | Facility: HOSPITAL | Age: 84
End: 2024-10-23
Payer: MEDICARE

## 2024-10-23 ENCOUNTER — TELEPHONE (OUTPATIENT)
Dept: PHYSICAL THERAPY | Facility: HOSPITAL | Age: 84
End: 2024-10-23

## 2024-10-28 ENCOUNTER — HOSPITAL ENCOUNTER (OUTPATIENT)
Dept: PHYSICAL THERAPY | Facility: HOSPITAL | Age: 84
Setting detail: THERAPIES SERIES
Discharge: HOME | End: 2024-10-28
Payer: MEDICARE

## 2024-10-28 DIAGNOSIS — R26.89 BALANCE DISORDER: Primary | ICD-10-CM

## 2024-10-28 DIAGNOSIS — M54.50 CHRONIC RIGHT-SIDED LOW BACK PAIN WITHOUT SCIATICA: ICD-10-CM

## 2024-10-28 DIAGNOSIS — R29.898 LEG WEAKNESS, BILATERAL: ICD-10-CM

## 2024-10-28 DIAGNOSIS — Z74.09 IMPAIRED MOBILITY: ICD-10-CM

## 2024-10-28 DIAGNOSIS — R29.6 FREQUENT FALLS: ICD-10-CM

## 2024-10-28 DIAGNOSIS — G89.29 CHRONIC RIGHT-SIDED LOW BACK PAIN WITHOUT SCIATICA: ICD-10-CM

## 2024-10-28 PROCEDURE — 97530 THERAPEUTIC ACTIVITIES: CPT

## 2024-10-28 NOTE — THERAPY TREATMENT NOTE
Outpatient Physical Therapy Ortho Treatment Note  [facility]     Patient Name: Sukhdev Zayas  : 1940  MRN: [id number]  Today's Date: 10/28/2024      Visit Date: 10/28/2024    Visit Dx:    ICD-10-CM ICD-9-CM   1. Balance disorder  R26.89 781.99   2. Frequent falls  R29.6 V15.88   3. Impaired mobility  Z74.09 799.89   4. Leg weakness, bilateral  R29.898 729.89   5. Chronic right-sided low back pain without sciatica  M54.50 724.2    G89.29 338.29       Patient Active Problem List   Diagnosis    Hyperlipidemia    Coronary artery disease involving native coronary artery of native heart without angina pectoris    MCI (mild cognitive impairment)    Mood disorder    Closed wedge compression fracture of third thoracic vertebra with routine healing    GERD (gastroesophageal reflux disease)    S/P drug eluting coronary stent placement    Chest pain    Diastolic dysfunction    Mixed action and resting tremor    Bladder cancer    BPH (benign prostatic hyperplasia)    Atrial fibrillation    Essential hypertension    Dizziness    Beta-blocker intolerance    Orthostatic hypotension    Parkinsonism    Lumbar facet arthropathy    Spondylosis of 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    Depression     Dizziness     GERD (gastroesophageal reflux disease)     History of fractured rib     RIGHT SIDE    Hyperlipidemia     Multiple falls     Tremors of nervous system     Urinary incontinence     Vertigo         Past Surgical History:   Procedure Laterality Date    CARDIAC CATHETERIZATION      7x, 5 stents    CATARACT EXTRACTION, BILATERAL      CHOLECYSTECTOMY N/A 2019    Procedure: CHOLECYSTECTOMY LAPAROSCOPIC WITH  INTRAOPERATIVE CHOLANGIOGRAM;  Surgeon: Bg Rodriguez Jr., MD;  [facility];  Service: General    COLONOSCOPY      CORONARY ANGIOPLASTY WITH STENT PLACEMENT      X5     CYSTOSCOPY BLADDER BIOPSY N/A 1/8/2019    Procedure: CYSTOSCOPY BLADDER BIOPSY;  Surgeon: Wang Soares Jr., MD;  Location:  [facility];  Service: Urology    CYSTOSCOPY BLADDER BIOPSY N/A 6/13/2019    Procedure: CYSTOSCOPY BLADDER BIOPSY;  Surgeon: Wang Soares Jr., MD;  [facility];  Service: Urology    CYSTOSCOPY TRANSURETHRAL RESECTION OF PROSTATE N/A 7/11/2019    Procedure: CYSTOSCOPY TRANSURETHRAL RESECTION OF PROSTATE;  Surgeon: Wang Soares Jr., MD;  [facility];  Service: Urology    CYSTOSCOPY URETEROSCOPY LASER LITHOTRIPSY N/A 6/13/2019    Procedure: CYSTO LITHOPAXY;  Surgeon: Wang Soares Jr., MD;  [facility];  Service: Urology    ERCP N/A 9/3/2019    Procedure: ENDOSCOPIC RETROGRADE CHOLANGIOPANCREATOGRAPHY with sphincterotomy and balloon sweep;  Surgeon: Ashok Amaya MD;  Location: [facility] ENDOSCOPY;  Service: Gastroenterology    EYE SURGERY      Lens implants    LUMBAR DISCECTOMY FUSION INSTRUMENTATION      LUMBAR EPIDURAL INJECTION N/A 5/4/2022    Procedure: lumbar epidural steroid injection;  Surgeon: Charlene Manzo MD;  [facility];  Service: Pain Management;  Laterality: N/A;    MEDIAL BRANCH BLOCK Bilateral 12/29/2021    Procedure: LUMBAR MEDIAL BRANCH BLOCK Bilateral L3-S1 x2 (2 weeks apart);  Surgeon: Charlene Manzo MD;  [facility];  Service: Pain Management;  Laterality: Bilateral;    MEDIAL BRANCH BLOCK Bilateral 1/12/2022    Procedure: LUMBAR MEDIAL BRANCH BLOCK Bilateral L3-S1 x2 (2 weeks apart);  Surgeon: Charlene Manzo MD;  [facility];  Service: Pain Management;  Laterality: Bilateral;    RADIOFREQUENCY ABLATION Bilateral 2/7/2022    Procedure: RADIOFREQUENCY ABLATION LUMBAR bilateral  L3-S1;  Surgeon: Charlene Manzo MD;  Location: [facility] MAIN OR;  Service: Pain Management;  Laterality: Bilateral;    SKIN CANCER EXCISION Left     chest wall    SKIN CANCER EXCISION  01/21/2021    facial    TRANSURETHRAL RESECTION OF BLADDER TUMOR N/A 11/29/2018    Procedure: TUR BLADDER TUMOR  LARGE;  Surgeon: Wang Soares Jr., MD;  Location: [facility] MAIN OR;  Service: Urology                        PT Assessment/Plan       Row Name 10/28/24 1600          PT Assessment    Assessment Comments Mr. Zayas returns to PT continuing to have ongoing pain however does report overall worse pain levels in R knee, as well as reports feeling worsening LE strength over the last week. He reports several instances of feeling that his knees are going to buckle. When asked about his activty at home, he continues to report little compliance with HEP secondary to ongoing pain with activity. He reports remaining seated is the only position of relief for back and knee pain. Through further assessment of knee, he remains tender to palpation along lateral joint and distal hamstring as well as overall has significant HS tightness BL. Crepitus apparent with position changes, little patellar movement when coming to stand. He does require more frequent rest breaks this date secondary to fatigue and onset of pain. Utilized IFC tens this date throughout session to allow for back pain modulation throughout exercises which pt reports has overall good control of discomfort throughout. Spent additional time at session termination discussing need to improve overall mobility for long term pain management. Discussed arthritis joint changes as well as overall conservative arthritis management and need for tolerable strengthening activities. Additionally discussed referral to orthopedic doctor for R knee pain for potential imaging and possible injections for pain relief as he remains limited with all activity secondary to worsening pain. Will  assess tolerance to IFC throughout session, may benefit from acquiring home unit.  -MO        PT Plan    PT Plan Comments how was estim? ortho referral. Continue with all standing exercises  -MO               User Key  (r) = Recorded By, (t) = Taken By, (c) = Cosigned By      Initials Name Provider Type    Jennifer Cartagena PT Physical Therapist                     Modalities       Row Name 10/28/24 1500             ELECTRICAL STIMULATION    Attended/Unattended Unattended  -MO      Stimulation Type IFC  -MO      Max mAmp 23  -MO      Location/Electrode Placement/Other BL multifidus, BL paraspinals below scapula  -MO      13904 - PT E-Stim Attended Minutes 35  -MO                User Key  (r) = Recorded By, (t) = Taken By, (c) = Cosigned By      Initials Name Provider Type    Jennifer Cratagena PT Physical Therapist                   OP Exercises       Row Name 10/28/24 1500             Total Minutes    85834 - PT Therapeutic Activity Minutes 38  -MO         Exercise 1    Exercise Name 1 Nustep  -MO      Cueing 1 Verbal  -MO      Time 1 5min  -MO         Exercise 6    Exercise Name 6 seated HS stretch  -MO      Cueing 6 Verbal;Tactile;Demo  -MO      Sets 6 3B  -MO      Reps 6 20s  -MO      Time 6 copious cueing for painfree stretching range  -MO         Exercise 8    Exercise Name 8 STS blue mat  -MO      Cueing 8 Verbal  -MO      Sets 8 1  -MO      Reps 8 10  -MO         Exercise 11    Exercise Name 11 lateral walking  -MO      Cueing 11 Verbal  -MO      Reps 11 4 laps  -MO      Time 11 w/o resistance  -MO         Exercise 12    Exercise Name 12 standing march  -MO      Cueing 12 Verbal  -MO      Reps 12 2 alternating  -MO      Time 12 10  -MO      Additional Comments seated rest break between  -MO                User Key  (r) = Recorded By, (t) = Taken By, (c) = Cosigned By      Initials Name Provider Type    Jennifer Cartagena PT Physical Therapist                                                    Time Calculation:    Start Time: 1400  Stop Time: 1438  Time Calculation (min): 38 min  Timed Charges  14765 - PT E-Stim Attended Minutes: 35  44993 - PT Therapeutic Activity Minutes: 38  Total Minutes  Timed Charges Total Minutes: 73   Total Minutes: 73  Therapy Charges for Today       Code Description Service Date Service Provider Modifiers Qty    [id number]  PT THERAPEUTIC ACT EA 15 MIN 10/28/2024 Jennifer Sanchez, PT GP 3                      Jennifer Sanchez PT  10/28/2024

## 2024-10-30 ENCOUNTER — TELEPHONE (OUTPATIENT)
Dept: PHYSICAL THERAPY | Facility: HOSPITAL | Age: 84
End: 2024-10-30

## 2024-10-30 ENCOUNTER — APPOINTMENT (OUTPATIENT)
Dept: PHYSICAL THERAPY | Facility: HOSPITAL | Age: 84
End: 2024-10-30
Payer: MEDICARE

## 2024-11-04 ENCOUNTER — TELEPHONE (OUTPATIENT)
Dept: PHYSICAL THERAPY | Facility: HOSPITAL | Age: 84
End: 2024-11-04
Payer: MEDICARE

## 2024-11-04 ENCOUNTER — HOSPITAL ENCOUNTER (OUTPATIENT)
Dept: PHYSICAL THERAPY | Facility: HOSPITAL | Age: 84
Setting detail: THERAPIES SERIES
End: 2024-11-04
Payer: MEDICARE

## 2024-11-04 NOTE — TELEPHONE ENCOUNTER
Pt arrives to PT apt this afternoon and reports to this therapist that he had a fall in the parking lot of [address] in. He reports that he was walking through a grass area, his legs gave out, and he fell onto his knees. Upon brief orthopedic assessment, there are no open wounds present, no signs of any head trauma. He does deny any head hit or LOC. He was able to get up from the ground and amb in. He is tender to palpation along medial knee and posterior proximal calf. He denied need for ER workup. Secondary to progressive LE weakness, PT assisted patient to car via w/c. Safe report was submitted. Additionally spoke with pts wife following incident via phone, alerted her of what happened and discussed need for f/u with PCP regarding worsening overall symptoms, progressive LE weakness, and for knee assessment following today's incident. She is agreeable.

## 2024-11-06 ENCOUNTER — APPOINTMENT (OUTPATIENT)
Dept: PHYSICAL THERAPY | Facility: HOSPITAL | Age: 84
End: 2024-11-06
Payer: MEDICARE

## 2024-11-13 ENCOUNTER — APPOINTMENT (OUTPATIENT)
Dept: PHYSICAL THERAPY | Facility: HOSPITAL | Age: 84
End: 2024-11-13
Payer: MEDICARE

## 2024-11-13 ENCOUNTER — TELEPHONE (OUTPATIENT)
Dept: PHYSICAL THERAPY | Facility: HOSPITAL | Age: 84
End: 2024-11-13
Payer: MEDICARE

## 2024-11-13 NOTE — TELEPHONE ENCOUNTER
Caller: Taty Zayas    Relationship: Emergency Contact       What was the call regarding: WIFE IS ER

## 2024-11-18 ENCOUNTER — APPOINTMENT (OUTPATIENT)
Dept: PHYSICAL THERAPY | Facility: HOSPITAL | Age: 84
End: 2024-11-18
Payer: MEDICARE

## 2024-11-25 ENCOUNTER — TELEPHONE (OUTPATIENT)
Dept: PHYSICAL THERAPY | Facility: HOSPITAL | Age: 84
End: 2024-11-25
Payer: MEDICARE

## 2024-11-25 NOTE — TELEPHONE ENCOUNTER
Spoke with patient's spouse regarding no show for today's appointment. Reminded pt of next appointment date and time, as well as reminded of 24 hour cancellation policy. Requested pt call back if unable to make next appointment. Pt's spouse states they were unaware of the appointment as they must have forgot to put it in their calendar.

## 2024-11-27 ENCOUNTER — HOSPITAL ENCOUNTER (OUTPATIENT)
Dept: PHYSICAL THERAPY | Facility: HOSPITAL | Age: 84
Setting detail: THERAPIES SERIES
Discharge: HOME | End: 2024-11-27
Payer: MEDICARE

## 2024-11-27 DIAGNOSIS — R29.898 LEG WEAKNESS, BILATERAL: ICD-10-CM

## 2024-11-27 DIAGNOSIS — R29.6 FREQUENT FALLS: ICD-10-CM

## 2024-11-27 DIAGNOSIS — R26.89 BALANCE DISORDER: Primary | ICD-10-CM

## 2024-11-27 DIAGNOSIS — Z74.09 IMPAIRED MOBILITY: ICD-10-CM

## 2024-11-27 PROCEDURE — 97530 THERAPEUTIC ACTIVITIES: CPT

## 2024-11-27 NOTE — THERAPY PROGRESS REPORT/RE-CERT
Outpatient Physical Therapy Ortho Progress Note  [facility]     Patient Name: Sukhdev Zayas  : 1940  MRN: [id number]  Today's Date: 2024      Visit Date: 2024    Visit Dx:    ICD-10-CM ICD-9-CM   1. Balance disorder  R26.89 781.99   2. Frequent falls  R29.6 V15.88   3. Impaired mobility  Z74.09 799.89   4. Leg weakness, bilateral  R29.898 729.89       Patient Active Problem List   Diagnosis    Hyperlipidemia    Coronary artery disease involving native coronary artery of native heart without angina pectoris    MCI (mild cognitive impairment)    Mood disorder    Closed wedge compression fracture of third thoracic vertebra with routine healing    GERD (gastroesophageal reflux disease)    S/P drug eluting coronary stent placement    Chest pain    Diastolic dysfunction    Mixed action and resting tremor    Bladder cancer    BPH (benign prostatic hyperplasia)    Atrial fibrillation    Essential hypertension    Dizziness    Beta-blocker intolerance    Orthostatic hypotension    Parkinsonism    Lumbar facet arthropathy    Spondylosis of 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    Depression     Dizziness     GERD (gastroesophageal reflux disease)     History of fractured rib     RIGHT SIDE    Hyperlipidemia     Multiple falls     Tremors of nervous system     Urinary incontinence     Vertigo         Past Surgical History:   Procedure Laterality Date    CARDIAC CATHETERIZATION      7x, 5 stents    CATARACT EXTRACTION, BILATERAL      CHOLECYSTECTOMY N/A 2019    Procedure: CHOLECYSTECTOMY LAPAROSCOPIC WITH INTRAOPERATIVE CHOLANGIOGRAM;  Surgeon: Bg Rodriguez Jr., MD;  Location: [facility]  OR;  Service: General    COLONOSCOPY      CORONARY ANGIOPLASTY WITH STENT PLACEMENT      X5     CYSTOSCOPY BLADDER BIOPSY N/A 1/8/2019    Procedure: CYSTOSCOPY BLADDER BIOPSY;  Surgeon: Wang Soares Jr., MD;  Location:  [facility];  Service: Urology    CYSTOSCOPY BLADDER BIOPSY N/A 6/13/2019    Procedure: CYSTOSCOPY BLADDER BIOPSY;  Surgeon: Wang Soares Jr., MD;  Location:  [facility];  Service: Urology    CYSTOSCOPY TRANSURETHRAL RESECTION OF PROSTATE N/A 7/11/2019    Procedure: CYSTOSCOPY TRANSURETHRAL RESECTION OF PROSTATE;  Surgeon: Wang Soares Jr., MD;  [facility];  Service: Urology    CYSTOSCOPY URETEROSCOPY LASER LITHOTRIPSY N/A 6/13/2019    Procedure: CYSTO LITHOPAXY;  Surgeon: Wang Soares Jr., MD;  Location:  [facility];  Service: Urology    ERCP N/A 9/3/2019    Procedure: ENDOSCOPIC RETROGRADE CHOLANGIOPANCREATOGRAPHY with sphincterotomy and balloon sweep;  Surgeon: Ashok Amaya MD;  Location: [facility] ENDOSCOPY;  Service: Gastroenterology    EYE SURGERY      Lens implants    LUMBAR DISCECTOMY FUSION INSTRUMENTATION      LUMBAR EPIDURAL INJECTION N/A 5/4/2022    Procedure: lumbar epidural steroid injection;  Surgeon: Charlene Manzo MD;  [facility];  Service: Pain Management;  Laterality: N/A;    MEDIAL BRANCH BLOCK Bilateral 12/29/2021    Procedure: LUMBAR MEDIAL BRANCH BLOCK Bilateral L3-S1 x2 (2 weeks apart);  Surgeon: Charlene Manzo MD;  [facility];  Service: Pain Management;  Laterality: Bilateral;    MEDIAL BRANCH BLOCK Bilateral 1/12/2022    Procedure: LUMBAR MEDIAL BRANCH BLOCK Bilateral L3-S1 x2 (2 weeks apart);  Surgeon: Charlene Manzo MD;  [facility];  Service: Pain Management;  Laterality: Bilateral;    RADIOFREQUENCY ABLATION Bilateral 2/7/2022    Procedure: RADIOFREQUENCY ABLATION LUMBAR bilateral L3-S1;  Surgeon: Charlene Manzo MD;  [facility];  Service: Pain Management;   Laterality: Bilateral;    SKIN CANCER EXCISION Left     chest wall    SKIN CANCER EXCISION  01/21/2021    facial    TRANSURETHRAL RESECTION OF BLADDER TUMOR N/A 11/29/2018    Procedure: TUR BLADDER TUMOR  LARGE;  Surgeon: Wang Soares Jr., MD;  Location: [facility];  Service: Urology                        PT Assessment/Plan       Row Name 11/27/24 9477          PT Assessment    Functional Limitations Impaired gait;Limitation in home management;Limitations in community activities;Limitations in functional capacity and performance;Performance in leisure activities;Performance in self-care ADL;Performance in sport activities  -MO     Impairments Balance;Pain;Gait;Muscle strength;Range of motion;Poor body mechanics;Joint mobility;Coordination  -MO     Assessment Comments Sukhdev Zayas has been seen for 13 physical therapy sessions for R side shoulder pain, LBP, R knee pain, and significant balance and functional mobility deficits.  Treatment has included therapeutic exercise, therapeutic activity, electrical stimulation , and patient education with home exercise program . Progress to physical therapy goals is slow. Pt has met 2/4 STG and 0/7 LTG. Progress to goals limited from significant PMH as well as by severity of symptoms. Additionally, compliance to HEP has been very little despite frequent education on arthritis pain management, need for strength training program, and falls prevention discussion. Pt returns today following 4 week gap in therapy secondary to pt scheduling difficulties and cancellations. He does subjectively report feeling an improvement in shoulder and low back pain symptoms at this time, reporting ~25% resolution in symptoms. LE weakness continue to be his greatest reported limitation as well as increased R knee pain with all activity. He has had 1 fall since the start of therapy while in the parking lot, denies any falls following. Today, he does show improved 2 minute walk  test, improving from 260' at last progress note to 286' this date, overall improvement of 50' since initial evaluation 3 months ago. 30s STS remains the same as he continues to have significant R knee pain with multiple repetitions. Resumed seated and standing exercises this date, held on supine to limit back pain exacerbation with overall improved tolerance. Reinforced need to be compliant with exercises for improvement in overall condition. Reviewed appropriate frequency and answered all questions. He will benefit from continued skilled physical therapy to address remaining impairments and functional limitations.  -MO     Please refer to paper survey for additional self-reported information No  -MO     Rehab Potential Good  -MO     Patient/caregiver participated in establishment of treatment plan and goals Yes  -MO     Patient would benefit from skilled therapy intervention Yes  -MO        PT Plan    PT Frequency 1x/week;2x/week  -MO     Predicted Duration of Therapy Intervention (PT) 8-10 visits  -MO     Planned CPT's? PT RE-EVAL: 78076;PT THER PROC EA 15 MIN: 70125;PT THER ACT EA 15 MIN: 42488;PT MANUAL THERAPY EA 15 MIN: 17101;PT NEUROMUSC RE-EDUCATION EA 15 MIN: 76091;PT GAIT TRAINING EA 15 MIN: 93801;PT EVAL AQUA: 50609;PT AQUATIC THERAPY EA 15 MIN: 43805;PT SELF CARE/HOME MGMT/TRAIN EA 15: 02524;PT HOT OR COLD PACK TREAT MCARE;PT ELECTRICAL STIM UNATTEND: ;PT ELECTRICAL STIM ATTD EA 15 MIN: 76379  -MO     PT Plan Comments ortho referral for knee pain. how was adherence to HEP? tandem walk, monster walks, small step up  -MO               User Key  (r) = Recorded By, (t) = Taken By, (c) = Cosigned By      Initials Name Provider Type    Jennifer Cartagena, PT Physical Therapist                       OP Exercises       Row Name 11/27/24 1300             Subjective    Subjective Comments The legs just want to always give out. I had to cancel because my wife hurt her shoulder  -MO         Total Minutes    99525  - PT Therapeutic Activity Minutes 38  -MO         Exercise 1    Exercise Name 1 Nustep  -MO      Cueing 1 Verbal  -MO      Time 1 5min  -MO         Exercise 2    Exercise Name 2 Progress note  -MO      Additional Comments 2 min walk, goal discussion  -MO         Exercise 3    Exercise Name 3 seated LAQ  -MO      Cueing 3 Verbal  -MO      Sets 3 2B  -MO      Reps 3 10  -MO         Exercise 4    Exercise Name 4 seated add  -MO      Cueing 4 Verbal  -MO      Sets 4 2  -MO      Reps 4 10  -MO      Time 4 3s  -MO         Exercise 5    Exercise Name 5 Progress note  -MO      Additional Comments 2 min walk test, 30s STS, goal discussion  -MO         Exercise 7    Exercise Name 7 standing HR  -MO      Cueing 7 Verbal  -MO      Sets 7 2  -MO      Reps 7 10  -MO         Exercise 8    Exercise Name 8 30s STS  -MO      Time 8 6  -MO         Exercise 11    Exercise Name 11 lateral walking  -MO      Cueing 11 Verbal  -MO      Reps 11 3 laps  -MO      Time 11 YTB  -MO                User Key  (r) = Recorded By, (t) = Taken By, (c) = Cosigned By      Initials Name Provider Type    Jennifer Cartagena, PT Physical Therapist                                  PT OP Goals       Row Name 11/27/24 1300          PT Short Term Goals    STG Date to Achieve 10/09/24  -MO     STG 1 Patient will be independent with education for symptom management and initial HEP to decrease LBP pain.  -MO     STG 1 Progress Progressing  -MO     STG 2 Pt will report >/=25% improvement in R side back pain for overall improved QOL.  -MO     STG 2 Progress Met  -MO     STG 3 Pt performs 30 seconds sit to stand having complete at least 2 more repetitions that was performed upon initial evaluation for progression of ease with functional transfers, LE strength, and safety in the home.  -MO     STG 3 Progress Ongoing  -MO     STG 4 Pt will improve 2 min walk test by 50' for improved LE strength, endurance, and funcitonal mobility.  -MO     STG 4 Progress Met  -MO         Long Term Goals    LTG Date to Achieve 11/08/24  -MO     LTG 1 Patient will be independent with education for symptom management and advanced HEP to decrease LBP pain.  -MO     LTG 1 Progress Ongoing  -MO     LTG 2 Pt will be independent with advance HEP to improve strength and static/dynamic balance.  -MO     LTG 2 Progress Ongoing  -MO     LTG 3 Patient will improve standing tolerance from </10 min to >25 min with LBP </= 3/10 to improve participation in community activities.  -MO     LTG 3 Progress Ongoing  -MO     LTG 4 Pt will demo improved gross BL LE strength to >/= 4/5 for improved functional strength.  -MO     LTG 4 Progress Ongoing  -MO     LTG 5 Pt will improve FGA score by 7 in order to demo a reduced risk of falls.  -MO     LTG 5 Progress Ongoing  -MO     LTG 6 Pt will complete 10x STS in 30sec for improved LE functional strength.  -MO     LTG 6 Progress Ongoing  -MO     LTG 7 Pt will perform 2 min walk test ambulating over 370ft with/without assistive device with SBA/Supervision without LOB with directional changes for improvements in safety with ambulatory tasks in house or community.  -MO     LTG 7 Progress Ongoing  -MO               User Key  (r) = Recorded By, (t) = Taken By, (c) = Cosigned By      Initials Name Provider Type    Jennifer Cartagena, PT Physical Therapist                         2 Minute Walk Test  Distance Ambulated in 2 Minutes: 286  30 Second Chair Stand Test  30 Second Chair Stand Test: 6         Time Calculation:   Start Time: 1315  Stop Time: 1353  Time Calculation (min): 38 min  Timed Charges  42164 - PT Therapeutic Activity Minutes: 38  Total Minutes  Timed Charges Total Minutes: 38   Total Minutes: 38  Therapy Charges for Today       Code Description Service Date Service Provider Modifiers Qty    36824140220  PT THERAPEUTIC ACT EA 15 MIN 11/27/2024 Jennifer Sanchez, PT GP 3                      Jennifer Sanchez PT  11/27/2024

## 2024-12-10 ENCOUNTER — OFFICE VISIT (OUTPATIENT)
Dept: INTERNAL MEDICINE | Facility: CLINIC | Age: 84
End: 2024-12-10
Payer: MEDICARE

## 2024-12-10 ENCOUNTER — HOSPITAL ENCOUNTER (OUTPATIENT)
Facility: HOSPITAL | Age: 84
Discharge: HOME | End: 2024-12-10
Payer: MEDICARE

## 2024-12-10 VITALS
DIASTOLIC BLOOD PRESSURE: 74 MMHG | SYSTOLIC BLOOD PRESSURE: 116 MMHG | HEIGHT: 72 IN | BODY MASS INDEX: 26.01 KG/M2 | TEMPERATURE: 98.1 F | WEIGHT: 192 LBS

## 2024-12-10 DIAGNOSIS — M25.562 ACUTE PAIN OF BOTH KNEES: ICD-10-CM

## 2024-12-10 DIAGNOSIS — M54.50 ACUTE BILATERAL LOW BACK PAIN WITHOUT SCIATICA: ICD-10-CM

## 2024-12-10 DIAGNOSIS — E03.9 ACQUIRED HYPOTHYROIDISM: Primary | ICD-10-CM

## 2024-12-10 DIAGNOSIS — M25.561 ACUTE PAIN OF BOTH KNEES: ICD-10-CM

## 2024-12-10 DIAGNOSIS — I10 ESSENTIAL HYPERTENSION: ICD-10-CM

## 2024-12-10 DIAGNOSIS — G20.C PARKINSONISM, UNSPECIFIED PARKINSONISM TYPE: ICD-10-CM

## 2024-12-10 DIAGNOSIS — R53.83 OTHER FATIGUE: ICD-10-CM

## 2024-12-10 PROCEDURE — G2211 COMPLEX E/M VISIT ADD ON: HCPCS | Performed by: PHYSICIAN ASSISTANT

## 2024-12-10 PROCEDURE — 3074F SYST BP LT 130 MM HG: CPT | Performed by: PHYSICIAN ASSISTANT

## 2024-12-10 PROCEDURE — 1160F RVW MEDS BY RX/DR IN RCRD: CPT | Performed by: PHYSICIAN ASSISTANT

## 2024-12-10 PROCEDURE — 99214 OFFICE O/P EST MOD 30 MIN: CPT | Performed by: PHYSICIAN ASSISTANT

## 2024-12-10 PROCEDURE — 1159F MED LIST DOCD IN RCRD: CPT | Performed by: PHYSICIAN ASSISTANT

## 2024-12-10 PROCEDURE — 3078F DIAST BP <80 MM HG: CPT | Performed by: PHYSICIAN ASSISTANT

## 2024-12-10 PROCEDURE — 73562 X-RAY EXAM OF KNEE 3: CPT

## 2024-12-10 PROCEDURE — 72100 X-RAY EXAM L-S SPINE 2/3 VWS: CPT

## 2024-12-10 PROCEDURE — 1125F AMNT PAIN NOTED PAIN PRSNT: CPT | Performed by: PHYSICIAN ASSISTANT

## 2024-12-10 RX ORDER — FLUOXETINE 40 MG/1
40 CAPSULE ORAL DAILY
Qty: 90 CAPSULE | Refills: 3 | Status: SHIPPED | OUTPATIENT
Start: 2024-12-10

## 2024-12-10 NOTE — PROGRESS NOTES
Subjective   Chief Complaint   Patient presents with    Back Pain     Minor fall 2 weeks ago     Uncontrollable Muscle Weakness     Lower extremity       History of Present Illness     He missed the toilet seat and fell in the floor about 1-2 weeks ago. He states his left knees are really bothering him after the fall. Wife states he is not moving as much due to worsening pain. She thinks he has been more depressed with his symptoms of parkinson's and pain continuing to worsen. Has been having more fatigue as well.      Patient Active Problem List   Diagnosis    Hyperlipidemia    Coronary artery disease involving native coronary artery of native heart without angina pectoris    MCI (mild cognitive impairment)    Mood disorder    Closed wedge compression fracture of third thoracic vertebra with routine healing    GERD (gastroesophageal reflux disease)    S/P drug eluting coronary stent placement    Chest pain    Diastolic dysfunction    Mixed action and resting tremor    Bladder cancer    BPH (benign prostatic hyperplasia)    Atrial fibrillation    Essential hypertension    Dizziness    Beta-blocker intolerance    Orthostatic hypotension    Parkinsonism    Lumbar facet arthropathy    Spondylosis of lumbar region without myelopathy or radiculopathy    Lumbar foraminal stenosis    Chronic bilateral low back pain without sciatica    Hypothyroidism    DDD (degenerative disc disease), lumbar    Lumbar radiculopathy       Allergies   Allergen Reactions    Bupropion Dizziness, Delirium and Other (See Comments)       Current Outpatient Medications on File Prior to Visit   Medication Sig Dispense Refill    acetaminophen-codeine (TYLENOL #3) 300-30 MG per tablet Take 1 tablet by mouth Every 6 (Six) Hours As Needed for Moderate Pain. 60 tablet 1    aspirin 81 MG chewable tablet Chew 1 tablet Daily. 30 tablet 0    Blood Pressure Monitoring (Comfort Touch BP Cuff/Large) misc Use 1 each Daily. 1 each 0    clopidogrel (PLAVIX) 75 MG  tablet Take 1 tablet by mouth Daily. Start on Monday      FLUoxetine (PROzac) 20 MG capsule Take 1 capsule by mouth Daily.      levothyroxine (SYNTHROID, LEVOTHROID) 50 MCG tablet TAKE 1 TABLET BY MOUTH DAILY 90 tablet 0    midodrine (PROAMATINE) 5 MG tablet Take 0.5 tablets by mouth Daily As Needed (hypotension). 30 tablet 1    omeprazole (priLOSEC) 40 MG capsule Take 1 capsule by mouth Daily. 90 capsule 4    VITAMIN D PO Take  by mouth. 400 mg daily       No current facility-administered medications on file prior to visit.       Past Medical History:   Diagnosis Date    Anxiety     Back pain     Bladder cancer 2018    bladder cancer has been removed.    Depression     Dizziness     GERD (gastroesophageal reflux disease)     History of fractured rib     RIGHT SIDE    Hyperlipidemia     Multiple falls     Tremors of nervous system     Urinary incontinence     Vertigo        Family History   Problem Relation Age of Onset    Arthritis Mother     Glaucoma Mother     Heart disease Father     Emphysema Father     Tremor Father     Malig Hyperthermia Neg Hx        Social History     Socioeconomic History    Marital status:     Number of children: 4    Years of education: 5 college degrees   Tobacco Use    Smoking status: Never    Smokeless tobacco: Never   Vaping Use    Vaping status: Never Used   Substance and Sexual Activity    Alcohol use: No     Comment: last drink about 1 year ago     Drug use: No    Sexual activity: Defer       Past Surgical History:   Procedure Laterality Date    CARDIAC CATHETERIZATION      7x, 5 stents    CATARACT EXTRACTION, BILATERAL      CHOLECYSTECTOMY N/A 9/2/2019    Procedure: CHOLECYSTECTOMY LAPAROSCOPIC WITH INTRAOPERATIVE CHOLANGIOGRAM;  Surgeon: Bg Rodriguez Jr., MD;  Location: [facility];  Service: General    COLONOSCOPY      CORONARY ANGIOPLASTY WITH STENT PLACEMENT      X5     CYSTOSCOPY BLADDER BIOPSY N/A 1/8/2019    Procedure: CYSTOSCOPY BLADDER BIOPSY;  Surgeon:  Wang Soares Jr., MD;  [facility];  Service: Urology    CYSTOSCOPY BLADDER BIOPSY N/A 6/13/2019    Procedure: CYSTOSCOPY BLADDER BIOPSY;  Surgeon: Wang Soares Jr., MD;  [facility];  Service: Urology    CYSTOSCOPY TRANSURETHRAL RESECTION OF PROSTATE N/A 7/11/2019    Procedure: CYSTOSCOPY TRANSURETHRAL RESECTION OF PROSTATE;  Surgeon: Wang Soares Jr., MD;  [facility];  Service: Urology    CYSTOSCOPY URETEROSCOPY LASER LITHOTRIPSY N/A 6/13/2019    Procedure: CYSTO LITHOPAXY;  Surgeon: Wang Soares Jr., MD;  [facility];  Service: Urology    ERCP N/A 9/3/2019    Procedure: ENDOSCOPIC RETROGRADE CHOLANGIOPANCREATOGRAPHY with sphincterotomy and balloon sweep;  Surgeon: Ashok Amaya MD;  Location: [facility] ENDOSCOPY;  Service: Gastroenterology    EYE SURGERY      Lens implants    LUMBAR DISCECTOMY FUSION INSTRUMENTATION      LUMBAR EPIDURAL INJECTION N/A 5/4/2022    Procedure: lumbar epidural steroid injection;  Surgeon: Charlene Manzo MD;  [facility];  Service: Pain Management;  Laterality: N/A;    MEDIAL BRANCH BLOCK Bilateral 12/29/2021    Procedure: LUMBAR MEDIAL BRANCH BLOCK Bilateral L3-S1 x2 (2 weeks apart);  Surgeon: Charlene Manzo MD;  [facility];  Service: Pain Management;  Laterality: Bilateral;    MEDIAL BRANCH BLOCK Bilateral 1/12/2022    Procedure: LUMBAR MEDIAL BRANCH BLOCK Bilateral L3-S1 x2 (2 weeks apart);  Surgeon: Charlene Manzo MD;  [facility];  Service: Pain Management;  Laterality: Bilateral;    RADIOFREQUENCY ABLATION Bilateral 2/7/2022    Procedure: RADIOFREQUENCY ABLATION LUMBAR bilateral L3-S1;  Surgeon: Charlene Manzo MD;  [facility];  Service: Pain Management;  Laterality: Bilateral;    SKIN CANCER EXCISION Left     chest wall    SKIN CANCER EXCISION  01/21/2021    facial    TRANSURETHRAL RESECTION OF BLADDER TUMOR N/A 11/29/2018     "Procedure: TUR BLADDER TUMOR  LARGE;  Surgeon: Wang Soares Jr., MD;  Location: [facility];  Service: Urology         The following portions of the patient's history were reviewed and updated as appropriate: problem list, allergies, current medications, past medical history, past family history, past social history, and past surgical history.    ROS    See HPI    Immunization History   Administered Date(s) Administered    COVID-19 (PFIZER) BIVALENT 12+YRS 10/27/2022    COVID-19 (PFIZER) Purple Cap Monovalent 01/26/2021, 02/20/2021, 08/23/2021    Fluad Quad 65+ 11/09/2020    Fluzone High-Dose 65+YRS 10/12/2016    Fluzone High-Dose 65+yrs 10/20/2021, 12/04/2023    Td, Not Adsorbed 03/25/2017    flucelvax quad pfs =>4 YRS 11/30/2018       Objective   Vitals:    12/10/24 1325   BP: 116/74   Temp: 98.1 °F (36.7 °C)   Weight: 87.1 kg (192 lb)   Height: 183 cm (72.05\")     Body mass index is 26.01 kg/m².  Physical Exam  Vitals reviewed.   Constitutional:       Appearance: Normal appearance.   HENT:      Head: Normocephalic and atraumatic.   Musculoskeletal:      Comments: Ttp over bilateral knees medial joint line   Neurological:      Mental Status: He is alert.      Comments: tremulous           Assessment & Plan   Diagnoses and all orders for this visit:    1. Acquired hypothyroidism (Primary)  -     TSH  -     T4, Free    2. Other fatigue  -     CBC & Differential  -     Vitamin B12    3. Essential hypertension  -     Comprehensive Metabolic Panel    4. Parkinsonism, unspecified Parkinsonism type  -     FLUoxetine (PROzac) 40 MG capsule; Take 1 capsule by mouth Daily.  Dispense: 90 capsule; Refill: 3  -     FLUoxetine (PROzac) 20 MG capsule; Take 1 capsule by mouth Daily.  Dispense: 90 capsule; Refill: 3    5. Acute bilateral low back pain without sciatica  -     XR Spine Lumbar 2 or 3 View; Future    6. Acute pain of both knees  -     XR Knee 3 View Bilateral; Future       Increase Prozac to 60 mg see if it " helps. Check labs with ongoing fatigue. Xrays today after fall. We did discuss possible medicinal marijuana for pain control etc.   Return for Lab Today.

## 2024-12-11 DIAGNOSIS — G20.C PRIMARY PARKINSONISM: Primary | ICD-10-CM

## 2024-12-11 LAB
ALBUMIN SERPL-MCNC: 4.1 G/DL (ref 3.7–4.7)
ALP SERPL-CCNC: 145 IU/L (ref 44–121)
ALT SERPL-CCNC: 36 IU/L (ref 0–44)
AST SERPL-CCNC: 40 IU/L (ref 0–40)
BASOPHILS # BLD AUTO: 0 X10E3/UL (ref 0–0.2)
BASOPHILS NFR BLD AUTO: 1 %
BILIRUB SERPL-MCNC: 0.6 MG/DL (ref 0–1.2)
BUN SERPL-MCNC: 16 MG/DL (ref 8–27)
BUN/CREAT SERPL: 15 (ref 10–24)
CALCIUM SERPL-MCNC: 9.6 MG/DL (ref 8.6–10.2)
CHLORIDE SERPL-SCNC: 104 MMOL/L (ref 96–106)
CO2 SERPL-SCNC: 24 MMOL/L (ref 20–29)
CREAT SERPL-MCNC: 1.04 MG/DL (ref 0.76–1.27)
EGFRCR SERPLBLD CKD-EPI 2021: 71 ML/MIN/1.73
EOSINOPHIL # BLD AUTO: 0.2 X10E3/UL (ref 0–0.4)
EOSINOPHIL NFR BLD AUTO: 2 %
ERYTHROCYTE [DISTWIDTH] IN BLOOD BY AUTOMATED COUNT: 11.7 % (ref 11.6–15.4)
GLOBULIN SER CALC-MCNC: 2.3 G/DL (ref 1.5–4.5)
GLUCOSE SERPL-MCNC: 75 MG/DL (ref 70–99)
HCT VFR BLD AUTO: 45 % (ref 37.5–51)
HGB BLD-MCNC: 14.8 G/DL (ref 13–17.7)
IMM GRANULOCYTES # BLD AUTO: 0.1 X10E3/UL (ref 0–0.1)
IMM GRANULOCYTES NFR BLD AUTO: 1 %
LYMPHOCYTES # BLD AUTO: 1.7 X10E3/UL (ref 0.7–3.1)
LYMPHOCYTES NFR BLD AUTO: 21 %
MCH RBC QN AUTO: 33.5 PG (ref 26.6–33)
MCHC RBC AUTO-ENTMCNC: 32.9 G/DL (ref 31.5–35.7)
MCV RBC AUTO: 102 FL (ref 79–97)
MONOCYTES # BLD AUTO: 0.7 X10E3/UL (ref 0.1–0.9)
MONOCYTES NFR BLD AUTO: 9 %
NEUTROPHILS # BLD AUTO: 5.6 X10E3/UL (ref 1.4–7)
NEUTROPHILS NFR BLD AUTO: 66 %
PLATELET # BLD AUTO: 275 X10E3/UL (ref 150–450)
POTASSIUM SERPL-SCNC: 4.3 MMOL/L (ref 3.5–5.2)
PROT SERPL-MCNC: 6.4 G/DL (ref 6–8.5)
RBC # BLD AUTO: 4.42 X10E6/UL (ref 4.14–5.8)
SODIUM SERPL-SCNC: 141 MMOL/L (ref 134–144)
T4 FREE SERPL-MCNC: 0.99 NG/DL (ref 0.82–1.77)
TSH SERPL DL<=0.005 MIU/L-ACNC: 3.65 UIU/ML (ref 0.45–4.5)
VIT B12 SERPL-MCNC: 469 PG/ML (ref 232–1245)
WBC # BLD AUTO: 8.4 X10E3/UL (ref 3.4–10.8)

## 2024-12-11 RX ORDER — NABUMETONE 500 MG/1
500 TABLET, FILM COATED ORAL 2 TIMES DAILY PRN
Qty: 60 TABLET | Refills: 0 | Status: SHIPPED | OUTPATIENT
Start: 2024-12-11

## 2024-12-16 ENCOUNTER — HOSPITAL ENCOUNTER (OUTPATIENT)
Dept: PHYSICAL THERAPY | Facility: HOSPITAL | Age: 84
Setting detail: THERAPIES SERIES
Discharge: HOME | End: 2024-12-16
Payer: MEDICARE

## 2024-12-16 DIAGNOSIS — R29.6 FREQUENT FALLS: ICD-10-CM

## 2024-12-16 DIAGNOSIS — Z74.09 IMPAIRED MOBILITY: ICD-10-CM

## 2024-12-16 DIAGNOSIS — R29.898 LEG WEAKNESS, BILATERAL: ICD-10-CM

## 2024-12-16 DIAGNOSIS — R26.89 BALANCE DISORDER: Primary | ICD-10-CM

## 2024-12-16 DIAGNOSIS — M25.562 ACUTE PAIN OF BOTH KNEES: Primary | ICD-10-CM

## 2024-12-16 DIAGNOSIS — M25.561 ACUTE PAIN OF BOTH KNEES: Primary | ICD-10-CM

## 2024-12-16 PROCEDURE — 97530 THERAPEUTIC ACTIVITIES: CPT

## 2024-12-16 NOTE — THERAPY TREATMENT NOTE
Outpatient Physical Therapy Ortho Treatment Note  [facility]     Patient Name: Sukhdev Zayas  : 1940  MRN: [id number]  Today's Date: 2024      Visit Date: 2024    Visit Dx:    ICD-10-CM ICD-9-CM   1. Balance disorder  R26.89 781.99   2. Frequent falls  R29.6 V15.88   3. Impaired mobility  Z74.09 799.89   4. Leg weakness, bilateral  R29.898 729.89       Patient Active Problem List   Diagnosis    Hyperlipidemia    Coronary artery disease involving native coronary artery of native heart without angina pectoris    MCI (mild cognitive impairment)    Mood disorder    Closed wedge compression fracture of third thoracic vertebra with routine healing    GERD (gastroesophageal reflux disease)    S/P drug eluting coronary stent placement    Chest pain    Diastolic dysfunction    Mixed action and resting tremor    Bladder cancer    BPH (benign prostatic hyperplasia)    Atrial fibrillation    Essential hypertension    Dizziness    Beta-blocker intolerance    Orthostatic hypotension    Parkinsonism    Lumbar facet arthropathy    Spondylosis of 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    Depression     Dizziness     GERD (gastroesophageal reflux disease)     History of fractured rib     RIGHT SIDE    Hyperlipidemia     Multiple falls     Tremors of nervous system     Urinary incontinence     Vertigo         Past Surgical History:   Procedure Laterality Date    CARDIAC CATHETERIZATION      7x, 5 stents    CATARACT EXTRACTION, BILATERAL      CHOLECYSTECTOMY N/A 2019    Procedure: CHOLECYSTECTOMY LAPAROSCOPIC WITH INTRAOPERATIVE CHOLANGIOGRAM;  Surgeon: Bg Rodriguez Jr., MD;  Location: [facility]  OR;  Service: General    COLONOSCOPY      CORONARY ANGIOPLASTY WITH STENT PLACEMENT      X5     CYSTOSCOPY BLADDER BIOPSY N/A 1/8/2019    Procedure: CYSTOSCOPY BLADDER BIOPSY;  Surgeon: Wang Soares Jr., MD;  Location:  [facility];  Service: Urology    CYSTOSCOPY BLADDER BIOPSY N/A 6/13/2019    Procedure: CYSTOSCOPY BLADDER BIOPSY;  Surgeon: Wang Soares Jr., MD;  Location:  [facility];  Service: Urology    CYSTOSCOPY TRANSURETHRAL RESECTION OF PROSTATE N/A 7/11/2019    Procedure: CYSTOSCOPY TRANSURETHRAL RESECTION OF PROSTATE;  Surgeon: Wang Soares Jr., MD;  Location:  [facility];  Service: Urology    CYSTOSCOPY URETEROSCOPY LASER LITHOTRIPSY N/A 6/13/2019    Procedure: CYSTO LITHOPAXY;  Surgeon: Wang Soares Jr., MD;  Location:  [facility];  Service: Urology    ERCP N/A 9/3/2019    Procedure: ENDOSCOPIC RETROGRADE CHOLANGIOPANCREATOGRAPHY with sphincterotomy and balloon sweep;  Surgeon: Ashok Amaya MD;  Location: [facility] ENDOSCOPY;  Service: Gastroenterology    EYE SURGERY      Lens implants    LUMBAR DISCECTOMY FUSION INSTRUMENTATION      LUMBAR EPIDURAL INJECTION N/A 5/4/2022    Procedure: lumbar epidural steroid injection;  Surgeon: Charlene Manzo MD;  Location: [facility];  Service: Pain Management;  Laterality: N/A;    MEDIAL BRANCH BLOCK Bilateral 12/29/2021    Procedure: LUMBAR MEDIAL BRANCH BLOCK Bilateral L3-S1 x2 (2 weeks apart);  Surgeon: Charlene Manzo MD;  Location: [facility];  Service: Pain Management;  Laterality: Bilateral;    MEDIAL BRANCH BLOCK Bilateral 1/12/2022    Procedure: LUMBAR MEDIAL BRANCH BLOCK Bilateral L3-S1 x2 (2 weeks apart);  Surgeon: Charlene Manzo MD;  Location: [facility];  Service: Pain Management;  Laterality: Bilateral;    RADIOFREQUENCY ABLATION Bilateral 2/7/2022    Procedure: RADIOFREQUENCY ABLATION LUMBAR bilateral L3-S1;  Surgeon: Charlene Manzo MD;  Location: [facility];  Service: Pain Management;   Laterality: Bilateral;    SKIN CANCER EXCISION Left     chest wall    SKIN CANCER EXCISION  01/21/2021    facial    TRANSURETHRAL RESECTION OF BLADDER TUMOR N/A 11/29/2018    Procedure: TUR BLADDER TUMOR  LARGE;  Surgeon: Wang Soares Jr., MD;  Location: [facility];  Service: Urology                        PT Assessment/Plan       Row Name 12/16/24 1600          PT Assessment    Assessment Comments Mr. Zayas returns to PT this date, reporting HEP completion 2-3 x's over the last 3 weeks since last session. Does additionally report 1 fall, falling in the bathroom next to the toilet. Went to PCP, had new imagining of knee and back, no acute findings. He returns reporting overall worsening of condition, does have notable edema over R knee. He has increased irritation with exercise, held on several this date secondary to pain. Discussed aquatic therapy, likely great candidate, may have overall improved tolerance to exercise and further progress strength. Additionally reached out to PCP about orthopedic referral for knee and hip pain, discussed potential knee injection for pain relief. Spent time reviewing HEP, continued to emphasize need for gentle progressed strength training for improvement in overall condition and prevention for further musculature atrophy.  -MO        PT Plan    PT Plan Comments f/u w/ aquatic therapy  -MO               User Key  (r) = Recorded By, (t) = Taken By, (c) = Cosigned By      Initials Name Provider Type    Jennifer Cartagena, PT Physical Therapist                       OP Exercises       Row Name 12/16/24 1300             Subjective    Subjective Comments Fell in the bathroom, really made the R side hurt even more. Imaging negative for acute findings  -MO         Total Minutes    15551 - PT Therapeutic Activity Minutes 38  -MO         Exercise 1    Exercise Name 1 Nustep  -MO      Cueing 1 Verbal  -MO      Time 1 5min  -MO         Exercise 3    Exercise Name 3 seated LAQ  -MO       Cueing 3 Verbal  -MO      Sets 3 2B  -MO      Reps 3 10  -MO         Exercise 4    Exercise Name 4 seated add  -MO      Cueing 4 Verbal  -MO      Sets 4 2  -MO      Reps 4 10  -MO      Time 4 3s  -MO         Exercise 5    Exercise Name 5 Amb out to car in parking lot  -MO      Time 5 10  -MO      Additional Comments w/ rwx out in parking garage  -MO         Exercise 7    Exercise Name 7 standing HR  -MO      Cueing 7 Verbal  -MO      Sets 7 1  -MO      Reps 7 10  -MO         Exercise 9    Exercise Name 9 STS  -MO      Cueing 9 Verbal;Tactile  -MO      Sets 9 2  -MO      Reps 9 5  -MO      Time 9 elevated mat table  -MO                User Key  (r) = Recorded By, (t) = Taken By, (c) = Cosigned By      Initials Name Provider Type    Jennifer Cartagena, PT Physical Therapist                                  PT OP Goals       Row Name 12/16/24 1300          PT Short Term Goals    STG Date to Achieve 10/09/24  -MO     STG 1 Patient will be independent with education for symptom management and initial HEP to decrease LBP pain.  -MO     STG 1 Progress Progressing  -MO     STG 2 Pt will report >/=25% improvement in R side back pain for overall improved QOL.  -MO     STG 2 Progress Met  -MO     STG 3 Pt performs 30 seconds sit to stand having complete at least 2 more repetitions that was performed upon initial evaluation for progression of ease with functional transfers, LE strength, and safety in the home.  -MO     STG 3 Progress Ongoing  -MO     STG 4 Pt will improve 2 min walk test by 50' for improved LE strength, endurance, and funcitonal mobility.  -MO     STG 4 Progress Met  -MO        Long Term Goals    LTG Date to Achieve 11/08/24  -MO     LTG 1 Patient will be independent with education for symptom management and advanced HEP to decrease LBP pain.  -MO     LTG 1 Progress Ongoing  -MO     LTG 2 Pt will be independent with advance HEP to improve strength and static/dynamic balance.  -MO     LTG 2 Progress Ongoing   -MO     LTG 3 Patient will improve standing tolerance from </10 min to >25 min with LBP </= 3/10 to improve participation in community activities.  -MO     LTG 3 Progress Ongoing  -MO     LTG 4 Pt will demo improved gross BL LE strength to >/= 4/5 for improved functional strength.  -MO     LTG 4 Progress Ongoing  -MO     LTG 5 Pt will improve FGA score by 7 in order to demo a reduced risk of falls.  -MO     LTG 5 Progress Ongoing  -MO     LTG 6 Pt will complete 10x STS in 30sec for improved LE functional strength.  -MO     LTG 6 Progress Ongoing  -MO     LTG 7 Pt will perform 2 min walk test ambulating over 370ft with/without assistive device with SBA/Supervision without LOB with directional changes for improvements in safety with ambulatory tasks in house or community.  -MO     LTG 7 Progress Ongoing  -MO               User Key  (r) = Recorded By, (t) = Taken By, (c) = Cosigned By      Initials Name Provider Type    Jennifer Cartagena, PT Physical Therapist                                   Time Calculation:   Start Time: 1315  Stop Time: 1353  Time Calculation (min): 38 min  Timed Charges  23120 - PT Therapeutic Activity Minutes: 38  Total Minutes  Timed Charges Total Minutes: 38   Total Minutes: 38  Therapy Charges for Today       Code Description Service Date Service Provider Modifiers Qty    [id number] HC PT THERAPEUTIC ACT EA 15 MIN 12/16/2024 Jennifer Sanchez, PT GP, KX 3                      Jennifer Sanchez PT  12/16/2024

## 2024-12-23 ENCOUNTER — TELEPHONE (OUTPATIENT)
Dept: INTERNAL MEDICINE | Facility: CLINIC | Age: 84
End: 2024-12-23
Payer: MEDICARE

## 2024-12-23 NOTE — TELEPHONE ENCOUNTER
Caller: Taty Zayas    Relationship to patient: Emergency Contact    Best call back number: [phone]    Patient is needing:   WOULD LIKE TO KNOW IF THE OFFICE HAS RECORD OF PATIENT BEING UP TO DATE ON HIS COVID SHOTS.     PLEASE CALL TO DISCUSS.

## 2025-01-15 ENCOUNTER — TREATMENT (OUTPATIENT)
Dept: PHYSICAL THERAPY | Facility: CLINIC | Age: 85
End: 2025-01-15
Payer: MEDICARE

## 2025-01-15 DIAGNOSIS — R29.898 LEG WEAKNESS, BILATERAL: ICD-10-CM

## 2025-01-15 DIAGNOSIS — Z74.09 IMPAIRED MOBILITY: ICD-10-CM

## 2025-01-15 DIAGNOSIS — R26.89 BALANCE DISORDER: Primary | ICD-10-CM

## 2025-01-15 DIAGNOSIS — R29.6 FREQUENT FALLS: ICD-10-CM

## 2025-01-15 PROCEDURE — 97113 AQUATIC THERAPY/EXERCISES: CPT | Performed by: PHYSICAL THERAPIST

## 2025-01-15 PROCEDURE — 97530 THERAPEUTIC ACTIVITIES: CPT | Performed by: PHYSICAL THERAPIST

## 2025-01-15 PROCEDURE — 97162 PT EVAL MOD COMPLEX 30 MIN: CPT | Performed by: PHYSICAL THERAPIST

## 2025-01-15 NOTE — PROGRESS NOTES
Re-Assessment / Re-Certification    [facility]  [address] [place] [address]  [phone] (phone)  [phone] (fax)    Patient: Sukhdev Zayas   : 1940  Diagnosis/ICD-10 Code:  Balance disorder [R26.89]  Referring practitioner: Rachelle Patton PA-C  Date of Initial Visit: Type: THERAPY  Noted: 2024  Today's Date: 1/15/2025  Patient seen for 15 sessions      Subjective:     Functional Outcome Score: 2 minute walk Test and 30 sec Sit to Stand Test  Clinical Progress: improved  Home Program Compliance: No  Treatment has included:  therapeutic exercise, therapeutic activity, neuro-muscular retraining , aquatic therapy, and patient education with home exercise program     Subjective   Pain right shoulder, across low back and right knee.  Had a fall last month which aggravtaed his kne more.  Xrays completed all negative   Legs want to give out.      Objective      Gait: Independent with fairly upright posture (does have some thoracic kyphosis), decreased step length (no shuffling), reduced arm swing    MMT:  Hip Flexion 4-/5 B  Knee Extension 4-/5 B  Knee Flexioin  4-/5 B  Ankle DF         4+/5 B  Ankle PF tested in standing Unable to perform full heel raise    Flexibility: Decreased in B hamstrings and Calf    Functional outcome score:     30 Second Sit to Stand Test: patient completed 5.5 repetitions with moderate UE support    2 min Walk Test: 251 ft     Standing tolerance 5 min      AQUATIC EXERCISE:  Chair Lift to enter/exit the pool.    [x]Water Walk at rail w/ SBA  Forwards and Sideways  15 ft X 2 each          [x]Wall Crawl (BKTC) Stretch  w/ Noodle  20 sec.               [x]Hamstring Stretch  seated 20 sec X 2 each                   []Piriformis Stretch                       []Calf Stretch          []Quad Stretch          [x]Vertical Traction 1.5 min.        [x]Abdominals- Noodle Pushdowns   10X                                []Hip Abd/Add   []Hip  Circles               []Hip Flex/Ext                []March in Place   []Leg Press          []Mini Squat                  []B Toe/Heel Raises                                        []Step Ups                    [x]Bicycle  seated X 3 min.                                 []Flutter/Scissor Kick                                []Rows w/ closed paddles              []Closed Paddle Stirs     Assessment/Plan  Sukhdev Zayas has attended 14 physical therapy sessions.  There has been a one month lapse since his last appointment.  Aquatic physical therapy was recommended from his previous therapist and he is here today for his first appointment in the pool. He did have a fall last month at home,  all imaging negative for acute fracture.  Matthew reports ~ 6 falls in the past year.  He continues to report  right shoulder pain along with chronic back pain treated with epidurals and ablation, however now offering little relief. He has  UE tremors diagnosed as Parkinsonism, not PD. Sukhdev had poor compliance with his home exercises.  Some of the goals have been revised.  Progress toward previous goals: Partially Met    STGs:   Patient will be able to walk across pool with supervision for 4-5 minutes with normal gait pattern.  NEW  Patient will report at least 25% improvement in right side back pain for overall improved quality of life.   MET  3.  Patient will be able to transfer from sit to stand one time without UE support indicating improved LE strength and balance.  NEW   4. Patient will improve his 2 minute walk test by 50 ft for improved LE strength, endurance and functional mobility.  MET    Long Term Goals:  Patient will improve his strength and balance and report no falls over a 4 week period.  NEW  Patient will perform 2 minute walk test  ambulating over 300 feet with SBA without LOB with directional changes for improvements in safety with functional mobility. ONGOING  3. Patient will complete 30 -40 minutes of  therapeutic exercise in a pool without    increased pain to improve strength, balance and endurance.  NEW               Recommendations: Continue as planned  Timeframe: 1 month  Prognosis to achieve goals: good    PT Signature: Bertin Sepulveda PT  [place] License: [id number]    Timed:  Aquatic Therapy    25     mins 46061    Therapeutic Activities  ___10__ mins 83315    Self-Care     _____ mins 94801     Untimed;  RE-EVALUATION         18   min  74705    Group Therapy           _____ mins 07826    Total Timed:               ______ mins    Based upon review of the patient's progress and continued therapy plan, it is my medical opinion that Sukhdev Zayas should continue physical therapy treatment at [facility]  [address] [place] [address] [address] [place] [address] [phone].  Please fax signed copy to [phone]    Signature: __________________________________  Rachelle Patton PA-C  [email or web address]    Electronically signed by Bertin Sepulveda, PT, 01/15/25, 11:35 AM EST    DATE TREATMENT INITIATED: 1/15/2025      90 Day Recertification  Certification Period: 4/15/2025  I certify that the therapy services are furnished while this patient is under my care.  The services outlined above are required by this patient, and will be reviewed every 90 days.     PHYSICIAN: Rachelle Patton PA-C   NPI: [id number]                                         DATE:     Please sign and return via fax to [phone] Thank you, [facility].

## 2025-01-17 ENCOUNTER — TREATMENT (OUTPATIENT)
Dept: PHYSICAL THERAPY | Facility: CLINIC | Age: 85
End: 2025-01-17
Payer: MEDICARE

## 2025-01-17 DIAGNOSIS — R29.6 FREQUENT FALLS: ICD-10-CM

## 2025-01-17 DIAGNOSIS — R29.898 LEG WEAKNESS, BILATERAL: ICD-10-CM

## 2025-01-17 DIAGNOSIS — Z74.09 IMPAIRED MOBILITY: ICD-10-CM

## 2025-01-17 DIAGNOSIS — R26.89 BALANCE DISORDER: Primary | ICD-10-CM

## 2025-01-17 PROCEDURE — 97113 AQUATIC THERAPY/EXERCISES: CPT | Performed by: PHYSICAL THERAPIST

## 2025-01-17 NOTE — PROGRESS NOTES
Physical Therapy Daily Treatment Note    [facility]  [address] [place] [address]  [phone] (phone)  [phone] (fax)    Patient: Sukhdev Zayas   : 1940  Diagnosis/ICD-10 Code:  Balance disorder [R26.89]  Referring practitioner: Rachelle Patton PA-C  Date of Initial Visit: Type: THERAPY  Noted: 2024  Today's Date: 2025  Patient seen for 16 sessions             Subjective   I was tired after I got home, took a nap.    Objective     AQUATIC EXERCISE:     [x]Water Walk at rail w/ SBA  Forwards 25 ft, then across pool CGA 50 ft and Sideways at rail  15 ft X 2 each          [x]Wall Crawl (BKTC) Stretch  w/ Noodle  20 sec.               [x]Hamstring Stretch  seated 20 sec X 2 each                   []Piriformis Stretch                       []Calf Stretch          []Quad Stretch          [x]Vertical Traction 1 min. X 2     [x]Abdominals- Noodle Pushdowns   10X                                [x]Hip Abd/Add 5X ea  []Hip Circles               []SLRs   5x ea             []March in Place 15x  []Leg Press          [x]Mini Squat    10x               [x]B Toe/Heel 10XRaises                                        []Step Ups                    [x]Bicycle  seated X 2 min.                                 []Flutter/Scissor Kick                                []Rows w/ closed paddles              []Closed Paddle Stirs        Assessment/Plan  Patient able to enter and exit pool via the stairs w/ B hand support on railing and CGA instead of the chair lift.  One episode of vertigo when turning while walking in pool and a second episode later in session. Recommended patient bring walker to his next visit to navigate the wet floors and long walk in/out of facility.  Recommendations for home activity to walk 2X/day for 2-3 min. Using assistive device as needed.        Timed:  Aquatic Therapy    40     mins 20041;    Therapeutic Activities  _____ mins  87784    Self-Care    _____ mins 97740     Untimed;  Group Therapy           _____ mins 13895    Total Timed:               ______ mins    Bertin Sepulveda, PT  Physical Therapist    [place] License:  [id number]

## 2025-01-24 ENCOUNTER — TREATMENT (OUTPATIENT)
Dept: PHYSICAL THERAPY | Facility: CLINIC | Age: 85
End: 2025-01-24
Payer: MEDICARE

## 2025-01-24 DIAGNOSIS — Z74.09 IMPAIRED MOBILITY: ICD-10-CM

## 2025-01-24 DIAGNOSIS — R29.898 LEG WEAKNESS, BILATERAL: ICD-10-CM

## 2025-01-24 DIAGNOSIS — R26.89 BALANCE DISORDER: Primary | ICD-10-CM

## 2025-01-24 DIAGNOSIS — R29.6 FREQUENT FALLS: ICD-10-CM

## 2025-01-24 PROCEDURE — 97113 AQUATIC THERAPY/EXERCISES: CPT | Performed by: PHYSICAL THERAPIST

## 2025-01-24 NOTE — PROGRESS NOTES
Physical Therapy Daily Treatment Note    [facility]  [address] [place] [address]  [phone] (phone)  [phone] (fax)    Patient: Sukhdev Zayas   : 1940  Diagnosis/ICD-10 Code:  Balance disorder [R26.89]  Referring practitioner: Rachelle Patton PA-C  Date of Initial Visit: Type: THERAPY  Noted: 2024  Today's Date: 2025  Patient seen for 17 sessions             Subjective   Was able to walk 5 min. 2X/day at home for 2 days.    Objective     AQUATIC EXERCISE:     [x]Water Walk at rail w/ SBA  Forwards 25 ft, then across pool CGA 50 ft X 2 and Sideways at rail  15 ft X 2 each          [x]Wall Crawl (BKTC) Stretch  w/ Noodle  20 sec.               [x]Hamstring Stretch  seated 20 sec X 2 each                   [x]Piriformis Stretch Fig 4 -assisted 10 sec X 2 each                      []Calf Stretch          []Quad Stretch          [x]Vertical Traction 1 min. X 2     [x]Abdominals- Noodle Pushdowns   10X                                [x]Hip Abd/Add 10X ea  []Hip Circles               []SLRs   5x ea             [x]March in Place 10x  []Leg Press          [x]Mini Squat    10x               [x]B Toe/Heel Raises  10X                                      []Step Ups                    [x]Bicycle  seated X 2 min.                                 []Flutter/Scissor Kick                                []Rows w/ closed paddles              []Closed Paddle Stirs           Assessment/Plan  Ray did not have any episodes of vertigo this treatment.  He was again encouraged to bring his walker to use when leaving the pool and going out to his car with his wife.          Timed:  Aquatic Therapy    30     mins 11585;    Therapeutic Activities  _____ mins 04298    Self-Care    _____ mins 64444     Untimed;  Group Therapy           _____ mins 79543    Total Timed:               ______ mins    Bertin Sepulveda, PT  Physical Therapist    [place] License:  [id number]

## 2025-01-29 ENCOUNTER — TREATMENT (OUTPATIENT)
Dept: PHYSICAL THERAPY | Facility: CLINIC | Age: 85
End: 2025-01-29
Payer: MEDICARE

## 2025-01-29 DIAGNOSIS — R29.6 FREQUENT FALLS: ICD-10-CM

## 2025-01-29 DIAGNOSIS — Z74.09 IMPAIRED MOBILITY: ICD-10-CM

## 2025-01-29 DIAGNOSIS — R29.898 LEG WEAKNESS, BILATERAL: ICD-10-CM

## 2025-01-29 DIAGNOSIS — R26.89 BALANCE DISORDER: Primary | ICD-10-CM

## 2025-01-29 PROCEDURE — 97530 THERAPEUTIC ACTIVITIES: CPT | Performed by: PHYSICAL THERAPIST

## 2025-01-29 NOTE — PROGRESS NOTES
Physical Therapy Daily Treatment Note    [facility]  [address] [place] [address]  [phone] (phone)  [phone] (fax)    Patient: Sukhdev Zayas   : 1940  Diagnosis/ICD-10 Code:  Balance disorder [R26.89]  Referring practitioner: Rachelle Patton PA-C  Date of Initial Visit: Type: THERAPY  Noted: 2024  Today's Date: 2025  Patient seen for 18 sessions             Subjective   Feeling light headed and dizzy, but wanted to give therapy a try.    Objective   -------    Assessment/Plan  Patient not feeling well.  Does have history of vertigo.  Attempted X 3 to check BP, however due to significant tremors in B UEs, all resulted in Error readings.  Upon standing patient lost balance due to dizziness and caught himself by putting his hand on the wall.  Physical Therapy not appropriate today.  Obtained wheel chair and assisted patient out to car.  Wife present throughout.  Discussed that patient should cancel if not feeling well.  Discussed need for further evaluation and treatment of vertigo, however patient dissatisfied with previous treatment for vertigo.             Timed:  Aquatic Therapy         mins 86773;    Therapeutic Activities  _15____ mins 63741    Self-Care    _____ mins 58476     Untimed;  Group Therapy           _____ mins 51916    Total Timed:               ______ mins    Bertin Sepulveda, PT  Physical Therapist    [place] License:  [id number]

## 2025-01-29 NOTE — PLAN OF CARE
Problem: Pain, Acute (Adult)  Goal: Identify Related Risk Factors and Signs and Symptoms  Outcome: Outcome(s) achieved Date Met: 09/02/19    Goal: Acceptable Pain Control/Comfort Level  Outcome: Ongoing (interventions implemented as appropriate)      Problem: Patient Care Overview  Goal: Plan of Care Review   09/02/19 5834   Coping/Psychosocial   Plan of Care Reviewed With patient   Plan of Care Review   Progress no change   OTHER   Outcome Summary Pt s/p lap Anuja today. Noted with 4 lap sites with steri strips. Superior site noted with seeping of blood. cleaned and covered with 2X2, Pt remains drowsy and moaning at intervals but due to hx of sensitivity family does not want more meds gives as of now. Remains NPO with gastto consult ordered. Per family possible scope tomorrow to remove stones. Family remains at bedside. Will continue to monitor.      Goal: Individualization and Mutuality  Outcome: Ongoing (interventions implemented as appropriate)      Problem: Fall Risk (Adult)  Goal: Absence of Fall  Outcome: Ongoing (interventions implemented as appropriate)         Spoke with Adrianne and discussed below messages.     1) Will continue Torsemide 40 MG BID    2) Reviewed CT results and recommendation in detail. Adrianne was hesitant to wait 6 months for CT follow up. Adrianne understands that the same Renal CT will be ordered with pre-hydration and 6 months follow up is reccommended to allow for any concerning changes.     3) Torsemide 40 MG no longer available from manufacture.  Updated to Torsemide 20 MG tablets. [facility] informed.     Attempted to assist with arranging CT.  CT order needs to specify hydration is needed. (Post hydration is only option to addend to order) Will route to PCP to clarify.

## 2025-02-13 DIAGNOSIS — G20.C PRIMARY PARKINSONISM: ICD-10-CM

## 2025-02-14 ENCOUNTER — TREATMENT (OUTPATIENT)
Dept: PHYSICAL THERAPY | Facility: CLINIC | Age: 85
End: 2025-02-14
Payer: MEDICARE

## 2025-02-14 DIAGNOSIS — Z74.09 IMPAIRED MOBILITY: ICD-10-CM

## 2025-02-14 DIAGNOSIS — R29.6 FREQUENT FALLS: ICD-10-CM

## 2025-02-14 DIAGNOSIS — R29.898 LEG WEAKNESS, BILATERAL: ICD-10-CM

## 2025-02-14 DIAGNOSIS — R26.89 BALANCE DISORDER: Primary | ICD-10-CM

## 2025-02-14 PROCEDURE — 97113 AQUATIC THERAPY/EXERCISES: CPT | Performed by: PHYSICAL THERAPIST

## 2025-02-14 RX ORDER — NABUMETONE 500 MG/1
500 TABLET, FILM COATED ORAL 2 TIMES DAILY PRN
Qty: 60 TABLET | Refills: 0 | Status: SHIPPED | OUTPATIENT
Start: 2025-02-14

## 2025-02-14 NOTE — PROGRESS NOTES
Physical Therapy Daily Treatment Note    [facility]  [address] [place] [address]  [phone] (phone)  [phone] (fax)    Patient: Sukhdev Zayas   : 1940  Diagnosis/ICD-10 Code:  Balance disorder [R26.89]  Referring practitioner: Rachelle Patton PA-C  Date of Initial Visit: Type: THERAPY  Noted: 2024  Today's Date: 2025  Patient seen for 19 sessions             Subjective   Ready for therapy.    Objective     AQUATIC EXERCISE:     [x]Water Walk Forwards 100 ft. Sideways and Backwards 50 ft all SBA         [x]Wall Crawl (BKTC) Stretch  w/ Noodle  20 sec. X 2              [x]Hamstring Stretch  seated 20 sec X 2 each                   []Piriformis Stretch Fig 4 -assisted 10 sec X 2 each                      []Calf Stretch          []Quad Stretch          [x]Vertical Traction 1 min. X 2     [x]Abdominals- Noodle Pushdowns   15X                                [x]Hip Abd/Add5ea  []Hip Circles               [x]SLRs   5x ea             [x]March in Place 10x  Leg Press   w/ Lg Noodle X 7 ea        [x]Mini Squat    10x               [x]B Toe/Heel Raises  10X                                      []Step Ups                    [x]Bicycle  seated X 2 min.                                 []Seated LAQs  12X ea                                []Rows w/ closed paddles              []Closed Paddle Stirs           Assessment/Plan  Ray is more secure in the water than previous sessions.  He was able to increase exercise tolerance this visit.  He has tightness right > left hamstrings and was encouraged to stretch regularly to avoid contractures.          Timed:  Aquatic Therapy    30     mins 49541;    Therapeutic Activities  _____ mins 36121    Self-Care    _____ mins 64205     Untimed;  Group Therapy           _____ mins 69305    Total Timed:               ______ mins    Bertin Sepulveda, PT  Physical Therapist    [place] License:  [id number]

## 2025-02-20 NOTE — PROGRESS NOTES
Physical Therapy 30-Day Progress Note     [facility] Physical Therapy [facility]  [address] [place] [address]  [phone] (phone)  [phone] (fax)    Patient: Sukhdev Zayas   : 1940  Diagnosis/ICD-10 Code:  Frequent falls [R29.6]  Referring practitioner: Rachelle Patton PA-C  Date of Initial Visit: Type: THERAPY  Noted: 2024  Today's Date: 2025  Patient seen for 20 sessions      Subjective:     Clinical Progress: improved  Home Program Compliance: Yes  Treatment has included:  therapeutic exercise, therapeutic activity, aquatic therapy, and patient education with home exercise program     Subjective  Right shoulder doesn't really bother him anymore.  Low back pain occurs with standing for a period of time.  Hasn't had any falls, but lots of near falls or unsteadiness.    Objective     Functional outcome score: 2 Minute Walk Test:  270 ft. without assistive device     Sit to Stand - able to complete one time without UE assist    Gait: Independent with increased gait speed noted walking into pool area    AQUATIC EXERCISE:     [x]Water Walk Forwards across pool SBA X 4 minutes continuous      [x]Wall Crawl (BKTC) Stretch  w/ Noodle  20 sec. X 2              [x]Hamstring Stretch  seated 20 sec X 2 each                   []Piriformis Stretch Fig 4 -assisted 10 sec X 2 each                      []Calf Stretch          []Quad Stretch          [x]Vertical Traction 1 min. X 2     [x]Abdominals- Noodle Pushdowns   15X                                [x]Hip Abd/Add 2 X 5 ea  []Hip Circles               [x]SLRs   2 X 5 ea             [x]March in Place 10x  Leg Press   w/ Lg Noodle X 7 ea        [x]Mini Squat    10x               [x]B Toe/Heel Raises  10X                                      []Step Ups                    [x]Bicycle  seated X 1 min.                                 [x]Seated LAQs  12X ea                                []Rows w/ closed paddles              []Closed  Anahy Martinezs     Assessment & Plan       Assessment  Assessment details: Sukhdev demonstrates improved gait speed walking into pool area today without use of an assistive device.  He walked 270 feet during the 2 minute walk test and he was able to transfer from sit to stand without UE assistance 1X.  He reports multiple near falls at home, however no actual fall since starting aqua therapy.  One episode of a near fall occurred when he was carrying 3 bags of groceries up the stairs and let go of the railing.  We discussed the risk and he understands his limitations here.  In general he reports less use of his cane, but will use it when he feels the need.  He has met all the short term goals and 2 of 3 long term goals.         STGs:   Patient will be able to walk across pool with supervision for 4-5 minutes with normal gait pattern.  MET  Patient will report at least 25% improvement in right side back pain for overall improved quality of life.   MET  3.  Patient will be able to transfer from sit to stand one time without UE support indicating improved LE strength and balance.  MET   4. Patient will improve his 2 minute walk test by 50 ft for improved LE strength, endurance and functional mobility.  MET     Long Term Goals:  Patient will improve his strength and balance and report no falls over a 4 week period.  MET  Patient will perform 2 minute walk test  ambulating over 300 feet with SBA without LOB with directional changes for improvements in safety with functional mobility. ONGOING  3. Patient will complete 30 -40 minutes of therapeutic exercise in a pool without increased pain to improve strength, balance and endurance.  MET        Recommendations: Continue as planned  Timeframe: 1 month  Prognosis to achieve goals: good    PT Signature: Bertin Sepulveda, PT  [place] License: [id number]      Based upon review of the patient's progress and continued therapy plan, it is my medical opinion that Sukhdev Zayas should continue  physical therapy treatment at [facility] PHYSICAL THERAPY  [address]   [place] [address] [phone].    Signature: __________________________________  Rachelle Patton PA-C  NPI: [id number]                                          Timed:  Aquatic Therapy      53   mins 57396    Therapeutic Activities  _____ mins 17999    Self-Care    _____ mins 91973     Untimed;  Group Therapy           _____ mins 55858    Total Timed:               ______ mins

## 2025-02-21 ENCOUNTER — TREATMENT (OUTPATIENT)
Dept: PHYSICAL THERAPY | Facility: CLINIC | Age: 85
End: 2025-02-21
Payer: MEDICARE

## 2025-02-21 DIAGNOSIS — R29.898 LEG WEAKNESS, BILATERAL: ICD-10-CM

## 2025-02-21 DIAGNOSIS — Z74.09 IMPAIRED MOBILITY: ICD-10-CM

## 2025-02-21 DIAGNOSIS — R26.89 BALANCE DISORDER: ICD-10-CM

## 2025-02-21 DIAGNOSIS — R29.6 FREQUENT FALLS: Primary | ICD-10-CM

## 2025-02-21 PROCEDURE — 97113 AQUATIC THERAPY/EXERCISES: CPT | Performed by: PHYSICAL THERAPIST

## 2025-02-26 ENCOUNTER — TELEPHONE (OUTPATIENT)
Dept: PHYSICAL THERAPY | Facility: CLINIC | Age: 85
End: 2025-02-26

## 2025-02-26 NOTE — TELEPHONE ENCOUNTER
Caller: Taty Zayas    Relationship: Emergency Contact         What was the call regarding: NOT FEELING WELL, DIZZY, FELL THE OTHER DAY  ”

## 2025-02-28 ENCOUNTER — TREATMENT (OUTPATIENT)
Dept: PHYSICAL THERAPY | Facility: CLINIC | Age: 85
End: 2025-02-28
Payer: MEDICARE

## 2025-02-28 DIAGNOSIS — R29.6 FREQUENT FALLS: Primary | ICD-10-CM

## 2025-02-28 DIAGNOSIS — R29.898 LEG WEAKNESS, BILATERAL: ICD-10-CM

## 2025-02-28 DIAGNOSIS — Z74.09 IMPAIRED MOBILITY: ICD-10-CM

## 2025-02-28 DIAGNOSIS — R26.89 BALANCE DISORDER: ICD-10-CM

## 2025-02-28 PROCEDURE — 97113 AQUATIC THERAPY/EXERCISES: CPT | Performed by: PHYSICAL THERAPIST

## 2025-02-28 NOTE — PROGRESS NOTES
Physical Therapy Daily Treatment Note    [facility] Physical Therapy [facility]  [address] [place] [address]  [phone] (phone)  [phone] (fax)    Patient: Sukhdev Zayas   : 1940  Diagnosis/ICD-10 Code:  Frequent falls [R29.6]  Referring practitioner: Rachelle Patton PA-C  Date of Initial Visit: Type: THERAPY  Noted: 2024  Today's Date: 2025  Patient seen for 21 sessions             Subjective   Fell at home 2 days ago while he was walking in the kitchen, legs gave out on him.  He fell onto his right side, denies syncope. He did not seek medical attention.  Ray reports increased right sided back pain rated 6,7/10.    Objective     AQUATIC EXERCISE:     [x]Water Walk Forwards across pool SBA 25 ft X 4    [x]Wall Crawl (BKTC) Stretch  w/ Noodle  20 sec. X 2              [x]Hamstring Stretch  seated 20 sec X 2 each                   []Piriformis Stretch Fig 4 -assisted 10 sec X 2 each                      []Calf Stretch          []Quad Stretch          [x]Vertical Traction 1 min. X 2     [x]Abdominals- Noodle Pushdowns  5X                                []Hip Abd/Add 2 X 5 ea  []Hip Circles               []SLRs   2 X 5 ea             [x]March in Place 10x  Leg Press   w/ Lg Noodle --      []Mini Squat    10x               [x]B Toe/Heel Raises  10X                                      []Step Ups                    [x]Bicycle  seated X 1 min.                                 [x]Seated LAQs  12X ea                                []Rows w/ closed paddles              []Closed Paddle Stirs     Assessment/Plan  Sukhdev cancelled his last appointment due to falling at home, states his legs gave out. He did not seek medical attention and rested the remainder of the day,  There are no visible bruises on his back.  He has started using his rollator regularly now instead of the cane.  Patient's wife reports he has had extensive work ups for these falls and all testing was negative.   More rest breaks provided today.        Timed:  Aquatic Therapy    38     mins 01119;    Therapeutic Activities  _____ mins 52650    Self-Care    _____ mins 75065     Untimed;  Group Therapy           _____ mins 06329    Total Timed:               ______ mins    Bertin Sepulveda, PT  Physical Therapist    [place] License:  [id number]

## 2025-03-05 ENCOUNTER — TREATMENT (OUTPATIENT)
Dept: PHYSICAL THERAPY | Facility: CLINIC | Age: 85
End: 2025-03-05
Payer: MEDICARE

## 2025-03-05 DIAGNOSIS — R29.898 LEG WEAKNESS, BILATERAL: ICD-10-CM

## 2025-03-05 DIAGNOSIS — R29.6 FREQUENT FALLS: Primary | ICD-10-CM

## 2025-03-05 DIAGNOSIS — Z74.09 IMPAIRED MOBILITY: ICD-10-CM

## 2025-03-05 PROCEDURE — 97113 AQUATIC THERAPY/EXERCISES: CPT | Performed by: PHYSICAL THERAPIST

## 2025-03-05 NOTE — PROGRESS NOTES
Physical Therapy Daily Treatment Note    [facility] Physical Therapy [facility]  [address] [place] [address]  [phone] (phone)  [phone] (fax)    Patient: Sukhdev Zayas   : 1940  Diagnosis/ICD-10 Code:  Frequent falls [R29.6]  Referring practitioner: Rachelle Patton PA-C  Date of Initial Visit: Type: THERAPY  Noted: 2024  Today's Date: 3/5/2025  Patient seen for 22 sessions             Subjective   Saw the cardiologist yesterday who changed his baby aspirin to Eloquis (in addition to Plavix).  Back pain is left side more than right side today.     Objective     AQUATIC EXERCISE:     [x]Water Walk Forwards, Sideways and Backwards, SBA X 5.5 minutes  [x]Wall Crawl (BKTC) Stretch  w/ Noodle  30 sec. X 2              [x]Hamstring Stretch assisted seated 20 sec X 2 each                   []Piriformis Stretch Fig 4 -assisted                    []Calf Stretch          []Quad Stretch          [x]Vertical Traction 1 min.      [x]Abdominals- Large Noodle Pushdowns  15X                                [x]Hip Abd/Add 10X ea  []Hip Circles               []SLRs   2 X 5 ea             [x]March in Place 10x    []Mini Squat    10x               [x]B Toe/Heel Raises  10X                                      []Step Ups                    []Bicycle  seated X 1 min.                                 [x]Seated LAQs  10X ea                                []Rows w/ closed paddles              [x]Leg Press   w/ medium foam ring X 10 each        Assessment/Plan  Patient enters pool area without rollator or cane today.  Continued back pain.  Improved mobility since last week both in and out of pool.  Patient has new diagnosis of Atrial Flutter and started taking Eloquis today.  Recommended patient follow up with Dr Gorman regarding his back pain and falls (legs giving out) to help determine if this could be related to his back.          Timed:  Aquatic Therapy    40     mins 28954;    Therapeutic  Activities  _____ mins 76855    Self-Care    _____ mins 90271     Untimed;  Group Therapy           _____ mins 39458    Total Timed:               ______ mins    Bertin Sepulveda, PT  Physical Therapist    [place] License:  [id number]

## 2025-03-06 NOTE — PROGRESS NOTES
Physical Therapy Daily Treatment Note    [facility] Physical Therapy [facility]  [address] [place] [address]  [phone] (phone)  [phone] (fax)    Patient: Sukhdev Zayas   : 1940  Diagnosis/ICD-10 Code:  Frequent falls [R29.6]  Referring practitioner: Rachelle Patton PA-C  Date of Initial Visit: Type: THERAPY  Noted: 2024  Today's Date: 3/7/2025  Patient seen for 23 sessions             Subjective   Wife reports Ray had a good day yesterday - unloaded the  and took the garbage can to end of driveway which has a slope.  Patient reports left sided back pain.    Objective     AQUATIC EXERCISE:     [x]Water Walk Forwards, Sideways and Backwards, SBA w/ coordinated UE mvmts X 5 minutes  [x]Wall Crawl (BKTC) Stretch  w/ Noodle  30 sec. X 2              [x]Hamstring Stretch assisted seated 20 sec X 2 each                   []Piriformis Stretch Fig 4 -assisted                    []Calf Stretch          []Quad Stretch          [x]Vertical Traction 1 min. X 2    [x]Abdominals- Large Noodle Pushdowns  15X                                [x]Hip Abd/Add 10X ea  []Hip Circles               []SLRs   2 X 5 ea             [x]March in Place 10x     [x]Mini Squat    10x               [x]B Toe/Heel Raises  10X                                      []Step Ups                    []Bicycle  seated X 1 min.                                 [x]Seated LAQs  10X ea                                [x]Shoulder horiz Abd/Add w/ back to wall X 5              [x]Leg Press   w/ medium foam ring X 10 each    Seated w/ jets to back, SBA X 3 min.    Assessment/Plan  Added coordinated arm movements to walking for coordination and balance training and gave verbal cues to increase step length.  Increased home activity reported by wife.  Patient did not use his cane to come to therapy today.  He has tenderness of the left lower thoracic/upper lumbar paraspinals.          Timed:  Aquatic Therapy    40      mins 40147;    Therapeutic Activities  _____ mins 69000    Self-Care    _____ mins 75067     Untimed;  Group Therapy           _____ mins 53708    Total Timed:               ______ mins    Bertin Sepulveda, PT  Physical Therapist    [place] License:  [id number]

## 2025-03-07 ENCOUNTER — TREATMENT (OUTPATIENT)
Dept: PHYSICAL THERAPY | Facility: CLINIC | Age: 85
End: 2025-03-07
Payer: MEDICARE

## 2025-03-07 DIAGNOSIS — R29.898 LEG WEAKNESS, BILATERAL: ICD-10-CM

## 2025-03-07 DIAGNOSIS — Z74.09 IMPAIRED MOBILITY: ICD-10-CM

## 2025-03-07 DIAGNOSIS — R29.6 FREQUENT FALLS: Primary | ICD-10-CM

## 2025-03-07 PROCEDURE — 97113 AQUATIC THERAPY/EXERCISES: CPT | Performed by: PHYSICAL THERAPIST

## 2025-03-12 ENCOUNTER — TREATMENT (OUTPATIENT)
Dept: PHYSICAL THERAPY | Facility: CLINIC | Age: 85
End: 2025-03-12
Payer: MEDICARE

## 2025-03-12 DIAGNOSIS — R29.6 FREQUENT FALLS: Primary | ICD-10-CM

## 2025-03-12 DIAGNOSIS — R26.89 BALANCE DISORDER: ICD-10-CM

## 2025-03-12 DIAGNOSIS — Z74.09 IMPAIRED MOBILITY: ICD-10-CM

## 2025-03-12 DIAGNOSIS — R29.898 LEG WEAKNESS, BILATERAL: ICD-10-CM

## 2025-03-12 PROCEDURE — 97113 AQUATIC THERAPY/EXERCISES: CPT | Performed by: PHYSICAL THERAPIST

## 2025-03-12 NOTE — PROGRESS NOTES
Physical Therapy Daily Treatment Note    [facility] Physical Therapy [facility]  [address] [place] [address]  [phone] (phone)  [phone] (fax)    Patient: Sukhdev Zayas   : 1940  Diagnosis/ICD-10 Code:  Frequent falls [R29.6]  Referring practitioner: Rachelle Patton PA-C  Date of Initial Visit: Type: THERAPY  Noted: 2024  Today's Date: 3/12/2025  Patient seen for 24 sessions             Subjective   Worked on my truck yesterday (check oil, battery)  and after that my back pain kept me down most of the rest of the day.  Back pain moves around, today it's in my low back.    Objective     AQUATIC EXERCISE:     [x]Water Walk Forwards, Sideways and Backwards, SBA w/ coordinated UE mvmts X 6 minutes  [x]Wall Crawl (BKTC) Stretch  w/ Noodle  30 sec. X 2              [x]Hamstring Stretch assisted seated 20 sec X 2 each                   []Piriformis Stretch Fig 4 -assisted                    []Calf Stretch          []Quad Stretch          [x]Vertical Traction 1 min. X 2    [x]Abdominals- Large Noodle Pushdowns  15X                                [x]Hip Abd/Add 10X ea  []Hip Circles               []SLRs   2 X 5 ea             [x]March in Place 10x     [x]Mini Squat    12x               [x]B Toe/Heel Raises  10X                                      [x]Step Ups    4 inch X 5 ea                 []Bicycle  seated X 1 min.                                 [x]Seated LAQs  10X ea                                [x]Shoulder horiz Abd/Add w/ back to wall X 8              []Leg Press   w/ medium foam ring X 10 each    Seated w/ jets to back, SBA X 2 min.    Assessment/Plan  Worked on reciprocal arm glide during water walking.  Added 4 inch step ups in waist depth water today with one hand support on rail.  Wife reports patient spends a lot of time lying down or sitting. Recommended he get up ~ every hour to take a short walk.          Timed:  Aquatic Therapy    45     mins  87453;    Therapeutic Activities  _____ mins 33210    Self-Care    _____ mins 17247     Untimed;  Group Therapy           _____ mins 64078    Total Timed:               ______ mins    Bertin Sepulveda, PT  Physical Therapist    [place] License:  [id number]

## 2025-03-14 ENCOUNTER — TREATMENT (OUTPATIENT)
Dept: PHYSICAL THERAPY | Facility: CLINIC | Age: 85
End: 2025-03-14
Payer: MEDICARE

## 2025-03-14 DIAGNOSIS — R26.89 BALANCE DISORDER: ICD-10-CM

## 2025-03-14 DIAGNOSIS — R29.6 FREQUENT FALLS: Primary | ICD-10-CM

## 2025-03-14 DIAGNOSIS — R29.898 LEG WEAKNESS, BILATERAL: ICD-10-CM

## 2025-03-14 DIAGNOSIS — Z74.09 IMPAIRED MOBILITY: ICD-10-CM

## 2025-03-14 PROCEDURE — 97113 AQUATIC THERAPY/EXERCISES: CPT | Performed by: PHYSICAL THERAPIST

## 2025-03-14 NOTE — PROGRESS NOTES
Physical Therapy Daily Treatment Note    [facility] Physical Therapy [facility]estone  [address] [place] [address]  [phone] (phone)  [phone] (fax)    Patient: Sukhdev Zayas   : 1940  Diagnosis/ICD-10 Code:  Frequent falls [R29.6]  Referring practitioner: Rachelle Patton PA-C  Date of Initial Visit: Type: THERAPY  Noted: 2024  Today's Date: 3/14/2025  Patient seen for 25 sessions             Subjective   Low back hurts.    Objective     AQUATIC EXERCISE:     [x]Water Walk Forwards, Sideways and Backwards, SBA X 5.5 minutes continuous  [x]Wall Crawl (BKTC) Stretch  w/ Noodle  30 sec. X 2              [x]Hamstring Stretch assisted standing 20 sec X 2 each                   []Piriformis Stretch Fig 4 -assisted                    []Calf Stretch          [x]Ball Toss into box, SBA X 12, Ball Toss/Catch X 15, No LOB         [x]Vertical Traction 1 min. X 2    [x]Abdominals- Large Noodle Pushdowns  15X                                [x]Hip Abd/Add 10X ea  []Hip Circles               []SLRs   2 X 5 ea             [x]March in Place 10x  []Mini Squat    12x               [x]B Toe/Heel Raises  10X                                      []Step Ups    4 inch X 5 ea                 []Bicycle  seated X 1 min.                                 [x]Seated LAQs  10X ea                                [x]Shoulder horiz Abd/Add w/ sm foam dumbbells  X 10              [x]Leg Press   w/ lg foam noodle X 10 each        Assessment/Plan  Worked balance with ball toss and catch and ball toss into box placed at various positions, all without LOB or unsteadiness.          Timed:  Aquatic Therapy    40     mins 59622;    Therapeutic Activities  _____ mins 07229    Self-Care    _____ mins 85280     Untimed;  Group Therapy           _____ mins 84749    Total Timed:               ______ mins    Bertin Sepulveda, PT  Physical Therapist    [place] License:  [id number]

## 2025-03-19 ENCOUNTER — TREATMENT (OUTPATIENT)
Dept: PHYSICAL THERAPY | Facility: CLINIC | Age: 85
End: 2025-03-19
Payer: MEDICARE

## 2025-03-19 DIAGNOSIS — Z74.09 IMPAIRED MOBILITY: ICD-10-CM

## 2025-03-19 DIAGNOSIS — R29.898 LEG WEAKNESS, BILATERAL: ICD-10-CM

## 2025-03-19 DIAGNOSIS — R26.89 BALANCE DISORDER: ICD-10-CM

## 2025-03-19 DIAGNOSIS — R29.6 FREQUENT FALLS: Primary | ICD-10-CM

## 2025-03-19 PROCEDURE — 97113 AQUATIC THERAPY/EXERCISES: CPT | Performed by: PHYSICAL THERAPIST

## 2025-03-19 NOTE — PROGRESS NOTES
Physical Therapy Daily Treatment Note    [facility] Physical Therapy [facility]  [address] [place] [address]  [phone] (phone)  [phone] (fax)    Patient: Sukhdev Zayas   : 1940  Diagnosis/ICD-10 Code:  Frequent falls [R29.6]  Referring practitioner: Rachelle Patton PA-C  Date of Initial Visit: Type: THERAPY  Noted: 2024  Today's Date: 3/19/2025  Patient seen for 26 sessions             Subjective   I was able to walk normal yesterday morning (no shuffling steps) and able to walk into therapy from the parking lot instead of being dropped off at the door.  My back is feeling a little better.    Objective     AQUATIC EXERCISE:     [x]Water Walk Forwards, Sideways and Backwards, SBA X 5.5 minutes continuous  [x]Wall Crawl (BKTC) Stretch  w/ Noodle  30 sec. X 2              [x]Hamstring Stretch assisted seated 20 sec X 2 each                   []Piriformis Stretch Fig 4                  []Calf Stretch          [x]Ball Toss into floating BB Hoop X 2 min. (No LOB)         [x]Vertical Traction 1 min. X 2    [x]Abdominals- Large Noodle Pushdowns  15X                                [x]Hip Abd/Add 10X ea  []Sit to Stands 10X, SBA               []SLRs   2 X 5 ea             [x]March in Place 10x  [x]Mini Squat    12x               [x]B Toe/Heel Raises  10X                                      [x]Step Ups    4 inch X 5 ea                 []Bicycle  seated X 1 min.                                 [x]Seated LAQs  10X ea                                [x]Shoulder horiz Abd/Add w/ sm foam dumbbells  X 10              [x]Leg Press   w/ lg foam noodle X 10 each     Assessment/Plan  Patient notes improved step length walking at home yesterday and improved distance walking in today from the parking lot instead of being dropped off at the door.  He was again encouraged to perform seated hamstring stretching at home. Matthew has a pool at home that he would be able to use this summer to continue  his strengthening and balance training after discharge from [facility] is scheduled for a 30 day heart monitor in early April, it is unclear at this time whether he can submerge in the pool with this or not.          Timed:  Aquatic Therapy    46     mins 23929;    Therapeutic Activities  _____ mins 37040    Self-Care    _____ mins 60769     Untimed;  Group Therapy           _____ mins 92257    Total Timed:               ______ mins    Bertin Sepulveda, PT  Physical Therapist    [place] License:  [id number]

## 2025-03-20 NOTE — PROGRESS NOTES
"Physical Therapy 30-Day Progress Note     [facility]  [address] [place] [address]  [phone] (phone)  [phone] (fax)    Patient: Sukhdev Zayas   : 1940  Diagnosis/ICD-10 Code:  Frequent falls [R29.6]  Referring practitioner: Rachelle Patton PA-C  Date of Initial Visit: Type: THERAPY  Noted: 2024  Today's Date: 3/21/2025  Patient seen for 27 sessions      Subjective:     Clinical Progress: improved  Home Program Compliance: N/A  Treatment has included:  aquatic therapy    Subjective   Back pain rated 6-7/10.  Started Vitamin B this morning and feels \"fuzzy\".    Objective       Functional outcome Measure: 2 minute walk test: Completed without assistive device 278 ft.    AQUATIC EXERCISE:     [x]Water Walk Forwards, Sideways and Backwards, SBA X 5 minutes continuous  [x]Wall Crawl (BKTC) Stretch  w/ Noodle  30 sec. X 2              [x]Hamstring Stretch assisted seated 20 sec X 2 each                   [x]Shoulder Flex/Ext AROM (core stability/balance) 10X                 []Calf Stretch          []Ball Toss into floating BB Hoop X 2 min. (No LOB)         []Vertical Traction 1 min. X 2    [x]Abdominals- Large Noodle Pushdowns  12X                                [x]Hip Abd/Add 10X ea  []Sit to Stands 10X, SBA               []SLRs   2 X 5 ea             [x]March in Place 10x  [x]Mini Squat        12x               [x]B Toe/Heel Raises  10X                                      []Step Ups    4 inch X 5 ea                 [x]Bicycle  seated X 1 min.                                 []Seated LAQs  10X ea                                [x]Shoulder horiz Abd/Add AROM  X 10              [x]Leg Press   w/ lg foam noodle X 10 each        Assessment & Plan       Assessment  Assessment details: Sukhdev has been treated in aquatic therapy for a total of 12 visits over the past 2 months.  He did have a fall at home last month () when he was standing in his " kitchen and from what he described his legs gave out.  He did not seek medical attention for this, but did report increased back pain afterwards.  He has new diagnosis of atrial flutter and is scheduled to wear a heart monitor for 30 days. He walked 278 feet during the 2 minute walk test compared to 251 ft 2 months ago.  The last 2 appointments he has been able to walk from the parking lot into the facility instead of being dropped off at the door.  He has noticed he is not shuffling his feet when he walks at home, however his stride is decreased.   Sukhdev uses his rollator as needed.    He has met all the short term goals and 1 of 3 long term goals.  He does have a pool at home that will open this summer.  His wife plans to get in the pool in his last few appointments to learn how to assist him with the prescribed aquatic exercises so that he can continue exercising on his own after discharge.           STGs:   Patient will be able to walk across pool with supervision for 4-5 minutes with normal gait pattern.  MET  Patient will report at least 25% improvement in right side back pain for overall improved quality of life.   MET  3.  Patient will be able to transfer from sit to stand one time without UE support indicating improved LE strength and balance.  MET   4. Patient will improve his 2 minute walk test by 50 ft for improved LE strength, endurance and functional mobility.  MET     Long Term Goals:  Patient will improve his strength and balance and report no falls over a 4 week period.  Ongoing, had a fall on 2/26/25 in kitchen where legs gave out   Patient will perform 2 minute walk test  ambulating over 300 feet with SBA without LOB with directional changes for improvements in safety with functional mobility. ONGOING  3. Patient will complete 30 -40 minutes of therapeutic exercise in a pool without increased pain to improve strength, balance and endurance.  MET       Recommendations: Continue as  planned  Timeframe: 1 month  Prognosis to achieve goals: good    PT Signature: Bertin Sepulveda, PT  [place] License: [id number]      Based upon review of the patient's progress and continued therapy plan, it is my medical opinion that Sukhdev Zayas should continue physical therapy treatment at [facility]  [address] [place] [address] [address] [place] [address]  [phone].    Signature: __________________________________  Rachelle Patton PA-C  NPI: [id number]                                          Timed:  Aquatic Therapy      47   mins [id number]    Therapeutic Activities  _____ mins 66918    Self-Care    _____ mins 32162     Untimed;  Group Therapy           _____ mins 26916    Total Timed:               ______ mins

## 2025-03-21 ENCOUNTER — TREATMENT (OUTPATIENT)
Dept: PHYSICAL THERAPY | Facility: CLINIC | Age: 85
End: 2025-03-21
Payer: MEDICARE

## 2025-03-21 DIAGNOSIS — Z74.09 IMPAIRED MOBILITY: ICD-10-CM

## 2025-03-21 DIAGNOSIS — R29.6 FREQUENT FALLS: Primary | ICD-10-CM

## 2025-03-21 DIAGNOSIS — R26.89 BALANCE DISORDER: ICD-10-CM

## 2025-03-21 DIAGNOSIS — R29.898 LEG WEAKNESS, BILATERAL: ICD-10-CM

## 2025-03-21 PROCEDURE — 97113 AQUATIC THERAPY/EXERCISES: CPT | Performed by: PHYSICAL THERAPIST

## 2025-03-23 ENCOUNTER — APPOINTMENT (OUTPATIENT)
Dept: CT IMAGING | Facility: HOSPITAL | Age: 85
End: 2025-03-23
Payer: MEDICARE

## 2025-03-23 ENCOUNTER — HOSPITAL ENCOUNTER (EMERGENCY)
Facility: HOSPITAL | Age: 85
Discharge: HOME | End: 2025-03-23
Attending: EMERGENCY MEDICINE | Admitting: EMERGENCY MEDICINE
Payer: MEDICARE

## 2025-03-23 ENCOUNTER — APPOINTMENT (OUTPATIENT)
Dept: GENERAL RADIOLOGY | Facility: HOSPITAL | Age: 85
End: 2025-03-23
Payer: MEDICARE

## 2025-03-23 VITALS
OXYGEN SATURATION: 100 % | DIASTOLIC BLOOD PRESSURE: 88 MMHG | SYSTOLIC BLOOD PRESSURE: 170 MMHG | RESPIRATION RATE: 18 BRPM | HEART RATE: 64 BPM | TEMPERATURE: 98.9 F

## 2025-03-23 DIAGNOSIS — S09.90XA MINOR HEAD INJURY, INITIAL ENCOUNTER: ICD-10-CM

## 2025-03-23 DIAGNOSIS — S62.647A CLOSED NONDISPLACED FRACTURE OF PROXIMAL PHALANX OF LEFT LITTLE FINGER, INITIAL ENCOUNTER: Primary | ICD-10-CM

## 2025-03-23 PROCEDURE — 99284 EMERGENCY DEPT VISIT MOD MDM: CPT

## 2025-03-23 PROCEDURE — 70450 CT HEAD/BRAIN W/O DYE: CPT

## 2025-03-23 PROCEDURE — 72125 CT NECK SPINE W/O DYE: CPT

## 2025-03-23 PROCEDURE — 73130 X-RAY EXAM OF HAND: CPT

## 2025-03-23 NOTE — ED NOTES
Pt had a fall on Friday. Pt has bruising and swelling to his left hand. Pt did hit his head. Pt denies LOC. Pt is on 3 blood thinners. Pt also reports left knee pain

## 2025-03-23 NOTE — ED PROVIDER NOTES
EMERGENCY DEPARTMENT ENCOUNTER  Room Number:  24/24  PCP: Rachelle Patton PA-C  Independent Historians: Patient      HPI:  Chief Complaint: had concerns including Fall and Head Injury.     A complete HPI/ROS/PMH/PSH/SH/FH are unobtainable due to: N/A      Context: Sukhdev Zayas is a 84 y.o. male with a medical history of CAD status post PCI, who presents to the ED c/o acute fall with left hand pain.  He states he fell on Friday.  He did hit his head.  He denies loss of conscious.  He denies headache.  He denies nausea or vomiting.  He has chronic neck and back pain, not acutely worse.  He primarily came today due to pain in his left hand and his hand had swollen to the point that he could not remove a ring on his left fourth finger.  He reports that his hand feels better after having the ring removed here on arrival.      Review of prior external notes (non-ED) -and- Review of prior external test results outside of this encounter: I reviewed family medicine visit from 3/4/2025 where patient was seen in follow-up for coronary artery disease.        PAST MEDICAL HISTORY  Active Ambulatory Problems     Diagnosis Date Noted    Hyperlipidemia 05/09/2016    Coronary artery disease involving native coronary artery of native heart without angina pectoris 05/09/2016    MCI (mild cognitive impairment) 05/09/2016    Mood disorder 10/20/2016    Closed wedge compression fracture of third thoracic vertebra with routine healing 02/06/2018    GERD (gastroesophageal reflux disease) 07/31/2018    S/P drug eluting coronary stent placement 07/31/2018    Chest pain 08/16/2018    Diastolic dysfunction 08/16/2018    Mixed action and resting tremor 11/19/2018    Bladder cancer 01/08/2019    BPH (benign prostatic hyperplasia) 07/11/2019    Atrial fibrillation 09/03/2019    Essential hypertension 09/12/2019    Dizziness 10/04/2019    Beta-blocker intolerance 12/03/2019    Orthostatic hypotension 12/16/2019    Parkinsonism 12/03/2020     Lumbar facet arthropathy 12/28/2021    Spondylosis of lumbar region without myelopathy or radiculopathy 12/28/2021    Lumbar foraminal stenosis 12/28/2021    Chronic bilateral low back pain without sciatica 12/28/2021    Hypothyroidism 03/24/2022    DDD (degenerative disc disease), lumbar 04/18/2022    Lumbar radiculopathy 04/18/2022     Resolved Ambulatory Problems     Diagnosis Date Noted    Exertional angina 08/20/2018    Unstable angina 08/23/2018    Bladder mass 09/18/2018    Acute cystitis without hematuria 05/24/2019    Dyspnea on exertion 05/25/2019    Right upper quadrant pain 08/31/2019    Calculus of gallbladder without cholecystitis 08/31/2019    Elevated LFTs 08/31/2019    Calculus of bile duct with acute cholecystitis without obstruction 08/30/2019    Dehydration 09/11/2019    Abdominal pain 09/12/2019    Nausea without vomiting 09/12/2019    Elevated bilirubin 10/04/2019    Bacteremia due to Klebsiella pneumoniae 10/09/2019    Acute urinary retention 03/11/2020     Past Medical History:   Diagnosis Date    Anxiety     Back pain     Depression     History of fractured rib     Multiple falls     Tremors of nervous system     Urinary incontinence     Vertigo          PAST SURGICAL HISTORY  Past Surgical History:   Procedure Laterality Date    CARDIAC CATHETERIZATION      7x, 5 stents    CATARACT EXTRACTION, BILATERAL      CHOLECYSTECTOMY N/A 9/2/2019    Procedure: CHOLECYSTECTOMY LAPAROSCOPIC WITH INTRAOPERATIVE CHOLANGIOGRAM;  Surgeon: Bg Rodriguez Jr., MD;  Location: [facility];  Service: General    COLONOSCOPY      CORONARY ANGIOPLASTY WITH STENT PLACEMENT      X5     CYSTOSCOPY BLADDER BIOPSY N/A 1/8/2019    Procedure: CYSTOSCOPY BLADDER BIOPSY;  Surgeon: Wang Soares Jr., MD;  Location: [facility] OR;  Service: Urology    CYSTOSCOPY BLADDER BIOPSY N/A 6/13/2019    Procedure: CYSTOSCOPY BLADDER BIOPSY;  Surgeon: Wang Soares Jr., MD;  Location: [facility] OR;  Service:  Urology    CYSTOSCOPY TRANSURETHRAL RESECTION OF PROSTATE N/A 7/11/2019    Procedure: CYSTOSCOPY TRANSURETHRAL RESECTION OF PROSTATE;  Surgeon: Wang Soares Jr., MD;  Location: [facility] MAIN OR;  Service: Urology    CYSTOSCOPY URETEROSCOPY LASER LITHOTRIPSY N/A 6/13/2019    Procedure: CYSTO LITHOPAXY;  Surgeon: Wang Soares Jr., MD;  Location: [facility] MAIN OR;  Service: Urology    ERCP N/A 9/3/2019    Procedure: ENDOSCOPIC RETROGRADE CHOLANGIOPANCREATOGRAPHY with sphincterotomy and balloon sweep;  Surgeon: Ashok Amaya MD;  Location: [facility] ENDOSCOPY;  Service: Gastroenterology    EYE SURGERY      Lens implants    LUMBAR DISCECTOMY FUSION INSTRUMENTATION      LUMBAR EPIDURAL INJECTION N/A 5/4/2022    Procedure: lumbar epidural steroid injection;  Surgeon: Charlene Manzo MD;  Location: [facility] MAIN OR;  Service: Pain Management;  Laterality: N/A;    MEDIAL BRANCH BLOCK Bilateral 12/29/2021    Procedure: LUMBAR MEDIAL BRANCH BLOCK Bilateral L3-S1 x2 (2 weeks apart);  Surgeon: Charlene Manzo MD;  Location: [facility] MAIN OR;  Service: Pain Management;  Laterality: Bilateral;    MEDIAL BRANCH BLOCK Bilateral 1/12/2022    Procedure: LUMBAR MEDIAL BRANCH BLOCK Bilateral L3-S1 x2 (2 weeks apart);  Surgeon: Charlene Manzo MD;  Location: [facility]IN OR;  Service: Pain Management;  Laterality: Bilateral;    RADIOFREQUENCY ABLATION Bilateral 2/7/2022    Procedure: RADIOFREQUENCY ABLATION LUMBAR bilateral L3-S1;  Surgeon: Charlene Manzo MD;  Location: [facility] OR;  Service: Pain Management;  Laterality: Bilateral;    SKIN CANCER EXCISION Left     chest wall    SKIN CANCER EXCISION  01/21/2021    facial    TRANSURETHRAL RESECTION OF BLADDER TUMOR N/A 11/29/2018    Procedure: TUR BLADDER TUMOR  LARGE;  Surgeon: Wang Soares Jr., MD;  Location: [facility] MA[facility] OR;  Service: Urology         FAMILY HISTORY  Family History   Problem Relation Age of Onset    Arthritis Mother     Glaucoma Mother     Heart  disease Father     Emphysema Father     Tremor Father     Malig Hyperthermia Neg Hx          SOCIAL HISTORY  Social History     Socioeconomic History    Marital status:     Number of children: 4    Years of education: 5 college degrees   Tobacco Use    Smoking status: Never     Passive exposure: Never    Smokeless tobacco: Never   Vaping Use    Vaping status: Never Used   Substance and Sexual Activity    Alcohol use: No     Comment: last drink about 1 year ago     Drug use: No    Sexual activity: Defer         ALLERGIES  Bupropion      REVIEW OF SYSTEMS  Review of all 14 systems is negative other than stated in the HPI above.      PHYSICAL EXAM    I have reviewed the triage vital signs and nursing notes.    ED Triage Vitals   Temp Heart Rate Resp BP SpO2   03/23/25 1352 03/23/25 1352 03/23/25 1352 03/23/25 1408 03/23/25 1352   98.9 °F (37.2 °C) 72 16 165/91 100 %      Temp src Heart Rate Source Patient Position BP Location FiO2 (%)   03/23/25 1352 -- -- -- --   Tympanic             GENERAL: awake and alert, well-appearing, GCS 15  HENT: Normocephalic, atraumatic  EYES: no scleral icterus, pupils 3 mm reactive bilaterally  CV: regular rhythm, regular rate  RESPIRATORY: normal effort  MUSCULOSKELETAL: no deformity, no midline C-spine tenderness or step-off.  There is mild soft tissue swelling of the dorsum of the left hand with point tenderness over the left third MCP joint without crepitus.  There is brisk cap refill in all digits of left hand.  NEURO: alert, moves all extremities, follows commands, cranial nerves II through XII intact, speech fluent and clear, normal sensation to light touch throughout the bilateral lower extremities, bilateral upper extremities.  Patient has normal strength with hip flexion, knee extension, plantarflexion, dorsiflexion bilateral lower extremities.  PSYCH: calm, cooperative  SKIN: Warm, dry, superficial abrasion anterior aspect left knee          LAB RESULTS  No results found  for this or any previous visit (from the past 24 hours).    The above labs were ordered by me and independently reviewed by me.     RADIOLOGY  XR Hand 3+ View Left  Result Date: 3/23/2025  XR LEFT HAND, 3 VIEWS  HISTORY: Fall, left hand pain  COMPARISON: None available  FINDINGS: Osteopenia. There is a fracture involving the base of the proximal phalanx of the fifth digit. There is overlying soft tissue swelling. No other fractures are seen. There are osteoarthritic changes involving multiple IP joints and the base of the thumb      Fracture of the proximal phalanx of the fifth digit    This report was finalized on 3/23/2025 3:53 PM by Dr. Sandeep Chaudhary M.D on Workstation: [id number]      CT Head Without Contrast  CT Head Without Contrast, CT Cervical Spine Without Contrast  Result Date: 3/23/2025  CT HEAD WO CONTRAST-, CT CERVICAL SPINE WO CONTRAST-  INDICATIONS: Trauma  TECHNIQUE: Radiation dose reduction techniques were utilized, including automated exposure control and exposure modulation based on body size. Noncontrast head CT  COMPARISON: 2/1/2018  FINDINGS:  Head CT:  No acute intracranial hemorrhage, midline shift or mass effect. No acute territorial infarct is identified.  Mild periventricular hypodensities suggest chronic small vessel ischemic change in a patient this age.  Arterial calcifications are seen at the base of the brain.  Ventricles, cisterns, cerebral sulci are unremarkable for patient age.  The visualized paranasal sinuses, orbits, mastoid air cells are unremarkable.  Cervical spine CT:  No acute fracture is identified. Typical appearing hemangioma T1.  Mild anterolisthesis of C4 on C5.  Facet and uncovertebral joint hypertrophy contribute to neuroforaminal narrowing, more prominent on the right at C6/7, and on the left at C3/4, C4/5, C5/6, C6/7. No high-grade central stenosis is identified without the benefit of intrathecal contrast material.  Paraspinal soft tissues are notable for  bilateral carotid arterial calcifications. Minimal likely atelectasis in the left upper lung. Minimal fibronodular changes at the lung apices.         No acute intracranial hemorrhage or hydrocephalus; chronic changes of the brain. No acute cervical fracture identified; degenerative changes in the cervical spine. If there is further clinical concern, MRI could be considered for further evaluation.  This report was finalized on 3/23/2025 3:35 PM by Dr. Michael Huerta M.D on Workstation: [email or web address]        The above radiology studies were ordered by me.  See ED course for independent interpretations.     MEDICATIONS GIVEN IN ER  Medications - No data to display      ORDERS PLACED DURING THIS VISIT:  Orders Placed This Encounter   Procedures    Splint Cast Strapping    CT Head Without Contrast    CT Cervical Spine Without Contrast    XR Hand 3+ View Left         OUTPATIENT MEDICATION MANAGEMENT:  No current Epic-ordered facility-administered medications on file.     Current Outpatient Medications Ordered in Epic   Medication Sig Dispense Refill    acetaminophen-codeine (TYLENOL #3) 300-30 MG per tablet Take 1 tablet by mouth Every 6 (Six) Hours As Needed for Moderate Pain. 60 tablet 1    apixaban (ELIQUIS) 5 MG tablet tablet Take 1 tablet by mouth.      aspirin 81 MG chewable tablet Chew 1 tablet Daily. 30 tablet 0    atorvastatin (LIPITOR) 10 MG tablet Take 1 tablet by mouth.      Blood Pressure Monitoring (Comfort Touch BP Cuff/Large) misc Use 1 each Daily. 1 each 0    clopidogrel (PLAVIX) 75 MG tablet Take 1 tablet by mouth Daily. Start on Monday      FLUoxetine (PROzac) 20 MG capsule Take 1 capsule by mouth Daily.      FLUoxetine (PROzac) 20 MG capsule Take 1 capsule by mouth Daily. 90 capsule 3    FLUoxetine (PROzac) 40 MG capsule Take 1 capsule by mouth Daily. 90 capsule 3    gabapentin (NEURONTIN) 100 MG capsule Take 1 capsule by mouth 2 (Two) Times a Day As Needed.      levothyroxine (SYNTHROID, LEVOTHROID)  50 MCG tablet TAKE 1 TABLET BY MOUTH DAILY 90 tablet 0    midodrine (PROAMATINE) 5 MG tablet Take 0.5 tablets by mouth Daily As Needed (hypotension). 30 tablet 1    nabumetone (RELAFEN) 500 MG tablet TAKE 1 TABLET BY MOUTH TWICE DAILY AS NEEDED FOR MILD PAIN 60 tablet 0    omeprazole (priLOSEC) 40 MG capsule Take 1 capsule by mouth Daily. 90 capsule 4    VITAMIN D PO Take  by mouth. 400 mg daily           PROCEDURES  Splint - Cast - Strapping    Date/Time: 3/23/2025 4:50 PM    Performed by: Brigido Slade MD  Authorized by: Brigido Slade MD    Consent:     Consent obtained:  Verbal  Pre-procedure details:     Distal neurologic exam:  Normal    Distal perfusion: brisk capillary refill    Procedure details:     Location:  Finger    Finger location:  L small finger    Splint type:  Finger    Supplies:  Aluminum splint    Attestation: Splint applied and adjusted personally by me    Post-procedure details:     Distal neurologic exam:  Normal    Distal perfusion: brisk capillary refill      Procedure completion:  Tolerated well, no immediate complications    Post-procedure imaging: not applicable                PROGRESS, DATA ANALYSIS, CONSULTS, AND MEDICAL DECISION MAKING  All labs have been independently interpreted by me.  All radiology studies have been reviewed by me. All EKG's have been independently viewed and interpreted by me.  Discussion below represents my analysis of pertinent findings related to patient's condition, differential diagnosis, treatment plan and final disposition.    Differential diagnosis includes but is not limited to:  Traumatic intracranial hemorrhage  Traumatic cervical spine fracture  Hand fracture      Clinical Scores:                  ED Course as of 03/23/25 1650   Sun Mar 23, 2025   1534 CT brain without contrast independently interpreted PACS demonstrates no acute intracranial hemorrhage. [JR]   1557 Patient does have a fracture of the proximal phalanx of the left  fifth finger.  This will be immobilized in a splint and patient will be instructed follow-up with orthopedic hand surgery. [JR]      ED Course User Index  [JR] Brigido Slade MD             AS OF 16:50 EDT VITALS:    BP - 165/91  HR - 72  TEMP - 98.9 °F (37.2 °C) (Tympanic)  O2 SATS - 100%    COMPLEXITY OF CARE        Chronic or social conditions impacting patient care (Social Determinants of Health):     DIAGNOSIS  Final diagnoses:   Closed nondisplaced fracture of proximal phalanx of left little finger, initial encounter   Minor head injury, initial encounter           DISPOSITION  DISCHARGE    Patient discharged in stable condition.    Reviewed implications of results, diagnosis, meds, responsibility to follow up, warning signs and symptoms of possible worsening, potential complications and reasons to return to ER.    Patient/Family voiced understanding of above instructions.    Discussed plan for discharge, as there is no emergent indication for admission. Patient referred to primary care provider for BP management due to today's BP. Pt/family is agreeable and understands need for follow up and repeat testing.  Pt is aware that discharge does not mean that nothing is wrong but it indicates no emergency is present that requires admission and they must continue care with follow-up as given below or physician of their choice.     FOLLOW-UP  Mara Pederson MD  [address] [facility] [address] [phone]    Schedule an appointment as soon as possible for a visit            Medication List      No changes were made to your prescriptions during this visit.             Prescription drug monitoring program review:           Please note that portions of this document were completed with a voice recognition program.    Note Disclaimer: At [facility], we believe that sharing information builds trust and better relationships. You are receiving this note because you recently visited [facility].  It is possible you will see health information before a provider has talked with you about it. This kind of information can be easy to misunderstand. To help you fully understand what it means for your health, we urge you to discuss this note with your provider.         Brigido Slade MD  03/23/25 [id number]

## 2025-03-25 DIAGNOSIS — G20.C PRIMARY PARKINSONISM: ICD-10-CM

## 2025-03-25 RX ORDER — NABUMETONE 500 MG/1
500 TABLET, FILM COATED ORAL 2 TIMES DAILY PRN
Qty: 60 TABLET | Refills: 0 | Status: SHIPPED | OUTPATIENT
Start: 2025-03-25

## 2025-03-26 ENCOUNTER — TREATMENT (OUTPATIENT)
Dept: PHYSICAL THERAPY | Facility: CLINIC | Age: 85
End: 2025-03-26
Payer: MEDICARE

## 2025-03-26 DIAGNOSIS — R26.89 BALANCE DISORDER: ICD-10-CM

## 2025-03-26 DIAGNOSIS — R29.6 FREQUENT FALLS: Primary | ICD-10-CM

## 2025-03-26 DIAGNOSIS — Z74.09 IMPAIRED MOBILITY: ICD-10-CM

## 2025-03-26 DIAGNOSIS — R29.898 LEG WEAKNESS, BILATERAL: ICD-10-CM

## 2025-03-26 PROCEDURE — 97113 AQUATIC THERAPY/EXERCISES: CPT | Performed by: PHYSICAL THERAPIST

## 2025-03-26 NOTE — PROGRESS NOTES
Physical Therapy Daily Treatment Note    [facility]  [address] [place] [address]  [phone] (phone)  [phone] (fax)    Patient: Sukhdev Zayas   : 1940  Diagnosis/ICD-10 Code:  Frequent falls [R29.6]  Referring practitioner: Rachelle Patton PA-C  Date of Initial Visit: Type: THERAPY  Noted: 2024  Today's Date: 3/26/2025  Patient seen for 28 sessions             Subjective   Fell on Saturday when walking outside on cement walkway to get the mail.  Broke left pinky finger (saw hand specialist-  it was ginny taped, no sugery).  Did hit his head and had MRI that was normal.  Back pain increased and has a scrape on his left knee.     Objective     AQUATIC EXERCISE:     [x]Water Walk Forwards w/ hand supported on kickboard 75 ft, CGA  []Wall Crawl (BKTC) Stretch  w/ Noodle  30 sec. X 2              [x]Hamstring Stretch -gentle assisted seated 20 sec X 2 each                   []Shoulder Flex/Ext AROM (core stability/balance) 10X                 []Calf Stretch          []Ball Toss into floating BB Hoop X 2 min. (No LOB)         []Vertical Traction 1 min. X 2    []Abdominals- Large Noodle Pushdowns  12X                                []Hip Abd/Add 10X ea  []Sit to Stands 10X, SBA               []SLRs   2 X 5 ea             []March in Place 10x  []Mini Squat        12x               [x]B Toe/Heel Raises  10X                                      []Step Ups    4 inch X 5 ea                 [x]Bicycle  seated 20X                                 [x]Seated LAQs  5X ea                                [x]Shoulder horiz Abd/Add AROM  X 10  seated             []Leg Press   w/ lg foam noodle X 10 each   [x]Seated Arm Press downs X 8   Seated at jets SBA 5 min. to help ease back pain       Assessment/Plan  Patient fell this past Saturday walking outside to get the mail (see details in Subjective section).  Discussed with him and his wife the need for him to use an  assistive device, he has a rollator.  He had limited participation in therapy today as he expressed concern for his daughter in [place] who is in the hospital. He and his wife will travel there next week to visit her.        Timed:  Aquatic Therapy    23     mins 24980;    Therapeutic Activities  _____ mins [id number]    Self-Care    _____ mins 85314     Untimed;  Group Therapy           _____ mins 45860    Total Timed:               ______ mins    Bertin Sepulveda, PT  Physical Therapist    [place] License:  [id number]

## 2025-03-28 ENCOUNTER — TREATMENT (OUTPATIENT)
Dept: PHYSICAL THERAPY | Facility: CLINIC | Age: 85
End: 2025-03-28
Payer: MEDICARE

## 2025-03-28 DIAGNOSIS — R29.6 FREQUENT FALLS: Primary | ICD-10-CM

## 2025-03-28 DIAGNOSIS — R26.89 BALANCE DISORDER: ICD-10-CM

## 2025-03-28 DIAGNOSIS — Z74.09 IMPAIRED MOBILITY: ICD-10-CM

## 2025-03-28 DIAGNOSIS — R29.898 LEG WEAKNESS, BILATERAL: ICD-10-CM

## 2025-03-28 PROCEDURE — 97113 AQUATIC THERAPY/EXERCISES: CPT | Performed by: PHYSICAL THERAPIST

## 2025-03-28 NOTE — PROGRESS NOTES
Physical Therapy Daily Treatment Note    [facility]  [address] [place] [address]  [phone] (phone)  [phone] (fax)    Patient: Sukhdev Zayas   : 1940  Diagnosis/ICD-10 Code:  Frequent falls [R29.6]  Referring practitioner: Rachelle Patton PA-C  Date of Initial Visit: Type: THERAPY  Noted: 2024  Today's Date: 3/28/2025  Patient seen for 29 sessions             Subjective   Same back pain as last time and some knee soreness from his fall.     Objective     AQUATIC EXERCISE:     [x]Water Walk w/ left hand supported on kickboard Fwds, Sideways, Backwards 50 ft each CGA (patient at slower pace today)  [x]Wall Crawl (BKTC) Stretch  w/ Noodle  30 sec. X 2              [x]Hamstring Stretch -gentle assisted seated 20 sec X 2 each                   []Shoulder Flex/Ext AROM (core stability/balance) 10X                 []Calf Stretch          []Ball Toss into floating BB Hoop X 2 min. (No LOB)         []Vertical Traction 1 min. X 2    []Abdominals- Large Noodle Pushdowns  12X                                [x]Hip Abd/Add 10X ea  []Sit to Stands 10X, SBA               []SLRs   2 X 5 ea             []March in Place 10x  [x]Mini Squat        8x               [x]B Toe/Heel Raises  10X                                      []Step Ups    4 inch X 5 ea                 [x]Bicycle  seated 20X                                 []Seated LAQs  5X ea                                [x]Shoulder horiz Abd/Add AROM  X 10 standing w/ kickboard under left hand/forearm             []Leg Press   w/ lg foam noodle X 10 each   []Seated Arm Press downs X 8   Seated at jets SBA 3 min. to help ease back pain       Assessment/Plan  Left hand swollen with bruising from his fall last weekend.  Used kick board to support left hand/forearm during water walking and UE AROM.  Plan: Patient's wife will get in the pool for his next appointment to learn how to assist her  with the  aquatic exercises as they have a pool at home.          Timed:  Aquatic Therapy    30     mins 73571;    Therapeutic Activities  _____ mins 19446    Self-Care    _____ mins 99536     Untimed;  Group Therapy           _____ mins 01670    Total Timed:               ______ mins    Bertin Sepulveda, PT  Physical Therapist    [place] License:  [id number]

## 2025-04-02 ENCOUNTER — TELEPHONE (OUTPATIENT)
Dept: PHYSICAL THERAPY | Facility: CLINIC | Age: 85
End: 2025-04-02

## 2025-04-02 NOTE — TELEPHONE ENCOUNTER
Caller: Sukhdev Zayas    Relationship: Self       What was the call regarding: WIFE CALLED NOT FEELING WELL THIS AM

## 2025-04-09 ENCOUNTER — TELEPHONE (OUTPATIENT)
Dept: ORTHOPEDICS | Facility: OTHER | Age: 85
End: 2025-04-09
Payer: MEDICARE

## 2025-04-14 ENCOUNTER — TELEPHONE (OUTPATIENT)
Dept: INTERNAL MEDICINE | Facility: CLINIC | Age: 85
End: 2025-04-14
Payer: MEDICARE

## 2025-04-14 ENCOUNTER — TREATMENT (OUTPATIENT)
Dept: PHYSICAL THERAPY | Facility: CLINIC | Age: 85
End: 2025-04-14
Payer: MEDICARE

## 2025-04-14 DIAGNOSIS — M25.511 ACUTE PAIN OF RIGHT SHOULDER: Primary | ICD-10-CM

## 2025-04-14 DIAGNOSIS — Z74.09 IMPAIRED MOBILITY: ICD-10-CM

## 2025-04-14 DIAGNOSIS — R26.89 BALANCE DISORDER: ICD-10-CM

## 2025-04-14 DIAGNOSIS — R29.898 LEG WEAKNESS, BILATERAL: ICD-10-CM

## 2025-04-14 DIAGNOSIS — M54.50 ACUTE BILATERAL LOW BACK PAIN WITHOUT SCIATICA: ICD-10-CM

## 2025-04-14 DIAGNOSIS — R29.6 FREQUENT FALLS: Primary | ICD-10-CM

## 2025-04-14 DIAGNOSIS — G20.C PARKINSONISM, UNSPECIFIED PARKINSONISM TYPE: ICD-10-CM

## 2025-04-14 PROCEDURE — 97113 AQUATIC THERAPY/EXERCISES: CPT | Performed by: PHYSICAL THERAPIST

## 2025-04-14 NOTE — PROGRESS NOTES
Physical Therapy Daily Treatment Note    [facility] Therapy [facility]  [address] [place] [address]  [phone] (phone)  [phone] (fax)    Patient: Sukhdev Zayas   : 1940  Diagnosis/ICD-10 Code:  Frequent falls [R29.6]  Referring practitioner: Rachelle Patton PA-C  Date of Initial Visit: Type: THERAPY  Noted: 2024  Today's Date: 2025  Patient seen for 30 sessions             Subjective   Back pain.    Objective     AQUATIC EXERCISE:     [x]Water Walk Fwds, Sideways, Backwards 50 ft each SBA (slower pace today)  [x]Wall Crawl (BKTC) Stretch  w/ Noodle  30 sec. X 2              [x]Hamstring Stretch seated 20 sec X 2 each                   [x]Shoulder Flex/Ext AROM (core stability/balance) 10X                 [x]Calf Stretch  20 sec X 2 ea     []Ball Toss into floating BB Hoop X 2 min. (No LOB)         []Vertical Traction 1 min. X 2    [x]Abdominals- Large Noodle Pushdowns  12X                                [x]Hip Abd/Add 10X ea  []Sit to Stands 10X, SBA               [x]SLRs            10X ea             []March in Place 10x  [x]Mini Squat        10x               [x]B Toe/Heel Raises  10X                                      []Step Ups    4 inch X 5 ea                 [x]Bicycle  seated 2 min.                                [x]Seated LAQs  5X ea                                [x]Shoulder horiz Abd/Add AROM  X 10 standing w/ kickboard under left hand/forearm             []Leg Press   w/ lg foam noodle X 10 each   []Seated Arm Press downs X 8   Seated at jets SBA 3 min. to help ease back pain       Assessment/Plan  There has been a 2 week lapse in treatment due to patient leaving town to visit his daughter who was in hospice and then passed recently. Matthew was accompanied in the pool by his wife today. They have a pool at home and plan to continue the water exercise after discharge when their pool opens next month.  Family teaching provided  today.          Timed:  Aquatic Therapy    40     mins 88958;    Therapeutic Activities  _____ mins 01157    Self-Care    _____ mins 88599     Untimed;  Group Therapy           _____ mins 82833    Total Timed:               ______ mins    Bertin Sepulveda, PT  Physical Therapist    [place] License:  [id number]

## 2025-04-24 ENCOUNTER — TREATMENT (OUTPATIENT)
Dept: PHYSICAL THERAPY | Facility: CLINIC | Age: 85
End: 2025-04-24
Payer: MEDICARE

## 2025-04-24 DIAGNOSIS — R26.89 BALANCE DISORDER: ICD-10-CM

## 2025-04-24 DIAGNOSIS — R29.898 LEG WEAKNESS, BILATERAL: ICD-10-CM

## 2025-04-24 DIAGNOSIS — R29.6 FREQUENT FALLS: Primary | ICD-10-CM

## 2025-04-24 DIAGNOSIS — Z74.09 IMPAIRED MOBILITY: ICD-10-CM

## 2025-04-24 PROCEDURE — 97113 AQUATIC THERAPY/EXERCISES: CPT | Performed by: PHYSICAL THERAPIST

## 2025-04-24 NOTE — PROGRESS NOTES
"Re-Assessment / Re-Certification    [facility]  [address] [place] [address]  [phone] (phone)  [phone] (fax)    Patient: Sukhdev Zayas   : 1940  Diagnosis/ICD-10 Code:  Frequent falls [R29.6]  Referring practitioner: Rachelle Patton PA-C  Date of Initial Visit: Type: THERAPY  Noted: 2024  Today's Date: 2025  Patient seen for 31 sessions      Subjective:     Functional Outcome Score:  2 Minute Walk Test  Clinical Progress: improved  Home Program Compliance: N/A, his pool is not open   Treatment has included:  aquatic therapy    Subjective  Walking to the mailbox (on an incline) to get the mail for exercise.  Was more active yesterday putting new battery in his car with grandson. Continues to have back pain, reported as \"about the same\".  Legs feel weak today.    Objective     Gait: Independent with increased thoracic kyphosis, decreased step length (no shuffling), reduced arm swing, slow gait speed     MMT:  Hip Flexion Right 3+/5,  Left 4/5  Knee Extension 4+/5 B (Lacks full knee extension B)  Knee Flexioin  4/5 B  Ankle DF         5/5 B  Ankle PF tested in standing Unable to perform full heel raise     Flexibility: Decreased in B hamstrings and Calf     30 Second Sit to Stand Test: patient completed 6 repetitions with minimal UE support     Functional outcome score: 2 Minute Walk Test:  200 ft    AQUATIC EXERCISE:   While entering pool via stairs, holding rail, patient lost balance backwards and therapist helped regain balance without patient falling.  Matthew had one episode of a festinating gait with backwards walking during therapy.    [x]Water Walk Forward 100 ft and Backwards 50 ft, SBA   [x]Wall Crawl (BKTC) Stretch  w/ Noodle  30 sec. X 2              [x]Hamstring Stretch assisted seated 20 sec X 2 each                   [x]Shoulder Flex/Ext AROM (core stability/balance) 10X                 []Calf Stretch          []Ball Toss into " floating BB Hoop X 2 min. (No LOB)         []Vertical Traction 1 min. X 2    []Abdominals- Large Noodle Pushdowns  12X                                []Hip Abd/Add 10X ea  []Sit to Stands 10X, SBA               []SLRs   2 X 5 ea             []March in Place 10x  [x]Mini Squat        10x               [x]B Toe/Heel Raises  10X                                      []Step Ups    4 inch X 5 ea                 [x]Bicycle  seated X 2 min.                                 [x]Seated LAQs  10X ea                                [x]Shoulder horiz Abd/Add AROM  X 10              []Leg Press   w/ lg foam noodle X 10 each             Assessment & Plan       Assessment  Impairments: abnormal gait, abnormal or restricted ROM, activity intolerance, impaired balance, impaired physical strength, lacks appropriate home exercise program, pain with function and safety issue   Functional limitations: carrying objects, lifting, walking, uncomfortable because of pain, standing and unable to perform repetitive tasks   Assessment details: Sukhdev has been attending aquatic therapy for the past 3 months.  Due to a death in the family he had limited visits in April.  Sukhdev fell at home in February and again in March, sustaining a finger fracture. He continues to report chronic back pain.  Recently he has been walking to the mailbox for exercise.  He did not perform as well on the 2 minute walk test today compared to last month.  His wife states he was very active yesterday and this could be the reason.   Sukhdev walks independently and does use his rollator as needed.   He has a pool at home that is not yet open.  He plans to continue the prescribed aquatic exercises with the help of his wife when it opens. He does demonstrate improved sit to stand transfers and LE strength.  He has met all 4 short term goals and 2 of 4 long term goals.    Goals  Plan Goals:        STGs:  1 Patient will be able to walk across pool with supervision for 4-5  minutes with normal gait pattern.  MET  2. Patient will report at least 25% improvement in right side back pain for overall improved quality of life.   MET  3.  Patient will be able to transfer from sit to stand one time without UE support indicating improved LE strength and balance.  MET   4. Patient will improve his 2 minute walk test by 50 ft for improved LE strength, endurance and functional mobility.  MET     Long Term Goals:  1. Patient will improve his strength and balance and report no falls over a 4 week period.  MET for the past 4 weeks    2. Patient will perform 2 minute walk test  ambulating over 300 feet with SBA without LOB with directional changes for improvements in safety with functional mobility. ONGOING    3. Patient will complete 30 -40 minutes of therapeutic exercise in a pool without increased pain to improve strength, balance and endurance.  MET    4. Patient with the assist of his wife will demonstrate independence with aquatic exercises that he can perform in his home pool this summer. NEW      Plan  Therapy options: will be seen for skilled therapy services  Other planned therapy interventions: AQUATIC THERAPY, GROUP THERAPY  Frequency: 2x week  Duration in weeks: 5  Treatment plan discussed with: patient and family      Progress toward previous goals: Partially Met        Recommendations: Continue as planned  Timeframe: 1 month  Prognosis to achieve goals: good    PT Signature: Bertin Sepulveda, PT  [place] License: [id number]    Timed:  Aquatic Therapy    45     mins 49685    Therapeutic Activities  _____ mins 28792    Self-Care     _____ mins 64324     Untimed;  Group Therapy           _____ mins 68465    Total Timed:               ______ mins    Based upon review of the patient's progress and continued therapy plan, it is my medical opinion that Sukhdev Zayas should continue physical therapy treatment at [facility]  [address] [place]  [place] [address]  [phone].  Please fax signed copy to [phone]    Signature: __________________________________  Rachelle Patton PA-C  [email or web address]    Electronically signed by Bertin Sepulveda, PT, 04/24/25, 9:45 AM EDT    DATE TREATMENT INITIATED: 4/24/2025      90 Day Recertification  Certification Period: 7/23/2025  I certify that the therapy services are furnished while this patient is under my care.  The services outlined above are required by this patient, and will be reviewed every 90 days.     PHYSICIAN: Rachelle Patton PA-C   NPI: [id number]                                         DATE:     Please sign and return via fax to [phone] Thank you, [facility] Therapy.

## 2025-04-30 ENCOUNTER — TREATMENT (OUTPATIENT)
Dept: PHYSICAL THERAPY | Facility: CLINIC | Age: 85
End: 2025-04-30
Payer: MEDICARE

## 2025-04-30 DIAGNOSIS — R26.89 BALANCE DISORDER: ICD-10-CM

## 2025-04-30 DIAGNOSIS — Z74.09 IMPAIRED MOBILITY: ICD-10-CM

## 2025-04-30 DIAGNOSIS — R29.898 LEG WEAKNESS, BILATERAL: ICD-10-CM

## 2025-04-30 DIAGNOSIS — R29.6 FREQUENT FALLS: Primary | ICD-10-CM

## 2025-04-30 PROCEDURE — 97113 AQUATIC THERAPY/EXERCISES: CPT | Performed by: PHYSICAL THERAPIST

## 2025-04-30 NOTE — PROGRESS NOTES
Physical Therapy Daily Treatment Note    [facility]  [address] [place] [address]  [phone] (phone)  [phone] (fax)    Patient: Sukhdev Zayas   : 1940  Diagnosis/ICD-10 Code:  Frequent falls [R29.6]  Referring practitioner: Rahcelle Patton PA-C  Date of Initial Visit: Type: THERAPY  Noted: 2024  Today's Date: 2025  Patient seen for 32 sessions             Subjective   The last 2 days my legs felt unstable/weak and sore in the back, today they  are better.  Back hurts a lot.    Objective     AQUATIC EX:  Water Walk Forward 100 ft.,  Backwards 50 ft. and Sidestepping 25 ft, SBA  [x]Wall Crawl (BKTC) Stretch  w/ Noodle  30 sec. X 3              [x]Hamstring Stretch assisted seated 20 sec X 2 each                   []Shoulder Flex/Ext AROM (core stability/balance) 10X                 []Calf Stretch          []Ball Toss into floating BB Hoop X 2 min. (No LOB)         [x]Decompression w/ hips/knees flexed w/ Noodle/rail support 1 min.    [x]Abdominals- Large solid Noodle Pushdowns  10X                                [x]Hip Abd/Add 10X ea  []Sit to Stands 10X, SBA               []SLRs   2 X 5 ea             [x]March in Place 10x  [x]Mini Squat        10x               [x]B Toe/Heel Raises  10X                                      []Step Ups    4 inch X 5 ea                 [x]Bicycle  seated X 2 min.                                 [x]Seated LAQs  10X ea                                [x]Shoulder horiz Abd/Add AROM  X 20              []Leg Press   w/ lg foam noodle X 10 each   Seated at jets 1 min. Supervised to help ease back pain    Assessment/Plan  Ray reported his legs felt tired at the end of today's session.  Wall crawl stretch and decompression position did bring some short term relief to back pain.          Timed:  Aquatic Therapy    40     mins 70125;    Therapeutic Activities  _____ mins 25734    Self-Care    _____ mins 98313      Untimed;  Group Therapy           _____ mins 82338    Total Timed:               ______ mins    Bertin Sepulveda, PT  Physical Therapist    [place] License:  [id number]

## 2025-05-02 ENCOUNTER — TREATMENT (OUTPATIENT)
Dept: PHYSICAL THERAPY | Facility: CLINIC | Age: 85
End: 2025-05-02
Payer: MEDICARE

## 2025-05-02 DIAGNOSIS — R29.898 LEG WEAKNESS, BILATERAL: ICD-10-CM

## 2025-05-02 DIAGNOSIS — R29.6 FREQUENT FALLS: Primary | ICD-10-CM

## 2025-05-02 DIAGNOSIS — Z74.09 IMPAIRED MOBILITY: ICD-10-CM

## 2025-05-02 DIAGNOSIS — R26.89 BALANCE DISORDER: ICD-10-CM

## 2025-05-02 PROCEDURE — 97113 AQUATIC THERAPY/EXERCISES: CPT | Performed by: PHYSICAL THERAPIST

## 2025-05-02 NOTE — DISCHARGE INSTRUCTIONS
Paged [phone]: Leo GREEN. Do you want the coreg/asprin/ all morning meds given? please advise thanks Claudine [id number]    Albert Wills:Leo GREEN. Do you want the coreg/asprin/ all morning meds given? please advise thanks Claudine [id number] Claudine Amaro RN on 5/2/2025 at 7:34 AM    Take the following medications the morning of surgery with a small sip of water:        General Instructions:  • Do not eat or drink anything after midnight the night before surgery.  • Infants may have breast milk up to four hours before surgery.  • Infants drinking formula may drink formula up to six hours before surgery.   • Patients who avoid smoking, chewing tobacco and alcohol for 4 weeks prior to surgery have a reduced risk of post-operative complications.  Quit smoking as many days before surgery as you can.  • Do not smoke, use chewing tobacco or drink alcohol the day of surgery.   • If applicable bring your C-PAP/ BI-PAP machine.  • Bring any papers given to you in the doctor’s office.  • Wear clean comfortable clothes and socks.  • Do not wear contact lenses or make-up.  Bring a case for your glasses.   • Bring crutches or walker if applicable.  • Remove all piercings.  Leave jewelry and any other valuables at home.  • Hair extensions with metal clips must be removed prior to surgery.  • The Pre-Admission Testing nurse will instruct you to bring medications if unable to obtain an accurate list in Pre-Admission Testing.        If you were given a blood bank ID arm band remember to bring it with you the day of surgery.    Preventing a Surgical Site Infection:  • For 2 to 3 days before surgery, avoid shaving with a razor because the razor can irritate skin and make it easier to develop an infection.    • Any areas of open skin can increase the risk of a post-operative wound infection by allowing bacteria to enter and travel throughout the body.  Notify your surgeon if you have any skin wounds / rashes even if it is not near the expected surgical site.  The area will need assessed to determine if surgery should be delayed until it is healed.  • The night prior to surgery sleep in a clean bed with clean clothing.  Do not allow pets to sleep with you.  • Shower on the morning of surgery using a fresh bar of  anti-bacterial soap (such as Dial) and clean washcloth.  Dry with a clean towel and dress in clean clothing.  • Ask your surgeon if you will be receiving antibiotics prior to surgery.  • Make sure you, your family, and all healthcare providers clean their hands with soap and water or an alcohol based hand  before caring for you or your wound.    Day of surgery:11/29/18   0530  Upon arrival, a Pre-op nurse and Anesthesiologist will review your health history, obtain vital signs, and answer questions you may have.  The only belongings needed at this time will be your home medications and if applicable your C-PAP/BI-PAP machine.  If you are staying overnight your family can leave the rest of your belongings in the car and bring them to your room later.  A Pre-op nurse will start an IV and you may receive medication in preparation for surgery, including something to help you relax.  Your family will be able to see you in the Pre-op area.  While you are in surgery your family should notify the waiting room  if they leave the waiting room area and provide a contact phone number.    Please be aware that surgery does come with discomfort.  We want to make every effort to control your discomfort so please discuss any uncontrolled symptoms with your nurse.   Your doctor will most likely have prescribed pain medications.      If you are going home after surgery you will receive individualized written care instructions before being discharged.  A responsible adult must drive you to and from the hospital on the day of your surgery and stay with you for 24 hours.    If you are staying overnight following surgery, you will be transported to your hospital room following the recovery period.  [facility] has all private rooms.    You have received a list of surgical assistants for your reference.  If you have any questions please call Pre-Admission Testing at [phone].  Deductibles and co-payments  are collected on the day of service. Please be prepared to pay the required co-pay, deductible or deposit on the day of service as defined by your plan.

## 2025-05-02 NOTE — PROGRESS NOTES
Physical Therapy Daily Treatment Note    [facility] Therapy [facility]  [address] [place] [address]  [phone] (phone)  [phone] (fax)    Patient: Sukhdev Zayas   : 1940  Diagnosis/ICD-10 Code:  Frequent falls [R29.6]  Referring practitioner: Rachelle Patton PA-C  Date of Initial Visit: Type: THERAPY  Noted: 2024  Today's Date: 2025  Patient seen for 33 sessions             Subjective   Yesterday when I was going up the stairs my knees slowly buckled, but I was abler to recover (did not fall).  It seems like my legs are getting weaker.  Back hurts, feels like there's a knot in the middle of my back.     Objective     AQUATIC EX:  Water Walk Forward 100 ft. and Sidestepping 15 ft, SBA  [x]Wall Crawl (BKTC) Stretch  w/ Noodle  30 sec. X 3              [x]Hamstring Stretch assisted seated 20 sec each                   []Shoulder Flex/Ext AROM (core stability/balance) 10X                 []Calf Stretch          []Ball Toss into floating BB Hoop X 2 min. (No LOB)         [x]Decompression w/ hips/knees flexed w/ Noodle/rail support2  min.    []Abdominals- Large solid Noodle Pushdowns  10X                                []Hip Abd/Add 10X ea  []Sit to Stands 10X, SBA               []SLRs   2 X 5 ea             []March in Place 10x  [x]Mini Squat        10x               [x]B Toe/Heel Raises  10X                                      []Step Ups    4 inch X 5 ea                 [x]Bicycle  seated X 1 min.                                 [x]Seated LAQs  10X ea                                []Shoulder horiz Abd/Add AROM  X 20              []Leg Press   w/ lg foam noodle X 10 each   Seated at jets 2 min. Supervised to help ease back pain      Assessment/Plan  Patient reporting legs feeling weak and had one episode of knees buckling on stairs.  He also reports his back pain is worse at times and he has been using the heating.  Ray was not able to complete all the previous  exercises and also required more rest breaks due to back and knee pain.  Matthew's wife has a call in to the Dr at the [facility] to schedule an injection.          Timed:  Aquatic Therapy    25     mins 34336;    Therapeutic Activities  _____ mins 38122    Self-Care    _____ mins 71099     Untimed;  Group Therapy           _____ mins 99889    Total Timed:               ______ mins    Bertin Sepulveda, PT  Physical Therapist    [place] License:  [id number]

## 2025-05-08 DIAGNOSIS — G20.C PRIMARY PARKINSONISM: ICD-10-CM

## 2025-05-09 RX ORDER — NABUMETONE 500 MG/1
500 TABLET, FILM COATED ORAL 2 TIMES DAILY PRN
Qty: 60 TABLET | Refills: 0 | Status: SHIPPED | OUTPATIENT
Start: 2025-05-09

## 2025-05-12 ENCOUNTER — TREATMENT (OUTPATIENT)
Dept: PHYSICAL THERAPY | Facility: CLINIC | Age: 85
End: 2025-05-12
Payer: MEDICARE

## 2025-05-12 DIAGNOSIS — R29.6 FREQUENT FALLS: Primary | ICD-10-CM

## 2025-05-12 DIAGNOSIS — Z74.09 IMPAIRED MOBILITY: ICD-10-CM

## 2025-05-12 DIAGNOSIS — R26.89 BALANCE DISORDER: ICD-10-CM

## 2025-05-12 DIAGNOSIS — R29.898 LEG WEAKNESS, BILATERAL: ICD-10-CM

## 2025-05-12 PROCEDURE — 97113 AQUATIC THERAPY/EXERCISES: CPT | Performed by: PHYSICAL THERAPIST

## 2025-05-12 NOTE — PROGRESS NOTES
Physical Therapy Daily Treatment Note    [facility]  [address] [place] [address]  [phone] (phone)  [phone] (fax)    Patient: Sukhdev Zayas   : 1940  Diagnosis/ICD-10 Code:  Frequent falls [R29.6]  Referring practitioner: Rachelle Patton PA-C  Date of Initial Visit: Type: THERAPY  Noted: 2024  Today's Date: 2025  Patient seen for 34 sessions             Subjective  My knees feel like marbles inside rolling around.  Seeing the Dr tomorrow for a physical.  Has not scheduled Pain Management.  Home pool should be opening soon.    Objective     AQUATIC EX:  Water Walk Forwards, Sidestepping and Backwards 5.5 min. SBA  [x]Wall Crawl (BKTC) Stretch  w/ Noodle  30 sec. X 3              [x]Hamstring Stretch assisted seated 20 sec each                   []Shoulder Flex/Ext AROM (core stability/balance) 10X                 []Calf Stretch          []Ball Toss into floating BB Hoop X 2 min. (No LOB)         [x]Decompression w/ hips/knees flexed w/ Noodle/rail support2  min.    []Abdominals- Large solid Noodle Pushdowns  10X                                [x]Hip Abd/Add 10X ea  []Sit to Stands 10X, SBA               []SLRs   2 X 5 ea             [x]March in Place 10x  [x]Mini Squat        10x               [x]B Toe/Heel Raises  10X                                      [x]Step Ups    4 inch X 5 ea                 [x]Bicycle  seated X 1 min.                                 [x]Seated LAQs  10X ea                                []Shoulder horiz Abd/Add AROM  X 20              []Leg Press   w/ lg foam noodle X 10 each   Seated at jets 2 min. Spoke with wife regarding patient's medical status and upcoming physical       Assessment/Plan  Continue aqua therapy for strength and balance training to help decrease fall risk and improve functional mobility.  Patient had improved activity tolerance compared to his last appt.  Plan: Recommend patient's wife get in  pool with patient next visit to continue Family teaching.       Timed:  Aquatic Therapy    40     mins 17916;    Therapeutic Activities  _____ mins 92009    Self-Care    _____ mins 97751     Untimed;  Group Therapy           _____ mins 37151    Total Timed:               ______ mins    Bertin Sepulveda, PT  Physical Therapist    [place] License:  [id number]

## 2025-05-13 ENCOUNTER — OFFICE VISIT (OUTPATIENT)
Dept: INTERNAL MEDICINE | Facility: CLINIC | Age: 85
End: 2025-05-13
Payer: MEDICARE

## 2025-05-13 VITALS
HEIGHT: 72 IN | TEMPERATURE: 98.2 F | DIASTOLIC BLOOD PRESSURE: 74 MMHG | SYSTOLIC BLOOD PRESSURE: 120 MMHG | WEIGHT: 182 LBS | BODY MASS INDEX: 24.65 KG/M2

## 2025-05-13 DIAGNOSIS — I25.10 CORONARY ARTERY DISEASE INVOLVING NATIVE CORONARY ARTERY OF NATIVE HEART WITHOUT ANGINA PECTORIS: ICD-10-CM

## 2025-05-13 DIAGNOSIS — M62.81 MUSCLE WEAKNESS: ICD-10-CM

## 2025-05-13 DIAGNOSIS — E78.2 MIXED HYPERLIPIDEMIA: Primary | ICD-10-CM

## 2025-05-13 DIAGNOSIS — R53.83 OTHER FATIGUE: ICD-10-CM

## 2025-05-13 DIAGNOSIS — I48.91 ATRIAL FIBRILLATION, UNSPECIFIED TYPE: ICD-10-CM

## 2025-05-13 DIAGNOSIS — I10 ESSENTIAL HYPERTENSION: ICD-10-CM

## 2025-05-13 DIAGNOSIS — E03.9 ACQUIRED HYPOTHYROIDISM: ICD-10-CM

## 2025-05-13 DIAGNOSIS — E55.9 VITAMIN D DEFICIENCY, UNSPECIFIED: ICD-10-CM

## 2025-05-13 PROCEDURE — 1125F AMNT PAIN NOTED PAIN PRSNT: CPT | Performed by: PHYSICIAN ASSISTANT

## 2025-05-13 PROCEDURE — 1160F RVW MEDS BY RX/DR IN RCRD: CPT | Performed by: PHYSICIAN ASSISTANT

## 2025-05-13 PROCEDURE — 1159F MED LIST DOCD IN RCRD: CPT | Performed by: PHYSICIAN ASSISTANT

## 2025-05-13 PROCEDURE — 3078F DIAST BP <80 MM HG: CPT | Performed by: PHYSICIAN ASSISTANT

## 2025-05-13 PROCEDURE — 3074F SYST BP LT 130 MM HG: CPT | Performed by: PHYSICIAN ASSISTANT

## 2025-05-13 PROCEDURE — 1170F FXNL STATUS ASSESSED: CPT | Performed by: PHYSICIAN ASSISTANT

## 2025-05-13 PROCEDURE — G0439 PPPS, SUBSEQ VISIT: HCPCS | Performed by: PHYSICIAN ASSISTANT

## 2025-05-13 NOTE — PROGRESS NOTES
Subjective   The ABCs of the Annual Wellness Visit  Medicare Wellness Visit      Sukhdev Zayas is a 84 y.o. patient who presents for a Medicare Wellness Visit.    The following portions of the patient's history were reviewed and   updated as appropriate: allergies, current medications, past family history, past medical history, past social history, past surgical history, and problem list.    Compared to one year ago, the patient's physical   health is the same.  Compared to one year ago, the patient's mental   health is the same.    Recent Hospitalizations:  He was not admitted to the hospital during the last year.     Current Medical Providers:  Patient Care Team:  Rachelle Patton PA-C as PCP - General (Physician Assistant)    Outpatient Medications Prior to Visit   Medication Sig Dispense Refill    acetaminophen-codeine (TYLENOL #3) 300-30 MG per tablet Take 1 tablet by mouth Every 6 (Six) Hours As Needed for Moderate Pain. 60 tablet 1    apixaban (ELIQUIS) 5 MG tablet tablet Take 1 tablet by mouth.      atorvastatin (LIPITOR) 10 MG tablet Take 1 tablet by mouth.      Blood Pressure Monitoring (Comfort Touch BP Cuff/Large) misc Use 1 each Daily. 1 each 0    clopidogrel (PLAVIX) 75 MG tablet Take 1 tablet by mouth Daily. Start on Monday      FLUoxetine (PROzac) 20 MG capsule Take 1 capsule by mouth Daily. 90 capsule 3    FLUoxetine (PROzac) 40 MG capsule Take 1 capsule by mouth Daily. 90 capsule 3    levothyroxine (SYNTHROID, LEVOTHROID) 50 MCG tablet TAKE 1 TABLET BY MOUTH DAILY 90 tablet 0    midodrine (PROAMATINE) 5 MG tablet Take 0.5 tablets by mouth Daily As Needed (hypotension). 30 tablet 1    nabumetone (RELAFEN) 500 MG tablet TAKE 1 TABLET BY MOUTH TWICE DAILY AS NEEDED FOR MILD PAIN 60 tablet 0    omeprazole (priLOSEC) 40 MG capsule Take 1 capsule by mouth Daily. 90 capsule 4    VITAMIN D PO Take  by mouth. 400 mg daily      aspirin 81 MG chewable tablet Chew 1 tablet Daily. (Patient not taking:  Reported on 5/13/2025) 30 tablet 0    FLUoxetine (PROzac) 20 MG capsule Take 1 capsule by mouth Daily. (Patient not taking: Reported on 5/13/2025)      gabapentin (NEURONTIN) 100 MG capsule Take 1 capsule by mouth 2 (Two) Times a Day As Needed. (Patient not taking: Reported on 5/13/2025)       No facility-administered medications prior to visit.     Opioid medication/s are on active medication list.  and I have evaluated his active treatment plan and pain score trends (see table).  Vitals:    05/13/25 1255   PainSc: 6      I have reviewed the chart for potential of high risk medication and harmful drug interactions in the elderly.        Aspirin is on active medication list. Aspirin use is not indicated based on review of current medical condition/s. Risk of harm outweighs potential benefits. Patient instructed to discontinue this medication.  .      Patient Active Problem List   Diagnosis    Hyperlipidemia    Coronary artery disease involving native coronary artery of native heart without angina pectoris    MCI (mild cognitive impairment)    Mood disorder    Closed wedge compression fracture of third thoracic vertebra with routine healing    GERD (gastroesophageal reflux disease)    S/P drug eluting coronary stent placement    Chest pain    Diastolic dysfunction    Mixed action and resting tremor    Bladder cancer    BPH (benign prostatic hyperplasia)    Atrial fibrillation    Essential hypertension    Dizziness    Beta-blocker intolerance    Orthostatic hypotension    Parkinsonism    Lumbar facet arthropathy    Spondylosis of lumbar region without myelopathy or radiculopathy    Lumbar foraminal stenosis    Chronic bilateral low back pain without sciatica    Hypothyroidism    DDD (degenerative disc disease), lumbar    Lumbar radiculopathy     Advance Care Planning Advance Directive is not on file.  ACP discussion was held with the patient during this visit. Patient has an advance directive (not in EMR), copy  "requested.            Objective   Vitals:    25 1255   BP: 120/74   Temp: 98.2 °F (36.8 °C)   Weight: 82.6 kg (182 lb)   Height: 183 cm (72.05\")   PainSc: 6        Estimated body mass index is 24.65 kg/m² as calculated from the following:    Height as of this encounter: 183 cm (72.05\").    Weight as of this encounter: 82.6 kg (182 lb).    BMI is within normal parameters. No other follow-up for BMI required.           Does the patient have evidence of cognitive impairment? No                                                                                                Health  Risk Assessment    Smoking Status:  Social History     Tobacco Use   Smoking Status Never    Passive exposure: Never   Smokeless Tobacco Never     Alcohol Consumption:  Social History     Substance and Sexual Activity   Alcohol Use No    Comment: last drink about 1 year ago        Fall Risk Screen  STEADI Fall Risk Assessment was completed, and patient is at HIGH risk for falls. Assessment completed on:2025    Depression Screening   Little interest or pleasure in doing things? Not at all   Feeling down, depressed, or hopeless? Not at all   PHQ-2 Total Score 0      Health Habits and Functional and Cognitive Screenin/13/2025     1:00 PM   Functional & Cognitive Status   Do you have difficulty preparing food and eating? Yes   Do you have difficulty bathing yourself, getting dressed or grooming yourself? No   Do you have difficulty using the toilet? No   Do you have difficulty moving around from place to place? No   Do you have trouble with steps or getting out of a bed or a chair? No   Current Diet Well Balanced Diet   Dental Exam Up to date   Eye Exam Up to date   Exercise (times per week) 0 times per week   Current Exercises Include No Regular Exercise   Do you need help using the phone?  No   Are you deaf or do you have serious difficulty hearing?  No   Do you need help to go to places out of walking distance? Yes   Do you " need help shopping? Yes   Do you need help preparing meals?  No   Do you need help with housework?  Yes   Do you need help with laundry? Yes   Do you need help taking your medications? No   Do you need help managing money? No   Do you ever drive or ride in a car without wearing a seat belt? No   Have you felt unusual stress, anger or loneliness in the last month? No   Who do you live with? Spouse   If you need help, do you have trouble finding someone available to you? No   Have you been bothered in the last four weeks by sexual problems? No   Do you have difficulty concentrating, remembering or making decisions? No           Age-appropriate Screening Schedule:  Refer to the list below for future screening recommendations based on patient's age, sex and/or medical conditions. Orders for these recommended tests are listed in the plan section. The patient has been provided with a written plan.    Health Maintenance List  Health Maintenance   Topic Date Due    Pneumococcal Vaccine 50+ (1 of 1 - PCV) Never done    ZOSTER VACCINE (1 of 2) Never done    RSV Vaccine - Adults (1 - 1-dose 75+ series) Never done    TDAP/TD VACCINES (1 - Tdap) 03/26/2017    ANNUAL WELLNESS VISIT  12/04/2024    LIPID PANEL  12/04/2024    COVID-19 Vaccine (6 - 2024-25 season) 11/13/2025 (Originally 9/1/2024)    INFLUENZA VACCINE  07/01/2025    COLORECTAL CANCER SCREENING  Discontinued                                                                                                                                                CMS Preventative Services Quick Reference  Risk Factors Identified During Encounter  Fall Risk-High or Moderate: Discussed Fall Prevention in the home    The above risks/problems have been discussed with the patient.  Pertinent information has been shared with the patient in the After Visit Summary.  An After Visit Summary and PPPS were made available to the patient.    Follow Up:   Next Medicare Wellness visit to be  "scheduled in 1 year.         Additional E&M Note during same encounter follows:  Patient has additional, significant, and separately identifiable condition(s)/problem(s) that require work above and beyond the Medicare Wellness Visit     Chief Complaint  Medicare Wellness-subsequent    Subjective   HPI          Pt is here today         Objective   Vital Signs:  /74   Temp 98.2 °F (36.8 °C)   Ht 183 cm (72.05\")   Wt 82.6 kg (182 lb)   BMI 24.65 kg/m²   Physical Exam  Vitals reviewed.   Constitutional:       Appearance: He is well-developed.   HENT:      Head: Normocephalic and atraumatic.   Cardiovascular:      Rate and Rhythm: Normal rate and regular rhythm.      Heart sounds: Normal heart sounds, S1 normal and S2 normal.   Pulmonary:      Effort: Pulmonary effort is normal.      Breath sounds: Normal breath sounds.   Musculoskeletal:      Comments: tremulous   Skin:     General: Skin is warm.   Neurological:      Mental Status: He is alert.   Psychiatric:         Behavior: Behavior normal.              Assessment and Plan         Mixed hyperlipidemia     Coronary artery disease involving native coronary artery of native heart without angina pectoris    Atrial fibrillation, unspecified type    Vitamin D deficiency, unspecified    Other fatigue    Muscle weakness    Acquired hypothyroidism    Essential hypertension    Diagnoses and all orders for this visit:    1. Mixed hyperlipidemia (Primary)  -     Lipid Panel With / Chol / HDL Ratio    2. Coronary artery disease involving native coronary artery of native heart without angina pectoris    3. Atrial fibrillation, unspecified type    4. Vitamin D deficiency, unspecified  -     Vitamin D,25-Hydroxy    5. Other fatigue  -     CBC & Differential  -     Vitamin B12    6. Muscle weakness  -     Magnesium    7. Acquired hypothyroidism  -     TSH    8. Essential hypertension  -     Urinalysis With Culture If Indicated -    Check labs today. Pt and wife unsure if he " has been taking his ASA and levothyroxine. He was to discontinue the aspirin after being started on Eliquis by cardiology. Will check labs today. He denies any blood in his stool. Check TSH today also.            Follow Up   Return in about 6 months (around 11/13/2025) for Lab Today.  Patient was given instructions and counseling regarding his condition or for health maintenance advice. Please see specific information pulled into the AVS if appropriate.

## 2025-05-14 ENCOUNTER — TREATMENT (OUTPATIENT)
Dept: PHYSICAL THERAPY | Facility: CLINIC | Age: 85
End: 2025-05-14
Payer: MEDICARE

## 2025-05-14 DIAGNOSIS — R26.89 BALANCE DISORDER: ICD-10-CM

## 2025-05-14 DIAGNOSIS — Z74.09 IMPAIRED MOBILITY: ICD-10-CM

## 2025-05-14 DIAGNOSIS — R29.6 FREQUENT FALLS: Primary | ICD-10-CM

## 2025-05-14 DIAGNOSIS — R29.898 LEG WEAKNESS, BILATERAL: ICD-10-CM

## 2025-05-14 LAB
25(OH)D3+25(OH)D2 SERPL-MCNC: 77.1 NG/ML (ref 30–100)
APPEARANCE UR: CLEAR
BACTERIA #/AREA URNS HPF: NORMAL /[HPF]
BASOPHILS # BLD AUTO: 0.03 10*3/MM3 (ref 0–0.2)
BASOPHILS NFR BLD AUTO: 0.4 % (ref 0–1.5)
BILIRUB UR QL STRIP: NEGATIVE
CASTS URNS QL MICRO: NORMAL /LPF
CHOLEST SERPL-MCNC: 174 MG/DL (ref 0–200)
CHOLEST/HDLC SERPL: 3.16 {RATIO}
COLOR UR: YELLOW
EOSINOPHIL # BLD AUTO: 0.15 10*3/MM3 (ref 0–0.4)
EOSINOPHIL NFR BLD AUTO: 2.2 % (ref 0.3–6.2)
EPI CELLS #/AREA URNS HPF: NORMAL /HPF (ref 0–10)
ERYTHROCYTE [DISTWIDTH] IN BLOOD BY AUTOMATED COUNT: 12 % (ref 12.3–15.4)
GLUCOSE UR QL STRIP: NEGATIVE
HCT VFR BLD AUTO: 43.4 % (ref 37.5–51)
HDLC SERPL-MCNC: 55 MG/DL (ref 40–60)
HGB BLD-MCNC: 14.7 G/DL (ref 13–17.7)
HGB UR QL STRIP: NEGATIVE
IMM GRANULOCYTES # BLD AUTO: 0.02 10*3/MM3 (ref 0–0.05)
IMM GRANULOCYTES NFR BLD AUTO: 0.3 % (ref 0–0.5)
KETONES UR QL STRIP: ABNORMAL
LDLC SERPL CALC-MCNC: 91 MG/DL (ref 0–100)
LEUKOCYTE ESTERASE UR QL STRIP: NEGATIVE
LYMPHOCYTES # BLD AUTO: 1.45 10*3/MM3 (ref 0.7–3.1)
LYMPHOCYTES NFR BLD AUTO: 21 % (ref 19.6–45.3)
MAGNESIUM SERPL-MCNC: 2.4 MG/DL (ref 1.6–2.4)
MCH RBC QN AUTO: 33.4 PG (ref 26.6–33)
MCHC RBC AUTO-ENTMCNC: 33.9 G/DL (ref 31.5–35.7)
MCV RBC AUTO: 98.6 FL (ref 79–97)
MICRO URNS: ABNORMAL
MICRO URNS: ABNORMAL
MONOCYTES # BLD AUTO: 0.59 10*3/MM3 (ref 0.1–0.9)
MONOCYTES NFR BLD AUTO: 8.6 % (ref 5–12)
NEUTROPHILS # BLD AUTO: 4.66 10*3/MM3 (ref 1.7–7)
NEUTROPHILS NFR BLD AUTO: 67.5 % (ref 42.7–76)
NITRITE UR QL STRIP: NEGATIVE
NRBC BLD AUTO-RTO: 0 /100 WBC (ref 0–0.2)
PH UR STRIP: 6 [PH] (ref 5–7.5)
PLATELET # BLD AUTO: 242 10*3/MM3 (ref 140–450)
PROT UR QL STRIP: ABNORMAL
RBC # BLD AUTO: 4.4 10*6/MM3 (ref 4.14–5.8)
RBC #/AREA URNS HPF: NORMAL /HPF (ref 0–2)
SP GR UR STRIP: 1.02 (ref 1–1.03)
TRIGL SERPL-MCNC: 163 MG/DL (ref 0–150)
TSH SERPL DL<=0.005 MIU/L-ACNC: 3.3 UIU/ML (ref 0.27–4.2)
URINALYSIS REFLEX: ABNORMAL
UROBILINOGEN UR STRIP-MCNC: 1 MG/DL (ref 0.2–1)
VIT B12 SERPL-MCNC: 407 PG/ML (ref 211–946)
VLDLC SERPL CALC-MCNC: 28 MG/DL (ref 5–40)
WBC # BLD AUTO: 6.9 10*3/MM3 (ref 3.4–10.8)
WBC #/AREA URNS HPF: NORMAL /HPF (ref 0–5)

## 2025-05-14 PROCEDURE — 97113 AQUATIC THERAPY/EXERCISES: CPT | Performed by: PHYSICAL THERAPIST

## 2025-05-14 NOTE — PROGRESS NOTES
Physical Therapy Daily Treatment Note    [facility]  [address] [place] [address]  [phone] (phone)  [phone] (fax)    Patient: Sukhdev Zayas   : 1940  Diagnosis/ICD-10 Code:  Frequent falls [R29.6]  Referring practitioner: Rachelle Patton PA-C  Date of Initial Visit: Type: THERAPY  Noted: 2024  Today's Date: 2025  Patient seen for 35 sessions             Subjective   I took ibuprofen this morning and that helps.  Wife reports she plans to schedule appointment with Pain Management for possible injection.    Objective     AQUATIC EX:  [x]Water Walk Forwards, Sidestepping and Backwards 5 min. SBA, cues for upright posture and step length  [x]Wall Crawl (BKTC) Stretch  w/ Noodle  30 sec. X 2              [x]Hamstring Stretch assisted seated 20 sec each                   []Shoulder Flex/Ext AROM (core stability/balance) 10X                 [x]Calf Stretch 20 sec X 2 ea         [x]Ball Toss into box 15X SBA (No LOB)         []Decompression w/ hips/knees flexed w/ Noodle/rail support   [x]Abdominals- Large solid Noodle Pushdowns  15X                                [x]Hip Abd/Add 8X ea  []Sit to Stands 10X, SBA               []SLRs   2 X 5 ea             [x]March in Place  holding solid noodle, 10x SBA  [x]Mini Squat 10x, cues to avoid pulling up on the rail               [x]B Toe/Heel Raises  10X                                      [x]Step Ups    4 inch X 6 ea                 [x]Bicycle  seated  2  min.                                 [x]Seated LAQs  10X ea assist to keep thigh from floating up                                []Shoulder horiz Abd/Add AROM  X 20              []Leg Press   w/ lg foam noodle X 10 each     Assessment/Plan  Good balance with ball toss into target.  Frequent cues for upright posture and cues to avoid compensatory patterns.  Caught left big toe on last step out of pool, patient has noticed this at home also.  Taught  patient/wife seated foot tapping and seated march exercise as well as standing heel and toe raises with wife standing behind for safety.          Timed:  Aquatic Therapy    40     mins [id number];    Therapeutic Activities  _____ mins [id number]    Self-Care    _____ mins [id number]     Untimed;  Group Therapy           _____ mins [id number]    Total Timed:               ______ mins    Bertin Sepulveda, PT  Physical Therapist    [place] License:  [id number]

## 2025-05-23 ENCOUNTER — TELEPHONE (OUTPATIENT)
Dept: PHYSICAL THERAPY | Facility: CLINIC | Age: 85
End: 2025-05-23

## 2025-05-23 NOTE — TELEPHONE ENCOUNTER
Caller: Taty Zayas    Relationship:  Emergency Contact    PATIENT CALLED REQUESTING TO CANCEL SAME DAY APPT.    Did the patient call AFTER the start time of their scheduled appointment?  []YES  [x]NO    Was the patient's appointment rescheduled? []YES  [x]NO    Any additional information: PATIENT HAD A FALL, PLANS TO  BE AT NEXT APPT

## 2025-05-28 ENCOUNTER — APPOINTMENT (OUTPATIENT)
Dept: GENERAL RADIOLOGY | Facility: HOSPITAL | Age: 85
End: 2025-05-28
Payer: MEDICARE

## 2025-05-28 ENCOUNTER — TELEPHONE (OUTPATIENT)
Dept: PHYSICAL THERAPY | Facility: CLINIC | Age: 85
End: 2025-05-28

## 2025-05-28 ENCOUNTER — APPOINTMENT (OUTPATIENT)
Dept: CT IMAGING | Facility: HOSPITAL | Age: 85
End: 2025-05-28
Payer: MEDICARE

## 2025-05-28 ENCOUNTER — HOSPITAL ENCOUNTER (EMERGENCY)
Facility: HOSPITAL | Age: 85
Discharge: HOME | End: 2025-05-28
Attending: EMERGENCY MEDICINE | Admitting: EMERGENCY MEDICINE
Payer: MEDICARE

## 2025-05-28 ENCOUNTER — TELEPHONE (OUTPATIENT)
Dept: INTERNAL MEDICINE | Facility: CLINIC | Age: 85
End: 2025-05-28
Payer: MEDICARE

## 2025-05-28 VITALS
BODY MASS INDEX: 24.92 KG/M2 | RESPIRATION RATE: 16 BRPM | HEART RATE: 65 BPM | TEMPERATURE: 97.9 F | WEIGHT: 184 LBS | OXYGEN SATURATION: 98 % | DIASTOLIC BLOOD PRESSURE: 118 MMHG | HEIGHT: 72 IN | SYSTOLIC BLOOD PRESSURE: 151 MMHG

## 2025-05-28 DIAGNOSIS — R07.9 CHEST PAIN, UNSPECIFIED TYPE: ICD-10-CM

## 2025-05-28 DIAGNOSIS — R53.1 GENERALIZED WEAKNESS: Primary | ICD-10-CM

## 2025-05-28 LAB
ALBUMIN SERPL-MCNC: 4 G/DL (ref 3.5–5.2)
ALBUMIN/GLOB SERPL: 1.5 G/DL
ALP SERPL-CCNC: 149 U/L (ref 39–117)
ALT SERPL W P-5'-P-CCNC: 27 U/L (ref 1–41)
ANION GAP SERPL CALCULATED.3IONS-SCNC: 8 MMOL/L (ref 5–15)
AST SERPL-CCNC: 32 U/L (ref 1–40)
BASOPHILS # BLD AUTO: 0.03 10*3/MM3 (ref 0–0.2)
BASOPHILS NFR BLD AUTO: 0.4 % (ref 0–1.5)
BILIRUB SERPL-MCNC: 0.7 MG/DL (ref 0–1.2)
BUN SERPL-MCNC: 12 MG/DL (ref 8–23)
BUN/CREAT SERPL: 10.8 (ref 7–25)
CALCIUM SPEC-SCNC: 9.3 MG/DL (ref 8.6–10.5)
CHLORIDE SERPL-SCNC: 104 MMOL/L (ref 98–107)
CO2 SERPL-SCNC: 27 MMOL/L (ref 22–29)
CREAT SERPL-MCNC: 1.11 MG/DL (ref 0.76–1.27)
DEPRECATED RDW RBC AUTO: 46.4 FL (ref 37–54)
EGFRCR SERPLBLD CKD-EPI 2021: 65.5 ML/MIN/1.73
EOSINOPHIL # BLD AUTO: 0.2 10*3/MM3 (ref 0–0.4)
EOSINOPHIL NFR BLD AUTO: 2.6 % (ref 0.3–6.2)
ERYTHROCYTE [DISTWIDTH] IN BLOOD BY AUTOMATED COUNT: 12.2 % (ref 12.3–15.4)
GEN 5 1HR TROPONIN T REFLEX: 18 NG/L
GLOBULIN UR ELPH-MCNC: 2.7 GM/DL
GLUCOSE SERPL-MCNC: 80 MG/DL (ref 65–99)
HCT VFR BLD AUTO: 45.2 % (ref 37.5–51)
HGB BLD-MCNC: 14.9 G/DL (ref 13–17.7)
HOLD SPECIMEN: NORMAL
HOLD SPECIMEN: NORMAL
IMM GRANULOCYTES # BLD AUTO: 0.04 10*3/MM3 (ref 0–0.05)
IMM GRANULOCYTES NFR BLD AUTO: 0.5 % (ref 0–0.5)
LYMPHOCYTES # BLD AUTO: 1.75 10*3/MM3 (ref 0.7–3.1)
LYMPHOCYTES NFR BLD AUTO: 23.1 % (ref 19.6–45.3)
MCH RBC QN AUTO: 33.5 PG (ref 26.6–33)
MCHC RBC AUTO-ENTMCNC: 33 G/DL (ref 31.5–35.7)
MCV RBC AUTO: 101.6 FL (ref 79–97)
MONOCYTES # BLD AUTO: 0.64 10*3/MM3 (ref 0.1–0.9)
MONOCYTES NFR BLD AUTO: 8.4 % (ref 5–12)
NEUTROPHILS NFR BLD AUTO: 4.92 10*3/MM3 (ref 1.7–7)
NEUTROPHILS NFR BLD AUTO: 65 % (ref 42.7–76)
NRBC BLD AUTO-RTO: 0 /100 WBC (ref 0–0.2)
PLATELET # BLD AUTO: 229 10*3/MM3 (ref 140–450)
PMV BLD AUTO: 10.2 FL (ref 6–12)
POTASSIUM SERPL-SCNC: 4.4 MMOL/L (ref 3.5–5.2)
PROT SERPL-MCNC: 6.7 G/DL (ref 6–8.5)
QT INTERVAL: 466 MS
QTC INTERVAL: 497 MS
RBC # BLD AUTO: 4.45 10*6/MM3 (ref 4.14–5.8)
SODIUM SERPL-SCNC: 139 MMOL/L (ref 136–145)
TROPONIN T NUMERIC DELTA: -1 NG/L
TROPONIN T SERPL HS-MCNC: 19 NG/L
WBC NRBC COR # BLD AUTO: 7.58 10*3/MM3 (ref 3.4–10.8)
WHOLE BLOOD HOLD COAG: NORMAL
WHOLE BLOOD HOLD SPECIMEN: NORMAL

## 2025-05-28 PROCEDURE — 85025 COMPLETE CBC W/AUTO DIFF WBC: CPT | Performed by: EMERGENCY MEDICINE

## 2025-05-28 PROCEDURE — 84484 ASSAY OF TROPONIN QUANT: CPT | Performed by: EMERGENCY MEDICINE

## 2025-05-28 PROCEDURE — 80053 COMPREHEN METABOLIC PANEL: CPT | Performed by: EMERGENCY MEDICINE

## 2025-05-28 PROCEDURE — 93010 ELECTROCARDIOGRAM REPORT: CPT | Performed by: INTERNAL MEDICINE

## 2025-05-28 PROCEDURE — 71045 X-RAY EXAM CHEST 1 VIEW: CPT

## 2025-05-28 PROCEDURE — 70450 CT HEAD/BRAIN W/O DYE: CPT

## 2025-05-28 PROCEDURE — 73610 X-RAY EXAM OF ANKLE: CPT

## 2025-05-28 PROCEDURE — 99284 EMERGENCY DEPT VISIT MOD MDM: CPT

## 2025-05-28 PROCEDURE — 93005 ELECTROCARDIOGRAM TRACING: CPT

## 2025-05-28 PROCEDURE — 36415 COLL VENOUS BLD VENIPUNCTURE: CPT

## 2025-05-28 PROCEDURE — 73560 X-RAY EXAM OF KNEE 1 OR 2: CPT

## 2025-05-28 PROCEDURE — 93005 ELECTROCARDIOGRAM TRACING: CPT | Performed by: EMERGENCY MEDICINE

## 2025-05-28 RX ORDER — SODIUM CHLORIDE 0.9 % (FLUSH) 0.9 %
10 SYRINGE (ML) INJECTION AS NEEDED
Status: DISCONTINUED | OUTPATIENT
Start: 2025-05-28 | End: 2025-05-28 | Stop reason: HOSPADM

## 2025-05-28 NOTE — TELEPHONE ENCOUNTER
Caller: Taty Zayas    Relationship: Emergency Contact      What was the call regarding: SICK NOT FEELING WELL.

## 2025-05-28 NOTE — TELEPHONE ENCOUNTER
Caller: Taty Zayas    Relationship: Emergency Contact    Best call back number: [phone]     What is the best time to reach you: ANY    Who are you requesting to speak with (clinical staff, provider,  specific staff member): BEN IRWIN    Do you know the name of the person who called: [place]    What was the call regarding: VIRGINIA WOULD LIKE TO SPEAK TO BEN IN REGARDS TO PATIENT'S FEET AND HANDS SWELLING AND SOME OTHER PERSONAL ISSUES. PLEASE CALL.

## 2025-05-28 NOTE — ED TRIAGE NOTES
Pt/spouse reports swelling to left ankle/hand that started a couple days ago, today started having left shoulder/chest pain, also reports weakness/falls, pain all over for awhile

## 2025-05-28 NOTE — ED PROVIDER NOTES
EMERGENCY DEPARTMENT ENCOUNTER    Room Number:  31/31  PCP: Rachelle Patton PA-C  Historian: Patient      HPI:  Chief Complaint: Generalized weakness falls  A complete HPI/ROS/PMH/PSH/SH/FH are unobtainable due to: None  Context: Sukhdev Zayas is a 84 y.o. male who presents to the ED c/o generalized weakness.  Patient has history of coronary disease hyperlipidemia.  Patient has significant tremor.  Has been treated for Parkinson's.  Patient states has had increasing weakness.  Fell 3 days ago.  Did not hit his head.  Is having difficulty ambulating without significant assistance.  Patient is had no chest pain.  States has pain to his left ankle and bilateral knees from the fall.  Has had no vomiting or diarrhea.  Has had no fevers or chills.  Patient has had some chest pain that has resolved.            PAST MEDICAL HISTORY  Active Ambulatory Problems     Diagnosis Date Noted    Hyperlipidemia 05/09/2016    Coronary artery disease involving native coronary artery of native heart without angina pectoris 05/09/2016    MCI (mild cognitive impairment) 05/09/2016    Mood disorder 10/20/2016    Closed wedge compression fracture of third thoracic vertebra with routine healing 02/06/2018    GERD (gastroesophageal reflux disease) 07/31/2018    S/P drug eluting coronary stent placement 07/31/2018    Chest pain 08/16/2018    Diastolic dysfunction 08/16/2018    Mixed action and resting tremor 11/19/2018    Bladder cancer 01/08/2019    BPH (benign prostatic hyperplasia) 07/11/2019    Atrial fibrillation 09/03/2019    Essential hypertension 09/12/2019    Dizziness 10/04/2019    Beta-blocker intolerance 12/03/2019    Orthostatic hypotension 12/16/2019    Parkinsonism 12/03/2020    Lumbar facet arthropathy 12/28/2021    Spondylosis of lumbar region without myelopathy or radiculopathy 12/28/2021    Lumbar foraminal stenosis 12/28/2021    Chronic bilateral low back pain without sciatica 12/28/2021    Hypothyroidism 03/24/2022     DDD (degenerative disc disease), lumbar 04/18/2022    Lumbar radiculopathy 04/18/2022     Resolved Ambulatory Problems     Diagnosis Date Noted    Exertional angina 08/20/2018    Unstable angina 08/23/2018    Bladder mass 09/18/2018    Acute cystitis without hematuria 05/24/2019    Dyspnea on exertion 05/25/2019    Right upper quadrant pain 08/31/2019    Calculus of gallbladder without cholecystitis 08/31/2019    Elevated LFTs 08/31/2019    Calculus of bile duct with acute cholecystitis without obstruction 08/30/2019    Dehydration 09/11/2019    Abdominal pain 09/12/2019    Nausea without vomiting 09/12/2019    Elevated bilirubin 10/04/2019    Bacteremia due to Klebsiella pneumoniae 10/09/2019    Acute urinary retention 03/11/2020     Past Medical History:   Diagnosis Date    Anxiety     Back pain     Depression     History of fractured rib     Multiple falls     Tremors of nervous system     Urinary incontinence     Vertigo          PAST SURGICAL HISTORY  Past Surgical History:   Procedure Laterality Date    CARDIAC CATHETERIZATION      7x, 5 stents    CATARACT EXTRACTION, BILATERAL      CHOLECYSTECTOMY N/A 9/2/2019    Procedure: CHOLECYSTECTOMY LAPAROSCOPIC WITH INTRAOPERATIVE CHOLANGIOGRAM;  Surgeon: Bg Rodriguez Jr., MD;  Location: [facility] OR;  Service: General    COLONOSCOPY      CORONARY ANGIOPLASTY WITH STENT PLACEMENT      X5     CYSTOSCOPY BLADDER BIOPSY N/A 1/8/2019    Procedure: CYSTOSCOPY BLADDER BIOPSY;  Surgeon: Wang Soares Jr., MD;  Location: [facility] OR;  Service: Urology    CYSTOSCOPY BLADDER BIOPSY N/A 6/13/2019    Procedure: CYSTOSCOPY BLADDER BIOPSY;  Surgeon: Wang Soares Jr., MD;  Location: [facility] OR;  Service: Urology    CYSTOSCOPY TRANSURETHRAL RESECTION OF PROSTATE N/A 7/11/2019    Procedure: CYSTOSCOPY TRANSURETHRAL RESECTION OF PROSTATE;  Surgeon: Wang Soares Jr., MD;  Location: [facility] OR;  Service: Urology    CYSTOSCOPY URETEROSCOPY LASER  LITHOTRIPSY N/A 6/13/2019    Procedure: CYSTO LITHOPAXY;  Surgeon: Wang Soares Jr., MD;  [facility];  Service: Urology    ERCP N/A 9/3/2019    Procedure: ENDOSCOPIC RETROGRADE CHOLANGIOPANCREATOGRAPHY with sphincterotomy and balloon sweep;  Surgeon: Ashok Amaya MD;  Location: [facility] ENDOSCOPY;  Service: Gastroenterology    EYE SURGERY      Lens implants    LUMBAR DISCECTOMY FUSION INSTRUMENTATION      LUMBAR EPIDURAL INJECTION N/A 5/4/2022    Procedure: lumbar epidural steroid injection;  Surgeon: Charlene Manzo MD;  [facility];  Service: Pain Management;  Laterality: N/A;    MEDIAL BRANCH BLOCK Bilateral 12/29/2021    Procedure: LUMBAR MEDIAL BRANCH BLOCK Bilateral L3-S1 x2 (2 weeks apart);  Surgeon: Charlene Manzo MD;  Location: [facility];  Service: Pain Management;  Laterality: Bilateral;    MEDIAL BRANCH BLOCK Bilateral 1/12/2022    Procedure: LUMBAR MEDIAL BRANCH BLOCK Bilateral L3-S1 x2 (2 weeks apart);  Surgeon: Charlene Manzo MD;  Location: [facility];  Service: Pain Management;  Laterality: Bilateral;    RADIOFREQUENCY ABLATION Bilateral 2/7/2022    Procedure: RADIOFREQUENCY ABLATION LUMBAR bilateral L3-S1;  Surgeon: Charlene Manzo MD;  Location: [facility];  Service: Pain Management;  Laterality: Bilateral;    SKIN CANCER EXCISION Left     chest wall    SKIN CANCER EXCISION  01/21/2021    facial    TRANSURETHRAL RESECTION OF BLADDER TUMOR N/A 11/29/2018    Procedure: TUR BLADDER TUMOR  LARGE;  Surgeon: Wang Soares Jr., MD;  [facility];  Service: Urology         FAMILY HISTORY  Family History   Problem Relation Age of Onset    Arthritis Mother     Glaucoma Mother     Heart disease Father     Emphysema Father     Tremor Father     Malig Hyperthermia Neg Hx          SOCIAL HISTORY  Social History     Socioeconomic History    Marital status:     Number of children: 4    Years of education: 5 college degrees   Tobacco  Use    Smoking status: Never     Passive exposure: Never    Smokeless tobacco: Never   Vaping Use    Vaping status: Never Used   Substance and Sexual Activity    Alcohol use: No     Comment: last drink about 1 year ago     Drug use: No    Sexual activity: Defer         ALLERGIES  Bupropion        REVIEW OF SYSTEMS  Review of Systems   Generalized weakness      PHYSICAL EXAM  ED Triage Vitals   Temp Heart Rate Resp BP SpO2   -- 05/28/25 1145 05/28/25 1145 05/28/25 1151 05/28/25 1145    71 18 (!) 194/146 95 %      Temp src Heart Rate Source Patient Position BP Location FiO2 (%)   -- -- -- 05/28/25 1151 --      Left arm        Physical Exam      GENERAL: no acute distress.  Significant tremor  HENT: nares patent  EYES: no scleral icterus  CV: regular rhythm, normal rate  RESPIRATORY: normal effort  ABDOMEN: soft, nondistended nontender  MUSCULOSKELETAL: no deformity  NEURO: alert, moves all extremities, follows commands  PSYCH:  calm, cooperative  SKIN: warm, dry    Vital signs and nursing notes reviewed.          LAB RESULTS  Recent Results (from the past 24 hours)   ECG 12 Lead ED Triage Standing Order; Chest Pain    Collection Time: 05/28/25 11:48 AM   Result Value Ref Range    QT Interval 466 ms    QTC Interval 497 ms   Comprehensive Metabolic Panel    Collection Time: 05/28/25 11:51 AM    Specimen: Arm, Right; Blood   Result Value Ref Range    Glucose 80 65 - 99 mg/dL    BUN 12.0 8.0 - 23.0 mg/dL    Creatinine 1.11 0.76 - 1.27 mg/dL    Sodium 139 136 - 145 mmol/L    Potassium 4.4 3.5 - 5.2 mmol/L    Chloride 104 98 - 107 mmol/L    CO2 27.0 22.0 - 29.0 mmol/L    Calcium 9.3 8.6 - 10.5 mg/dL    Total Protein 6.7 6.0 - 8.5 g/dL    Albumin 4.0 3.5 - 5.2 g/dL    ALT (SGPT) 27 1 - 41 U/L    AST (SGOT) 32 1 - 40 U/L    Alkaline Phosphatase 149 (H) 39 - 117 U/L    Total Bilirubin 0.7 0.0 - 1.2 mg/dL    Globulin 2.7 gm/dL    A/G Ratio 1.5 g/dL    BUN/Creatinine Ratio 10.8 7.0 - 25.0    Anion Gap 8.0 5.0 - 15.0 mmol/L     eGFR 65.5 >60.0 mL/min/1.73   High Sensitivity Troponin T    Collection Time: 05/28/25 11:51 AM    Specimen: Arm, Right; Blood   Result Value Ref Range    HS Troponin T 19 <22 ng/L   Green Top (Gel)    Collection Time: 05/28/25 11:51 AM   Result Value Ref Range    Extra Tube Hold for add-ons.    Lavender Top    Collection Time: 05/28/25 11:51 AM   Result Value Ref Range    Extra Tube hold for add-on    Gold Top - SST    Collection Time: 05/28/25 11:51 AM   Result Value Ref Range    Extra Tube Hold for add-ons.    Light Blue Top    Collection Time: 05/28/25 11:51 AM   Result Value Ref Range    Extra Tube Hold for add-ons.    CBC Auto Differential    Collection Time: 05/28/25 11:51 AM    Specimen: Arm, Right; Blood   Result Value Ref Range    WBC 7.58 3.40 - 10.80 10*3/mm3    RBC 4.45 4.14 - 5.80 10*6/mm3    Hemoglobin 14.9 13.0 - 17.7 g/dL    Hematocrit 45.2 37.5 - 51.0 %    .6 (H) 79.0 - 97.0 fL    MCH 33.5 (H) 26.6 - 33.0 pg    MCHC 33.0 31.5 - 35.7 g/dL    RDW 12.2 (L) 12.3 - 15.4 %    RDW-SD 46.4 37.0 - 54.0 fl    MPV 10.2 6.0 - 12.0 fL    Platelets 229 140 - 450 10*3/mm3    Neutrophil % 65.0 42.7 - 76.0 %    Lymphocyte % 23.1 19.6 - 45.3 %    Monocyte % 8.4 5.0 - 12.0 %    Eosinophil % 2.6 0.3 - 6.2 %    Basophil % 0.4 0.0 - 1.5 %    Immature Grans % 0.5 0.0 - 0.5 %    Neutrophils, Absolute 4.92 1.70 - 7.00 10*3/mm3    Lymphocytes, Absolute 1.75 0.70 - 3.10 10*3/mm3    Monocytes, Absolute 0.64 0.10 - 0.90 10*3/mm3    Eosinophils, Absolute 0.20 0.00 - 0.40 10*3/mm3    Basophils, Absolute 0.03 0.00 - 0.20 10*3/mm3    Immature Grans, Absolute 0.04 0.00 - 0.05 10*3/mm3    nRBC 0.0 0.0 - 0.2 /100 WBC   High Sensitivity Troponin T 1Hr    Collection Time: 05/28/25  1:09 PM    Specimen: Blood   Result Value Ref Range    HS Troponin T 18 <22 ng/L    Troponin T Numeric Delta -1 Abnormal if >/=3 ng/L       Ordered the above labs and reviewed the results.        RADIOLOGY  CT Head Without Contrast  Result Date:  5/28/2025  CT HEAD WO CONTRAST-  INDICATIONS: Falls  TECHNIQUE: Radiation dose reduction techniques were utilized, including automated exposure control and exposure modulation based on body size. Noncontrast head CT  COMPARISON: 3/23/2025  FINDINGS:    No acute intracranial hemorrhage, midline shift or mass effect. No acute territorial infarct is identified.  Mild periventricular hypodensities suggest chronic small vessel ischemic change in a patient this age.  Arterial calcifications are seen at the base of the brain.  Ventricles, cisterns, cerebral sulci are unremarkable for patient age.  Minimal paranasal sinus mucosal thickening. The visualized paranasal sinuses, orbits, mastoid air cells are otherwise unremarkable.           No acute intracranial hemorrhage or hydrocephalus. If there is further clinical concern, MRI could be considered for further evaluation.  This report was finalized on 5/28/2025 2:01 PM by Dr. Michael Huerta M.D on Workstation: [id number]      XR Chest 1 View  XR Chest 1 View, XR Ankle 3+ View Left  XR Chest 1 View, XR Ankle 3+ View Left, XR Knee 1 or 2 View Bilateral  Result Date: 5/28/2025  XR CHEST 1 VW-, XR KNEE 1 OR 2 VW BILATERAL-, XR ANKLE 3+ VW LEFT-  INDICATION: Post fall  COMPARISON: Chest radiographs dating back to 5/24/2019       Chest: No focal consolidation. No pleural effusion or pneumothorax. Normal size cardiomediastinal silhouette.  No focal osseous abnormality.  Knees: No fracture. Normal alignment. No joint effusions. Severe lateral compartment narrowing on the right. Moderate lateral compartment narrowing on the left. Mild medial compartment narrowing bilaterally. Meniscal chondrocalcinosis bilaterally.  Left ankle:. No fracture. Ankle mortise maintained on nonweightbearing views.  This report was finalized on 5/28/2025 12:57 PM by Dr. Jose Douglas M.D on Workstation: [id number]        Ordered the above noted radiological studies.  X-ray independently interpreted by  me and shows no evidence of pneumothorax          PROCEDURES  Procedures    EKG          EKG time: 1148  Rhythm/Rate: Normal sinus rhythm 68  P waves and OH: Normal P wave  QRS, axis: Normal QRS  ST and T waves: Normal ST-T waves.  Significant movement artifact    Interpreted Contemporaneously by me, independently viewed  Unchanged compared to prior 9/23/2021          MEDICATIONS GIVEN IN ER  Medications   sodium chloride 0.9 % flush 10 mL (has no administration in time range)                   MEDICAL DECISION MAKING, PROGRESS, and CONSULTS    All labs have been independently reviewed by me.  All radiology studies have been reviewed by me and I have also reviewed the radiology report.   EKGs independently viewed and interpreted by me.  Discussion below represents my analysis of pertinent findings related to patient's condition, differential diagnosis, treatment plan and final disposition.      Additional sources:  - Discussed/ obtained information from independent historians: Did get some history from wife as well.  Patient has Parkinson's-like illness    - External (non-ED) record review: Epic reviewed patient seen by primary provider 5/23/2025 for chronic low back pain    - Chronic or social conditions impacting care: None    - Shared decision making: Discussed options with patient including keeping patient in the hospital.  Patient does not want to do this patient's family are supporting him.  They understand there is diagnostic uncertainty.  Understands to return for any concerns.      Orders placed during this visit:  Orders Placed This Encounter   Procedures    XR Chest 1 View    CT Head Without Contrast    XR Ankle 3+ View Left    XR Knee 1 or 2 View Bilateral    [place] Draw    Comprehensive Metabolic Panel    High Sensitivity Troponin T    CBC Auto Differential    High Sensitivity Troponin T 1Hr    NPO Diet NPO Type: Strict NPO    Undress & Gown    Continuous Pulse Oximetry    Oxygen Therapy- Nasal  Cannula; Titrate 1-6 LPM Per SpO2; 90 - 95%    ECG 12 Lead ED Triage Standing Order; Chest Pain    ECG 12 Lead ED Triage Standing Order; Chest Pain    Telemetry Scan    Telemetry Scan    Insert Peripheral IV    CBC & Differential    Green Top (Gel)    Lavender Top    Gold Top - SST    Light Blue Top         Additional orders considered but not ordered:  None        Differential diagnosis includes but is not limited to:    Toxic metabolic encephalopathy versus deconditioning versus CVA      Independent interpretation of labs, radiology studies, and discussions with consultants:  ED Course as of 05/28/25 1455   Wed May 28, 2025   1431 14:31 EDT  Patient presents for weakness and also was having chest pain.  Patient did have a fall.  His x-rays have been negative.  EKG and serial troponins are normal.  He has CT head negative.  Discussed options with him and his family.  I think the safest thing is to keep him in the hospital for further evaluation of weakness chest pain.  Patient has refused this.  Patient states he will go home.  He understands we cannot test for everything here.  Patient's family is in the room and they are in agreement with him.  They understand there is diagnostic uncertainty is here and he is to return for any concerns [SL]      ED Course User Index  [SL] Yg Burgess MD                 DIAGNOSIS  Final diagnoses:   Generalized weakness   Chest pain, unspecified type         DISPOSITION  DISCHARGE    Patient discharged in stable condition.    Reviewed implications of results, diagnosis, meds, responsibility to follow up, warning signs and symptoms of possible worsening, potential complications and reasons to return to ER, including worsening symptoms    Patient/Family voiced understanding of above instructions.    Discussed plan for discharge, as there is no emergent indication for admission. Patient referred to primary care provider for BP management due to today's BP. Pt/family is  agreeable and understands need for follow up and repeat testing.  Pt is aware that discharge does not mean that nothing is wrong but it indicates no emergency is present that requires admission and they must continue care with follow-up as given below or physician of their choice.     FOLLOW-UP  Rachelle Patton, GNII  [address] [address]  [phone]    Schedule an appointment as soon as possible for a visit            Medication List      No changes were made to your prescriptions during this visit.                  Latest Documented Vital Signs:  As of 14:55 EDT  BP- 177/99 HR- 62 Temp- 97.9 °F (36.6 °C) (Oral) O2 sat- 97%              --    Please note that portions of this were completed with a voice recognition program.       Note Disclaimer: At [facility], we believe that sharing information builds trust and better relationships. You are receiving this note because you are receiving care at [facility] or recently visited. It is possible you will see health information before a provider has talked with you about it. This kind of information can be easy to misunderstand. To help you fully understand what it means for your health, we urge you to discuss this note with your provider.            Yg Burgess MD  05/28/25 7178

## 2025-05-28 NOTE — TELEPHONE ENCOUNTER
Called [place], patient is c/o chest and shoulder pain L-side with dizziness, swollen extremities and knees hurt. Rachelle counseled and advised that they go to the ER immediately. [place] voiced understanding

## 2025-05-30 ENCOUNTER — TELEPHONE (OUTPATIENT)
Dept: PHYSICAL THERAPY | Facility: CLINIC | Age: 85
End: 2025-05-30

## 2025-06-04 ENCOUNTER — TREATMENT (OUTPATIENT)
Dept: PHYSICAL THERAPY | Facility: CLINIC | Age: 85
End: 2025-06-04
Payer: MEDICARE

## 2025-06-04 DIAGNOSIS — R29.6 FREQUENT FALLS: Primary | ICD-10-CM

## 2025-06-04 DIAGNOSIS — Z74.09 IMPAIRED MOBILITY: ICD-10-CM

## 2025-06-04 DIAGNOSIS — R29.898 LEG WEAKNESS, BILATERAL: ICD-10-CM

## 2025-06-04 DIAGNOSIS — R26.89 BALANCE DISORDER: ICD-10-CM

## 2025-06-04 PROCEDURE — 97530 THERAPEUTIC ACTIVITIES: CPT | Performed by: PHYSICAL THERAPIST

## 2025-06-04 PROCEDURE — 97164 PT RE-EVAL EST PLAN CARE: CPT | Performed by: PHYSICAL THERAPIST

## 2025-06-04 NOTE — PROGRESS NOTES
Physical Therapy 30-Day Progress Note     [facility]  [address] [place] [address]  [phone] (phone)  [phone] (fax)    Patient: Sukhdev Zayas   : 1940  Diagnosis/ICD-10 Code:  Frequent falls [R29.6]  Referring practitioner: Rachelle Patton PA-C  Date of Initial Visit: Type: THERAPY  Noted: 2024  Today's Date: 2025  Patient seen for 36 sessions      Subjective:     Clinical Progress: patient missed the last 3 weeks, he seems to have less energy, however gait pattern is improved, he did have another fall at home in late May  Home Program Compliance: N/A  Treatment has included:  aquatic therapy    Subjective   Patient was in the ED on 25 for generalized weakness with a recent fall.  X rays of chest, Knees and left ankle unremarkable.  Today patient complains of headache.  His wife feels this is due to his sinuses and they plan to try sudafed.     Objective      BP: 130/80 (left UE), difficult to get reading due to UE tremors.    Gait: Independent with even step length, narrow base of support, decreased stride length, upright posture    Functional outcome score: 2 Minute Walk Test:  215 ft.    MMT:  Hip Flexion: 4/5 pain in knees  Knee Extension Right 4-/5, painful, lacks full extension ROM right>left,  Left 4/5  Knee Flexion: Right 4-/5  Left 4+/5  Ankle DF  4/5 B   Ankle PF Able to perform standing partial heel raise with B legs (together)     Flexibility: Decreased in B hamstrings and Calf     Assessment & Plan       Assessment  Impairments: abnormal gait, abnormal or restricted ROM, activity intolerance, impaired balance, impaired physical strength, lacks appropriate home exercise program, pain with function and safety issue   Functional limitations: carrying objects, lifting, walking, uncomfortable because of pain, standing and unable to perform repetitive tasks   Assessment details: Matthew had a 3 week lapse in his treatment due to  having a fall in late May and then not feeling well.  He was seen in the ED May 28th for generalized weakness.  Today Matthew is  walking independently, however he does use his rollator when he feels weak.  His gait pattern is somewhat improved.  He has difficulty getting up from sitting and has balance issues with frequent falls.  He has Parkinsonism and they may try going back to a medicine to treat that which he has used in the past.  No new goals have been met.    Goals  Plan Goals:        STGs:  1 Patient will be able to walk across pool with supervision for 4-5 minutes with normal gait pattern.  MET  2. Patient will report at least 25% improvement in right side back pain for overall improved quality of life.   MET  3.  Patient will be able to transfer from sit to stand one time without UE support indicating improved LE strength and balance.  MET   4. Patient will improve his 2 minute walk test by 50 ft for improved LE strength, endurance and functional mobility.  MET     Long Term Goals:  1. Patient will improve his strength and balance and report no falls over a 4 week period.  This goal was previously met a month ago. However since that time he has had another fall      2. Patient will perform 2 minute walk test  ambulating over 300 feet with SBA without LOB with directional changes for improvements in safety with functional mobility. ONGOING    3. Patient will complete 30 -40 minutes of therapeutic exercise in a pool without increased pain to improve strength, balance and endurance.  MET    4. Patient with the assist of his wife will demonstrate independence with aquatic exercises that he can perform in his home pool this summer. ONGOING      Plan  Therapy options: will be seen for skilled therapy services  Other planned therapy interventions: AQUATIC THERAPY, GROUP THERAPY  Frequency: 2x week  Duration in weeks: 4  Treatment plan discussed with: patient (wife)  Plan details: Patient and his family will be  going on vacation next week.  He will reschedule the visits he missed and work towards him and his wife being confident with the prescribe aquatic exercise so together he can perform in their home pool this summer.            Recommendations: Continue as planned  Timeframe: 1 month  Prognosis to achieve goals: good    PT Signature: Bertin Sepulveda, PT  [place] License: [id number]      Based upon review of the patient's progress and continued therapy plan, it is my medical opinion that Sukhdev Zayas should continue physical therapy treatment at [facility]  [address] [place] [address] [address] [place] [place] [address]  [phone].    Signature: __________________________________  Rachelle Patton PA-C  NPI: [id number]                                          Timed:  Aquatic Therapy      40   mins [id number]    Therapeutic Activities  _____ mins 97531    Self-Care    _____ mins [id number]     Untimed;  Group Therapy           _____ mins 35553    Total Timed:               ______ mins

## 2025-06-06 ENCOUNTER — TREATMENT (OUTPATIENT)
Dept: PHYSICAL THERAPY | Facility: CLINIC | Age: 85
End: 2025-06-06
Payer: MEDICARE

## 2025-06-06 DIAGNOSIS — R29.6 FREQUENT FALLS: Primary | ICD-10-CM

## 2025-06-06 DIAGNOSIS — Z74.09 IMPAIRED MOBILITY: ICD-10-CM

## 2025-06-06 DIAGNOSIS — R26.89 BALANCE DISORDER: ICD-10-CM

## 2025-06-06 DIAGNOSIS — R29.898 LEG WEAKNESS, BILATERAL: ICD-10-CM

## 2025-06-06 PROCEDURE — 97113 AQUATIC THERAPY/EXERCISES: CPT | Performed by: PHYSICAL THERAPIST

## 2025-06-06 NOTE — PROGRESS NOTES
Re-Assessment / Re-Certification    [facility]  [address] [place] [address]  [phone] (phone)  [phone] (fax)    Patient: Sukhdev Zayas   : 1940  Diagnosis/ICD-10 Code:  Frequent falls [R29.6]  Referring practitioner: Rachelle Patton PA-C  Date of Initial Visit: Type: THERAPY  Noted: 2024  Today's Date: 2025  Patient seen for 36 sessions      Subjective:     Functional Outcome Score: 2 Minute Walk Test  Clinical Progress: overall improved, did have some recent regression with another fall at home and feeling weak  Home Program Compliance: N/A, waiting for home pool to open  Treatment has included:  aquatic therapy    Subjective   Patient was in the ED on 25 for generalized weakness with a recent fall.  X rays of chest unremarkable and Knees and left ankle do not show fracture. CT of head - no intracranial hemorrhage or hydrocephalus.  Today patient complains of headache.  His wife feels this is due to his sinuses and they plan to try sudafed.    Objective      BP: 130/80 (left UE), difficult to get reading due to UE tremors, multiple attempts.    Gait: Independent with even step length, narrow base of support, decreased stride length, upright posture (although he does have increased thoracic kyphosis).    Functional outcome score: 2 Minute Walk Test:  215 ft.    MMT:  Hip Flexion: 4/5 pain in knees  Knee Extension Right 4-/5, painful, lacks full extension ROM right>left,  Left 4/5  Knee Flexion: Right 4-/5  Left 4+/5  Ankle DF  4/5 B  Ankle PF Able to perform standing partial heel raise with B legs (together)     Flexibility: Decreased in B hamstrings and Calf        Assessment & Plan       Assessment  Impairments: abnormal gait, abnormal or restricted ROM, activity intolerance, impaired balance, impaired physical strength, lacks appropriate home exercise program, pain with function and safety issue   Functional limitations: carrying  objects, lifting, walking, uncomfortable because of pain, standing and unable to perform repetitive tasks   Assessment details: Matthew had a 3 week lapse in his treatment due to having a fall in late May and then not feeling well enough to participate in physical therapy.  He was seen in the ED May 28th for generalized weakness (multiple tests performed).  He and his wife opted to return home vs being admitted to the hospital.  He has Parkinsonism, His wife states he may try going back on a medicine he used previously to treat his Parkinsonism.  Matthew has a long history of frequent falls.  Matthew is walking independently today, however he does use his rollator when he feels weak.  His gait pattern is somewhat improved and is limited by chronic back pain.  Matthew has difficulty with sit to stand transfers and relies heavily on his UEs for assistance.  Matthew has been attending aqua P.T. since January 15, 2025 with gradual improvement in his mobility and energy.  He has a pool at home that will be opening soon. His wife plans to assist him in the pool to continue the prescribed exercises.  More family teaching is needed at this time. No new goals have been met.  He has met all 4 short term goals and 2 of 4 long term goals.    Goals  Plan Goals:        STGs:  1 Patient will be able to walk across pool with supervision for 4-5 minutes with normal gait pattern.  MET  2. Patient will report at least 25% improvement in right side back pain for overall improved quality of life.   MET  3.  Patient will be able to transfer from sit to stand one time without UE support indicating improved LE strength and balance.  MET   4. Patient will improve his 2 minute walk test by 50 ft for improved LE strength, endurance and functional mobility.  MET     Long Term Goals:  1. Patient will improve his strength and balance and report no falls over a 4 week period.  This goal was previously met, until he had recent fall in May    2. Patient will perform 2  minute walk test  ambulating over 300 feet with SBA without LOB with directional changes for improvements in safety with functional mobility. ONGOING, walks 215 ft    3. Patient will complete 30 -40 minutes of therapeutic exercise in a pool without increased pain to improve strength, balance and endurance.  MET    4. Patient with the assist of his wife will demonstrate independence with aquatic exercises that he can perform in his home pool this summer. NEW, ONGOING      Plan  Therapy options: will be seen for skilled therapy services  Other planned therapy interventions: AQUATIC THERAPY, GROUP THERAPY  Frequency: 2x week  Duration in weeks: 4  Treatment plan discussed with: patient and family      Progress toward previous goals: Partially Met        Recommendations: Continue with recommendations extend physical therapy 2X/week for 2-4 weeks   Timeframe: 1 month  Prognosis to achieve goals: good    PT Signature: Bertin Sepulveda PT  [place] License: [id number]    Timed:  Aquatic Therapy    -     mins 88216    Therapeutic Activities  __14___ mins 15619    Self-Care     _____ mins 64052     Untimed;  ReEvaluation                  25    mins 70220    Total Timed:               ______ mins    Based upon review of the patient's progress and continued therapy plan, it is my medical opinion that Sukhdev Zayas should continue physical therapy treatment at [facility]  [address] [place] [address] [phone].  Please fax signed copy to [phone]    Signature: __________________________________  Rachelle Patton, GINI  AMBPTSIG    Electronically signed by Bertin Sepulveda, PT, 06/06/25, 4:27 PM EDT    DATE TREATMENT INITIATED: 6/4/25      90 Day Recertification  Certification Period: 9/4/2025  I certify that the therapy services are furnished while this patient is under my care.  The services outlined above are required by this patient, and will be reviewed every 90  days.     PHYSICIAN: Rachelle Patton PA-C   NPI: [id number]                                         DATE:     Please sign and return via fax to [phone] Thank you, [facility].

## 2025-06-06 NOTE — PROGRESS NOTES
Physical Therapy Daily Treatment Note    [facility]  [address] [place] [address]  [phone] (phone)  [phone] (fax)    Patient: Sukhdev Zayas   : 1940  Diagnosis/ICD-10 Code:  Frequent falls [R29.6]  Referring practitioner: Rachelle Patton PA-C  Date of Initial Visit: Type: THERAPY  Noted: 2024  Today's Date: 2025  Patient seen for 37 sessions             Subjective   Legs are feeling stronger today.  Leaving for vacation tomorrow, travelling by car, will stop over night half way (son is driving).  Has appt with Pain Management for his back when he returns. Back pain varies.    Objective     AQUATIC EX:  [x]Water Walk Forwards, Sidestepping and Backwards 5 min.   [x]Wall Crawl (BKTC) Stretch  w/ Noodle  30 sec. X 3              [x]Hamstring Stretch assisted seated 20 sec each                   []Shoulder Flex/Ext AROM (core stability/balance) 10X                 [x]Calf Stretch 10 sec X 3 ea         []Ball Toss into box 15X SBA (No LOB)         []Decompression w/ hips/knees flexed w/ Noodle/rail support   [x]Abdominals- Large solid Noodle Pushdowns  10X                                []Hip Abd/Add 8X ea  []Sit to Stands 10X, SBA               []SLRs   2 X 5 ea             []March in Place  holding solid noodle, 10x SBA  [x]Mini Squat 5x, started to bother knees and back              []B Toe/Heel Raises  10X                                      []Step Ups    4 inch X 6 ea                 [x]Bicycle  seated  2  min.                                 []Seated LAQs  10X ea assist to keep thigh from floating up         [] Shoulder horiz Abd/Add AROM  X 20              []Leg Press   w/ lg foam noodle X 10 each      Assessment/Plan  Patient will be on vacation next week.  His home pool is not yet open, still working on the heater.  More Family teaching needed with wife to ensure he is able to carry over P.T. exercises to home pool with assist of  wife.  He did not have energy to perform as many exercises as he was doing in mid May and required more rest  breaks today.          Timed:  Aquatic Therapy    40     mins 13592;    Therapeutic Activities  _____ mins 95177    Self-Care    _____ mins 74232     Untimed;  Group Therapy           _____ mins 15057    Total Timed:               ______ mins    Bertin Sepulveda, PT  Physical Therapist    [place] License:  [id number]

## 2025-06-17 DIAGNOSIS — G20.C PRIMARY PARKINSONISM: ICD-10-CM

## 2025-06-18 RX ORDER — NABUMETONE 500 MG/1
500 TABLET, FILM COATED ORAL 2 TIMES DAILY PRN
Qty: 60 TABLET | Refills: 0 | Status: SHIPPED | OUTPATIENT
Start: 2025-06-18

## 2025-06-20 ENCOUNTER — TELEPHONE (OUTPATIENT)
Dept: PHYSICAL THERAPY | Facility: CLINIC | Age: 85
End: 2025-06-20

## 2025-06-25 ENCOUNTER — TREATMENT (OUTPATIENT)
Dept: PHYSICAL THERAPY | Facility: CLINIC | Age: 85
End: 2025-06-25
Payer: MEDICARE

## 2025-06-25 DIAGNOSIS — R26.89 BALANCE DISORDER: ICD-10-CM

## 2025-06-25 DIAGNOSIS — R29.898 LEG WEAKNESS, BILATERAL: ICD-10-CM

## 2025-06-25 DIAGNOSIS — Z74.09 IMPAIRED MOBILITY: ICD-10-CM

## 2025-06-25 DIAGNOSIS — R29.6 FREQUENT FALLS: Primary | ICD-10-CM

## 2025-06-25 PROCEDURE — 97113 AQUATIC THERAPY/EXERCISES: CPT | Performed by: PHYSICAL THERAPIST

## 2025-06-25 NOTE — PROGRESS NOTES
6Physical Therapy Daily Treatment Note    [facility]  [address] [place] [address]  [phone] (phone)  [phone] (fax)    Patient: Sukhdev Zayas   : 1940  Diagnosis/ICD-10 Code:  Frequent falls [R29.6]  Referring practitioner: Rachelle Patton PA-C  Date of Initial Visit: Type: THERAPY  Noted: 2024  Today's Date: 2025  Patient seen for 38 sessions             Subjective   Patient's wife reports he fell while on vacation when trying to sit on a bench.  They did not seek medical attention, (their daughter is a Dr and was present).  Patient and his wife got into his home pool on Monday.    Objective     AQUATIC EX:  []Water Walk Forwards, Sidestepping and Backwards 5 min.   [x]Wall Crawl (BKTC) Stretch  w/ Noodle  30 sec.              [x]Hamstring Stretch assisted seated 20 sec x 2 each                   [x]Shoulder Flex/Ext AROM (core stability/balance) 5X                 [x]Calf Stretch 20 sec X 2 ea               [x]Decompression w/ hips/knees flexed w/ Noodle/rail support 1 min  [x]Abdominals- Large solid Noodle Pushdowns  10X                                [x]Hip Abd/Add 8X ea  []Sit to Stands 10X, SBA               [x]SLRs    5 x ea             [x]March in Place 10x ea  [x]Mini Squat 7x            [x]B Toe/Heel Raises  10X                                      []Step Ups    4 inch X 6 ea                 [x]Bicycle  seated  2  min.                                 [x]Shoulder horiz Abd/Add AROM  X 5             [x]Leg Press   w/ lg foam noodle X 6 each     Assessment/Plan  Continued Family teaching with Matthew's wife in pool today. Used pictures and written directions of the prescribed exercises.  Also taught his wife how to help him return to standing from a floating position on the noodle and how to use tactile cues for upright posture.  Plan: One more session of family teaching, then discharge with home exercise program in the pool assisted  by his wife.      Timed:  Aquatic Therapy    40     mins 94464;    Therapeutic Activities  _____ mins 44505    Self-Care    _____ mins 93451     Untimed;  Group Therapy           _____ mins 49557    Total Timed:               ______ mins    Bertin Sepulveda, PT  Physical Therapist    [place] License:  [id number]

## 2025-06-27 ENCOUNTER — TELEPHONE (OUTPATIENT)
Dept: PHYSICAL THERAPY | Facility: CLINIC | Age: 85
End: 2025-06-27

## 2025-06-27 NOTE — TELEPHONE ENCOUNTER
Caller: Taty Zayas    Relationship:  Emergency Contact    Telephone Information:   Mobile [phone]        PATIENT CALLED REQUESTING TO CANCEL SAME DAY APPT.    Did the patient call AFTER the start time of their scheduled appointment?  []YES  [x]NO    Was the patient's appointment rescheduled? []YES  [x]NO    Any additional information: PATIENT IS SICK, SPOUSE REQUESTS TO HAVE THE EXERCISES EMAILED TO THEM

## 2025-07-26 DIAGNOSIS — G20.C PRIMARY PARKINSONISM: ICD-10-CM

## 2025-07-28 RX ORDER — NABUMETONE 500 MG/1
500 TABLET, FILM COATED ORAL 2 TIMES DAILY PRN
Qty: 60 TABLET | Refills: 0 | Status: SHIPPED | OUTPATIENT
Start: 2025-07-28

## 2025-08-06 ENCOUNTER — OFFICE VISIT (OUTPATIENT)
Dept: INTERNAL MEDICINE | Facility: CLINIC | Age: 85
End: 2025-08-06
Payer: MEDICARE

## 2025-08-06 VITALS — HEIGHT: 72 IN | WEIGHT: 184 LBS | BODY MASS INDEX: 24.92 KG/M2

## 2025-08-06 DIAGNOSIS — G20.C PRIMARY PARKINSONISM: ICD-10-CM

## 2025-08-06 DIAGNOSIS — R42 DIZZINESS: Primary | ICD-10-CM

## 2025-08-06 DIAGNOSIS — F39 MOOD DISORDER: ICD-10-CM

## 2025-08-06 PROCEDURE — 1125F AMNT PAIN NOTED PAIN PRSNT: CPT | Performed by: PHYSICIAN ASSISTANT

## 2025-08-06 PROCEDURE — 99215 OFFICE O/P EST HI 40 MIN: CPT | Performed by: PHYSICIAN ASSISTANT

## 2025-08-06 PROCEDURE — G2211 COMPLEX E/M VISIT ADD ON: HCPCS | Performed by: PHYSICIAN ASSISTANT

## 2025-08-11 ENCOUNTER — TELEPHONE (OUTPATIENT)
Dept: INTERNAL MEDICINE | Facility: CLINIC | Age: 85
End: 2025-08-11
Payer: MEDICARE

## 2025-08-11 DIAGNOSIS — G20.C PRIMARY PARKINSONISM: ICD-10-CM

## 2025-08-11 DIAGNOSIS — G31.84 MCI (MILD COGNITIVE IMPAIRMENT): Primary | ICD-10-CM

## 2025-08-11 DIAGNOSIS — F39 MOOD DISORDER: ICD-10-CM

## (undated) DEVICE — GLV SURG BIOGEL LTX PF 7

## (undated) DEVICE — TUBING, SUCTION, 1/4" X 20', STRAIGHT: Brand: MEDLINE INDUSTRIES, INC.

## (undated) DEVICE — TIDISHIELD UROLOGY DRAIN BAGS FROSTY VINYL STERILE FITS SIEMENS UROSKOP ACCESS 20 PER CASE: Brand: TIDISHIELD

## (undated) DEVICE — GLV SURG TRIUMPH PF LTX 7.5 STRL

## (undated) DEVICE — EXTENSION SET, MALE LUER LOCK ADAPTER WITH RETRACTABLE COLLAR

## (undated) DEVICE — LOU CYSTO: Brand: MEDLINE INDUSTRIES, INC.

## (undated) DEVICE — Device: Brand: DEFENDO AIR/WATER/SUCTION AND BIOPSY VALVE

## (undated) DEVICE — PAD GRND REM POLYHESIVE A/ DISP

## (undated) DEVICE — LOU LAP CHOLE: Brand: MEDLINE INDUSTRIES, INC.

## (undated) DEVICE — SENSR O2 OXIMAX FNGR A/ 18IN NONSTR

## (undated) DEVICE — Device: Brand: PORTEX

## (undated) DEVICE — Device: Brand: OLYMPUS

## (undated) DEVICE — SUT VIC 0 TN 27IN DYED JTN0G

## (undated) DEVICE — NDL SPINE 22G 31/2IN BLK

## (undated) DEVICE — ENDOPATH XCEL UNIVERSAL TROCAR STABLILITY SLEEVES: Brand: ENDOPATH XCEL

## (undated) DEVICE — Device

## (undated) DEVICE — APPL HEMO SURG ARISTA/AH/FLEXITIP XL 38CM

## (undated) DEVICE — EPIDURAL TRAY: Brand: MEDLINE INDUSTRIES, INC.

## (undated) DEVICE — GLV SURG TRIUMPH PF LTX 7 STRL

## (undated) DEVICE — GLV SURG PREMIERPRO ORTHO LTX PF SZ7.5 BRN

## (undated) DEVICE — LOU TUR: Brand: MEDLINE INDUSTRIES, INC.

## (undated) DEVICE — NITINOL STONE RETRIEVAL BASKET: Brand: ZERO TIP

## (undated) DEVICE — 3M™ STERI-STRIP™ COMPOUND BENZOIN TINCTURE 40 BAGS/CARTON 4 CARTONS/CASE C1544: Brand: 3M™ STERI-STRIP™

## (undated) DEVICE — CATH CHOLANG 4.5F18IN BRGNDY

## (undated) DEVICE — TUBING, SUCTION, 1/4" X 10', STRAIGHT: Brand: MEDLINE

## (undated) DEVICE — CANN NASL CO2 TRULINK W/O2 A/

## (undated) DEVICE — BITEBLOCK OMNI BLOC

## (undated) DEVICE — RETRIEVAL BALLOON CATHETER: Brand: EXTRACTOR™ PRO RX

## (undated) DEVICE — DRSNG TELFA PAD NONADH STR 1S 3X4IN

## (undated) DEVICE — TIDISHIELD UROLOGY DRAIN BAGS FROSTY VINYL FITS SIEMENS UROSKOP ACCESS 20 PER CASE: Brand: TIDISHIELD

## (undated) DEVICE — ENDOPATH PNEUMONEEDLE INSUFFLATION NEEDLES WITH LUER LOCK CONNECTORS 120MM: Brand: ENDOPATH

## (undated) DEVICE — NDL EPID TUOHY 18G 31/2IN

## (undated) DEVICE — CATH COUVALAIRE SIMPLASTIC 3WY 24F 30CC

## (undated) DEVICE — SUT MNCRYL PLS ANTIB UD 4/0 PS2 18IN

## (undated) DEVICE — ENDOPATH XCEL BLADELESS TROCARS WITH STABILITY SLEEVES: Brand: ENDOPATH XCEL

## (undated) DEVICE — APPL CHLORAPREP W/TINT 26ML ORNG

## (undated) DEVICE — TOWEL,OR,DSP,ST,BLUE,STD,4/PK,20PK/CS: Brand: MEDLINE

## (undated) DEVICE — SPHINCTEROTOME: Brand: HYDRATOME RX 44

## (undated) DEVICE — TUR/ENDOSCOPIC CABLE, 10' (3.05 M): Brand: CONMED

## (undated) DEVICE — BAG,DRAINAGE,4L,A/R TOWER,LL,SLIDE TAP: Brand: MEDLINE

## (undated) DEVICE — GOWN,NON-REINFORCED,SIRUS,SET IN SLV,XL: Brand: MEDLINE

## (undated) DEVICE — ENDOPOUCH RETRIEVER SPECIMEN RETRIEVAL BAGS: Brand: ENDOPOUCH RETRIEVER

## (undated) DEVICE — ENDOCUT SCISSOR TIP, DISPOSABLE: Brand: RENEW

## (undated) DEVICE — DRP C/ARM 41X74IN

## (undated) DEVICE — PAD GRND E/S NT200IX RF/GEN W/CABL DISP

## (undated) DEVICE — PRT BIOP SEALS

## (undated) DEVICE — FIBR LASR HOLMIUM ACCUMAX 1000 1PT USE

## (undated) DEVICE — 3M™ STERI-STRIP™ REINFORCED ADHESIVE SKIN CLOSURES, R1547, 1/2 IN X 4 IN (12 MM X 100 MM), 6 STRIPS/ENVELOPE: Brand: 3M™ STERI-STRIP™

## (undated) DEVICE — DEV LK WIREGUIDE FUSN OLYMP SCP

## (undated) DEVICE — STPCK 3WY D201 DISCOFIX

## (undated) DEVICE — ERBE NESSY®PLATE 170 SPLIT; 168CM²; CABLE 3M: Brand: ERBE

## (undated) DEVICE — IRRIGATOR TOOMEY 70CC